# Patient Record
Sex: MALE | Race: WHITE | NOT HISPANIC OR LATINO | Employment: OTHER | URBAN - METROPOLITAN AREA
[De-identification: names, ages, dates, MRNs, and addresses within clinical notes are randomized per-mention and may not be internally consistent; named-entity substitution may affect disease eponyms.]

---

## 2017-01-26 ENCOUNTER — RADIANT APPOINTMENT (OUTPATIENT)
Dept: GENERAL RADIOLOGY | Facility: CLINIC | Age: 82
End: 2017-01-26
Attending: FAMILY MEDICINE
Payer: MEDICARE

## 2017-01-26 ENCOUNTER — OFFICE VISIT (OUTPATIENT)
Dept: FAMILY MEDICINE | Facility: CLINIC | Age: 82
End: 2017-01-26
Payer: MEDICARE

## 2017-01-26 VITALS
SYSTOLIC BLOOD PRESSURE: 148 MMHG | HEIGHT: 70 IN | RESPIRATION RATE: 14 BRPM | DIASTOLIC BLOOD PRESSURE: 80 MMHG | WEIGHT: 268 LBS | HEART RATE: 61 BPM | TEMPERATURE: 97.5 F | BODY MASS INDEX: 38.37 KG/M2

## 2017-01-26 DIAGNOSIS — M17.0 OSTEOARTHRITIS OF BOTH KNEES, UNSPECIFIED OSTEOARTHRITIS TYPE: Primary | ICD-10-CM

## 2017-01-26 DIAGNOSIS — M17.0 OSTEOARTHRITIS OF BOTH KNEES, UNSPECIFIED OSTEOARTHRITIS TYPE: ICD-10-CM

## 2017-01-26 PROCEDURE — 73562 X-RAY EXAM OF KNEE 3: CPT | Mod: RT

## 2017-01-26 PROCEDURE — 73562 X-RAY EXAM OF KNEE 3: CPT | Mod: LT

## 2017-01-26 PROCEDURE — 99214 OFFICE O/P EST MOD 30 MIN: CPT | Performed by: FAMILY MEDICINE

## 2017-01-26 NOTE — NURSING NOTE
"Chief Complaint   Patient presents with     Knee Pain     both knees are painful, left side goes up his leg to his left buttocks       Initial /79 mmHg  Pulse 61  Temp(Src) 97.5  F (36.4  C) (Oral)  Resp 14  Ht 5' 10\" (1.778 m)  Wt 268 lb (121.564 kg)  BMI 38.45 kg/m2 Estimated body mass index is 38.45 kg/(m^2) as calculated from the following:    Height as of this encounter: 5' 10\" (1.778 m).    Weight as of this encounter: 268 lb (121.564 kg).  BP completed using cuff size: large rt arm Marlene Juarez MA      "

## 2017-01-26 NOTE — PROGRESS NOTES
SUBJECTIVE:                                                    Damien Vallecillo is a 81 year old male who presents to clinic today for the following health issues:    Osteoarthritis of both knees, unspecified osteoarthritis type  (primary encounter diagnosis): Patient states that he has been experiencing bilateral pain in both knees. He reports that he has been in severe pain and can hardly walk. In his left knee he notes that the pain radiates up the leg to the buttock. The patient was previously prescribed Nabumetone a few months ago but states that it did not relieve pain.   PSHx The patient had a left knee replacement in [id number].  He has pain ever since  Past Medical History   Diagnosis Date     Hematuria      Acute, but ill-defined, cerebrovascular disease      Malignant neoplasm of rectosigmoid junction (H)      Gout, unspecified      Lentigo maligna (H)      Syncope      Glucose intolerance (impaired glucose tolerance) 5/31/2013     Arthritis      Acute suppurative arthritis (H)      Tubulovillous adenoma of colon      Chronic headaches      Other convulsions      last seizure 5-6 years ago...not certain if these were true seizres or not..     Diabetes (H)      Pre-diabetes 10/21/2016     Morbid obesity due to excess calories (H) 10/21/2016     Hammer toe of left foot 10/21/2016     Venous stasis dermatitis of both lower extremities 10/21/2016       ROS: Bilaterally knee pain, fall with no loss of consciousness. Slipped on the ice. No knee trauma, cardiac sx    This document serves as a record of the services and decisions personally performed and made by Alexandr Crocker MD. It was created on his behalf by Juice Smith a trained medical scribe. The creation of this document is based the provider's statements to the medical scribe. Juice Smith January 26, 2017 9:40 AM  OBJECTIVE:                                                    /79 mmHg  Pulse 61  Temp(Src) 97.5  F (36.4  C) (Oral)  Resp 14  Ht  "1.778 m (5' 10\")  Wt 121.564 kg (268 lb)  BMI 38.45 kg/m2  Body mass index is 38.45 kg/(m^2).  BP Readings from Last 1 Encounters:   01/26/17 148/80   GEN: Healthy, Alert, No distress  MS: Trace effusion in knees    XR bilat severe OA patellae, right with medial joint space loss  ASSESSMENT/PLAN:                                                    (M17.0) Osteoarthritis of both knees, unspecified osteoarthritis type  (primary encounter diagnosis)  Comment: right knee with loss of medial joint space, bilateral OA knee caps  Plan: XR Knee Left 3 Views, XR Knee Right 3 Views           ORTHO  REFERRAL PT now   systolic appropriate for this pt  RCK depending upon ortho    The information in this document, created by a scribe for me, accurately reflects the services I personally performed and the decisions made by me. I have reviewed and approved this document for accuracy. 9:41 AM January 26, 2017    ALICE HAMEED MD  [facility]  "

## 2017-01-26 NOTE — MR AVS SNAPSHOT
After Visit Summary   1/26/2017    Damien Vallecillo    MRN: [id number]           Patient Information     Date Of Birth          1935        Visit Information        Provider Department      1/26/2017 9:30 AM Obi Strange MD [facility]        Today's Diagnoses     Osteoarthritis of both knees, unspecified osteoarthritis type    -  1        Follow-ups after your visit        Additional Services     ORTHO  REFERRAL       [facility] is referring you to the Orthopedic  Services at [facility].       The  Representative will assist you in the coordination of your Orthopedic and Musculoskeletal Care as prescribed by your physician.    The  Representative will call you within 1 business day to help schedule your appointment, or you may contact the  Representative at:    All areas ~ [phone]     Type of Referral : Surgical / Specialist       Timeframe requested: Within 2 weeks    Coverage of these services is subject to the terms and limitations of your health insurance plan.  Please call member services at your health plan with any benefit or coverage questions.      If X-rays, CT or MRI's have been performed, please contact the facility where they were done to arrange for , prior to your scheduled appointment.  Please bring this referral request to your appointment and present it to your specialist.                  Who to contact     If you have questions or need follow up information about today's clinic visit or your schedule please contact [facility] directly at [phone].  Normal or non-critical lab and imaging results will be communicated to you by MyChart, letter or phone within 4 business days after the clinic has received the results. If you do not hear from us within 7 days, please contact the clinic through MyChart or phone. If you have a critical or abnormal  "lab result, we will notify you by phone as soon as possible.  Submit refill requests through [employer] or call your pharmacy and they will forward the refill request to us. Please allow 3 business days for your refill to be completed.          Additional Information About Your Visit        MyChart Information     [employer] lets you send messages to your doctor, view your test results, renew your prescriptions, schedule appointments and more. To sign up, go to [email or web address] . Click on \"Log in\" on the left side of the screen, which will take you to the Welcome page. Then click on \"Sign up Now\" on the right side of the page.     You will be asked to enter the access code listed below, as well as some personal information. Please follow the directions to create your username and password.     Your access code is: [email or web address]  Expires: 2017 10:31 AM     Your access code will  in 90 days. If you need help or a new code, please call your [facility] clinic or [phone].        Care EveryWhere ID     This is your Care EveryWhere ID. This could be used by other organizations to access your [facility] medical records  [id number]        Your Vitals Were     Pulse Temperature Respirations Height BMI (Body Mass Index)       61 97.5  F (36.4  C) (Oral) 14 5' 10\" (1.778 m) 38.45 kg/m2        Blood Pressure from Last 3 Encounters:   17 148/80   10/21/16 132/68   08/10/15 142/74    Weight from Last 3 Encounters:   17 268 lb (121.564 kg)   10/21/16 268 lb 14.4 oz (121.972 kg)   08/10/15 263 lb 10.7 oz (119.6 kg)              We Performed the Following     ORTHO  REFERRAL        Primary Care Provider Office Phone # Fax #    Obi Strange [phone]       [facility] [address] [place] [address]        Thank you!     Thank you for choosing [facility]  for your care. Our goal is always to provide you with excellent care. Hearing back " from our patients is one way we can continue to improve our services. Please take a few minutes to complete the written survey that you may receive in the mail after your visit with us. Thank you!             Your Updated Medication List - Protect others around you: Learn how to safely use, store and throw away your medicines at www.disposemymeds.org.          This list is accurate as of: 1/26/17 10:31 AM.  Always use your most recent med list.                   Brand Name Dispense Instructions for use    ACE NOT PRESCRIBED (INTENTIONAL)     0 each    ACE Inhibitor not prescribed due to Other:  Had cough on ACEI in past       acetaminophen 325 MG tablet    TYLENOL    100 tablet    Take 2 tablets (650 mg) by mouth every 4 hours as needed for mild pain       amoxicillin 500 MG capsule    AMOXIL    30 capsule    Take four pills one hour before dental work       atorvastatin 40 MG tablet    LIPITOR    90 tablet    Take 1 tablet (40 mg) by mouth At Bedtime       * blood glucose monitoring test strip    ACCU-CHEK LAURA    100 strip    1Use to test blood sugar 2 times daily or as directed.       * blood glucose monitoring test strip    ACCU-CHEK LAURA PLUS    100 each    Use to test blood sugar 1 time daily       CINNAMON PO          fish oil-omega-3 fatty acids 1000 MG capsule      Take 1 g by mouth every evening       flax seed oil 1000 MG capsule      Take 1 capsule by mouth every evening       Glucosamine HCl 1000 MG Tabs      Take 1 tablet by mouth every evening       tamsulosin 0.4 MG capsule    FLOMAX    90 capsule    Take 1 capsule (0.4 mg) by mouth At Bedtime       * Notice:  This list has 2 medication(s) that are the same as other medications prescribed for you. Read the directions carefully, and ask your doctor or other care provider to review them with you.

## 2017-01-27 ENCOUNTER — TELEPHONE (OUTPATIENT)
Dept: FAMILY MEDICINE | Facility: CLINIC | Age: 82
End: 2017-01-27

## 2017-01-27 ENCOUNTER — OFFICE VISIT (OUTPATIENT)
Dept: ORTHOPEDICS | Facility: CLINIC | Age: 82
End: 2017-01-27
Payer: MEDICARE

## 2017-01-27 VITALS
SYSTOLIC BLOOD PRESSURE: 148 MMHG | BODY MASS INDEX: 38.37 KG/M2 | HEIGHT: 70 IN | DIASTOLIC BLOOD PRESSURE: 80 MMHG | WEIGHT: 268 LBS

## 2017-01-27 DIAGNOSIS — M25.561 ARTHRALGIA OF RIGHT LOWER LEG: ICD-10-CM

## 2017-01-27 DIAGNOSIS — M17.11 PRIMARY LOCALIZED OSTEOARTHROSIS, LOWER LEG, RIGHT: ICD-10-CM

## 2017-01-27 DIAGNOSIS — M25.562 CHRONIC PAIN OF LEFT KNEE: Primary | ICD-10-CM

## 2017-01-27 DIAGNOSIS — G89.29 CHRONIC PAIN OF LEFT KNEE: Primary | ICD-10-CM

## 2017-01-27 PROCEDURE — 20610 DRAIN/INJ JOINT/BURSA W/O US: CPT | Mod: RT | Performed by: ORTHOPAEDIC SURGERY

## 2017-01-27 PROCEDURE — 99203 OFFICE O/P NEW LOW 30 MIN: CPT | Mod: 25 | Performed by: ORTHOPAEDIC SURGERY

## 2017-01-27 ASSESSMENT — PATIENT HEALTH QUESTIONNAIRE - PHQ9: SUM OF ALL RESPONSES TO PHQ QUESTIONS 1-9: 2

## 2017-01-27 NOTE — TELEPHONE ENCOUNTER
Patients wife phoned stating he had an appt. In [place] today at 1;30 and needed  To take images with them. I burned a cd with images from 1/26/17 of bilateral knees.  Printed report and left a release for him to sign up front.  Nanda Nguyen  [facility]  [phone]  Fax [phone]

## 2017-01-27 NOTE — PROGRESS NOTES
HISTORY OF PRESENT ILLNESS:    Damien Vallecillo is a 81 year old male who is seen in consultation at the request of Dr. Strange for Bilateral knee pain.  Pt. Would like to discuss options for pain relief    Present symptoms: Pain while ambulating is very difficult, pain during sleep  Treatments tried to this point: Injections: Ibuprofen, elevating leg  Orthopedic PMH: Left knee replacement 2002/2003     Past Medical History   Diagnosis Date     Hematuria      Acute, but ill-defined, cerebrovascular disease      Malignant neoplasm of rectosigmoid junction (H)      Gout, unspecified      Lentigo maligna (H)      Syncope      Glucose intolerance (impaired glucose tolerance) 5/31/2013     Arthritis      Acute suppurative arthritis (H)      Tubulovillous adenoma of colon      Chronic headaches      Other convulsions      last seizure 5-6 years ago...not certain if these were true seizres or not..     Diabetes (H)      Pre-diabetes 10/21/2016     Morbid obesity due to excess calories (H) 10/21/2016     Hammer toe of left foot 10/21/2016     Venous stasis dermatitis of both lower extremities 10/21/2016       Past Surgical History   Procedure Laterality Date     C nonspecific procedure       right heel surgery; spur removed.     Orthopedic surgery       lt knee arthroplasty     Herniorrhaphy epigastric  4/27/2012     Procedure:HERNIORRHAPHY EPIGASTRIC; Epigastric Hernia Repair with mesh ; Surgeon:BOLIVAR OLIVEIRA; Location: OR     Sigmoidoscopy flexible  5/29/2013     Procedure: SIGMOIDOSCOPY FLEXIBLE;  SIGMOIDOSCOPY FLEXIBLE (FV) Needs blood sugar ck'd;  Surgeon: Enrique Salazar MD;  Location:  GI     Colonoscopy N/A 6/23/2015     Procedure: COMBINED COLONOSCOPY, SINGLE OR MULTIPLE BIOPSY/POLYPECTOMY BY BIOPSY;  Surgeon: Kimberly Alfaro MD;  Location:  GI     Colonoscopy N/A 7/30/2015     Procedure: COLONOSCOPY;  Surgeon: Enrique Salazar MD;  Location:  OR     Laparoscopic assisted colectomy Right 7/30/2015      Procedure: LAPAROSCOPIC ASSISTED COLECTOMY;  Surgeon: Enrique Salazar MD;  Location: [facility] OR       Family History   Problem Relation Age of Onset     Cancer - colorectal Mother      CEREBROVASCULAR DISEASE Father        Social History     Social History     Marital Status:      Spouse Name: N/A     Number of Children: N/A     Years of Education: N/A     Occupational History     Not on file.     Social History Main Topics     Smoking status: Never Smoker      Smokeless tobacco: Never Used     Alcohol Use: 0.0 oz/week     0 Standard drinks or equivalent per week      Comment: one or two per day, beer, wine     Drug Use: No     Sexual Activity:     Partners: Female     Other Topics Concern     Parent/Sibling W/ Cabg, Mi Or Angioplasty Before 65f 55m? No     Social History Narrative       Current Outpatient Prescriptions   Medication Sig Dispense Refill     CINNAMON PO        tamsulosin (FLOMAX) 0.4 MG 24 hr capsule Take 1 capsule (0.4 mg) by mouth At Bedtime 90 capsule 3     atorvastatin (LIPITOR) 40 MG tablet Take 1 tablet (40 mg) by mouth At Bedtime 90 tablet 3     acetaminophen (TYLENOL) 325 MG tablet Take 2 tablets (650 mg) by mouth every 4 hours as needed for mild pain 100 tablet 3     blood glucose monitoring (ACCU-CHEK LAURA PLUS) test strip Use to test blood sugar 1 time daily 100 each 11     blood glucose (ACCU-CHEK LAURA) test strip 1Use to test blood sugar 2 times daily or as directed. 100 strip 3     fish oil-omega-3 fatty acids 1000 MG capsule Take 1 g by mouth every evening       Flaxseed, Linseed, (FLAX SEED OIL) 1000 MG capsule Take 1 capsule by mouth every evening       Glucosamine HCl 1000 MG TABS Take 1 tablet by mouth every evening       amoxicillin (AMOXIL) 500 MG capsule Take four pills one hour before dental work 30 capsule 0     ACE NOT PRESCRIBED, INTENTIONAL, ACE Inhibitor not prescribed due to Other:  Had cough on ACEI in past       0 each 0       Allergies   Allergen Reactions      "Levetiracetam [Keppra] Rash     Oxcarbazepine [Trileptal] Rash       REVIEW OF SYSTEMS:  CONSTITUTIONAL:  NEGATIVE for fever, chills, change in weight  INTEGUMENTARY/SKIN:  NEGATIVE for worrisome rashes, moles or lesions  EYES:  NEGATIVE for vision changes or irritation  ENT/MOUTH:  NEGATIVE for ear, mouth and throat problems  RESP:  NEGATIVE for significant cough or SOB  BREAST:  NEGATIVE for masses, tenderness or discharge  CV:  NEGATIVE for chest pain, palpitations or peripheral edema  GI:  NEGATIVE for nausea, abdominal pain, heartburn, or change in bowel habits  :  Negative   MUSCULOSKELETAL:  See HPI above  NEURO:  NEGATIVE for weakness, dizziness or paresthesias  ENDOCRINE:  NEGATIVE for temperature intolerance, skin/hair changes  HEME/ALLERGY/IMMUNE:  NEGATIVE for bleeding problems  PSYCHIATRIC:  NEGATIVE for changes in mood or affect      PHYSICAL EXAM:  /80 mmHg  Ht 5' 10\" (1.778 m)  Wt 268 lb (121.564 kg)  BMI 38.45 kg/m2  Body mass index is 38.45 kg/(m^2).   GENERAL APPEARANCE: healthy, alert and no distress   SKIN: no suspicious lesions or rashes  NEURO: Normal strength and tone, mentation intact and speech normal  VASCULAR: Good pulses, and capillary refill   LYMPH: no lymphadenopathy   PSYCH:  mentation appears normal and affect normal/bright    MSK:  A&OX3, NAD  Neck supple, no lymphadenopathy  The patient ambulates without an antalgic gait.  The patient is able to get on and off the exam table without difficulty.      Examination of the spine reveals a normal lordosis to the cervical and lumbar spine, and a normal kyphosis to the thoracic spine.  There is no clinical evidence of scoliosis.    The pelvis is clinically level.  Trendelenberg is negative.  The patient is non-tender to palpation over the greater trochanteric bursal region or piriformis fossa.  With the hip flexed to 90 degrees, internal and external rotation is 30/60 respectively,  with no pain .      KNEE: Examination of the " bilateral knee reveals the lower extremity to have a Normal anatomic alignment.  There is no erythema, ecchymosis nor edema. The left knee surgical scar is well-healed. There is moderate effusion present clinically.  There is no significant warmth.  Range of motion is 0 to 120 degrees, without crepitus.  There is mild tenderness to palpation at the both medial and lateral joint line.  Delia's is not done . The knee is ligamentously stable. Patello-femoral grind test is not done.      The calves and thighs are symmetric, without atrophy and non-tender to palpation. Navid's sign is negative, bilaterally.   CMS is intact to the toes.        ASSESSMENT / PLAN: 10 years status post left total knee arthroplasty, possibly loosening. I ordered a bone scan to look for loosening. The right knee has primary osteoarthritis getting close to end-stage. At this point I offered him a corticosteroid injection to the right knee, which he accepted. Following a sterile prep, 40 mg of Depo-Medrol 4 mg of dexamethasone along with 3 cc 1% lidocaine was instilled into his right knee.    Imaging Interpretation:         Mark Raymond MD  Department of Orthopedic Surgery        Disclaimer: This note consists of symbols derived from keyboarding, dictation and/or voice recognition software. As a result, there may be errors in the script that have gone undetected. Please consider this when interpreting information found in this chart.

## 2017-01-27 NOTE — PATIENT INSTRUCTIONS
*Follow up as needed or as discussed with your care provider  *See Chart Note for specific details      Your Bone scan has been ordered.  [facility] Scheduling will contact you to schedule.  Please call [phone] if you have not heard from them in two days.     [facility]  [address] [facility]   [place], [place] [address] [phone]      Arrive 15 minutes early.  If you need to cancel or change the appointment please call [phone]      Please follow up in clinic 2 business days following the Bone Scan

## 2017-01-27 NOTE — NURSING NOTE
"Chief Complaint   Patient presents with     Musculoskeletal Problem     knee pain       Initial /80 mmHg  Ht 5' 10\" (1.778 m)  Wt 268 lb (121.564 kg)  BMI 38.45 kg/m2 Estimated body mass index is 38.45 kg/(m^2) as calculated from the following:    Height as of this encounter: 5' 10\" (1.778 m).    Weight as of this encounter: 268 lb (121.564 kg).  BP completed using cuff size: NA (Not Taken)    Iliana Herzog, RT (R)      "

## 2017-01-27 NOTE — MR AVS SNAPSHOT
After Visit Summary   1/27/2017    Damien Vallecillo    MRN: [id number]           Patient Information     Date Of Birth          1935        Visit Information        Provider Department      1/27/2017 1:20 PM Alec Raymond MD [facility] ORTHOPEDIC SURGERY        Today's Diagnoses     Chronic pain of left knee    -  1     Primary localized osteoarthrosis, lower leg, right [M17.11]         Arthralgia of right lower leg [M25.561]           Care Instructions    *Follow up as needed or as discussed with your care provider  *See Chart Note for specific details      Your Bone scan has been ordered.  [facility] Scheduling will contact you to schedule.  Please call [phone] if you have not heard from them in two days.     [facility]  [address] [place] [address] [phone]      Arrive 15 minutes early.  If you need to cancel or change the appointment please call [phone]      Please follow up in clinic 2 business days following the Bone Scan                          Follow-ups after your visit        Your next 10 appointments already scheduled     Feb 02, 2017  1:50 PM   MOE Extremity with Vern Levy, PT   [facility]    [address]  [facility] [address] [phone]              Future tests that were ordered for you today     Open Future Orders        Priority Expected Expires Ordered    NM Bone Scan Whole Body Routine  1/27/2018 1/27/2017            Who to contact     If you have questions or need follow up information about today's clinic visit or your schedule please contact [facility] ORTHOPEDIC SURGERY directly at [phone].  Normal or non-critical lab and imaging results will be communicated to you by MyChart, letter or phone within 4 business days after the clinic has received the results. If you do not hear from us within 7 days, please  "contact the clinic through [employer] or phone. If you have a critical or abnormal lab result, we will notify you by phone as soon as possible.  Submit refill requests through [employer] or call your pharmacy and they will forward the refill request to us. Please allow 3 business days for your refill to be completed.          Additional Information About Your Visit        [employer] Information     [employer] lets you send messages to your doctor, view your test results, renew your prescriptions, schedule appointments and more. To sign up, go to [email or web address] . Click on \"Log in\" on the left side of the screen, which will take you to the Welcome page. Then click on \"Sign up Now\" on the right side of the page.     You will be asked to enter the access code listed below, as well as some personal information. Please follow the directions to create your username and password.     Your access code is: [email or web address]  Expires: 2017 10:31 AM     Your access code will  in 90 days. If you need help or a new code, please call your [facility] clinic or [phone].        Care EveryWhere ID     This is your Care EveryWhere ID. This could be used by other organizations to access your [facility] medical records  [id number]        Your Vitals Were     Height BMI (Body Mass Index)                5' 10\" (1.778 m) 38.45 kg/m2           Blood Pressure from Last 3 Encounters:   17 148/80   17 148/80   10/21/16 132/68    Weight from Last 3 Encounters:   17 268 lb (121.564 kg)   17 268 lb (121.564 kg)   10/21/16 268 lb 14.4 oz (121.972 kg)               Primary Care Provider Office Phone # Fax #    Obi Strange [phone]       [facility] [address] [place] [address]        Thank you!     Thank you for choosing [facility] ORTHOPEDIC SURGERY  for your care. Our goal is always to provide you with excellent care. Hearing back from our patients is one way we " can continue to improve our services. Please take a few minutes to complete the written survey that you may receive in the mail after your visit with us. Thank you!             Your Updated Medication List - Protect others around you: Learn how to safely use, store and throw away your medicines at www.disposemymeds.org.          This list is accurate as of: 1/27/17  1:51 PM.  Always use your most recent med list.                   Brand Name Dispense Instructions for use    ACE NOT PRESCRIBED (INTENTIONAL)     0 each    ACE Inhibitor not prescribed due to Other:  Had cough on ACEI in past       acetaminophen 325 MG tablet    TYLENOL    100 tablet    Take 2 tablets (650 mg) by mouth every 4 hours as needed for mild pain       amoxicillin 500 MG capsule    AMOXIL    30 capsule    Take four pills one hour before dental work       atorvastatin 40 MG tablet    LIPITOR    90 tablet    Take 1 tablet (40 mg) by mouth At Bedtime       * blood glucose monitoring test strip    ACCU-CHEK LAURA    100 strip    1Use to test blood sugar 2 times daily or as directed.       * blood glucose monitoring test strip    ACCU-CHEK LAURA PLUS    100 each    Use to test blood sugar 1 time daily       CINNAMON PO          fish oil-omega-3 fatty acids 1000 MG capsule      Take 1 g by mouth every evening       flax seed oil 1000 MG capsule      Take 1 capsule by mouth every evening       Glucosamine HCl 1000 MG Tabs      Take 1 tablet by mouth every evening       tamsulosin 0.4 MG capsule    FLOMAX    90 capsule    Take 1 capsule (0.4 mg) by mouth At Bedtime       * Notice:  This list has 2 medication(s) that are the same as other medications prescribed for you. Read the directions carefully, and ask your doctor or other care provider to review them with you.

## 2017-02-01 ENCOUNTER — TELEPHONE (OUTPATIENT)
Dept: ORTHOPEDICS | Facility: CLINIC | Age: 82
End: 2017-02-01

## 2017-02-01 NOTE — TELEPHONE ENCOUNTER
Alejandrina called on behalf of Damien looking to see about scheduling the Bone scan as they have not been called yet.    I called back and LVM that the order had more recently been signed and that they should get a call soon or they can call ([phone]) to schedule.    Vitor Thomason, ATC

## 2017-02-02 NOTE — TELEPHONE ENCOUNTER
Wife calls stating scheduling has not called to set up a bone scan. Informed of message below and phone number given to call to schedule. Apologized for the delay.     MARK Ontiveros RN

## 2017-02-03 ENCOUNTER — HOSPITAL ENCOUNTER (OUTPATIENT)
Dept: NUCLEAR MEDICINE | Facility: CLINIC | Age: 82
Setting detail: NUCLEAR MEDICINE
Discharge: HOME | End: 2017-02-03
Attending: ORTHOPAEDIC SURGERY | Admitting: ORTHOPAEDIC SURGERY
Payer: MEDICARE

## 2017-02-03 ENCOUNTER — HOSPITAL ENCOUNTER (OUTPATIENT)
Dept: NUCLEAR MEDICINE | Facility: CLINIC | Age: 82
Setting detail: NUCLEAR MEDICINE
End: 2017-02-03
Attending: ORTHOPAEDIC SURGERY
Payer: MEDICARE

## 2017-02-03 DIAGNOSIS — G89.29 CHRONIC PAIN OF LEFT KNEE: ICD-10-CM

## 2017-02-03 DIAGNOSIS — M25.562 CHRONIC PAIN OF LEFT KNEE: ICD-10-CM

## 2017-02-03 PROCEDURE — 34300033 ZZH RX 343: Performed by: ORTHOPAEDIC SURGERY

## 2017-02-03 PROCEDURE — A9561 TC99M OXIDRONATE: HCPCS | Performed by: ORTHOPAEDIC SURGERY

## 2017-02-03 PROCEDURE — 78315 BONE IMAGING 3 PHASE: CPT

## 2017-02-03 RX ADMIN — Medication 25 MCI.: at 11:15

## 2017-02-08 ENCOUNTER — OFFICE VISIT (OUTPATIENT)
Dept: ORTHOPEDICS | Facility: CLINIC | Age: 82
End: 2017-02-08
Payer: MEDICARE

## 2017-02-08 ENCOUNTER — TELEPHONE (OUTPATIENT)
Dept: ORTHOPEDICS | Facility: CLINIC | Age: 82
End: 2017-02-08

## 2017-02-08 VITALS
BODY MASS INDEX: 38.37 KG/M2 | SYSTOLIC BLOOD PRESSURE: 130 MMHG | HEIGHT: 70 IN | WEIGHT: 268 LBS | DIASTOLIC BLOOD PRESSURE: 78 MMHG

## 2017-02-08 DIAGNOSIS — T84.018D FAILED TOTAL KNEE ARTHROPLASTY, SUBSEQUENT ENCOUNTER: Primary | ICD-10-CM

## 2017-02-08 DIAGNOSIS — Z96.659 FAILED TOTAL KNEE ARTHROPLASTY, SUBSEQUENT ENCOUNTER: Primary | ICD-10-CM

## 2017-02-08 PROCEDURE — 99213 OFFICE O/P EST LOW 20 MIN: CPT | Performed by: ORTHOPAEDIC SURGERY

## 2017-02-08 NOTE — MR AVS SNAPSHOT
After Visit Summary   2/8/2017    Damien Vallecillo    MRN: [id number]           Patient Information     Date Of Birth          1935        Visit Information        Provider Department      2/8/2017 1:20 PM Alec Raymond MD [facility] ORTHOPEDIC SURGERY        Today's Diagnoses     Failed total knee arthroplasty, subsequent encounter    -  1       Follow-ups after your visit        Your next 10 appointments already scheduled     Feb 20, 2017   Procedure with Alec Raymond MD   [facility] PeriOp Services (--)    [address]  [facility] [address]   [phone]            Feb 24, 2017 10:50 AM CST   MOE Extremity with Vern Levy PT   [facility] ([facility] [place])    [address]  [facility] [id number] [phone]            Mar 03, 2017  9:40 AM CST   Return Visit with Thomas Govea PA-C   [facility] ORTHOPEDIC SURGERY ([facility] [place])    [address]  [facility] [address]   [phone]              Who to contact     If you have questions or need follow up information about today's clinic visit or your schedule please contact [facility] ORTHOPEDIC SURGERY directly at [phone].  Normal or non-critical lab and imaging results will be communicated to you by MyChart, letter or phone within 4 business days after the clinic has received the results. If you do not hear from us within 7 days, please contact the clinic through MyChart or phone. If you have a critical or abnormal lab result, we will notify you by phone as soon as possible.  Submit refill requests through [email or web address] or call your pharmacy and they will forward the refill request to us. Please allow 3 business days for your refill to be completed.          Additional Information About Your Visit        [email or web address] Information     [email or web address] lets you send messages to your doctor, view  "your test results, renew your prescriptions, schedule appointments and more. To sign up, go to [email or web address] . Click on \"Log in\" on the left side of the screen, which will take you to the Welcome page. Then click on \"Sign up Now\" on the right side of the page.     You will be asked to enter the access code listed below, as well as some personal information. Please follow the directions to create your username and password.     Your access code is: [email or web address]  Expires: 2017 10:31 AM     Your access code will  in 90 days. If you need help or a new code, please call your [place] clinic or [phone].        Care EveryWhere ID     This is your Care EveryWhere ID. This could be used by other organizations to access your [place] medical records  [id number]        Your Vitals Were     Height BMI (Body Mass Index)                5' 10\" (1.778 m) 38.45 kg/m2           Blood Pressure from Last 3 Encounters:   17 124/64   17 130/78   17 148/80    Weight from Last 3 Encounters:   17 266 lb 4.8 oz (120.8 kg)   17 268 lb (121.6 kg)   17 268 lb (121.6 kg)              Today, you had the following     No orders found for display       Primary Care Provider Office Phone # Fax #    Obi Strange [phone]       [address] [address]        Thank you!     Thank you for choosing [facility] ORTHOPEDIC SURGERY  for your care. Our goal is always to provide you with excellent care. Hearing back from our patients is one way we can continue to improve our services. Please take a few minutes to complete the written survey that you may receive in the mail after your visit with us. Thank you!             Your Updated Medication List - Protect others around you: Learn how to safely use, store and throw away your medicines at www.disposemymeds.org.          This list is accurate as of: 17 11:59 PM.  Always " use your most recent med list.                   Brand Name Dispense Instructions for use    ACE NOT PRESCRIBED (INTENTIONAL)     0 each    ACE Inhibitor not prescribed due to Other:  Had cough on ACEI in past       amoxicillin 500 MG capsule    AMOXIL    30 capsule    Take four pills one hour before dental work       atorvastatin 40 MG tablet    LIPITOR    90 tablet    Take 1 tablet (40 mg) by mouth At Bedtime       * blood glucose monitoring test strip    ACCU-CHEK LAURA    100 strip    1Use to test blood sugar 2 times daily or as directed.       * blood glucose monitoring test strip    ACCU-CHEK LAURA PLUS    100 each    Use to test blood sugar 1 time daily       CINNAMON PO          fish oil-omega-3 fatty acids 1000 MG capsule      Take 1 g by mouth every evening       flax seed oil 1000 MG capsule      Take 1 capsule by mouth every evening       Glucosamine HCl 1000 MG Tabs      Take 1 tablet by mouth every evening       tamsulosin 0.4 MG capsule    FLOMAX    90 capsule    Take 1 capsule (0.4 mg) by mouth At Bedtime       * Notice:  This list has 2 medication(s) that are the same as other medications prescribed for you. Read the directions carefully, and ask your doctor or other care provider to review them with you.

## 2017-02-08 NOTE — NURSING NOTE
"Chief Complaint   Patient presents with     Results     Bone Scan results, Left knee pain       Initial /78 mmHg  Ht 5' 10\" (1.778 m)  Wt 268 lb (121.564 kg)  BMI 38.45 kg/m2 Estimated body mass index is 38.45 kg/(m^2) as calculated from the following:    Height as of this encounter: 5' 10\" (1.778 m).    Weight as of this encounter: 268 lb (121.564 kg).  Medication Reconciliation: complete    "

## 2017-02-08 NOTE — TELEPHONE ENCOUNTER
Scheduled surgery for Revision Left total knee Arthroplasty on 2/20/2017 with Dr. Raymond @ [facility] @ 9:30AM.  Surgery education packet provided to patient.

## 2017-02-08 NOTE — PROGRESS NOTES
"HISTORY OF PRESENT ILLNESS:    Damien Vallecillo is a 81 year old male who is seen in follow up for left knee pain, Bone Scan results.  Pt states the cortisone injection in the right knee seems to have helped with the pain, rates current improvement at 50%.      PHYSICAL EXAM:  /78 mmHg  Ht 5' 10\" (1.778 m)  Wt 268 lb (121.564 kg)  BMI 38.45 kg/m2  Body mass index is 38.45 kg/(m^2).   GENERAL APPEARANCE: healthy, alert and no distress   SKIN: no suspicious lesions or rashes  NEURO: Normal strength and tone, mentation intact and speech normal  VASCULAR:  good pulses, and cappillary refill   LYMPH: no lymphadenopathy   PSYCH:  mentation appears normal and affect normal/bright    MSK:  Examination of his knees reveals no erythema ecchymosis nor edema. Surgical wound of the left knee is well-healed. Homans sign is negative. He has full extension and flexes to 110  on the left.    IMAGING INTERPRETATION:    Results for orders placed or performed during the hospital encounter of 02/03/17   NM Bone Scan 3 Phase    [facility]    NUCLEAR MEDICINE BONE SCAN THREE PHASE  2/3/2017 1:35 PM    HISTORY: Pain in left knee. Other chronic pain.    DOSE:  24.0mCI Tc99m HDP    COMPARISON:  Prior bone scan: None available.   Relevant imaging study: None.    FINDINGS: Flow images demonstrate mild increased blood flow to the  left knee area. Blood pool images demonstrate mild hyperemia  surrounding the left knee. Delayed images demonstrate a small focus of  increased activity beneath the medial tibial plateau component. This  could represent some loosening or resorption beneath the tibial  component. No significant abnormal uptake elsewhere.      Impression    IMPRESSION:  Slight increased flow and hyperemia surrounding the left  knee. Delayed images demonstrate abnormal increased uptake deep to the  medial tibial plateau component which could represent loosening or  resorption in this region. The increased flow and blood pool " activity  indicate inflammation that could be related to the loosening. It is  difficult to exclude infection based on this type of imaging.    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 We discussed revision total knee arthroplasty on the left and 8 primary total knee on the right. He would like to proceed with the former. We'll schedule this for later this month.      Return to clinic after surgery    Mark Raymond MD  Dept. Orthopedic Surgery  [facility]       Disclaimer: This note consists of symbols derived from keyboarding, dictation and/or voice recognition software. As a result, there may be errors in the script that have gone undetected. Please consider this when interpreting information found in this chart.

## 2017-02-13 ENCOUNTER — OFFICE VISIT (OUTPATIENT)
Dept: FAMILY MEDICINE | Facility: CLINIC | Age: 82
End: 2017-02-13
Payer: MEDICARE

## 2017-02-13 VITALS
WEIGHT: 266.3 LBS | HEIGHT: 70 IN | BODY MASS INDEX: 38.12 KG/M2 | TEMPERATURE: 97.8 F | SYSTOLIC BLOOD PRESSURE: 124 MMHG | DIASTOLIC BLOOD PRESSURE: 64 MMHG | HEART RATE: 55 BPM

## 2017-02-13 DIAGNOSIS — Z01.818 PREOP GENERAL PHYSICAL EXAM: Primary | ICD-10-CM

## 2017-02-13 LAB
BASOPHILS # BLD AUTO: 0.1 10E9/L (ref 0–0.2)
BASOPHILS NFR BLD AUTO: 0.7 %
DIFFERENTIAL METHOD BLD: ABNORMAL
EOSINOPHIL # BLD AUTO: 0.4 10E9/L (ref 0–0.7)
EOSINOPHIL NFR BLD AUTO: 5 %
ERYTHROCYTE [DISTWIDTH] IN BLOOD BY AUTOMATED COUNT: 19.6 % (ref 10–15)
HCT VFR BLD AUTO: 34.6 % (ref 40–53)
HGB BLD-MCNC: 10.8 G/DL (ref 13.3–17.7)
LYMPHOCYTES # BLD AUTO: 1 10E9/L (ref 0.8–5.3)
LYMPHOCYTES NFR BLD AUTO: 12.6 %
MCH RBC QN AUTO: 22.6 PG (ref 26.5–33)
MCHC RBC AUTO-ENTMCNC: 31.2 G/DL (ref 31.5–36.5)
MCV RBC AUTO: 73 FL (ref 78–100)
MONOCYTES # BLD AUTO: 1 10E9/L (ref 0–1.3)
MONOCYTES NFR BLD AUTO: 13.6 %
NEUTROPHILS # BLD AUTO: 5.2 10E9/L (ref 1.6–8.3)
NEUTROPHILS NFR BLD AUTO: 68.1 %
PLATELET # BLD AUTO: 234 10E9/L (ref 150–450)
RBC # BLD AUTO: 4.77 10E12/L (ref 4.4–5.9)
WBC # BLD AUTO: 7.6 10E9/L (ref 4–11)

## 2017-02-13 PROCEDURE — 36415 COLL VENOUS BLD VENIPUNCTURE: CPT | Performed by: FAMILY MEDICINE

## 2017-02-13 PROCEDURE — 99214 OFFICE O/P EST MOD 30 MIN: CPT | Performed by: FAMILY MEDICINE

## 2017-02-13 PROCEDURE — 85025 COMPLETE CBC W/AUTO DIFF WBC: CPT | Performed by: FAMILY MEDICINE

## 2017-02-13 PROCEDURE — 93000 ELECTROCARDIOGRAM COMPLETE: CPT | Performed by: FAMILY MEDICINE

## 2017-02-13 PROCEDURE — 80048 BASIC METABOLIC PNL TOTAL CA: CPT | Performed by: FAMILY MEDICINE

## 2017-02-13 NOTE — NURSING NOTE
"Chief Complaint   Patient presents with     Pre-Op Exam       Initial There were no vitals taken for this visit. Estimated body mass index is 38.45 kg/(m^2) as calculated from the following:    Height as of 2/8/17: 5' 10\" (1.778 m).    Weight as of 2/8/17: 268 lb (121.6 kg).  Medication Reconciliation: complete rt arm Marlene Juarez MA      "

## 2017-02-13 NOTE — LETTER
[facility]  [address] [place], [address]  [phone]      February 14, 2017    Damien Vallecillo  [address] [facility] [address]          Dear Damien,    The results of your recent tests are back. Tests are all OK for your surgery. Let's look at that anemia after you get done. Enclosed is a copy of the results.  It was a pleasure to see you at your last appointment.  Results for orders placed or performed in visit on 02/13/17   CBC with platelets and differential   Result Value Ref Range    WBC 7.6 4.0 - 11.0 10e9/L    RBC Count 4.77 4.4 - 5.9 10e12/L    Hemoglobin 10.8 (L) 13.3 - 17.7 g/dL    Hematocrit 34.6 (L) 40.0 - 53.0 %    MCV 73 (L) 78 - 100 fl    MCH 22.6 (L) 26.5 - 33.0 pg    MCHC 31.2 (L) 31.5 - 36.5 g/dL    RDW 19.6 (H) 10.0 - 15.0 %    Platelet Count 234 150 - 450 10e9/L    Diff Method Automated Method     % Neutrophils 68.1 %    % Lymphocytes 12.6 %    % Monocytes 13.6 %    % Eosinophils 5.0 %    % Basophils 0.7 %    Absolute Neutrophil 5.2 1.6 - 8.3 10e9/L    Absolute Lymphocytes 1.0 0.8 - 5.3 10e9/L    Absolute Monocytes 1.0 0.0 - 1.3 10e9/L    Absolute Eosinophils 0.4 0.0 - 0.7 10e9/L    Absolute Basophils 0.1 0.0 - 0.2 10e9/L   Basic metabolic panel  (Ca, Cl, CO2, Creat, Gluc, K, Na, BUN)   Result Value Ref Range    Sodium 140 133 - 144 mmol/L    Potassium 4.1 3.4 - 5.3 mmol/L    Chloride 106 94 - 109 mmol/L    Carbon Dioxide 27 20 - 32 mmol/L    Anion Gap 7 3 - 14 mmol/L    Glucose 111 (H) 70 - 99 mg/dL    Urea Nitrogen 12 7 - 30 mg/dL    Creatinine 0.74 0.66 - 1.25 mg/dL    GFR Estimate >90  Non  GFR Calc   >60 mL/min/1.7m2    GFR Estimate If Black >90   GFR Calc   >60 mL/min/1.7m2    Calcium 9.1 8.5 - 10.1 mg/dL         If you have any questions or concerns, please call myself or my nurse at [phone].          Sincerely,    Obi Strange MD  [id number]

## 2017-02-13 NOTE — MR AVS SNAPSHOT
After Visit Summary   2/13/2017    Damien Vallecillo    MRN: [id number]           Patient Information     Date Of Birth          1935        Visit Information        Provider Department      2/13/2017 10:45 AM Obi Strange MD [facility]        Today's Diagnoses     Preop general physical exam    -  1      Care Instructions      Before Your Surgery      Call your surgeon if there is any change in your health. This includes signs of a cold or flu (such as a sore throat, runny nose, cough, rash or fever).    Do not smoke, drink alcohol or take over the counter medicine (unless your surgeon or primary care doctor tells you to) for the 24 hours before and after surgery.    If you take prescribed drugs: Follow your doctor s orders about which medicines to take and which to stop until after surgery.    Eating and drinking prior to surgery: follow the instructions from your surgeon    Take a shower or bath the night before surgery. Use the soap your surgeon gave you to gently clean your skin. If you do not have soap from your surgeon, use your regular soap. Do not shave or scrub the surgery site.  Wear clean pajamas and have clean sheets on your bed.         Follow-ups after your visit        Your next 10 appointments already scheduled     Feb 20, 2017   Procedure with Alec Raymond MD   [facility] PeriOp Services (--)    [address] [facility] [id number] [phone] Feb 24, 2017 10:50 AM CST   MOE Extremity with Vern Levy, SARAH   [facility] ([facility] [place])    [address]  [facility] [address] [phone]            Mar 03, 2017  9:40 AM CST   Return Visit with Thomas Govea PA-C   [facility] ([facility] [place])    [address]  [address]  [facility] [address]   [phone]              Who to contact     If you  "have questions or need follow up information about today's clinic visit or your schedule please contact [facility] directly at [phone].  Normal or non-critical lab and imaging results will be communicated to you by MyChart, letter or phone within 4 business days after the clinic has received the results. If you do not hear from us within 7 days, please contact the clinic through [employer] or phone. If you have a critical or abnormal lab result, we will notify you by phone as soon as possible.  Submit refill requests through CallFire or call your pharmacy and they will forward the refill request to us. Please allow 3 business days for your refill to be completed.          Additional Information About Your Visit        [employer] Information     CallFire lets you send messages to your doctor, view your test results, renew your prescriptions, schedule appointments and more. To sign up, go to [email or web address] . Click on \"Log in\" on the left side of the screen, which will take you to the Welcome page. Then click on \"Sign up Now\" on the right side of the page.     You will be asked to enter the access code listed below, as well as some personal information. Please follow the directions to create your username and password.     Your access code is: [email or web address]  Expires: 2017 10:31 AM     Your access code will  in 90 days. If you need help or a new code, please call your [place] clinic or [phone].        Care EveryWhere ID     This is your Care EveryWhere ID. This could be used by other organizations to access your [place] medical records  [id number]        Your Vitals Were     Pulse Temperature Height BMI (Body Mass Index)          55 97.8  F (36.6  C) (Oral) 5' 10\" (1.778 m) 38.21 kg/m2         Blood Pressure from Last 3 Encounters:   17 142/83   17 130/78   17 148/80    Weight from Last 3 Encounters:   17 266 lb 4.8 oz (120.8 kg)   17 268 lb " (121.6 kg)   01/27/17 268 lb (121.6 kg)              We Performed the Following     Basic metabolic panel  (Ca, Cl, CO2, Creat, Gluc, K, Na, BUN)     CBC with platelets and differential     EKG 12-lead complete w/read - Clinics        Primary Care Provider Office Phone # Fax #    Obi Strange [phone]       [facility] [address]  [facility] [address]        Thank you!     Thank you for choosing [facility]  for your care. Our goal is always to provide you with excellent care. Hearing back from our patients is one way we can continue to improve our services. Please take a few minutes to complete the written survey that you may receive in the mail after your visit with us. Thank you!             Your Updated Medication List - Protect others around you: Learn how to safely use, store and throw away your medicines at www.disposemymeds.org.          This list is accurate as of: 2/13/17 11:26 AM.  Always use your most recent med list.                   Brand Name Dispense Instructions for use    ACE NOT PRESCRIBED (INTENTIONAL)     0 each    ACE Inhibitor not prescribed due to Other:  Had cough on ACEI in past       amoxicillin 500 MG capsule    AMOXIL    30 capsule    Take four pills one hour before dental work       atorvastatin 40 MG tablet    LIPITOR    90 tablet    Take 1 tablet (40 mg) by mouth At Bedtime       * blood glucose monitoring test strip    ACCU-CHEK LAURA    100 strip    1Use to test blood sugar 2 times daily or as directed.       * blood glucose monitoring test strip    ACCU-CHEK LAURA PLUS    100 each    Use to test blood sugar 1 time daily       CINNAMON PO          fish oil-omega-3 fatty acids 1000 MG capsule      Take 1 g by mouth every evening       flax seed oil 1000 MG capsule      Take 1 capsule by mouth every evening       Glucosamine HCl 1000 MG Tabs      Take 1 tablet by mouth every evening       tamsulosin 0.4 MG capsule     FLOMAX    90 capsule    Take 1 capsule (0.4 mg) by mouth At Bedtime       * Notice:  This list has 2 medication(s) that are the same as other medications prescribed for you. Read the directions carefully, and ask your doctor or other care provider to review them with you.

## 2017-02-13 NOTE — PROGRESS NOTES
[facility]  [address] [facility] [address]  [phone]  Dept: [phone]    PRE-OP EVALUATION:  Today's date: 2017    Damien Vallecillo (: 1935) presents for pre-operative evaluation assessment as requested by Dr. Raymond.  He requires evaluation and anesthesia risk assessment prior to undergoing surgery/procedure for treatment of Left knee replacemnt .  Proposed procedure: Left knee replacement    Date of Surgery/ Procedure: 2017  Time of Surgery/ Procedure: 8:00 am  Hospital/Surgical Facility: [facility]    Primary Physician: Obi Strange  Type of Anesthesia Anticipated: General    Patient has a Health Care Directive or Living Will:  NO    1. NO - Do you have a history of heart attack, stroke, stent, bypass or surgery on an artery in the head, neck, heart or legs?  2. NO - Do you ever have any pain or discomfort in your chest?  3. NO - Do you have a history of  Heart Failure?  4. NO - Are you troubled by shortness of breath when: walking on the level, up a slight hill or at night?  5. NO - Do you currently have a cold, bronchitis or other respiratory infection?  6. NO - Do you have a cough, shortness of breath or wheezing?  7. NO - Do you sometimes get pains in the calves of your legs when you walk?  8. NO - Do you or anyone in your family have previous history of blood clots?  9. NO - Do you or does anyone in your family have a serious bleeding problem such as prolonged bleeding following surgeries or cuts?  10. NO - Have you ever had problems with anemia or been told to take iron pills?  11. NO - Have you had any abnormal blood loss such as black, tarry or bloody stools, or abnormal vaginal bleeding?  13. NO - Have you or any of your relatives ever had problems with anesthesia?  14. NO - Do you have sleep apnea, excessive snoring or daytime drowsiness?  15. NO - Do you have any prosthetic heart valves?  17. NO - Is there any chance that you may be  pregnant?  12. Not sure - Have you ever had a blood transfusion?  16. Yes- Do you have prosthetic joints?      HPI:                                                      Brief HPI related to upcoming procedure: Left knee replacement . 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 Provider to review       Lower GI bleed 06/30/2015     Priority: Medium     GI bleed 06/30/2015     Priority: Medium     Cerebral infarction (H) 07/07/2014     Priority: Medium     Diagnosis updated by automated process. Provider to review and confirm.       Glucose intolerance (impaired glucose tolerance) 05/31/2013     Priority: Medium     Advanced directives, counseling/discussion 03/14/2012     Priority: Medium     ordered today.       CARDIOVASCULAR SCREENING; LDL GOAL LESS THAN 100 10/31/2010     Priority: Medium     24.9% 10 yr risk 4/2014       Thyroid nodule 06/11/2010     Priority: Medium     Gout      Priority: Medium     Problem list name updated by automated process. Provider to review       Effusion of lower leg joint 05/29/2003     Priority: Medium      Past Medical History   Diagnosis Date     Acute suppurative arthritis (H)      Acute, but ill-defined, cerebrovascular disease      Arthritis      Chronic headaches      Diabetes (H)      Glucose intolerance (impaired glucose tolerance) 5/31/2013     Gout, unspecified      Hammer toe of left foot 10/21/2016      Hematuria      Lentigo maligna (H)      Malignant neoplasm of rectosigmoid junction (H)      Morbid obesity due to excess calories (H) 10/21/2016     Other convulsions      last seizure 5-6 years ago...not certain if these were true seizres or not..     Pre-diabetes 10/21/2016     Syncope      Tubulovillous adenoma of colon      Venous stasis dermatitis of both lower extremities 10/21/2016     Past Surgical History   Procedure Laterality Date     C nonspecific procedure       right heel surgery; spur removed.     Orthopedic surgery       lt knee arthroplasty     Herniorrhaphy epigastric  4/27/2012     Procedure:HERNIORRHAPHY EPIGASTRIC; Epigastric Hernia Repair with mesh ; Surgeon:BOLIVAR OLIVEIRA; Location: OR     Sigmoidoscopy flexible  5/29/2013     Procedure: SIGMOIDOSCOPY FLEXIBLE;  SIGMOIDOSCOPY FLEXIBLE (FV) Needs blood sugar ck'd;  Surgeon: Enrique Salazar MD;  Location:  GI     Colonoscopy N/A 6/23/2015     Procedure: COMBINED COLONOSCOPY, SINGLE OR MULTIPLE BIOPSY/POLYPECTOMY BY BIOPSY;  Surgeon: Kimberly Alfaro MD;  Location:  GI     Colonoscopy N/A 7/30/2015     Procedure: COLONOSCOPY;  Surgeon: Enrique Salazar MD;  Location:  OR     Laparoscopic assisted colectomy Right 7/30/2015     Procedure: LAPAROSCOPIC ASSISTED COLECTOMY;  Surgeon: Enrique Salazar MD;  Location:  OR     Current Outpatient Prescriptions   Medication Sig Dispense Refill     CINNAMON PO        tamsulosin (FLOMAX) 0.4 MG 24 hr capsule Take 1 capsule (0.4 mg) by mouth At Bedtime 90 capsule 3     atorvastatin (LIPITOR) 40 MG tablet Take 1 tablet (40 mg) by mouth At Bedtime 90 tablet 3     blood glucose monitoring (ACCU-CHEK LAURA PLUS) test strip Use to test blood sugar 1 time daily 100 each 11     blood glucose (ACCU-CHEK LAURA) test strip 1Use to test blood sugar 2 times daily or as directed. 100 strip 3     fish oil-omega-3 fatty acids 1000 MG capsule Take 1 g by mouth every evening       Flaxseed, Linseed, (FLAX  "SEED OIL) 1000 MG capsule Take 1 capsule by mouth every evening       Glucosamine HCl 1000 MG TABS Take 1 tablet by mouth every evening       amoxicillin (AMOXIL) 500 MG capsule Take four pills one hour before dental work 30 capsule 0     ACE NOT PRESCRIBED, INTENTIONAL, ACE Inhibitor not prescribed due to Other:  Had cough on ACEI in past       0 each 0     OTC products: None, except as noted above    Allergies   Allergen Reactions     Levetiracetam [Keppra] Rash     Oxcarbazepine [Trileptal] Rash      Latex Allergy: NO    Social History   Substance Use Topics     Smoking status: Never Smoker     Smokeless tobacco: Never Used     Alcohol use 0.0 oz/week     0 Standard drinks or equivalent per week      Comment: one or two per day, beer, wine     History   Drug Use No       REVIEW OF SYSTEMS:                                                    Denies fever, chills, blurry vision, coughing, chest pain, SOB, palpations, diarrhea, constipation, dysuria    This document serves as a record of the services and decisions personally performed and made by Alexandr Crocker MD. It was created on his behalf by Juice Smith a trained medical scribe. The creation of this document is based the provider's statements to the medical scribe. Juice Smith February 13, 2017 10:59 AM  EXAM:                                                    /83 (BP Location: Right arm, Patient Position: Chair, Cuff Size: Adult Large)  Pulse 55  Temp 97.8  F (36.6  C) (Oral)  Ht 1.778 m (5' 10\")  Wt 120.8 kg (266 lb 4.8 oz)  BMI 38.21 kg/m2   BP Readings from Last 1 Encounters:   02/13/17 124/64   GEN: Healthy, Alert, No distress  EYES: pupils are symmetric  ENT: ears without cerumen, mucus membranes moist  NECK: no thyroidmegaly  CHEST: Clear to auscultation, respirations unlabored  HEART: S1S2 RRR no murmur nor apical displacement  ABD: soft without mass or organomegaly   MS: nper ortho   DIAGNOSTICS:                                                "     EKG: appears normal, NSR, normal axis, normal intervals, no acute ST/T changes c/w ischemia, no LVH by voltage criteria, unchanged from previous tracings    Recent Labs   Lab Test  10/21/16   1154  08/10/15   0835  08/08/15   0600   08/05/15   0650   08/03/15   0705   07/31/15   0705   HGB   --    --    --    --    --    --   10.4*   --   10.4*   PLT   --    --   268   --   252   --   230   < >  219   INR   --   1.15*   --    --   1.09   < >   --    --    --    NA  138  137  136   < >  137   < >  136   < >  139   POTASSIUM  4.6  4.3  4.4   < >  4.0   < >  4.2   < >  4.3   CR  0.84  0.73  0.68   < >  0.71   < >  0.79   < >  0.81   A1C  6.0   --    --    --    --    --    --    --   6.3*    < > = values in this interval not displayed.     IMPRESSION:                                                    Reason for surgery/procedure: Left knee replacement   Diagnosis/reason for consult: assess perioperative risk    The proposed surgical procedure is considered INTERMEDIATE risk.    REVISED CARDIAC RISK INDEX  The patient has the following serious cardiovascular risks for perioperative complications such as (MI, PE, VFib and 3  AV Block):  No serious cardiac risks  INTERPRETATION: 0 risks: Class I (very low risk - 0.4% complication rate)    The patient has the following additional risks for perioperative complications:  No identified additional risks  Remote History epilepsy, no treatment now    ICD-10-CM    1. Preop general physical exam Z01.818 EKG 12-lead complete w/read - Clinics     CBC with platelets and differential     Basic metabolic panel  (Ca, Cl, CO2, Creat, Gluc, K, Na, BUN)       RECOMMENDATIONS:                                                    Patient is to hold ALL medications on the AM of surgery     APPROVAL GIVEN to proceed with proposed procedure, without further diagnostic evaluation     Signed Electronically by: Obi Strange MD    The information in this document, created by a scribe for me,  accurately reflects the services I personally performed and the decisions made by me. I have reviewed and approved this document for accuracy. 11:02 AM February 13, 2017    Copy of this evaluation report is provided to requesting physician.    [facility] Preop Guidelines

## 2017-02-14 PROBLEM — R71.8 RBC MICROCYTOSIS: Status: ACTIVE | Noted: 2017-02-14

## 2017-02-14 LAB
ANION GAP SERPL CALCULATED.3IONS-SCNC: 7 MMOL/L (ref 3–14)
BUN SERPL-MCNC: 12 MG/DL (ref 7–30)
CALCIUM SERPL-MCNC: 9.1 MG/DL (ref 8.5–10.1)
CHLORIDE SERPL-SCNC: 106 MMOL/L (ref 94–109)
CO2 SERPL-SCNC: 27 MMOL/L (ref 20–32)
CREAT SERPL-MCNC: 0.74 MG/DL (ref 0.66–1.25)
GFR SERPL CREATININE-BSD FRML MDRD: ABNORMAL ML/MIN/1.7M2
GLUCOSE SERPL-MCNC: 111 MG/DL (ref 70–99)
POTASSIUM SERPL-SCNC: 4.1 MMOL/L (ref 3.4–5.3)
SODIUM SERPL-SCNC: 140 MMOL/L (ref 133–144)

## 2017-02-17 ENCOUNTER — OFFICE VISIT (OUTPATIENT)
Dept: FAMILY MEDICINE | Facility: CLINIC | Age: 82
End: 2017-02-17
Payer: MEDICARE

## 2017-02-17 ENCOUNTER — TELEPHONE (OUTPATIENT)
Dept: ORTHOPEDICS | Facility: CLINIC | Age: 82
End: 2017-02-17

## 2017-02-17 ENCOUNTER — TELEPHONE (OUTPATIENT)
Dept: FAMILY MEDICINE | Facility: CLINIC | Age: 82
End: 2017-02-17

## 2017-02-17 ENCOUNTER — RADIANT APPOINTMENT (OUTPATIENT)
Dept: GENERAL RADIOLOGY | Facility: CLINIC | Age: 82
End: 2017-02-17
Attending: FAMILY MEDICINE
Payer: MEDICARE

## 2017-02-17 VITALS
HEART RATE: 86 BPM | OXYGEN SATURATION: 95 % | TEMPERATURE: 99.6 F | RESPIRATION RATE: 18 BRPM | DIASTOLIC BLOOD PRESSURE: 66 MMHG | HEIGHT: 70 IN | SYSTOLIC BLOOD PRESSURE: 130 MMHG

## 2017-02-17 DIAGNOSIS — R05.9 COUGH: Primary | ICD-10-CM

## 2017-02-17 DIAGNOSIS — R05.9 COUGH: ICD-10-CM

## 2017-02-17 DIAGNOSIS — R94.2 ABNORMAL LUNG FUNCTION TEST: ICD-10-CM

## 2017-02-17 DIAGNOSIS — J10.1 INFLUENZA A: ICD-10-CM

## 2017-02-17 LAB
FEF 25/75: 33
FEV-1: 54
FEV1/FVC: 77
FLUAV+FLUBV AG SPEC QL: ABNORMAL
FLUAV+FLUBV AG SPEC QL: ABNORMAL
FVC: 70
SPECIMEN SOURCE: ABNORMAL

## 2017-02-17 PROCEDURE — 71020 XR CHEST 2 VW: CPT

## 2017-02-17 PROCEDURE — 94010 BREATHING CAPACITY TEST: CPT | Performed by: FAMILY MEDICINE

## 2017-02-17 PROCEDURE — 99214 OFFICE O/P EST MOD 30 MIN: CPT | Mod: 25 | Performed by: FAMILY MEDICINE

## 2017-02-17 PROCEDURE — 87804 INFLUENZA ASSAY W/OPTIC: CPT | Performed by: FAMILY MEDICINE

## 2017-02-17 RX ORDER — PREDNISONE 20 MG/1
40 TABLET ORAL DAILY
Qty: 10 TABLET | Refills: 0 | Status: SHIPPED | OUTPATIENT
Start: 2017-02-17 | End: 2017-02-22

## 2017-02-17 RX ORDER — OSELTAMIVIR PHOSPHATE 75 MG/1
75 CAPSULE ORAL 2 TIMES DAILY
Qty: 10 CAPSULE | Refills: 0 | Status: SHIPPED | OUTPATIENT
Start: 2017-02-17 | End: 2017-03-10

## 2017-02-17 NOTE — PROGRESS NOTES
"  SUBJECTIVE:                                                    Damien Vallecillo is a 81 year old male who presents to clinic today for the following health issues:    Acute Illness   Acute illness concerns: URI  Onset: Feb. 13th    Fever: YES - low grade today    Chills/Sweats: YES - Chills    Headache (location?): YES    Sinus Pressure:no    Conjunctivitis:  no    Ear Pain: no    Rhinorrhea: YES    Congestion: YES    Sore Throat: YES    Appetite: no    flucuations in bodily temperature: YES - cold     Cough: YES - productive of yellow sputum    Wheeze: no     Decreased Appetite: YES    Nausea: no    Vomiting: no    Diarrhea:  YES    Dysuria/Freq.: no    Fatigue/Achiness: YES    Sick/Strep Exposure: no    Patient had to cancel knee surgery      Therapies Tried and outcome: none    Social History     Social History     Marital status:      Spouse name: N/A     Number of children: N/A     Years of education: N/A     Occupational History     Not on file.     Social History Main Topics     Smoking status: Never Smoker     Smokeless tobacco: Never Used     Alcohol use 0.0 oz/week     0 Standard drinks or equivalent per week      Comment: Occas     Drug use: No     Sexual activity: Yes     Partners: Female     Other Topics Concern     Parent/Sibling W/ Cabg, Mi Or Angioplasty Before 65f 55m? No     Social History Narrative       ROS: See above    This document serves as a record of the services and decisions personally performed and made by Alexandr Crocker MD. It was created on his behalf by Juice Smith a trained medical scribe. The creation of this document is based the provider's statements to the medical scribe. Juice Smith February 17, 2017 1:56 PM  OBJECTIVE:                                                    /66 (BP Location: Right arm, Patient Position: Chair, Cuff Size: Adult Large)  Pulse 86  Temp 99.6  F (37.6  C) (Oral)  Resp 18  Ht 1.778 m (5' 10\")  SpO2 95%  There is no height or weight on " file to calculate BMI.  GEN: Healthy, Alert, No distress  CHEST: inspiratory wheezing to ausculation in all lung fields bilaterally    X-ray: negative  Results for orders placed or performed in visit on 02/17/17   Spirometry, Breathing Capacity: Normal Order, Clinic Performed   Result Value Ref Range    FEV-1 54     FVC 70     FEV1/FVC 77     FEF 25/75 33    Influenza A/B antigen   Result Value Ref Range    Influenza A/B Agn Specimen Nasal     Influenza A (A) NEG     Positive   Test results must be correlated with clinical data. If necessary, results   should be confirmed by a molecular assay or viral culture.      Influenza B  NEG     Negative   Test results must be correlated with clinical data. If necessary, results   should be confirmed by a molecular assay or viral culture.        cxr neg  ASSESSMENT/PLAN:                                                    (R05) Cough  (primary encounter diagnosis)  Comment: Broaden database. Reassuring imaging   Plan: Influenza A/B antigen, XR Chest 2 Views,         Spirometry, Breathing Capacity: Normal Order,         Clinic Performed, CANCELED: Influenza A/B         antigen            (J10.1) Influenza A  Comment: Rx  Plan: oseltamivir (TAMIFLU) 75 MG capsule            (R94.2) Abnormal lung function test  Comment: Rx  Plan: predniSONE (DELTASONE) 20 MG tablet            RCK in 2-3 weeks    The information in this document, created by a scribe for me, accurately reflects the services I personally performed and the decisions made by me. I have reviewed and approved this document for accuracy. 1:57 PM February 17, 2017    ALICE HAMEED MD  [facility]

## 2017-02-17 NOTE — MR AVS SNAPSHOT
"              After Visit Summary   2/17/2017    Damien Vallecillo    MRN: [id number]           Patient Information     Date Of Birth          1935        Visit Information        Provider Department      2/17/2017 1:45 PM Obi Strange MD [facility]        Today's Diagnoses     Cough    -  1    Influenza A        Abnormal lung function test           Follow-ups after your visit        Your next 10 appointments already scheduled     Mar 10, 2017  9:00 AM CST   SHORT with Obi Strange MD   [facility]    [address] [place] [id number] [phone]              Who to contact     If you have questions or need follow up information about today's clinic visit or your schedule please contact [facility] directly at [phone].  Normal or non-critical lab and imaging results will be communicated to you by MyChart, letter or phone within 4 business days after the clinic has received the results. If you do not hear from us within 7 days, please contact the clinic through MyChart or phone. If you have a critical or abnormal lab result, we will notify you by phone as soon as possible.  Submit refill requests through [employer] or call your pharmacy and they will forward the refill request to us. Please allow 3 business days for your refill to be completed.          Additional Information About Your Visit        MyChart Information     [employer] lets you send messages to your doctor, view your test results, renew your prescriptions, schedule appointments and more. To sign up, go to [email or web address] . Click on \"Log in\" on the left side of the screen, which will take you to the Welcome page. Then click on \"Sign up Now\" on the right side of the page.     You will be asked to enter the access code listed below, as well as some personal information. Please follow the directions to create your username and " "password.     Your access code is: [email or web address]  Expires: 2017 10:31 AM     Your access code will  in 90 days. If you need help or a new code, please call your [place] clinic or [phone].        Care EveryWhere ID     This is your Care EveryWhere ID. This could be used by other organizations to access your [place] medical records  [id number]        Your Vitals Were     Pulse Temperature Respirations Height Pulse Oximetry       86 99.6  F (37.6  C) (Oral) 18 5' 10\" (1.778 m) 95%        Blood Pressure from Last 3 Encounters:   17 130/66   17 124/64   17 130/78    Weight from Last 3 Encounters:   17 266 lb 4.8 oz (120.8 kg)   17 268 lb (121.6 kg)   17 268 lb (121.6 kg)              We Performed the Following     Influenza A/B antigen     Spirometry, Breathing Capacity: Normal Order, Clinic Performed          Today's Medication Changes          These changes are accurate as of: 17  8:04 PM.  If you have any questions, ask your nurse or doctor.               Start taking these medicines.        Dose/Directions    oseltamivir 75 MG capsule   Commonly known as:  TAMIFLU   Used for:  Influenza A   Started by:  Obi Strange MD        Dose:  75 mg   Take 1 capsule (75 mg) by mouth 2 times daily   Quantity:  10 capsule   Refills:  0       predniSONE 20 MG tablet   Commonly known as:  DELTASONE   Used for:  Abnormal lung function test   Started by:  Obi Strange MD        Dose:  40 mg   Take 2 tablets (40 mg) by mouth daily for 5 days   Quantity:  10 tablet   Refills:  0            Where to get your medicines      These medications were sent to [facility] [address]  [address] [facility] [address]     Phone:  [phone]     oseltamivir 75 MG capsule    predniSONE 20 MG tablet                Primary Care Provider Office Phone # Fax #    Obi Strange [phone]       [facility]" [place] [address] [address]  [facility] [address]        Thank you!     Thank you for choosing [facility]  for your care. Our goal is always to provide you with excellent care. Hearing back from our patients is one way we can continue to improve our services. Please take a few minutes to complete the written survey that you may receive in the mail after your visit with us. Thank you!             Your Updated Medication List - Protect others around you: Learn how to safely use, store and throw away your medicines at www.disposemymeds.org.          This list is accurate as of: 2/17/17  8:04 PM.  Always use your most recent med list.                   Brand Name Dispense Instructions for use    ACE NOT PRESCRIBED (INTENTIONAL)     0 each    ACE Inhibitor not prescribed due to Other:  Had cough on ACEI in past       amoxicillin 500 MG capsule    AMOXIL    30 capsule    Take four pills one hour before dental work       atorvastatin 40 MG tablet    LIPITOR    90 tablet    Take 1 tablet (40 mg) by mouth At Bedtime       * blood glucose monitoring test strip    ACCU-CHEK LAURA    100 strip    1Use to test blood sugar 2 times daily or as directed.       * blood glucose monitoring test strip    ACCU-CHEK LAURA PLUS    100 each    Use to test blood sugar 1 time daily       CINNAMON PO          fish oil-omega-3 fatty acids 1000 MG capsule      Take 1 g by mouth every evening       flax seed oil 1000 MG capsule      Take 1 capsule by mouth every evening       Glucosamine HCl 1000 MG Tabs      Take 1 tablet by mouth every evening       oseltamivir 75 MG capsule    TAMIFLU    10 capsule    Take 1 capsule (75 mg) by mouth 2 times daily       predniSONE 20 MG tablet    DELTASONE    10 tablet    Take 2 tablets (40 mg) by mouth daily for 5 days       tamsulosin 0.4 MG capsule    FLOMAX    90 capsule    Take 1 capsule (0.4 mg) by mouth At Bedtime       * Notice:  This list has 2 medication(s) that are the same as other  medications prescribed for you. Read the directions carefully, and ask your doctor or other care provider to review them with you.

## 2017-02-17 NOTE — TELEPHONE ENCOUNTER
Wife calls for patient stating patient has been very ill this week and would like surgery cancelled for 2/20/17 with Dr. Raymond. Patient is seeing his PCP this pm for URI/ possible pneumonia. Informed we will have surgery scheduling cancel at the hospital and will cancel follow up appointments with Moises Govea PA-C.   She will call back to reschedule but would like to get set up before pre-op time frame expires. Was appreciate of assistance.     Routing to Surgery scheduling.     MARK Ontiveros RN

## 2017-02-17 NOTE — NURSING NOTE
"Chief Complaint   Patient presents with     URI       Initial /66 (BP Location: Right arm, Patient Position: Chair, Cuff Size: Adult Large)  Pulse 86  Temp 99.6  F (37.6  C) (Oral)  Resp 18  Ht 5' 10\" (1.778 m)  SpO2 95% Estimated body mass index is 38.21 kg/(m^2) as calculated from the following:    Height as of 2/13/17: 5' 10\" (1.778 m).    Weight as of 2/13/17: 266 lb 4.8 oz (120.8 kg).  Medication Reconciliation: complete Josephine Campuzano MA  Health Maintenance has been reviewed.       "

## 2017-02-17 NOTE — TELEPHONE ENCOUNTER
Spouse calling states pt. Has been ill since 2/13/2017    Has productive cough with congestion, pt. Complains of weakness, fatigue ( sleeping all day), low appetite, and complaints of chills (did not check temp but had to increase home thermostat to 77).    Pt. Has tried robitussin along with fluids but no improvement over the last 3 days.     Triage spoke with patient and advised to be seen in clinic. Given sx may need further eval for pneumonia or flu.    Pt. And spouse expressed understanding.     OV made for BW today.     Essie Leija, RN, BSN, PHN

## 2017-02-19 RX ORDER — DEXAMETHASONE SODIUM PHOSPHATE 4 MG/ML
4 INJECTION, SOLUTION INTRA-ARTICULAR; INTRALESIONAL; INTRAMUSCULAR; INTRAVENOUS; SOFT TISSUE ONCE
Qty: 1 ML | Refills: 0 | OUTPATIENT
Start: 2017-02-19 | End: 2017-05-10

## 2017-02-19 RX ORDER — METHYLPREDNISOLONE ACETATE 40 MG/ML
40 INJECTION, SUSPENSION INTRA-ARTICULAR; INTRALESIONAL; INTRAMUSCULAR; SOFT TISSUE ONCE
Qty: 1 ML | Refills: 0 | OUTPATIENT
Start: 2017-02-19 | End: 2017-02-19

## 2017-03-10 ENCOUNTER — OFFICE VISIT (OUTPATIENT)
Dept: FAMILY MEDICINE | Facility: CLINIC | Age: 82
End: 2017-03-10
Payer: MEDICARE

## 2017-03-10 VITALS
RESPIRATION RATE: 20 BRPM | WEIGHT: 263.7 LBS | DIASTOLIC BLOOD PRESSURE: 68 MMHG | HEART RATE: 86 BPM | OXYGEN SATURATION: 99 % | SYSTOLIC BLOOD PRESSURE: 124 MMHG | HEIGHT: 70 IN | TEMPERATURE: 97.4 F | BODY MASS INDEX: 37.75 KG/M2

## 2017-03-10 DIAGNOSIS — R94.2 ABNORMAL PULMONARY FUNCTION TEST: Primary | ICD-10-CM

## 2017-03-10 DIAGNOSIS — I87.2 STASIS DERMATITIS OF BOTH LEGS: ICD-10-CM

## 2017-03-10 DIAGNOSIS — R05.9 COUGH: ICD-10-CM

## 2017-03-10 LAB
FEF 25/75: NORMAL
FEV-1: 89
FEV1/FVC: 89
FVC: 99

## 2017-03-10 PROCEDURE — 94010 BREATHING CAPACITY TEST: CPT | Performed by: FAMILY MEDICINE

## 2017-03-10 PROCEDURE — 99213 OFFICE O/P EST LOW 20 MIN: CPT | Mod: 25 | Performed by: FAMILY MEDICINE

## 2017-03-10 RX ORDER — GUAIFENESIN 600 MG/1
1200 TABLET, EXTENDED RELEASE ORAL 2 TIMES DAILY
Qty: 120 TABLET | Refills: 1 | Status: SHIPPED | OUTPATIENT
Start: 2017-03-10 | End: 2017-05-08

## 2017-03-10 RX ORDER — TRIAMCINOLONE ACETONIDE 5 MG/G
CREAM TOPICAL
Qty: 30 G | Refills: 1 | Status: SHIPPED | OUTPATIENT
Start: 2017-03-10 | End: 2021-09-16

## 2017-03-10 NOTE — PROGRESS NOTES
"  SUBJECTIVE:                                                    Damien Vallecillo is a 81 year old male who presents to clinic today for the following health issues:    Acute Illness   Acute illness concerns: URI  Onset: 1month    Fever: no     Chills/Sweats: no     Headache (location?): no     Sinus Pressure:no    Conjunctivitis:  no    Ear Pain: no    Rhinorrhea: no     Congestion: YES    Sore Throat: no      Cough: YES-non-productive    Wheeze: no     Decreased Appetite: no     Nausea: no     Vomiting: no     Diarrhea:  no     Dysuria/Freq.: no     Fatigue/Achiness: no     Sick/Strep Exposure: no      Therapies Tried and outcome: Tamiflu    Treated for influenza, still some \"junk\" in lungs  Stasis dermatitis of both legs: Patient states that he uses lotion to treat rash on lower extremities      ROS: Denies fever, chest pain, heart burn. Also, see above    This document serves as a record of the services and decisions personally performed and made by Alexandr Crocker MD. It was created on his behalf by Juice Smith a trained medical scribe. The creation of this document is based the provider's statements to the medical scribe. Juice Smith March 10, 2017 9:12 AM  OBJECTIVE:                                                    /68 (BP Location: Right arm, Patient Position: Chair, Cuff Size: Adult Regular)  Pulse 86  Temp 97.4  F (36.3  C) (Oral)  Resp 20  Ht 1.778 m (5' 10\")  Wt 119.6 kg (263 lb 11.2 oz)  SpO2 99%  BMI 37.84 kg/m2  Body mass index is 37.84 kg/(m^2).  GEN: Healthy, Alert, No distress  CHEST: Clear to auscultation, respirations unlabored    Results for orders placed or performed in visit on 03/10/17   Spirometry, Breathing Capacity: Normal Order, Clinic Performed   Result Value Ref Range    FEV-1 89     FVC 99     FEV1/FVC 89     FEF 25/75        No edema   Mild stasis dermatitis  ASSESSMENT/PLAN:                                                    (R94.2) Abnormal pulmonary function test  (primary " encounter diagnosis)  Comment: Broaden database. Reassuring spirometry. Rx   Plan: Spirometry, Breathing Capacity: Normal Order,         Clinic Performed, guaiFENesin (MUCINEX) 600 MG         12 hr tablet normalized            (R05) Cough  Comment: Rx  Plan: guaiFENesin (MUCINEX) 600 MG 12 hr tablet      (I83.11,  I83.12) Stasis dermatitis of both legs  Comment: Rx  Plan: triamcinolone (KENALOG) 0.5 % cream, emollients      RCK in 6 weeks     The information in this document, created by a scribe for me, accurately reflects the services I personally performed and the decisions made by me. I have reviewed and approved this document for accuracy. 9:13 AM March 10, 2017      ALICE HAMEED MD  [facility]

## 2017-03-10 NOTE — MR AVS SNAPSHOT
"              After Visit Summary   3/10/2017    Damien Vallecillo    MRN: [id number]           Patient Information     Date Of Birth          1935        Visit Information        Provider Department      3/10/2017 9:00 AM Obi Strange MD [facility]        Today's Diagnoses     Abnormal pulmonary function test    -  1    Cough        Stasis dermatitis of both legs          Care Instructions    Eucerin Cream to legs        Follow-ups after your visit        Who to contact     If you have questions or need follow up information about today's clinic visit or your schedule please contact [facility] directly at [phone].  Normal or non-critical lab and imaging results will be communicated to you by MyChart, letter or phone within 4 business days after the clinic has received the results. If you do not hear from us within 7 days, please contact the clinic through [employer] or phone. If you have a critical or abnormal lab result, we will notify you by phone as soon as possible.  Submit refill requests through [employer] or call your pharmacy and they will forward the refill request to us. Please allow 3 business days for your refill to be completed.          Additional Information About Your Visit        MyChart Information     [employer] lets you send messages to your doctor, view your test results, renew your prescriptions, schedule appointments and more. To sign up, go to [email or web address] . Click on \"Log in\" on the left side of the screen, which will take you to the Welcome page. Then click on \"Sign up Now\" on the right side of the page.     You will be asked to enter the access code listed below, as well as some personal information. Please follow the directions to create your username and password.     Your access code is: [email or web address]  Expires: 2017 10:31 AM     Your access code will  in 90 days. If you need help or a new code, please call your [place] clinic " "or [phone].        Care EveryWhere ID     This is your Care EveryWhere ID. This could be used by other organizations to access your [facility] medical records  [id number]        Your Vitals Were     Pulse Temperature Respirations Height Pulse Oximetry BMI (Body Mass Index)    86 97.4  F (36.3  C) (Oral) 20 5' 10\" (1.778 m) 99% 37.84 kg/m2       Blood Pressure from Last 3 Encounters:   03/10/17 124/68   02/17/17 130/66   02/13/17 124/64    Weight from Last 3 Encounters:   03/10/17 263 lb 11.2 oz (119.6 kg)   02/13/17 266 lb 4.8 oz (120.8 kg)   02/08/17 268 lb (121.6 kg)              We Performed the Following     Spirometry, Breathing Capacity: Normal Order, Clinic Performed          Today's Medication Changes          These changes are accurate as of: 3/10/17  9:51 AM.  If you have any questions, ask your nurse or doctor.               Start taking these medicines.        Dose/Directions    guaiFENesin 600 MG 12 hr tablet   Commonly known as:  MUCINEX   Used for:  Abnormal pulmonary function test, Cough   Started by:  Obi Strange MD        Dose:  1200 mg   Take 2 tablets (1,200 mg) by mouth 2 times daily   Quantity:  120 tablet   Refills:  1       triamcinolone 0.5 % cream   Commonly known as:  KENALOG   Used for:  Stasis dermatitis of both legs   Started by:  Obi Strange MD        Apply sparingly to affected area three times daily.   Quantity:  30 g   Refills:  1            Where to get your medicines      These medications were sent to [facility] [address] [address]     Phone:  [phone]     guaiFENesin 600 MG 12 hr tablet    triamcinolone 0.5 % cream                Primary Care Provider Office Phone # Fax #    Obi Strange [phone]       [address] [address]        Thank you!     Thank you for choosing [facility]  for your care. Our " goal is always to provide you with excellent care. Hearing back from our patients is one way we can continue to improve our services. Please take a few minutes to complete the written survey that you may receive in the mail after your visit with us. Thank you!             Your Updated Medication List - Protect others around you: Learn how to safely use, store and throw away your medicines at www.disposemymeds.org.          This list is accurate as of: 3/10/17  9:51 AM.  Always use your most recent med list.                   Brand Name Dispense Instructions for use    ACE NOT PRESCRIBED (INTENTIONAL)     0 each    ACE Inhibitor not prescribed due to Other:  Had cough on ACEI in past       amoxicillin 500 MG capsule    AMOXIL    30 capsule    Take four pills one hour before dental work       atorvastatin 40 MG tablet    LIPITOR    90 tablet    Take 1 tablet (40 mg) by mouth At Bedtime       * blood glucose monitoring test strip    ACCU-CHEK LAURA    100 strip    1Use to test blood sugar 2 times daily or as directed.       * blood glucose monitoring test strip    ACCU-CHEK LAURA PLUS    100 each    Use to test blood sugar 1 time daily       CINNAMON PO          dexamethasone 4 MG/ML injection    DECADRON    1 mL    Inject 1 mL (4 mg) as directed once for 1 dose Use 4 mg or dose determined by provider for iontophoresis.       fish oil-omega-3 fatty acids 1000 MG capsule      Take 1 g by mouth every evening       flax seed oil 1000 MG capsule      Take 1 capsule by mouth every evening       Glucosamine HCl 1000 MG Tabs      Take 1 tablet by mouth every evening       guaiFENesin 600 MG 12 hr tablet    MUCINEX    120 tablet    Take 2 tablets (1,200 mg) by mouth 2 times daily       tamsulosin 0.4 MG capsule    FLOMAX    90 capsule    Take 1 capsule (0.4 mg) by mouth At Bedtime       triamcinolone 0.5 % cream    KENALOG    30 g    Apply sparingly to affected area three times daily.       * Notice:  This list has 2  medication(s) that are the same as other medications prescribed for you. Read the directions carefully, and ask your doctor or other care provider to review them with you.

## 2017-04-17 ENCOUNTER — OFFICE VISIT (OUTPATIENT)
Dept: FAMILY MEDICINE | Facility: CLINIC | Age: 82
End: 2017-04-17
Payer: MEDICARE

## 2017-04-17 VITALS
HEIGHT: 70 IN | HEART RATE: 56 BPM | TEMPERATURE: 98 F | BODY MASS INDEX: 38.22 KG/M2 | DIASTOLIC BLOOD PRESSURE: 70 MMHG | SYSTOLIC BLOOD PRESSURE: 132 MMHG | RESPIRATION RATE: 12 BRPM | WEIGHT: 267 LBS | OXYGEN SATURATION: 100 %

## 2017-04-17 DIAGNOSIS — R79.9 ABNORMAL FINDING OF BLOOD CHEMISTRY: ICD-10-CM

## 2017-04-17 DIAGNOSIS — R06.09 DYSPNEA ON EXERTION: ICD-10-CM

## 2017-04-17 DIAGNOSIS — R09.82 POST-NASAL DRIP: ICD-10-CM

## 2017-04-17 DIAGNOSIS — R73.03 PRE-DIABETES: ICD-10-CM

## 2017-04-17 DIAGNOSIS — R05.9 COUGH: Primary | ICD-10-CM

## 2017-04-17 LAB — HBA1C MFR BLD: 5.9 % (ref 4.3–6)

## 2017-04-17 PROCEDURE — 99214 OFFICE O/P EST MOD 30 MIN: CPT | Performed by: FAMILY MEDICINE

## 2017-04-17 PROCEDURE — 83036 HEMOGLOBIN GLYCOSYLATED A1C: CPT | Performed by: FAMILY MEDICINE

## 2017-04-17 PROCEDURE — 84460 ALANINE AMINO (ALT) (SGPT): CPT | Performed by: FAMILY MEDICINE

## 2017-04-17 PROCEDURE — 36415 COLL VENOUS BLD VENIPUNCTURE: CPT | Performed by: FAMILY MEDICINE

## 2017-04-17 RX ORDER — FLUTICASONE PROPIONATE 50 MCG
1-2 SPRAY, SUSPENSION (ML) NASAL DAILY
Qty: 1 BOTTLE | Refills: 1 | Status: SHIPPED | OUTPATIENT
Start: 2017-04-17 | End: 2017-05-08

## 2017-04-17 NOTE — NURSING NOTE
"No chief complaint on file.      Initial Ht 5' 10\" (1.778 m)  Wt 267 lb (121.1 kg)  BMI 38.31 kg/m2 Estimated body mass index is 38.31 kg/(m^2) as calculated from the following:    Height as of this encounter: 5' 10\" (1.778 m).    Weight as of this encounter: 267 lb (121.1 kg).  Medication Reconciliation: complete   Nanda Darden, JAY      "

## 2017-04-17 NOTE — MR AVS SNAPSHOT
After Visit Summary   4/17/2017    Damien Vallecillo    MRN: [id number]           Patient Information     Date Of Birth          1935        Visit Information        Provider Department      4/17/2017 10:15 AM Obi Strange MD [facility]        Today's Diagnoses     Cough    -  1    Post-nasal drip        Pre-diabetes        Abnormal finding of blood chemistry         Dyspnea on exertion           Follow-ups after your visit        Additional Services     SLEEP EVALUATION & MANAGEMENT REFERRAL - ADULT       Please be aware that coverage of these services is subject to the terms and limitations of your health insurance plan.  Call member services at your health plan with any benefit or coverage questions.      Please bring the following to your appointment:    >>   List of current medications   >>   This referral request   >>   Any documents/labs given to you for this referral    List of [place] hospitals according to the [phone] (Age 18 and up)                  Your next 10 appointments already scheduled     May 08, 2017  9:00 AM CDT   SHORT with Obi Strange MD   [facility] ([facility]    [address] [place] [id number] [phone]              Future tests that were ordered for you today     Open Future Orders        Priority Expected Expires Ordered    SLEEP EVALUATION & MANAGEMENT REFERRAL - ADULT Routine  4/17/2018 4/17/2017            Who to contact     If you have questions or need follow up information about today's clinic visit or your schedule please contact [facility] directly at [phone].  Normal or non-critical lab and imaging results will be communicated to you by MyChart, letter or phone within 4 business days after the clinic has received the results. If you do not hear from us within 7 days, please contact the clinic through MyChart or phone. If you have a critical or abnormal  "lab result, we will notify you by phone as soon as possible.  Submit refill requests through [employer] or call your pharmacy and they will forward the refill request to us. Please allow 3 business days for your refill to be completed.          Additional Information About Your Visit        [employer] Information     [employer] lets you send messages to your doctor, view your test results, renew your prescriptions, schedule appointments and more. To sign up, go to [email or web address] . Click on \"Log in\" on the left side of the screen, which will take you to the Welcome page. Then click on \"Sign up Now\" on the right side of the page.     You will be asked to enter the access code listed below, as well as some personal information. Please follow the directions to create your username and password.     Your access code is: [email or web address]  Expires: 2017 11:31 AM     Your access code will  in 90 days. If you need help or a new code, please call your [facility] clinic or [phone].        Care EveryWhere ID     This is your Care EveryWhere ID. This could be used by other organizations to access your [facility] medical records  [id number]        Your Vitals Were     Pulse Temperature Respirations Height Pulse Oximetry BMI (Body Mass Index)    56 98  F (36.7  C) (Oral) 12 5' 10\" (1.778 m) 100% 38.31 kg/m2       Blood Pressure from Last 3 Encounters:   17 132/70   03/10/17 124/68   17 130/66    Weight from Last 3 Encounters:   17 267 lb (121.1 kg)   03/10/17 263 lb 11.2 oz (119.6 kg)   17 266 lb 4.8 oz (120.8 kg)              We Performed the Following     ALT     Hemoglobin A1c          Today's Medication Changes          These changes are accurate as of: 17 11:59 PM.  If you have any questions, ask your nurse or doctor.               Start taking these medicines.        Dose/Directions    fluticasone 50 MCG/ACT spray   Commonly known as:  FLONASE   Used for:  Post-nasal drip   Started by:  " Obi Strange MD        Dose:  1-2 spray   Spray 1-2 sprays into both nostrils daily   Quantity:  1 Bottle   Refills:  1       ranitidine 300 MG tablet   Commonly known as:  ZANTAC   Used for:  Cough   Started by:  Obi Strange MD        Dose:  300 mg   Take 1 tablet (300 mg) by mouth At Bedtime   Quantity:  30 tablet   Refills:  1            Where to get your medicines      These medications were sent to [address] [address]     Phone:  [phone]     fluticasone 50 MCG/ACT spray    ranitidine 300 MG tablet                Primary Care Provider Office Phone # Fax #    Obi Strange [phone]       [address] [address]        Thank you!     Thank you for choosing [facility]  for your care. Our goal is always to provide you with excellent care. Hearing back from our patients is one way we can continue to improve our services. Please take a few minutes to complete the written survey that you may receive in the mail after your visit with us. Thank you!             Your Updated Medication List - Protect others around you: Learn how to safely use, store and throw away your medicines at www.disposemymeds.org.          This list is accurate as of: 4/17/17 11:59 PM.  Always use your most recent med list.                   Brand Name Dispense Instructions for use    ACE NOT PRESCRIBED (INTENTIONAL)     0 each    ACE Inhibitor not prescribed due to Other:  Had cough on ACEI in past       amoxicillin 500 MG capsule    AMOXIL    30 capsule    Take four pills one hour before dental work       ASPIRIN PO      Take 325 mg by mouth daily       atorvastatin 40 MG tablet    LIPITOR    90 tablet    Take 1 tablet (40 mg) by mouth At Bedtime       * blood glucose monitoring test strip    ACCU-CHEK LAURA    100 strip    1Use to test blood sugar 2 times daily or  as directed.       * blood glucose monitoring test strip    ACCU-CHEK LAURA PLUS    100 each    Use to test blood sugar 1 time daily       CINNAMON PO          dexamethasone 4 MG/ML injection    DECADRON    1 mL    Inject 1 mL (4 mg) as directed once for 1 dose Use 4 mg or dose determined by provider for iontophoresis.       fish oil-omega-3 fatty acids 1000 MG capsule      Take 1 g by mouth every evening       flax seed oil 1000 MG capsule      Take 1 capsule by mouth every evening       fluticasone 50 MCG/ACT spray    FLONASE    1 Bottle    Spray 1-2 sprays into both nostrils daily       Glucosamine HCl 1000 MG Tabs      Take 1 tablet by mouth every evening       guaiFENesin 600 MG 12 hr tablet    MUCINEX    120 tablet    Take 2 tablets (1,200 mg) by mouth 2 times daily       MULTIVITAMIN ADULT PO          ranitidine 300 MG tablet    ZANTAC    30 tablet    Take 1 tablet (300 mg) by mouth At Bedtime       tamsulosin 0.4 MG capsule    FLOMAX    90 capsule    Take 1 capsule (0.4 mg) by mouth At Bedtime       triamcinolone 0.5 % cream    KENALOG    30 g    Apply sparingly to affected area three times daily.       * Notice:  This list has 2 medication(s) that are the same as other medications prescribed for you. Read the directions carefully, and ask your doctor or other care provider to review them with you.

## 2017-04-17 NOTE — PROGRESS NOTES
SUBJECTIVE:                                                    Damien Vallecillo is a 81 year old male who presents to clinic today for the following health issues:    Cough/Chest congestion      Duration: 2 mos    Description (location/character/radiation):  Dry cough with inhalation, chest congestion    Intensity:  moderate    Accompanying signs and symptoms: none    History (similar episodes/previous evaluation): None    Precipitating or alleviating factors: None    Therapies tried and outcome: last Rx didn't work, felt worse       Cough  (primary encounter diagnosis): The patient states that the prednisone regimen he was prescribed provided relief for a brief period of time. Patient had a negative X-ray in the past and his spirometry was normal in March.      Post-nasal drip: Patient believes he may has allergies      Pre-diabetes:   Lab Results   Component Value Date    A1C 6.0 10/21/2016    A1C 6.3 07/31/2015    A1C 6.3 07/22/2015    A1C 6.0 04/28/2015    A1C 6.0 07/01/2014     Glucose   Date Value Ref Range Status   02/13/2017 111 (H) 70 - 99 mg/dL Final     Comment:     Non Fasting         Dyspnea on exertion: Patient notes intermittent SOB with physical activity. Patient has not had a sleep study for many years.        Past Medical History:   Diagnosis Date     Acute suppurative arthritis (H)      Acute, but ill-defined, cerebrovascular disease      Arthritis      Chronic headaches      Glucose intolerance (impaired glucose tolerance) 5/31/2013     Gout, unspecified      Hammer toe of left foot 10/21/2016     Hematuria      Lentigo maligna (H)      Malignant neoplasm of rectosigmoid junction (H)      Morbid obesity due to excess calories (H) 10/21/2016     Other convulsions     last seizure 7-8 years ago...not certain if these were true seizres or not..     Pre-diabetes 10/21/2016     RBC microcytosis 2/14/2017     Syncope      Tubulovillous adenoma of colon      Venous stasis dermatitis of both lower  "extremities 10/21/2016       Past Surgical History:   Procedure Laterality Date     C NONSPECIFIC PROCEDURE      right heel surgery; spur removed.     COLONOSCOPY N/A 6/23/2015    Procedure: COMBINED COLONOSCOPY, SINGLE OR MULTIPLE BIOPSY/POLYPECTOMY BY BIOPSY;  Surgeon: Kimberly Alfaro MD;  Location:  GI     COLONOSCOPY N/A 7/30/2015    Procedure: COLONOSCOPY;  Surgeon: Enrique Salazar MD;  Location:  OR     HERNIORRHAPHY EPIGASTRIC  4/27/2012    Procedure:HERNIORRHAPHY EPIGASTRIC; Epigastric Hernia Repair with mesh ; Surgeon:BOLIVAR OLIVEIRA; Location: OR     LAPAROSCOPIC ASSISTED COLECTOMY Right 7/30/2015    Procedure: LAPAROSCOPIC ASSISTED COLECTOMY;  Surgeon: Enrique Salazar MD;  Location:  OR     ORTHOPEDIC SURGERY      lt knee arthroplasty     SIGMOIDOSCOPY FLEXIBLE  5/29/2013    Procedure: SIGMOIDOSCOPY FLEXIBLE;  SIGMOIDOSCOPY FLEXIBLE (FV) Needs blood sugar ck'd;  Surgeon: Enrique Salazar MD;  Location:  GI       Family History   Problem Relation Age of Onset     Cancer - colorectal Mother      CEREBROVASCULAR DISEASE Father        Social History   Substance Use Topics     Smoking status: Never Smoker     Smokeless tobacco: Never Used     Alcohol use 0.0 oz/week     0 Standard drinks or equivalent per week      Comment: Occas       ROS: Denies heart burn, post nasal drip, edema, fever     This document serves as a record of the services and decisions personally performed and made by Alexandr Crocker MD. It was created on his behalf by Juice Smith a trained medical scribe. The creation of this document is based the provider's statements to the medical scribe. Juice Smith April 17, 2017 10:49 AM  OBJECTIVE:                                                    /70 (BP Location: Right arm, Patient Position: Chair, Cuff Size: Adult Large)  Pulse 56  Temp 98  F (36.7  C) (Oral)  Resp 12  Ht 1.778 m (5' 10\")  Wt 121.1 kg (267 lb)  SpO2 100%  BMI 38.31 kg/m2  Body mass index is " 38.31 kg/(m^2).  GEN: Healthy, Alert, No distress  ENT: ears without cerumen, post nasal drip, wears hearing aides  CHEST: Clear to auscultation, respirations unlabored     ASSESSMENT/PLAN:                                                    (R05) Cough  (primary encounter diagnosis)  Comment: Rx  Plan: ranitidine (ZANTAC) 300 MG tablet            (R09.82) Post-nasal drip  Comment: Rx  Plan: fluticasone (FLONASE) 50 MCG/ACT spray      (R73.03) Pre-diabetes  Comment: Assess   Lab Results   Component Value Date    A1C 6.0 10/21/2016    A1C 6.3 07/31/2015    A1C 6.3 07/22/2015    A1C 6.0 04/28/2015    A1C 6.0 07/01/2014     Glucose   Date Value Ref Range Status   02/13/2017 111 (H) 70 - 99 mg/dL Final     Comment:     Non Fasting   Plan: ALT, Hemoglobin A1c            (R79.9) Abnormal finding of blood chemistry  ICD 10 requires  Comment: Assess   Lab Results   Component Value Date    A1C 6.0 10/21/2016    A1C 6.3 07/31/2015    A1C 6.3 07/22/2015    A1C 6.0 04/28/2015    A1C 6.0 07/01/2014     Glucose   Date Value Ref Range Status   02/13/2017 111 (H) 70 - 99 mg/dL Final     Comment:     Non Fasting   Plan: Hemoglobin A1c                (R06.09) Dyspnea on exertion  Comment: Discussed, referred  Plan: SLEEP EVALUATION & MANAGEMENT REFERRAL - ADULT              RCK in 3 weeks    The information in this document, created by a scribe for me, accurately reflects the services I personally performed and the decisions made by me. I have reviewed and approved this document for accuracy. 10:51 AM April 17, 2017    ALICE HAMEED MD  [facility]

## 2017-04-17 NOTE — LETTER
[facility]  [address] [place], [place], [address]  [phone]      April 18, 2017    Damien Vallecillo  [address] [facility] [address]          Dear Damien,    The results of your recent tests were normal. GREAT!  Enclosed is a copy of the results.  It was a pleasure to see you at your last appointment.  Results for orders placed or performed in visit on 04/17/17   ALT   Result Value Ref Range    ALT 19 0 - 70 U/L   Hemoglobin A1c   Result Value Ref Range    Hemoglobin A1C 5.9 4.3 - 6.0 %   '  If you have any questions or concerns, please call myself or my nurse at [phone].          Sincerely,    Obi Strange MD

## 2017-04-18 LAB — ALT SERPL W P-5'-P-CCNC: 19 U/L (ref 0–70)

## 2017-04-18 ASSESSMENT — PATIENT HEALTH QUESTIONNAIRE - PHQ9: SUM OF ALL RESPONSES TO PHQ QUESTIONS 1-9: 0

## 2017-05-08 ENCOUNTER — OFFICE VISIT (OUTPATIENT)
Dept: FAMILY MEDICINE | Facility: CLINIC | Age: 82
End: 2017-05-08
Payer: MEDICARE

## 2017-05-08 VITALS
HEART RATE: 72 BPM | RESPIRATION RATE: 14 BRPM | HEIGHT: 70 IN | DIASTOLIC BLOOD PRESSURE: 68 MMHG | WEIGHT: 268 LBS | TEMPERATURE: 98.1 F | SYSTOLIC BLOOD PRESSURE: 138 MMHG | BODY MASS INDEX: 38.37 KG/M2

## 2017-05-08 DIAGNOSIS — R09.82 POST-NASAL DRIP: ICD-10-CM

## 2017-05-08 DIAGNOSIS — Z71.89 ADVANCED DIRECTIVES, COUNSELING/DISCUSSION: ICD-10-CM

## 2017-05-08 DIAGNOSIS — M20.42 HAMMER TOE OF LEFT FOOT: ICD-10-CM

## 2017-05-08 DIAGNOSIS — R94.2 ABNORMAL PULMONARY FUNCTION TEST: ICD-10-CM

## 2017-05-08 DIAGNOSIS — M25.561 RIGHT KNEE PAIN, UNSPECIFIED CHRONICITY: ICD-10-CM

## 2017-05-08 DIAGNOSIS — R05.9 COUGH: ICD-10-CM

## 2017-05-08 DIAGNOSIS — M25.562 LEFT KNEE PAIN, UNSPECIFIED CHRONICITY: Primary | ICD-10-CM

## 2017-05-08 PROCEDURE — 99214 OFFICE O/P EST MOD 30 MIN: CPT | Performed by: FAMILY MEDICINE

## 2017-05-08 RX ORDER — FLUTICASONE PROPIONATE 50 MCG
1-2 SPRAY, SUSPENSION (ML) NASAL DAILY
Qty: 1 BOTTLE | Refills: 11 | Status: SHIPPED | OUTPATIENT
Start: 2017-05-08 | End: 2017-08-17

## 2017-05-08 RX ORDER — GUAIFENESIN 600 MG/1
1200 TABLET, EXTENDED RELEASE ORAL 2 TIMES DAILY
Qty: 120 TABLET | Refills: 11 | Status: SHIPPED | OUTPATIENT
Start: 2017-05-08 | End: 2017-08-17

## 2017-05-08 ASSESSMENT — ANXIETY QUESTIONNAIRES
6. BECOMING EASILY ANNOYED OR IRRITABLE: NOT AT ALL
3. WORRYING TOO MUCH ABOUT DIFFERENT THINGS: NOT AT ALL
GAD7 TOTAL SCORE: 0
2. NOT BEING ABLE TO STOP OR CONTROL WORRYING: NOT AT ALL
5. BEING SO RESTLESS THAT IT IS HARD TO SIT STILL: NOT AT ALL
1. FEELING NERVOUS, ANXIOUS, OR ON EDGE: NOT AT ALL
7. FEELING AFRAID AS IF SOMETHING AWFUL MIGHT HAPPEN: NOT AT ALL

## 2017-05-08 ASSESSMENT — PATIENT HEALTH QUESTIONNAIRE - PHQ9: 5. POOR APPETITE OR OVEREATING: NOT AT ALL

## 2017-05-08 NOTE — MR AVS SNAPSHOT
After Visit Summary   5/8/2017    Damien Vallecillo    MRN: [id number]           Patient Information     Date Of Birth          1935        Visit Information        Provider Department      5/8/2017 9:00 AM Obi Strange MD [facility]        Today's Diagnoses     Left knee pain, unspecified chronicity    -  1    Right knee pain, unspecified chronicity        Hammer toe of left foot        Post-nasal drip        Abnormal pulmonary function test        Cough        Advanced directives, counseling/discussion           Follow-ups after your visit        Additional Services     ORTHO  REFERRAL       [facility] is referring you to the Orthopedic  Services at [facility].       The  Representative will assist you in the coordination of your Orthopedic and Musculoskeletal Care as prescribed by your physician.    The  Representative will call you within 1 business day to help schedule your appointment, or you may contact the  Representative at:    All areas ~ [phone]     Type of Referral : Surgical / Specialist       Timeframe requested: Within 2 weeks    Coverage of these services is subject to the terms and limitations of your health insurance plan.  Please call member services at your health plan with any benefit or coverage questions.      If X-rays, CT or MRI's have been performed, please contact the facility where they were done to arrange for , prior to your scheduled appointment.  Please bring this referral request to your appointment and present it to your specialist.            ORTHO  REFERRAL       [facility] is referring you to the Orthopedic  Services at [facility].       The  Representative will assist you in the coordination of your Orthopedic and Musculoskeletal Care as prescribed by your physician.    The  "[facility] Representative will call you within 1 business day to help schedule your appointment, or you may contact the [facility] Representative at:    All areas ~ [phone]     Type of Referral : [facility] / Foot & Ankle Surgery       Timeframe requested: Within 2 weeks    Coverage of these services is subject to the terms and limitations of your health insurance plan.  Please call member services at your health plan with any benefit or coverage questions.      If X-rays, CT or MRI's have been performed, please contact the facility where they were done to arrange for , prior to your scheduled appointment.  Please bring this referral request to your appointment and present it to your specialist.                  Who to contact     If you have questions or need follow up information about today's clinic visit or your schedule please contact [facility] directly at [phone].  Normal or non-critical lab and imaging results will be communicated to you by MyChart, letter or phone within 4 business days after the clinic has received the results. If you do not hear from us within 7 days, please contact the clinic through MyChart or phone. If you have a critical or abnormal lab result, we will notify you by phone as soon as possible.  Submit refill requests through [employer] or call your pharmacy and they will forward the refill request to us. Please allow 3 business days for your refill to be completed.          Additional Information About Your Visit        [employer] Information     [employer] lets you send messages to your doctor, view your test results, renew your prescriptions, schedule appointments and more. To sign up, go to [email or web address] . Click on \"Log in\" on the left side of the screen, which will take you to the Welcome page. Then click on \"Sign up Now\" on the right side of the page.     You will be asked to enter the access code listed below, as well as some personal " "information. Please follow the directions to create your username and password.     Your access code is: [email or web address]  Expires: 2017  9:15 AM     Your access code will  in 90 days. If you need help or a new code, please call your [facility] clinic or [phone].        Care EveryWhere ID     This is your Care EveryWhere ID. This could be used by other organizations to access your [facility] medical records  [id number]        Your Vitals Were     Pulse Temperature Respirations Height BMI (Body Mass Index)       72 98.1  F (36.7  C) (Oral) 14 5' 10\" (1.778 m) 38.45 kg/m2        Blood Pressure from Last 3 Encounters:   17 138/68   17 132/70   03/10/17 124/68    Weight from Last 3 Encounters:   17 268 lb (121.6 kg)   17 267 lb (121.1 kg)   03/10/17 263 lb 11.2 oz (119.6 kg)              We Performed the Following     ORTHO  REFERRAL     ORTHO  REFERRAL          Where to get your medicines      These medications were sent to [facility] [address] [address]     Phone:  [phone]     fluticasone 50 MCG/ACT spray    guaiFENesin 600 MG 12 hr tablet          Primary Care Provider Office Phone # Fax #    Obi Strange [phone]       [address] [address]        Thank you!     Thank you for choosing [facility]  for your care. Our goal is always to provide you with excellent care. Hearing back from our patients is one way we can continue to improve our services. Please take a few minutes to complete the written survey that you may receive in the mail after your visit with us. Thank you!             Your Updated Medication List - Protect others around you: Learn how to safely use, store and throw away your medicines at www.disposemymeds.org.          This list is accurate as of: 17 10:33 AM.  Always use your most " recent med list.                   Brand Name Dispense Instructions for use    ACE NOT PRESCRIBED (INTENTIONAL)     0 each    ACE Inhibitor not prescribed due to Other:  Had cough on ACEI in past       ASPIRIN PO      Take 325 mg by mouth daily       atorvastatin 40 MG tablet    LIPITOR    90 tablet    Take 1 tablet (40 mg) by mouth At Bedtime       * blood glucose monitoring test strip    ACCU-CHEK LAURA    100 strip    1Use to test blood sugar 2 times daily or as directed.       * blood glucose monitoring test strip    ACCU-CHEK LAURA PLUS    100 each    Use to test blood sugar 1 time daily       CINNAMON PO          dexamethasone 4 MG/ML injection    DECADRON    1 mL    Inject 1 mL (4 mg) as directed once for 1 dose Use 4 mg or dose determined by provider for iontophoresis.       fish oil-omega-3 fatty acids 1000 MG capsule      Take 1 g by mouth every evening       flax seed oil 1000 MG capsule      Take 1 capsule by mouth every evening       fluticasone 50 MCG/ACT spray    FLONASE    1 Bottle    Spray 1-2 sprays into both nostrils daily       Glucosamine HCl 1000 MG Tabs      Take 1 tablet by mouth every evening       guaiFENesin 600 MG 12 hr tablet    MUCINEX    120 tablet    Take 2 tablets (1,200 mg) by mouth 2 times daily       MULTIVITAMIN ADULT PO          ranitidine 300 MG tablet    ZANTAC    30 tablet    Take 1 tablet (300 mg) by mouth At Bedtime       tamsulosin 0.4 MG capsule    FLOMAX    90 capsule    Take 1 capsule (0.4 mg) by mouth At Bedtime       triamcinolone 0.5 % cream    KENALOG    30 g    Apply sparingly to affected area three times daily.       * Notice:  This list has 2 medication(s) that are the same as other medications prescribed for you. Read the directions carefully, and ask your doctor or other care provider to review them with you.

## 2017-05-08 NOTE — PROGRESS NOTES
"  SUBJECTIVE:                                                    Damien Vallecillo is a 81 year old male who presents to clinic today for the following health issues:      Left knee pain, unspecified chronicity  (primary encounter diagnosis):     The patient notes pain over his left knee. The patient is interested in surgical options for treatment. Previously seen and prepared, he became ill and his surgery was delayed      Right knee pain, unspecified chronicity:    Same      Hammer toe of left foot:    The patient notes that he has to buy shoes 3 times his size to fit his \"crooked\"  toes      Advanced directives, counseling/discussion:    The patient does not have an advanced directive        Post-nasal drip:    The patient notes that his cough has been persistent but that his nasal congestion has improved. The patient uses flonase regularly. He denies SOB. We had wanted, for completeness sake, to get a sleep study. He is not interested. Spirometry normalized echocardiogram showed good function no chest pain. Mucus is increased with guaifenesin      Abnormal pulmonary function test:     Normalized      Cough:     The patient notes that his cough has become progressively more productive over the past week.      Past Medical History:   Diagnosis Date     Acute suppurative arthritis (H)      Acute, but ill-defined, cerebrovascular disease      Arthritis      Chronic headaches      Glucose intolerance (impaired glucose tolerance) 5/31/2013     Gout, unspecified      Hammer toe of left foot 10/21/2016     Hematuria      Left knee pain, unspecified chronicity 5/8/2017     Lentigo maligna (H)      Malignant neoplasm of rectosigmoid junction (H)      Morbid obesity due to excess calories (H) 10/21/2016     Other convulsions     last seizure 7-8 years ago...not certain if these were true seizres or not..     Pre-diabetes 10/21/2016     RBC microcytosis 2/14/2017     Right knee pain, unspecified chronicity 5/8/2017     Syncope  " "    Tubulovillous adenoma of colon      Venous stasis dermatitis of both lower extremities 10/21/2016       Past Surgical History:   Procedure Laterality Date     C NONSPECIFIC PROCEDURE      right heel surgery; spur removed.     COLONOSCOPY N/A 6/23/2015    Procedure: COMBINED COLONOSCOPY, SINGLE OR MULTIPLE BIOPSY/POLYPECTOMY BY BIOPSY;  Surgeon: Kimberly Alfaro MD;  Location:  GI     COLONOSCOPY N/A 7/30/2015    Procedure: COLONOSCOPY;  Surgeon: Enrique Salazar MD;  Location:  OR     HERNIORRHAPHY EPIGASTRIC  4/27/2012    Procedure:HERNIORRHAPHY EPIGASTRIC; Epigastric Hernia Repair with mesh ; Surgeon:BOLIVAR OLIVEIRA; Location: OR     LAPAROSCOPIC ASSISTED COLECTOMY Right 7/30/2015    Procedure: LAPAROSCOPIC ASSISTED COLECTOMY;  Surgeon: Enrique Salazar MD;  Location:  OR     ORTHOPEDIC SURGERY      lt knee arthroplasty     SIGMOIDOSCOPY FLEXIBLE  5/29/2013    Procedure: SIGMOIDOSCOPY FLEXIBLE;  SIGMOIDOSCOPY FLEXIBLE (FV) Needs blood sugar ck'd;  Surgeon: Enrique Salazar MD;  Location:  GI       Family History   Problem Relation Age of Onset     Cancer - colorectal Mother      CEREBROVASCULAR DISEASE Father        Social History   Substance Use Topics     Smoking status: Never Smoker     Smokeless tobacco: Never Used     Alcohol use 0.0 oz/week     0 Standard drinks or equivalent per week      Comment: Occas       ROS: No fevers no chest pain    This document serves as a record of the services and decisions personally performed and made by Alexandr Crocker MD. It was created on his behalf by Juice Smith a trained medical scribe. The creation of this document is based the provider's statements to the medical scribe. Juice Smith May 8, 2017 9:08 AM  OBJECTIVE:                                                    /82 (BP Location: Left arm, Patient Position: Chair, Cuff Size: Adult Large)  Pulse 72  Temp 98.1  F (36.7  C) (Oral)  Resp 14  Ht 1.778 m (5' 10\")  Wt 121.6 kg (268 lb)  " BMI 38.45 kg/m2  Body mass index is 38.45 kg/(m^2).   BP Readings from Last 1 Encounters:   05/08/17 150/82   GEN: Healthy, Alert, No distress  MS: no edema,   FEET: left-sided sided hammer toe with dorsal abrasions over medial toes     BP Readings from Last 1 Encounters:   05/08/17 138/68       ASSESSMENT/PLAN:                                                    (M25.562) Left knee pain, unspecified chronicity  (primary encounter diagnosis)  Comment: Discussed, referred  Plan: ORTHO  REFERRAL he was interested in arthroplasty            (M25.561) Right knee pain, unspecified chronicity  Comment: Discussed, referred  Plan: ORTHO  REFERRAL he was interested in arthroplasty            (M20.42) Hammer toe of left foot  Comment: Discussed, referred  Plan: ORTHO  REFERRAL these can be surgically addressed            (Z71.89) Advanced directives, counseling/discussion  Comment: Discussed  Plan: pamphlet given       (R09.82) Post-nasal drip  Comment: Refill  Plan: fluticasone (FLONASE) 50 MCG/ACT spray            (R94.2) Abnormal pulmonary function test result  Comment: Refill  Plan: guaiFENesin (MUCINEX) 600 MG 12 hr tablet            (R05) Cough  Comment: Refill   Plan: guaiFENesin (MUCINEX) 600 MG 12 hr tablet           Anticipated preop otherwise in the fall    The information in this document, created by a scribe for me, accurately reflects the services I personally performed and the decisions made by me. I have reviewed and approved this document for accuracy. 9:18 AM May 8, 2017    ALICE HAMEED MD  [facility]

## 2017-05-09 ASSESSMENT — ANXIETY QUESTIONNAIRES: GAD7 TOTAL SCORE: 0

## 2017-05-10 ENCOUNTER — TELEPHONE (OUTPATIENT)
Dept: FAMILY MEDICINE | Facility: CLINIC | Age: 82
End: 2017-05-10

## 2017-05-10 ENCOUNTER — RADIANT APPOINTMENT (OUTPATIENT)
Dept: GENERAL RADIOLOGY | Facility: CLINIC | Age: 82
End: 2017-05-10
Attending: ORTHOPAEDIC SURGERY
Payer: MEDICARE

## 2017-05-10 ENCOUNTER — OFFICE VISIT (OUTPATIENT)
Dept: ORTHOPEDICS | Facility: CLINIC | Age: 82
End: 2017-05-10
Payer: MEDICARE

## 2017-05-10 VITALS
WEIGHT: 268 LBS | DIASTOLIC BLOOD PRESSURE: 72 MMHG | HEIGHT: 70 IN | BODY MASS INDEX: 38.37 KG/M2 | SYSTOLIC BLOOD PRESSURE: 144 MMHG

## 2017-05-10 DIAGNOSIS — M79.671 BILATERAL FOOT PAIN: ICD-10-CM

## 2017-05-10 DIAGNOSIS — M79.672 BILATERAL FOOT PAIN: Primary | ICD-10-CM

## 2017-05-10 DIAGNOSIS — M79.671 BILATERAL FOOT PAIN: Primary | ICD-10-CM

## 2017-05-10 DIAGNOSIS — M79.672 BILATERAL FOOT PAIN: ICD-10-CM

## 2017-05-10 PROCEDURE — 99213 OFFICE O/P EST LOW 20 MIN: CPT | Performed by: ORTHOPAEDIC SURGERY

## 2017-05-10 PROCEDURE — 73630 X-RAY EXAM OF FOOT: CPT | Mod: RT

## 2017-05-10 NOTE — PROGRESS NOTES
"HISTORY OF PRESENT ILLNESS:    Damien Vallecillo is a 81 year old male who is seen in follow up for bilateral knee pain.  Present symptoms: Pt would like to discuss getting TKA on right knee first.  Treatments tried to this point: left - failed TKA, bone scan; right - cortisone injections    PHYSICAL EXAM:  /72 (BP Location: Right arm, Patient Position: Chair, Cuff Size: Adult Large)  Ht 5' 10\" (1.778 m)  Wt 268 lb (121.6 kg)  BMI 38.45 kg/m2  Body mass index is 38.45 kg/(m^2).   GENERAL APPEARANCE: healthy, alert and no distress   SKIN: no suspicious lesions or rashes  NEURO: Normal strength and tone, mentation intact and speech normal  VASCULAR:  good pulses, and cappillary refill   LYMPH: no lymphadenopathy   PSYCH:  mentation appears normal and affect normal/bright    MSK:  The patient ambulates without an antalgic gait.  The patient is able to get on and off the exam table without difficulty.      Examination of the spine reveals a normal lordosis to the cervical and lumbar spine, and a normal kyphosis to the thoracic spine.  There is no clinical evidence of scoliosis.    The pelvis is clinically level.  Trendelenberg is negative.  The patient is non-tender to palpation over the greater trochanteric bursal region or piriformis fossa.  With the hip flexed to 90 degrees, internal and external rotation is 30/60 respectively,  with no pain .      KNEE: Examination of the right knee reveals the lower extremity to have a Normal anatomic alignment.  There is no erythema, ecchymosis nor edema.  There is moderate effusion present clinically.  There is no significant warmth.  Range of motion is 0 to 130 degrees, with crepitus.  There is mild tenderness to palpation at the both medial and lateral joint line.  Delia's is positive without crepitus. The knee is ligamentously stable. Patello-femoral grind test is positive.      The calves and thighs are symmetric, without atrophy and non-tender to palpation. Navid's " sign is negative, bilaterally.   CMS is intact to the toes.       IMAGING INTERPRETATION:       ASSESSMENT / PLAN: Primary and stage osteo arthritis, right knee. We discussed the potential risks as well as benefits of total knee arthroplasty and he would like to proceed. He will schedule this at his convenience.    Return to clinic     Mark Raymond MD  Dept. Orthopedic Surgery  [facility]       Disclaimer: This note consists of symbols derived from keyboarding, dictation and/or voice recognition software. As a result, there may be errors in the script that have gone undetected. Please consider this when interpreting information found in this chart.

## 2017-05-10 NOTE — TELEPHONE ENCOUNTER
Reason for call: Other   Patient called regarding (reason for call): Patient was seen by Dr. Strange on 05/08/17 for   knee/foot pain.  He is hoping to schedule surgery for Monday, 05/18/17.  Patient is    wondering if the appt he had on Monday would count as a pre-op.  If so, his surgeon    needs something to indicate he is good to go for the surgery.  If not, let him know and   he will schedule a pre-op appt.    Additional comments: Please have Dr. Strange review his chart and call the patient   to let him know how to proceed.    Phone Number Pt can be reached at: Home number on file [phone] (home)  Best Time: Please call in the morning if possible  Can we leave a detailed message on this number? YES

## 2017-05-10 NOTE — NURSING NOTE
"Chief Complaint   Patient presents with     RECHECK     bilateral knee pain       Initial /72 (BP Location: Right arm, Patient Position: Chair, Cuff Size: Adult Large)  Ht 5' 10\" (1.778 m)  Wt 268 lb (121.6 kg)  BMI 38.45 kg/m2 Estimated body mass index is 38.45 kg/(m^2) as calculated from the following:    Height as of this encounter: 5' 10\" (1.778 m).    Weight as of this encounter: 268 lb (121.6 kg).  Medication Reconciliation: complete    "

## 2017-05-10 NOTE — LETTER
"  5/10/2017       RE: Damien Vallecillo  [address]  [place] [address]           Dear Colleague,    Thank you for referring your patient, Damien Vallecillo, to the [facility] ORTHOPEDIC SURGERY. Please see a copy of my visit note below.    HISTORY OF PRESENT ILLNESS:    Damien Vallecillo is a 81 year old male who is seen in follow up for bilateral knee pain.  Present symptoms: Pt would like to discuss getting TKA on right knee first.  Treatments tried to this point: left - failed TKA, bone scan; right - cortisone injections    PHYSICAL EXAM:  /72 (BP Location: Right arm, Patient Position: Chair, Cuff Size: Adult Large)  Ht 5' 10\" (1.778 m)  Wt 268 lb (121.6 kg)  BMI 38.45 kg/m2  Body mass index is 38.45 kg/(m^2).   GENERAL APPEARANCE: healthy, alert and no distress   SKIN: no suspicious lesions or rashes  NEURO: Normal strength and tone, mentation intact and speech normal  VASCULAR:  good pulses, and cappillary refill   LYMPH: no lymphadenopathy   PSYCH:  mentation appears normal and affect normal/bright    MSK:  The patient ambulates without an antalgic gait.  The patient is able to get on and off the exam table without difficulty.      Examination of the spine reveals a normal lordosis to the cervical and lumbar spine, and a normal kyphosis to the thoracic spine.  There is no clinical evidence of scoliosis.    The pelvis is clinically level.  Trendelenberg is negative.  The patient is non-tender to palpation over the greater trochanteric bursal region or piriformis fossa.  With the hip flexed to 90 degrees, internal and external rotation is 30/60 respectively,  with no pain .      KNEE: Examination of the right knee reveals the lower extremity to have a Normal anatomic alignment.  There is no erythema, ecchymosis nor edema.  There is moderate effusion present clinically.  There is no significant warmth.  Range of motion is 0 to 130 degrees, with crepitus.  There is mild tenderness to palpation at " the both medial and lateral joint line.  Delia's is positive without crepitus. The knee is ligamentously stable. Patello-femoral grind test is positive.      The calves and thighs are symmetric, without atrophy and non-tender to palpation. Navid's sign is negative, bilaterally.   CMS is intact to the toes.       IMAGING INTERPRETATION:       ASSESSMENT / PLAN: Primary and stage osteo arthritis, right knee. We discussed the potential risks as well as benefits of total knee arthroplasty and he would like to proceed. He will schedule this at his convenience.    Return to clinic     Mark Raymond MD  Dept. Orthopedic Surgery  [facility]       Disclaimer: This note consists of symbols derived from keyboarding, dictation and/or voice recognition software. As a result, there may be errors in the script that have gone undetected. Please consider this when interpreting information found in this chart.        Again, thank you for allowing me to participate in the care of your patient.        Sincerely,              Alec Raymond MD

## 2017-05-11 NOTE — TELEPHONE ENCOUNTER
Called patient to inform him that he needs pre-op, appt scheduled for Monday 05/15.  Lynda Alexis CMA

## 2017-05-11 NOTE — TELEPHONE ENCOUNTER
No, a pre op needs additional tests and formal assessment of risk  None of these were done  Obi Strange

## 2017-05-12 ENCOUNTER — OFFICE VISIT (OUTPATIENT)
Dept: PODIATRY | Facility: CLINIC | Age: 82
End: 2017-05-12
Payer: MEDICARE

## 2017-05-12 VITALS
SYSTOLIC BLOOD PRESSURE: 140 MMHG | BODY MASS INDEX: 38.37 KG/M2 | WEIGHT: 268 LBS | DIASTOLIC BLOOD PRESSURE: 84 MMHG | HEIGHT: 70 IN

## 2017-05-12 DIAGNOSIS — M20.42 HAMMER TOES OF BOTH FEET: ICD-10-CM

## 2017-05-12 DIAGNOSIS — M79.671 PAIN IN BOTH FEET: Primary | ICD-10-CM

## 2017-05-12 DIAGNOSIS — M79.672 PAIN IN BOTH FEET: Primary | ICD-10-CM

## 2017-05-12 DIAGNOSIS — E66.01 MORBID OBESITY DUE TO EXCESS CALORIES (H): ICD-10-CM

## 2017-05-12 DIAGNOSIS — I87.2 VENOUS INSUFFICIENCY OF BOTH LOWER EXTREMITIES: ICD-10-CM

## 2017-05-12 DIAGNOSIS — M20.41 HAMMER TOES OF BOTH FEET: ICD-10-CM

## 2017-05-12 PROCEDURE — 99204 OFFICE O/P NEW MOD 45 MIN: CPT | Performed by: PODIATRIST

## 2017-05-12 NOTE — PATIENT INSTRUCTIONS
DR. CASEY'S CLINIC SCHEDULE     [facility]  [address] [place] [address]  P: [phone]  F: [phone] [facility]  [address] [place] [address]  P: [phone]  F: [phone] [facility]  [address] [place] [address]  P: [phone]  F: [phone]   FRIDAY AM FRIDAY PM SURGERY   [facility]     [facility]  [address]  [place] [address]  P: [phone] [facility]  [address]  [place] [address]  P: [phone]  F: [phone] Surgery [phone]   Appointment [phone] General After Hours:  [phone] Patient [phone]             Body Mass Index (BMI)  Many things can cause foot and ankle problems. Foot structure, activity level, foot mechanics and injuries are common causes of pain.    One very important issue that often goes unmentioned, is body weight.  Extra weight can cause increased stress on muscles, ligaments, bones and tendons.  Sometimes just a few extra pounds is all it takes to put one over her/his threshold.   Without reducing that stress, it can be difficult to alleviate pain.      Some people are uncomfortable addressing this issue, but we feel it is important for you to think about it.  As Foot &  Ankle specialists, our job is addressing the lower extremity problem and possible causes.     Regarding extra body weight, we encourage patients to discuss diet and weight management plans with their primary care doctors.  It is this team approach that gives you the best opportunity for pain relief and getting you back on your feet.        HAMMERTOES     Hammertoe is a contracture (bending) of one or both joints of the second, third, fourth, or fifth (little) toes. This abnormal bending can put pressure on the toe when wearing shoes, causing problems to develop.  Hammertoes usually start  out as mild deformities and get progressively worse over time. In the earlier stages, hammertoes are flexible and the symptoms can often be managed with noninvasive measures. But if left untreated, hammertoes can become more rigid and will not respond to non-surgical treatment.  Because of the progressive nature of hammertoes, they should receive early attention. Hammertoes never get better without some kind of intervention.  Causes  The most common cause of hammertoe is a muscle/tendon imbalance. This imbalance, which leads to a bending of the toe, results from mechanical (structural) changes in the foot that occur over time in some people.  Hammertoes may be aggravated by shoes that don t fit properly. A hammertoe may result if a toe is too long and is forced into a cramped position when a tight shoe is worn.  Occasionally, hammertoe is the result of an earlier trauma to the toe. In some people, hammertoes are inherited.  Symptoms  Pain or irritation of the affected toe when wearing shoes.   Corns and calluses (a buildup of skin) on the toe, between two toes, or on the ball of the foot. Corns are caused by constant friction against the shoe. They may be soft or hard, depending upon their location.   Inflammation, redness, or a burning sensation   Contracture of the toe   In more severe cases of hammertoe, open sores may form.   Diagnosis  Although hammertoes are readily apparent, to arrive at a diagnosis the foot and ankle surgeon will obtain a thorough history of your symptoms and examine your foot. During the physical examination, the doctor may attempt to reproduce your symptoms by manipulating your foot and will study the contractures of the toes. In addition, the foot and ankle surgeon may take x-rays to determine the degree of the deformities and assess any changes that may have occurred.   Hammertoes are progressive   they don t go away by themselves and usually they will get worse over time. However, not  all cases are alike   some hammertoes progress more rapidly than others. Once your foot and ankle surgeon has evaluated your hammertoes, a treatment plan can be developed that is suited to your needs.  Non-surgical Treatment  There is a variety of treatment options for hammertoe. The treatment your foot and ankle surgeon selects will depend upon the severity of your hammertoe and other factors.  A number of non-surgical measures can be undertaken:  Padding corns and calluses. Your foot and ankle surgeon can provide or prescribe pads designed to shield corns from irritation. If you want to try over-the-counter pads, avoid the medicated types. Medicated pads are generally not recommended because they may contain a small amount of acid that can be harmful. Consult your surgeon about this option.   Changes in shoewear. Avoid shoes with pointed toes, shoes that are too short, or shoes with high heels   conditions that can force your toe against the front of the shoe. Instead, choose comfortable shoes with a deep, roomy toe box and heels no higher than two inches.   Orthotic devices. A custom orthotic device placed in your shoe may help control the muscle/tendon imbalance.   Injection therapy. Corticosteroid injections are sometimes used to ease pain and inflammation caused by hammertoe.   Medications. Oral nonsteroidal anti-inflammatory drugs (NSAIDs), such as ibuprofen, may be recommended to reduce pain and inflammation.   Splinting/strapping. Splints or small straps may be applied by the surgeon to realign the bent toe.   Exercises:   1. The Toe Tap  Stand flat on the ground in your bare feet. Raise all of your toes up off the ground as high as you can. Then starting with the little toes, slowly press them down to the ground. After the big toes are on the ground, start over by raising all of them up off the ground again. This motion is similar to tapping your fingers on a desk. Repeat this ten times.     2.  Interlocking your Fingers and Toes  Cross your right foot over your knee and place the fingers of your left hand between your toes. Squeeze your toes together, pinching your fingers between them. Spread the toes apart and squeeze them together again. Repeat this ten times then do the other foot. Like most exercises, this will get easier the more you do it. If you are having a lot of difficulty with this exercise, start with just your index finger between your first and second toe, then later add your middle finger between your second and third toes, and so on until you can fit all your fingers between your toes. Do this ten times on each foot. Eventually you will be able to spread your toes apart without using your fingers.    3. Gripping the Floor   the floor by pressing the pads of your toes (not the tips) into the floor without curling your toes. Relax and repeat this ten times. If your shoes have the proper amount of depth, you should be able to do this with shoes on.      HAMMERTOE TOE SURGERY   Hammertoe surgery is complex. The surgical procedure is an attempt to help the toe lay in a better position. Nearly every structure in the toe will be cut including the tendons, ligaments, skin and bone. Hammertoes are a complex deformity and final toe position is difficult to predict. The only sure way to position a toe is to fuse all three digital joints. That will not happen as some degree of toe motion is needed for walking. The toe may not be completely reduced as the surrounding skin and other structures may not allow the toe to return to a normal position. The tendons on adjacent toes may need to be cut at the time of the original or subsequent surgeries, as interconnections exist between the toes. The toe may drift after surgery. Stiffness may develop leading to new areas of pressure.   Future shoe choices will be critical in allowing the surgery to provide comfort. The toes will still hurt if shoes rub. The  original pain may also persist as other foot problems may be contributing to the current pain. The toe may or may not be pinned in place. External pins would require complete avoidance of water on the foot for six weeks. The pin would be removed about six weeks after the surgery. Strict attention to protection is critical. The pin could get bumped or loosen resulting in early removal. Removal might be necessary before the bone heals which would negatively affect the final surgical outcome and toe allignment.     POTENTIAL COMPLICATIONS OF FOOT & ANKLE SURGERY    Undergoing a surgical procedure involves a certain amount of risk. Risk of complications vary depending on the complexity of the surgery and how you take care of the surgical area during the healing process. Complications can range from minor infection to death. Some complications are temporary while others will be permanent.  Your surgeon weighs the risk of complications vs the potential benefit of undergoing surgery. You need to consider your tolerance for unexpected problems as you decide whether to undergo surgery.    Foot and Ankle surgery involves cutting skin, bone, ligaments, blood vessels and joints.  these structures heal well but not without consequence. Any break to the skin can lead to infection. A deep infection involves bones or joints which can be devastating. Deep infection can lead to amputation or could spread to other parts of your body. Most infections are minor and easily treated with oral antibiotics. Infections are often times from bacteria already present on your skin. Proper care of the surgical site is an essential component of avoiding infection. Do not get the bandage wet and take proper care of external pins to avoid these problems.     Joint stiffness is inherent to any foot or ankle surgery. Joint surgery is a major component of reconstructive foot and ankle procedures. The ligaments and tissues around the joint are cut, then  later repaired. Scare tissue limits joint mobility. This can be permanent but generally improves over the course of one year.    Surgery involves dissection around nerves. Visible nerves are moved out of the way while very small nerves are cut. Scar tissue develops around nerves and can lead to nerve pain, numbness, or neuromas. Nerve symptoms can be permanent. This can lead to numbness or sometimes hypersensitivity to touch and problems wearing shoes.    Bones do not always heal after surgery. Poor healing after a bone cut or joint fusion can lead to an extended period of casting or repeat surgery. Electronic bone stimulators are sometimes used to stimulate poor healing of bone. Nonunion is when joint fusion does not take.  This can occur as often as 10% of the time. Smoking doubles your risk of poor bone healing to 20%.    Bone grafting is sometimes necessary during the original or subsequent surgery. Bone is sometimes taken from other parts of your body or freeze dried bone from a bone bank from a bone bank or synthetic bone material might be used.    A scar is always present after foot and ankle surgery. The scar will be visible and could be sensitive. Some people develop excessive scarring, which cannot be controlled by the surgeon. Scars can be unsightly and can restrict joint mobility.    Blood clots can develop in the calf after surgery. Foot and ankle surgery is a predisposing factor for blood clots. The blood clot could break and travel to your lung.  This condition can lead to death. Early warning signs could include calf swelling and pain, chest pain or shortness of breath. This is an emergency that requires immediate attention by a medical doctor!    Surgery will not necessarily create a pain-free foot. Even normal feet hurt. Crooked toes, bunions, neuromas, flat feet and arthritis should all be considered permanent conditions.  Ankle pain commonly requires multiple surgeries over a lifetime. Do not  assume that having surgery will permanently fix your condition. You may need permanent alteration in shoes and activities to accommodate your foot and ankle problem.    Careful attention to post-operative recommendations will dramatically reduce your risk of complications. Proper dressing, wound care, elevation and rest will be essential to get the wound healed and minimize scarring. Strict attention to activity restrictions, such as non-weight bearing, or partial weight bearing is essential. Internal fixation devices may not not resist the stress of walking. Some select surgeries allow the patient to walk, however this should be very minimal.    Despite these concerns, foot and ankle surgery leads to a high level of patient satisfaction. Your surgeon would not recommend surgery if he/she did not expect your foot to improve. Talk to your surgeon about any of the above issues.

## 2017-05-12 NOTE — MR AVS SNAPSHOT
After Visit Summary   2017    Damien Vallecillo    MRN: [id number]           Patient Information     Date Of Birth          1935        Visit Information        Provider Department      2017 2:30 PM Beverly Kim DPM, Podiat[facility]        Care Instructions    DR. KIM'S CLINIC SCHEDULE     [facility]  [address] [place] [address]  P: [phone]  F: [phone] [facility]  [address] [address] [place] [address]  P: [phone]  F: [phone] [facility]  [address] [place] [address]  P: [phone]  F: [phone]   FRIDAY AM FRIDAY PM SURGERY   [facility]     [facility]  [address]  [place] [address]  P: [phone] [facility]  [address]  [place] [address]  P: [phone]  F: [phone] Surgery [phone]   Appointment [phone] General After Hours:  [phone] Patient [phone]             Body Mass Index (BMI)  Many things can cause foot and ankle problems. Foot structure, activity level, foot mechanics and injuries are common causes of pain.    One very important issue that often goes unmentioned, is body weight.  Extra weight can cause increased stress on muscles, ligaments, bones and tendons.  Sometimes just a few extra pounds is all it takes to put one over her/his threshold.   Without reducing that stress, it can be difficult to alleviate pain.      Some people are uncomfortable addressing this issue, but we feel it is important for you to think about it.  As Foot &  Ankle specialists, our job is addressing the lower extremity problem and possible causes.     Regarding extra body weight, we encourage patients to discuss diet and weight management plans with their primary care doctors.  It is this team approach that gives you  the best opportunity for pain relief and getting you back on your feet.        HAMMERTOES     Hammertoe is a contracture (bending) of one or both joints of the second, third, fourth, or fifth (little) toes. This abnormal bending can put pressure on the toe when wearing shoes, causing problems to develop.  Hammertoes usually start out as mild deformities and get progressively worse over time. In the earlier stages, hammertoes are flexible and the symptoms can often be managed with noninvasive measures. But if left untreated, hammertoes can become more rigid and will not respond to non-surgical treatment.  Because of the progressive nature of hammertoes, they should receive early attention. Hammertoes never get better without some kind of intervention.  Causes  The most common cause of hammertoe is a muscle/tendon imbalance. This imbalance, which leads to a bending of the toe, results from mechanical (structural) changes in the foot that occur over time in some people.  Hammertoes may be aggravated by shoes that don t fit properly. A hammertoe may result if a toe is too long and is forced into a cramped position when a tight shoe is worn.  Occasionally, hammertoe is the result of an earlier trauma to the toe. In some people, hammertoes are inherited.  Symptoms  Pain or irritation of the affected toe when wearing shoes.   Corns and calluses (a buildup of skin) on the toe, between two toes, or on the ball of the foot. Corns are caused by constant friction against the shoe. They may be soft or hard, depending upon their location.   Inflammation, redness, or a burning sensation   Contracture of the toe   In more severe cases of hammertoe, open sores may form.   Diagnosis  Although hammertoes are readily apparent, to arrive at a diagnosis the foot and ankle surgeon will obtain a thorough history of your symptoms and examine your foot. During the physical examination, the doctor may attempt to reproduce your symptoms by  manipulating your foot and will study the contractures of the toes. In addition, the foot and ankle surgeon may take x-rays to determine the degree of the deformities and assess any changes that may have occurred.   Hammertoes are progressive - they don t go away by themselves and usually they will get worse over time. However, not all cases are alike - some hammertoes progress more rapidly than others. Once your foot and ankle surgeon has evaluated your hammertoes, a treatment plan can be developed that is suited to your needs.  Non-surgical Treatment  There is a variety of treatment options for hammertoe. The treatment your foot and ankle surgeon selects will depend upon the severity of your hammertoe and other factors.  A number of non-surgical measures can be undertaken:  Padding corns and calluses. Your foot and ankle surgeon can provide or prescribe pads designed to shield corns from irritation. If you want to try over-the-counter pads, avoid the medicated types. Medicated pads are generally not recommended because they may contain a small amount of acid that can be harmful. Consult your surgeon about this option.   Changes in shoewear. Avoid shoes with pointed toes, shoes that are too short, or shoes with high heels - conditions that can force your toe against the front of the shoe. Instead, choose comfortable shoes with a deep, roomy toe box and heels no higher than two inches.   Orthotic devices. A custom orthotic device placed in your shoe may help control the muscle/tendon imbalance.   Injection therapy. Corticosteroid injections are sometimes used to ease pain and inflammation caused by hammertoe.   Medications. Oral nonsteroidal anti-inflammatory drugs (NSAIDs), such as ibuprofen, may be recommended to reduce pain and inflammation.   Splinting/strapping. Splints or small straps may be applied by the surgeon to realign the bent toe.   Exercises:   1. The Toe Tap  Stand flat on the ground in your bare  feet. Raise all of your toes up off the ground as high as you can. Then starting with the little toes, slowly press them down to the ground. After the big toes are on the ground, start over by raising all of them up off the ground again. This motion is similar to tapping your fingers on a desk. Repeat this ten times.     2. Interlocking your Fingers and Toes  Cross your right foot over your knee and place the fingers of your left hand between your toes. Squeeze your toes together, pinching your fingers between them. Spread the toes apart and squeeze them together again. Repeat this ten times then do the other foot. Like most exercises, this will get easier the more you do it. If you are having a lot of difficulty with this exercise, start with just your index finger between your first and second toe, then later add your middle finger between your second and third toes, and so on until you can fit all your fingers between your toes. Do this ten times on each foot. Eventually you will be able to spread your toes apart without using your fingers.    3. Gripping the Floor   the floor by pressing the pads of your toes (not the tips) into the floor without curling your toes. Relax and repeat this ten times. If your shoes have the proper amount of depth, you should be able to do this with shoes on.      HAMMERTOE TOE SURGERY   Hammertoe surgery is complex. The surgical procedure is an attempt to help the toe lay in a better position. Nearly every structure in the toe will be cut including the tendons, ligaments, skin and bone. Hammertoes are a complex deformity and final toe position is difficult to predict. The only sure way to position a toe is to fuse all three digital joints. That will not happen as some degree of toe motion is needed for walking. The toe may not be completely reduced as the surrounding skin and other structures may not allow the toe to return to a normal position. The tendons on adjacent toes may  need to be cut at the time of the original or subsequent surgeries, as interconnections exist between the toes. The toe may drift after surgery. Stiffness may develop leading to new areas of pressure.   Future shoe choices will be critical in allowing the surgery to provide comfort. The toes will still hurt if shoes rub. The original pain may also persist as other foot problems may be contributing to the current pain. The toe may or may not be pinned in place. External pins would require complete avoidance of water on the foot for six weeks. The pin would be removed about six weeks after the surgery. Strict attention to protection is critical. The pin could get bumped or loosen resulting in early removal. Removal might be necessary before the bone heals which would negatively affect the final surgical outcome and toe allignment.     POTENTIAL COMPLICATIONS OF FOOT & ANKLE SURGERY    Undergoing a surgical procedure involves a certain amount of risk. Risk of complications vary depending on the complexity of the surgery and how you take care of the surgical area during the healing process. Complications can range from minor infection to death. Some complications are temporary while others will be permanent.  Your surgeon weighs the risk of complications vs the potential benefit of undergoing surgery. You need to consider your tolerance for unexpected problems as you decide whether to undergo surgery.    Foot and Ankle surgery involves cutting skin, bone, ligaments, blood vessels and joints.  these structures heal well but not without consequence. Any break to the skin can lead to infection. A deep infection involves bones or joints which can be devastating. Deep infection can lead to amputation or could spread to other parts of your body. Most infections are minor and easily treated with oral antibiotics. Infections are often times from bacteria already present on your skin. Proper care of the surgical site is an  essential component of avoiding infection. Do not get the bandage wet and take proper care of external pins to avoid these problems.     Joint stiffness is inherent to any foot or ankle surgery. Joint surgery is a major component of reconstructive foot and ankle procedures. The ligaments and tissues around the joint are cut, then later repaired. Scare tissue limits joint mobility. This can be permanent but generally improves over the course of one year.    Surgery involves dissection around nerves. Visible nerves are moved out of the way while very small nerves are cut. Scar tissue develops around nerves and can lead to nerve pain, numbness, or neuromas. Nerve symptoms can be permanent. This can lead to numbness or sometimes hypersensitivity to touch and problems wearing shoes.    Bones do not always heal after surgery. Poor healing after a bone cut or joint fusion can lead to an extended period of casting or repeat surgery. Electronic bone stimulators are sometimes used to stimulate poor healing of bone. Nonunion is when joint fusion does not take.  This can occur as often as 10% of the time. Smoking doubles your risk of poor bone healing to 20%.    Bone grafting is sometimes necessary during the original or subsequent surgery. Bone is sometimes taken from other parts of your body or freeze dried bone from a bone bank from a bone bank or synthetic bone material might be used.    A scar is always present after foot and ankle surgery. The scar will be visible and could be sensitive. Some people develop excessive scarring, which cannot be controlled by the surgeon. Scars can be unsightly and can restrict joint mobility.    Blood clots can develop in the calf after surgery. Foot and ankle surgery is a predisposing factor for blood clots. The blood clot could break and travel to your lung.  This condition can lead to death. Early warning signs could include calf swelling and pain, chest pain or shortness of breath. This  is an emergency that requires immediate attention by a medical doctor!    Surgery will not necessarily create a pain-free foot. Even normal feet hurt. Crooked toes, bunions, neuromas, flat feet and arthritis should all be considered permanent conditions.  Ankle pain commonly requires multiple surgeries over a lifetime. Do not assume that having surgery will permanently fix your condition. You may need permanent alteration in shoes and activities to accommodate your foot and ankle problem.    Careful attention to post-operative recommendations will dramatically reduce your risk of complications. Proper dressing, wound care, elevation and rest will be essential to get the wound healed and minimize scarring. Strict attention to activity restrictions, such as non-weight bearing, or partial weight bearing is essential. Internal fixation devices may not not resist the stress of walking. Some select surgeries allow the patient to walk, however this should be very minimal.    Despite these concerns, foot and ankle surgery leads to a high level of patient satisfaction. Your surgeon would not recommend surgery if he/she did not expect your foot to improve. Talk to your surgeon about any of the above issues.          Follow-ups after your visit        Your next 10 appointments already scheduled     May 15, 2017  9:45 AM CDT   Office Visit with Obi Strange MD   [facility]    [address] [place] [id number] [phone]           Bring a current list of meds and any records pertaining to this visit.  For Physicals, please bring immunization records and any forms needing to be filled out.  Please arrive 10 minutes early to complete paperwork.              Who to contact     If you have questions or need follow up information about today's clinic visit or your schedule please contact [facility] directly at [phone].  Normal or  "non-critical lab and imaging results will be communicated to you by MyChart, letter or phone within 4 business days after the clinic has received the results. If you do not hear from us within 7 days, please contact the clinic through [employer] or phone. If you have a critical or abnormal lab result, we will notify you by phone as soon as possible.  Submit refill requests through [employer] or call your pharmacy and they will forward the refill request to us. Please allow 3 business days for your refill to be completed.          Additional Information About Your Visit        [employer] Information     [employer] lets you send messages to your doctor, view your test results, renew your prescriptions, schedule appointments and more. To sign up, go to [email or web address] . Click on \"Log in\" on the left side of the screen, which will take you to the Welcome page. Then click on \"Sign up Now\" on the right side of the page.     You will be asked to enter the access code listed below, as well as some personal information. Please follow the directions to create your username and password.     Your access code is: [email or web address]  Expires: 2017  9:15 AM     Your access code will  in 90 days. If you need help or a new code, please call your [place] [facility] clinic or [phone].        Care EveryWhere ID     This is your Care EveryWhere ID. This could be used by other organizations to access your [place] [facility] medical records  [id number]        Your Vitals Were     Height BMI (Body Mass Index)                5' 10\" (1.778 m) 38.45 kg/m2           Blood Pressure from Last 3 Encounters:   17 140/84   05/10/17 144/72   17 138/68    Weight from Last 3 Encounters:   17 268 lb (121.6 kg)   05/10/17 268 lb (121.6 kg)   17 268 lb (121.6 kg)              Today, you had the following     No orders found for display       Primary Care Provider Office Phone # Fax #    Obi Strange [phone]       " [address] [address]        Thank you!     Thank you for choosing [facility]  for your care. Our goal is always to provide you with excellent care. Hearing back from our patients is one way we can continue to improve our services. Please take a few minutes to complete the written survey that you may receive in the mail after your visit with us. Thank you!             Your Updated Medication List - Protect others around you: Learn how to safely use, store and throw away your medicines at www.disposemymeds.org.          This list is accurate as of: 5/12/17  3:31 PM.  Always use your most recent med list.                   Brand Name Dispense Instructions for use    ACE NOT PRESCRIBED (INTENTIONAL)     0 each    ACE Inhibitor not prescribed due to Other:  Had cough on ACEI in past       ASPIRIN PO      Take 325 mg by mouth daily       atorvastatin 40 MG tablet    LIPITOR    90 tablet    Take 1 tablet (40 mg) by mouth At Bedtime       * blood glucose monitoring test strip    ACCU-CHEK LAURA    100 strip    1Use to test blood sugar 2 times daily or as directed.       * blood glucose monitoring test strip    ACCU-CHEK LAURA PLUS    100 each    Use to test blood sugar 1 time daily       CINNAMON PO          fish oil-omega-3 fatty acids 1000 MG capsule      Take 1 g by mouth every evening       flax seed oil 1000 MG capsule      Take 1 capsule by mouth every evening       fluticasone 50 MCG/ACT spray    FLONASE    1 Bottle    Spray 1-2 sprays into both nostrils daily       Glucosamine HCl 1000 MG Tabs      Take 1 tablet by mouth every evening       guaiFENesin 600 MG 12 hr tablet    MUCINEX    120 tablet    Take 2 tablets (1,200 mg) by mouth 2 times daily       MULTIVITAMIN ADULT PO      Reported on 5/12/2017       ranitidine 300 MG tablet    ZANTAC    30 tablet    Take 1 tablet (300 mg) by mouth At Bedtime       tamsulosin 0.4 MG capsule    FLOMAX    90 capsule     Take 1 capsule (0.4 mg) by mouth At Bedtime       triamcinolone 0.5 % cream    KENALOG    30 g    Apply sparingly to affected area three times daily.       * Notice:  This list has 2 medication(s) that are the same as other medications prescribed for you. Read the directions carefully, and ask your doctor or other care provider to review them with you.

## 2017-05-12 NOTE — NURSING NOTE
"Chief Complaint   Patient presents with     Foot Problems     toes and pain on both feet        Initial /84  Ht 5' 10\" (1.778 m)  Wt 268 lb (121.6 kg)  BMI 38.45 kg/m2 Estimated body mass index is 38.45 kg/(m^2) as calculated from the following:    Height as of this encounter: 5' 10\" (1.778 m).    Weight as of this encounter: 268 lb (121.6 kg).  Medication Reconciliation: complete   Blaise Palafox MA      "

## 2017-05-12 NOTE — LETTER
5/12/2017       RE: Damien Vallecillo  [address]  [facility] [id number]           Dear Colleague,    Thank you for referring your patient, Damien Vallecillo, to the [facility]. Please see a copy of my visit note below.    PATIENT HISTORY:  Damien Vallecillo is a 81 year old male who presents to clinic for pain to both feet. Left worse. Notes he has had hammertoes for years. He is getting knee surgery done with Dr. Raymond and is wondering about surgery on the toes at the same time. Pain to feet is 5/10. The left one is very hard to wear shoes in as it rubs. Right 2nd toe rubs against big toe. Here with wife. They had xrays of the feet done.     Review of Systems:  Patient denies fever, chills, rash, wound, stiffness,numbness, weakness, heart burn, blood in stool, chest pain with activity, calf pain when walking, shortness of breath with activity, chronic cough, easy bleeding/bruising, swelling of ankles, excessive thirst, fatigue, depression, anxiety.  Patient admits to limping at times. .     PAST MEDICAL HISTORY:   Past Medical History:   Diagnosis Date     Acute suppurative arthritis (H)      Acute, but ill-defined, cerebrovascular disease      Arthritis      Chronic headaches      Glucose intolerance (impaired glucose tolerance) 5/31/2013     Gout, unspecified      Hammer toe of left foot 10/21/2016     Hematuria      Left knee pain, unspecified chronicity 5/8/2017     Lentigo maligna (H)      Malignant neoplasm of rectosigmoid junction (H)      Morbid obesity due to excess calories (H) 10/21/2016     Other convulsions     last seizure 7-8 years ago...not certain if these were true seizres or not..     Pre-diabetes 10/21/2016     RBC microcytosis 2/14/2017     Right knee pain, unspecified chronicity 5/8/2017     Syncope      Tubulovillous adenoma of colon      Venous stasis dermatitis of both lower extremities 10/21/2016        PAST SURGICAL HISTORY:   Past Surgical History:   Procedure Laterality  Date     C NONSPECIFIC PROCEDURE      right heel surgery; spur removed.     COLONOSCOPY N/A 6/23/2015    Procedure: COMBINED COLONOSCOPY, SINGLE OR MULTIPLE BIOPSY/POLYPECTOMY BY BIOPSY;  Surgeon: Kimberly Alfaro MD;  Location:  GI     COLONOSCOPY N/A 7/30/2015    Procedure: COLONOSCOPY;  Surgeon: Enrique Salazar MD;  Location:  OR     HERNIORRHAPHY EPIGASTRIC  4/27/2012    Procedure:HERNIORRHAPHY EPIGASTRIC; Epigastric Hernia Repair with mesh ; Surgeon:BOLIVAR OLIVEIRA; Location: OR     LAPAROSCOPIC ASSISTED COLECTOMY Right 7/30/2015    Procedure: LAPAROSCOPIC ASSISTED COLECTOMY;  Surgeon: Enrique Salazar MD;  Location:  OR     ORTHOPEDIC SURGERY      lt knee arthroplasty     SIGMOIDOSCOPY FLEXIBLE  5/29/2013    Procedure: SIGMOIDOSCOPY FLEXIBLE;  SIGMOIDOSCOPY FLEXIBLE (FV) Needs blood sugar ck'd;  Surgeon: Enrique Salazar MD;  Location:  GI        MEDICATIONS:   Current Outpatient Prescriptions:      fluticasone (FLONASE) 50 MCG/ACT spray, Spray 1-2 sprays into both nostrils daily, Disp: 1 Bottle, Rfl: 11     guaiFENesin (MUCINEX) 600 MG 12 hr tablet, Take 2 tablets (1,200 mg) by mouth 2 times daily, Disp: 120 tablet, Rfl: 11     ASPIRIN PO, Take 325 mg by mouth daily, Disp: , Rfl:      Multiple Vitamins-Minerals (MULTIVITAMIN ADULT PO), Reported on 5/12/2017, Disp: , Rfl:      CINNAMON PO, , Disp: , Rfl:      tamsulosin (FLOMAX) 0.4 MG 24 hr capsule, Take 1 capsule (0.4 mg) by mouth At Bedtime, Disp: 90 capsule, Rfl: 3     atorvastatin (LIPITOR) 40 MG tablet, Take 1 tablet (40 mg) by mouth At Bedtime, Disp: 90 tablet, Rfl: 3     blood glucose monitoring (ACCU-CHEK LAURA PLUS) test strip, Use to test blood sugar 1 time daily, Disp: 100 each, Rfl: 11     blood glucose (ACCU-CHEK LAURA) test strip, 1Use to test blood sugar 2 times daily or as directed., Disp: 100 strip, Rfl: 3     fish oil-omega-3 fatty acids 1000 MG capsule, Take 1 g by mouth every evening, Disp: , Rfl:      Flaxseed, Linseed,  "(FLAX SEED OIL) 1000 MG capsule, Take 1 capsule by mouth every evening, Disp: , Rfl:      Glucosamine HCl 1000 MG TABS, Take 1 tablet by mouth every evening, Disp: , Rfl:      ACE NOT PRESCRIBED, INTENTIONAL,, ACE Inhibitor not prescribed due to Other:  Had cough on ACEI in past   , Disp: 0 each, Rfl: 0     ranitidine (ZANTAC) 300 MG tablet, Take 1 tablet (300 mg) by mouth At Bedtime (Patient not taking: Reported on 5/10/2017), Disp: 30 tablet, Rfl: 1     triamcinolone (KENALOG) 0.5 % cream, Apply sparingly to affected area three times daily. (Patient not taking: Reported on 5/10/2017), Disp: 30 g, Rfl: 1     ALLERGIES:    Allergies   Allergen Reactions     Levetiracetam [Keppra] Rash     Oxcarbazepine [Trileptal] Rash        SOCIAL HISTORY:   Social History     Social History     Marital status:      Spouse name: N/A     Number of children: N/A     Years of education: N/A     Occupational History     Not on file.     Social History Main Topics     Smoking status: Never Smoker     Smokeless tobacco: Never Used     Alcohol use 0.0 oz/week     0 Standard drinks or equivalent per week      Comment: Occas     Drug use: No     Sexual activity: Yes     Partners: Female     Other Topics Concern     Parent/Sibling W/ Cabg, Mi Or Angioplasty Before 65f 55m? No     Social History Narrative        FAMILY HISTORY:   Family History   Problem Relation Age of Onset     Cancer - colorectal Mother      CEREBROVASCULAR DISEASE Father         EXAM:Vitals: /84  Ht 5' 10\" (1.778 m)  Wt 268 lb (121.6 kg)  BMI 38.45 kg/m2  BMI= Body mass index is 38.45 kg/(m^2).    General appearance: Patient is alert and fully cooperative with history & exam.  No sign of distress is noted during the visit.     Psychiatric: Affect is pleasant & appropriate.  Patient appears motivated to improve health.     Respiratory: Breathing is regular & unlabored while sitting.     HEENT: Hearing is intact to spoken word.  Speech is clear.  No gross " evidence of visual impairment that would impact ambulation.     Dermatologic:pre ulcerative lesions  Dorsal left 2nd and 3rd proximal interphalangeal joints. No open lesions or signs of infection.      Vascular: DP & PT pulses are intact & regular bilaterally.   edema and varicosities noted.  CFT and skin temperature is normal to both lower extremities.     Neurologic: Lower extremity sensation is intact to light touch.  No evidence of weakness or contracture in the lower extremities.  No evidence of neuropathy.     Musculoskeletal: Patient is ambulatory without assistive device or brace.  Rigid contracture of the toes 2-5 left foot and semi flexible contracture of the right 2-5th toes.     Radiographs: Right foot: Compare January 2014. Osteopenia suspected. Mild-moderate  degenerative change at the first TMT joint. Calcaneal spurring again  seen.     Left foot: Osteopenia suspected. Dislocation of the second MTP joint.  Plantar and posterior calcaneal spurring. Mild degenerative change at  the talonavicular joint.     ASSESSMENT:     Pain in both feet  Hammer toes of both feet  Morbid obesity due to excess calories (H)  Venous insufficiency of both lower extremities     PLAN:  Reviewed [facility] chart in [facility]. Reviewed xrays with patient and patients wife. Reviewed and discussed causes of hammertoes with patient.  Explained that this can be caused by an overpowering of muscles or by the way we walk.  Discussed conservative treatments such as orthotics, pads, shoe gear.  Explained that sometimes the flexor tendons can be cut to try and straighten the toe and reduce rubbing. This is normally done in office and patient is weight bearing in postop she for 1-2 weeks.  We also discussed surgical intervention to remove the joint and possibly fuse the toe.  Normally patient has a pin sticking out of the toe for about 6 weeks and can not get the foot wet. Patient would have to be minimal weight bearing in cam boot.       Right foot, could likely be by with flexor and extensor tenotomies but right foot would require metatarsal osteotomies to correct toes. Talked about risks including infection, numbness, continued pain, non union, need for further surgery, blood loss, blood clotting. You will scar. Will be in a boot left foot for 6-8 weeks with post op shoe on the right.      Spoke with Dr. Raymond who thought that it would be fine for him to be in a boot on the left side after right side knee surgery.      Will try to coordinate surgery with Dr. Raymond.     DIAGNOSIS: hammertoes both feet.       PROCEDURES:  Hammer toe repair toes 2-4 left foot with osteotomy and right foot toes 2, possible 3rd and 4th with tenotomies.     Site:   Bilateral      Length of case:  2 1/2 hours for my part ( for the feet)    Fernandez:  Yes     ANESTHESIA General     Popliteal Block:   No     Tourniquet:   Ankle      PATIENT POSITION:  Supine     Antibiotics:  Give before surgery     Overnight Observation    EQUIPMENT:  Mini c-arm, podiatry set, sagital saw with #138,arthrex scorpion and snap off screw and trim it pin       Beverly Kim DPM, Podiatry/Foot and Ankle Surgery    Weight management plan: Patient was referred to their PCP to discuss a diet and exercise plan.      Again, thank you for allowing me to participate in the care of your patient.        Sincerely,              Beverly Kim DPM, Podiatry/Foot and Ankle Surgery

## 2017-05-13 NOTE — PROGRESS NOTES
PATIENT HISTORY:  Damien Vallecillo is a 81 year old male who presents to clinic for pain to both feet. Left worse. Notes he has had hammertoes for years. He is getting knee surgery done with Dr. Raymond and is wondering about surgery on the toes at the same time. Pain to feet is 5/10. The left one is very hard to wear shoes in as it rubs. Right 2nd toe rubs against big toe. Here with wife. They had xrays of the feet done.     Review of Systems:  Patient denies fever, chills, rash, wound, stiffness,numbness, weakness, heart burn, blood in stool, chest pain with activity, calf pain when walking, shortness of breath with activity, chronic cough, easy bleeding/bruising, swelling of ankles, excessive thirst, fatigue, depression, anxiety.  Patient admits to limping at times. .     PAST MEDICAL HISTORY:   Past Medical History:   Diagnosis Date     Acute suppurative arthritis (H)      Acute, but ill-defined, cerebrovascular disease      Arthritis      Chronic headaches      Glucose intolerance (impaired glucose tolerance) 5/31/2013     Gout, unspecified      Hammer toe of left foot 10/21/2016     Hematuria      Left knee pain, unspecified chronicity 5/8/2017     Lentigo maligna (H)      Malignant neoplasm of rectosigmoid junction (H)      Morbid obesity due to excess calories (H) 10/21/2016     Other convulsions     last seizure 7-8 years ago...not certain if these were true seizres or not..     Pre-diabetes 10/21/2016     RBC microcytosis 2/14/2017     Right knee pain, unspecified chronicity 5/8/2017     Syncope      Tubulovillous adenoma of colon      Venous stasis dermatitis of both lower extremities 10/21/2016        PAST SURGICAL HISTORY:   Past Surgical History:   Procedure Laterality Date     C NONSPECIFIC PROCEDURE      right heel surgery; spur removed.     COLONOSCOPY N/A 6/23/2015    Procedure: COMBINED COLONOSCOPY, SINGLE OR MULTIPLE BIOPSY/POLYPECTOMY BY BIOPSY;  Surgeon: Kimberly Alfaro MD;  Location:   GI     COLONOSCOPY N/A 7/30/2015    Procedure: COLONOSCOPY;  Surgeon: Enrique Salazar MD;  Location:  OR     HERNIORRHAPHY EPIGASTRIC  4/27/2012    Procedure:HERNIORRHAPHY EPIGASTRIC; Epigastric Hernia Repair with mesh ; Surgeon:BOLIVAR OLIVEIRA; Location: OR     LAPAROSCOPIC ASSISTED COLECTOMY Right 7/30/2015    Procedure: LAPAROSCOPIC ASSISTED COLECTOMY;  Surgeon: Enrique Salazar MD;  Location:  OR     ORTHOPEDIC SURGERY      lt knee arthroplasty     SIGMOIDOSCOPY FLEXIBLE  5/29/2013    Procedure: SIGMOIDOSCOPY FLEXIBLE;  SIGMOIDOSCOPY FLEXIBLE (FV) Needs blood sugar ck'd;  Surgeon: Enrique Salazar MD;  Location:  GI        MEDICATIONS:   Current Outpatient Prescriptions:      fluticasone (FLONASE) 50 MCG/ACT spray, Spray 1-2 sprays into both nostrils daily, Disp: 1 Bottle, Rfl: 11     guaiFENesin (MUCINEX) 600 MG 12 hr tablet, Take 2 tablets (1,200 mg) by mouth 2 times daily, Disp: 120 tablet, Rfl: 11     ASPIRIN PO, Take 325 mg by mouth daily, Disp: , Rfl:      Multiple Vitamins-Minerals (MULTIVITAMIN ADULT PO), Reported on 5/12/2017, Disp: , Rfl:      CINNAMON PO, , Disp: , Rfl:      tamsulosin (FLOMAX) 0.4 MG 24 hr capsule, Take 1 capsule (0.4 mg) by mouth At Bedtime, Disp: 90 capsule, Rfl: 3     atorvastatin (LIPITOR) 40 MG tablet, Take 1 tablet (40 mg) by mouth At Bedtime, Disp: 90 tablet, Rfl: 3     blood glucose monitoring (ACCU-CHEK LAURA PLUS) test strip, Use to test blood sugar 1 time daily, Disp: 100 each, Rfl: 11     blood glucose (ACCU-CHEK LAURA) test strip, 1Use to test blood sugar 2 times daily or as directed., Disp: 100 strip, Rfl: 3     fish oil-omega-3 fatty acids 1000 MG capsule, Take 1 g by mouth every evening, Disp: , Rfl:      Flaxseed, Linseed, (FLAX SEED OIL) 1000 MG capsule, Take 1 capsule by mouth every evening, Disp: , Rfl:      Glucosamine HCl 1000 MG TABS, Take 1 tablet by mouth every evening, Disp: , Rfl:      ACE NOT PRESCRIBED, INTENTIONAL,, ACE Inhibitor not prescribed  "due to Other:  Had cough on ACEI in past   , Disp: 0 each, Rfl: 0     ranitidine (ZANTAC) 300 MG tablet, Take 1 tablet (300 mg) by mouth At Bedtime (Patient not taking: Reported on 5/10/2017), Disp: 30 tablet, Rfl: 1     triamcinolone (KENALOG) 0.5 % cream, Apply sparingly to affected area three times daily. (Patient not taking: Reported on 5/10/2017), Disp: 30 g, Rfl: 1     ALLERGIES:    Allergies   Allergen Reactions     Levetiracetam [Keppra] Rash     Oxcarbazepine [Trileptal] Rash        SOCIAL HISTORY:   Social History     Social History     Marital status:      Spouse name: N/A     Number of children: N/A     Years of education: N/A     Occupational History     Not on file.     Social History Main Topics     Smoking status: Never Smoker     Smokeless tobacco: Never Used     Alcohol use 0.0 oz/week     0 Standard drinks or equivalent per week      Comment: Occas     Drug use: No     Sexual activity: Yes     Partners: Female     Other Topics Concern     Parent/Sibling W/ Cabg, Mi Or Angioplasty Before 65f 55m? No     Social History Narrative        FAMILY HISTORY:   Family History   Problem Relation Age of Onset     Cancer - colorectal Mother      CEREBROVASCULAR DISEASE Father         EXAM:Vitals: /84  Ht 5' 10\" (1.778 m)  Wt 268 lb (121.6 kg)  BMI 38.45 kg/m2  BMI= Body mass index is 38.45 kg/(m^2).    General appearance: Patient is alert and fully cooperative with history & exam.  No sign of distress is noted during the visit.     Psychiatric: Affect is pleasant & appropriate.  Patient appears motivated to improve health.     Respiratory: Breathing is regular & unlabored while sitting.     HEENT: Hearing is intact to spoken word.  Speech is clear.  No gross evidence of visual impairment that would impact ambulation.     Dermatologic:pre ulcerative lesions  Dorsal left 2nd and 3rd proximal interphalangeal joints. No open lesions or signs of infection.      Vascular: DP & PT pulses are intact & " regular bilaterally.   edema and varicosities noted.  CFT and skin temperature is normal to both lower extremities.     Neurologic: Lower extremity sensation is intact to light touch.  No evidence of weakness or contracture in the lower extremities.  No evidence of neuropathy.     Musculoskeletal: Patient is ambulatory without assistive device or brace.  Rigid contracture of the toes 2-5 left foot and semi flexible contracture of the right 2-5th toes.     Radiographs: Right foot: Compare January 2014. Osteopenia suspected. Mild-moderate  degenerative change at the first TMT joint. Calcaneal spurring again  seen.     Left foot: Osteopenia suspected. Dislocation of the second MTP joint.  Plantar and posterior calcaneal spurring. Mild degenerative change at  the talonavicular joint.     ASSESSMENT:     Pain in both feet  Hammer toes of both feet  Morbid obesity due to excess calories (H)  Venous insufficiency of both lower extremities     PLAN:  Reviewed patient's chart in [facility]. Reviewed xrays with patient and patients wife. Reviewed and discussed causes of hammertoes with patient.  Explained that this can be caused by an overpowering of muscles or by the way we walk.  Discussed conservative treatments such as orthotics, pads, shoe gear.  Explained that sometimes the flexor tendons can be cut to try and straighten the toe and reduce rubbing. This is normally done in office and patient is weight bearing in postop she for 1-2 weeks.  We also discussed surgical intervention to remove the joint and possibly fuse the toe.  Normally patient has a pin sticking out of the toe for about 6 weeks and can not get the foot wet. Patient would have to be minimal weight bearing in cam boot.      Right foot, could likely be by with flexor and extensor tenotomies but right foot would require metatarsal osteotomies to correct toes. Talked about risks including infection, numbness, continued pain, non union, need for further surgery,  blood loss, blood clotting. You will scar. Will be in a boot left foot for 6-8 weeks with post op shoe on the right.      Spoke with Dr. Raymond who thought that it would be fine for him to be in a boot on the left side after right side knee surgery.      Will try to coordinate surgery with Dr. Raymond.     DIAGNOSIS: hammertoes both feet.       PROCEDURES:  Hammer toe repair toes 2-4 left foot with osteotomy and right foot toes 2, possible 3rd and 4th with tenotomies.     Site:   Bilateral      Length of case:  2 1/2 hours for my part ( for the feet)    Fernandez:  Yes     ANESTHESIA General     Popliteal Block:   No     Tourniquet:   Ankle      PATIENT POSITION:  Supine     Antibiotics:  Give before surgery     Overnight Observation    EQUIPMENT:  Mini c-arm, podiatry set, sagital saw with #138,arthrex scorpion and snap off screw and trim it pin       Beverly Kim DPM, Podiatry/Foot and Ankle Surgery    Weight management plan: Patient was referred to their PCP to discuss a diet and exercise plan.

## 2017-05-15 ENCOUNTER — TELEPHONE (OUTPATIENT)
Dept: ORTHOPEDICS | Facility: CLINIC | Age: 82
End: 2017-05-15

## 2017-05-15 ENCOUNTER — OFFICE VISIT (OUTPATIENT)
Dept: FAMILY MEDICINE | Facility: CLINIC | Age: 82
End: 2017-05-15
Payer: MEDICARE

## 2017-05-15 VITALS — TEMPERATURE: 97.7 F | RESPIRATION RATE: 16 BRPM | WEIGHT: 267.6 LBS | HEIGHT: 70 IN | BODY MASS INDEX: 38.31 KG/M2

## 2017-05-15 DIAGNOSIS — Z01.818 PREOP GENERAL PHYSICAL EXAM: Primary | ICD-10-CM

## 2017-05-15 LAB
ANION GAP SERPL CALCULATED.3IONS-SCNC: 8 MMOL/L (ref 3–14)
BASOPHILS # BLD AUTO: 0 10E9/L (ref 0–0.2)
BASOPHILS NFR BLD AUTO: 0.7 %
BUN SERPL-MCNC: 13 MG/DL (ref 7–30)
CALCIUM SERPL-MCNC: 9 MG/DL (ref 8.5–10.1)
CHLORIDE SERPL-SCNC: 109 MMOL/L (ref 94–109)
CO2 SERPL-SCNC: 26 MMOL/L (ref 20–32)
CREAT SERPL-MCNC: 0.79 MG/DL (ref 0.66–1.25)
DIFFERENTIAL METHOD BLD: ABNORMAL
EOSINOPHIL # BLD AUTO: 0.4 10E9/L (ref 0–0.7)
EOSINOPHIL NFR BLD AUTO: 6 %
ERYTHROCYTE [DISTWIDTH] IN BLOOD BY AUTOMATED COUNT: 18.8 % (ref 10–15)
GFR SERPL CREATININE-BSD FRML MDRD: ABNORMAL ML/MIN/1.7M2
GLUCOSE SERPL-MCNC: 139 MG/DL (ref 70–99)
HCT VFR BLD AUTO: 37.7 % (ref 40–53)
HGB BLD-MCNC: 12.1 G/DL (ref 13.3–17.7)
LYMPHOCYTES # BLD AUTO: 1.1 10E9/L (ref 0.8–5.3)
LYMPHOCYTES NFR BLD AUTO: 17.9 %
MCH RBC QN AUTO: 25.1 PG (ref 26.5–33)
MCHC RBC AUTO-ENTMCNC: 32.1 G/DL (ref 31.5–36.5)
MCV RBC AUTO: 78 FL (ref 78–100)
MONOCYTES # BLD AUTO: 0.8 10E9/L (ref 0–1.3)
MONOCYTES NFR BLD AUTO: 12.9 %
NEUTROPHILS # BLD AUTO: 3.7 10E9/L (ref 1.6–8.3)
NEUTROPHILS NFR BLD AUTO: 62.5 %
PLATELET # BLD AUTO: 210 10E9/L (ref 150–450)
POTASSIUM SERPL-SCNC: 4 MMOL/L (ref 3.4–5.3)
RBC # BLD AUTO: 4.82 10E12/L (ref 4.4–5.9)
SODIUM SERPL-SCNC: 143 MMOL/L (ref 133–144)
WBC # BLD AUTO: 5.9 10E9/L (ref 4–11)

## 2017-05-15 PROCEDURE — 36415 COLL VENOUS BLD VENIPUNCTURE: CPT | Performed by: FAMILY MEDICINE

## 2017-05-15 PROCEDURE — 80048 BASIC METABOLIC PNL TOTAL CA: CPT | Performed by: FAMILY MEDICINE

## 2017-05-15 PROCEDURE — 85025 COMPLETE CBC W/AUTO DIFF WBC: CPT | Performed by: FAMILY MEDICINE

## 2017-05-15 PROCEDURE — 93000 ELECTROCARDIOGRAM COMPLETE: CPT | Performed by: FAMILY MEDICINE

## 2017-05-15 PROCEDURE — 99214 OFFICE O/P EST MOD 30 MIN: CPT | Performed by: FAMILY MEDICINE

## 2017-05-15 NOTE — MR AVS SNAPSHOT
After Visit Summary   5/15/2017    Damien Vallecillo    MRN: [id number]           Patient Information     Date Of Birth          1935        Visit Information        Provider Department      5/15/2017 9:45 AM Obi Strange MD [facility]        Today's Diagnoses     Preop general physical exam    -  1      Care Instructions      Before Your Surgery      Call your surgeon if there is any change in your health. This includes signs of a cold or flu (such as a sore throat, runny nose, cough, rash or fever).    Do not smoke, drink alcohol or take over the counter medicine (unless your surgeon or primary care doctor tells you to) for the 24 hours before and after surgery.    If you take prescribed drugs: Follow your doctor s orders about which medicines to take and which to stop until after surgery.    Eating and drinking prior to surgery: follow the instructions from your surgeon    Take a shower or bath the night before surgery. Use the soap your surgeon gave you to gently clean your skin. If you do not have soap from your surgeon, use your regular soap. Do not shave or scrub the surgery site.  Wear clean pajamas and have clean sheets on your bed.         Follow-ups after your visit        Who to contact     If you have questions or need follow up information about today's clinic visit or your schedule please contact [facility] directly at [phone].  Normal or non-critical lab and imaging results will be communicated to you by MyChart, letter or phone within 4 business days after the clinic has received the results. If you do not hear from us within 7 days, please contact the clinic through MyChart or phone. If you have a critical or abnormal lab result, we will notify you by phone as soon as possible.  Submit refill requests through [employer] or call your pharmacy and they will forward the refill request to us. Please allow 3 business days for your refill to be  "completed.          Additional Information About Your Visit        [employer] Information     [employer] lets you send messages to your doctor, view your test results, renew your prescriptions, schedule appointments and more. To sign up, go to [email or web address] . Click on \"Log in\" on the left side of the screen, which will take you to the Welcome page. Then click on \"Sign up Now\" on the right side of the page.     You will be asked to enter the access code listed below, as well as some personal information. Please follow the directions to create your username and password.     Your access code is: [email or web address]  Expires: 2017  9:15 AM     Your access code will  in 90 days. If you need help or a new code, please call your [place] clinic or [phone].        Care EveryWhere ID     This is your [facility] EveryWhere ID. This could be used by other organizations to access your [place] medical records  [id number]        Your Vitals Were     Temperature Respirations Height BMI (Body Mass Index)          97.7  F (36.5  C) (Oral) 16 5' 10\" (1.778 m) 38.4 kg/m2         Blood Pressure from Last 3 Encounters:   17 140/84   05/10/17 144/72   17 138/68    Weight from Last 3 Encounters:   05/15/17 267 lb 9.6 oz (121.4 kg)   17 268 lb (121.6 kg)   05/10/17 268 lb (121.6 kg)              We Performed the Following     Basic metabolic panel  (Ca, Cl, CO2, Creat, Gluc, K, Na, BUN)     CBC with platelets and differential     EKG 12-lead complete w/read - Clinics        Primary Care Provider Office Phone # Fax #    Obi Strange [phone]       [facility] [address] [place] [id number]        Thank you!     Thank you for choosing [facility]  for your care. Our goal is always to provide you with excellent care. Hearing back from our patients is one way we can continue to improve our services. Please take a few minutes to complete the written " survey that you may receive in the mail after your visit with us. Thank you!             Your Updated Medication List - Protect others around you: Learn how to safely use, store and throw away your medicines at www.disposemymeds.org.          This list is accurate as of: 5/15/17 10:44 AM.  Always use your most recent med list.                   Brand Name Dispense Instructions for use    ACE NOT PRESCRIBED (INTENTIONAL)     0 each    ACE Inhibitor not prescribed due to Other:  Had cough on ACEI in past       ASPIRIN PO      Take 325 mg by mouth daily       atorvastatin 40 MG tablet    LIPITOR    90 tablet    Take 1 tablet (40 mg) by mouth At Bedtime       * blood glucose monitoring test strip    ACCU-CHEK LAURA    100 strip    1Use to test blood sugar 2 times daily or as directed.       * blood glucose monitoring test strip    ACCU-CHEK LAURA PLUS    100 each    Use to test blood sugar 1 time daily       CINNAMON PO          fish oil-omega-3 fatty acids 1000 MG capsule      Take 1 g by mouth every evening       flax seed oil 1000 MG capsule      Take 1 capsule by mouth every evening       fluticasone 50 MCG/ACT spray    FLONASE    1 Bottle    Spray 1-2 sprays into both nostrils daily       Glucosamine HCl 1000 MG Tabs      Take 1 tablet by mouth every evening       guaiFENesin 600 MG 12 hr tablet    MUCINEX    120 tablet    Take 2 tablets (1,200 mg) by mouth 2 times daily       MULTIVITAMIN ADULT PO      Reported on 5/12/2017       tamsulosin 0.4 MG capsule    FLOMAX    90 capsule    Take 1 capsule (0.4 mg) by mouth At Bedtime       triamcinolone 0.5 % cream    KENALOG    30 g    Apply sparingly to affected area three times daily.       * Notice:  This list has 2 medication(s) that are the same as other medications prescribed for you. Read the directions carefully, and ask your doctor or other care provider to review them with you.

## 2017-05-15 NOTE — TELEPHONE ENCOUNTER
Spoke with patient and patients wife. WE will do just the left foot surgery and the right knee (no right foot).      If you could let them know more about the dates Kathleen, that would be great.    Beverly Kim DPM

## 2017-05-15 NOTE — NURSING NOTE
"Chief Complaint   Patient presents with     Pre-Op Exam     rt knee        Initial There were no vitals taken for this visit. Estimated body mass index is 38.45 kg/(m^2) as calculated from the following:    Height as of 5/12/17: 5' 10\" (1.778 m).    Weight as of 5/12/17: 268 lb (121.6 kg).  Medication Reconciliation: complete rt arm Marlene Juarez MA      "

## 2017-05-15 NOTE — PROGRESS NOTES
[facility]  [address] [id number] [phone]  Dept: [phone]    PRE-OP EVALUATION:  Today's date: 5/15/2017    Damien Vallecillo (: 1935) presents for pre-operative evaluation assessment as requested by Dr. Raymond.  He requires evaluation and anesthesia risk assessment prior to undergoing surgery/procedure for treatment of rt knee .  Proposed procedure: total right knee replacement     Date of Surgery/ Procedure: Not sure  Time of Surgery/ Procedure: not sure  Hospital/Surgical Facility: [facility]    Primary Physician: Obi Strange  Type of Anesthesia Anticipated: General    Patient has a Health Care Directive or Living Will:  NO    1. NO - Do you have a history of heart attack, stroke, stent, bypass or surgery on an artery in the head, neck, heart or legs?  2. NO - Do you ever have any pain or discomfort in your chest?  3. NO - Do you have a history of  Heart Failure?  4. NO - Are you troubled by shortness of breath when: walking on the level, up a slight hill or at night?  6. NO - Do you have a cough, shortness of breath or wheezing?  7. NO - Do you sometimes get pains in the calves of your legs when you walk?  8. NO - Do you or anyone in your family have previous history of blood clots?  9. NO - Do you or does anyone in your family have a serious bleeding problem such as prolonged bleeding following surgeries or cuts?  10. NO - Have you ever had problems with anemia or been told to take iron pills?  11. NO - Have you had any abnormal blood loss such as black, tarry or bloody stools, or abnormal vaginal bleeding?  13. NO - Have you or any of your relatives ever had problems with anesthesia?  14. NO - Do you have sleep apnea, excessive snoring or daytime drowsiness?  15. NO - Do you have any prosthetic heart valves?  17. NO - Is there any chance that you may be pregnant?  5. NO, thinks he might have allergies  - Do you currently have a cold, bronchitis or  other respiratory infection?  12. YES - Have you ever had a blood transfusion?  16. YES - Do you have prosthetic joints?  HPI:                                                      Brief HPI related to upcoming procedure: total right knee replacement    MEDICAL HISTORY:                                                      Patient Active Problem List    Diagnosis Date Noted     Right knee pain, unspecified chronicity 05/08/2017     Priority: Medium     Left knee pain, unspecified chronicity 05/08/2017     Priority: Medium     RBC microcytosis 02/14/2017     Priority: Medium     Pre-diabetes 10/21/2016     Priority: Medium     Morbid obesity due to excess calories (H) 10/21/2016     Priority: Medium     Hammer toe of left foot 10/21/2016     Priority: Medium     Venous stasis dermatitis of both lower extremities 10/21/2016     Priority: Medium     Obesity 07/01/2015     Priority: Medium     Problem list name updated by automated process. 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 Provider to review and confirm.       Glucose intolerance (impaired glucose tolerance) 05/31/2013     Advanced directives, counseling/discussion 03/14/2012     ordered today.       CARDIOVASCULAR SCREENING; LDL GOAL LESS THAN 100 10/31/2010     24.9% 10 yr risk 4/2014       Thyroid nodule 06/11/2010     Gout      Problem list name updated by automated process. Provider to review       Effusion of lower leg joint 05/29/2003      Past Medical History:   Diagnosis Date     Acute suppurative arthritis (H)      Acute, but ill-defined, cerebrovascular disease      Arthritis      Chronic headaches      Glucose intolerance (impaired glucose tolerance) 5/31/2013     Gout, unspecified      Hammer toe of left foot 10/21/2016     Hematuria      Left knee  pain, unspecified chronicity 5/8/2017     Lentigo maligna (H)      Malignant neoplasm of rectosigmoid junction (H)      Morbid obesity due to excess calories (H) 10/21/2016     Other convulsions     last seizure 7-8 years ago...not certain if these were true seizres or not..     Pre-diabetes 10/21/2016     RBC microcytosis 2/14/2017     Right knee pain, unspecified chronicity 5/8/2017     Syncope      Tubulovillous adenoma of colon      Venous stasis dermatitis of both lower extremities 10/21/2016     Past Surgical History:   Procedure Laterality Date     C NONSPECIFIC PROCEDURE      right heel surgery; spur removed.     COLONOSCOPY N/A 6/23/2015    Procedure: COMBINED COLONOSCOPY, SINGLE OR MULTIPLE BIOPSY/POLYPECTOMY BY BIOPSY;  Surgeon: Kimberly Alfaro MD;  Location:  GI     COLONOSCOPY N/A 7/30/2015    Procedure: COLONOSCOPY;  Surgeon: Enrique Salazar MD;  Location:  OR     HERNIORRHAPHY EPIGASTRIC  4/27/2012    Procedure:HERNIORRHAPHY EPIGASTRIC; Epigastric Hernia Repair with mesh ; Surgeon:BOLIVAR OLIVEIRA; Location: OR     LAPAROSCOPIC ASSISTED COLECTOMY Right 7/30/2015    Procedure: LAPAROSCOPIC ASSISTED COLECTOMY;  Surgeon: Enrique Salazar MD;  Location:  OR     ORTHOPEDIC SURGERY      lt knee arthroplasty     SIGMOIDOSCOPY FLEXIBLE  5/29/2013    Procedure: SIGMOIDOSCOPY FLEXIBLE;  SIGMOIDOSCOPY FLEXIBLE (FV) Needs blood sugar ck'd;  Surgeon: Enrique Salazar MD;  Location:  GI     Current Outpatient Prescriptions   Medication Sig Dispense Refill     fluticasone (FLONASE) 50 MCG/ACT spray Spray 1-2 sprays into both nostrils daily 1 Bottle 11     guaiFENesin (MUCINEX) 600 MG 12 hr tablet Take 2 tablets (1,200 mg) by mouth 2 times daily 120 tablet 11     ASPIRIN PO Take 325 mg by mouth daily       Multiple Vitamins-Minerals (MULTIVITAMIN ADULT PO) Reported on 5/12/2017       triamcinolone (KENALOG) 0.5 % cream Apply sparingly to affected area three times daily. 30 g 1     CINNAMON PO     "    tamsulosin (FLOMAX) 0.4 MG 24 hr capsule Take 1 capsule (0.4 mg) by mouth At Bedtime 90 capsule 3     atorvastatin (LIPITOR) 40 MG tablet Take 1 tablet (40 mg) by mouth At Bedtime 90 tablet 3     blood glucose monitoring (ACCU-CHEK LAURA PLUS) test strip Use to test blood sugar 1 time daily 100 each 11     blood glucose (ACCU-CHEK LAURA) test strip 1Use to test blood sugar 2 times daily or as directed. 100 strip 3     fish oil-omega-3 fatty acids 1000 MG capsule Take 1 g by mouth every evening       Flaxseed, Linseed, (FLAX SEED OIL) 1000 MG capsule Take 1 capsule by mouth every evening       Glucosamine HCl 1000 MG TABS Take 1 tablet by mouth every evening       ACE NOT PRESCRIBED, INTENTIONAL, ACE Inhibitor not prescribed due to Other:  Had cough on ACEI in past       0 each 0     OTC products: None, except as noted above    Allergies   Allergen Reactions     Levetiracetam [Keppra] Rash     Oxcarbazepine [Trileptal] Rash      Latex Allergy: NO    Social History   Substance Use Topics     Smoking status: Never Smoker     Smokeless tobacco: Never Used     Alcohol use 0.0 oz/week     0 Standard drinks or equivalent per week      Comment: Occas     History   Drug Use No       REVIEW OF SYSTEMS:                                                    Denies  vision changes, hearing loss, difficulty swallowing, heart burn, chest pain, SOB, diarrhea, constipation, dysuria. Has intermittent swelling of the feet. A complete review of systems is otherwise negative    This document serves as a record of the services and decisions personally performed and made by Alexandr Crocker MD. It was created on his behalf by Juice Smith a trained medical scribe. The creation of this document is based the provider's statements to the medical scribe. Juice Smith May 15, 2017 10:01 AM  EXAM:                                                    Temp 97.7  F (36.5  C) (Oral)  Resp 16  Ht 5' 10\" (1.778 m)  Wt 267 lb 9.6 oz (121.4 kg)  " BMI 38.4 kg/m2  GEN: Healthy, Alert, No distress  EYES: pupils are symmetric  ENT: ears without cerumen, mucus membranes moist  NECK: no thyromegaly  CHEST: Clear to auscultation, respirations unlabored  HEART: S1S2 RRR no murmur nor apical displacement  ABD: soft without mass or organomegaly   MS: tr edema   DIAGNOSTICS:                                                    EKG: sinus bradycardia, normal axis, normal intervals, no acute ST/T changes c/w ischemia, no LVH by voltage criteria, unchanged from previous    Recent Labs   Lab Test  04/17/17   1100  02/13/17   1115  10/21/16   1154  08/10/15   0835  08/08/15   0600   08/05/15   0650   08/03/15   0705   HGB   --   10.8*   --    --    --    --    --    --   10.4*   PLT   --   234   --    --   268   --   252   --   230   INR   --    --    --   1.15*   --    --   1.09   < >   --    NA   --   140  138  137  136   < >  137   < >  136   POTASSIUM   --   4.1  4.6  4.3  4.4   < >  4.0   < >  4.2   CR   --   0.74  0.84  0.73  0.68   < >  0.71   < >  0.79   A1C  5.9   --   6.0   --    --    --    --    --    --     < > = values in this interval not displayed.     IMPRESSION:                                                    Reason for surgery/procedure: total right knee replacement  Diagnosis/reason for consult: assess perioperative risk    The proposed surgical procedure is considered INTERMEDIATE risk.    REVISED CARDIAC RISK INDEX  The patient has the following serious cardiovascular risks for perioperative complications such as (MI, PE, VFib and 3  AV Block):  No serious cardiac risks  INTERPRETATION: 0 risks: Class I (very low risk - 0.4% complication rate)    The patient has the following additional risks for perioperative complications:  No identified additional risks      ICD-10-CM    1. Preop general physical exam Z01.818      RECOMMENDATIONS:                                                              APPROVAL GIVEN to proceed with proposed procedure, without  further diagnostic evaluation       Signed Electronically by: Obi Strange MD    The information in this document, created by a scribe for me, accurately reflects the services I personally performed and the decisions made by me. I have reviewed and approved this document for accuracy. 10:03 AM May 15, 2017    Copy of this evaluation report is provided to requesting physician.    [place] Preop Guidelines

## 2017-05-15 NOTE — TELEPHONE ENCOUNTER
Wife, Alejandrina, left voicemail stating patient saw Dr. Raymond on 5/10/17. Patient saw Dr Kim last week also and had his pre-op physical. Please call.     Phone call to wife and patient. Patient states he wants to have R TKA and surgery done to Right toes. Dr. Kim and Dr. Raymond were going to coordinate the surgeries. Informed that there is conflicting information in chart and will need to discuss with physicians and get back with him. Ok to leave message.     Dr. Kim's note from 5/12/17 states:   Right foot, could likely be by with flexor and extensor tenotomies but right foot would require metatarsal osteotomies to correct toes. Talked about risks including infection, numbness, continued pain, non union, need for further surgery, blood loss, blood clotting. You will scar. Will be in a boot left foot for 6-8 weeks with post op shoe on the right.    Spoke with Dr. Raymond who thought that it would be fine for him to be in a boot on the left side after right side knee surgery.   Will try to coordinate surgery with Dr. Raymond.    Discussed with Dr. Raymond. He prefers to have surgery done on left foot and R TKA to avoid the possibility of infection on the same side as R TKA.    Please clarify which foot surgery involves and correct office visit note if needed. Please contact patient with details and set up surgery via surgery scheduling.     Routing to Dr. Julio Ontiveros, RN

## 2017-05-15 NOTE — LETTER
[facility]  [address] [place], [address]  [phone]      May 17, 2017    Damien Vallecillo  [address] [id number]          Dear Damien,    The results of your recent tests are back. Tests are all ok.  Enclosed is a copy of the results.  It was a pleasure to see you at your last appointment.  Results for orders placed or performed in visit on 05/15/17   CBC with platelets and differential   Result Value Ref Range    WBC 5.9 4.0 - 11.0 10e9/L    RBC Count 4.82 4.4 - 5.9 10e12/L    Hemoglobin 12.1 (L) 13.3 - 17.7 g/dL    Hematocrit 37.7 (L) 40.0 - 53.0 %    MCV 78 78 - 100 fl    MCH 25.1 (L) 26.5 - 33.0 pg    MCHC 32.1 31.5 - 36.5 g/dL    RDW 18.8 (H) 10.0 - 15.0 %    Platelet Count 210 150 - 450 10e9/L    Diff Method Automated Method     % Neutrophils 62.5 %    % Lymphocytes 17.9 %    % Monocytes 12.9 %    % Eosinophils 6.0 %    % Basophils 0.7 %    Absolute Neutrophil 3.7 1.6 - 8.3 10e9/L    Absolute Lymphocytes 1.1 0.8 - 5.3 10e9/L    Absolute Monocytes 0.8 0.0 - 1.3 10e9/L    Absolute Eosinophils 0.4 0.0 - 0.7 10e9/L    Absolute Basophils 0.0 0.0 - 0.2 10e9/L   Basic metabolic panel  (Ca, Cl, CO2, Creat, Gluc, K, Na, BUN)   Result Value Ref Range    Sodium 143 133 - 144 mmol/L    Potassium 4.0 3.4 - 5.3 mmol/L    Chloride 109 94 - 109 mmol/L    Carbon Dioxide 26 20 - 32 mmol/L    Anion Gap 8 3 - 14 mmol/L    Glucose 139 (H) 70 - 99 mg/dL    Urea Nitrogen 13 7 - 30 mg/dL    Creatinine 0.79 0.66 - 1.25 mg/dL    GFR Estimate >90  Non  GFR Calc   >60 mL/min/1.7m2    GFR Estimate If Black >90   GFR Calc   >60 mL/min/1.7m2    Calcium 9.0 8.5 - 10.1 mg/dL       If you have any questions or concerns, please call myself or my nurse at [phone].          Sincerely,    Obi Strange MD  [id number]

## 2017-05-19 ENCOUNTER — TELEPHONE (OUTPATIENT)
Dept: PODIATRY | Facility: CLINIC | Age: 82
End: 2017-05-19

## 2017-05-19 ENCOUNTER — TELEPHONE (OUTPATIENT)
Dept: ORTHOPEDICS | Facility: CLINIC | Age: 82
End: 2017-05-19

## 2017-05-19 RX ORDER — CEFAZOLIN SODIUM 1 G/50ML
3 SOLUTION INTRAVENOUS
Status: CANCELLED | OUTPATIENT
Start: 2017-05-19

## 2017-05-19 RX ORDER — CEFAZOLIN SODIUM 1 G/3ML
1 INJECTION, POWDER, FOR SOLUTION INTRAMUSCULAR; INTRAVENOUS SEE ADMIN INSTRUCTIONS
Status: CANCELLED | OUTPATIENT
Start: 2017-05-19

## 2017-05-19 NOTE — TELEPHONE ENCOUNTER
Scheduled surgery for Right knee total arthroplasty on 6/12/2017 with Dr. Raymond @ [facility] @ 1:30.    **  Dr. Raymond 1:30 - 3:00,  Dr. Kim to follow @ 3:00  **    Surgery education packet provided to patient.

## 2017-05-19 NOTE — TELEPHONE ENCOUNTER
Scheduled surgery for Hammer toe repair toes 2-4 left foot with osteotomy on 6/12/2017 with Dr. Kim @ [facility] @ 3:00.     **  Dr. Raymond 1:30 - 3:00) (Dr. Kim to follow @ 3:00  **      Surgery education packet provided to patient.

## 2017-06-01 ENCOUNTER — HOSPITAL ENCOUNTER (OUTPATIENT)
Dept: LAB | Facility: CLINIC | Age: 82
Discharge: HOME | End: 2017-06-01
Attending: ORTHOPAEDIC SURGERY | Admitting: ORTHOPAEDIC SURGERY
Payer: MEDICARE

## 2017-06-01 DIAGNOSIS — Z01.812 PRE-OPERATIVE LABORATORY EXAMINATION: ICD-10-CM

## 2017-06-01 LAB
MRSA DNA SPEC QL NAA+PROBE: NORMAL
SPECIMEN SOURCE: NORMAL

## 2017-06-01 PROCEDURE — 40000829 ZZHCL STATISTIC STAPH AUREUS SUSCEPT SCREEN PCR: Performed by: ORTHOPAEDIC SURGERY

## 2017-06-01 PROCEDURE — 40000830 ZZHCL STATISTIC STAPH AUREUS METH RESIST SCREEN PCR: Performed by: ORTHOPAEDIC SURGERY

## 2017-06-01 PROCEDURE — 87641 MR-STAPH DNA AMP PROBE: CPT | Performed by: ORTHOPAEDIC SURGERY

## 2017-06-01 PROCEDURE — 87640 STAPH A DNA AMP PROBE: CPT | Performed by: ORTHOPAEDIC SURGERY

## 2017-06-07 NOTE — H&P (VIEW-ONLY)
[facility]  [id number] [facility] [id number] [phone]  Dept: [phone]    PRE-OP EVALUATION:  Today's date: 5/15/2017    Damien Vallecillo (: 1935) presents for pre-operative evaluation assessment as requested by Dr. Raymond.  He requires evaluation and anesthesia risk assessment prior to undergoing surgery/procedure for treatment of rt knee .  Proposed procedure: total right knee replacement     Date of Surgery/ Procedure: Not sure  Time of Surgery/ Procedure: not sure  Hospital/Surgical Facility: [facility]    Primary Physician: Obi Strange  Type of Anesthesia Anticipated: General    Patient has a Health Care Directive or Living Will:  NO    1. NO - Do you have a history of heart attack, stroke, stent, bypass or surgery on an artery in the head, neck, heart or legs?  2. NO - Do you ever have any pain or discomfort in your chest?  3. NO - Do you have a history of  Heart Failure?  4. NO - Are you troubled by shortness of breath when: walking on the level, up a slight hill or at night?  6. NO - Do you have a cough, shortness of breath or wheezing?  7. NO - Do you sometimes get pains in the calves of your legs when you walk?  8. NO - Do you or anyone in your family have previous history of blood clots?  9. NO - Do you or does anyone in your family have a serious bleeding problem such as prolonged bleeding following surgeries or cuts?  10. NO - Have you ever had problems with anemia or been told to take iron pills?  11. NO - Have you had any abnormal blood loss such as black, tarry or bloody stools, or abnormal vaginal bleeding?  13. NO - Have you or any of your relatives ever had problems with anesthesia?  14. NO - Do you have sleep apnea, excessive snoring or daytime drowsiness?  15. NO - Do you have any prosthetic heart valves?  17. NO - Is there any chance that you may be pregnant?  5. NO, thinks he might have allergies  - Do you currently have a cold, bronchitis or  other respiratory infection?  12. YES - Have you ever had a blood transfusion?  16. YES - Do you have prosthetic joints?  HPI:                                                      Brief HPI related to upcoming procedure: total right knee replacement    MEDICAL HISTORY:                                                      Patient Active Problem List    Diagnosis Date Noted     Right knee pain, unspecified chronicity 05/08/2017     Priority: Medium     Left knee pain, unspecified chronicity 05/08/2017     Priority: Medium     RBC microcytosis 02/14/2017     Priority: Medium     Pre-diabetes 10/21/2016     Priority: Medium     Morbid obesity due to excess calories (H) 10/21/2016     Priority: Medium     Hammer toe of left foot 10/21/2016     Priority: Medium     Venous stasis dermatitis of both lower extremities 10/21/2016     Priority: Medium     Obesity 07/01/2015     Priority: Medium     Problem list name updated by automated process. 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 Provider to review and confirm.       Glucose intolerance (impaired glucose tolerance) 05/31/2013     Advanced directives, counseling/discussion 03/14/2012     ordered today.       CARDIOVASCULAR SCREENING; LDL GOAL LESS THAN 100 10/31/2010     24.9% 10 yr risk 4/2014       Thyroid nodule 06/11/2010     Gout      Problem list name updated by automated process. Provider to review       Effusion of lower leg joint 05/29/2003      Past Medical History:   Diagnosis Date     Acute suppurative arthritis (H)      Acute, but ill-defined, cerebrovascular disease      Arthritis      Chronic headaches      Glucose intolerance (impaired glucose tolerance) 5/31/2013     Gout, unspecified      Hammer toe of left foot 10/21/2016     Hematuria      Left knee  pain, unspecified chronicity 5/8/2017     Lentigo maligna (H)      Malignant neoplasm of rectosigmoid junction (H)      Morbid obesity due to excess calories (H) 10/21/2016     Other convulsions     last seizure 7-8 years ago...not certain if these were true seizres or not..     Pre-diabetes 10/21/2016     RBC microcytosis 2/14/2017     Right knee pain, unspecified chronicity 5/8/2017     Syncope      Tubulovillous adenoma of colon      Venous stasis dermatitis of both lower extremities 10/21/2016     Past Surgical History:   Procedure Laterality Date     C NONSPECIFIC PROCEDURE      right heel surgery; spur removed.     COLONOSCOPY N/A 6/23/2015    Procedure: COMBINED COLONOSCOPY, SINGLE OR MULTIPLE BIOPSY/POLYPECTOMY BY BIOPSY;  Surgeon: Kimberly Alfaro MD;  Location:  GI     COLONOSCOPY N/A 7/30/2015    Procedure: COLONOSCOPY;  Surgeon: Enrique Salazar MD;  Location:  OR     HERNIORRHAPHY EPIGASTRIC  4/27/2012    Procedure:HERNIORRHAPHY EPIGASTRIC; Epigastric Hernia Repair with mesh ; Surgeon:BOLIVAR OLIVEIRA; Location: OR     LAPAROSCOPIC ASSISTED COLECTOMY Right 7/30/2015    Procedure: LAPAROSCOPIC ASSISTED COLECTOMY;  Surgeon: Enrique Salazar MD;  Location:  OR     ORTHOPEDIC SURGERY      lt knee arthroplasty     SIGMOIDOSCOPY FLEXIBLE  5/29/2013    Procedure: SIGMOIDOSCOPY FLEXIBLE;  SIGMOIDOSCOPY FLEXIBLE (FV) Needs blood sugar ck'd;  Surgeon: Enrique Salazar MD;  Location:  GI     Current Outpatient Prescriptions   Medication Sig Dispense Refill     fluticasone (FLONASE) 50 MCG/ACT spray Spray 1-2 sprays into both nostrils daily 1 Bottle 11     guaiFENesin (MUCINEX) 600 MG 12 hr tablet Take 2 tablets (1,200 mg) by mouth 2 times daily 120 tablet 11     ASPIRIN PO Take 325 mg by mouth daily       Multiple Vitamins-Minerals (MULTIVITAMIN ADULT PO) Reported on 5/12/2017       triamcinolone (KENALOG) 0.5 % cream Apply sparingly to affected area three times daily. 30 g 1     CINNAMON PO     "    tamsulosin (FLOMAX) 0.4 MG 24 hr capsule Take 1 capsule (0.4 mg) by mouth At Bedtime 90 capsule 3     atorvastatin (LIPITOR) 40 MG tablet Take 1 tablet (40 mg) by mouth At Bedtime 90 tablet 3     blood glucose monitoring (ACCU-CHEK LAURA PLUS) test strip Use to test blood sugar 1 time daily 100 each 11     blood glucose (ACCU-CHEK LAURA) test strip 1Use to test blood sugar 2 times daily or as directed. 100 strip 3     fish oil-omega-3 fatty acids 1000 MG capsule Take 1 g by mouth every evening       Flaxseed, Linseed, (FLAX SEED OIL) 1000 MG capsule Take 1 capsule by mouth every evening       Glucosamine HCl 1000 MG TABS Take 1 tablet by mouth every evening       ACE NOT PRESCRIBED, INTENTIONAL, ACE Inhibitor not prescribed due to Other:  Had cough on ACEI in past       0 each 0     OTC products: None, except as noted above    Allergies   Allergen Reactions     Levetiracetam [Keppra] Rash     Oxcarbazepine [Trileptal] Rash      Latex Allergy: NO    Social History   Substance Use Topics     Smoking status: Never Smoker     Smokeless tobacco: Never Used     Alcohol use 0.0 oz/week     0 Standard drinks or equivalent per week      Comment: Occas     History   Drug Use No       REVIEW OF SYSTEMS:                                                    Denies  vision changes, hearing loss, difficulty swallowing, heart burn, chest pain, SOB, diarrhea, constipation, dysuria. Has intermittent swelling of the feet. A complete review of systems is otherwise negative    This document serves as a record of the services and decisions personally performed and made by Alexandr Crocker MD. It was created on his behalf by Juice Smith a trained medical scribe. The creation of this document is based the provider's statements to the medical scribe. Juice Smith May 15, 2017 10:01 AM  EXAM:                                                    Temp 97.7  F (36.5  C) (Oral)  Resp 16  Ht 5' 10\" (1.778 m)  Wt 267 lb 9.6 oz (121.4 kg)  " BMI 38.4 kg/m2  GEN: Healthy, Alert, No distress  EYES: pupils are symmetric  ENT: ears without cerumen, mucus membranes moist  NECK: no thyromegaly  CHEST: Clear to auscultation, respirations unlabored  HEART: S1S2 RRR no murmur nor apical displacement  ABD: soft without mass or organomegaly   MS: tr edema   DIAGNOSTICS:                                                    EKG: sinus bradycardia, normal axis, normal intervals, no acute ST/T changes c/w ischemia, no LVH by voltage criteria, unchanged from previous    Recent Labs   Lab Test  04/17/17   1100  02/13/17   1115  10/21/16   1154  08/10/15   0835  08/08/15   0600   08/05/15   0650   08/03/15   0705   HGB   --   10.8*   --    --    --    --    --    --   10.4*   PLT   --   234   --    --   268   --   252   --   230   INR   --    --    --   1.15*   --    --   1.09   < >   --    NA   --   140  138  137  136   < >  137   < >  136   POTASSIUM   --   4.1  4.6  4.3  4.4   < >  4.0   < >  4.2   CR   --   0.74  0.84  0.73  0.68   < >  0.71   < >  0.79   A1C  5.9   --   6.0   --    --    --    --    --    --     < > = values in this interval not displayed.     IMPRESSION:                                                    Reason for surgery/procedure: total right knee replacement  Diagnosis/reason for consult: assess perioperative risk    The proposed surgical procedure is considered INTERMEDIATE risk.    REVISED CARDIAC RISK INDEX  The patient has the following serious cardiovascular risks for perioperative complications such as (MI, PE, VFib and 3  AV Block):  No serious cardiac risks  INTERPRETATION: 0 risks: Class I (very low risk - 0.4% complication rate)    The patient has the following additional risks for perioperative complications:  No identified additional risks      ICD-10-CM    1. Preop general physical exam Z01.818      RECOMMENDATIONS:                                                              APPROVAL GIVEN to proceed with proposed procedure, without  further diagnostic evaluation       Signed Electronically by: Obi Strange MD    The information in this document, created by a scribe for me, accurately reflects the services I personally performed and the decisions made by me. I have reviewed and approved this document for accuracy. 10:03 AM May 15, 2017    Copy of this evaluation report is provided to requesting physician.    [facility] Preop Guidelines

## 2017-06-08 NOTE — PHARMACY-ADMISSION MEDICATION HISTORY
Medication reconciliation completed by pre-admitting.    Prior to Admission medications    Medication Sig Last Dose Taking? Auth Provider   fluticasone (FLONASE) 50 MCG/ACT spray Spray 1-2 sprays into both nostrils daily  Yes Obi Strange MD   guaiFENesin (MUCINEX) 600 MG 12 hr tablet Take 2 tablets (1,200 mg) by mouth 2 times daily  Yes Obi Strange MD   ASPIRIN PO Take 325 mg by mouth daily  Yes Reported, Patient   Multiple Vitamins-Minerals (MULTIVITAMIN ADULT PO) Reported on 5/12/2017  Yes Reported, Patient   triamcinolone (KENALOG) 0.5 % cream Apply sparingly to affected area three times daily.  Yes Obi Strange MD   CINNAMON PO   Yes Reported, Patient   tamsulosin (FLOMAX) 0.4 MG 24 hr capsule Take 1 capsule (0.4 mg) by mouth At Bedtime  Yes Obi Strange MD   atorvastatin (LIPITOR) 40 MG tablet Take 1 tablet (40 mg) by mouth At Bedtime  Yes Obi Strange MD   fish oil-omega-3 fatty acids 1000 MG capsule Take 1 g by mouth every evening  Yes Unknown, Entered By History   Flaxseed, Linseed, (FLAX SEED OIL) 1000 MG capsule Take 1 capsule by mouth every evening  Yes Unknown, Entered By History   Glucosamine HCl 1000 MG TABS Take 1 tablet by mouth every evening  Yes Unknown, Entered By History   blood glucose monitoring (ACCU-CHEK LAURA PLUS) test strip Use to test blood sugar 1 time daily   Obi Strange MD   blood glucose (ACCU-CHEK LAURA) test strip 1Use to test blood sugar 2 times daily or as directed.   Obi Strange MD   ACE NOT PRESCRIBED, INTENTIONAL, ACE Inhibitor not prescribed due to Other:  Had cough on ACEI in past         Naren, Nimisha ARENAS MD

## 2017-06-11 RX ORDER — CEFAZOLIN SODIUM 1 G/50ML
3 SOLUTION INTRAVENOUS
Status: CANCELLED | OUTPATIENT
Start: 2017-06-11

## 2017-06-11 RX ORDER — CEFAZOLIN SODIUM 1 G/3ML
1 INJECTION, POWDER, FOR SOLUTION INTRAMUSCULAR; INTRAVENOUS SEE ADMIN INSTRUCTIONS
Status: CANCELLED | OUTPATIENT
Start: 2017-06-11

## 2017-06-12 ENCOUNTER — HOSPITAL ENCOUNTER (OUTPATIENT)
Facility: CLINIC | Age: 82
Discharge: HOME | End: 2017-06-12
Attending: ORTHOPAEDIC SURGERY | Admitting: ORTHOPAEDIC SURGERY
Payer: MEDICARE

## 2017-06-12 ENCOUNTER — TELEPHONE (OUTPATIENT)
Dept: ORTHOPEDICS | Facility: CLINIC | Age: 82
End: 2017-06-12

## 2017-06-12 ENCOUNTER — TELEPHONE (OUTPATIENT)
Dept: PODIATRY | Facility: CLINIC | Age: 82
End: 2017-06-12

## 2017-06-12 NOTE — TELEPHONE ENCOUNTER
Scheduled surgery for Right total knee arthroplasty on 6/19/2017 with Dr. Raymond @ [facility] @ 2:30  Surgery education packet provided to patient.

## 2017-06-12 NOTE — TELEPHONE ENCOUNTER
Scheduled surgery for Hammer toe repair toes 2-4 left foot with osteotomy on 6/19/2017 with Dr. Kim @ [facility] @ 3:30 (following Dr. Raymond's Case at 2:30.  Surgery education packet provided to patient.

## 2017-06-13 RX ORDER — CEFAZOLIN SODIUM 1 G/3ML
1 INJECTION, POWDER, FOR SOLUTION INTRAMUSCULAR; INTRAVENOUS SEE ADMIN INSTRUCTIONS
Status: CANCELLED | OUTPATIENT
Start: 2017-06-13

## 2017-06-13 RX ORDER — CEFAZOLIN SODIUM 1 G/50ML
3 SOLUTION INTRAVENOUS
Status: CANCELLED | OUTPATIENT
Start: 2017-06-13

## 2017-06-16 NOTE — H&P
I have taken a new history and examined Damien and there have been no changes to his 2017 H&P, as seen below:      Office Visit     5/15/2017  [facility]    Alice Hameed MD   Family Practice    Preop general physical exam   Dx    Pre-Op Exam    Reason for visit    Progress Notes   Alice Hameed MD (Physician)     Family Practice      Tests are all OK  ALICE HAMEED         Progress Notes   Alice Hameed MD (Physician)     Family Practice   Expand All Collapse All    [facility]  [address] [facility] [id number] [phone]  Dept: [phone]     PRE-OP EVALUATION:  Today's date: 5/15/2017     Damien Vallecillo (: 1935) presents for pre-operative evaluation assessment as requested by Dr. Raymond.  He requires evaluation and anesthesia risk assessment prior to undergoing surgery/procedure for treatment of rt knee .  Proposed procedure: total right knee replacement      Date of Surgery/ Procedure: Not sure  Time of Surgery/ Procedure: not sure  Hospital/Surgical Facility: [facility]     Primary Physician: Alice Hameed  Type of Anesthesia Anticipated: General     Patient has a Health Care Directive or Living Will:  NO     1. NO - Do you have a history of heart attack, stroke, stent, bypass or surgery on an artery in the head, neck, heart or legs?  2. NO - Do you ever have any pain or discomfort in your chest?  3. NO - Do you have a history of  Heart Failure?  4. NO - Are you troubled by shortness of breath when: walking on the level, up a slight hill or at night?  6. NO - Do you have a cough, shortness of breath or wheezing?  7. NO - Do you sometimes get pains in the calves of your legs when you walk?  8. NO - Do you or anyone in your family have previous history of blood clots?  9. NO - Do you or does anyone in your family have a serious bleeding problem such as prolonged bleeding following surgeries or cuts?  10. NO - Have you ever had problems  with anemia or been told to take iron pills?  11. NO - Have you had any abnormal blood loss such as black, tarry or bloody stools, or abnormal vaginal bleeding?  13. NO - Have you or any of your relatives ever had problems with anesthesia?  14. NO - Do you have sleep apnea, excessive snoring or daytime drowsiness?  15. NO - Do you have any prosthetic heart valves?  17. NO - Is there any chance that you may be pregnant?  5. NO, thinks he might have allergies  - Do you currently have a cold, bronchitis or other respiratory infection?  12. YES - Have you ever had a blood transfusion?  16. YES - Do you have prosthetic joints?  HPI:                                                       Brief HPI related to upcoming procedure: total right knee replacement     MEDICAL HISTORY:               Patient Active Problem List     Diagnosis Date Noted     Right knee pain, unspecified chronicity 05/08/2017       Priority: Medium     Left knee pain, unspecified chronicity 05/08/2017       Priority: Medium     RBC microcytosis 02/14/2017       Priority: Medium     Pre-diabetes 10/21/2016       Priority: Medium     Morbid obesity due to excess calories (H) 10/21/2016       Priority: Medium     Hammer toe of left foot 10/21/2016       Priority: Medium     Venous stasis dermatitis of both lower extremities 10/21/2016       Priority: Medium     Obesity 07/01/2015       Priority: Medium       Problem list name updated by automated process. Provider to review     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) 10/21/2016       Last 2011     Lower GI bleed 06/30/2015     GI bleed 06/30/2015     Cerebral infarction (H) 07/07/2014       Diagnosis updated by automated process. Provider to review and confirm.     Glucose intolerance (impaired glucose tolerance) 05/31/2013     Advanced directives, counseling/discussion 03/14/2012       ordered today.     CARDIOVASCULAR SCREENING; LDL GOAL LESS THAN 100 10/31/2010       24.9% 10 yr risk  4/2014     Thyroid nodule 06/11/2010     Gout         Problem list name updated by automated process. Provider to review     Effusion of lower leg joint 05/29/2003       Past Medical History         Past Medical History:   Diagnosis Date     Acute suppurative arthritis (H)       Acute, but ill-defined, cerebrovascular disease       Arthritis       Chronic headaches       Glucose intolerance (impaired glucose tolerance) 5/31/2013     Gout, unspecified       Hammer toe of left foot 10/21/2016     Hematuria       Left knee pain, unspecified chronicity 5/8/2017     Lentigo maligna (H)       Malignant neoplasm of rectosigmoid junction (H)       Morbid obesity due to excess calories (H) 10/21/2016     Other convulsions       last seizure 7-8 years ago...not certain if these were true seizres or not..     Pre-diabetes 10/21/2016     RBC microcytosis 2/14/2017     Right knee pain, unspecified chronicity 5/8/2017     Syncope       Tubulovillous adenoma of colon       Venous stasis dermatitis of both lower extremities 10/21/2016          Past Surgical History          Past Surgical History:   Procedure Laterality Date     C NONSPECIFIC PROCEDURE         right heel surgery; spur removed.     COLONOSCOPY N/A 6/23/2015     Procedure: COMBINED COLONOSCOPY, SINGLE OR MULTIPLE BIOPSY/POLYPECTOMY BY BIOPSY;  Surgeon: Kimberly Alfaro MD;  Location:  GI     COLONOSCOPY N/A 7/30/2015     Procedure: COLONOSCOPY;  Surgeon: Enrique Salazar MD;  Location:  OR     HERNIORRHAPHY EPIGASTRIC   4/27/2012     Procedure:HERNIORRHAPHY EPIGASTRIC; Epigastric Hernia Repair with mesh ; Surgeon:BOLIVAR OLIVEIRA; Location: OR     LAPAROSCOPIC ASSISTED COLECTOMY Right 7/30/2015     Procedure: LAPAROSCOPIC ASSISTED COLECTOMY;  Surgeon: Enrique Salazar MD;  Location:  OR     ORTHOPEDIC SURGERY         lt knee arthroplasty     SIGMOIDOSCOPY FLEXIBLE   5/29/2013     Procedure: SIGMOIDOSCOPY FLEXIBLE;  SIGMOIDOSCOPY FLEXIBLE (FV) Needs  blood sugar ck'd;  Surgeon: Enrique Salazar MD;  Location:  GI          Current Outpatient Prescriptions           Current Outpatient Prescriptions   Medication Sig Dispense Refill     fluticasone (FLONASE) 50 MCG/ACT spray Spray 1-2 sprays into both nostrils daily 1 Bottle 11     guaiFENesin (MUCINEX) 600 MG 12 hr tablet Take 2 tablets (1,200 mg) by mouth 2 times daily 120 tablet 11     ASPIRIN PO Take 325 mg by mouth daily         Multiple Vitamins-Minerals (MULTIVITAMIN ADULT PO) Reported on 5/12/2017         triamcinolone (KENALOG) 0.5 % cream Apply sparingly to affected area three times daily. 30 g 1     CINNAMON PO           tamsulosin (FLOMAX) 0.4 MG 24 hr capsule Take 1 capsule (0.4 mg) by mouth At Bedtime 90 capsule 3     atorvastatin (LIPITOR) 40 MG tablet Take 1 tablet (40 mg) by mouth At Bedtime 90 tablet 3     blood glucose monitoring (ACCU-CHEK LAURA PLUS) test strip Use to test blood sugar 1 time daily 100 each 11     blood glucose (ACCU-CHEK LAURA) test strip 1Use to test blood sugar 2 times daily or as directed. 100 strip 3     fish oil-omega-3 fatty acids 1000 MG capsule Take 1 g by mouth every evening         Flaxseed, Linseed, (FLAX SEED OIL) 1000 MG capsule Take 1 capsule by mouth every evening         Glucosamine HCl 1000 MG TABS Take 1 tablet by mouth every evening         ACE NOT PRESCRIBED, INTENTIONAL, ACE Inhibitor not prescribed due to Other:  Had cough on ACEI in past       0 each 0         OTC products: None, except as noted above          Allergies   Allergen Reactions     Levetiracetam [Keppra] Rash     Oxcarbazepine [Trileptal] Rash      Latex Allergy: NO             Social History    Substance Use Topics      Smoking status: Never Smoker      Smokeless tobacco: Never Used      Alcohol use 0.0 oz/week       0 Standard drinks or equivalent per week         Comment: Occas           History   Drug Use No         REVIEW OF SYSTEMS:      Denies  vision changes, hearing loss, difficulty  "swallowing, heart burn, chest pain, SOB, diarrhea, constipation, dysuria. Has intermittent swelling of the feet. A complete review of systems is otherwise negative     This document serves as a record of the services and decisions personally performed and made by Alexandr Crocker MD. It was created on his behalf by Juice Smith a trained medical scribe. The creation of this document is based the provider's statements to the medical scribe. Juice Smith May 15, 2017 10:01 AM  EXAM:      Temp 97.7  F (36.5  C) (Oral)  Resp 16  Ht 5' 10\" (1.778 m)  Wt 267 lb 9.6 oz (121.4 kg)  BMI 38.4 kg/m2  GEN: Healthy, Alert, No distress  EYES: pupils are symmetric  ENT: ears without cerumen, mucus membranes moist  NECK: no thyromegaly  CHEST: Clear to auscultation, respirations unlabored  HEART: S1S2 RRR no murmur nor apical displacement  ABD: soft without mass or organomegaly   MS: tr edema   DIAGNOSTICS:      EKG: sinus bradycardia, normal axis, normal intervals, no acute ST/T changes c/w ischemia, no LVH by voltage criteria, unchanged from previous                 Recent Labs   Lab Test  04/17/17   1100  02/13/17   1115  10/21/16   1154  08/10/15   0835  08/08/15   0600    08/05/15   0650    08/03/15   0705   HGB   --   10.8*   --    --    --    --    --    --   10.4*   PLT   --   234   --    --   268   --   252   --   230   INR   --    --    --   1.15*   --    --   1.09   < >   --    NA   --   140  138  137  136   < >  137   < >  136   POTASSIUM   --   4.1  4.6  4.3  4.4   < >  4.0   < >  4.2   CR   --   0.74  0.84  0.73  0.68   < >  0.71   < >  0.79   A1C  5.9   --   6.0   --    --    --    --    --    --     < > = values in this interval not displayed.      IMPRESSION:      Reason for surgery/procedure: total right knee replacement  Diagnosis/reason for consult: assess perioperative risk   The proposed surgical procedure is considered INTERMEDIATE risk.     REVISED CARDIAC RISK INDEX  The patient has the following " serious cardiovascular risks for perioperative complications such as (MI, PE, VFib and 3  AV Block):  No serious cardiac risks  INTERPRETATION: 0 risks: Class I (very low risk - 0.4% complication rate)     The patient has the following additional risks for perioperative complications:  No identified additional risks         ICD-10-CM     1. Preop general physical exam Z01.818        RECOMMENDATIONS:                     APPROVAL GIVEN to proceed with proposed procedure, without further diagnostic evaluation         Signed Electronically by: Obi Strange MD

## 2017-06-16 NOTE — PLAN OF CARE
Surgery was cancelled on 6/12 due to computer issues at [facility]. Patient now rescheduled for surgery on 6/19/17, but H&P is now outdated. Call placed to Dr. Raymond to see if he will update on DOS. Labs will be redrawn on AM of surgery also-no need for patient to come pre op for a T&S because we have no HGB results in the past 30 days- per Dr. Sadler.

## 2017-06-19 ENCOUNTER — ANESTHESIA (OUTPATIENT)
Dept: SURGERY | Facility: CLINIC | Age: 82
DRG: 470 | End: 2017-06-19
Payer: MEDICARE

## 2017-06-19 ENCOUNTER — ANESTHESIA EVENT (OUTPATIENT)
Dept: SURGERY | Facility: CLINIC | Age: 82
DRG: 470 | End: 2017-06-19
Payer: MEDICARE

## 2017-06-19 ENCOUNTER — HOSPITAL ENCOUNTER (INPATIENT)
Facility: CLINIC | Age: 82
LOS: 3 days | Discharge: NURSING, REHAB OR LONG-TERM CARE | DRG: 470 | End: 2017-06-22
Attending: ORTHOPAEDIC SURGERY | Admitting: ORTHOPAEDIC SURGERY
Payer: MEDICARE

## 2017-06-19 ENCOUNTER — APPOINTMENT (OUTPATIENT)
Dept: GENERAL RADIOLOGY | Facility: CLINIC | Age: 82
DRG: 470 | End: 2017-06-19
Attending: ORTHOPAEDIC SURGERY
Payer: MEDICARE

## 2017-06-19 DIAGNOSIS — Z96.651 STATUS POST TOTAL RIGHT KNEE REPLACEMENT: Primary | ICD-10-CM

## 2017-06-19 PROBLEM — Z96.659 S/P TOTAL KNEE ARTHROPLASTY: Status: ACTIVE | Noted: 2017-06-19

## 2017-06-19 LAB
ABO + RH BLD: NORMAL
ABO + RH BLD: NORMAL
BLD GP AB SCN SERPL QL: NORMAL
BLOOD BANK CMNT PATIENT-IMP: NORMAL
CREAT SERPL-MCNC: 0.82 MG/DL (ref 0.66–1.25)
GFR SERPL CREATININE-BSD FRML MDRD: 90 ML/MIN/1.7M2
GLUCOSE BLDC GLUCOMTR-MCNC: 117 MG/DL (ref 70–99)
GLUCOSE BLDC GLUCOMTR-MCNC: 178 MG/DL (ref 70–99)
HGB BLD-MCNC: 12.1 G/DL (ref 13.3–17.7)
PLATELET # BLD AUTO: 224 10E9/L (ref 150–450)
POTASSIUM SERPL-SCNC: 4.1 MMOL/L (ref 3.4–5.3)
SPECIMEN EXP DATE BLD: NORMAL

## 2017-06-19 PROCEDURE — 28011 INCISION OF TOE TENDONS: CPT | Mod: LT | Performed by: PODIATRIST

## 2017-06-19 PROCEDURE — 36000063 ZZH SURGERY LEVEL 4 EA 15 ADDTL MIN: Performed by: ORTHOPAEDIC SURGERY

## 2017-06-19 PROCEDURE — 40000306 ZZH STATISTIC PRE PROC ASSESS II: Performed by: ORTHOPAEDIC SURGERY

## 2017-06-19 PROCEDURE — 12000007 ZZH R&B INTERMEDIATE

## 2017-06-19 PROCEDURE — C1776 JOINT DEVICE (IMPLANTABLE): HCPCS | Performed by: ORTHOPAEDIC SURGERY

## 2017-06-19 PROCEDURE — C1713 ANCHOR/SCREW BN/BN,TIS/BN: HCPCS | Performed by: ORTHOPAEDIC SURGERY

## 2017-06-19 PROCEDURE — 25000128 H RX IP 250 OP 636: Performed by: NURSE ANESTHETIST, CERTIFIED REGISTERED

## 2017-06-19 PROCEDURE — 25000128 H RX IP 250 OP 636: Performed by: ANESTHESIOLOGY

## 2017-06-19 PROCEDURE — 28308 INCISION OF METATARSAL: CPT | Mod: T1 | Performed by: PODIATRIST

## 2017-06-19 PROCEDURE — 25000125 ZZHC RX 250: Performed by: PHYSICIAN ASSISTANT

## 2017-06-19 PROCEDURE — 82565 ASSAY OF CREATININE: CPT | Performed by: ANESTHESIOLOGY

## 2017-06-19 PROCEDURE — 25000125 ZZHC RX 250: Performed by: NURSE ANESTHETIST, CERTIFIED REGISTERED

## 2017-06-19 PROCEDURE — 71000012 ZZH RECOVERY PHASE 1 LEVEL 1 FIRST HR: Performed by: ORTHOPAEDIC SURGERY

## 2017-06-19 PROCEDURE — 85049 AUTOMATED PLATELET COUNT: CPT | Performed by: ORTHOPAEDIC SURGERY

## 2017-06-19 PROCEDURE — 0L8W0ZZ DIVISION OF LEFT FOOT TENDON, OPEN APPROACH: ICD-10-PCS | Performed by: PODIATRIST

## 2017-06-19 PROCEDURE — 25000566 ZZH SEVOFLURANE, EA 15 MIN: Performed by: ORTHOPAEDIC SURGERY

## 2017-06-19 PROCEDURE — 85018 HEMOGLOBIN: CPT | Performed by: ANESTHESIOLOGY

## 2017-06-19 PROCEDURE — 25000128 H RX IP 250 OP 636: Performed by: ORTHOPAEDIC SURGERY

## 2017-06-19 PROCEDURE — 25000125 ZZHC RX 250: Performed by: ANESTHESIOLOGY

## 2017-06-19 PROCEDURE — 27447 TOTAL KNEE ARTHROPLASTY: CPT | Mod: RT | Performed by: ORTHOPAEDIC SURGERY

## 2017-06-19 PROCEDURE — 28285 REPAIR OF HAMMERTOE: CPT | Mod: T1 | Performed by: PODIATRIST

## 2017-06-19 PROCEDURE — 36000065 ZZH SURGERY LEVEL 4 W FLUORO 1ST 30 MIN: Performed by: ORTHOPAEDIC SURGERY

## 2017-06-19 PROCEDURE — 25000132 ZZH RX MED GY IP 250 OP 250 PS 637: Mod: GY | Performed by: ORTHOPAEDIC SURGERY

## 2017-06-19 PROCEDURE — 37000008 ZZH ANESTHESIA TECHNICAL FEE, 1ST 30 MIN: Performed by: ORTHOPAEDIC SURGERY

## 2017-06-19 PROCEDURE — S0020 INJECTION, BUPIVICAINE HYDRO: HCPCS | Performed by: PHYSICIAN ASSISTANT

## 2017-06-19 PROCEDURE — 85049 AUTOMATED PLATELET COUNT: CPT | Performed by: ANESTHESIOLOGY

## 2017-06-19 PROCEDURE — A9270 NON-COVERED ITEM OR SERVICE: HCPCS | Mod: GY | Performed by: ORTHOPAEDIC SURGERY

## 2017-06-19 PROCEDURE — 84132 ASSAY OF SERUM POTASSIUM: CPT | Performed by: ANESTHESIOLOGY

## 2017-06-19 PROCEDURE — 71000013 ZZH RECOVERY PHASE 1 LEVEL 1 EA ADDTL HR: Performed by: ORTHOPAEDIC SURGERY

## 2017-06-19 PROCEDURE — 86901 BLOOD TYPING SEROLOGIC RH(D): CPT | Performed by: ANESTHESIOLOGY

## 2017-06-19 PROCEDURE — 86850 RBC ANTIBODY SCREEN: CPT | Performed by: ANESTHESIOLOGY

## 2017-06-19 PROCEDURE — 86900 BLOOD TYPING SEROLOGIC ABO: CPT | Performed by: ANESTHESIOLOGY

## 2017-06-19 PROCEDURE — 0SGQ04Z FUSION OF LEFT TOE PHALANGEAL JOINT WITH INTERNAL FIXATION DEVICE, OPEN APPROACH: ICD-10-PCS | Performed by: PODIATRIST

## 2017-06-19 PROCEDURE — 37000009 ZZH ANESTHESIA TECHNICAL FEE, EACH ADDTL 15 MIN: Performed by: ORTHOPAEDIC SURGERY

## 2017-06-19 PROCEDURE — 40000986 XR KNEE PORT RT 1/2 VW: Mod: RT

## 2017-06-19 PROCEDURE — 0SRC0J9 REPLACEMENT OF RIGHT KNEE JOINT WITH SYNTHETIC SUBSTITUTE, CEMENTED, OPEN APPROACH: ICD-10-PCS | Performed by: ORTHOPAEDIC SURGERY

## 2017-06-19 PROCEDURE — 27210794 ZZH OR GENERAL SUPPLY STERILE: Performed by: ORTHOPAEDIC SURGERY

## 2017-06-19 PROCEDURE — 27810169 ZZH OR IMPLANT GENERAL: Performed by: ORTHOPAEDIC SURGERY

## 2017-06-19 PROCEDURE — 36415 COLL VENOUS BLD VENIPUNCTURE: CPT | Performed by: ANESTHESIOLOGY

## 2017-06-19 PROCEDURE — 27110028 ZZH OR GENERAL SUPPLY NON-STERILE: Performed by: ORTHOPAEDIC SURGERY

## 2017-06-19 PROCEDURE — 00000146 ZZHCL STATISTIC GLUCOSE BY METER IP

## 2017-06-19 PROCEDURE — 0QHP04Z INSERTION OF INTERNAL FIXATION DEVICE INTO LEFT METATARSAL, OPEN APPROACH: ICD-10-PCS | Performed by: PODIATRIST

## 2017-06-19 DEVICE — IMP SCR ARTHREX QUICKFIX TI 2X13MM AR-8930-13: Type: IMPLANTABLE DEVICE | Site: FOOT | Status: FUNCTIONAL

## 2017-06-19 DEVICE — IMPLANTABLE DEVICE: Type: IMPLANTABLE DEVICE | Site: KNEE | Status: FUNCTIONAL

## 2017-06-19 DEVICE — IMP PIN ARTHREX BIO TRIM IT 2.0X100MM AR-4152DS: Type: IMPLANTABLE DEVICE | Site: FOOT | Status: FUNCTIONAL

## 2017-06-19 DEVICE — IMP COMP TIBIAL TRAY SNN JOURNEY NP RT SZ 5 74022215: Type: IMPLANTABLE DEVICE | Site: KNEE | Status: FUNCTIONAL

## 2017-06-19 DEVICE — BONE CEMENT SIMPLEX FULL DOSE 6191-1-001: Type: IMPLANTABLE DEVICE | Site: KNEE | Status: FUNCTIONAL

## 2017-06-19 DEVICE — IMP SCR ARTHREX QUICKFIX TI 2X11MM AR-8930-11: Type: IMPLANTABLE DEVICE | Site: FOOT | Status: FUNCTIONAL

## 2017-06-19 RX ORDER — KETAMINE HYDROCHLORIDE 10 MG/ML
INJECTION, SOLUTION INTRAMUSCULAR; INTRAVENOUS PRN
Status: DISCONTINUED | OUTPATIENT
Start: 2017-06-19 | End: 2017-06-19

## 2017-06-19 RX ORDER — HYDRALAZINE HYDROCHLORIDE 20 MG/ML
2.5-5 INJECTION INTRAMUSCULAR; INTRAVENOUS EVERY 10 MIN PRN
Status: DISCONTINUED | OUTPATIENT
Start: 2017-06-19 | End: 2017-06-19 | Stop reason: HOSPADM

## 2017-06-19 RX ORDER — ONDANSETRON 2 MG/ML
4 INJECTION INTRAMUSCULAR; INTRAVENOUS EVERY 6 HOURS PRN
Status: DISCONTINUED | OUTPATIENT
Start: 2017-06-19 | End: 2017-06-22 | Stop reason: HOSPADM

## 2017-06-19 RX ORDER — ACETAMINOPHEN 325 MG/1
650 TABLET ORAL EVERY 4 HOURS PRN
Status: DISCONTINUED | OUTPATIENT
Start: 2017-06-22 | End: 2017-06-22 | Stop reason: HOSPADM

## 2017-06-19 RX ORDER — TAMSULOSIN HYDROCHLORIDE 0.4 MG/1
0.4 CAPSULE ORAL AT BEDTIME
Status: DISCONTINUED | OUTPATIENT
Start: 2017-06-19 | End: 2017-06-22 | Stop reason: HOSPADM

## 2017-06-19 RX ORDER — CEFAZOLIN SODIUM 1 G/3ML
1 INJECTION, POWDER, FOR SOLUTION INTRAMUSCULAR; INTRAVENOUS SEE ADMIN INSTRUCTIONS
Status: DISCONTINUED | OUTPATIENT
Start: 2017-06-19 | End: 2017-06-20

## 2017-06-19 RX ORDER — ONDANSETRON 2 MG/ML
INJECTION INTRAMUSCULAR; INTRAVENOUS PRN
Status: DISCONTINUED | OUTPATIENT
Start: 2017-06-19 | End: 2017-06-19

## 2017-06-19 RX ORDER — SODIUM CHLORIDE, SODIUM LACTATE, POTASSIUM CHLORIDE, CALCIUM CHLORIDE 600; 310; 30; 20 MG/100ML; MG/100ML; MG/100ML; MG/100ML
INJECTION, SOLUTION INTRAVENOUS CONTINUOUS
Status: DISCONTINUED | OUTPATIENT
Start: 2017-06-19 | End: 2017-06-22

## 2017-06-19 RX ORDER — LABETALOL HYDROCHLORIDE 5 MG/ML
10 INJECTION, SOLUTION INTRAVENOUS
Status: COMPLETED | OUTPATIENT
Start: 2017-06-19 | End: 2017-06-19

## 2017-06-19 RX ORDER — NALOXONE HYDROCHLORIDE 0.4 MG/ML
.1-.4 INJECTION, SOLUTION INTRAMUSCULAR; INTRAVENOUS; SUBCUTANEOUS
Status: DISCONTINUED | OUTPATIENT
Start: 2017-06-19 | End: 2017-06-22 | Stop reason: HOSPADM

## 2017-06-19 RX ORDER — ACETAMINOPHEN 325 MG/1
975 TABLET ORAL EVERY 8 HOURS
Status: COMPLETED | OUTPATIENT
Start: 2017-06-19 | End: 2017-06-22

## 2017-06-19 RX ORDER — ATORVASTATIN CALCIUM 40 MG/1
40 TABLET, FILM COATED ORAL AT BEDTIME
Status: DISCONTINUED | OUTPATIENT
Start: 2017-06-19 | End: 2017-06-22 | Stop reason: HOSPADM

## 2017-06-19 RX ORDER — DEXAMETHASONE SODIUM PHOSPHATE 4 MG/ML
INJECTION, SOLUTION INTRA-ARTICULAR; INTRALESIONAL; INTRAMUSCULAR; INTRAVENOUS; SOFT TISSUE PRN
Status: DISCONTINUED | OUTPATIENT
Start: 2017-06-19 | End: 2017-06-19

## 2017-06-19 RX ORDER — BUPIVACAINE HYDROCHLORIDE 5 MG/ML
INJECTION, SOLUTION EPIDURAL; INTRACAUDAL PRN
Status: DISCONTINUED | OUTPATIENT
Start: 2017-06-19 | End: 2017-06-19 | Stop reason: HOSPADM

## 2017-06-19 RX ORDER — CEFAZOLIN SODIUM 2 G/100ML
2 INJECTION, SOLUTION INTRAVENOUS EVERY 8 HOURS
Status: COMPLETED | OUTPATIENT
Start: 2017-06-20 | End: 2017-06-20

## 2017-06-19 RX ORDER — PROPOFOL 10 MG/ML
INJECTION, EMULSION INTRAVENOUS PRN
Status: DISCONTINUED | OUTPATIENT
Start: 2017-06-19 | End: 2017-06-19

## 2017-06-19 RX ORDER — BUPIVACAINE HYDROCHLORIDE AND EPINEPHRINE 5; 5 MG/ML; UG/ML
INJECTION, SOLUTION PERINEURAL PRN
Status: DISCONTINUED | OUTPATIENT
Start: 2017-06-19 | End: 2017-06-19

## 2017-06-19 RX ORDER — SODIUM CHLORIDE, SODIUM LACTATE, POTASSIUM CHLORIDE, CALCIUM CHLORIDE 600; 310; 30; 20 MG/100ML; MG/100ML; MG/100ML; MG/100ML
INJECTION, SOLUTION INTRAVENOUS CONTINUOUS
Status: DISCONTINUED | OUTPATIENT
Start: 2017-06-19 | End: 2017-06-19

## 2017-06-19 RX ORDER — CEFAZOLIN SODIUM 1 G/50ML
3 SOLUTION INTRAVENOUS
Status: COMPLETED | OUTPATIENT
Start: 2017-06-19 | End: 2017-06-19

## 2017-06-19 RX ORDER — FENTANYL CITRATE 50 UG/ML
25-50 INJECTION, SOLUTION INTRAMUSCULAR; INTRAVENOUS
Status: DISCONTINUED | OUTPATIENT
Start: 2017-06-19 | End: 2017-06-19 | Stop reason: HOSPADM

## 2017-06-19 RX ORDER — SODIUM CHLORIDE, SODIUM LACTATE, POTASSIUM CHLORIDE, CALCIUM CHLORIDE 600; 310; 30; 20 MG/100ML; MG/100ML; MG/100ML; MG/100ML
INJECTION, SOLUTION INTRAVENOUS CONTINUOUS
Status: DISCONTINUED | OUTPATIENT
Start: 2017-06-19 | End: 2017-06-19 | Stop reason: HOSPADM

## 2017-06-19 RX ORDER — GLYCOPYRROLATE 0.2 MG/ML
INJECTION, SOLUTION INTRAMUSCULAR; INTRAVENOUS PRN
Status: DISCONTINUED | OUTPATIENT
Start: 2017-06-19 | End: 2017-06-19

## 2017-06-19 RX ORDER — HYDROMORPHONE HYDROCHLORIDE 2 MG/1
2-4 TABLET ORAL
Status: DISCONTINUED | OUTPATIENT
Start: 2017-06-19 | End: 2017-06-22 | Stop reason: HOSPADM

## 2017-06-19 RX ORDER — LIDOCAINE 40 MG/G
CREAM TOPICAL
Status: DISCONTINUED | OUTPATIENT
Start: 2017-06-19 | End: 2017-06-22 | Stop reason: HOSPADM

## 2017-06-19 RX ORDER — LIDOCAINE HYDROCHLORIDE 10 MG/ML
INJECTION, SOLUTION INFILTRATION; PERINEURAL PRN
Status: DISCONTINUED | OUTPATIENT
Start: 2017-06-19 | End: 2017-06-19

## 2017-06-19 RX ORDER — DIMENHYDRINATE 50 MG/ML
25 INJECTION, SOLUTION INTRAMUSCULAR; INTRAVENOUS
Status: DISCONTINUED | OUTPATIENT
Start: 2017-06-19 | End: 2017-06-19 | Stop reason: HOSPADM

## 2017-06-19 RX ORDER — ONDANSETRON 4 MG/1
4 TABLET, ORALLY DISINTEGRATING ORAL EVERY 30 MIN PRN
Status: DISCONTINUED | OUTPATIENT
Start: 2017-06-19 | End: 2017-06-19 | Stop reason: HOSPADM

## 2017-06-19 RX ORDER — FENTANYL CITRATE 50 UG/ML
INJECTION, SOLUTION INTRAMUSCULAR; INTRAVENOUS PRN
Status: DISCONTINUED | OUTPATIENT
Start: 2017-06-19 | End: 2017-06-19

## 2017-06-19 RX ORDER — ONDANSETRON 2 MG/ML
4 INJECTION INTRAMUSCULAR; INTRAVENOUS EVERY 30 MIN PRN
Status: DISCONTINUED | OUTPATIENT
Start: 2017-06-19 | End: 2017-06-19 | Stop reason: HOSPADM

## 2017-06-19 RX ORDER — FLUTICASONE PROPIONATE 50 MCG
1-2 SPRAY, SUSPENSION (ML) NASAL DAILY
Status: DISCONTINUED | OUTPATIENT
Start: 2017-06-20 | End: 2017-06-22 | Stop reason: HOSPADM

## 2017-06-19 RX ORDER — HYDROMORPHONE HYDROCHLORIDE 1 MG/ML
.5-1 INJECTION, SOLUTION INTRAMUSCULAR; INTRAVENOUS; SUBCUTANEOUS
Status: DISCONTINUED | OUTPATIENT
Start: 2017-06-19 | End: 2017-06-22 | Stop reason: HOSPADM

## 2017-06-19 RX ORDER — ONDANSETRON 4 MG/1
4 TABLET, ORALLY DISINTEGRATING ORAL EVERY 6 HOURS PRN
Status: DISCONTINUED | OUTPATIENT
Start: 2017-06-19 | End: 2017-06-22 | Stop reason: HOSPADM

## 2017-06-19 RX ORDER — NEOSTIGMINE METHYLSULFATE 1 MG/ML
VIAL (ML) INJECTION PRN
Status: DISCONTINUED | OUTPATIENT
Start: 2017-06-19 | End: 2017-06-19

## 2017-06-19 RX ORDER — HYDROXYZINE HYDROCHLORIDE 10 MG/1
10 TABLET, FILM COATED ORAL EVERY 6 HOURS PRN
Status: DISCONTINUED | OUTPATIENT
Start: 2017-06-19 | End: 2017-06-22 | Stop reason: HOSPADM

## 2017-06-19 RX ORDER — CYCLOBENZAPRINE HCL 5 MG
5 TABLET ORAL 3 TIMES DAILY PRN
Status: DISCONTINUED | OUTPATIENT
Start: 2017-06-19 | End: 2017-06-22 | Stop reason: HOSPADM

## 2017-06-19 RX ADMIN — TAMSULOSIN HYDROCHLORIDE 0.4 MG: 0.4 CAPSULE ORAL at 21:04

## 2017-06-19 RX ADMIN — GLYCOPYRROLATE 0.2 MG: 0.2 INJECTION, SOLUTION INTRAMUSCULAR; INTRAVENOUS at 16:06

## 2017-06-19 RX ADMIN — DEXAMETHASONE SODIUM PHOSPHATE 4 MG: 4 INJECTION, SOLUTION INTRA-ARTICULAR; INTRALESIONAL; INTRAMUSCULAR; INTRAVENOUS; SOFT TISSUE at 16:06

## 2017-06-19 RX ADMIN — LIDOCAINE HYDROCHLORIDE 50 MG: 10 INJECTION, SOLUTION INFILTRATION; PERINEURAL at 16:06

## 2017-06-19 RX ADMIN — ACETAMINOPHEN 975 MG: 325 TABLET, FILM COATED ORAL at 21:04

## 2017-06-19 RX ADMIN — HYDROMORPHONE HYDROCHLORIDE 0.5 MG: 1 INJECTION, SOLUTION INTRAMUSCULAR; INTRAVENOUS; SUBCUTANEOUS at 17:33

## 2017-06-19 RX ADMIN — FENTANYL CITRATE 50 MCG: 50 INJECTION, SOLUTION INTRAMUSCULAR; INTRAVENOUS at 18:00

## 2017-06-19 RX ADMIN — Medication 10 MG: at 19:20

## 2017-06-19 RX ADMIN — HYDROMORPHONE HYDROCHLORIDE 0.5 MG: 1 INJECTION, SOLUTION INTRAMUSCULAR; INTRAVENOUS; SUBCUTANEOUS at 17:38

## 2017-06-19 RX ADMIN — Medication 2 MG: at 18:53

## 2017-06-19 RX ADMIN — FENTANYL CITRATE 50 MCG: 50 INJECTION INTRAMUSCULAR; INTRAVENOUS at 19:43

## 2017-06-19 RX ADMIN — FENTANYL CITRATE 100 MCG: 50 INJECTION, SOLUTION INTRAMUSCULAR; INTRAVENOUS at 16:06

## 2017-06-19 RX ADMIN — SODIUM CHLORIDE, POTASSIUM CHLORIDE, SODIUM LACTATE AND CALCIUM CHLORIDE: 600; 310; 30; 20 INJECTION, SOLUTION INTRAVENOUS at 21:05

## 2017-06-19 RX ADMIN — Medication 3 G: at 16:02

## 2017-06-19 RX ADMIN — ONDANSETRON 4 MG: 2 INJECTION INTRAMUSCULAR; INTRAVENOUS at 16:06

## 2017-06-19 RX ADMIN — ROCURONIUM BROMIDE 35 MG: 10 INJECTION INTRAVENOUS at 16:06

## 2017-06-19 RX ADMIN — ROCURONIUM BROMIDE 15 MG: 10 INJECTION INTRAVENOUS at 16:28

## 2017-06-19 RX ADMIN — BUPIVACAINE HYDROCHLORIDE AND EPINEPHRINE BITARTRATE 30 ML: 5; .005 INJECTION, SOLUTION EPIDURAL; INTRACAUDAL; PERINEURAL at 15:56

## 2017-06-19 RX ADMIN — SODIUM CHLORIDE, POTASSIUM CHLORIDE, SODIUM LACTATE AND CALCIUM CHLORIDE: 600; 310; 30; 20 INJECTION, SOLUTION INTRAVENOUS at 17:05

## 2017-06-19 RX ADMIN — PROPOFOL 200 MG: 10 INJECTION, EMULSION INTRAVENOUS at 16:06

## 2017-06-19 RX ADMIN — ATORVASTATIN CALCIUM 40 MG: 40 TABLET, FILM COATED ORAL at 21:04

## 2017-06-19 RX ADMIN — FENTANYL CITRATE 50 MCG: 50 INJECTION INTRAMUSCULAR; INTRAVENOUS at 19:31

## 2017-06-19 RX ADMIN — MIDAZOLAM HYDROCHLORIDE 2 MG: 1 INJECTION, SOLUTION INTRAMUSCULAR; INTRAVENOUS at 15:46

## 2017-06-19 RX ADMIN — SODIUM CHLORIDE, POTASSIUM CHLORIDE, SODIUM LACTATE AND CALCIUM CHLORIDE: 600; 310; 30; 20 INJECTION, SOLUTION INTRAVENOUS at 16:02

## 2017-06-19 RX ADMIN — GLYCOPYRROLATE 0.2 MG: 0.2 INJECTION, SOLUTION INTRAMUSCULAR; INTRAVENOUS at 18:53

## 2017-06-19 RX ADMIN — CEFAZOLIN 1 G: 1 INJECTION, POWDER, FOR SOLUTION INTRAMUSCULAR; INTRAVENOUS at 18:02

## 2017-06-19 RX ADMIN — KETAMINE HYDROCHLORIDE 50 MG: 10 INJECTION, SOLUTION INTRAMUSCULAR; INTRAVENOUS at 16:13

## 2017-06-19 ASSESSMENT — LIFESTYLE VARIABLES: TOBACCO_USE: 0

## 2017-06-19 ASSESSMENT — ENCOUNTER SYMPTOMS
SEIZURES: 1
DYSRHYTHMIAS: 0
STRIDOR: 0

## 2017-06-19 ASSESSMENT — COPD QUESTIONNAIRES: COPD: 0

## 2017-06-19 NOTE — IP AVS SNAPSHOT
MRN:[id number]                      After Visit Summary   6/19/2017    Damien Vallecillo    MRN: [id number]           Thank you!     Thank you for choosing [facility] for your care. Our goal is always to provide you with excellent care. Hearing back from our patients is one way we can continue to improve our services. Please take a few minutes to complete the written survey that you may receive in the mail after you visit. If you would like to speak to someone directly about your visit please contact Patient Relations at [phone]. Thank you!          Patient Information     Date Of Birth          1935        Designated Caregiver       Most Recent Value    Caregiver    Will someone help with your care after discharge? 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  For non-urgent questions about your medical care, please call your primary care provider or clinic, [phone]  For questions related to your surgery, please call your surgery clinic        Attending Provider     Provider Specialty    Alec Raymond MD Orthopedics       Primary Care Provider Office Phone # Fax #    Obi Strange [phone]      After Care Instructions     Activity       Your activity upon discharge: minimal heel weight bearing left foot in cast boot.            Activity - Up ad dillan           Activity - Up with assistive device           Additional Discharge Instructions       IT IS OK FOR THE CARE CENTER TO CHANGE YOUR DRESSING ON YOUR FOOT as instructed  below, to start 6/23.            Fall precautions           General info for SNF       Length of Stay Estimate: Short Term Care: Estimated # of Days <30  Condition at Discharge: Improving  Level of care:skilled   Rehabilitation Potential: Excellent  Admission H&P remains valid and up-to-date: Yes  Recent Chemotherapy: N/A  Use Nursing Home Standing Orders: Yes            Mantoux instructions       Give two-step Mantoux (PPD) Per Facility Policy Yes            Weight bearing status       WBAT            Wound care       Site:   R knee, Left foot  Instructions:  Daily deressing changes until wound healed            Wound care and dressings       Instructions to care for your wound at home:    Left foot:  Keep foot and foot dressing dry and intact. Will change at first clinic visit. Bag up if showering. .                  Follow-up Appointments     Follow Up and recommended labs and tests       Follow up with Dr. Raymond/ Julio in 10-14 days . ( Friday afternoons both in same [facility] clinic)            Follow Up and recommended labs and tests       YOUR Podiatry FOLLOW UP appointment has been changed to 6/30 2:45pm, as indicated below.            Follow-up and recommended labs and tests        Follow up with Dr. Kim on 6/23/2017 at 1:15pm at [facility]. For questions or concerns with the foot surgery, call:  [phone]                  Your next 10 appointments already scheduled     Jun 29, 2017 10:20 AM CDT   Return Visit with Thomas Govea PA-C   [facility] ([facility])    [address]  [address]  [place] [facility] [address]   [phone]            Jun 30, 2017  2:45 PM CDT   Return Visit with Beverly Kim DPM, Podiatry/Foot and Ankle Surgery   [facility] ([facility])    [address]  [address]  [place] [address]   [phone]              Additional Services     Occupational Therapy Adult  Consult       Evaluate and treat as clinically indicated.    Reason:  TKA            Physical Therapy Adult Consult       Evaluate and treat as clinically indicated.    Reason:  TKA                  Further instructions from your care team       POST OP LOOT FOOT CARE:  Every Other Day Dressing changes to include 4x4 gauze, wrap with Kerlix and then follow with Ace wrap.   Call Dr. Kim office,  [phone] for question or concerns     POST OP TOTAL KNEE INSTRUCTION  Pain medication: You will be discharged from the hospital with pain medication(s). In most cases, consistent use the pain pills can be limited to a few days. After this period, the medication should be weaned off as soon as possible to avoid potential complications of constipation, nausea, or rash, etc. Until you can be completely off the pain pills, using them only at nighttime along with controlling the pain with over-the-counter medication(s) such as Ibuprofen/Naproxen and/or Tylenol during the day would be a good way of managing the pain.    A medication for blood clot prevention will be specified when you are discharged. If you were taking Coumadin before the surgery, you will resume it.    Activity: You will be using a walker or crutches until you feel confident. You also need to use the knee immobilizer until you are able to straight leg raise 10-15 times. Gradual rather than sudden increase of walking distance is recommended as the comfort level dictates. A cane instead of walker or crutches can be used when you strength and balance are improved.    Dressing: Typically, the first dressing change will be done on postop day #2. Showers can be taken with plastic covering over the original post surgical dressing for the first 4 days from the surgery. At that point, the incision site can be wet for showering but should not be soaked. A light gauze dressing along with paper tape should be placed over the wound after each shower. The staples will be  removed 10-14 days from the operation. During that time it is best to cover the incision site to protect the staples from being pulled or snagged.     If you have an aquacell dressing, (flesh colored rubber like bandage), you may leave this dressing in place until follow up in the clinic, unless; the dressing becomes completely saturated, is leaking, gets water inside as evident by moisture appearing on the inside of the dressing, or you have a skin reaction.  In this case you should remove and use dressings like what is mentioned above.    Exercises: It is recommended that you do the exercises of range of motion and strengthening in a small amounts frequently throughout the day rather than infrequent long sessions. Being over aggressive with her exercises can be hindering rather than helping. We are looking for a gradual steady improvement even though the general goal for range of motion post-operatively is 0-90 degrees within the first 2 weeks. During this time, gaining full Extension is far more important than gaining flexion. Generally, you be working with a physical therapist 2-3 times per week for the first 2-4 weeks.    5 keys to the knee,- to be done every 2 hours during the day:  1. Knee Bending: Dangle- in a seated position where foot can swing, let the leg from the knee down dangle off the edge of bed or table. You may use the other leg/foot to gently push the affected leg into more bending.  2. Knee straightening: Heel blocking - While sitting or laying, place pillow, ottoman or some other support under the heel with toes pointing up but ankle relaxed.  Rest there, working up to 10 minutes relaxing the leg to induce knee straightening.  Once relaxed, push/squeeze the knee towards the floor, hold for 2 seconds, and relax. Repeat x 10.    NOTE:  Alternate 1 and 2 every other hour.  3. Straight leg raises: from a laying or sitting position, while keeping the knee locked straight, slowly lift the foot up  "12-16 inches and hold for 2 seconds and relax.  Repeat working up to 20 repetitions.    4. Ice and elevate: whenever you are not up and about.  No standing around or sitting with the knee bend for more than very short periods.  5. Walk:  Use assistive devices as needed such as walker or cane to promote safety.  Walk focusing more on good form than getting somewhere.  Lead with the knee and then straighten the knee trying to make the surgical leg move like normal knee.    Soft tissue: It is not unusual to have swelling in the leg (thigh down to the foot) up to 2 months from the surgery. Frequent ankle pumping, elevation of the leg above the heart level and gentle compression support with ace wrap should help with swelling. Icing regularly, for 20 minutes every hour, will also help with swelling and pain.  Due to soft tissue swelling, increased amount of redness around the incision site is also commonly seen. Progressively worsening redness along with increasing pain or increasing drainage from the wound should be a reason to alert us immediately.     Follow up in clinic within 14 days after surgery.  May call [phone] to schedule.    Thomas Govea PA-C  [facility] - Surgery  [facility]  [address] [place]  [address]  [phone]          Pending Results     No orders found from 6/17/2017 to 6/20/2017.            Statement of Approval     Ordered          06/22/17 1216  I have reviewed and agree with all the recommendations and orders detailed in this document.  EFFECTIVE NOW     Approved and electronically signed by:  Alec Raymond MD             Admission Information     Date & Time Provider Department Dept. Phone    6/19/2017 Alec Raymond MD [facility] [phone]      Your Vitals Were     Blood Pressure Pulse Temperature Respirations Height Weight    131/70 75 98  F (36.7  C) (Oral) 16 1.778 m (5' 10\") 120.7 kg (266 lb) " "   Pulse Oximetry BMI (Body Mass Index)                96% 38.17 kg/m2          [facility] Information     [employer] lets you send messages to your doctor, view your test results, renew your prescriptions, schedule appointments and more. To sign up, go to [email or web address] . Click on \"Log in\" on the left side of the screen, which will take you to the Welcome page. Then click on \"Sign up Now\" on the right side of the page.     You will be asked to enter the access code listed below, as well as some personal information. Please follow the directions to create your username and password.     Your access code is: [email or web address]  Expires: 2017  9:15 AM     Your access code will  in 90 days. If you need help or a new code, please call your [place] clinic or [phone].        Care EveryWhere ID     This is your Care EveryWhere ID. This could be used by other organizations to access your [place] medical records  [id number]        Equal Access to Services     TWIN MART : Hadwilver sebastiano Sosharri, waaxda luqadaha, qaybta kaalmada adeoscar, corin blank . So [facility] [phone].    ATENCIÓN: Si habla español, tiene a dumont disposición servicios gratuitos de asistencia lingüística. Llame al [phone].    We comply with applicable federal civil rights laws and [place] laws. We do not discriminate on the basis of race, color, national origin, age, disability sex, sexual orientation or gender identity.               Review of your medicines      START taking        Dose / Directions    HYDROmorphone 2 MG tablet   Commonly known as:  DILAUDID        Dose:  2-4 mg   Take 1-2 tablets (2-4 mg) by mouth every 3 hours as needed for moderate to severe pain   Refills:  0       order for DME        Equipment being ordered: Walker Wheels () and Walker () Treatment Diagnosis: Impaired functional mobility   Quantity:  1 each   Refills:  0         CONTINUE these medicines which may " have CHANGED, or have new prescriptions. If we are uncertain of the size of tablets/capsules you have at home, strength may be listed as something that might have changed.        Dose / Directions    aspirin  MG EC tablet   This may have changed:  medication strength        Dose:  325 mg   Take 1 tablet (325 mg) by mouth daily   Quantity:  30 tablet   Refills:  0         CONTINUE these medicines which have NOT CHANGED        Dose / Directions    ACE NOT PRESCRIBED (INTENTIONAL)   Used for:  Type 2 diabetes, HbA1c goal < 7% (H)        ACE Inhibitor not prescribed due to Other:  Had cough on ACEI in past   Quantity:  0 each   Refills:  0       atorvastatin 40 MG tablet   Commonly known as:  LIPITOR   Used for:  Pre-diabetes   Notes to Patient:  Resume per home schedule        Dose:  40 mg   Take 1 tablet (40 mg) by mouth At Bedtime   Quantity:  90 tablet   Refills:  3       * blood glucose monitoring test strip   Commonly known as:  ACCU-CHEK LAURA   Used for:  Glucose intolerance (impaired glucose tolerance)        1Use to test blood sugar 2 times daily or as directed.   Quantity:  100 strip   Refills:  3       * blood glucose monitoring test strip   Commonly known as:  ACCU-CHEK LAURA PLUS   Used for:  Glucose intolerance (impaired glucose tolerance)        Use to test blood sugar 1 time daily   Quantity:  100 each   Refills:  11       CINNAMON PO        Refills:  0       fish oil-omega-3 fatty acids 1000 MG capsule   Notes to Patient:  Resume per home schedule        Dose:  1 g   Take 1 g by mouth every evening   Refills:  0       flax seed oil 1000 MG capsule   Notes to Patient:  Resume per home schedule        Dose:  1 capsule   Take 1 capsule by mouth every evening   Refills:  0       fluticasone 50 MCG/ACT spray   Commonly known as:  FLONASE   Used for:  Post-nasal drip   Notes to Patient:  Resume per home schedule        Dose:  1-2 spray   Spray 1-2 sprays into both nostrils daily   Quantity:  1 Bottle    Refills:  11       Glucosamine HCl 1000 MG Tabs   Notes to Patient:  Resume per home schedule        Dose:  1 tablet   Take 1 tablet by mouth every evening   Refills:  0       guaiFENesin 600 MG 12 hr tablet   Commonly known as:  MUCINEX   Used for:  Abnormal pulmonary function test, Cough   Notes to Patient:  Resume per home schedule        Dose:  1200 mg   Take 2 tablets (1,200 mg) by mouth 2 times daily   Quantity:  120 tablet   Refills:  11       MULTIVITAMIN ADULT PO   Notes to Patient:  Resume per home schedule        Reported on 5/12/2017   Refills:  0       tamsulosin 0.4 MG capsule   Commonly known as:  FLOMAX   Used for:  Benign prostatic hyperplasia with lower urinary tract symptoms, unspecified morphology        Dose:  0.4 mg   Take 1 capsule (0.4 mg) by mouth At Bedtime   Quantity:  90 capsule   Refills:  3       triamcinolone 0.5 % cream   Commonly known as:  KENALOG   Used for:  Stasis dermatitis of both legs   Notes to Patient:  Resume per home schedule        Apply sparingly to affected area three times daily.   Quantity:  30 g   Refills:  1       * Notice:  This list has 2 medication(s) that are the same as other medications prescribed for you. Read the directions carefully, and ask your doctor or other care provider to review them with you.         Where to get your medicines      Some of these will need a paper prescription and others can be bought over the counter. Ask your nurse if you have questions.     Bring a paper prescription for each of these medications     order for DME       You don't need a prescription for these medications     aspirin  MG EC tablet         Information about where to get these medications is not yet available     ! Ask your nurse or doctor about these medications     HYDROmorphone 2 MG tablet                Protect others around you: Learn how to safely use, store and throw away your medicines at www.disposemymeds.org.             Medication List: This is a  list of all your medications and when to take them. Check marks below indicate your daily home schedule. Keep this list as a reference.      Medications           Morning Afternoon Evening Bedtime As Needed    ACE NOT PRESCRIBED (INTENTIONAL)   ACE Inhibitor not prescribed due to Other:  Had cough on ACEI in past                                aspirin  MG EC tablet   Take 1 tablet (325 mg) by mouth daily   Next Dose Due:  Start tomorrow                                  atorvastatin 40 MG tablet   Commonly known as:  LIPITOR   Take 1 tablet (40 mg) by mouth At Bedtime   Last time this was given:  40 mg on 6/21/2017  7:54 PM   Notes to Patient:  Resume per home schedule                                * blood glucose monitoring test strip   Commonly known as:  ACCU-CHEK LAURA   1Use to test blood sugar 2 times daily or as directed.                                * blood glucose monitoring test strip   Commonly known as:  ACCU-CHEK LAURA PLUS   Use to test blood sugar 1 time daily                                CINNAMON PO                                fish oil-omega-3 fatty acids 1000 MG capsule   Take 1 g by mouth every evening   Notes to Patient:  Resume per home schedule                                flax seed oil 1000 MG capsule   Take 1 capsule by mouth every evening   Notes to Patient:  Resume per home schedule                                fluticasone 50 MCG/ACT spray   Commonly known as:  FLONASE   Spray 1-2 sprays into both nostrils daily   Notes to Patient:  Resume per home schedule                                Glucosamine HCl 1000 MG Tabs   Take 1 tablet by mouth every evening   Notes to Patient:  Resume per home schedule                                guaiFENesin 600 MG 12 hr tablet   Commonly known as:  MUCINEX   Take 2 tablets (1,200 mg) by mouth 2 times daily   Notes to Patient:  Resume per home schedule                                      HYDROmorphone 2 MG tablet   Commonly known as:   DILAUDID   Take 1-2 tablets (2-4 mg) by mouth every 3 hours as needed for moderate to severe pain   Last time this was given:  2 mg on 6/21/2017 12:49 PM   Next Dose Due:  Anytime                                  MULTIVITAMIN ADULT PO   Reported on 5/12/2017   Notes to Patient:  Resume per home schedule                                order for DME   Equipment being ordered: Walker Wheels () and Walker () Treatment Diagnosis: Impaired functional mobility                                tamsulosin 0.4 MG capsule   Commonly known as:  FLOMAX   Take 1 capsule (0.4 mg) by mouth At Bedtime   Last time this was given:  0.4 mg on 6/21/2017  7:54 PM   Next Dose Due:  Tonight                                     triamcinolone 0.5 % cream   Commonly known as:  KENALOG   Apply sparingly to affected area three times daily.   Notes to Patient:  Resume per home schedule                                * Notice:  This list has 2 medication(s) that are the same as other medications prescribed for you. Read the directions carefully, and ask your doctor or other care provider to review them with you.

## 2017-06-19 NOTE — IP AVS SNAPSHOT
"          [facility] ORTHO SPINE: [phone]                                              INTERAGENCY TRANSFER FORM - PHYSICIAN ORDERS   2017                    Hospital Admission Date: 2017  HORTENSIA ADAMS   : 1935  Sex: Male        Attending Provider: Alec Raymond MD     Allergies:  Levetiracetam [Keppra], Oxcarbazepine [Trileptal]    Infection:  None   Service:  SURGERY    Ht:  1.778 m (5' 10\")   Wt:  120.7 kg (266 lb)   Admission Wt:  121.5 kg (267 lb 14.4 oz)    BMI:  38.17 kg/m 2   BSA:  2.44 m 2            Patient PCP Information     Provider PCP Type    Obi Strange MD General      ED Clinical Impression     Diagnosis Description Comment Added By Time Added    Status post total right knee replacement [Z96.651] Status post total right knee replacement [Z96.651]  [place], PT 2017 12:05 PM      Hospital Problems as of 2017              Priority Class Noted POA    S/P total knee arthroplasty Medium  2017 Yes      Non-Hospital Problems as of 2017              Priority Class Noted    Effusion of lower leg joint   2003    Gout   Unknown    Thyroid nodule   2010    CARDIOVASCULAR SCREENING; LDL GOAL LESS THAN 100   10/31/2010    Advanced directives, counseling/discussion   3/14/2012    Glucose intolerance (impaired glucose tolerance)   2013    Cerebral infarction (H)   2014    Lower GI bleed   2015    GI bleed   2015    Obesity Medium  2015    Pre-diabetes Medium  10/21/2016    Morbid obesity due to excess calories (H) Medium  10/21/2016    Hammer toe of left foot Medium  10/21/2016    Venous stasis dermatitis of both lower extremities Medium  10/21/2016    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)   10/21/2016    RBC microcytosis Medium  2017    Right knee pain, unspecified chronicity Medium  2017    Left knee pain, unspecified chronicity Medium  2017      Code Status History     Date " Active Date Inactive Code Status Order ID Comments User Context    8/10/2015  8:41 AM 6/19/2017  8:08 PM Full Code [id number]  Jerrica Bates PA-C Outpatient    7/30/2015  4:51 PM 8/10/2015  8:41 AM Full Code [id number]  Enrique Salazar MD Inpatient    7/1/2015  9:27 AM 7/30/2015  4:51 PM Full Code [id number]  Faviola Rosen PA Outpatient    6/30/2015 10:35 PM 7/1/2015  9:27 AM Full Code [id number]  Kiko Rhodes DO ED         Medication Review      START taking        Dose / Directions Comments    HYDROmorphone 2 MG tablet   Commonly known as:  DILAUDID        Dose:  2-4 mg   Take 1-2 tablets (2-4 mg) by mouth every 3 hours as needed for moderate to severe pain   Refills:  0        order for DME        Equipment being ordered: Walker Wheels () and Walker () Treatment Diagnosis: Impaired functional mobility   Quantity:  1 each   Refills:  0          CONTINUE these medications which may have CHANGED, or have new prescriptions. If we are uncertain of the size of tablets/capsules you have at home, strength may be listed as something that might have changed.        Dose / Directions Comments    aspirin  MG EC tablet   This may have changed:  medication strength        Dose:  325 mg   Take 1 tablet (325 mg) by mouth daily   Quantity:  30 tablet   Refills:  0          CONTINUE these medications which have NOT CHANGED        Dose / Directions Comments    ACE NOT PRESCRIBED (INTENTIONAL)   Used for:  Type 2 diabetes, HbA1c goal < 7% (H)        ACE Inhibitor not prescribed due to Other:  Had cough on ACEI in past   Quantity:  0 each   Refills:  0        atorvastatin 40 MG tablet   Commonly known as:  LIPITOR   Used for:  Pre-diabetes   Notes to Patient:  Resume per home schedule        Dose:  40 mg   Take 1 tablet (40 mg) by mouth At Bedtime   Quantity:  90 tablet   Refills:  3        * blood glucose monitoring test strip   Commonly known as:  ACCU-CHEK LAURA   Used for:  Glucose intolerance  (impaired glucose tolerance)        1Use to test blood sugar 2 times daily or as directed.   Quantity:  100 strip   Refills:  3        * blood glucose monitoring test strip   Commonly known as:  ACCU-CHEK LAURA PLUS   Used for:  Glucose intolerance (impaired glucose tolerance)        Use to test blood sugar 1 time daily   Quantity:  100 each   Refills:  11        CINNAMON PO        Refills:  0        fish oil-omega-3 fatty acids 1000 MG capsule   Notes to Patient:  Resume per home schedule        Dose:  1 g   Take 1 g by mouth every evening   Refills:  0        flax seed oil 1000 MG capsule   Notes to Patient:  Resume per home schedule        Dose:  1 capsule   Take 1 capsule by mouth every evening   Refills:  0        fluticasone 50 MCG/ACT spray   Commonly known as:  FLONASE   Used for:  Post-nasal drip   Notes to Patient:  Resume per home schedule        Dose:  1-2 spray   Spray 1-2 sprays into both nostrils daily   Quantity:  1 Bottle   Refills:  11        Glucosamine HCl 1000 MG Tabs   Notes to Patient:  Resume per home schedule        Dose:  1 tablet   Take 1 tablet by mouth every evening   Refills:  0        guaiFENesin 600 MG 12 hr tablet   Commonly known as:  MUCINEX   Used for:  Abnormal pulmonary function test, Cough   Notes to Patient:  Resume per home schedule        Dose:  1200 mg   Take 2 tablets (1,200 mg) by mouth 2 times daily   Quantity:  120 tablet   Refills:  11        MULTIVITAMIN ADULT PO   Notes to Patient:  Resume per home schedule        Reported on 5/12/2017   Refills:  0        tamsulosin 0.4 MG capsule   Commonly known as:  FLOMAX   Used for:  Benign prostatic hyperplasia with lower urinary tract symptoms, unspecified morphology        Dose:  0.4 mg   Take 1 capsule (0.4 mg) by mouth At Bedtime   Quantity:  90 capsule   Refills:  3        triamcinolone 0.5 % cream   Commonly known as:  KENALOG   Used for:  Stasis dermatitis of both legs   Notes to Patient:  Resume per home schedule         Apply sparingly to affected area three times daily.   Quantity:  30 g   Refills:  1        * Notice:  This list has 2 medication(s) that are the same as other medications prescribed for you. Read the directions carefully, and ask your doctor or other care provider to review them with you.              Further instructions from your care team       POST OP LOOT FOOT CARE:  Every Other Day Dressing changes to include 4x4 gauze, wrap with Kerlix and then follow with Ace wrap.   Call Dr. Kim office,  [phone] for question or concerns     POST OP TOTAL KNEE INSTRUCTION  Pain medication: You will be discharged from the hospital with pain medication(s). In most cases, consistent use the pain pills can be limited to a few days. After this period, the medication should be weaned off as soon as possible to avoid potential complications of constipation, nausea, or rash, etc. Until you can be completely off the pain pills, using them only at nighttime along with controlling the pain with over-the-counter medication(s) such as Ibuprofen/Naproxen and/or Tylenol during the day would be a good way of managing the pain.    A medication for blood clot prevention will be specified when you are discharged. If you were taking Coumadin before the surgery, you will resume it.    Activity: You will be using a walker or crutches until you feel confident. You also need to use the knee immobilizer until you are able to straight leg raise 10-15 times. Gradual rather than sudden increase of walking distance is recommended as the comfort level dictates. A cane instead of walker or crutches can be used when you strength and balance are improved.    Dressing: Typically, the first dressing change will be done on postop day #2. Showers can be taken with plastic covering over the original post surgical dressing for the first 4 days from the surgery. At that point, the incision site can be wet for showering but should not be soaked. A light  gauze dressing along with paper tape should be placed over the wound after each shower. The staples will be removed 10-14 days from the operation. During that time it is best to cover the incision site to protect the staples from being pulled or snagged.     If you have an aquacell dressing, (flesh colored rubber like bandage), you may leave this dressing in place until follow up in the clinic, unless; the dressing becomes completely saturated, is leaking, gets water inside as evident by moisture appearing on the inside of the dressing, or you have a skin reaction.  In this case you should remove and use dressings like what is mentioned above.    Exercises: It is recommended that you do the exercises of range of motion and strengthening in a small amounts frequently throughout the day rather than infrequent long sessions. Being over aggressive with her exercises can be hindering rather than helping. We are looking for a gradual steady improvement even though the general goal for range of motion post-operatively is 0-90 degrees within the first 2 weeks. During this time, gaining full Extension is far more important than gaining flexion. Generally, you be working with a physical therapist 2-3 times per week for the first 2-4 weeks.    5 keys to the knee,- to be done every 2 hours during the day:  1. Knee Bending: Dangle- in a seated position where foot can swing, let the leg from the knee down dangle off the edge of bed or table. You may use the other leg/foot to gently push the affected leg into more bending.  2. Knee straightening: Heel blocking - While sitting or laying, place pillow, ottoman or some other support under the heel with toes pointing up but ankle relaxed.  Rest there, working up to 10 minutes relaxing the leg to induce knee straightening.  Once relaxed, push/squeeze the knee towards the floor, hold for 2 seconds, and relax. Repeat x 10.    NOTE:  Alternate 1 and 2 every other hour.  3. Straight leg  raises: from a laying or sitting position, while keeping the knee locked straight, slowly lift the foot up 12-16 inches and hold for 2 seconds and relax.  Repeat working up to 20 repetitions.    4. Ice and elevate: whenever you are not up and about.  No standing around or sitting with the knee bend for more than very short periods.  5. Walk:  Use assistive devices as needed such as walker or cane to promote safety.  Walk focusing more on good form than getting somewhere.  Lead with the knee and then straighten the knee trying to make the surgical leg move like normal knee.    Soft tissue: It is not unusual to have swelling in the leg (thigh down to the foot) up to 2 months from the surgery. Frequent ankle pumping, elevation of the leg above the heart level and gentle compression support with ace wrap should help with swelling. Icing regularly, for 20 minutes every hour, will also help with swelling and pain.  Due to soft tissue swelling, increased amount of redness around the incision site is also commonly seen. Progressively worsening redness along with increasing pain or increasing drainage from the wound should be a reason to alert us immediately.     Follow up in clinic within 14 days after surgery.  May call [phone] to schedule.    Thomas Govea PA-C  [facility] - Surgery  [facility]  [address] [place]  [address]  [phone]          Summary of Visit     Reason for your hospital stay       Total knee arthroplasty and hammer toe correction, uneventful             After Care     Activity       Your activity upon discharge: minimal heel weight bearing left foot in cast boot.       Activity - Up ad dillan           Activity - Up with assistive device           Additional Discharge Instructions       IT IS OK FOR THE CARE CENTER TO CHANGE YOUR DRESSING ON YOUR FOOT as instructed below, to start 6/23.       Fall precautions           General info for SNF        Length of Stay Estimate: Short Term Care: Estimated # of Days <30  Condition at Discharge: Improving  Level of care:skilled   Rehabilitation Potential: Excellent  Admission H&P remains valid and up-to-date: Yes  Recent Chemotherapy: N/A  Use Nursing Home Standing Orders: Yes       Mantoux instructions       Give two-step Mantoux (PPD) Per Facility Policy Yes       Weight bearing status       WBAT       Wound care       Site:   R knee, Left foot  Instructions:  Daily deressing changes until wound healed       Wound care and dressings       Instructions to care for your wound at home:    Left foot:  Keep foot and foot dressing dry and intact. Will change at first clinic visit. Bag up if showering. .             Referrals     Occupational Therapy Adult Consult       Evaluate and treat as clinically indicated.    Reason:  TKA       Physical Therapy Adult Consult       Evaluate and treat as clinically indicated.    Reason:  TKA             Your next 10 appointments already scheduled     Jun 29, 2017 10:20 AM CDT   Return Visit with Thomas Govea PA-C   [facility] ORTHOPEDIC SURGERY ([facility])    [id number] [place] [id number]   [phone]            Jun 30, 2017  2:45 PM CDT   Return Visit with Beverly Kim DPM, Podiatry/Foot and Ankle Surgery   [facility] PODIATRY ([facility])    [address]  [address]  [facility] [address]   [phone]              Follow-Up Appointment Instructions     Future Labs/Procedures    Follow Up and recommended labs and tests     Comments:    Follow up with Dr. Raymond/ Julio in 10-14 days . ( Friday afternoons both in same [place] clinic)    Follow Up and recommended labs and tests     Comments:    YOUR Podiatry FOLLOW UP appointment has been changed to 6/30 2:45pm, as indicated below.    Follow-up and recommended labs and tests      Comments:    Follow up with Dr. Kim on 6/23/2017 at  1:15pm at [facility]. For questions or concerns with the foot surgery, call:  [phone]      Follow-Up Appointment Instructions     Follow Up and recommended labs and tests       Follow up with Dr. Raymond/ Julio in 10-14 days . ( Friday afternoons both in same [facility])       Follow Up and recommended labs and tests       YOUR Podiatry FOLLOW UP appointment has been changed to 6/30 2:45pm, as indicated below.       Follow-up and recommended labs and tests        Follow up with Dr. Kim on 6/23/2017 at 1:15pm at [facility]. For questions or concerns with the foot surgery, call:  [phone]             Statement of Approval     Ordered          06/22/17 1216  I have reviewed and agree with all the recommendations and orders detailed in this document.  EFFECTIVE NOW     Approved and electronically signed by:  Alec Raymond MD

## 2017-06-19 NOTE — LETTER
Transition Communication Hand-off for Care Transitions to Next Level of Care Provider    Name: Damien Vallecillo  MRN #: [id number]  Primary Care Provider: Obi Strange  Primary Care MD Name: Alexandr   Primary Clinic: [facility] [address] [facility] [address]  Primary Care Clinic Name: [facility]  Reason for Hospitalization:  right knee pain, left foot hammertoe  S/P total knee arthroplasty  Admit Date/Time: 6/19/2017 12:44 PM  Discharge Date: 6/22/2017   Payor Source: Payor: MEDICARE / Plan: MEDICARE / Product Type: Medicare /       Pt is S/P Right total knee arthroplasty, Hammer toe repair toe 2,3,4,5 left foot with osteotomy  , 6/19/2017, Dr. Alec Raymond.  Discharging to TCU, [place]   Discharge follow up plan to include Ortho Dr. Raymond in 10 -14 days   Follow up with Dr. Kim on 6/30/2017 at 2:45 pm at [facility].   For questions or concerns with the foot surgery, call:  [phone]   DVT Prophylaxis   aspirin  MG EC tablet   This may have changed: medication strength        Dose: 325 mg   Take 1 tablet (325 mg) by mouth daily   Quantity: 30 tablet   Refills: 0                Pt.'s  PCP is Obi Strange    Internal Medicine service was asked to see pt. post operatively for management of chronic medical problems.  No post operative acute medical concerns identified for PCP follow up on discharge.     FYI/TCU handoff indicated.    Vannesa Dhaliwal RN BSN CTS  Care Transitions Team  [phone]      AVS/Discharge Summary is the source of truth; this is a helpful guide for improved communication of patient story

## 2017-06-19 NOTE — ANESTHESIA PROCEDURE NOTES
Peripheral nerve/Neuraxial procedure note : Femoral (adductor canal)  Pre-Procedure  Performed by YOLI RANKIN  Referred by NAHID  Location: pre-op    Procedure Times:6/19/2017 3:46 PM and 6/19/2017 3:56 PM  Pre-Anesthestic Checklist: patient identified, IV checked, site marked, risks and benefits discussed, informed consent, monitors and equipment checked, pre-op evaluation, at physician/surgeon's request and post-op pain management    Timeout  Correct Patient: Yes   Correct Procedure: Yes   Correct Site: Yes   Correct Laterality: Yes   Correct Position: Yes   Site Marked: Yes   .   Procedure Documentation    .    Procedure:    Femoral (adductor canal).  Local skin infiltrated with mL of 1% lidocaine.     Ultrasound used to identify targeted nerve, plexus, or vascular marker and placed a needle adjacent to it., Ultrasound was used to visualize the spread of the anesthetic in close proximity to the above stated nerve.   Patient Prep;mask, sterile gloves, povidone-iodine 7.5% surgical scrub.  .  Needle: insulated (22 G. 3.13 in). .  Spinal Needle: . . Insertion Method: Single Shot (incrementally).     Assessment/Narrative  Paresthesias: No.  .  The placement was negative for: blood aspirated, painful injection and site bleeding.  Bolus given via..   Secured via.   Complications: none. Comments:  The surgeon has given a verbal order transferring care of this patient to me for the performance of a regional analgesia block for post-op pain control. It is requested of me because I am uniquely trained and qualified to perform this block and the surgeon is neither trained nor qualified to perform this procedure.

## 2017-06-19 NOTE — IP AVS SNAPSHOT
` [facility] ORTHO SPINE: [phone]            Medication Administration Report for Damien Vallecillo as of 06/22/17 9775   Legend:    Given Hold Not Given Due Canceled Entry Other Actions    Time Time (Time) Time  Time-Action       Inactive    Active    Linked        Medications 06/16/17 06/17/17 06/18/17 06/19/17 06/20/17 06/21/17 06/22/17    acetaminophen (TYLENOL) tablet 650 mg  Dose: 650 mg Freq: EVERY 4 HOURS PRN Route: PO  PRN Reason: other  PRN Comment: surgical pain  Start: 06/22/17 0000   Admin Instructions: May give first dose 4 hours after last scheduled dose of acetaminophen.  Maximum acetaminophen dose from all sources = 75 mg/kg/day not to exceed 4 grams/day.           1437 (650 mg)-Given           atorvastatin (LIPITOR) tablet 40 mg  Dose: 40 mg Freq: AT BEDTIME Route: PO  Start: 06/19/17 2200       2104 (40 mg)-Given        2113 (40 mg)-Given        1954 (40 mg)-Given               [ ] 2200           benzocaine-menthol (CHLORASEPTIC) 6-10 MG lozenge 1-2 lozenge  Dose: 1-2 lozenge Freq: EVERY 1 HOUR PRN Route: BU  PRN Reason: sore throat  PRN Comment: sore throat without fever  Start: 06/19/17 2007              cyclobenzaprine (FLEXERIL) tablet 5 mg  Dose: 5 mg Freq: 3 TIMES DAILY PRN Route: PO  PRN Reason: muscle spasms  Start: 06/19/17 2007   Admin Instructions: Caution to be used when administering multiple CNS depressing meds within a short time frame.               enoxaparin (LOVENOX) injection 40 mg  Dose: 40 mg Freq: EVERY 24 HOURS Route: SC  Start: 06/20/17 1100   Admin Instructions: Check to make sure start date/time is 12-24 hours post op unless documented complication, AND no sooner than 22 hours post op if spinal anesthesia used.   Continue until discharge to home. HOLD if platelet count falls below 50% of baseline or less than 100,000/ L and notify provider.         1034 (40 mg)-Given        1055 (40 mg)-Given        1144 (40 mg)-Given           fluticasone (FLONASE) 50  "MCG/ACT spray 1-2 spray  Dose: 1-2 spray Freq: DAILY Route: BOTH NOSTRIL  Start: 06/20/17 0800        (0732)-Not Given [C]        (0730)-Not Given        (0836)-Not Given           HYDROmorphone (DILAUDID) tablet 2-4 mg  Dose: 2-4 mg Freq: EVERY 3 HOURS PRN Route: PO  PRN Reason: moderate to severe pain  Start: 06/19/17 2007   Admin Instructions: IF CrCl is UNKNOWN start at lowest end of dosing range. Hold while on PCA or with regular IV opioid dosing.         0507 (2 mg)-Given       1809 (2 mg)-Given       2018 (2 mg)-Given        0422 (2 mg)-Given       0845 (2 mg)-Given       1249 (2 mg)-Given            HYDROmorphone (PF) (DILAUDID) injection 0.5-1 mg  Dose: 0.5-1 mg Freq: EVERY 2 HOURS PRN Route: IV  PRN Reason: severe pain  PRN Comment: or patient unable to take PO  Start: 06/19/17 2007   Admin Instructions: ONLY for Opioid TOLERANT patient. Hold while on PCA..               hydrOXYzine (ATARAX) tablet 10 mg  Dose: 10 mg Freq: EVERY 6 HOURS PRN Route: PO  PRN Reason: itching  Start: 06/19/17 2007   Admin Instructions: Caution to be used when administering multiple CNS depressing meds within a short time frame.               lidocaine (LMX4) kit  Freq: EVERY 1 HOUR PRN Route: Top  PRN Reason: pain  PRN Comment: with VAD insertion or accessing implanted port.  Start: 06/19/17 2007   Admin Instructions: Do NOT give if patient has a history of allergy to any local anesthetic or any \"elroy\" product.   Apply 30 minutes prior to VAD insertion or port access.  MAX Dose:  2.5 g (  of 5 g tube)               lidocaine (LMX4) kit  Freq: EVERY 1 HOUR PRN Route: Top  PRN Reason: pain  PRN Comment: with VAD insertion or accessing implanted port.  Start: 06/19/17 1324   Admin Instructions: Do NOT give if patient has a history of allergy to any local anesthetic or any \"elroy\" product.   Apply 30 minutes prior to VAD insertion or port access.  MAX Dose:  2.5 g (  of 5 g tube)               lidocaine 1 % 1 mL  Dose: 1 mL Freq: " "EVERY 1 HOUR PRN Route: OTHER  PRN Comment: mild pain with VAD insertion or accessing implanted port  Start: 06/19/17 2007   Admin Instructions: Do NOT give if patient has a history of allergy to any local anesthetic or any \"elroy\" product. MAX dose 1 mL subcutaneous OR intradermal in divided doses.               lidocaine 1 % 1 mL  Dose: 1 mL Freq: EVERY 1 HOUR PRN Route: OTHER  PRN Comment: mild pain with VAD insertion or accessing implanted port  Start: 06/19/17 1324   Admin Instructions: Do NOT give if patient has a history of allergy to any local anesthetic or any \"elroy\" product. MAX dose 1 mL subcutaneous OR intradermal in divided doses.               naloxone (NARCAN) injection 0.1-0.4 mg  Dose: 0.1-0.4 mg Freq: EVERY 2 MIN PRN Route: IV  PRN Reason: opioid reversal  Start: 06/19/17 2007   Admin Instructions: For respiratory rate LESS than or EQUAL to 8.  Partial reversal dose:  0.1 mg titrated q 2 minutes for Analgesia Side Effects Monitoring Sedation Level of 3 (frequently drowsy, arousable, drifts to sleep during conversation).Full reversal dose:  0.4 mg bolus for Analgesia Side Effects Monitoring Sedation Level of 4 (somnolent, minimal or no response to stimulation).               ondansetron (ZOFRAN-ODT) ODT tab 4 mg  Dose: 4 mg Freq: EVERY 6 HOURS PRN Route: PO  PRN Reason: nausea  Start: 06/19/17 2008   Admin Instructions: This is Step 1 of nausea and vomiting management.  If nausea not resolved in 15 minutes, go to Step 2 prochlorperazine (COMPAZINE). Do not push through foil backing. Peel back foil and gently remove. Place on tongue immediately. Administration with liquid unnecessary              Or  ondansetron (ZOFRAN) injection 4 mg  Dose: 4 mg Freq: EVERY 6 HOURS PRN Route: IV  PRN Reasons: nausea,vomiting  Start: 06/19/17 2008   Admin Instructions: This is Step 1 of nausea and vomiting management.  If nausea not resolved in 15 minutes, go to Step 2 prochlorperazine (COMPAZINE).  Irritant.       "         sodium chloride (PF) 0.9% PF flush 3 mL  Dose: 3 mL Freq: EVERY 8 HOURS Route: IK  Start: 06/19/17 2015   Admin Instructions: And Q1H PRN, to lock peripheral IV dormant line.        (2037)-Not Given        (0507)-Not Given       1223 (3 mL)-Given       2018 (3 mL)-Given        0402 (3 mL)-Given       1252 (3 mL)-Given       (2059)-Not Given        0407 (3 mL)-Given       1144 (3 mL)-Given       [ ] 2015           sodium chloride (PF) 0.9% PF flush 3 mL  Dose: 3 mL Freq: EVERY 1 HOUR PRN Route: IK  PRN Reason: line flush  PRN Comment: for peripheral IV flush post IV meds  Start: 06/19/17 2007              sodium chloride (PF) 0.9% PF flush 3 mL  Dose: 3 mL Freq: EVERY 1 HOUR PRN Route: IK  PRN Reason: line flush  PRN Comment: for peripheral IV flush post IV meds  Start: 06/19/17 1324              tamsulosin (FLOMAX) capsule 0.4 mg  Dose: 0.4 mg Freq: AT BEDTIME Route: PO  Start: 06/19/17 2200   Admin Instructions: Administer 30 minutes after the same meal each day.  Capsules should be swallowed whole; do not crush chew or open.        2104 (0.4 mg)-Given        2113 (0.4 mg)-Given        1954 (0.4 mg)-Given               [ ] 2200           zolpidem (AMBIEN) half-tab 2.5 mg  Dose: 2.5 mg Freq: AT BEDTIME PRN Route: PO  PRN Reason: sleep  Start: 06/20/17 2000   Admin Instructions: POD 1.  Do not give unless at least 6 hours of uninterrupted sleep is expected.              Completed Medications  Medications 06/16/17 06/17/17 06/18/17 06/19/17 06/20/17 06/21/17 06/22/17         Dose: 975 mg Freq: EVERY 8 HOURS Route: PO  Start: 06/19/17 2100   End: 06/22/17 1144   Admin Instructions: Do not use if patient has an active opioid/acetaminophen analgesic order for pain  Maximum acetaminophen dose from all sources = 75 mg/kg/day not to exceed 4 grams/day.        2104 (975 mg)-Given        0507 (975 mg)-Given       1226 (975 mg)-Given       2018 (975 mg)-Given        0357 (975 mg)-Given       1249 (975 mg)-Given        1953 (975 mg)-Given        0404 (975 mg)-Given       1144 (975 mg)-Given             Dose: 3 g Freq: PRE-OP/PRE-PROCEDURE Route: IV  Indications of Use: SURGICAL PROPHYLAXIS  Start: 06/19/17 1320   End: 06/19/17 1602   Admin Instructions: Give first dose within 1 hour PRIOR to incision.        1602 (3 g)-Given                Dose: 2 g Freq: EVERY 8 HOURS Route: IV  Indications of Use: SURGICAL PROPHYLAXIS  Start: 06/20/17 0200   End: 06/20/17 1104   Admin Instructions: First post-op dose due 8 hours after intra-op dose, see eMAR.           0134 (2 g)-Given       1034 (2 g)-Given               Dose: 10 mg Freq: ONCE PRN Route: IV  PRN Reason: high blood pressure  PRN Comment: for Systemic Blood Pressure greater than 160 and Heart Rate greater than 60 bpm.    Start: 06/19/17 1859   End: 06/19/17 1920   Admin Instructions: For PACU USE ONLY.  DC WHEN TRANSFERRED TO FLOOR.        1920 (10 mg)-Given             Discontinued Medications  Medications 06/16/17 06/17/17 06/18/17 06/19/17 06/20/17 06/21/17 06/22/17         Freq: PRN  Start: 06/19/17 1702   End: 06/19/17 2006       1702 (20 mL)-Given [C]       2006-Med Discontinued            Dose: 1 g Freq: SEE ADMIN INSTRUCTIONS Route: IV  Indications of Use: SURGICAL PROPHYLAXIS  Start: 06/19/17 1320   End: 06/20/17 1328   Admin Instructions: Give every 2 hours while patient in surgery, starting 2 hours after pre-op dose. DO NOT GIVE intra-op dose if CrCl less than 10 mL/min (on dialysis).  If CrCL less than 50 mL/min, double the time interval between doses.        1802 (1 g)-Given        1328-Med Discontinued           Dose: 25 mg Freq: ONCE PRN Route: IV  PRN Reason: sleep  PRN Comment: nausea  Start: 06/19/17 1859   End: 06/19/17 2006   Admin Instructions: Dilute in 10 ml 0.9% Sodium chloride.        2006-Med Discontinued            Dose: 25-50 mcg Freq: EVERY 2 MIN PRN Route: IV  PRN Reason: other  PRN Comment: acute pain  Start: 06/19/17 1859   End: 06/19/17 2006    Admin Instructions: MAX cumulative dose = 250 mcg.    Use Fentanyl initially, as a short acting agent for acute pain control.  If insufficient, or a longer acting agent is needed, begin Morphine or Hydromorphone if ordered.        1931 (50 mcg)-Given       1943 (50 mcg)-Given       2006-Med Discontinued            Dose: 2.5-5 mg Freq: EVERY 10 MIN PRN Route: IV  PRN Reason: high blood pressure  PRN Comment: for Systolic Blood Pressure greater than 160 and Heart Rate greater than 60 bpm.  Start: 06/19/17 1859   End: 06/19/17 2006   Admin Instructions: Max cumulative dose = 20 mg.  FOR USE IN PACU ONLY, DC WHEN TRANSFERRED TO FLOOR.        2006-Med Discontinued            Dose: 0.3-0.5 mg Freq: EVERY 5 MIN PRN Route: IV  PRN Reason: other  PRN Comment: acute pain.  May administer if Respiratory Rate is greater than 10  Start: 06/19/17 1859   End: 06/19/17 2006   Admin Instructions: If fentanyl is also ordered, use HYDROmorphone if pain control insufficient with fentanyl or a longer acting agent is needed.   Max cumulative dose = 2 mg        2006-Med Discontinued            Rate: 75 mL/hr Freq: CONTINUOUS Route: IV  Start: 06/19/17 2015   End: 06/22/17 1031   Admin Instructions: Change to saline lock when PO well tolerated.        2105 ( )-New Bag          1031-Med Discontinued         Rate: 100 mL/hr Freq: CONTINUOUS Route: IV  Start: 06/19/17 1900   End: 06/19/17 2006   Admin Instructions: Continue until IV catheter is weaned               2006-Med Discontinued            Rate: 25 mL/hr Freq: CONTINUOUS Route: IV  Start: 06/19/17 1330   End: 06/19/17 2033   Admin Instructions: IF patient NOT on dialysis.        1602 ( )-New Bag       1705 ( )-New Bag       1856 ( )-Anesthesia Volume Adjustment       2033-Med Discontinued            Dose: 4 mg Freq: EVERY 30 MIN PRN Route: PO  PRN Reason: nausea  Start: 06/19/17 1859   End: 06/19/17 2006   Admin Instructions: MAX total dose = 8 mg, including OR dosing. If not  resolved in 15 minutes, then go to step 2 (Prochlorperazine if ordered).        2006-Med Discontinued         Or    Dose: 4 mg Freq: EVERY 30 MIN PRN Route: IV  PRN Reason: nausea  Start: 06/19/17 1859   End: 06/19/17 2006   Admin Instructions: MAX total dose = 8 mg, including OR dosing. If not resolved in 15 minutes, then go to step 2 (Prochlorperazine if ordered).  Irritant.        2006-Med Discontinued            Dose: 5 mg Freq: EVERY 6 HOURS PRN Route: IV  PRN Reasons: nausea,vomiting  Start: 06/19/17 1859   End: 06/19/17 2006   Admin Instructions: This is Step 2 of the nausea and vomiting protocol.   If nausea not resolved in 15 minutes, give Metoclopramide if ordered (step 3 of nausea and vomiting protocol)          2006-Med Discontinued            Dose: 3 mL Freq: EVERY 8 HOURS Route: IK  Start: 06/19/17 1330   End: 06/20/17 1115   Admin Instructions: And Q1H PRN, to lock peripheral IV dormant line.        (2027)-Not Given       (2037)-Not Given        (0508)-Not Given       1115-Med Discontinued      Medications 06/16/17 06/17/17 06/18/17 06/19/17 06/20/17 06/21/17 06/22/17

## 2017-06-19 NOTE — BRIEF OP NOTE
[facility] Operative Note    Pre-operative diagnosis:  left foot hammertoe   Post-operative diagnosis same   Procedure: Procedure(s):  Right total knee arthroplasty, Hammer toe repair toe 2,3,4,5 left foot with osteotomy    choice anesthesia   - Wound Class: I-Clean   - Wound Class: I-Clean   Surgeon(s): Surgeon(s) and Role:  Panel 2:     * Beverly Kim DPM, Podiatry/Foot and Ankle Surgery - Primary   Estimated blood loss: 2 mL    Specimens: * No specimens in log *   Findings: See op note

## 2017-06-19 NOTE — IP AVS SNAPSHOT
` ` Patient Information     Patient Name Sex     Damien Vallecillo V ([id number]) Male 1935       Room Bed    [facility] 0643-01      Patient Demographics     Address Phone     [address]  [facility] [address] [phone] (Home)  none (Work)  [phone] (Mobile) *Preferred*      Patient Ethnicity & Race     Ethnic Group Patient Race    American White      Emergency Contact(s)     Name Relation Home Work Mobile    Alejandrina Vallecillo Spouse [phone] none [phone]    Amanda Evans  [phone] none none      Documents on File        Status Date Received Description       Documents for the Patient    Privacy Notice - [place] Received      Insurance Card  05     Face Sheet Received () 08     Insurance Card  07     External Medication Information Consent Accepted () 09     Face Sheet Received () 09     Face Sheet Received () 07/28/10     External Medication Information Consent Accepted () 09/14/10     Patient ID Received 06/15/14     Consent for Services - Hospital/Clinic Received () 11/01/10     Insurance Card Received 11/01/10     Privacy Notice - [place] Received 11     Consent for Services - Hospital/Clinic Received () 11     Other Received 11 BCBS COB    HIM TAYLOR Authorization - File Only   [facility] 11     [facility]IM [facility] Authorization - File Only   Record Request 3-14-12    External Medication Information Consent Accepted () 12     Insurance Card Received 12     Insurance Card Received 12     Privacy Notice - [place] Received 12     Consent for Services - Hospital/Clinic Received () 12     Consent for EHR Access  13 Copied from existing Consent for services - C/HOD collected on 2012    [facility] Specified Other       External Medication Information Consent Patient Refused 13     Patient ID Received 13     Insurance Card Received 10/21/16  MEDICARE/ BCBS    Consent for Services - Hospital/Clinic Received () 10/04/13     Consent for Services - Hospital/Clinic Received () 04/28/15     Patient ID Received 06/23/15     Consent for Services/Privacy Notice - Hospital/Clinic Received 10/21/16     Patient ID Received 10/26/16 [place] 19    Insurance Card Received 17 BCBS    Insurance Card Received 17 Medicare    HIM TAYLOR Authorization - File Only  17 [facility]       Documents for the Encounter    CMS IM for Patient Signature Received 17 Explained to pt by Marilee       Admission Information     Attending Provider Admitting Provider Admission Type Admission Date/Time    Alec Raymond MD Wengler, Michael David, MD Elective 17  1244    Discharge Date Hospital Service Auth/Cert Status Service Area     Surgery Incomplete [facility]    Unit Room/Bed Admission Status        ORTHO SPINE  Admission (Confirmed)       Admission     Complaint    right knee pain, left foot hammertoe, S/P total knee arthroplasty      Hospital Account     Name Acct ID Class Status Primary Coverage    Damien Vallecillo [id number] Inpatient Open MEDICARE - MEDICARE            Guarantor Account (for Hospital Account #[id number])     Name Relation to Pt Service Area Active? Acct Type    Damien Vallecillo  FCS Yes Personal/Family    [place] [address] [phone](H)  none(O)              Coverage Information (for Hospital Account #[id number])     1. MEDICARE/MEDICARE     F/O Payor/Plan Precert #    MEDICARE/MEDICARE     Subscriber Subscriber #    Damien Vallecillo [id number]    Address Phone    [employer]  [address]  [place] [address] [phone]          2. [place]     F/O Payor/Plan Precert #    [place]     Subscriber Subscriber #    Damien Vallecillo [id number]    Address Phone    [address] [address]  [place]  [address] [phone]

## 2017-06-19 NOTE — ANESTHESIA PREPROCEDURE EVALUATION
Anesthesia Evaluation     . Pt has had prior anesthetic. Type: General    No history of anesthetic complications          ROS/MED HX    ENT/Pulmonary:     (+)RENAE risk factors obese, , . .   (-) tobacco use, asthma, COPD and recent URI   Neurologic:     (+)seizures last seizure: 8 yrs CVA date: 2014 without deficits    Cardiovascular:  - neg cardiovascular ROS   (+) ----. : . . fainting (syncope). :. . Previous cardiac testing date:results:date: results:ECG reviewed date:5/17 results:Sinus 54. date: results:         (-) hypertension, CAD, arrhythmias and valvular problems/murmurs   METS/Exercise Tolerance:     Hematologic: Comments: Lab Test        06/19/17     05/15/17     02/13/17     08/10/15     08/08/15     08/05/15     08/04/15     08/03/15                       1346          1030          1115          0835          0600          0650          1157          0705          WBC           --          5.9          7.6           --           --           --           --          9.8           HGB          12.1*        12.1*        10.8*         --           --           --           --          10.4*         MCV           --          78           73*           --           --           --           --          77*           PLT           --          210          234           --          268          252           --          230           INR           --           --           --          1.15*         --          1.09         1.16*         --            Lab Test        06/19/17     05/15/17     02/13/17     10/21/16                       1346          1030          1115          1154          NA            --          143          140          138           POTASSIUM    4.1          4.0          4.1          4.6           CHLORIDE      --          109          106          106           CO2           --          26           27           26            BUN           --          13           12           15             CR           0.82         0.79         0.74         0.84          ANIONGAP      --          8            7            6             ANURAG           --          9.0          9.1          9.0           GLC           --          139*         111*         101*            (+) Anemia, History of Transfusion -      Musculoskeletal:  - neg musculoskeletal ROS (+) arthritis, , , -       GI/Hepatic:     (+) Other GI/Hepatic GI bleed     (-) GERD, hepatitis and liver disease   Renal/Genitourinary:  - ROS Renal section negative       Endo:     (+) type II DM Not using insulin - not using insulin pump thyroid problem (nodule) Obesity, .   (-) Type I DM   Psychiatric:  - neg psychiatric ROS       Infectious Disease:  - neg infectious disease ROS       Malignancy:      - no malignancy   Other:    - neg other ROS                 Physical Exam  Normal systems: cardiovascular, pulmonary and dental    Airway   Mallampati: III  TM distance: >3 FB  Neck ROM: full    Dental     Cardiovascular   Rhythm and rate: regular and normal  (-) no friction rub, no systolic click and no murmur    Pulmonary    breath sounds clear to auscultation(-) no rhonchi, no decreased breath sounds, no wheezes, no rales and no stridor                    Anesthesia Plan      History & Physical Review  History and physical reviewed and following examination; no interval change.    ASA Status:  3 .    NPO Status:  > 8 hours    Plan for General, ETT and Periph. Nerve Block for postop pain with Intravenous induction. Maintenance will be Balanced.    PONV prophylaxis:  Ondansetron (or other 5HT-3) and Dexamethasone or Solumedrol       Postoperative Care  Postoperative pain management:  IV analgesics and Multi-modal analgesia.      Consents  Anesthetic plan, risks, benefits and alternatives discussed with:  Patient or representative and Patient..                          .

## 2017-06-19 NOTE — IP AVS SNAPSHOT
[facility]    [address]    [facility] [address]    Phone:  [phone]    Fax:  [phone]                                       After Visit Summary   6/19/2017    Damien Vallecillo    MRN: [id number]           After Visit Summary Signature Page     I have received my discharge instructions, and my questions have been answered. I have discussed any challenges I see with this plan with the nurse or doctor.    ..........................................................................................................................................  Patient/Patient Representative Signature      ..........................................................................................................................................  Patient Representative Print Name and Relationship to Patient    ..................................................               ................................................  Date                                            Time    ..........................................................................................................................................  Reviewed by Signature/Title    ...................................................              ..............................................  Date                                                            Time

## 2017-06-20 ENCOUNTER — APPOINTMENT (OUTPATIENT)
Dept: PHYSICAL THERAPY | Facility: CLINIC | Age: 82
DRG: 470 | End: 2017-06-20
Attending: ORTHOPAEDIC SURGERY
Payer: MEDICARE

## 2017-06-20 LAB
GLUCOSE BLDC GLUCOMTR-MCNC: 110 MG/DL (ref 70–99)
GLUCOSE SERPL-MCNC: 148 MG/DL (ref 70–99)
HGB BLD-MCNC: 10.1 G/DL (ref 13.3–17.7)

## 2017-06-20 PROCEDURE — 25000128 H RX IP 250 OP 636: Performed by: ORTHOPAEDIC SURGERY

## 2017-06-20 PROCEDURE — 97110 THERAPEUTIC EXERCISES: CPT | Mod: GP | Performed by: PHYSICAL THERAPIST

## 2017-06-20 PROCEDURE — 12000000 ZZH R&B MED SURG/OB

## 2017-06-20 PROCEDURE — 85018 HEMOGLOBIN: CPT | Performed by: ORTHOPAEDIC SURGERY

## 2017-06-20 PROCEDURE — 99222 1ST HOSP IP/OBS MODERATE 55: CPT | Performed by: PHYSICIAN ASSISTANT

## 2017-06-20 PROCEDURE — 97161 PT EVAL LOW COMPLEX 20 MIN: CPT | Mod: GP | Performed by: PHYSICAL THERAPIST

## 2017-06-20 PROCEDURE — 00000146 ZZHCL STATISTIC GLUCOSE BY METER IP

## 2017-06-20 PROCEDURE — 25000132 ZZH RX MED GY IP 250 OP 250 PS 637: Mod: GY | Performed by: ORTHOPAEDIC SURGERY

## 2017-06-20 PROCEDURE — L4360 PNEUMAT WALKING BOOT PRE CST: HCPCS

## 2017-06-20 PROCEDURE — A9270 NON-COVERED ITEM OR SERVICE: HCPCS | Mod: GY | Performed by: ORTHOPAEDIC SURGERY

## 2017-06-20 PROCEDURE — 40000193 ZZH STATISTIC PT WARD VISIT: Performed by: PHYSICAL THERAPIST

## 2017-06-20 PROCEDURE — 97530 THERAPEUTIC ACTIVITIES: CPT | Mod: GP | Performed by: PHYSICAL THERAPIST

## 2017-06-20 PROCEDURE — 82947 ASSAY GLUCOSE BLOOD QUANT: CPT | Performed by: ORTHOPAEDIC SURGERY

## 2017-06-20 PROCEDURE — 99207 ZZC CONSULT E&M CHANGED TO INITIAL LEVEL: CPT | Performed by: PHYSICIAN ASSISTANT

## 2017-06-20 PROCEDURE — 36415 COLL VENOUS BLD VENIPUNCTURE: CPT | Performed by: ORTHOPAEDIC SURGERY

## 2017-06-20 RX ADMIN — ACETAMINOPHEN 975 MG: 325 TABLET, FILM COATED ORAL at 05:07

## 2017-06-20 RX ADMIN — HYDROMORPHONE HYDROCHLORIDE 2 MG: 2 TABLET ORAL at 05:07

## 2017-06-20 RX ADMIN — ENOXAPARIN SODIUM 40 MG: 40 INJECTION SUBCUTANEOUS at 10:34

## 2017-06-20 RX ADMIN — HYDROMORPHONE HYDROCHLORIDE 2 MG: 2 TABLET ORAL at 20:18

## 2017-06-20 RX ADMIN — HYDROMORPHONE HYDROCHLORIDE 2 MG: 2 TABLET ORAL at 18:09

## 2017-06-20 RX ADMIN — TAMSULOSIN HYDROCHLORIDE 0.4 MG: 0.4 CAPSULE ORAL at 21:13

## 2017-06-20 RX ADMIN — ACETAMINOPHEN 975 MG: 325 TABLET, FILM COATED ORAL at 12:26

## 2017-06-20 RX ADMIN — CEFAZOLIN SODIUM 2 G: 2 INJECTION, SOLUTION INTRAVENOUS at 10:34

## 2017-06-20 RX ADMIN — ATORVASTATIN CALCIUM 40 MG: 40 TABLET, FILM COATED ORAL at 21:13

## 2017-06-20 RX ADMIN — CEFAZOLIN SODIUM 2 G: 2 INJECTION, SOLUTION INTRAVENOUS at 01:34

## 2017-06-20 RX ADMIN — ACETAMINOPHEN 975 MG: 325 TABLET, FILM COATED ORAL at 20:18

## 2017-06-20 ASSESSMENT — PAIN DESCRIPTION - DESCRIPTORS: DESCRIPTORS: ACHING;DULL

## 2017-06-20 NOTE — ANESTHESIA POSTPROCEDURE EVALUATION
Patient: Damien Vallecillo    Procedure(s):  Right total knee arthroplasty, Hammer toe repair toe 2,3,4,5 left foot with osteotomy    choice anesthesia   - Wound Class: I-Clean   - Wound Class: I-Clean    Diagnosis:right knee pain, left foot hammertoe  Diagnosis Additional Information: Pre-operative diagnosis:  left foot hammertoe  Post-operative diagnosis same  Procedure: Procedure(s):  Right total knee arthroplasty, Hammer toe repair toe 2,3,4,5 left foot with osteotomy    DJD    Anesthesia Type:  General, ETT, Peripheral Nerve Block for post-op pain at surgeon's request    Note:  Anesthesia Post Evaluation    Patient location during evaluation: PACU  Patient participation: Able to participate in evaluation but full recovery from regional anesthesia has not yet ocurrred but is anticipated to occur within 48 hours  Level of consciousness: awake and alert  Pain management: adequate  Airway patency: patent  Cardiovascular status: acceptable  Respiratory status: acceptable  Hydration status: acceptable  PONV: none     Anesthetic complications: None          Last vitals:  Vitals:    06/19/17 2100 06/19/17 2147 06/19/17 2220   BP: 131/70 150/87 125/80   Pulse:      Resp: 16 16 16   Temp:  98  F (36.7  C)    SpO2: 99% 100% 98%         Electronically Signed By: Vinicio Rodriguez MD  June 19, 2017  10:41 PM

## 2017-06-20 NOTE — CONSULTS
"Hospitalist Consultation      Damien Vallecillo MRN# [id number]   YOB: 1935 Age: 81 year old   Date of Admission: 6/19/2017     Requesting Physician:  Dr. Raymond  Reason for consult: Medical mgmt           Assessment and Plan:   This patient is a 81 year old male with a PMH significant for BPH, allergies and gout who is POD 1 s/p right total knee arthroplasty and left hammer toe repair.    1. S/p right total knee arthroplasty and left hammer toe repair: doing well, cont PT/OT and pain control as per primary.    2. Hx of elevated blood sugar- He states he does not have diabetes but has been told he has high blood sugars in the past. Last A1c per him was 5.9. Will increase blood sugar checks to BID.    3. Possible hx of seizure- He has passed out before and there are differing opinions amongst providers if this was seizure related or not. Not currently on any anti seizure meds.    4. BPH- Continue Flomax    5. Allergies- Continue Flonase     DVT Prophylaxis: on Lovenox as per primary  D/C planning: Unclear at this point but he hopes to go home.             History of Present Illness:   This patient is a 81 year old male who is POD 1 s/p right total knee arthroplasty and left hammer toe repair.  Intra-op report reviewed and showed no intra-op complications.   I/o's reviewed, Currently net positive with good UOP since OR. Hgb stable this am at 10.1.  Overnight did pretty well, no complaints, VSS.   Currently, today latasha diet, pain controlled, no CP, SOB, no n/v.   O/w other medical problems have been stable, with no recent c/o illness.               Past Medical History:     Past Medical History:   Diagnosis Date     Acute suppurative arthritis (H)      Acute, but ill-defined, cerebrovascular disease      Arthritis      Chronic headaches      Diabetes (H)     \"Pre-diabetes\"     Glucose intolerance (impaired glucose tolerance) 5/31/2013     Gout, unspecified      Hammer toe of left foot 10/21/2016     " Hematuria      History of blood transfusion      Left knee pain, unspecified chronicity 5/8/2017     Lentigo maligna (H)      Malignant neoplasm of rectosigmoid junction (H)      Morbid obesity due to excess calories (H) 10/21/2016     Other convulsions     last seizure 7-8 years ago...not certain if these were true seizres or not..     Pre-diabetes 10/21/2016     RBC microcytosis 2/14/2017     Right knee pain, unspecified chronicity 5/8/2017     Syncope      Tubulovillous adenoma of colon      Venous stasis dermatitis of both lower extremities 10/21/2016               Past Surgical History:     Past Surgical History:   Procedure Laterality Date     C NONSPECIFIC PROCEDURE      right heel surgery; spur removed.     COLONOSCOPY N/A 6/23/2015    Procedure: COMBINED COLONOSCOPY, SINGLE OR MULTIPLE BIOPSY/POLYPECTOMY BY BIOPSY;  Surgeon: Kimberly Alfaro MD;  Location:  GI     COLONOSCOPY N/A 7/30/2015    Procedure: COLONOSCOPY;  Surgeon: Enrique Salazar MD;  Location:  OR     HERNIORRHAPHY EPIGASTRIC  4/27/2012    Procedure:HERNIORRHAPHY EPIGASTRIC; Epigastric Hernia Repair with mesh ; Surgeon:BOLIVAR OLIVEIRA; Location: OR     LAPAROSCOPIC ASSISTED COLECTOMY Right 7/30/2015    Procedure: LAPAROSCOPIC ASSISTED COLECTOMY;  Surgeon: Enrique Salazar MD;  Location:  OR     ORTHOPEDIC SURGERY      lt knee arthroplasty     SIGMOIDOSCOPY FLEXIBLE  5/29/2013    Procedure: SIGMOIDOSCOPY FLEXIBLE;  SIGMOIDOSCOPY FLEXIBLE (FV) Needs blood sugar ck'd;  Surgeon: Enrique Salazar MD;  Location:  GI                 Social History:     Social History   Substance Use Topics     Smoking status: Never Smoker     Smokeless tobacco: Never Used     Alcohol use 0.0 oz/week     0 Standard drinks or equivalent per week      Comment: Occas                 Family History:     family history includes CEREBROVASCULAR DISEASE in his father; Cancer - colorectal in his mother.              Allergies:     Allergies   Allergen  Reactions     Levetiracetam [Keppra] Rash     Oxcarbazepine [Trileptal] Rash             Medications:     Prior to Admission medications    Medication Sig Last Dose Taking? Auth Provider   ASPIRIN PO Take 325 mg by mouth daily Past Month at Unknown time Yes Reported, Patient   Multiple Vitamins-Minerals (MULTIVITAMIN ADULT PO) Reported on 5/12/2017 Past Month at Unknown time Yes Reported, Patient   triamcinolone (KENALOG) 0.5 % cream Apply sparingly to affected area three times daily. Past Month at Unknown time Yes Obi Strange MD   CINNAMON PO  Past Month at Unknown time Yes Reported, Patient   tamsulosin (FLOMAX) 0.4 MG 24 hr capsule Take 1 capsule (0.4 mg) by mouth At Bedtime Past Month at Unknown time Yes Obi Strange MD   fish oil-omega-3 fatty acids 1000 MG capsule Take 1 g by mouth every evening Past Month at Unknown time Yes Unknown, Entered By History   Flaxseed, Linseed, (FLAX SEED OIL) 1000 MG capsule Take 1 capsule by mouth every evening Past Month at Unknown time Yes Unknown, Entered By History   Glucosamine HCl 1000 MG TABS Take 1 tablet by mouth every evening Past Month at Unknown time Yes Unknown, Entered By History   fluticasone (FLONASE) 50 MCG/ACT spray Spray 1-2 sprays into both nostrils daily   Obi Strange MD   guaiFENesin (MUCINEX) 600 MG 12 hr tablet Take 2 tablets (1,200 mg) by mouth 2 times daily   Obi Strange MD   atorvastatin (LIPITOR) 40 MG tablet Take 1 tablet (40 mg) by mouth At Bedtime   Obi Strange MD   blood glucose monitoring (ACCU-CHEK LAURA PLUS) test strip Use to test blood sugar 1 time daily   Obi Strange MD   blood glucose (ACCU-CHEK LAURA) test strip 1Use to test blood sugar 2 times daily or as directed.   Obi Strange MD   ACE NOT PRESCRIBED, INTENTIONAL, ACE Inhibitor not prescribed due to Other:  Had cough on ACEI in past         Naren, Nimisha ARENAS MD               Review of Systems:   A comprehensive greater than 10 system review of systems was carried  "out.  Pertinent positives and negatives are noted above.  Otherwise negative for contributory info.            Physical Exam:   Vitals were reviewed  Blood pressure 121/67, pulse 78, temperature 98.5  F (36.9  C), temperature source Oral, resp. rate 18, height 1.778 m (5' 10\"), weight 120.7 kg (266 lb), SpO2 97 %.  Exam:    GENERAL:  Comfortable.  PSYCH: pleasant, oriented, No acute distress.  HEENT:  PERRLA. Normal conjunctiva, normal hearing, nasal mucosa and Oropharynx are normal.  NECK:  Supple, no neck vein distention, adenopathy or bruits, normal thyroid.  HEART:  Normal S1, S2 with no murmur, no pericardial rub, S3 or S4.  LUNGS:  Clear to auscultation, normal Respiratory effort.  ABDOMEN:  Soft, no hepatosplenomegaly, normal bowel sounds.  EXTREMITIES:  Legs have PCDs and ace wraps so unable to assess.  SKIN:  Dry to touch, No rash, wound or ulcerations.  NEUROLOGIC:  Nonfocal with normal cranial nerve and motor power and sensation.            Data:   Past 24 hours labs, studies, and imaging were reviewed.    Recent Labs  Lab 06/20/17  0600 06/19/17  1346   HGB 10.1* 12.1*   PLT  --  224       Recent Labs  Lab 06/20/17 0600 06/19/17  1346   POTASSIUM  --  4.1   *  --    CR  --  0.82   GFRESTIMATED  --  90   GFRESTBLACK  --  >90African American GFR Calc       Recent Labs  Lab 06/20/17  0600 06/19/17  2215 06/19/17  1917   *  --   --    BGM  --  178* 117*       Britta Lainez PA-C    Pt discussed with Dr. Larson who agrees with the care as discussed above.     "

## 2017-06-20 NOTE — PROGRESS NOTES
Here to see patient for tall Aircast boot.  His foot has thick wrap and compromises me donning the boot.  I will put in a message to Dr. Kim to find out how she would like me to proceed.

## 2017-06-20 NOTE — PLAN OF CARE
Problem: Knee Replacement, Total (Adult)  Goal: Signs and Symptoms of Listed Potential Problems Will be Absent or Manageable (Knee Replacement, Total)  Signs and symptoms of listed potential problems will be absent or manageable by discharge/transition of care (reference Knee Replacement, Total (Adult) CPG).   Outcome: Improving  Vital signs monitored.   Lung sounds clear. Bowel sounds active, tolerating diet.  CMS intact.   Ace wrap on RLE is c/d/i. Ace wrap on left foot is c/d/i.   Pt denies pain, declines pain medication. Taking scheduled Tylenol.  Rebecca DC'd at 1100, pt is DTV.  Up with max assist x2, pt got up to chair with PT today using the radha steady. Knee immobilizer on RLE; CAM boot on LLE.

## 2017-06-20 NOTE — PLAN OF CARE
Problem: Goal Outcome Summary  Goal: Goal Outcome Summary  OT: Order received, chart reviewed, pt POD#1 R TKA and L foot hammer toe repair, this afternoon pt fatigued from working with PT, limited ability to stand on this date, per discussion with pt and wife, will reschedule OT evaluation for tomorrow

## 2017-06-20 NOTE — PLAN OF CARE
Problem: Goal Outcome Summary  Goal: Goal Outcome Summary    Discharge Planner PT   Patient plan for discharge: home with spouse and OP PT  Current status: Worked on R TKA exercises this afternoon, tolerating well but limited quad strength/control yet, requires KI on when up.  Mod A for supine to sit EOB.  Sit <> stand trials with max A x 2 using FWW, very difficult with this due to poor strength R knee and appears to be placing full WBAT through L foot, in Camwalker.  Unable to safely take steps yet, unsteady.  Mod A x 2 with [place] transfer bed > chair.  R knee AAROM 9-66 deg.    Barriers to return to prior living situation: very limited mobility status yet, fall risk, difficulty with mobility skills if limited to heel WB L foot  Recommendations for discharge: TBD POD 2  Rationale for recommendations: TBD POD 2       Entered by: [place] 06/20/2017 3:00 PM

## 2017-06-20 NOTE — PHARMACY-ADMISSION MEDICATION HISTORY
Medication reconciliation completed by pre-admitting.    Prior to Admission medications    Medication Sig Last Dose Taking? Auth Provider   ASPIRIN PO Take 325 mg by mouth daily Past Month at Unknown time Yes Reported, Patient   Multiple Vitamins-Minerals (MULTIVITAMIN ADULT PO) Reported on 5/12/2017 Past Month at Unknown time Yes Reported, Patient   triamcinolone (KENALOG) 0.5 % cream Apply sparingly to affected area three times daily. Past Month at Unknown time Yes Obi Strange MD   CINNAMON PO  Past Month at Unknown time Yes Reported, Patient   tamsulosin (FLOMAX) 0.4 MG 24 hr capsule Take 1 capsule (0.4 mg) by mouth At Bedtime Past Month at Unknown time Yes Obi Strange MD   fish oil-omega-3 fatty acids 1000 MG capsule Take 1 g by mouth every evening Past Month at Unknown time Yes Unknown, Entered By History   Flaxseed, Linseed, (FLAX SEED OIL) 1000 MG capsule Take 1 capsule by mouth every evening Past Month at Unknown time Yes Unknown, Entered By History   Glucosamine HCl 1000 MG TABS Take 1 tablet by mouth every evening Past Month at Unknown time Yes Unknown, Entered By History   fluticasone (FLONASE) 50 MCG/ACT spray Spray 1-2 sprays into both nostrils daily   Obi Strange MD   guaiFENesin (MUCINEX) 600 MG 12 hr tablet Take 2 tablets (1,200 mg) by mouth 2 times daily   Obi Strange MD   atorvastatin (LIPITOR) 40 MG tablet Take 1 tablet (40 mg) by mouth At Bedtime   Obi Strange MD   blood glucose monitoring (ACCU-CHEK LAURA PLUS) test strip Use to test blood sugar 1 time daily   Obi Strange MD   blood glucose (ACCU-CHEK LAURA) test strip 1Use to test blood sugar 2 times daily or as directed.   Obi Strange MD   ACE NOT PRESCRIBED, INTENTIONAL, ACE Inhibitor not prescribed due to Other:  Had cough on ACEI in past         Naren, Nimisha ARENAS MD

## 2017-06-20 NOTE — OP NOTE
SURGEON: Beverly Kim DPM    DATE OF PROCEDURE:  6/19/2017      PREOPERATIVE DIAGNOSES:   1.  Left foot pain.   2.  Hammertoes 2 through 5, left foot.      POSTOPERATIVE DIAGNOSES:   1.  Left foot pain.   2.  Hammertoes 2 through 5, left foot.      PROCEDURES:   1.  Left second metatarsal shortening osteotomy.   2.  Left third metatarsal shortening osteotomy.   3.  Left second proximal interphalangeal joint fusion.   4.  Left third proximal interphalangeal joint fusion.   5.  Left fourth toe flexor tenotomy.   6.  Left fifth toe flexor tenotomy.      SURGEON:  Beverly Kim DPM      ANESTHESIA:  General with ankle block.      ESTIMATED BLOOD LOSS:  Less than 5 ml.      HEMOSTASIS:  Electrocautery and ankle tourniquet.      SPECIMENS:  None.      MATERIALS:  Four Arthrex snap-off screws and a Trim-It pin.      INDICATIONS:  The patient Damien Vallecillo is an 81-year-old male who presented to the Clinic with pain in the left foot.  He has had hammertoes for years, and it is very hard for him to wear shoes.  He is undergoing right knee surgery with Dr. Raymond who is operating on the right foot in conjunction with my surgery on the left foot.  The patient would like the hammertoes corrected at this time as well so he does not have to have multiple surgeries.  On physical examination the patient's pulses are palpable.  He does have edema noted to feet and legs.  He has rigid contractures of toes 2 through 5.  There is significant dorsal dislocation of the left second and third toes on the metatarsal heads.  X-rays demonstrate the same findings.  We discussed with the patient that we would shorten the second and third metatarsals to make room to straighten the second and third toes.  We discussed fusing the aPIPJ and discussed doing tenotomies of the fourth and fifth toes to help straighten those as well.  Risks, benefits and complications of surgery were discussed with the patient.  The patient wished to proceed with  surgery.      DESCRIPTION OF PROCEDURE:  The patient was brought to the operating room on 06/19/2017 where he was placed on the operating table in the supine position.  Anesthesia was administered, and a tourniquet was placed on the left ankle.  Local anesthetic was injected, and the left foot was prepped and draped using sterile technique.        Procedure #1.  Attention was directed to the dorsal aspect of the left second toe.  A linear incision approximately 6 cm was made over the proximal interphalangeal joint and metatarsophalangeal joint through skin.  Blunt dissection was used down to the level of the metatarsophalangeal joint.  Once noted this was transected using a #15 blade; we transected the extensor tendon at this level.  The periosteum was sharply dissected off of the head of the second metatarsal, and a McClamary elevator was used to release the soft tissues plantarly.  A double-cut Weil osteotomy was then performed on the second metatarsal, and the plantar fragment was translocated proximally 4 mm.  This was permanently fixated with 2 Arthrex Snap-off screws, and the remaining distal eminence of bone was removed using a sagittal saw.      Procedure #2.  The exact same procedure was done on the left third metatarsal.      Procedure #3.  Attention was directed to the left second proximal interphalangeal joint.  A transverse incision was made at this level through periosteum and cutting the extensor tendon.  The periosteum was sharply dissected off the head of the proximal phalanx and base of the middle phalanx.  These were removed using sagittal saw, and an Arthrex Trim-It pin was used and retrograded into the proximal phalanx.  Then the distal aspect of the toe was approximated over this to straighten and fuse the bone ends together.  The toe was noted to be straight at this time.  The wound was flushed with copious amounts of normal saline.  The capsule was reapproximated with 3-0 Vicryl, the  subcutaneous was reapproximated using 4-0 Vicryl, and the skin was reapproximated using 4-0 Prolene.  The toe was noted to be straight at this time especially in the loaded weightbearing position.      Procedure #4:  The exact same procedure was done on the left third proximal interphalangeal joint.      Procedure #5.  Attention was directed to the plantar aspect of the left fourth toe.  A stab incision was made at the level of the proximal interphalangeal joint full thickness to the joint capsule.  The flexor tendon was transected with a #15 blade.  The toe was noted to straighten at this time.  The wound was flushed with copious amounts of normal saline, and a 4-0 Prolene suture was used to reapproximate the skin.               Procedure #6.  The  exact same procedure was done to the fifth toe.               The patient's left foot was placed in a dry sterile dressing.  The patient tolerated the procedure and anesthesia well and was transferred to Recovery with stable vital signs and intact vascular status.      PLAN:  The patient will be minimal heel-weightbearing in a Cam boot on his left foot.  He will be admitted after his knee surgery, and pain control is deferred to Orthopedics.         ALAN CASEY DPM             D: 2017 18:26   T: 2017 09:57   MT: ITALIA#[id number]      Name:     HORTENSIA ADAMS   MRN:      [id number]        Account:        [id number]   :      1935           Procedure Date: 2017      Document: [id number]       cc: Obi Strange MD

## 2017-06-20 NOTE — PLAN OF CARE
Problem: Goal Outcome Summary  Goal: Goal Outcome Summary  Outcome: No Change  AO, VSS. R knee small drainage, L foot CDI. Pain managed by PRN medication. DTV, BS+, passing gas. Up with lift.

## 2017-06-20 NOTE — PROGRESS NOTES
06/20/17 1028   Quick Adds   Type of Visit Initial PT Evaluation   Living Environment   Lives With spouse   [place]   Home Accessibility stairs to enter home   Number of Stairs to Enter Home 1  (small threshold)   Number of Stairs Within Home 12  (to basement laundry, but bed/bath on main level)   Transportation Available family or friend will provide   Living Environment Comment Pt lives with spouse in a 2- level [facility], he states bed/bath on main level with small threshold step to enter.  Spouse has hx of head injury with STM impairment and some difficulty with mobility skills at times.   Self-Care   Usual Activity Tolerance moderate  (limited by knee and foot pain)   Current Activity Tolerance poor   Regular Exercise no   Equipment Currently Used at Home none   Activity/Exercise/Self-Care Comment pt reports he functions independently at baseline, owns crutches and a 4WW.   Functional Level Prior   Ambulation 0-->independent   Transferring 0-->independent   Toileting 0-->independent   Bathing 0-->independent   Dressing 0-->independent   Eating 0-->independent   Communication 0-->understands/communicates without difficulty   Swallowing 0-->swallows foods/liquids without difficulty   Cognition 0 - no cognition issues reported   Fall history within last six months yes  (slipped on ice)   Number of times patient has fallen within last six months 1   Which of the above functional risks had a recent onset or change? ambulation;transferring   Prior Functional Level Comment Pt reports he can ambulate 1-2 blocks at baseline before limited by knee and foot pain.   General Information   Onset of Illness/Injury or Date of Surgery - Date 06/19/17   Referring Physician Alec Raymond MD   Patient/Family Goals Statement pt states his plan is to d/c home and has an OP PT in mind   Pertinent History of Current Problem (include personal factors and/or comorbidities that impact the POC) Pt  seen POD #1 s/p  R TKA as well as L hammertoe correction toes 2-5.  Pt reports hx of L TKA 9 yrs ago.  PMH including pre-diabetes, obesity, venous stasis caterina LEs, gout.  Pt reports Dr. Kim indicated he needs to be heel WB only on LLE post op, awaiting orthotic boot for L foot yet.   Precautions/Limitations fall precautions   Weight-Bearing Status - LLE (pt indicates Dr. Kim told him heel WB only, Cam boot)   Weight-Bearing Status - RLE weight-bearing as tolerated   General Observations pt resting in bed, awake and alert   Cognitive Status Examination   Orientation orientation to person, place and time   Pain Assessment   Patient Currently in Pain Yes, see Vital Sign flowsheet  (3/10 R knee)   Integumentary/Edema   Integumentary/Edema Comments Ace wrap RLE, L foot wrapped in Ace.  Noted hx of caterina LE venous stasis   Posture    Posture Forward head position   Range of Motion (ROM)   ROM Comment L knee and hip to 90 deg flexion, limited AROM L ankle due to restrictions post op and Ace wrap.  R knee AAROM 9-60 deg currently, limited by pain.   Strength   Strength Comments Deconditioning noted proximally at hips, L hip flexion 4-/5 strength, able to demo L SLR indep.  Limited quad strength yet post op RLE, min A with SLR and mild/mod quad lag.  Full 5/5 strength R ankle DF/PF.   Bed Mobility   Bed Mobility Comments Min-mod A x 1 supine <> sit EOB.   Transfer Skills   Transfer Comments deferred until orthotic boot arrives for L foot   Gait   Gait Comments deferred until orthotic boot arrives for L foot   Balance   Balance Comments Good seated stability EOB, deferred standing currently until orthotic boot arrives for L foot   Sensory Examination   Sensory Perception no deficits were identified   Muscle Tone   Muscle Tone Comments mm guarding R knee   Modality Interventions   Planned Modality Interventions Cryotherapy   Planned Modality Interventions Comments prn to R knee   General Therapy Interventions   Planned Therapy  "Interventions bed mobility training;gait training;neuromuscular re-education;orthotic fitting/training;ROM;strengthening;stretching;transfer training;risk factor education;home program guidelines;progressive activity/exercise   Clinical Impression   Criteria for Skilled Therapeutic Intervention yes, treatment indicated   PT Diagnosis Impaired functional mobility   Influenced by the following impairments Pain, weakness, dec LE ROM, WB restrictions LLE, impaired balance   Functional limitations due to impairments Impaired bed mobility skills, transfers, gait skills, single step into home, fall risk   Clinical Presentation Stable/Uncomplicated   Clinical Presentation Rationale stable medical status post op, caterina LE involvement but single body system, indep PLOF   Clinical Decision Making (Complexity) Low complexity   Therapy Frequency` 2 times/day   Predicted Duration of Therapy Intervention (days/wks) 3 days   Anticipated Equipment Needs at Discharge front wheeled walker  (pt owns crutches and 4WW, may need FWW, order placed)   Risk & Benefits of therapy have been explained Yes   Patient, Family & other staff in agreement with plan of care Yes   Clinical Impression Comments Anticipate potential difficulty with functional mobility skills given WB restrictions to LLE in addition to R TKA, pt indicates Dr. Kim told him heel WB only, awaiting orthotic boot yet for L foot.   [facility] [employer] TM \"6 Clicks\"   2016, Trustees of [facility], under license to [employer].  All rights reserved.   6 Clicks Short Forms Basic Mobility Inpatient Short Form   [facility] [employer]  \"6 Clicks\" V.2 Basic Mobility Inpatient Short Form   1. Turning from your back to your side while in a flat bed without using bedrails? 3 - A Little   2. Moving from lying on your back to sitting on the side of a flat bed without using bedrails? 3 - A Little   3. Moving to and from a bed to a chair (including a wheelchair)? 2 - A Lot   4. " Standing up from a chair using your arms (e.g., wheelchair, or bedside chair)? 2 - A Lot   5. To walk in hospital room? 2 - A Lot   6. Climbing 3-5 steps with a railing? 2 - A Lot   Basic Mobility Raw Score (Score out of 24.Lower scores equate to lower levels of function) 14   Total Evaluation Time   Total Evaluation Time (Minutes) 15

## 2017-06-20 NOTE — PLAN OF CARE
Problem: Goal Outcome Summary  Goal: Goal Outcome Summary  A/O, VSS pain 3/10 oral dilaudid for pain control.  saline locked lindquist cathetr in place waiting for orthiotics consult to be fitted for a cam boot. Hard of Hearing.  plans to go home at discharge.

## 2017-06-20 NOTE — PLAN OF CARE
Problem: Goal Outcome Summary  Goal: Goal Outcome Summary     PT:  Eval complete, treatment initiated.  Pt seen POD #1 s/p R TKA as well as L hammertoe correction toes 2-5.  Pt lives with spouse in a rambler-style home, 1 step to enter, main floor bed/bath.  He functions independently at baseline, but does own crutches and a 4WW.  He states his wife suffered a head injury many years ago and struggles with STM, some mild mobility difficulties at times which pt helps out with as needed.     Discharge Planner PT   Patient plan for discharge: home with spouse and OP PT  Current status: Deferred standing skills yet this AM, as orthotics has not been able to fit proper walking boot for L foot yet due to size of dressing.  Initiated TKA exercises, AAROM currently 9-60 deg, pain rated 3/10 at rest.  Limited quad strength/control yet, will require KI on when up.  Deconditioning/weakness noted proximally at hips, and pt indicates Dr. Kim told him he will need to be heel WB only on L foot post op.  Dangled EOB with good tolerance, no dizziness.  Barriers to return to prior living situation: mobility status, anticipate difficulty with mobility skills if limited to heel WB L foot  Recommendations for discharge: TBD POD 2  Rationale for recommendations: TBD POD 2       Entered by: [place] 06/20/2017 11:00 AM

## 2017-06-20 NOTE — PROGRESS NOTES
S.  Patient was seen today at  for an evaluation for a short cam walker for their left foot/ankle as ordered by . Dx: Hammer toe repair.   O/G. help stabilize foot and ankle.  A.  Patient was fit with a large short pneumatic Cam Walker for the left leg. An extension was used for the dorsal portion of the patients foot. Patients bandages were to thick to close the dorsal portion of the boot and an extention where used.    Cam walker was assembled by removing the soft liner from the foot plate and uprights, the patients foot and leg was positioned into the soft liner with the heel firmly sealed.  The liner was closed tightly and secured with the Velcro closure. The heel and foot were positioned in the rocker bottom foot plate and the uprights were contoured to fall at mid-line of the lower leg and secured to the Velcro.  The foot plate Velcro straps were secured, proximal straps first, then the distal strap.  The lower leg straps were attached starting distal and working proximal. The ankle joints were set at 90 degrees of dorsi/plantar flexion. Donning and doffing of the Cam Walker was explained.  P.   Patient was advised to contact this office with any problems or questions.

## 2017-06-20 NOTE — ANESTHESIA CARE TRANSFER NOTE
Patient: Damien Vallecillo    Procedure(s):  Right total knee arthroplasty, Hammer toe repair toe 2,3,4,5 left foot with osteotomy    choice anesthesia   - Wound Class: I-Clean   - Wound Class: I-Clean    Diagnosis: right knee pain, left foot hammertoe  Diagnosis Additional Information: No value filed.    Anesthesia Type:   General, ETT, Periph. Nerve Block for postop pain     Note:  Airway :Face Mask  Patient transferred to:PACU  Comments: Report to RN, oxygen per face mask, VVS.      Vitals: (Last set prior to Anesthesia Care Transfer)    CRNA VITALS  6/19/2017 1829 - 6/19/2017 1908      6/19/2017             Pulse: 80    SpO2: 99 %    Resp Rate (observed): 17                Electronically Signed By: MAKAYLA Morrow CRNA  June 19, 2017  7:08 PM

## 2017-06-20 NOTE — PLAN OF CARE
Problem: Individualization  Goal: Patient Preferences  Outcome: No Change  2015 Arrived from PACU 643. Alert, orientated. Pleasant. Dressing dry to right leg and left foot. Dorsi and planter flexions strong bilateral feet. Unable to lift right leg off leg. KI on. No numbness. Full liquids well. Pain controlled, denies need for pain meds. No edema. Lungs clear. IS 1500. Pulse ox and bed alarm on for safety. 2210 sat on edge of bed. Awaiting CAM boot tomorrow so did not stand. Tolerated sitting on edge of bed well, moving well. KI on to right leg. Blood sugar 178. Fernandez drains well. No drain. No acute variances. Planning to go home at d/c with wife. Following plan of care. Bed alarm and pulse ox on.

## 2017-06-21 ENCOUNTER — APPOINTMENT (OUTPATIENT)
Dept: PHYSICAL THERAPY | Facility: CLINIC | Age: 82
DRG: 470 | End: 2017-06-21
Attending: ORTHOPAEDIC SURGERY
Payer: MEDICARE

## 2017-06-21 LAB
GLUCOSE BLDC GLUCOMTR-MCNC: 103 MG/DL (ref 70–99)
GLUCOSE SERPL-MCNC: 128 MG/DL (ref 70–99)
HGB BLD-MCNC: 8.8 G/DL (ref 13.3–17.7)

## 2017-06-21 PROCEDURE — 40000193 ZZH STATISTIC PT WARD VISIT: Performed by: PHYSICAL THERAPIST

## 2017-06-21 PROCEDURE — 25000132 ZZH RX MED GY IP 250 OP 250 PS 637: Mod: GY | Performed by: ORTHOPAEDIC SURGERY

## 2017-06-21 PROCEDURE — 99232 SBSQ HOSP IP/OBS MODERATE 35: CPT | Performed by: INTERNAL MEDICINE

## 2017-06-21 PROCEDURE — 00000146 ZZHCL STATISTIC GLUCOSE BY METER IP

## 2017-06-21 PROCEDURE — 97110 THERAPEUTIC EXERCISES: CPT | Mod: GP | Performed by: PHYSICAL THERAPIST

## 2017-06-21 PROCEDURE — 82947 ASSAY GLUCOSE BLOOD QUANT: CPT | Performed by: ORTHOPAEDIC SURGERY

## 2017-06-21 PROCEDURE — 99207 ZZC APP CREDIT; MD BILLING SHARED VISIT: CPT | Performed by: INTERNAL MEDICINE

## 2017-06-21 PROCEDURE — 36415 COLL VENOUS BLD VENIPUNCTURE: CPT | Performed by: ORTHOPAEDIC SURGERY

## 2017-06-21 PROCEDURE — 85018 HEMOGLOBIN: CPT | Performed by: ORTHOPAEDIC SURGERY

## 2017-06-21 PROCEDURE — 97530 THERAPEUTIC ACTIVITIES: CPT | Mod: GP | Performed by: PHYSICAL THERAPIST

## 2017-06-21 PROCEDURE — A9270 NON-COVERED ITEM OR SERVICE: HCPCS | Mod: GY | Performed by: ORTHOPAEDIC SURGERY

## 2017-06-21 PROCEDURE — 25000128 H RX IP 250 OP 636: Performed by: ORTHOPAEDIC SURGERY

## 2017-06-21 PROCEDURE — 12000000 ZZH R&B MED SURG/OB

## 2017-06-21 RX ADMIN — ACETAMINOPHEN 975 MG: 325 TABLET, FILM COATED ORAL at 12:49

## 2017-06-21 RX ADMIN — ACETAMINOPHEN 975 MG: 325 TABLET, FILM COATED ORAL at 19:53

## 2017-06-21 RX ADMIN — HYDROMORPHONE HYDROCHLORIDE 2 MG: 2 TABLET ORAL at 04:22

## 2017-06-21 RX ADMIN — TAMSULOSIN HYDROCHLORIDE 0.4 MG: 0.4 CAPSULE ORAL at 19:54

## 2017-06-21 RX ADMIN — HYDROMORPHONE HYDROCHLORIDE 2 MG: 2 TABLET ORAL at 08:45

## 2017-06-21 RX ADMIN — ACETAMINOPHEN 975 MG: 325 TABLET, FILM COATED ORAL at 03:57

## 2017-06-21 RX ADMIN — HYDROMORPHONE HYDROCHLORIDE 2 MG: 2 TABLET ORAL at 12:49

## 2017-06-21 RX ADMIN — ENOXAPARIN SODIUM 40 MG: 40 INJECTION SUBCUTANEOUS at 10:55

## 2017-06-21 RX ADMIN — ATORVASTATIN CALCIUM 40 MG: 40 TABLET, FILM COATED ORAL at 19:54

## 2017-06-21 NOTE — PLAN OF CARE
Problem: Goal Outcome Summary  Goal: Goal Outcome Summary  OT-Attempted eval, however breakfast had arrived and patient requesting to eat and have pain meds before therapy. Will attempt again later today after PT session as OT schedule allows.

## 2017-06-21 NOTE — PLAN OF CARE
Problem: Goal Outcome Summary  Goal: Goal Outcome Summary  Outcome: Improving  AO, VSS-temp 99.7 encouraged IS. Dressing on R scant old drainage, L CDI-CMS intact. Pain very minimal. BS+, Gas +, voiding approp. Up with Lift.

## 2017-06-21 NOTE — PLAN OF CARE
Problem: Goal Outcome Summary  Goal: Goal Outcome Summary  Outcome: No Change  Areas of concern: patient still unable to ambulate so ceiling lift needed to get him to chair.      Areas of improvement: pain in right calf better. Tolerating sitting up in chair. Reports pain is better today. Blood sugars better today.      Plan: PT rec TCU. Heel toch weight bearing to left foot and right knee needs immobilizer. Anticipate discharge tomorrow.

## 2017-06-21 NOTE — PLAN OF CARE
Problem: Goal Outcome Summary  Goal: Goal Outcome Summary  Outcome: No Change  Vital signs stable.  Dressing to right knee and left foot and lower leg are C/D/I.    Oral dilaudid given for pain.  Alarms on for safety.  Continue with POC.

## 2017-06-21 NOTE — PROGRESS NOTES
X-cover    Called for calf pain    Per patient calf pain present since surgery. Constant in nature, actually feeling better this evening.  Not crampy just achy    Pulses palpable through wrap, so doubt weak, no e/o acute arterial insufficiency    A/p    Calf pain-present since surgery  Already on enox for dvt ppx and seems too early for dvt  Likely due to surgery, consider US if worsens or changes

## 2017-06-21 NOTE — PROVIDER NOTIFICATION
Web based paged hospitalist on call  complaining of Right calf pain, weak pulse pain doesn't change with dorsiflexion

## 2017-06-21 NOTE — PLAN OF CARE
Problem: Goal Outcome Summary  Goal: Goal Outcome Summary    Discharge Planner PT   Patient plan for discharge: home with spouse and OP PT  Current status: Tolerated supine TKA ex on RLE needing min-mod A for ROM as very resistant to flex knee; ROM: 0-18-48; total A to yuliana CAM walker on LLE, KI on RLE; min A with RLE for supine > sit with HOB elevated and use of overhead trapeze; sit <> stand with mod A x2 and elevated bed height; unable to take steps from bed > w/c; pt reports he believes he was only heel WB on LLE during standing; ceiling lift needed for transfer to w/c  Barriers to return to prior living situation: very limited mobility status yet, fall risk, difficulty with mobility skills if limited to heel WB L foot, heavy use on ability to adapt environment for current mobility (raise HOB, overhead trapeze, elevated bed height for standing)   Recommendations for discharge: TCU  Rationale for recommendations: Pt would benefit from TCU for continued rehab to progress strength, ROM, and mobility prior to return to home       Entered by: Estela Malik 06/21/2017 11:35 AM         PM Session: dependently lifted from w/c to bed, participated in supine ex on RLE, continues to be very limited with ROM.  Pt is appropriate for daily PT

## 2017-06-21 NOTE — PLAN OF CARE
Problem: Goal Outcome Summary  Goal: Goal Outcome Summary  A/O complained of calf pain md notified pt said calf pain has improved this shift.  Taking oral dilaudid for pain using urinal voided 200cc dark clear gaby post void residual 105cc.

## 2017-06-21 NOTE — PROGRESS NOTES
Care Transitions Team: Following for CC, discharge planning, and disposition.      POD 2, PT recommendation update:   Barriers to return to prior living situation: very limited mobility status yet, fall risk, difficulty with mobility skills if limited to heel WB L foot, heavy use on ability to adapt environment for current mobility (raise HOB, overhead trapeze, elevated bed height for standing)   Recommendations for discharge: TCU  Rationale for recommendations: Pt would benefit from TCU for continued rehab to progress strength, ROM, and mobility prior to return to home       Entered by: Estela Malik 06/21/2017 11:35 AM     This information was shared with Dr. Raymond's CC via  at  [phone]     Will await MD direction and consult [facility] upon MD request.     Will continue to follow.    Vannesa Dhaliwal RN BSN CTS  Care Transitions Team  [phone]

## 2017-06-21 NOTE — PROGRESS NOTES
"[facility]  Hospitalist Progress Note  Gregory Larson MD, MD 06/21/2017  (Text Page)  Reason for Stay (Diagnosis): post operative medical care.         Assessment and Plan:      Summary of Stay: Damien Vallecillo is a 81 year old male admitted on 6/19/2017 with s/p R TKA and left hammer toe repair.    Problem List:   1. Post operative state  2. S/p TKA and left hammer toe repair  3. BPH    Pain under control. Tolerating oral diet. Prn anti-emetics.  Participating with PT/OT  Suspect needing TCU placement    DVT Prophylaxis: Defer to primary service  Code Status: Full Code  Discharge Dispo: likely TCU  Estimated Disch Date / # of Days until Disch: defer to primary service        Interval History (Subjective):      Seen and examined.  Damien is conversant, cooperative and pleasant while sitting comfortable on the chair.  Earlier he had some calf pain and feeling of cramps.  Afebrile. Feeling much better now.  Stable hemodynamics.                  Physical Exam:      Last Vital Signs:  /64  Pulse 81  Temp 99.2  F (37.3  C) (Oral)  Resp 18  Ht 1.778 m (5' 10\")  Wt 120.7 kg (266 lb)  SpO2 96%  BMI 38.17 kg/m2    I/O last 3 completed shifts:  In: 340 [P.O.:340]  Out: 1000 [Urine:1000]  Wt Readings from Last 1 Encounters:   06/19/17 120.7 kg (266 lb)     Vitals:    06/16/17 1400 06/19/17 1314   Weight: 121.5 kg (267 lb 14.4 oz) 120.7 kg (266 lb)       Constitutional: Awake, alert, cooperative, no apparent distress   Respiratory: Clear to auscultation bilaterally, no crackles or wheezing   Cardiovascular: Regular rate and rhythm, normal S1 and S2, and no murmur noted   Abdomen: Normal bowel sounds, soft, non-distended, non-tender   Skin: No rashes, no cyanosis, dry to touch   Neuro: Alert and oriented x3, no weakness, spontaneous and coherent speech   Extremities: No edema, normal range of motion   Other(s): Euthymic mood, not agitated       All other systems: Negative          Medications:    "   All current medications were reviewed with changes reflected in problem list.         Data:      All new lab and imaging data was reviewed.   Labs:  No results for input(s): CULT in the last 168 hours.    Recent Labs  Lab 06/21/17  0610 06/20/17  0600 06/19/17  1346   POTASSIUM  --   --  4.1   * 148*  --    CR  --   --  0.82   GFRESTIMATED  --   --  90   GFRESTBLACK  --   --  >90African American GFR Calc       Recent Labs  Lab 06/21/17 0610 06/20/17 0600 06/19/17  1346   HGB 8.8* 10.1* 12.1*   PLT  --   --  224       Recent Labs  Lab 06/21/17 0610 06/20/17  2244 06/20/17 0600 06/19/17  2215 06/19/17  1917   *  --  148*  --   --    BGM  --  110*  --  178* 117*        Imaging:   Results for orders placed or performed during the hospital encounter of 06/19/17   XR Knee Port Right 1/2 Views    Narrative    XR KNEE PORT RT 1/2 VW    6/19/2017 7:34 PM      HISTORY: Post-Op Total Knee    COMPARISON: None.    FINDINGS:  Right total knee arthroplasty has been performed. The  components are in good position. No postoperative complications are  identified.    ZEV ABRAHAM MD

## 2017-06-22 ENCOUNTER — APPOINTMENT (OUTPATIENT)
Dept: PHYSICAL THERAPY | Facility: CLINIC | Age: 82
DRG: 470 | End: 2017-06-22
Attending: ORTHOPAEDIC SURGERY
Payer: MEDICARE

## 2017-06-22 ENCOUNTER — CARE COORDINATION (OUTPATIENT)
Dept: CARE COORDINATION | Facility: CLINIC | Age: 82
End: 2017-06-22

## 2017-06-22 VITALS
RESPIRATION RATE: 16 BRPM | TEMPERATURE: 98 F | BODY MASS INDEX: 38.08 KG/M2 | SYSTOLIC BLOOD PRESSURE: 131 MMHG | WEIGHT: 266 LBS | OXYGEN SATURATION: 96 % | HEIGHT: 70 IN | DIASTOLIC BLOOD PRESSURE: 70 MMHG | HEART RATE: 75 BPM

## 2017-06-22 LAB — PLATELET # BLD AUTO: 153 10E9/L (ref 150–450)

## 2017-06-22 PROCEDURE — 25000128 H RX IP 250 OP 636: Performed by: ORTHOPAEDIC SURGERY

## 2017-06-22 PROCEDURE — 36415 COLL VENOUS BLD VENIPUNCTURE: CPT | Performed by: ORTHOPAEDIC SURGERY

## 2017-06-22 PROCEDURE — 99232 SBSQ HOSP IP/OBS MODERATE 35: CPT | Performed by: INTERNAL MEDICINE

## 2017-06-22 PROCEDURE — 85049 AUTOMATED PLATELET COUNT: CPT | Performed by: ORTHOPAEDIC SURGERY

## 2017-06-22 PROCEDURE — 40000193 ZZH STATISTIC PT WARD VISIT: Performed by: PHYSICAL THERAPIST

## 2017-06-22 PROCEDURE — 25000132 ZZH RX MED GY IP 250 OP 250 PS 637: Mod: GY | Performed by: ORTHOPAEDIC SURGERY

## 2017-06-22 PROCEDURE — 40000894 ZZH STATISTIC OT IP EVAL DEFER: Performed by: STUDENT IN AN ORGANIZED HEALTH CARE EDUCATION/TRAINING PROGRAM

## 2017-06-22 PROCEDURE — A9270 NON-COVERED ITEM OR SERVICE: HCPCS | Mod: GY | Performed by: ORTHOPAEDIC SURGERY

## 2017-06-22 PROCEDURE — 97110 THERAPEUTIC EXERCISES: CPT | Mod: GP | Performed by: PHYSICAL THERAPIST

## 2017-06-22 RX ORDER — HYDROMORPHONE HYDROCHLORIDE 2 MG/1
2-4 TABLET ORAL
Refills: 0 | Status: SHIPPED | DISCHARGE
Start: 2017-06-22 | End: 2017-07-13

## 2017-06-22 RX ADMIN — ACETAMINOPHEN 975 MG: 325 TABLET, FILM COATED ORAL at 11:44

## 2017-06-22 RX ADMIN — ACETAMINOPHEN 975 MG: 325 TABLET, FILM COATED ORAL at 04:04

## 2017-06-22 RX ADMIN — ACETAMINOPHEN 650 MG: 325 TABLET, FILM COATED ORAL at 14:37

## 2017-06-22 RX ADMIN — ENOXAPARIN SODIUM 40 MG: 40 INJECTION SUBCUTANEOUS at 11:44

## 2017-06-22 NOTE — PROGRESS NOTES
Clinic Care Coordination Contact    Situation: Patient chart reviewed by RN care coordinator.    Background:   CCRN received report from [facility] inpatient     Assessment:     right knee pain, left foot hammertoe  S/P total knee arthroplasty  Admit Date/Time: 6/19/2017 12:44 PM  Discharge Date: 6/22/2017     Plan/Recommendations: Patient will discharge today to TCU - CCRN will reach out to TCU tomorrow     Flakita Mast Care Coordinator RN  [facility]  [phone]  June 22, 2017

## 2017-06-22 NOTE — PROGRESS NOTES
"[facility]  Hospitalist Progress Note  Gregory Larson MD, MD 06/22/2017  (Text Page)  Reason for Stay (Diagnosis): post operative medical care.         Assessment and Plan:      Summary of Stay: Damien Vallecillo is a 81 year old male admitted on 6/19/2017 with s/p R TKA and left hammer toe repair.    Problem List:   1. Post operative state  2. S/p TKA and left hammer toe repair  3. BPH  4. Acute blood loss anemia from recent surgery, no indication yet for PRBC tranfusion    Pain under control. Tolerating oral diet. Prn anti-emetics.  Participating with PT/OT  I have no objections for TCU discharge. Needs to continue incentive spirometer    DVT Prophylaxis: Defer to primary service  Code Status: Full Code  Discharge Dispo: likely TCU  Estimated Disch Date / # of Days until Disch: defer to primary service        Interval History (Subjective):      Seen and examined.  Damien is in good spirits, remained conversant, cooperative and pleasant while sitting comfortable on the chair.  No recurrence of calf pain and feeling of cramps.  Afebrile. Tolerating oral diet  Stable hemodynamics.                  Physical Exam:      Last Vital Signs:  /70  Pulse 75  Temp 98  F (36.7  C) (Oral)  Resp 16  Ht 1.778 m (5' 10\")  Wt 120.7 kg (266 lb)  SpO2 96%  BMI 38.17 kg/m2    I/O last 3 completed shifts:  In: 800 [P.O.:800]  Out: 1050 [Urine:1050]  Wt Readings from Last 1 Encounters:   06/19/17 120.7 kg (266 lb)     Vitals:    06/16/17 1400 06/19/17 1314   Weight: 121.5 kg (267 lb 14.4 oz) 120.7 kg (266 lb)       Constitutional: Awake, alert, cooperative, no apparent distress   Respiratory: Clear to auscultation bilaterally, no crackles or wheezing   Cardiovascular: Regular rate and rhythm, normal S1 and S2, and no murmur noted   Abdomen: Normal bowel sounds, soft, non-distended, non-tender   Skin: No rashes, no cyanosis, dry to touch   Neuro: Alert and oriented x3, no weakness, spontaneous and coherent " speech   Extremities: No edema, normal range of motion   Other(s): Euthymic mood, not agitated       All other systems: Negative          Medications:      All current medications were reviewed with changes reflected in problem list.         Data:      All new lab and imaging data was reviewed.   Labs:  No results for input(s): CULT in the last 168 hours.    Recent Labs  Lab 06/21/17  0610 06/20/17  0600 06/19/17  1346   POTASSIUM  --   --  4.1   * 148*  --    CR  --   --  0.82   GFRESTIMATED  --   --  90   GFRESTBLACK  --   --  >90African American GFR Calc       Recent Labs  Lab 06/22/17  0602 06/21/17  0610 06/20/17  0600 06/19/17  1346   HGB  --  8.8* 10.1* 12.1*     --   --  224       Recent Labs  Lab 06/21/17  1630 06/21/17  0610 06/20/17  2244 06/20/17  0600 06/19/17  2215 06/19/17  1917   GLC  --  128*  --  148*  --   --    *  --  110*  --  178* 117*        Imaging:   Results for orders placed or performed during the hospital encounter of 06/19/17   XR Knee Port Right 1/2 Views    Narrative    XR KNEE PORT RT 1/2 VW    6/19/2017 7:34 PM      HISTORY: Post-Op Total Knee    COMPARISON: None.    FINDINGS:  Right total knee arthroplasty has been performed. The  components are in good position. No postoperative complications are  identified.    ZEV ABRAHAM MD

## 2017-06-22 NOTE — PROGRESS NOTES
Care Transitions Team: Following for CC, discharge planning, and disposition.        Per chart review noted DC recommendations per Dr. Kim re: Foot care.      Scheduled Follow for June 23, this is tomorrow.     Spoke to Dr. Kim who relayed that the care facility can change dressing and she can see him in 1-2 weeks from now.     This appointment was rescheduled for     Jun 30, 2017  2:45 PM CDT   Return Visit with Beverly Kim DPM, Podiatry/Foot and Ankle Surgery   [facility] ([facility])    [id number] [place] [id number]   [phone]        Dressing change instructions have been added to AVS   QOD dressing changes  4x4 guaze, Kerlix and wrap with Ace.     Call  for question or concerns     Vannesa Dhaliwal RN BSN [facility]  Care Transitions Team  [phone]

## 2017-06-22 NOTE — PROGRESS NOTES
Pt received discharge instructions on previous shift.  Verbalizes understanding and no further questions.  Assisted into w/c with Ax2 et lift.  Personal belongings gathered and sent with pt.  Leaves via w/c van for TCU.

## 2017-06-22 NOTE — PROGRESS NOTES
Care Transitions Team: Following for CC, discharge planning, and disposition.        [facility] has a bed available for discharge today.  Per Bedside RN who spoke with Hospitalitist pt is discharge ready for today as well.     Updates have been provided to discharge AVS in regards to Dr. Kim's recommendations and handoff to TCU via KEMAR Kiser, complete.    Pt. requests transport, fee's were discussed and [facility] W/C transport was arranged for 415pm.    Pt will update his wife regarding plan.     PCP handoff will be complete on discharge.     Vannesa Dhaliwal RN BSN CTS  Care Transitions Team  [phone]      Your information has been submitted on June 22nd, 2017 at 12:32:41 PM CDT. The confirmation number is [id number]

## 2017-06-22 NOTE — PLAN OF CARE
Problem: Goal Outcome Summary  Goal: Goal Outcome Summary  A/O, [place], VSS, repositioned for comfort declines pain meds.  Cam boot LLE, KI RLE when out of bed.  Dc possible to tcu today.  Care coordinator following.

## 2017-06-22 NOTE — PLAN OF CARE
Problem: Goal Outcome Summary  Goal: Goal Outcome Summary     Discharge Planner PT   Patient plan for discharge: to TCU  Current status: Pt participated in LE exercises, knee flexion on R to 55  Barriers to return to prior living situation: pt unable to safely mobilize  Recommendations for discharge: TCU  Rationale for recommendations: Pt will benefit from continued PT at TCU for progressing mobility, strength, ROM       Entered by: Helen Montalvo 06/22/2017 3:24 PM      Physical Therapy Discharge Summary     Reason for therapy discharge:    Discharged to transitional care facility.     Progress towards therapy goal(s). See goals on Care Plan in [facility] electronic health record for goal details.  Goals not met.  Barriers to achieving goals:   limited tolerance for therapy.     Therapy recommendation(s):    Continued therapy is recommended.  Rationale/Recommendations:  at TCU.

## 2017-06-22 NOTE — PLAN OF CARE
Problem: Goal Outcome Summary  Goal: Goal Outcome Summary  OT: Per chart review plan is for TCU possibly today, met with RN and pt, discussed with plan for TCU likely today, will hold IP OT evaluation and defer assessment and treatment to next level of care facility, pt in agreement with plan, will complete order

## 2017-06-22 NOTE — PROGRESS NOTES
Transition Communication Hand-off for Care Transitions to Next Level of Care Provider    Name: Damien Vallecillo  MRN #: [id number]  Primary Care Provider: Obi Strange  Primary Care MD Name: Alexandr   Primary Clinic: [facility] [address] [facility] [address]  Primary Care Clinic Name: [facility]  Reason for Hospitalization:  right knee pain, left foot hammertoe  S/P total knee arthroplasty  Admit Date/Time: 6/19/2017 12:44 PM  Discharge Date: 6/22/2017  Payor Source: Payor: MEDICARE / Plan: MEDICARE / Product Type: Medicare /       Pt is S/P Right total knee arthroplasty, Hammer toe repair toe 2,3,4,5 left foot with osteotomy  , 6/19/2017, Dr. Alec Raymond.  Discharging to TCU, [place]   Discharge follow up plan to include Ortho Dr. Raymond in 10 -14 days   Follow up with Dr. Kim on 6/30/2017 at 2:45 pm at [facility].   For questions or concerns with the foot surgery, call:  [phone]   DVT Prophylaxis   aspirin  MG EC tablet   This may have changed: medication strength        Dose: 325 mg   Take 1 tablet (325 mg) by mouth daily   Quantity: 30 tablet   Refills: 0                Pt.'s  PCP is Obi Strange    Internal Medicine service was asked to see pt. post operatively for management of chronic medical problems.  No post operative acute medical concerns identified for PCP follow up on discharge.     FYI/TCU handoff indicated.    Vannesa Dhaliwal RN BSN CTS  Care Transitions Team  [phone]      AVS/Discharge Summary is the source of truth; this is a helpful guide for improved communication of patient story

## 2017-06-22 NOTE — OP NOTE
DATE OF PROCEDURE:  06/19/2017       SURGEON:  Alec Raymond MD      ASSISTANT:  Moises Govea PA-C      PREOPERATIVE DIAGNOSIS:  Primary osteoarthritis, right knee.      POSTOPERATIVE DIAGNOSIS:  Primary osteoarthritis, right knee.      PROCEDURE:  Right total knee arthroplasty.      ANESTHESIA:  General.       INDICATIONS FOR PROCEDURE:  Damien Vallecillo is an 81-year-old gentleman with longstanding primary osteoarthritis to his right knee.  He elected to undergo the above-stated procedure, fully understanding the potential risks as well as benefits of this procedure.      DESCRIPTION OF PROCEDURE:  Damien was brought to the operating room, placed in a supine position on the operating table, where general anesthesia was administered without difficulty.  A tourniquet was applied to his right upper thigh, and following routine sterile scrub, prep and drape, the  right lower extremity was exsanguinated and the tourniquet was inflated to 300 mmHg.  Total tourniquet time was approximately 1 hour.        A longitudinal skin incision was made directly over the anterior aspect of the knee.  The skin and subcutaneous tissue were dissected sharply down to the medial patellar retinaculum and was extended up into the mid quadriceps tendon.      Using an intramedullary guide from the [employer] system, sequential cuts were made to the distal femur for a size #7 femoral component.  An extramedullary guide with 3 degrees of posterior slope was then used to make the tibial cuts for a size 7 tibial baseplate.  A 13 mm spacer gave the best stability and range of motion.  The patella was reamed for a 29 mm patellar button.  With the trial components in place, the knee had excellent range of motion and stability.  The trial components were removed and the bone ends were pulse lavaged and dried carefully.  A size 7 tibial baseplate, size 7 femoral component and 29 mm patella were cemented with polymethyl methacrylate cement.  Following  cement cure, the trial poly was replaced with a prosthetic 13 mm poly.  The knee had excellent range of motion and stability.  The wound was closed in layers in a routine fashion and dressed sterilely.  Hortensia left the operating room in satisfactory and stable condition.      ESTIMATED BLOOD LOSS:  10 mL.      SPONGE AND NEEDLE COUNT:  Correct x2.             IDALIA WHITFIELD MD             D: 2017 13:55   T: 2017 20:47   MT: ITALIA#[id number]      Name:     HORTENSIA ADAMS   MRN:      [id number]        Account:        [id number]   :      1935           Procedure Date: 2017      Document: [id number]

## 2017-06-22 NOTE — PROGRESS NOTES
Care Transitions Team: Following for CC, discharge planning, and disposition.        Per conversation with Ortho, Dr. Raymond is in agreement for TCU on discharge.    Followed up with pt at bedside who is understanding of the recommendations and would like   1. [facility]  2. [facility]   3. [facility]   Semi Private room.    Pt is medcially stable per Ortho for DC today.   Hospitalist to follow as well.     Referrals have been sent     Vannesa Dhaliwal RN BSN CTS  Care Transitions Team  [phone]

## 2017-06-22 NOTE — PLAN OF CARE
Problem: Goal Outcome Summary  Goal: Goal Outcome Summary  Outcome: Adequate for Discharge Date Met:  06/22/17  VS stable, afebrile. Pain is minimal, managing with Tylenol. Denies any numbness or tingling. Using the urinal. Dressing dry and intact. Lift. Will be going to [place] today at 415 pm. HE will provide the transport.

## 2017-06-22 NOTE — PLAN OF CARE
"Problem: Goal Outcome Summary  Goal: Goal Outcome Summary  Outcome: No Change  /67  Pulse 81  Temp 100  F (37.8  C) (Oral)  Resp 16  Ht 1.778 m (5' 10\")  Wt 120.7 kg (266 lb)  SpO2 97%  BMI 38.17 kg/m2  Lungs: clear but diminished, encouraged CDB and IS  BS: positive, passing flatus, tolerating regular diet  Urine: voids adequate amount of urine in urinal  CMS: intact, dried drainage to dressing, dressing marked  Pain: controlled with scheduled tylenol, no PRN pain medications needed this evening.  Activity: up with Ax1 and Lift per PT reccomendations  Plan to discharge to TCU             "

## 2017-06-22 NOTE — DISCHARGE SUMMARY
[facility]  Orthopedics Progress Note          Assessment and Plan:   POD#2  Rt TKA and hammertoe  correction, stable, in need of further rehab prior to return home. OT/PT, WBAT, regular diet.  F/U in 2 weeks with Wengler and Julio.(Friday afternoons in [facility])             Interval History:   no complaints and doing well; no cp, sob, n/v/d, or abd pain.              Review of Systems:   C: NEGATIVE for fever, chills, change in weight  E/M: NEGATIVE for ear, mouth and throat problems  R: NEGATIVE for significant cough or SOB  CV: NEGATIVE for chest pain, palpitations or peripheral edema             Medications:       atorvastatin  40 mg Oral At Bedtime     fluticasone  1-2 spray Both Nostrils Daily     tamsulosin  0.4 mg Oral At Bedtime     sodium chloride (PF)  3 mL Intracatheter Q8H     enoxaparin  40 mg Subcutaneous Q24H     lidocaine, lidocaine 4%, sodium chloride (PF), lidocaine, lidocaine 4%, sodium chloride (PF), sore throat lozenge, naloxone, HYDROmorphone, acetaminophen, HYDROmorphone, cyclobenzaprine, hydrOXYzine, ondansetron **OR** ondansetron, zolpidem               Physical Exam:   Vitals were reviewed  Patient Vitals for the past 24 hrs:   BP Temp Temp src Pulse Heart Rate Resp SpO2   06/22/17 0835 131/70 98  F (36.7  C) Oral 75 - 16 96 %   06/22/17 0052 132/66 99  F (37.2  C) Oral - 87 16 96 %   06/21/17 1904 128/67 100  F (37.8  C) Oral - 87 16 97 %   06/21/17 1559 - 99.7  F (37.6  C) Oral - - - -   06/21/17 1554 130/58 100  F (37.8  C) Oral - 76 16 97 %   06/21/17 1345 - - - - - 16 -     Musculoskeletal:   CMS intact, Navid's neg.              Data:     Lab Results   Component Value Date    WBC 5.9 05/15/2017     Lab Results   Component Value Date    RBC 4.82 05/15/2017     Lab Results   Component Value Date    HGB 8.8 06/21/2017     Lab Results   Component Value Date    HCT 37.7 05/15/2017     Lab Results   Component Value Date    RDW 18.8 05/15/2017     Lab Results    Component Value Date     06/22/2017           Alec Raymond MD  6/22/2017 1:38 PM

## 2017-06-22 NOTE — DISCHARGE INSTRUCTIONS
POST OP LOOT FOOT CARE:  Every Other Day Dressing changes to include 4x4 gauze, wrap with Kerlix and then follow with Ace wrap.   Call Dr. Kim office,  [phone] for question or concerns     POST OP TOTAL KNEE INSTRUCTION  Pain medication: You will be discharged from the hospital with pain medication(s). In most cases, consistent use the pain pills can be limited to a few days. After this period, the medication should be weaned off as soon as possible to avoid potential complications of constipation, nausea, or rash, etc. Until you can be completely off the pain pills, using them only at nighttime along with controlling the pain with over-the-counter medication(s) such as Ibuprofen/Naproxen and/or Tylenol during the day would be a good way of managing the pain.    A medication for blood clot prevention will be specified when you are discharged. If you were taking Coumadin before the surgery, you will resume it.    Activity: You will be using a walker or crutches until you feel confident. You also need to use the knee immobilizer until you are able to straight leg raise 10-15 times. Gradual rather than sudden increase of walking distance is recommended as the comfort level dictates. A cane instead of walker or crutches can be used when you strength and balance are improved.    Dressing: Typically, the first dressing change will be done on postop day #2. Showers can be taken with plastic covering over the original post surgical dressing for the first 4 days from the surgery. At that point, the incision site can be wet for showering but should not be soaked. A light gauze dressing along with paper tape should be placed over the wound after each shower. The staples will be removed 10-14 days from the operation. During that time it is best to cover the incision site to protect the staples from being pulled or snagged.     If you have an aquacell dressing, (flesh colored rubber like bandage), you may leave this  dressing in place until follow up in the clinic, unless; the dressing becomes completely saturated, is leaking, gets water inside as evident by moisture appearing on the inside of the dressing, or you have a skin reaction.  In this case you should remove and use dressings like what is mentioned above.    Exercises: It is recommended that you do the exercises of range of motion and strengthening in a small amounts frequently throughout the day rather than infrequent long sessions. Being over aggressive with her exercises can be hindering rather than helping. We are looking for a gradual steady improvement even though the general goal for range of motion post-operatively is 0-90 degrees within the first 2 weeks. During this time, gaining full Extension is far more important than gaining flexion. Generally, you be working with a physical therapist 2-3 times per week for the first 2-4 weeks.    5 keys to the knee,- to be done every 2 hours during the day:  1. Knee Bending: Dangle- in a seated position where foot can swing, let the leg from the knee down dangle off the edge of bed or table. You may use the other leg/foot to gently push the affected leg into more bending.  2. Knee straightening: Heel blocking - While sitting or laying, place pillow, ottoman or some other support under the heel with toes pointing up but ankle relaxed.  Rest there, working up to 10 minutes relaxing the leg to induce knee straightening.  Once relaxed, push/squeeze the knee towards the floor, hold for 2 seconds, and relax. Repeat x 10.    NOTE:  Alternate 1 and 2 every other hour.  3. Straight leg raises: from a laying or sitting position, while keeping the knee locked straight, slowly lift the foot up 12-16 inches and hold for 2 seconds and relax.  Repeat working up to 20 repetitions.    4. Ice and elevate: whenever you are not up and about.  No standing around or sitting with the knee bend for more than very short periods.  5. Walk:  Use  assistive devices as needed such as walker or cane to promote safety.  Walk focusing more on good form than getting somewhere.  Lead with the knee and then straighten the knee trying to make the surgical leg move like normal knee.    Soft tissue: It is not unusual to have swelling in the leg (thigh down to the foot) up to 2 months from the surgery. Frequent ankle pumping, elevation of the leg above the heart level and gentle compression support with ace wrap should help with swelling. Icing regularly, for 20 minutes every hour, will also help with swelling and pain.  Due to soft tissue swelling, increased amount of redness around the incision site is also commonly seen. Progressively worsening redness along with increasing pain or increasing drainage from the wound should be a reason to alert us immediately.     Follow up in clinic within 14 days after surgery.  May call [phone] to schedule.    Thomas Govea PA-C  [facility] - Surgery  [facility]  [address] [place]  [address]  [phone]

## 2017-06-23 NOTE — PROGRESS NOTES
Clinic Care Coordination Contact  Care Team Conversations    CCRN placed call to [facility] - gave CCRN contact information and asked to be notified with questions/concerns and when patient discharges from TCU     CCRN will review chart and f/u with TCU in 2 weeks if not have not heard from TCU by that time     Flakita Mast Care Coordinator RN  [facility]  [phone]  June 23, 2017

## 2017-06-26 ENCOUNTER — TELEPHONE (OUTPATIENT)
Dept: PODIATRY | Facility: CLINIC | Age: 82
End: 2017-06-26

## 2017-06-26 ENCOUNTER — TELEPHONE (OUTPATIENT)
Dept: ORTHOPEDICS | Facility: CLINIC | Age: 82
End: 2017-06-26

## 2017-06-26 NOTE — TELEPHONE ENCOUNTER
Patient will have staples from TKA removed at TCU so appointment with Moises Govea PA-C on 6/29/17 was cancelled. Patient is going to follow up in the office with Moises Govea PA-C  after discharge from TCU if all goes well.     Please advise patient of what she should do for foot surgery as patient will not be able to make it to appointment on 6/30/17 with Dr. Kim without causing a financial hardship.     MARK Ontiveros RN

## 2017-06-26 NOTE — TELEPHONE ENCOUNTER
Ok for staples at TCU, I believe that is in discharge instructions for TCU.    As long as he is being monitored ok to just see in clinic when able.  Would like post ok x ray at some point.    Thomas Govea PA-C  [facility] - Surgery

## 2017-06-26 NOTE — TELEPHONE ENCOUNTER
Wife, Alejandrina, left voicemail asking for a return call regarding upcoming appointments with Dr. Raymond and Dr. Kim.     Phone call to wife. She asks if appointments can be moved as it is too costly for her to have patient transported from TCU and back.   Patient has appointment with Dr. Kim on 6/30/17, but she is not sure when he is going to be ready to be discharged from the TCU.     Encouraged patient to discuss further with [facility] in [place] of when they are expecting discharge. Informed will check with Dr. Kim of what they recommend. She would like to coordinate visit with Moises Govea PA-C and Dr. Kim on the same day if possible. Informed that we are not able to do that most likely. Will have Dr. Kim's office get back with her.       Patient had :  1.  Left second metatarsal shortening osteotomy.   2.  Left third metatarsal shortening osteotomy.   3.  Left second proximal interphalangeal joint fusion.   4.  Left third proximal interphalangeal joint fusion.   5.  Left fourth toe flexor tenotomy.   6.  Left fifth toe flexor tenotomy.     Patient also had R TKA on 6/19/17 by Dr. Wang in conjunction with left foot surgery by Dr. Kim.     Routing to Dr. Kim.   Not sure if you have TCU remove dressing/sutures, etc.  Please advise.     MARK Ontiveros RN

## 2017-06-26 NOTE — TELEPHONE ENCOUNTER
I can try and come on the Thursday 6/29/2017 during his visit with Moises.  I have surgery that day from 9-11:30 so if they are okay with waiting after their appointment with Moises, I can do a dressing change and check the stitches. If not, then I can have the TCU remove the stitches the following week.    Please let patients wife know.     Beverly Kim DPM

## 2017-06-26 NOTE — TELEPHONE ENCOUNTER
Phone call to wife, Alejandirna, and informed of Moises Govea's recommendations below. Asked that she follow up with TCU to make sure they have orders, and if not to call us back. Otherwise to schedule an appointment with Moises Govea PA-C either the same day he is discharged or as soon as possible after.   She verbalized understanding.   She does states she has a lot going on and her son currently has a lung infection and she is a little overwhelmed. She will call back for further questions.     She asks about Dr. Kim's instructions. Will send message in another encounter.     MARK Ontiveros RN

## 2017-06-26 NOTE — TELEPHONE ENCOUNTER
Wife, Alejandrina, left voicemail asking for a return call regarding upcoming appointments with Dr. Raymond and Dr. Kim.     Phone call to wife. She asks if appointments can be moved as it is too costly for her to have patient transported from TCU and back.   Patient has appointment with Moises Govea PA-C scheduled for 6/29/17, but she is not sure when he is going to be ready to be discharged from the TCU.     Encouraged patient to discuss further with [facility] in [facility] when they are expecting discharge. Informed we can give them orders to D/C staples and patient can make appointment for follow up with Moises Govea PA-C here possibly the same day as discharge. Most likely not able to coordinate visit with Dr. Kim on the same day and Moises is here in the mornings on Friday and Dr. Kim is in [place] in the pm on Fridays.     Will check into further and get back with her.     Patient had R TKA on 6/19/17, in conjunction with left foot surgery by Dr. Kim.     Please advise if ok to give D/C staples order to [facility] for 7/3/17 or after.     MARK Ontiveros, RN

## 2017-06-27 NOTE — TELEPHONE ENCOUNTER
[facility]  [address] [place] [address]  Phone: [phone]    Discussed that we received a message from the wife that the patient is not going to his Thursday appointment now.  Discussed that they can take the foot stitches out July 3rd and he can get the foot wet at that time. Still needs to wear boot until August 1st.  Follow up when he leaves the care center.    Beverly Kim DPM

## 2017-07-10 ENCOUNTER — TELEPHONE (OUTPATIENT)
Dept: ORTHOPEDICS | Facility: CLINIC | Age: 82
End: 2017-07-10

## 2017-07-10 NOTE — TELEPHONE ENCOUNTER
Received call from Usha, , [facility]. She states they spoke with Elissa on 6/27/17 and were told there was not a need for patient to follow up until discharge. They did remove his staples and patient has ongoing therapy. There has been no date set for discharge. Patient is transferring better and family is able to transport patient to appointment. Physical therapy would like us to review the minimal weight bearing status and see patient if appropriate.     Phone call to Usha. Informed if it is not a burden/hardship for family to bring patient, then ok to make an appointment and our provider can make further recommendations regarding weight bearing status. She will have family call to make appointment.     MARK Ontiveros RN

## 2017-07-11 ENCOUNTER — CARE COORDINATION (OUTPATIENT)
Dept: CARE COORDINATION | Facility: CLINIC | Age: 82
End: 2017-07-11

## 2017-07-11 NOTE — PROGRESS NOTES
Clinic Care Coordination Contact    Situation: Patient chart reviewed by RN care coordinator.    Background: admitted to [facility] after knee surgery     Assessment: Per chart review patient currently admitted to [facility]     Plan/Recommendations: CCRM will f/u in two weeks to ensure patient still at TCU     Flakita Mast Care Coordinator RN  [facility]  [phone]  July 11, 2017

## 2017-07-19 ENCOUNTER — CARE COORDINATION (OUTPATIENT)
Dept: CARE COORDINATION | Facility: CLINIC | Age: 82
End: 2017-07-19

## 2017-07-19 NOTE — PROGRESS NOTES
Clinic Care Coordination Contact  Care Team Conversations    CCRN received call from Rashel IYER at [facility]     Care team will have meeting tomorrow and patient will likely discharge on 7/23/17 - she is uncertain if he will have any services such as home care     CCRN asked Rashel to call back if plans change     CCRN will f/u with patient on 7/24/17 after home from TCU     Flakita Mast Care Coordinator RN  [facility]  [phone]  July 19, 2017

## 2017-07-24 ENCOUNTER — HOSPITAL ENCOUNTER (OUTPATIENT)
Dept: ULTRASOUND IMAGING | Facility: CLINIC | Age: 82
Discharge: HOME | End: 2017-07-24
Attending: FAMILY MEDICINE | Admitting: FAMILY MEDICINE
Payer: MEDICARE

## 2017-07-24 ENCOUNTER — OFFICE VISIT (OUTPATIENT)
Dept: FAMILY MEDICINE | Facility: CLINIC | Age: 82
End: 2017-07-24
Payer: MEDICARE

## 2017-07-24 VITALS
DIASTOLIC BLOOD PRESSURE: 82 MMHG | HEIGHT: 70 IN | TEMPERATURE: 98.5 F | HEART RATE: 76 BPM | WEIGHT: 260 LBS | BODY MASS INDEX: 37.22 KG/M2 | SYSTOLIC BLOOD PRESSURE: 134 MMHG | RESPIRATION RATE: 16 BRPM

## 2017-07-24 DIAGNOSIS — R60.0 PEDAL EDEMA: Primary | ICD-10-CM

## 2017-07-24 DIAGNOSIS — M20.42 HAMMER TOE OF LEFT FOOT: ICD-10-CM

## 2017-07-24 DIAGNOSIS — R60.0 PEDAL EDEMA: ICD-10-CM

## 2017-07-24 DIAGNOSIS — Z96.651 S/P TOTAL KNEE ARTHROPLASTY, RIGHT: ICD-10-CM

## 2017-07-24 DIAGNOSIS — I87.2 VENOUS STASIS DERMATITIS OF BOTH LOWER EXTREMITIES: ICD-10-CM

## 2017-07-24 PROBLEM — M25.561 RIGHT KNEE PAIN, UNSPECIFIED CHRONICITY: Status: RESOLVED | Noted: 2017-05-08 | Resolved: 2017-07-24

## 2017-07-24 PROCEDURE — 99495 TRANSJ CARE MGMT MOD F2F 14D: CPT | Performed by: FAMILY MEDICINE

## 2017-07-24 PROCEDURE — 93971 EXTREMITY STUDY: CPT | Mod: RT

## 2017-07-24 NOTE — MR AVS SNAPSHOT
After Visit Summary   7/24/2017    Damien Vallecillo    MRN: [id number]           Patient Information     Date Of Birth          1935        Visit Information        Provider Department      7/24/2017 11:30 AM Obi Strange MD [facility]        Today's Diagnoses     Pedal edema    -  1    Venous stasis dermatitis of both lower extremities        Hammer toe of left foot        S/P total knee arthroplasty, right           Follow-ups after your visit        Your next 10 appointments already scheduled     Aug 04, 2017 11:00 AM CDT   Return Visit with Thomas Govea PA-C   [facility] ORTHOPEDIC SURGERY ([facility])    [address]  [address]  [facility] [address]   [phone]            Aug 04, 2017  1:00 PM CDT   Return Visit with Beverly Kim DPM, Podiatry/Foot and Ankle Surgery   [facility] PODIATRY ([facility])    [address]  [address]  [facility] [address]   [phone]              Future tests that were ordered for you today     Open Future Orders        Priority Expected Expires Ordered    US Lower Extremity Venous Duplex Right STAT  7/24/2018 7/24/2017            Who to contact     If you have questions or need follow up information about today's clinic visit or your schedule please contact [facility] directly at [phone].  Normal or non-critical lab and imaging results will be communicated to you by MyChart, letter or phone within 4 business days after the clinic has received the results. If you do not hear from us within 7 days, please contact the clinic through MyChart or phone. If you have a critical or abnormal lab result, we will notify you by phone as soon as possible.  Submit refill requests through [employer] or call your pharmacy and they will forward the refill request to us. Please allow 3 business days for your refill to be completed.          Additional  "Information About Your Visit        MyChart Information     [email or web address] lets you send messages to your doctor, view your test results, renew your prescriptions, schedule appointments and more. To sign up, go to [email or web address] . Click on \"Log in\" on the left side of the screen, which will take you to the Welcome page. Then click on \"Sign up Now\" on the right side of the page.     You will be asked to enter the access code listed below, as well as some personal information. Please follow the directions to create your username and password.     Your access code is: [email or web address]  Expires: 2017  9:15 AM     Your access code will  in 90 days. If you need help or a new code, please call your [place] clinic or [phone].        Care EveryWhere ID     This is your Care EveryWhere ID. This could be used by other organizations to access your [place] medical records  [id number]        Your Vitals Were     Pulse Temperature Respirations Height BMI (Body Mass Index)       76 98.5  F (36.9  C) (Oral) 16 5' 10\" (1.778 m) 37.31 kg/m2        Blood Pressure from Last 3 Encounters:   17 134/82   17 131/70   17 140/84    Weight from Last 3 Encounters:   17 260 lb (117.9 kg)   17 266 lb (120.7 kg)   05/15/17 267 lb 9.6 oz (121.4 kg)                 Today's Medication Changes          These changes are accurate as of: 17 11:59 PM.  If you have any questions, ask your nurse or doctor.               Start taking these medicines.        Dose/Directions    bumetanide 1 MG tablet   Commonly known as:  BUMEX   Used for:  Pedal edema   Started by:  Obi Strange MD        Dose:  1 mg   Take 1 tablet (1 mg) by mouth 2 times daily   Quantity:  30 tablet   Refills:  0            Where to get your medicines      These medications were sent to [facility] [address]  [address] [facility] [address]     Phone:  [phone]     bumetanide 1 MG " tablet                Primary Care Provider Office Phone # Fax #    Obi Strange [phone]       [facility] [address]  [facility] [address]        Equal Access to Services     ZENON MART : Amairani mccormick romuloo Socorrieali, waaxda luqadaha, qaybta kaalmada adeegyada, corin waden ron ray laDashadino davis. So [facility] [phone].    ATENCIÓN: Si habla español, tiene a dumont disposición servicios gratuitos de asistencia lingüística. Llame al [phone].    We comply with applicable federal civil rights laws and [place] laws. We do not discriminate on the basis of race, color, national origin, age, disability sex, sexual orientation or gender identity.            Thank you!     Thank you for choosing [facility]  for your care. Our goal is always to provide you with excellent care. Hearing back from our patients is one way we can continue to improve our services. Please take a few minutes to complete the written survey that you may receive in the mail after your visit with us. Thank you!             Your Updated Medication List - Protect others around you: Learn how to safely use, store and throw away your medicines at www.disposemymeds.org.          This list is accurate as of: 7/24/17 11:59 PM.  Always use your most recent med list.                   Brand Name Dispense Instructions for use Diagnosis    ACE NOT PRESCRIBED (INTENTIONAL)     0 each    ACE Inhibitor not prescribed due to Other:  Had cough on ACEI in past    Type 2 diabetes, HbA1c goal < 7% (H)       aspirin  MG EC tablet     30 tablet    Take 1 tablet (325 mg) by mouth daily    Status post total right knee replacement       atorvastatin 40 MG tablet    LIPITOR    90 tablet    Take 1 tablet (40 mg) by mouth At Bedtime    Pre-diabetes       * blood glucose monitoring test strip    ACCU-CHEK LAURA    100 strip    1Use to test blood sugar 2 times daily or as directed.    Glucose  intolerance (impaired glucose tolerance)       * blood glucose monitoring test strip    ACCU-CHEK LAURA PLUS    100 each    Use to test blood sugar 1 time daily    Glucose intolerance (impaired glucose tolerance)       bumetanide 1 MG tablet    BUMEX    30 tablet    Take 1 tablet (1 mg) by mouth 2 times daily    Pedal edema       CINNAMON PO           fish oil-omega-3 fatty acids 1000 MG capsule      Take 1 g by mouth every evening        flax seed oil 1000 MG capsule      Take 1 capsule by mouth every evening        fluticasone 50 MCG/ACT spray    FLONASE    1 Bottle    Spray 1-2 sprays into both nostrils daily    Post-nasal drip       Glucosamine HCl 1000 MG Tabs      Take 1 tablet by mouth every evening        guaiFENesin 600 MG 12 hr tablet    MUCINEX    120 tablet    Take 2 tablets (1,200 mg) by mouth 2 times daily    Abnormal pulmonary function test, Cough       MULTIVITAMIN ADULT PO      Reported on 5/12/2017        order for DME     1 each    Equipment being ordered: Walker Wheels () and Walker () Treatment Diagnosis: Impaired functional mobility    Status post total right knee replacement       tamsulosin 0.4 MG capsule    FLOMAX    90 capsule    Take 1 capsule (0.4 mg) by mouth At Bedtime    Benign prostatic hyperplasia with lower urinary tract symptoms, unspecified morphology       triamcinolone 0.5 % cream    KENALOG    30 g    Apply sparingly to affected area three times daily.    Stasis dermatitis of both legs       * Notice:  This list has 2 medication(s) that are the same as other medications prescribed for you. Read the directions carefully, and ask your doctor or other care provider to review them with you.

## 2017-07-24 NOTE — NURSING NOTE
"Chief Complaint   Patient presents with     RECHECK     follow up surgery rt knee and repair hammer toe        Initial /82 (BP Location: Right arm, Patient Position: Chair, Cuff Size: Adult Regular)  Pulse 76  Temp 98.5  F (36.9  C) (Oral)  Resp 16  Ht 5' 10\" (1.778 m)  Wt 260 lb (117.9 kg)  BMI 37.31 kg/m2 Estimated body mass index is 37.31 kg/(m^2) as calculated from the following:    Height as of this encounter: 5' 10\" (1.778 m).    Weight as of this encounter: 260 lb (117.9 kg).  Medication Reconciliation: complete rt arm Marlene Juarez MA      "

## 2017-07-24 NOTE — PROGRESS NOTES
"  SUBJECTIVE:                                                    Damien Vallecillo is a 81 year old male who presents to clinic today for the following health issues:          Hospital Follow-up Visit:    Hospital/Nursing Home/ Rehab Facility: [facility]  Date of Admission: 7/11/17  Date of Discharge: 7/23/17  Reason(s) for Admission: knee            Problems taking medications regularly:  None       Medication changes since discharge: None       Problems adhering to non-medication therapy:  None    Summary of hospitalization:  [facility] discharge summary reviewed  Discharge summary unavailable  Diagnostic Tests/Treatments reviewed.  Follow up needed: none  Other Healthcare Providers Involved in Patient s Care:         Physical Therapy  Update since discharge: improved.     Post Discharge Medication Reconciliation: discharge medications reconciled, continue medications without change.  Plan of care communicated with patient and family     Coding guidelines for this visit:  Type of Medical   Decision Making Face-to-Face Visit       within 7 Days of discharge Face-to-Face Visit        within 14 days of discharge   Moderate Complexity [id number]   High Complexity 81947 [id number]          Past Medical History:   Diagnosis Date     Acute suppurative arthritis (H)      Acute, but ill-defined, cerebrovascular disease      Arthritis      Chronic headaches      Diabetes (H)     \"Pre-diabetes\"     Glucose intolerance (impaired glucose tolerance) 5/31/2013     Gout, unspecified      Hammer toe of left foot 10/21/2016     Hematuria      History of blood transfusion      Left knee pain, unspecified chronicity 5/8/2017     Lentigo maligna (H)      Malignant neoplasm of rectosigmoid junction (H)      Morbid obesity due to excess calories (H) 10/21/2016     Other convulsions     last seizure 7-8 years ago...not certain if these were true seizres or not..     Pre-diabetes 10/21/2016     RBC microcytosis 2/14/2017     Right knee " pain, unspecified chronicity 5/8/2017     Syncope      Tubulovillous adenoma of colon      Venous stasis dermatitis of both lower extremities 10/21/2016       Past Surgical History:   Procedure Laterality Date     ARTHROPLASTY KNEE Right 6/19/2017    Procedure: ARTHROPLASTY KNEE;  Right total knee arthroplasty, Hammer toe repair toe 2,3,4,5 left foot with osteotomy;  Surgeon: Alec Raymond MD;  Location:  OR     C NONSPECIFIC PROCEDURE      right heel surgery; spur removed.     COLONOSCOPY N/A 6/23/2015    Procedure: COMBINED COLONOSCOPY, SINGLE OR MULTIPLE BIOPSY/POLYPECTOMY BY BIOPSY;  Surgeon: Kimberly Alfaro MD;  Location:  GI     COLONOSCOPY N/A 7/30/2015    Procedure: COLONOSCOPY;  Surgeon: Enrique Salazar MD;  Location:  OR     HERNIORRHAPHY EPIGASTRIC  4/27/2012    Procedure:HERNIORRHAPHY EPIGASTRIC; Epigastric Hernia Repair with mesh ; Surgeon:BOLIVAR OLIVEIRA; Location: OR     LAPAROSCOPIC ASSISTED COLECTOMY Right 7/30/2015    Procedure: LAPAROSCOPIC ASSISTED COLECTOMY;  Surgeon: Enrique Salazar MD;  Location:  OR     ORTHOPEDIC SURGERY      lt knee arthroplasty     REPAIR HAMMER TOE Left 6/19/2017    Procedure: REPAIR HAMMER TOE;  Hammer toe repair toe 2,3,4,5 left foot with osteotomy   ;  Surgeon: Beverly Kim DPM, Podiatry/Foot and Ankle Surgery;  Location:  OR     SIGMOIDOSCOPY FLEXIBLE  5/29/2013    Procedure: SIGMOIDOSCOPY FLEXIBLE;  SIGMOIDOSCOPY FLEXIBLE (FV) Needs blood sugar ck'd;  Surgeon: Enrique Salazar MD;  Location:  GI       Family History   Problem Relation Age of Onset     Cancer - colorectal Mother      CEREBROVASCULAR DISEASE Father        Social History   Substance Use Topics     Smoking status: Never Smoker     Smokeless tobacco: Never Used     Alcohol use 0.0 oz/week     0 Standard drinks or equivalent per week      Comment: Occas             Problem list and histories reviewed & adjusted, as indicated.  Additional history:         Reviewed and  "updated as needed this visit by clinical staff  Tobacco  Allergies  Meds  Problems  Med Hx  Surg Hx  Fam Hx  Soc Hx        Reviewed and updated as needed this visit by Provider  Allergies  Meds  Problems       Pedal edema  (primary encounter diagnosis) right leg. ACE wraps at NH, little pain. C/o right  Venous stasis dermatitis of both lower extremities known Dx  Hammer toe of left foot compliant with boot, complains  S/P total knee arthroplasty, right PT through NH continues  ROS no CP skin breakdown  /82 (BP Location: Right arm, Patient Position: Chair, Cuff Size: Adult Regular)  Pulse 76  Temp 98.5  F (36.9  C) (Oral)  Resp 16  Ht 5' 10\" (1.778 m)  Wt 260 lb (117.9 kg)  BMI 37.31 kg/m2  3+ edema right calf, no cords. Some edema left ends at boot top    US negative for DVT    ASSESSMENT / PLAN:  (R60.0) Pedal edema  (primary encounter diagnosis)  Comment: stop ACE wraps   Potassium   Date Value Ref Range Status   06/19/2017 4.1 3.4 - 5.3 mmol/L Final   ]    Plan: US Lower Extremity Venous Duplex Right     2 weeks loop diuretic. Increase ambulation    (I87.2) Venous stasis dermatitis of both lower extremities  Comment: known to be present  Plan:     (M20.42) Hammer toe of left foot  Comment: pos strokes for boot compliancePlan:     (Z96.651) S/P total knee arthroplasty, right  Comment: reassuring study  Plan: US Lower Extremity Venous Duplex Right                  Obi Strange MD          "

## 2017-07-25 RX ORDER — BUMETANIDE 1 MG/1
1 TABLET ORAL 2 TIMES DAILY
Qty: 30 TABLET | Refills: 0 | Status: SHIPPED | OUTPATIENT
Start: 2017-07-25 | End: 2017-08-17

## 2017-07-27 ENCOUNTER — TELEPHONE (OUTPATIENT)
Dept: ORTHOPEDICS | Facility: CLINIC | Age: 82
End: 2017-07-27

## 2017-07-27 ENCOUNTER — CARE COORDINATION (OUTPATIENT)
Dept: CARE COORDINATION | Facility: CLINIC | Age: 82
End: 2017-07-27

## 2017-07-27 NOTE — PROGRESS NOTES
Clinic Care Coordination Contact  OUTREACH    Referral Information:  Referral Source: CTS  Reason for Contact: post TCU discharge call   Care Conference: No     Universal Utilization:   ED Visits in last year: 0  Hospital visits in last year: 1  Last PCP appointment: 05/15/17  Missed Appointments:  (no concerns)  Concerns:  (no concerns )  Multiple Providers or Specialists: yes see care team     Clinical Concerns:  Current Medical Concerns: patient had right knee replacement on 6/19/17 - [facility] - he is now home from TCU   Patient was in to see pcp 7/24/17 and ultra sound was ordered to rule out DVT, patient given bumex to aid with edema   Patient notes that the medication seemed to work at first, however now the leg is just staying swollen - he is now noting swelling in his right foot as well   CCRN asked patient if his knee was warm and he had his wife feel it and she felt that it was warm - unable to bend leg at knee well   CCRN asked patient if he had contacted the surgeon's office and he had not - CCRN advised it is important that we advise surgeon of the symptoms and get their recommendations - they may want to see him prior to his scheduled visit 8/4/17 - patient asks CCRN to call for him     Patient advised to ice, elevate leg - he does not think that they have a thermometer at home however he does not feel hot - CCRN advised to check with wife and if they do have thermometer to check temp     CCRN placed call to [facility] Dr. Raymond's office - message left on nurse triage line to return call and advise that they have received the message and will call patient to discuss and or call CCRN back with recommendations - will also send note to pcp with symptoms     Current Behavioral Concerns: No concerns noted   PHQ-9 score:    PHQ-9 SCORE 4/17/2017   Total Score 0     Clinical Pathway Name: None    Medication Management:  States compliance   Has pain meds however not having a lot of pain so  not needing to take them     Functional Status:  Mobility Status: Independent w/Device walker  Equipment Currently Used at Home: none  Transportation: wife  Has not tried using stairs since coming home from [facility]   Independent and wife able to assist with needs      Psychosocial:  Current living arrangement:: I live in a private home with spouse  Financial/Insurance: no concerns      Resources and Interventions:  Current Resources:  (NA);  (NA)  PAS Number:  (NA)  Senior Linkage Line Referral Placed:  (NA)  Advanced Care Plans/Directives on file:: No  Referrals Placed:  (NA)     Patient/Caregiver understanding: yes   Frequency of Care Coordination: 1 week   Upcoming appointment: 8/4/17 ortho      Plan:   Will await call from ortho with recommendations  CCRN will send note to pcp as FYI  CCRN will mail [facility] care plan and Care Coordination letter and f/u with patient in 1 week - he agrees to this outreach     Flakita Mast Care Coordinator RN  [facility] and [facility]  [phone]  July 27, 2017

## 2017-07-27 NOTE — LETTER
Health Care Home - Access Care Plan    About Me  Patient Name:  Damien Vallecillo    YOB: 1935  Age:                            81 year old   Elizabet MRN:         [id number] Telephone Information:     Home Phone [phone]   Mobile [phone]       Address:    [address]  [place] [address] Email address:  No e-mail address on record      Emergency Contact(s)  Name Relationship Lgl Grd Work Phone Home Phone Mobile Phone   1. GEOFF VALLECILLO Spouse No none [phone]   2. MCCLELLAN, PATSY   none [phone] none             Health Maintenance: Routine Health maintenance Reviewed: Up to date    My Access Plan  Medical Emergency 911   Questions or concerns during clinic hours Primary Clinic Line, I will call the clinic directly: Primary Clinic: [facility]- [phone]   24 Hour Appointment Line [phone] or  7-000 [place] ([phone])  (toll free)   24 Hour Nurse Line [phone] (toll free)   Questions or concerns outside clinic hours 24 Hour Appointment Line, I will call the after-hours on-call line:   [facility] [phone] or [phone] ([phone]) (toll-free)   Preferred Urgent Care Preferred Urgent Care: [facility] [phone]   Preferred Hospital Preferred Hospital: [facility]  [phone]   Preferred Pharmacy [facility] [place], [place] - [address]     Behavioral Health Crisis Line The National Suicide Prevention Lifeline at 1-110.740.8455 or 682     My Care Team Members   Obi Strange MD PCP - General Family Practice 6/23/15    Phone: [phone] Fax: [phone]         Flakita Mast RN Clinic Care Coordinator  6/22/17    Phone: [phone] Fax: [phone]      Alec Raymond MD MD Orthopedics 6/22/17    Phone: [phone] Fax: [phone]         Beverly Kim DPM, Podiatry/Foot and Ankle Surgery   Podiatry 6/22/17    Phone:  [phone] Pager: [phone] Fax: [phone]      My Medical and Care Information  Problem List   Patient Active Problem List   Diagnosis     Gout     Thyroid nodule     CARDIOVASCULAR SCREENING; LDL GOAL LESS THAN 100     Advanced directives, counseling/discussion     Glucose intolerance (impaired glucose tolerance)     Cerebral infarction (H)     Lower GI bleed     GI bleed     Obesity     Pre-diabetes     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)     Morbid obesity due to excess calories (H)     Hammer toe of left foot     Venous stasis dermatitis of both lower extremities     RBC microcytosis     Left knee pain, unspecified chronicity     S/P total knee arthroplasty        Current Medications and Allergies:  See printed Medication Report

## 2017-07-27 NOTE — TELEPHONE ENCOUNTER
Received call from Flakita Mast RN, Care Coordinator, [facility].   Patient saw Dr Starnge 7/24/17.   Patient having a lot of swelling in right leg.   Ultrasound was done to rule out DVT.   Wife was concerned about his leg feeling warm and increased swelling.   She would like a return call either way.     Phone call to patient.   He states swelling is about the same.   Denies redness or drainage of incision.   Denies fever.   Is not needing to be covered at this time.   Steri strips have fallen off.   He states he is elevating at heart level most of the day without much change.   Swelling from knee to foot.   Icing almost constantly since discharge and TCU.   Started bumex which has helped some initially but now swelling back to where it was.   Had been using ACE wrap off and on, and states that Dr. Strange recommended they discontinue ACE.  Difficulty with doing physical therapy exercises due to swelling and pain and not doing many of them.   Denies pain at rest.   Patient is ambulating within the home with walker.    asked writer to speak with wife.   She reports incision is inflamed in two different areas for the past several days that is new.   One is approximately 3-4 inches x 1 inch for several days. The other area is just slightly reddened.   Taking tylenol 650mg three times daily .   Not taking much Hydromorphone 2mg only taking when pain is severe.     Due to inconsistent descriptions, appointment recommended and scheduled.     They verbalized understanding.     Flakita Ontiveros RN

## 2017-07-27 NOTE — LETTER
[place] CARE COORDINATION  [address] [address]  [phone]    July 27, 2017    Damien Vallecillo  [address] [address] [address]    Dear Damien,   I am the Clinic Care Coordinator that works with your primary care provider's clinic. I wanted to introduce myself and provide you with my contact information for you to be able to call me with any questions or concerns. I wanted to thank you for spending the time to talk with me.  Below is a description of what Clinic Care Coordination is and how I can further assist you.     The Clinic Care Coordinator role is a Registered Nurse and/or  who understands the health care system. The goal of Clinic Care Coordination is to help you manage your health and improve access to the [facility] system in the most efficient manner.  The Registered Nurse can assist you in meeting your health care goals by providing education, coordinating services, and strengthening the communication among your providers. The  can assist you with financial, behavioral, psychosocial, and chemical dependency and counseling/psychiatric resources.    Please feel free to keep this letter and contact information to contact me at [phone] with any further questions or concerns that may arise. We at [facility] are focused on providing you with the highest-quality healthcare experience possible and that all starts with you.       Sincerely,     Flakita Mast Care Coordinator RN  [facility]  [phone]  July 27, 2017     Enclosed: I have enclosed a copy of a 24 Hour Access Plan. This has helpful phone numbers for you to call when needed. Please keep this in an easy to access place to use as needed.

## 2017-07-28 ENCOUNTER — OFFICE VISIT (OUTPATIENT)
Dept: ORTHOPEDICS | Facility: CLINIC | Age: 82
End: 2017-07-28
Payer: MEDICARE

## 2017-07-28 VITALS
DIASTOLIC BLOOD PRESSURE: 76 MMHG | SYSTOLIC BLOOD PRESSURE: 126 MMHG | BODY MASS INDEX: 37.22 KG/M2 | HEIGHT: 70 IN | WEIGHT: 260 LBS

## 2017-07-28 DIAGNOSIS — Z96.651 S/P TOTAL KNEE ARTHROPLASTY, RIGHT: Primary | ICD-10-CM

## 2017-07-28 PROCEDURE — 99024 POSTOP FOLLOW-UP VISIT: CPT | Performed by: ORTHOPAEDIC SURGERY

## 2017-07-28 NOTE — LETTER
"    7/28/2017         RE: Damien Vallecillo  [address]  [place] [address]        Dear Colleague,    Thank you for referring your patient, Damien Vallecillo, to the [facility] ORTHOPEDIC SURGERY. Please see a copy of my visit note below.    HISTORY OF PRESENT ILLNESS:    Damien Vallecillo is a 81 year old male who is seen in follow up for Right total knee arthroplasty, DOS 6/19/2017.  Present symptoms: Pt states he has difficulty bending his knee.  Pt states he was only able to get to 100 deg at rehab.  Pt states he has a lot of swelling with the knee.      PHYSICAL EXAM:  /76 (BP Location: Right arm, Patient Position: Sitting, Cuff Size: Adult Regular)  Ht 5' 10\" (1.778 m)  Wt 260 lb (117.9 kg)  BMI 37.31 kg/m2  Body mass index is 37.31 kg/(m^2).   GENERAL APPEARANCE: healthy, alert and no distress   SKIN: no suspicious lesions or rashes  NEURO: Normal strength and tone, mentation intact and speech normal  VASCULAR:  good pulses, and cappillary refill   LYMPH: no lymphadenopathy   PSYCH:  mentation appears normal and affect normal/bright    MSK:  Examination of his right knee reveals the surgical wound to be well-healed. He has moderate edema persistent as expected. He has 100  of flexion and full extension. Homans sign is negative. CMS is intact to his toes.    IMAGING INTERPRETATION:       ASSESSMENT / PLAN: 6 weeks status post right total knee arthroplasty, healing and on schedule.  Return to clinic p.r.n.    Mark Raymond MD  Dept. Orthopedic Surgery  [facility]     Disclaimer: This note consists of symbols derived from keyboarding, dictation and/or voice recognition software. As a result, there may be errors in the script that have gone undetected. Please consider this when interpreting information found in this chart.      Again, thank you for allowing me to participate in the care of your patient.        Sincerely,        Alec Raymond MD    "

## 2017-07-28 NOTE — PROGRESS NOTES
"HISTORY OF PRESENT ILLNESS:    Damien Vallecillo is a 81 year old male who is seen in follow up for Right total knee arthroplasty, DOS 6/19/2017.  Present symptoms: Pt states he has difficulty bending his knee.  Pt states he was only able to get to 100 deg at rehab.  Pt states he has a lot of swelling with the knee.      PHYSICAL EXAM:  /76 (BP Location: Right arm, Patient Position: Sitting, Cuff Size: Adult Regular)  Ht 5' 10\" (1.778 m)  Wt 260 lb (117.9 kg)  BMI 37.31 kg/m2  Body mass index is 37.31 kg/(m^2).   GENERAL APPEARANCE: healthy, alert and no distress   SKIN: no suspicious lesions or rashes  NEURO: Normal strength and tone, mentation intact and speech normal  VASCULAR:  good pulses, and cappillary refill   LYMPH: no lymphadenopathy   PSYCH:  mentation appears normal and affect normal/bright    MSK:  Examination of his right knee reveals the surgical wound to be well-healed. He has moderate edema persistent as expected. He has 100  of flexion and full extension. Homans sign is negative. CMS is intact to his toes.    IMAGING INTERPRETATION:       ASSESSMENT / PLAN: 6 weeks status post right total knee arthroplasty, healing and on schedule.  Return to clinic p.r.n.    Mark Raymond MD  Dept. Orthopedic Surgery  [facility]     Disclaimer: This note consists of symbols derived from keyboarding, dictation and/or voice recognition software. As a result, there may be errors in the script that have gone undetected. Please consider this when interpreting information found in this chart.    "

## 2017-07-28 NOTE — MR AVS SNAPSHOT
"              After Visit Summary   2017    Damien Vallecillo    MRN: [id number]           Patient Information     Date Of Birth          1935        Visit Information        Provider Department      2017 10:40 AM Alec Raymond MD [facility] ORTHOPEDIC SURGERY        Today's Diagnoses     S/P total knee arthroplasty, right    -  1       Follow-ups after your visit        Who to contact     If you have questions or need follow up information about today's clinic visit or your schedule please contact [facility] ORTHOPEDIC SURGERY directly at [phone].  Normal or non-critical lab and imaging results will be communicated to you by [employer], letter or phone within 4 business days after the clinic has received the results. If you do not hear from us within 7 days, please contact the clinic through [employer] or phone. If you have a critical or abnormal lab result, we will notify you by phone as soon as possible.  Submit refill requests through [employer] or call your pharmacy and they will forward the refill request to us. Please allow 3 business days for your refill to be completed.          Additional Information About Your Visit        MyChart Information     [employer] lets you send messages to your doctor, view your test results, renew your prescriptions, schedule appointments and more. To sign up, go to [email or web address] . Click on \"Log in\" on the left side of the screen, which will take you to the Welcome page. Then click on \"Sign up Now\" on the right side of the page.     You will be asked to enter the access code listed below, as well as some personal information. Please follow the directions to create your username and password.     Your access code is: [email or web address]  Expires: 2017  2:59 PM     Your access code will  in 90 days. If you need help or a new code, please call your [place] clinic or [phone].        Care EveryWhere ID     This is your Care EveryWhere ID. " "This could be used by other organizations to access your [place] medical records  [id number]        Your Vitals Were     Height BMI (Body Mass Index)                5' 10\" (1.778 m) 37.31 kg/m2           Blood Pressure from Last 3 Encounters:   08/04/17 124/72   07/28/17 126/76   07/24/17 134/82    Weight from Last 3 Encounters:   08/04/17 264 lb 1.6 oz (119.8 kg)   07/28/17 260 lb (117.9 kg)   07/24/17 260 lb (117.9 kg)              Today, you had the following     No orders found for display       Primary Care Provider Office Phone # Fax #    Obi Strange [phone] [address] [facility] [address]        Equal Access to Services     [facility] : Hadii aad ku hadasho Soomaali, waaxda luqadaha, qaybta kaalmada adeegyada, corin blank . So [facility] [phone].    ATENCIÓN: Si habla español, tiene a dumont disposición servicios gratuitos de asistencia lingüística. Haley al [phone].    We comply with applicable federal civil rights laws and [place] laws. We do not discriminate on the basis of race, color, national origin, age, disability sex, sexual orientation or gender identity.            Thank you!     Thank you for choosing [facility] ORTHOPEDIC SURGERY  for your care. Our goal is always to provide you with excellent care. Hearing back from our patients is one way we can continue to improve our services. Please take a few minutes to complete the written survey that you may receive in the mail after your visit with us. Thank you!             Your Updated Medication List - Protect others around you: Learn how to safely use, store and throw away your medicines at www.disposemymeds.org.          This list is accurate as of: 7/28/17 11:59 PM.  Always use your most recent med list.                   Brand Name Dispense Instructions for use Diagnosis    ACE NOT PRESCRIBED (INTENTIONAL)     0 each    ACE Inhibitor not prescribed due to Other:  Had " cough on ACEI in past    Type 2 diabetes, HbA1c goal < 7% (H)       aspirin  MG EC tablet     30 tablet    Take 1 tablet (325 mg) by mouth daily    Status post total right knee replacement       atorvastatin 40 MG tablet    LIPITOR    90 tablet    Take 1 tablet (40 mg) by mouth At Bedtime    Pre-diabetes       * blood glucose monitoring test strip    ACCU-CHEK LAURA    100 strip    1Use to test blood sugar 2 times daily or as directed.    Glucose intolerance (impaired glucose tolerance)       * blood glucose monitoring test strip    ACCU-CHEK LAURA PLUS    100 each    Use to test blood sugar 1 time daily    Glucose intolerance (impaired glucose tolerance)       bumetanide 1 MG tablet    BUMEX    30 tablet    Take 1 tablet (1 mg) by mouth 2 times daily    Pedal edema       CINNAMON PO           fish oil-omega-3 fatty acids 1000 MG capsule      Take 1 g by mouth every evening        flax seed oil 1000 MG capsule      Take 1 capsule by mouth every evening        fluticasone 50 MCG/ACT spray    FLONASE    1 Bottle    Spray 1-2 sprays into both nostrils daily    Post-nasal drip       Glucosamine HCl 1000 MG Tabs      Take 1 tablet by mouth every evening        guaiFENesin 600 MG 12 hr tablet    MUCINEX    120 tablet    Take 2 tablets (1,200 mg) by mouth 2 times daily    Abnormal pulmonary function test, Cough       MULTIVITAMIN ADULT PO      Reported on 5/12/2017        order for DME     1 each    Equipment being ordered: Walker Wheels () and Walker () Treatment Diagnosis: Impaired functional mobility    Status post total right knee replacement       tamsulosin 0.4 MG capsule    FLOMAX    90 capsule    Take 1 capsule (0.4 mg) by mouth At Bedtime    Benign prostatic hyperplasia with lower urinary tract symptoms, unspecified morphology       triamcinolone 0.5 % cream    KENALOG    30 g    Apply sparingly to affected area three times daily.    Stasis dermatitis of both legs       * Notice:  This list has 2  medication(s) that are the same as other medications prescribed for you. Read the directions carefully, and ask your doctor or other care provider to review them with you.

## 2017-07-28 NOTE — NURSING NOTE
"Chief Complaint   Patient presents with     Surgical Followup     Right total knee arthroplasty, DOS 6/19/2017 ~ 6 weeks ago       Initial /76 (BP Location: Right arm, Patient Position: Sitting, Cuff Size: Adult Regular)  Ht 5' 10\" (1.778 m)  Wt 260 lb (117.9 kg)  BMI 37.31 kg/m2 Estimated body mass index is 37.31 kg/(m^2) as calculated from the following:    Height as of this encounter: 5' 10\" (1.778 m).    Weight as of this encounter: 260 lb (117.9 kg).  Medication Reconciliation: complete    "

## 2017-08-04 ENCOUNTER — OFFICE VISIT (OUTPATIENT)
Dept: PODIATRY | Facility: CLINIC | Age: 82
End: 2017-08-04
Payer: MEDICARE

## 2017-08-04 VITALS
DIASTOLIC BLOOD PRESSURE: 72 MMHG | WEIGHT: 264.1 LBS | HEIGHT: 70 IN | BODY MASS INDEX: 37.81 KG/M2 | SYSTOLIC BLOOD PRESSURE: 124 MMHG

## 2017-08-04 DIAGNOSIS — Z98.890 POST-OPERATIVE STATE: Primary | ICD-10-CM

## 2017-08-04 PROCEDURE — 99024 POSTOP FOLLOW-UP VISIT: CPT | Performed by: PODIATRIST

## 2017-08-04 NOTE — PROGRESS NOTES
"Podiatry / Foot and Ankle Surgery Progress Note    August 4, 2017    Subject: Patient was seen for follow up on notes his foot feels fine. No pain to the area. Will get a \"zing\" of pain very intermittently.     Objective:  Vitals: /72  Ht 1.778 m (5' 10\")  Wt 119.8 kg (264 lb 1.6 oz)  BMI 37.89 kg/m2    General:  Patient is alert and orientated.  NAD  Incisions are well healed. Sutures have been removed. Edema to both feet and legs. Sensation intact to toes.     Assessment: Post-operative state    Plan:  At this time, patient can go in to regular shoes. Can do normal activity on left foot. Follow up as needed.     Beverly Kim DPM, Podiatry/Foot and Ankle Surgery    Weight management plan: Patient was referred to their PCP to discuss a diet and exercise plan.      "

## 2017-08-04 NOTE — LETTER
"    8/4/2017         RE: Damien Vallecillo  [address]  [facility] [address]        Dear Colleague,    Thank you for referring your patient, Damien Vallecillo, to the [facility] PODIATRY. Please see a copy of my visit note below.    Podiatry / Foot and Ankle Surgery Progress Note    August 4, 2017    Subject: Patient was seen for follow up on notes his foot feels fine. No pain to the area. Will get a \"zing\" of pain very intermittently.     Objective:  Vitals: /72  Ht 1.778 m (5' 10\")  Wt 119.8 kg (264 lb 1.6 oz)  BMI 37.89 kg/m2    General:  Patient is alert and orientated.  NAD  Incisions are well healed. Sutures have been removed. Edema to both feet and legs. Sensation intact to toes.     Assessment: Post-operative state    Plan:  At this time, patient can go in to regular shoes. Can do normal activity on left foot. Follow up as needed.     Beverly Kim DPM, Podiatry/Foot and Ankle Surgery    Weight management plan: Patient was referred to their PCP to discuss a diet and exercise plan.        Again, thank you for allowing me to participate in the care of your patient.        Sincerely,        Beverly Kim DPM, Podiatry/Foot and Ankle Surgery    "

## 2017-08-04 NOTE — PATIENT INSTRUCTIONS
DR. CASEY'S CLINIC LOCATIONS:   MONDAY AM - [place] TUESDAY - [place]   [address] [address] [place] [address] [place] [address]   [phone] / [phone] / [phone]       WEDNESDAY - [place] FRIDAY AM - [facility]   [address]   [place] [address] [place] [address]   [phone] / [phone]       FRIDAY PM - [place] SCHEDULE SURGERY: [phone]   [address] BILLING QUESTIONS: [phone]   [place] [address] AFTER HOURS: [phone]   [phone] / [phone] APPOINTMENTS: [phone]         Body Mass Index (BMI)  Many things can cause foot and ankle problems. Foot structure, activity level, foot mechanics and injuries are common causes of pain.  One very important issue that often goes unmentioned, is body weight. Extra weight can cause increased stress on muscles, ligaments, bones and tendons.  Sometimes just a few extra pounds is all it takes to put one over her/his threshold. Without reducing that stress, it can be difficult to alleviate pain. Some people are uncomfortable addressing this issue, but we feel it is important for you to think about it. As Foot &  Ankle specialists, our job is addressing the lower extremity problem and possible causes. Regarding extra body weight, we encourage patients to discuss diet and weight management plans with their primary care doctors. It is this team approach that gives you the best opportunity for pain relief and getting you back on your feet.

## 2017-08-04 NOTE — NURSING NOTE
"Chief Complaint   Patient presents with     Surgical Followup     Hammer toe repair toe 2,3,4,5 left foot with osteotomy DOS 6/19/17       Initial /72  Ht 5' 10\" (1.778 m)  Wt 264 lb 1.6 oz (119.8 kg)  BMI 37.89 kg/m2 Estimated body mass index is 37.89 kg/(m^2) as calculated from the following:    Height as of this encounter: 5' 10\" (1.778 m).    Weight as of this encounter: 264 lb 1.6 oz (119.8 kg).  Medication Reconciliation: complete   Blaise Palafox MA      "

## 2017-08-04 NOTE — MR AVS SNAPSHOT
After Visit Summary   8/4/2017    Damien Vallecillo    MRN: [id number]           Patient Information     Date Of Birth          1935        Visit Information        Provider Department      8/4/2017 1:00 PM Beverly Kim DPM, Podiatry/Foot and Ankle Surgery [facility] [place] PODIATRY        Care Instructions      DR. KIM'S CLINIC LOCATIONS:   MONDAY AM - [place] T[place] - [place]   [address] [address] [place] [address] [place] [address]   [phone] / [phone] / [phone]       WEDNESDAY - [place] FRIDAY AM - [facility]   [address]   [place] [address] [place] [address]   [phone] / [phone]       FRIDAY PM - [place] SCHEDULE SURGERY: [phone]   [address] BILLING QUESTIONS: [phone]   [place] [address] AFTER HOURS: [phone] / [phone] APPOINTMENTS: [phone]         Body Mass Index (BMI)  Many things can cause foot and ankle problems. Foot structure, activity level, foot mechanics and injuries are common causes of pain.  One very important issue that often goes unmentioned, is body weight. Extra weight can cause increased stress on muscles, ligaments, bones and tendons.  Sometimes just a few extra pounds is all it takes to put one over her/his threshold. Without reducing that stress, it can be difficult to alleviate pain. Some people are uncomfortable addressing this issue, but we feel it is important for you to think about it. As Foot &  Ankle specialists, our job is addressing the lower extremity problem and possible causes. Regarding extra body weight, we encourage patients to discuss diet and weight management plans with their primary care doctors. It is this team approach that gives you the best opportunity for pain relief and getting you back on your feet.                  Follow-ups after your visit        Who to contact      "If you have questions or need follow up information about today's clinic visit or your schedule please contact [facility] PODIATRY directly at [phone].  Normal or non-critical lab and imaging results will be communicated to you by MyChart, letter or phone within 4 business days after the clinic has received the results. If you do not hear from us within 7 days, please contact the clinic through [employer] or phone. If you have a critical or abnormal lab result, we will notify you by phone as soon as possible.  Submit refill requests through [employer] or call your pharmacy and they will forward the refill request to us. Please allow 3 business days for your refill to be completed.          Additional Information About Your Visit        [employer] Information     [employer] lets you send messages to your doctor, view your test results, renew your prescriptions, schedule appointments and more. To sign up, go to [email or web address] . Click on \"Log in\" on the left side of the screen, which will take you to the Welcome page. Then click on \"Sign up Now\" on the right side of the page.     You will be asked to enter the access code listed below, as well as some personal information. Please follow the directions to create your username and password.     Your access code is: [email or web address]  Expires: 2017  9:15 AM     Your access code will  in 90 days. If you need help or a new code, please call your [place] clinic or [phone].        Care EveryWhere ID     This is your Care EveryWhere ID. This could be used by other organizations to access your [place] medical records  [id number]        Your Vitals Were     Height BMI (Body Mass Index)                5' 10\" (1.778 m) 37.89 kg/m2           Blood Pressure from Last 3 Encounters:   17 124/72   17 126/76   17 134/82    Weight from Last 3 Encounters:   17 264 lb 1.6 oz (119.8 kg)   17 260 lb (117.9 kg)   17 260 lb (117.9 kg)    "           Today, you had the following     No orders found for display       Primary Care Provider Office Phone # Fax #    Obi Strange [phone]       [address] [address]        Equal Access to Services     ZENON RUBIOGUICHO : Hadii aad ku hadasho Soomaali, waaxda luqadaha, qaybta kaalmada adeegyada, waxay idiin hayaan adehermilo khtetesh lajohnnelson ryan. So [facility] [phone].    ATENCIÓN: Si habla español, tiene a dumont disposición servicios gratuitos de asistencia lingüística. Llame al [phone].    We comply with applicable federal civil rights laws and [place] laws. We do not discriminate on the basis of race, color, national origin, age, disability sex, sexual orientation or gender identity.            Thank you!     Thank you for choosing [facility] PODIATRY  for your care. Our goal is always to provide you with excellent care. Hearing back from our patients is one way we can continue to improve our services. Please take a few minutes to complete the written survey that you may receive in the mail after your visit with us. Thank you!             Your Updated Medication List - Protect others around you: Learn how to safely use, store and throw away your medicines at www.disposemymeds.org.          This list is accurate as of: 8/4/17  1:13 PM.  Always use your most recent med list.                   Brand Name Dispense Instructions for use Diagnosis    ACE NOT PRESCRIBED (INTENTIONAL)     0 each    ACE Inhibitor not prescribed due to Other:  Had cough on ACEI in past    Type 2 diabetes, HbA1c goal < 7% (H)       aspirin  MG EC tablet     30 tablet    Take 1 tablet (325 mg) by mouth daily    Status post total right knee replacement       atorvastatin 40 MG tablet    LIPITOR    90 tablet    Take 1 tablet (40 mg) by mouth At Bedtime    Pre-diabetes       * blood glucose monitoring test strip    ACCU-CHEK LAURA    100 strip    1Use to test blood sugar  2 times daily or as directed.    Glucose intolerance (impaired glucose tolerance)       * blood glucose monitoring test strip    ACCU-CHEK LAURA PLUS    100 each    Use to test blood sugar 1 time daily    Glucose intolerance (impaired glucose tolerance)       bumetanide 1 MG tablet    BUMEX    30 tablet    Take 1 tablet (1 mg) by mouth 2 times daily    Pedal edema       CINNAMON PO           fish oil-omega-3 fatty acids 1000 MG capsule      Take 1 g by mouth every evening        flax seed oil 1000 MG capsule      Take 1 capsule by mouth every evening        fluticasone 50 MCG/ACT spray    FLONASE    1 Bottle    Spray 1-2 sprays into both nostrils daily    Post-nasal drip       Glucosamine HCl 1000 MG Tabs      Take 1 tablet by mouth every evening        guaiFENesin 600 MG 12 hr tablet    MUCINEX    120 tablet    Take 2 tablets (1,200 mg) by mouth 2 times daily    Abnormal pulmonary function test, Cough       MULTIVITAMIN ADULT PO      Reported on 5/12/2017        order for DME     1 each    Equipment being ordered: Walker Wheels () and Walker () Treatment Diagnosis: Impaired functional mobility    Status post total right knee replacement       tamsulosin 0.4 MG capsule    FLOMAX    90 capsule    Take 1 capsule (0.4 mg) by mouth At Bedtime    Benign prostatic hyperplasia with lower urinary tract symptoms, unspecified morphology       triamcinolone 0.5 % cream    KENALOG    30 g    Apply sparingly to affected area three times daily.    Stasis dermatitis of both legs       * Notice:  This list has 2 medication(s) that are the same as other medications prescribed for you. Read the directions carefully, and ask your doctor or other care provider to review them with you.

## 2017-08-17 ENCOUNTER — OFFICE VISIT (OUTPATIENT)
Dept: FAMILY MEDICINE | Facility: CLINIC | Age: 82
End: 2017-08-17
Payer: MEDICARE

## 2017-08-17 VITALS
TEMPERATURE: 97.7 F | HEART RATE: 59 BPM | BODY MASS INDEX: 37.81 KG/M2 | HEIGHT: 70 IN | SYSTOLIC BLOOD PRESSURE: 139 MMHG | RESPIRATION RATE: 14 BRPM | WEIGHT: 264.1 LBS | DIASTOLIC BLOOD PRESSURE: 56 MMHG

## 2017-08-17 DIAGNOSIS — Z96.651 HISTORY OF ARTHROPLASTY OF RIGHT KNEE: Primary | ICD-10-CM

## 2017-08-17 PROCEDURE — 99213 OFFICE O/P EST LOW 20 MIN: CPT | Performed by: FAMILY MEDICINE

## 2017-08-17 NOTE — MR AVS SNAPSHOT
After Visit Summary   8/17/2017    Damien Vallecillo    MRN: [id number]           Patient Information     Date Of Birth          1935        Visit Information        Provider Department      8/17/2017 9:30 AM Obi Strange MD [facility]        Today's Diagnoses     History of arthroplasty of right knee    -  1       Follow-ups after your visit        Additional Services     MOE PT, HAND, AND CHIROPRACTIC REFERRAL       **This order will print in the [facility] Scheduling Office**    Physical Therapy, Hand Therapy and Chiropractic Care are available through:    *[facility]  *[facility]  *[facility]    Call one number to schedule at any of the above locations: [phone].    Your provider has referred you to: As Indicated:     Indication/Reason for Referral: s/p arthroplasty  Onset of Illness: July 12  Therapy Orders: Evaluate and Treat  Special Programs:   Special Request:     Jose Garcia      Additional Comments for the Therapist or Chiropractor:   Please be aware that coverage of these services is subject to the terms and limitations of your health insurance plan.  Call member services at your health plan with any benefit or coverage questions.      Please bring the following to your appointment:    *Your personal calendar for scheduling future appointments  *Comfortable clothing                  Your next 10 appointments already scheduled     Aug 24, 2017 10:30 AM CDT   MOE Extremity with Vern Levy PT   [facility] ([facility])    [address]  [facility] [address]   [phone]            Aug 29, 2017 10:30 AM CDT   MOE Extremity with Vern Levy PT   [facility] ([facility])    [address]  [facility] [address]   [phone]            Aug 31, 2017 11:50 AM  CDT   MOE Extremity with Vern Levy, PT   [facility] ([facility])    [address]  [facility] [address]   [phone]            Sep 05, 2017 10:20 AM CDT   MOE Extremity with Vern Levy PT   [facility] ([facility])    [address]  [facility] [address]   [phone]            Sep 07, 2017 10:30 AM CDT   MOE Extremity with Vern Levy, PT   [facility] ([facility])    [address]  [facility] [address]   [phone]            Sep 12, 2017 10:20 AM CDT   MOE Extremity with Vern Levy, PT   [facility] ([facility])    [address]  [facility] [address]   [phone]            Sep 14, 2017 10:30 AM CDT   MOE Extremity with Vern Lvey PT   [facility]    [address]  [facility] [address]   [phone]              Who to contact     If you have questions or need follow up information about today's clinic visit or your schedule please contact [facility] directly at [phone].  Normal or non-critical lab and imaging results will be communicated to you by MyChart, letter or phone within 4 business days after the clinic has received the results. If you do not hear from us within 7 days, please contact the clinic through [employer]inhart or phone. If you have a critical or abnormal lab result, we will notify you by phone as soon as possible.  Submit refill requests through [employer] or call your pharmacy and they will forward the refill request to us. Please allow 3 business days for your refill to be completed.          Additional Information About Your Visit        vidCoinharJobSpice Information      "[employer] lets you send messages to your doctor, view your test results, renew your prescriptions, schedule appointments and more. To sign up, go to [email or web address] . Click on \"Log in\" on the left side of the screen, which will take you to the Welcome page. Then click on \"Sign up Now\" on the right side of the page.     You will be asked to enter the access code listed below, as well as some personal information. Please follow the directions to create your username and password.     Your access code is: [email or web address]  Expires: 2017  2:59 PM     Your access code will  in 90 days. If you need help or a new code, please call your [place] clinic or [phone].        Care EveryWhere ID     This is your Care EveryWhere ID. This could be used by other organizations to access your [place] medical records  [id number]        Your Vitals Were     Pulse Temperature Respirations Height BMI (Body Mass Index)       59 97.7  F (36.5  C) (Oral) 14 5' 10\" (1.778 m) 37.89 kg/m2        Blood Pressure from Last 3 Encounters:   17 139/56   17 124/72   17 126/76    Weight from Last 3 Encounters:   17 264 lb 1.6 oz (119.8 kg)   17 264 lb 1.6 oz (119.8 kg)   17 260 lb (117.9 kg)              We Performed the Following     [facility]E PT, HAND, AND CHIROPRACTIC REFERRAL        Primary Care Provider Office Phone # Fax #    Obi Strange [phone] [address] [facility] [address]        Equal Access to Services     [facility] : Amairani Carlson, rob godinez, michele slateralmikaela christensen, corin davis. So [facility] [phone].    ATENCIÓN: Si habla español, tiene a dumont disposición servicios gratuitos de asistencia lingüística. Llame al [phone].    We comply with applicable federal civil rights laws and [place] laws. We do not discriminate on the basis of race, color, national origin, age, disability sex, sexual " orientation or gender identity.            Thank you!     Thank you for choosing [facility]  for your care. Our goal is always to provide you with excellent care. Hearing back from our patients is one way we can continue to improve our services. Please take a few minutes to complete the written survey that you may receive in the mail after your visit with us. Thank you!             Your Updated Medication List - Protect others around you: Learn how to safely use, store and throw away your medicines at www.disposemymeds.org.          This list is accurate as of: 8/17/17  1:09 PM.  Always use your most recent med list.                   Brand Name Dispense Instructions for use Diagnosis    ACE NOT PRESCRIBED (INTENTIONAL)     0 each    ACE Inhibitor not prescribed due to Other:  Had cough on ACEI in past    Type 2 diabetes, HbA1c goal < 7% (H)       aspirin  MG EC tablet     30 tablet    Take 1 tablet (325 mg) by mouth daily    Status post total right knee replacement       atorvastatin 40 MG tablet    LIPITOR    90 tablet    Take 1 tablet (40 mg) by mouth At Bedtime    Pre-diabetes       * blood glucose monitoring test strip    ACCU-CHEK LAURA    100 strip    1Use to test blood sugar 2 times daily or as directed.    Glucose intolerance (impaired glucose tolerance)       * blood glucose monitoring test strip    ACCU-CHEK LAURA PLUS    100 each    Use to test blood sugar 1 time daily    Glucose intolerance (impaired glucose tolerance)       CINNAMON PO           fish oil-omega-3 fatty acids 1000 MG capsule      Take 1 g by mouth every evening        flax seed oil 1000 MG capsule      Take 1 capsule by mouth every evening        MULTIVITAMIN ADULT PO      Reported on 5/12/2017        order for DME     1 each    Equipment being ordered: Walker Wheels () and Walker () Treatment Diagnosis: Impaired functional mobility    Status post total right knee replacement       tamsulosin 0.4 MG  capsule    FLOMAX    90 capsule    Take 1 capsule (0.4 mg) by mouth At Bedtime    Benign prostatic hyperplasia with lower urinary tract symptoms, unspecified morphology       triamcinolone 0.5 % cream    KENALOG    30 g    Apply sparingly to affected area three times daily.    Stasis dermatitis of both legs       * Notice:  This list has 2 medication(s) that are the same as other medications prescribed for you. Read the directions carefully, and ask your doctor or other care provider to review them with you.

## 2017-08-17 NOTE — PROGRESS NOTES
"  SUBJECTIVE:                                                    Damien Vallecillo is a 81 year old male who presents to clinic today for the following health issues:      Discuss rt knee therapy     L knee fine,   R knee- had some therapy in hospital and TCU, has not done therapy since. He did not think he needed PT at the time.  His surgeon suggested that he could decide if he needed therapy  He has observed that he can only flex to 90  and his extension is weak he is able to walk  .     Problem list and histories reviewed & adjusted, as indicated.  Additional history: none    Reviewed and updated as needed this visit by clinical staff  Tobacco  Allergies  Meds  Med Hx  Surg Hx  Fam Hx  Soc Hx       Past Medical History:   Diagnosis Date     Acute suppurative arthritis (H)      Acute, but ill-defined, cerebrovascular disease      Arthritis      Chronic headaches      Diabetes (H)     \"Pre-diabetes\"     Glucose intolerance (impaired glucose tolerance) 5/31/2013     Gout, unspecified      Hammer toe of left foot 10/21/2016     Hematuria      History of blood transfusion      Left knee pain, unspecified chronicity 5/8/2017     Lentigo maligna (H)      Malignant neoplasm of rectosigmoid junction (H)      Morbid obesity due to excess calories (H) 10/21/2016     Other convulsions     last seizure 7-8 years ago...not certain if these were true seizres or not..     Pre-diabetes 10/21/2016     RBC microcytosis 2/14/2017     Right knee pain, unspecified chronicity 5/8/2017     Syncope      Tubulovillous adenoma of colon      Venous stasis dermatitis of both lower extremities 10/21/2016       Past Surgical History:   Procedure Laterality Date     ARTHROPLASTY KNEE Right 6/19/2017    Procedure: ARTHROPLASTY KNEE;  Right total knee arthroplasty, Hammer toe repair toe 2,3,4,5 left foot with osteotomy;  Surgeon: Alec Raymond MD;  Location: [facility] OR     C NONSPECIFIC PROCEDURE      right heel surgery; spur removed.     " COLONOSCOPY N/A 6/23/2015    Procedure: COMBINED COLONOSCOPY, SINGLE OR MULTIPLE BIOPSY/POLYPECTOMY BY BIOPSY;  Surgeon: Kimberly Alfaro MD;  Location:  GI     COLONOSCOPY N/A 7/30/2015    Procedure: COLONOSCOPY;  Surgeon: Enrique Salazar MD;  Location:  OR     HERNIORRHAPHY EPIGASTRIC  4/27/2012    Procedure:HERNIORRHAPHY EPIGASTRIC; Epigastric Hernia Repair with mesh ; Surgeon:BOLIVAR OLIVEIRA; Location: OR     LAPAROSCOPIC ASSISTED COLECTOMY Right 7/30/2015    Procedure: LAPAROSCOPIC ASSISTED COLECTOMY;  Surgeon: Enrique Salazar MD;  Location:  OR     ORTHOPEDIC SURGERY      lt knee arthroplasty     REPAIR HAMMER TOE Left 6/19/2017    Procedure: REPAIR HAMMER TOE;  Hammer toe repair toe 2,3,4,5 left foot with osteotomy   ;  Surgeon: Beverly Kim DPM, Podiatry/Foot and Ankle Surgery;  Location:  OR     SIGMOIDOSCOPY FLEXIBLE  5/29/2013    Procedure: SIGMOIDOSCOPY FLEXIBLE;  SIGMOIDOSCOPY FLEXIBLE (FV) Needs blood sugar ck'd;  Surgeon: Enrique Salazar MD;  Location:  GI       Family History   Problem Relation Age of Onset     Cancer - colorectal Mother      CEREBROVASCULAR DISEASE Father        Social History   Substance Use Topics     Smoking status: Never Smoker     Smokeless tobacco: Never Used     Alcohol use 0.0 oz/week     0 Standard drinks or equivalent per week      Comment: Occas       Reviewed and updated as needed this visit by Provider         ROS:  L knee fine he has variable edema of his lower extremities    This document serves as a record of the services and decisions personally performed and made by Obi Strange MD. It was created on his behalf by Vannesa Schreiber, a trained medical scribe.  The creation of this document is based on the scribe's personal observations and the provider's statements to the medical scribe.  Vannesa Schreiber, August 17, 2017 9:57 AM    OBJECTIVE:     /56 (BP Location: Right arm, Patient Position: Chair, Cuff Size: Adult Large)  Pulse 59  Temp  "97.7  F (36.5  C) (Oral)  Resp 14  Ht 1.778 m (5' 10\")  Wt 119.8 kg (264 lb 1.6 oz)  BMI 37.89 kg/m2  Body mass index is 37.89 kg/(m^2).  He does have 1+ edema right slightly less left mild brawny changes  MS: Gait after a few steps, is quite symmetric.     ASSESSMENT/PLAN:     ASSESSMENT / PLAN:  (Z96.651) History of arthroplasty of right knee  (primary encounter diagnosis)  Comment: Refer. At the moment is using crutches  Plan: [facility] PT, HAND, AND CHIROPRACTIC REFERRAL        He had a good outcome of his left knee          RTC October    The information in this document, created by the medical scribe for me, accurately reflects the services I personally performed and the decisions made by me. I have reviewed and approved this document for accuracy prior to leaving the patient care area.  Obi Strange MD August 17, 2017 9:57 AM    Obi Strange MD  [facility]"

## 2017-08-17 NOTE — NURSING NOTE
"Chief Complaint   Patient presents with     Knee Pain     therapy for rt knee       Initial /56 (BP Location: Right arm, Patient Position: Chair, Cuff Size: Adult Large)  Pulse 59  Temp 97.7  F (36.5  C) (Oral)  Resp 14  Ht 5' 10\" (1.778 m)  Wt 264 lb 1.6 oz (119.8 kg)  BMI 37.89 kg/m2 Estimated body mass index is 37.89 kg/(m^2) as calculated from the following:    Height as of this encounter: 5' 10\" (1.778 m).    Weight as of this encounter: 264 lb 1.6 oz (119.8 kg).  Medication Reconciliation: complete r romeo Juarez MA      "

## 2017-08-24 ENCOUNTER — THERAPY VISIT (OUTPATIENT)
Dept: PHYSICAL THERAPY | Facility: CLINIC | Age: 82
End: 2017-08-24
Payer: MEDICARE

## 2017-08-24 DIAGNOSIS — G89.29 CHRONIC PAIN OF RIGHT KNEE: Primary | ICD-10-CM

## 2017-08-24 DIAGNOSIS — M25.561 CHRONIC PAIN OF RIGHT KNEE: Primary | ICD-10-CM

## 2017-08-24 PROCEDURE — G8979 MOBILITY GOAL STATUS: HCPCS | Mod: GP | Performed by: PHYSICAL THERAPIST

## 2017-08-24 PROCEDURE — 97161 PT EVAL LOW COMPLEX 20 MIN: CPT | Mod: GP | Performed by: PHYSICAL THERAPIST

## 2017-08-24 PROCEDURE — 97110 THERAPEUTIC EXERCISES: CPT | Mod: GP | Performed by: PHYSICAL THERAPIST

## 2017-08-24 PROCEDURE — G8978 MOBILITY CURRENT STATUS: HCPCS | Mod: GP | Performed by: PHYSICAL THERAPIST

## 2017-08-24 ASSESSMENT — ACTIVITIES OF DAILY LIVING (ADL)
HOW_WOULD_YOU_RATE_THE_OVERALL_FUNCTION_OF_YOUR_KNEE_DURING_YOUR_USUAL_DAILY_ACTIVITIES?: NEARLY NORMAL
SQUAT: ACTIVITY IS NOT DIFFICULT
STIFFNESS: THE SYMPTOM AFFECTS MY ACTIVITY MODERATELY
WEAKNESS: THE SYMPTOM AFFECTS MY ACTIVITY SLIGHTLY
HOW_WOULD_YOU_RATE_THE_CURRENT_FUNCTION_OF_YOUR_KNEE_DURING_YOUR_USUAL_DAILY_ACTIVITIES_ON_A_SCALE_FROM_0_TO_100_WITH_100_BEING_YOUR_LEVEL_OF_KNEE_FUNCTION_PRIOR_TO_YOUR_INJURY_AND_0_BEING_THE_INABILITY_TO_PERFORM_ANY_OF_YOUR_USUAL_DAILY_ACTIVITIES?: 60
GO UP STAIRS: ACTIVITY IS MINIMALLY DIFFICULT
SWELLING: I DO NOT HAVE THE SYMPTOM
RAW_SCORE: 51
AS_A_RESULT_OF_YOUR_KNEE_INJURY,_HOW_WOULD_YOU_RATE_YOUR_CURRENT_LEVEL_OF_DAILY_ACTIVITY?: ABNORMAL
RISE FROM A CHAIR: ACTIVITY IS SOMEWHAT DIFFICULT
SIT WITH YOUR KNEE BENT: ACTIVITY IS MINIMALLY DIFFICULT
GIVING WAY, BUCKLING OR SHIFTING OF KNEE: THE SYMPTOM AFFECTS MY ACTIVITY SLIGHTLY
KNEE_ACTIVITY_OF_DAILY_LIVING_SUM: 51
KNEE_ACTIVITY_OF_DAILY_LIVING_SCORE: 72.86
WALK: ACTIVITY IS SOMEWHAT DIFFICULT
KNEEL ON THE FRONT OF YOUR KNEE: ACTIVITY IS NOT DIFFICULT
STAND: ACTIVITY IS SOMEWHAT DIFFICULT
LIMPING: THE SYMPTOM AFFECTS MY ACTIVITY SLIGHTLY
GO DOWN STAIRS: ACTIVITY IS MINIMALLY DIFFICULT
PAIN: I HAVE THE SYMPTOM BUT IT DOES NOT AFFECT MY ACTIVITY

## 2017-08-24 NOTE — LETTER
"DEPARTMENT OF HEALTH AND HUMAN SERVICES  CENTERS FOR MEDICARE & MEDICAID SERVICES    PLAN/UPDATED PLAN OF PROGRESS FOR OUTPATIENT REHABILITATION    PATIENTS NAME:  Damien Vallecillo   : 1935  PROVIDER NUMBER:    [id number]  HICN:  [id number]  PROVIDER NAME: [facility] PHYSICAL THERAPY  MEDICAL RECORD NUMBER: [id number]   START OF CARE DATE:  17   TYPE:  PT  PRIMARY/TREATMENT DIAGNOSIS: Chronic pain of right knee  VISITS FROM START OF CARE:  Rxs Used: 1     Subjective:    Patient is a 81 year old male presenting with rehab left knee hpi. The history is provided by the patient. No  was used.   Damien Vallecillo is a 81 year old male with a right knee condition.  Condition occurred with:  Degenerative joint disease.  This is a new condition  PT reports having R TKA on 17 without complication.  He went to TCU for 4 weeks after surgery and was told by surgeon \"he didn't need out-patient therapy\" per patient and patient's wife.  Patient reports pain:  Anterior.  Radiates to:  Knee.  Pain is described as aching and is intermittent and reported as 4/10.  Associated symptoms:  Loss of motion/stiffness. Pain is worse during the day.  Symptoms are exacerbated by activity, weight bearing, standing, ascending stairs, descending stairs and walking and relieved by rest and ice.  Since onset symptoms are gradually improving.  General health as reported by patient is good.  Pertinent medical history includes:  Cancer, high blood pressure and overweight.  Medical allergies: no.  Other surgeries include:  Cancer surgery (skin).  Current medications:  High blood pressure medication.  Current occupation is Retired.  Barriers include:  None as reported by the patient.  Red flags:  None as reported by the patient.                 Objective:    Gait:  Assistive Devices:  Crutches      Knee Evaluation:  ROM:    PROM  Hyperextension: Left:   Right:  0  Extension: Left:   " Right:  0  Flexion: Left:   Right:  81  Strength:   Quad Set Left: Fair    Pain:   Quad Set Right: Fair    Pain:  Palpation:  Normal    Assessment/Plan:      Patient is a 81 year old male with right side knee complaints.    Patient has the following significant findings with corresponding treatment plan.                Diagnosis 1:  S/p R TKA  Pain -  hot/cold therapy, self management, education, directional preference exercise and home program  Decreased ROM/flexibility - manual therapy and therapeutic exercise  Decreased joint mobility - manual therapy and therapeutic exercise  Decreased strength - therapeutic exercise and therapeutic activities  Impaired balance - neuro re-education and therapeutic activities  PATIENTS NAME:  Damien Vallecillo   : 1935        Decreased proprioception - neuro re-education and therapeutic activities  Impaired muscle performance - neuro re-education  Decreased function - therapeutic activities    Therapy Evaluation Codes:   1) History comprised of:   Personal factors that impact the plan of care:      None.    Comorbidity factors that impact the plan of care are:      Cancer and Overweight.     Medications impacting care: High blood pressure.  2) Examination of Body Systems comprised of:   Body structures and functions that impact the plan of care:      Knee.   Activity limitations that impact the plan of care are:      Bending, Sitting, Standing and Walking.  3) Clinical presentation characteristics are:   Stable/Uncomplicated.  4) Decision-Making    Low complexity using standardized patient assessment instrument and/or measureable assessment of functional outcome.  Cumulative Therapy Evaluation is: Low complexity.    Previous and current functional limitations:  (See Goal Flow Sheet for this information)    Short term and Long term goals: (See Goal Flow Sheet for this information)   Communication ability:  Patient appears to be able to clearly communicate and understand verbal  "and written communication and follow directions correctly.  Treatment Explanation - The following has been discussed with the patient:   RX ordered/plan of care.  This patient would benefit from PT intervention to resume normal activities.   Rehab potential is good.    Frequency:  2 X week, once daily  Duration:  for 6 weeks  Discharge Plan:  Achieve all LTG.  Independent in home treatment program.  Reach maximal therapeutic benefit.    Caregiver Signature/Credentials _____________________________ Date ________       Treating Provider: Vern Levy, PT    I have reviewed and certified the need for these services and plan of treatment while under my care.    PHYSICIAN'S SIGNATURE:   _________________________________________  Date___________   Obi Strange MD    Certification period:  Beginning of Cert date period: 08/24/17 to  End of Cert period date: 11/21/17     Functional Level Progress Report: Please see attached \"Goal Flow sheet for Functional level.\"    ____X____ Continue Services or       ________ DC Services                Service dates: From  SOC Date: 08/24/17 date to present           "

## 2017-08-24 NOTE — PROGRESS NOTES
"Subjective:    Patient is a 81 year old male presenting with rehab left knee hpi. The history is provided by the patient. No  was used.   Damien Vallecillo is a 81 year old male with a right knee condition.  Condition occurred with:  Degenerative joint disease.    This is a new condition  PT reports having R TKA on 6/19/17 without complication.  He went to TCU for 4 weeks after surgery and was told by surgeon \"he didn't need out-patient therapy\" per patient and patient's wife..    Patient reports pain:  Anterior.  Radiates to:  Knee.  Pain is described as aching and is intermittent and reported as 4/10.  Associated symptoms:  Loss of motion/stiffness. Pain is worse during the day.  Symptoms are exacerbated by activity, weight bearing, standing, ascending stairs, descending stairs and walking and relieved by rest and ice.  Since onset symptoms are gradually improving.        General health as reported by patient is good.  Pertinent medical history includes:  Cancer, high blood pressure and overweight.  Medical allergies: no.  Other surgeries include:  Cancer surgery (skin).  Current medications:  High blood pressure medication.  Current occupation is Retired  .        Barriers include:  None as reported by the patient.    Red flags:  None as reported by the patient.                        Objective:      Gait:    Assistive Devices:  Crutches                                                        Knee Evaluation:  ROM:      PROM    Hyperextension: Left:   Right:  0  Extension: Left:   Right:  0  Flexion: Left:   Right:  81      Strength:         Quad Set Left: Fair    Pain:   Quad Set Right: Fair    Pain:      Palpation:  Normal                General     ROS    Assessment/Plan:      Patient is a 81 year old male with right side knee complaints.    Patient has the following significant findings with corresponding treatment plan.                Diagnosis 1:  S/p R TKA  Pain -  hot/cold therapy, self " management, education, directional preference exercise and home program  Decreased ROM/flexibility - manual therapy and therapeutic exercise  Decreased joint mobility - manual therapy and therapeutic exercise  Decreased strength - therapeutic exercise and therapeutic activities  Impaired balance - neuro re-education and therapeutic activities  Decreased proprioception - neuro re-education and therapeutic activities  Impaired muscle performance - neuro re-education  Decreased function - therapeutic activities    Therapy Evaluation Codes:   1) History comprised of:   Personal factors that impact the plan of care:      None.    Comorbidity factors that impact the plan of care are:      Cancer and Overweight.     Medications impacting care: High blood pressure.  2) Examination of Body Systems comprised of:   Body structures and functions that impact the plan of care:      Knee.   Activity limitations that impact the plan of care are:      Bending, Sitting, Standing and Walking.  3) Clinical presentation characteristics are:   Stable/Uncomplicated.  4) Decision-Making    Low complexity using standardized patient assessment instrument and/or measureable assessment of functional outcome.  Cumulative Therapy Evaluation is: Low complexity.    Previous and current functional limitations:  (See Goal Flow Sheet for this information)    Short term and Long term goals: (See Goal Flow Sheet for this information)     Communication ability:  Patient appears to be able to clearly communicate and understand verbal and written communication and follow directions correctly.  Treatment Explanation - The following has been discussed with the patient:   RX ordered/plan of care  Anticipated outcomes  Possible risks and side effects  This patient would benefit from PT intervention to resume normal activities.   Rehab potential is good.    Frequency:  2 X week, once daily  Duration:  for 6 weeks  Discharge Plan:  Achieve all LTG.  Independent  in home treatment program.  Reach maximal therapeutic benefit.    Please refer to the daily flowsheet for treatment today, total treatment time and time spent performing 1:1 timed codes.

## 2017-08-24 NOTE — MR AVS SNAPSHOT
After Visit Summary   8/24/2017    Damien Vallecillo    MRN: [id number]           Patient Information     Date Of Birth          1935        Visit Information        Provider Department      8/24/2017 10:30 AM Vern Levy, SARAH [facility]        Today's Diagnoses     Chronic pain of right knee    -  1       Follow-ups after your visit        Your next 10 appointments already scheduled     Aug 29, 2017 10:30 AM CDT   MOE Extremity with Vern Levy PT   [facility] ([facility])    [address]  [facility] [address]   [phone]            Aug 31, 2017 11:50 AM CDT   MOE Extremity with Vern Levy PT   [facility] ([facility])    [address]  [facility] [address]   [phone]            Sep 05, 2017 10:20 AM CDT   MOE Extremity with Vern Levy PT   [facility] ([facility])    [address]  [facility] [address]   [phone]            Sep 07, 2017 10:30 AM CDT   MOE Extremity with Vern Levy PT   [facility] ([facility])    [address]  [facility] [address]   [phone]            Sep 12, 2017 10:20 AM CDT   MOE Extremity with Vern Levy PT   [facility] ([facility])    [address]  [facility] [address]   [phone]            Sep 14, 2017 10:30 AM CDT   MOE Extremity with Vern Levy PT   [facility] ([facility])    [address]  [facility] [address]   [phone]              Who to contact     If you have questions or need follow up information about today's  "clinic visit or your schedule please contact [facility] directly at [phone].  Normal or non-critical lab and imaging results will be communicated to you by Merklehart, letter or phone within 4 business days after the clinic has received the results. If you do not hear from us within 7 days, please contact the clinic through Merklehart or phone. If you have a critical or abnormal lab result, we will notify you by phone as soon as possible.  Submit refill requests through JoKno or call your pharmacy and they will forward the refill request to us. Please allow 3 business days for your refill to be completed.          Additional Information About Your Visit        Merklehart Information     JoKno lets you send messages to your doctor, view your test results, renew your prescriptions, schedule appointments and more. To sign up, go to [email or web address] . Click on \"Log in\" on the left side of the screen, which will take you to the Welcome page. Then click on \"Sign up Now\" on the right side of the page.     You will be asked to enter the access code listed below, as well as some personal information. Please follow the directions to create your username and password.     Your access code is: [email or web address]  Expires: 2017  2:59 PM     Your access code will  in 90 days. If you need help or a new code, please call your [facility] clinic or [phone].        Care EveryWhere ID     This is your Care EveryWhere ID. This could be used by other organizations to access your [facility] medical records  [id number]         Blood Pressure from Last 3 Encounters:   17 139/56   17 124/72   17 126/76    Weight from Last 3 Encounters:   17 119.8 kg (264 lb 1.6 oz)   17 119.8 kg (264 lb 1.6 oz)   17 117.9 kg (260 lb)              We Performed the Following     HC PT EVAL, LOW COMPLEXITY     MOE CERT REPORT     MOE INITIAL EVAL REPORT     " THERAPEUTIC EXERCISES        Primary Care Provider Office Phone # Fax #    Obi Strange [phone] [address]  [facility] [address]        Equal Access to Services     JAZMINETWIN RUBIOGUICHO : Hadwilver benjamin mccormick romuloo Socorrieali, waaxda luqadaha, qaybta kaalmada adeoscar, corin murraynelson ryan. So [facility] [phone].    ATENCIÓN: Si habla español, tiene a dumont disposición servicios gratuitos de asistencia lingüística. Llame al [phone].    We comply with applicable federal civil rights laws and [place] laws. We do not discriminate on the basis of race, color, national origin, age, disability sex, sexual orientation or gender identity.            Thank you!     Thank you for choosing [facility]  for your care. Our goal is always to provide you with excellent care. Hearing back from our patients is one way we can continue to improve our services. Please take a few minutes to complete the written survey that you may receive in the mail after your visit with us. Thank you!             Your Updated Medication List - Protect others around you: Learn how to safely use, store and throw away your medicines at www.disposemymeds.org.          This list is accurate as of: 8/24/17 11:39 AM.  Always use your most recent med list.                   Brand Name Dispense Instructions for use Diagnosis    ACE NOT PRESCRIBED (INTENTIONAL)     0 each    ACE Inhibitor not prescribed due to Other:  Had cough on ACEI in past    Type 2 diabetes, HbA1c goal < 7% (H)       aspirin  MG EC tablet     30 tablet    Take 1 tablet (325 mg) by mouth daily    Status post total right knee replacement       atorvastatin 40 MG tablet    LIPITOR    90 tablet    Take 1 tablet (40 mg) by mouth At Bedtime    Pre-diabetes       * blood glucose monitoring test strip    ACCU-CHEK LAURA    100 strip    1Use to test blood sugar 2 times daily or as directed.     Glucose intolerance (impaired glucose tolerance)       * blood glucose monitoring test strip    ACCU-CHEK LAURA PLUS    100 each    Use to test blood sugar 1 time daily    Glucose intolerance (impaired glucose tolerance)       CINNAMON PO           fish oil-omega-3 fatty acids 1000 MG capsule      Take 1 g by mouth every evening        flax seed oil 1000 MG capsule      Take 1 capsule by mouth every evening        MULTIVITAMIN ADULT PO      Reported on 5/12/2017        order for DME     1 each    Equipment being ordered: Walker Wheels () and Walker () Treatment Diagnosis: Impaired functional mobility    Status post total right knee replacement       tamsulosin 0.4 MG capsule    FLOMAX    90 capsule    Take 1 capsule (0.4 mg) by mouth At Bedtime    Benign prostatic hyperplasia with lower urinary tract symptoms, unspecified morphology       triamcinolone 0.5 % cream    KENALOG    30 g    Apply sparingly to affected area three times daily.    Stasis dermatitis of both legs       * Notice:  This list has 2 medication(s) that are the same as other medications prescribed for you. Read the directions carefully, and ask your doctor or other care provider to review them with you.

## 2017-08-29 ENCOUNTER — THERAPY VISIT (OUTPATIENT)
Dept: PHYSICAL THERAPY | Facility: CLINIC | Age: 82
End: 2017-08-29
Payer: MEDICARE

## 2017-08-29 DIAGNOSIS — G89.29 CHRONIC PAIN OF RIGHT KNEE: ICD-10-CM

## 2017-08-29 DIAGNOSIS — M25.561 CHRONIC PAIN OF RIGHT KNEE: ICD-10-CM

## 2017-08-29 PROCEDURE — 97110 THERAPEUTIC EXERCISES: CPT | Mod: GP | Performed by: PHYSICAL THERAPIST

## 2017-08-29 PROCEDURE — 97112 NEUROMUSCULAR REEDUCATION: CPT | Mod: GP | Performed by: PHYSICAL THERAPIST

## 2017-08-31 ENCOUNTER — THERAPY VISIT (OUTPATIENT)
Dept: PHYSICAL THERAPY | Facility: CLINIC | Age: 82
End: 2017-08-31
Payer: MEDICARE

## 2017-08-31 DIAGNOSIS — G89.29 CHRONIC PAIN OF RIGHT KNEE: ICD-10-CM

## 2017-08-31 DIAGNOSIS — M25.561 CHRONIC PAIN OF RIGHT KNEE: ICD-10-CM

## 2017-08-31 PROCEDURE — 97112 NEUROMUSCULAR REEDUCATION: CPT | Mod: GP | Performed by: PHYSICAL THERAPIST

## 2017-08-31 PROCEDURE — 97110 THERAPEUTIC EXERCISES: CPT | Mod: GP | Performed by: PHYSICAL THERAPIST

## 2017-09-05 ENCOUNTER — THERAPY VISIT (OUTPATIENT)
Dept: PHYSICAL THERAPY | Facility: CLINIC | Age: 82
End: 2017-09-05
Payer: MEDICARE

## 2017-09-05 DIAGNOSIS — G89.29 CHRONIC PAIN OF RIGHT KNEE: ICD-10-CM

## 2017-09-05 DIAGNOSIS — M25.561 CHRONIC PAIN OF RIGHT KNEE: ICD-10-CM

## 2017-09-05 PROCEDURE — 97112 NEUROMUSCULAR REEDUCATION: CPT | Mod: GP | Performed by: PHYSICAL THERAPIST

## 2017-09-05 PROCEDURE — 97110 THERAPEUTIC EXERCISES: CPT | Mod: GP | Performed by: PHYSICAL THERAPIST

## 2017-09-07 ENCOUNTER — THERAPY VISIT (OUTPATIENT)
Dept: PHYSICAL THERAPY | Facility: CLINIC | Age: 82
End: 2017-09-07
Payer: MEDICARE

## 2017-09-07 DIAGNOSIS — G89.29 CHRONIC PAIN OF RIGHT KNEE: ICD-10-CM

## 2017-09-07 DIAGNOSIS — M25.561 CHRONIC PAIN OF RIGHT KNEE: ICD-10-CM

## 2017-09-07 PROCEDURE — 97110 THERAPEUTIC EXERCISES: CPT | Mod: GP | Performed by: PHYSICAL THERAPIST

## 2017-09-07 PROCEDURE — 97112 NEUROMUSCULAR REEDUCATION: CPT | Mod: GP | Performed by: PHYSICAL THERAPIST

## 2017-09-10 ENCOUNTER — CARE COORDINATION (OUTPATIENT)
Dept: CARE COORDINATION | Facility: CLINIC | Age: 82
End: 2017-09-10

## 2017-09-10 NOTE — PROGRESS NOTES
F/U right knee replacement   Attending PT   Clinic Care Coordination Contact  OUTREACH    Referral Information:  Referral Source: CTS  Reason for Contact: foll  Care Conference: No     Universal Utilization:   ED Visits in last year: 0  Hospital visits in last year: 1  Last PCP appointment: 08/17/17  Missed Appointments:  (no concerns)  Concerns:  (no concerns)  Multiple Providers or Specialists: yes see care team     Clinical Concerns:  Current Medical Concerns:   Right TKA 6/19/17 - attending PT   Still having some swelling in lower legs / feet / bilaterally   Patient has been elevating and he is very tired of doing that - he has seen pcp and had ultrasound which was negative for DVT   Last Echo 10/16/16 EF 55-60%   Patient states that prior to the surgery he really was having so much pain in his knee that he was not able to walk much at all. Since the surgery that pain has been better and patient notes that he has been able to be on his feet more. He wonders if this contributes to the swelling. Patient is not having pain. He did use some tube socks that helped some with the swelling.   Discussed low sodium diet and that sodium can cause swelling - low sodium diet mailed to patient   CCRN advised patient that if swelling worsens or does not improve or he starts to have pain he is to call for appt. with pcp. CCRN advised sometimes they do need to use medications to help with the swelling - patient states that he has taken meds before however not on any currently. He reported that he was having to urinate every 45 minutes on that medication.     Current Behavioral Concerns: forgetful - patient asked CCRN 3 times while on the phone CCRN name.   No depression/anxiety history     Education Provided to patient: elevate legs - use low salt diet to aid with swelling - make appt. with pcp if swelling worsens or does not get better     Clinical Pathway Name: None    Medication Management:  States compliance - no questions      Functional Status:  Mobility Status: Independent w/Device walker   Equipment Currently Used at Home: walker   Transportation: wife   attending PT      Psychosocial:  Current living arrangement: I live in a private home with spouse  Financial/Insurance: no concerns      Resources and Interventions:  Current Resources:  (NA);  (NA)  PAS Number:  (NA)  Senior Linkage Line Referral Placed:  (NA)  Advanced Care Plans/Directives on file:: No  Referrals Placed:  (NA)     Goals:   Goal 1 Statement: LOW SODIUM FOODS UNDER 2500 MG DAILY TO AID WITH SWELLING   Goal 1 Progression Percent: 10%  Goal 1 Progression Date: 09/12/17     Frequency of Care Coordination: 1 month   Upcoming appointment:  (none with pcp)     Plan:   Patient will continue working with PT   Patient will continue monitoring sodium intake and swelling - if swelling worsens, has pain or starts with any new symptoms such as sob patient is to call right away for an appt.   CCRN mailed low sodium diet   CCRN will check in with patient in one month     Flakita Mast Care Coordinator RN  [facility]  [phone]  September 12, 2017

## 2017-09-12 ENCOUNTER — THERAPY VISIT (OUTPATIENT)
Dept: PHYSICAL THERAPY | Facility: CLINIC | Age: 82
End: 2017-09-12
Payer: MEDICARE

## 2017-09-12 DIAGNOSIS — G89.29 CHRONIC PAIN OF RIGHT KNEE: ICD-10-CM

## 2017-09-12 DIAGNOSIS — M25.561 CHRONIC PAIN OF RIGHT KNEE: ICD-10-CM

## 2017-09-12 PROCEDURE — 97112 NEUROMUSCULAR REEDUCATION: CPT | Mod: GP | Performed by: PHYSICAL THERAPIST

## 2017-09-12 PROCEDURE — 97110 THERAPEUTIC EXERCISES: CPT | Mod: GP | Performed by: PHYSICAL THERAPIST

## 2017-09-14 ENCOUNTER — THERAPY VISIT (OUTPATIENT)
Dept: PHYSICAL THERAPY | Facility: CLINIC | Age: 82
End: 2017-09-14
Payer: MEDICARE

## 2017-09-14 DIAGNOSIS — G89.29 CHRONIC PAIN OF RIGHT KNEE: ICD-10-CM

## 2017-09-14 DIAGNOSIS — M25.561 CHRONIC PAIN OF RIGHT KNEE: ICD-10-CM

## 2017-09-14 PROCEDURE — 97110 THERAPEUTIC EXERCISES: CPT | Mod: GP | Performed by: PHYSICAL THERAPIST

## 2017-09-14 PROCEDURE — 97112 NEUROMUSCULAR REEDUCATION: CPT | Mod: GP | Performed by: PHYSICAL THERAPIST

## 2017-09-15 ENCOUNTER — OFFICE VISIT (OUTPATIENT)
Dept: ORTHOPEDICS | Facility: CLINIC | Age: 82
End: 2017-09-15
Payer: MEDICARE

## 2017-09-15 ENCOUNTER — RADIANT APPOINTMENT (OUTPATIENT)
Dept: GENERAL RADIOLOGY | Facility: CLINIC | Age: 82
End: 2017-09-15
Attending: ORTHOPAEDIC SURGERY
Payer: MEDICARE

## 2017-09-15 ENCOUNTER — CARE COORDINATION (OUTPATIENT)
Dept: CARE COORDINATION | Facility: CLINIC | Age: 82
End: 2017-09-15

## 2017-09-15 ENCOUNTER — TELEPHONE (OUTPATIENT)
Dept: ORTHOPEDICS | Facility: CLINIC | Age: 82
End: 2017-09-15

## 2017-09-15 VITALS
SYSTOLIC BLOOD PRESSURE: 144 MMHG | HEIGHT: 70 IN | DIASTOLIC BLOOD PRESSURE: 80 MMHG | WEIGHT: 264 LBS | BODY MASS INDEX: 37.8 KG/M2

## 2017-09-15 DIAGNOSIS — M25.561 ACUTE PAIN OF RIGHT KNEE: ICD-10-CM

## 2017-09-15 DIAGNOSIS — M25.561 ACUTE PAIN OF RIGHT KNEE: Primary | ICD-10-CM

## 2017-09-15 DIAGNOSIS — Z96.651 S/P TOTAL KNEE ARTHROPLASTY, RIGHT: ICD-10-CM

## 2017-09-15 PROCEDURE — 73562 X-RAY EXAM OF KNEE 3: CPT | Mod: RT

## 2017-09-15 PROCEDURE — 99024 POSTOP FOLLOW-UP VISIT: CPT | Performed by: ORTHOPAEDIC SURGERY

## 2017-09-15 NOTE — TELEPHONE ENCOUNTER
Scheduled surgery for Right total knee revision on 10/09/2017 with Dr. Raymond @ [facility] @ 10:05.  Surgery education packet provided to patient.

## 2017-09-15 NOTE — PROGRESS NOTES
"HISTORY OF PRESENT ILLNESS:    Damien Vallecillo is a 81 year old male who is seen in follow up for right knee pain, R TKA 6/18/2017.  Present symptoms: Pt states at physical  Therapy he is getting pain down the lateral lower leg, pain in the calf, pt states the lower leg pain has been present since the day of surgery, states pain will vary in intensity.  Pt feels pain was with compression while at the hospital.  Pt states he continues to have a lot of swelling, states it is bothersome in the posterior knee.  Pt states at therapy he had less motion yesterday as well.  Treatments tried to this point: physical therapy;  R TKA on 6/18/2017, negative duplex Doppler for DVT.    PHYSICAL EXAM:  /80 (BP Location: Right arm, Patient Position: Sitting, Cuff Size: Adult Regular)  Ht 5' 10\" (1.778 m)  Wt 264 lb (119.7 kg)  BMI 37.88 kg/m2  Body mass index is 37.88 kg/(m^2).   GENERAL APPEARANCE: healthy, alert and no distress   SKIN: no suspicious lesions or rashes  NEURO: Normal strength and tone, mentation intact and speech normal  VASCULAR:  good pulses, and cappillary refill   LYMPH: no lymphadenopathy   PSYCH:  mentation appears normal and affect normal/bright    MSK:  The patient ambulates without an antalgic gait.  The patient is able to get on and off the exam table without difficulty.      Examination of the spine reveals a normal lordosis to the cervical and lumbar spine, and a normal kyphosis to the thoracic spine.  There is no clinical evidence of scoliosis.    The pelvis is clinically level.  Trendelenberg is negative.  The patient is non-tender to palpation over the greater trochanteric bursal region or piriformis fossa.  With the hip flexed to 90 degrees, internal and external rotation is 30/60 respectively,  with no pain .      KNEE: Examination of the right knee reveals the lower extremity to have a Normal anatomic alignment.  There is no erythema, ecchymosis nor edema.  There is moderate effusion present " clinically.  There is no significant warmth.  Range of motion is 0 to 110 degrees, without crepitus.  There is no tenderness to palpation at the both medial and lateral joint line.  Delia's is not done. The knee is ligamentously stable. Patello-femoral grind test is not done.      The calves and thighs are symmetric, without atrophy and non-tender to palpation. Navid's sign is negative, bilaterally.   CMS is intact to the toes.       IMAGING INTERPRETATION:  X-rays of the knee today reveal a subluxed patella.     ASSESSMENT / PLAN: Subluxed patellar prosthetic component. I offered Damien a revision of the medial arthrotomy with nonabsorbable sutures. We discussed the potential risks as well as benefits and he will schedule this at his convenience.    ICD-10-CM    1. Acute pain of right knee M25.561 XR Knee Right 3 Views   2. S/P total knee arthroplasty, right Z96.651 XR Knee Right 3 Views         Return to clinic after surgery    Mark Raymond MD  Dept. Orthopedic Surgery  [facility]       Disclaimer: This note consists of symbols derived from keyboarding, dictation and/or voice recognition software. As a result, there may be errors in the script that have gone undetected. Please consider this when interpreting information found in this chart.

## 2017-09-15 NOTE — NURSING NOTE
"Chief Complaint   Patient presents with     Knee Pain     Right knee       Initial /80 (BP Location: Right arm, Patient Position: Sitting, Cuff Size: Adult Regular)  Ht 5' 10\" (1.778 m)  Wt 264 lb (119.7 kg)  BMI 37.88 kg/m2 Estimated body mass index is 37.88 kg/(m^2) as calculated from the following:    Height as of this encounter: 5' 10\" (1.778 m).    Weight as of this encounter: 264 lb (119.7 kg).  Medication Reconciliation: complete    "

## 2017-09-15 NOTE — LETTER
"    9/15/2017         RE: Damien Vallecillo  [address]  [place] [facility] [address]        Dear Colleague,    Thank you for referring your patient, Damien Vallecillo, to the [facility] ORTHOP[facility]IC SURGERY. Please see a copy of my visit note below.    HISTORY OF PRESENT ILLNESS:    Damien Vallecillo is a 81 year old male who is seen in follow up for right knee pain, R TKA 6/18/2017.  Present symptoms: Pt states at physical  Therapy he is getting pain down the lateral lower leg, pain in the calf, pt states the lower leg pain has been present since the day of surgery, states pain will vary in intensity.  Pt feels pain was with compression while at the hospital.  Pt states he continues to have a lot of swelling, states it is bothersome in the posterior knee.  Pt states at therapy he had less motion yesterday as well.  Treatments tried to this point: physical therapy;  R TKA on 6/18/2017, negative duplex Doppler for DVT.    PHYSICAL EXAM:  /80 (BP Location: Right arm, Patient Position: Sitting, Cuff Size: Adult Regular)  Ht 5' 10\" (1.778 m)  Wt 264 lb (119.7 kg)  BMI 37.88 kg/m2  Body mass index is 37.88 kg/(m^2).   GENERAL APPEARANCE: healthy, alert and no distress   SKIN: no suspicious lesions or rashes  NEURO: Normal strength and tone, mentation intact and speech normal  VASCULAR:  good pulses, and cappillary refill   LYMPH: no lymphadenopathy   PSYCH:  mentation appears normal and affect normal/bright    MSK:  The patient ambulates without an antalgic gait.  The patient is able to get on and off the exam table without difficulty.      Examination of the spine reveals a normal lordosis to the cervical and lumbar spine, and a normal kyphosis to the thoracic spine.  There is no clinical evidence of scoliosis.    The pelvis is clinically level.  Trendelenberg is negative.  The patient is non-tender to palpation over the greater trochanteric bursal region or piriformis fossa.  With the hip flexed to 90 degrees, " internal and external rotation is 30/60 respectively,  with no pain .      KNEE: Examination of the right knee reveals the lower extremity to have a Normal anatomic alignment.  There is no erythema, ecchymosis nor edema.  There is moderate effusion present clinically.  There is no significant warmth.  Range of motion is 0 to 110 degrees, without crepitus.  There is no tenderness to palpation at the both medial and lateral joint line.  Delia's is not done. The knee is ligamentously stable. Patello-femoral grind test is not done.      The calves and thighs are symmetric, without atrophy and non-tender to palpation. Navid's sign is negative, bilaterally.   CMS is intact to the toes.       IMAGING INTERPRETATION:  X-rays of the knee today reveal a subluxed patella.     ASSESSMENT / PLAN: Subluxed patellar prosthetic component. I offered Damien a revision of the medial arthrotomy with nonabsorbable sutures. We discussed the potential risks as well as benefits and he will schedule this at his convenience.    ICD-10-CM    1. Acute pain of right knee M25.561 XR Knee Right 3 Views   2. S/P total knee arthroplasty, right Z96.651 XR Knee Right 3 Views         Return to clinic after surgery    Mark Raymond MD  Dept. Orthopedic Surgery  [facility]       Disclaimer: This note consists of symbols derived from keyboarding, dictation and/or voice recognition software. As a result, there may be errors in the script that have gone undetected. Please consider this when interpreting information found in this chart.        Again, thank you for allowing me to participate in the care of your patient.        Sincerely,        Alec Raymond MD

## 2017-09-15 NOTE — PROGRESS NOTES
Clinic Care Coordination Contact  OUTREACH    Referral Information:  Referral Source: CTS  Reason for Contact: CCRN received call from patient   Care Conference: No     Universal Utilization:   ED Visits in last year: 0  Hospital visits in last year: 1  Last PCP appointment: 08/17/17  Missed Appointments:  (no concerns)  Concerns:  (no concerns )  Multiple Providers or Specialists: yes see care team     Clinical Concerns:  Current Medical Concerns: right calf pain - since knee surgery   Patient wanted to call and update CCRN that his right calf pain is still bothering him - he tried to get into the clinic however Dr. Strange is out for a week - so patient called to get an appt. With Dr. Raymond today and he wanted CCRN to know - advised patient that they may need to do an ultrasound to rule out DVT -     CCRN will continue to monitor and f/u with patient next business day     Current Behavioral Concerns: No concerns - patient is very pleasant     Clinical Pathway Name: None    Medication Management:  No concerns with meds     Functional Status:  Mobility Status: Independent w/Device  Equipment Currently Used at Home: none  Transportation: wife       Psychosocial:  Current living arrangement:: I live in a private home with spouse  Financial/Insurance: No concerns      Resources and Interventions:  Current Resources:  (NA);  (NA)  PAS Number:  (NA)  Senior Linkage Line Referral Placed:  (NA)  Advanced Care Plans/Directives on file:: No  Referrals Placed:  (NA)     Goals:   Goal 1 Statement: LOW SODIUM FOODS UNDER 2500 MG DAILY TO AID WITH SWELLING   Goal 1 Progression Percent: 20%  Goal 1 Progression Date: 09/15/17    Patient/Caregiver understanding: yes   Frequency of Care Coordination: 1 month - previous date - will now f/u one business day as well   Upcoming appointment:  (none with pcp) - Dr. Raymond ortho today      Plan: patient will f/u with Dr. Raymond today at ortho for calf pain   CCRN will continue to monitor  and f/u one business day     Flakita Mast Care Coordinator RN  [facility]  [phone]  September 15, 2017

## 2017-09-15 NOTE — MR AVS SNAPSHOT
After Visit Summary   9/15/2017    Damien Vallecillo    MRN: [id number]           Patient Information     Date Of Birth          1935        Visit Information        Provider Department      9/15/2017 11:00 AM [facility]        Today's Diagnoses     Acute pain of right knee    -  1    S/P total knee arthroplasty, right           Follow-ups after your visit        Your next 10 appointments already scheduled     Sep 18, 2017 10:10 AM CDT   OME Extremity with Vern Levy PT   [facility] ([facility])    [address]  [facility] [address]   [phone]            Sep 22, 2017 10:10 AM CDT   MOE Extremity with Vern Levy PT   [facility] ([facility])    [address]  [facility] [address]   [phone]            Sep 25, 2017 10:10 AM CDT   MOE Extremity with Vern Levy PT   [facility] ([facility])    [address] [facility] [address]  [facility] [address]   [phone]            Sep 26, 2017 11:30 AM CDT   SHORT with Obi Strange MD   [facility]    [address] [place] [address]   [phone]            Sep 28, 2017 10:30 AM CDT   MOE Extremity with Vern Levy PT   [facility] ([facility])    [address] [facility] [address]  [facility] [address]   [phone]            Oct 09, 2017   Procedure with Alec Raymond MD   [facility] (--)    [address] [place]  [facility] [id number] [phone]              Who to contact     If you have questions or need follow up information about today's clinic visit or your schedule please contact [facility] ORTHOPEDIC  "SURGERY directly at [phone].  Normal or non-critical lab and imaging results will be communicated to you by MyChart, letter or phone within 4 business days after the clinic has received the results. If you do not hear from us within 7 days, please contact the clinic through [employer] or phone. If you have a critical or abnormal lab result, we will notify you by phone as soon as possible.  Submit refill requests through [employer] or call your pharmacy and they will forward the refill request to us. Please allow 3 business days for your refill to be completed.          Additional Information About Your Visit        [employer] Information     [employer] lets you send messages to your doctor, view your test results, renew your prescriptions, schedule appointments and more. To sign up, go to [email or web address] . Click on \"Log in\" on the left side of the screen, which will take you to the Welcome page. Then click on \"Sign up Now\" on the right side of the page.     You will be asked to enter the access code listed below, as well as some personal information. Please follow the directions to create your username and password.     Your access code is: [email or web address]  Expires: 2017  2:59 PM     Your access code will  in 90 days. If you need help or a new code, please call your [place] clinic or [phone].        Care EveryWhere ID     This is your Care EveryWhere ID. This could be used by other organizations to access your [place] medical records  [id number]        Your Vitals Were     Height BMI (Body Mass Index)                5' 10\" (1.778 m) 37.88 kg/m2           Blood Pressure from Last 3 Encounters:   09/15/17 144/80   17 139/56   17 124/72    Weight from Last 3 Encounters:   09/15/17 264 lb (119.7 kg)   17 264 lb 1.6 oz (119.8 kg)   17 264 lb 1.6 oz (119.8 kg)               Primary Care Provider Office Phone # Fax #    Obi Strange [phone] [address]  [facility] [address]        Equal Access to Services     [facility] BARRON : Hadii benjamin mccormick rashawn Sosharri, waaxda luqadaha, qaybta kaalmada amparo, corin petersonteteharry davis. So [facility] [phone].    ATENCIÓN: Si habla español, tiene a dumont disposición servicios gratuitos de asistencia lingüística. Llame al [phone].    We comply with applicable federal civil rights laws and [place] laws. We do not discriminate on the basis of race, color, national origin, age, disability sex, sexual orientation or gender identity.            Thank you!     Thank you for choosing [facility] ORTHOPEDIC SURGERY  for your care. Our goal is always to provide you with excellent care. Hearing back from our patients is one way we can continue to improve our services. Please take a few minutes to complete the written survey that you may receive in the mail after your visit with us. Thank you!             Your Updated Medication List - Protect others around you: Learn how to safely use, store and throw away your medicines at www.disposemymeds.org.          This list is accurate as of: 9/15/17 11:31 AM.  Always use your most recent med list.                   Brand Name Dispense Instructions for use Diagnosis    ACE NOT PRESCRIBED (INTENTIONAL)     0 each    ACE Inhibitor not prescribed due to Other:  Had cough on ACEI in past    Type 2 diabetes, HbA1c goal < 7% (H)       aspirin  MG EC tablet     30 tablet    Take 1 tablet (325 mg) by mouth daily    Status post total right knee replacement       atorvastatin 40 MG tablet    LIPITOR    90 tablet    Take 1 tablet (40 mg) by mouth At Bedtime    Pre-diabetes       * blood glucose monitoring test strip    ACCU-CHEK LAURA    100 strip    1Use to test blood sugar 2 times daily or as directed.    Glucose intolerance (impaired glucose tolerance)       * blood glucose monitoring test strip    ACCU-CHEK LAURA PLUS    100 each    Use to test blood sugar 1 time daily     Glucose intolerance (impaired glucose tolerance)       CINNAMON PO           fish oil-omega-3 fatty acids 1000 MG capsule      Take 1 g by mouth every evening        flax seed oil 1000 MG capsule      Take 1 capsule by mouth every evening        MULTIVITAMIN ADULT PO      Reported on 5/12/2017        order for DME     1 each    Equipment being ordered: Walker Wheels () and Walker () Treatment Diagnosis: Impaired functional mobility    Status post total right knee replacement       tamsulosin 0.4 MG capsule    FLOMAX    90 capsule    Take 1 capsule (0.4 mg) by mouth At Bedtime    Benign prostatic hyperplasia with lower urinary tract symptoms, unspecified morphology       triamcinolone 0.5 % cream    KENALOG    30 g    Apply sparingly to affected area three times daily.    Stasis dermatitis of both legs       * Notice:  This list has 2 medication(s) that are the same as other medications prescribed for you. Read the directions carefully, and ask your doctor or other care provider to review them with you.

## 2017-09-26 ENCOUNTER — OFFICE VISIT (OUTPATIENT)
Dept: FAMILY MEDICINE | Facility: CLINIC | Age: 82
End: 2017-09-26
Payer: MEDICARE

## 2017-09-26 VITALS
WEIGHT: 259 LBS | RESPIRATION RATE: 16 BRPM | SYSTOLIC BLOOD PRESSURE: 166 MMHG | DIASTOLIC BLOOD PRESSURE: 90 MMHG | TEMPERATURE: 97.6 F | BODY MASS INDEX: 37.16 KG/M2 | HEART RATE: 63 BPM

## 2017-09-26 DIAGNOSIS — Z01.818 PREOP GENERAL PHYSICAL EXAM: Primary | ICD-10-CM

## 2017-09-26 DIAGNOSIS — R73.02 GLUCOSE INTOLERANCE (IMPAIRED GLUCOSE TOLERANCE): ICD-10-CM

## 2017-09-26 DIAGNOSIS — Z01.812 PRE-OPERATIVE LABORATORY EXAMINATION: ICD-10-CM

## 2017-09-26 LAB
BASOPHILS # BLD AUTO: 0 10E9/L (ref 0–0.2)
BASOPHILS NFR BLD AUTO: 0.5 %
DIFFERENTIAL METHOD BLD: ABNORMAL
EOSINOPHIL # BLD AUTO: 0.5 10E9/L (ref 0–0.7)
EOSINOPHIL NFR BLD AUTO: 6.1 %
ERYTHROCYTE [DISTWIDTH] IN BLOOD BY AUTOMATED COUNT: 17.9 % (ref 10–15)
HBA1C MFR BLD: 5.5 % (ref 4.3–6)
HCT VFR BLD AUTO: 41.9 % (ref 40–53)
HGB BLD-MCNC: 13.3 G/DL (ref 13.3–17.7)
LYMPHOCYTES # BLD AUTO: 1.2 10E9/L (ref 0.8–5.3)
LYMPHOCYTES NFR BLD AUTO: 15.5 %
MCH RBC QN AUTO: 26.1 PG (ref 26.5–33)
MCHC RBC AUTO-ENTMCNC: 31.7 G/DL (ref 31.5–36.5)
MCV RBC AUTO: 82 FL (ref 78–100)
MONOCYTES # BLD AUTO: 1 10E9/L (ref 0–1.3)
MONOCYTES NFR BLD AUTO: 12.6 %
MRSA DNA SPEC QL NAA+PROBE: NEGATIVE
NEUTROPHILS # BLD AUTO: 5 10E9/L (ref 1.6–8.3)
NEUTROPHILS NFR BLD AUTO: 65.3 %
PLATELET # BLD AUTO: 233 10E9/L (ref 150–450)
RBC # BLD AUTO: 5.09 10E12/L (ref 4.4–5.9)
SPECIMEN SOURCE: NORMAL
WBC # BLD AUTO: 7.6 10E9/L (ref 4–11)

## 2017-09-26 PROCEDURE — 99214 OFFICE O/P EST MOD 30 MIN: CPT | Performed by: FAMILY MEDICINE

## 2017-09-26 PROCEDURE — 83036 HEMOGLOBIN GLYCOSYLATED A1C: CPT | Performed by: FAMILY MEDICINE

## 2017-09-26 PROCEDURE — 80053 COMPREHEN METABOLIC PANEL: CPT | Performed by: FAMILY MEDICINE

## 2017-09-26 PROCEDURE — 85025 COMPLETE CBC W/AUTO DIFF WBC: CPT | Performed by: FAMILY MEDICINE

## 2017-09-26 PROCEDURE — 93000 ELECTROCARDIOGRAM COMPLETE: CPT | Performed by: FAMILY MEDICINE

## 2017-09-26 PROCEDURE — 87641 MR-STAPH DNA AMP PROBE: CPT | Performed by: ORTHOPAEDIC SURGERY

## 2017-09-26 PROCEDURE — 87640 STAPH A DNA AMP PROBE: CPT | Mod: XU | Performed by: ORTHOPAEDIC SURGERY

## 2017-09-26 PROCEDURE — 36415 COLL VENOUS BLD VENIPUNCTURE: CPT | Performed by: FAMILY MEDICINE

## 2017-09-26 NOTE — MR AVS SNAPSHOT
After Visit Summary   9/26/2017    Damien Vallecillo    MRN: [id number]           Patient Information     Date Of Birth          1935        Visit Information        Provider Department      9/26/2017 1:45 PM Obi Strange MD [facility]        Today's Diagnoses     Preop general physical exam    -  1    Glucose intolerance (impaired glucose tolerance)        Pre-operative laboratory examination          Care Instructions      Before Your Surgery      Call your surgeon if there is any change in your health. This includes signs of a cold or flu (such as a sore throat, runny nose, cough, rash or fever).    Do not smoke, drink alcohol or take over the counter medicine (unless your surgeon or primary care doctor tells you to) for the 24 hours before and after surgery.    If you take prescribed drugs: Follow your doctor s orders about which medicines to take and which to stop until after surgery.    Eating and drinking prior to surgery: follow the instructions from your surgeon    Take a shower or bath the night before surgery. Use the soap your surgeon gave you to gently clean your skin. If you do not have soap from your surgeon, use your regular soap. Do not shave or scrub the surgery site.  Wear clean pajamas and have clean sheets on your bed.   NO MEDS am OF SURGERY          Follow-ups after your visit        Your next 10 appointments already scheduled     Oct 09, 2017   Procedure with Alec Raymond MD   [facility] Services (--)    [address] [facility] [address]   [phone]            Oct 13, 2017 10:10 AM CDT   (Arrive by 9:55 AM)   MOE Extremity with Vern Levy PT   [facility] [facility] ([facility] [place])    [address]  [facility] [address]   [phone]            Oct 20, 2017 10:00 AM CDT   Return Visit with Thomas Govea PA-C   [facility] ORTHOPEDIC SURGERY  "([facility])    [address] [facility]  [address]  [facility] [address]   [phone]              Who to contact     If you have questions or need follow up information about today's clinic visit or your schedule please contact [facility] directly at [phone].  Normal or non-critical lab and imaging results will be communicated to you by MyChart, letter or phone within 4 business days after the clinic has received the results. If you do not hear from us within 7 days, please contact the clinic through MyChart or phone. If you have a critical or abnormal lab result, we will notify you by phone as soon as possible.  Submit refill requests through [employer] or call your pharmacy and they will forward the refill request to us. Please allow 3 business days for your refill to be completed.          Additional Information About Your Visit        MyChart Information     [employer] lets you send messages to your doctor, view your test results, renew your prescriptions, schedule appointments and more. To sign up, go to [email or web address] . Click on \"Log in\" on the left side of the screen, which will take you to the Welcome page. Then click on \"Sign up Now\" on the right side of the page.     You will be asked to enter the access code listed below, as well as some personal information. Please follow the directions to create your username and password.     Your access code is: [email or web address]  Expires: 2017  2:59 PM     Your access code will  in 90 days. If you need help or a new code, please call your [facility] or [phone].        Care EveryWhere ID     This is your Care EveryWhere ID. This could be used by other organizations to access your [place] medical records  [id number]        Your Vitals Were     Pulse Temperature Respirations BMI (Body Mass Index)          63 97.6  F (36.4  C) (Oral) 16 37.16 kg/m2         Blood Pressure from Last 3 Encounters:   17 " 166/90   09/15/17 144/80   08/17/17 139/56    Weight from Last 3 Encounters:   09/26/17 259 lb (117.5 kg)   09/15/17 264 lb (119.7 kg)   08/17/17 264 lb 1.6 oz (119.8 kg)              We Performed the Following     CBC with platelets and differential     Comprehensive metabolic panel     EKG 12-lead complete w/read - Clinics     Hemoglobin A1c     Methicillin Resistant Staph Aureus PCR        Primary Care Provider Office Phone # Fax #    Obi Strange [phone] [address] [facility] [address]        Equal Access to Services     [facility] : Hadii aad anny hadasho Socorrieali, waaxda luqadaha, qaybta kaalmada adehermiloyada, corin blank . So [facility] [phone].    ATENCIÓN: Si habla español, tiene a dumont disposición servicios gratuitos de asistencia lingüística. Llame al [phone].    We comply with applicable federal civil rights laws and [place] laws. We do not discriminate on the basis of race, color, national origin, age, disability sex, sexual orientation or gender identity.            Thank you!     Thank you for choosing [facility]  for your care. Our goal is always to provide you with excellent care. Hearing back from our patients is one way we can continue to improve our services. Please take a few minutes to complete the written survey that you may receive in the mail after your visit with us. Thank you!             Your Updated Medication List - Protect others around you: Learn how to safely use, store and throw away your medicines at www.disposemymeds.org.          This list is accurate as of: 9/26/17  2:54 PM.  Always use your most recent med list.                   Brand Name Dispense Instructions for use Diagnosis    ACE NOT PRESCRIBED (INTENTIONAL)     0 each    ACE Inhibitor not prescribed due to Other:  Had cough on ACEI in past    Type 2 diabetes, HbA1c goal < 7% (H)       aspirin  MG EC tablet     30 tablet    Take 1  tablet (325 mg) by mouth daily    Status post total right knee replacement       atorvastatin 40 MG tablet    LIPITOR    90 tablet    Take 1 tablet (40 mg) by mouth At Bedtime    Pre-diabetes       * blood glucose monitoring test strip    ACCU-CHEK LAURA    100 strip    1Use to test blood sugar 2 times daily or as directed.    Glucose intolerance (impaired glucose tolerance)       * blood glucose monitoring test strip    ACCU-CHEK LAURA PLUS    100 each    Use to test blood sugar 1 time daily    Glucose intolerance (impaired glucose tolerance)       CINNAMON PO           fish oil-omega-3 fatty acids 1000 MG capsule      Take 1 g by mouth every evening        MULTIVITAMIN ADULT PO      Reported on 5/12/2017        order for DME     1 each    Equipment being ordered: Walker Wheels () and Walker () Treatment Diagnosis: Impaired functional mobility    Status post total right knee replacement       tamsulosin 0.4 MG capsule    FLOMAX    90 capsule    Take 1 capsule (0.4 mg) by mouth At Bedtime    Benign prostatic hyperplasia with lower urinary tract symptoms, unspecified morphology       triamcinolone 0.5 % cream    KENALOG    30 g    Apply sparingly to affected area three times daily.    Stasis dermatitis of both legs       * Notice:  This list has 2 medication(s) that are the same as other medications prescribed for you. Read the directions carefully, and ask your doctor or other care provider to review them with you.

## 2017-09-26 NOTE — PROGRESS NOTES
[facility]  [address] [facility] [address]  [phone]  Dept: [phone]    PRE-OP EVALUATION:  Today's date: 2017    Damien Vallecillo (: 1935) presents for pre-operative evaluation assessment as requested by Dr. Raymond.  He requires evaluation and anesthesia risk assessment prior to undergoing surgery/procedure for treatment of right knee .  Proposed procedure: total knee revision    Date of Surgery/ Procedure: 10/9/17  Time of Surgery/ Procedure: 10:05  Hospital/Surgical Facility: [facility]  Fax number for surgical facility: [phone]  Primary Physician: Obi Strange  Type of Anesthesia Anticipated: General    Patient has a Health Care Directive or Living Will:  YES at hospital    1. NO - Do you have a history of heart attack, stroke, stent, bypass or surgery on an artery in the head, neck, heart or legs?  2. NO - Do you ever have any pain or discomfort in your chest?  3. NO - Do you have a history of  Heart Failure?  4. NO - Are you troubled by shortness of breath when: walking on the level, up a slight hill or at night?  5. NO - Do you currently have a cold, bronchitis or other respiratory infection?  6. NO - Do you have a cough, shortness of breath or wheezing?  7. YES - Do you sometimes get pains in the calves of your legs when you walk? Blamed on previous therapies (right only)  8. NO - Do you or anyone in your family have previous history of blood clots?  9. NO - Do you or does anyone in your family have a serious bleeding problem such as prolonged bleeding following surgeries or cuts?  10. NO - Have you ever had problems with anemia or been told to take iron pills?  11. NO - Have you had any abnormal blood loss such as black, tarry or bloody stools, or abnormal vaginal bleeding?  12. YES - Have you ever had a blood transfusion?  13. NO - Have you or any of your relatives ever had problems with anesthesia?  14. NO - Do you have sleep apnea,  excessive snoring or daytime drowsiness?  15. NO - Do you have any prosthetic heart valves?  16. YES - Do you have prosthetic joints?  17. NO - Is there any chance that you may be pregnant?        HPI:                                                      Brief HPI related to upcoming procedure: Patient presents for pre op for right knee revision.         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 Provider to review       Lower GI bleed 06/30/2015     Priority: Medium     GI bleed 06/30/2015     Priority: Medium     Cerebral infarction (H) 07/07/2014     Priority: Medium     Diagnosis updated by automated process. Provider to review and confirm.       Glucose intolerance (impaired glucose tolerance) 05/31/2013     Priority: Medium     Advanced directives, counseling/discussion 03/14/2012     Priority: Medium     ordered today.       CARDIOVASCULAR SCREENING; LDL GOAL LESS THAN 100 10/31/2010     Priority: Medium     24.9% 10 yr risk 4/2014       Thyroid nodule 06/11/2010     Priority: Medium     Gout      Priority: Medium     Problem list name  "updated by automated process. Provider to review        Past Medical History:   Diagnosis Date     Acute suppurative arthritis (H)      Acute, but ill-defined, cerebrovascular disease      Arthritis      Chronic headaches      Diabetes (H)     \"Pre-diabetes\"     Glucose intolerance (impaired glucose tolerance) 5/31/2013     Gout, unspecified      Hammer toe of left foot 10/21/2016     Hematuria      History of arthroplasty of right knee 8/17/2017     History of blood transfusion      Left knee pain, unspecified chronicity 5/8/2017     Lentigo maligna (H)      Malignant neoplasm of rectosigmoid junction (H)      Morbid obesity due to excess calories (H) 10/21/2016     Other convulsions     last seizure 7-8 years ago...not certain if these were true seizres or not..     Pre-diabetes 10/21/2016     RBC microcytosis 2/14/2017     Right knee pain, unspecified chronicity 5/8/2017     Syncope      Tubulovillous adenoma of colon      Venous stasis dermatitis of both lower extremities 10/21/2016     Past Surgical History:   Procedure Laterality Date     ARTHROPLASTY KNEE Right 6/19/2017    Procedure: ARTHROPLASTY KNEE;  Right total knee arthroplasty, Hammer toe repair toe 2,3,4,5 left foot with osteotomy;  Surgeon: Alec Raymond MD;  Location:  OR      NONSPECIFIC PROCEDURE      right heel surgery; spur removed.     COLONOSCOPY N/A 6/23/2015    Procedure: COMBINED COLONOSCOPY, SINGLE OR MULTIPLE BIOPSY/POLYPECTOMY BY BIOPSY;  Surgeon: Kimberly Alfaro MD;  Location:  GI     COLONOSCOPY N/A 7/30/2015    Procedure: COLONOSCOPY;  Surgeon: Enrique Salazar MD;  Location:  OR     HERNIORRHAPHY EPIGASTRIC  4/27/2012    Procedure:HERNIORRHAPHY EPIGASTRIC; Epigastric Hernia Repair with mesh ; Surgeon:BOLIVAR OLIVEIRA; Location: OR     LAPAROSCOPIC ASSISTED COLECTOMY Right 7/30/2015    Procedure: LAPAROSCOPIC ASSISTED COLECTOMY;  Surgeon: Enrique Salazar MD;  Location:  OR     ORTHOPEDIC SURGERY      lt " knee arthroplasty     REPAIR HAMMER TOE Left 6/19/2017    Procedure: REPAIR HAMMER TOE;  Hammer toe repair toe 2,3,4,5 left foot with osteotomy   ;  Surgeon: Beverly Kim DPM, Podiatry/Foot and Ankle Surgery;  Location: [facility] OR     SIGMOIDOSCOPY FLEXIBLE  5/29/2013    Procedure: SIGMOIDOSCOPY FLEXIBLE;  SIGMOIDOSCOPY FLEXIBLE (FV) Needs blood sugar ck'd;  Surgeon: Enrique Salazar MD;  Location:  GI     Current Outpatient Prescriptions   Medication Sig Dispense Refill     aspirin  MG EC tablet Take 1 tablet (325 mg) by mouth daily 30 tablet      order for DME Equipment being ordered: Walker Wheels () and Walker ()  Treatment Diagnosis: Impaired functional mobility 1 each 0     Multiple Vitamins-Minerals (MULTIVITAMIN ADULT PO) Reported on 5/12/2017       triamcinolone (KENALOG) 0.5 % cream Apply sparingly to affected area three times daily. 30 g 1     CINNAMON PO        tamsulosin (FLOMAX) 0.4 MG 24 hr capsule Take 1 capsule (0.4 mg) by mouth At Bedtime 90 capsule 3     atorvastatin (LIPITOR) 40 MG tablet Take 1 tablet (40 mg) by mouth At Bedtime 90 tablet 3     blood glucose monitoring (ACCU-CHEK LAURA PLUS) test strip Use to test blood sugar 1 time daily 100 each 11     blood glucose (ACCU-CHEK LAURA) test strip 1Use to test blood sugar 2 times daily or as directed. 100 strip 3     fish oil-omega-3 fatty acids 1000 MG capsule Take 1 g by mouth every evening       ACE NOT PRESCRIBED, INTENTIONAL, ACE Inhibitor not prescribed due to Other:  Had cough on ACEI in past       0 each 0     OTC products: Aspirin    Allergies   Allergen Reactions     Levetiracetam [Keppra] Rash     Oxcarbazepine [Trileptal] Rash      Latex Allergy: NO    Social History   Substance Use Topics     Smoking status: Never Smoker     Smokeless tobacco: Never Used     Alcohol use 0.0 oz/week     0 Standard drinks or equivalent per week      Comment: Occas     History   Drug Use No       REVIEW OF SYSTEMS:                                                     C: NEGATIVE for fever, chills, change in weight  E: NEGATIVE for vision changes or irritation  E/M: NEGATIVE for dysphagia  R: NEGATIVE for significant cough or SOB  CV: NEGATIVE for chest pain, palpitations  GI: NEGATIVE for heartburn or change in bowel habits  : NEGATIVE for dysuria  N: NEGATIVE for weakness, dizziness or paresthesias  H: NEGATIVE for bleeding problems  P: NEGATIVE for changes in mood or affect    This document serves as a record of the services and decisions personally performed and made by Obi Strange MD. It was created on his behalf by Vannesa Schreiber, a trained medical scribe.  The creation of this document is based on the scribe's personal observations and the provider's statements to the medical scribe.  Vannesa Schreiber, September 26, 2017 2:03 PM    EXAM:                                                    /90 (BP Location: Left arm, Patient Position: Chair, Cuff Size: Adult Small)  Pulse 63  Temp 97.6  F (36.4  C) (Oral)  Resp 16  Wt 259 lb (117.5 kg)  BMI 37.16 kg/m2    GENERAL APPEARANCE: healthy, alert and no distress     EYES: EOMI,  PERRL     HENT: ear canals and TM's normal and nose and mouth without ulcers or lesions     NECK: no adenopathy, no asymmetry, masses, or scars and thyroid normal to palpation     RESP: lungs clear to auscultation - no rales, rhonchi or wheezes     CV: regular rates and rhythm, normal S1 S2, no S3 or S4 and no murmur, click or rub     ABDOMEN:  soft, nontender, no HSM or masses and bowel sounds normal     MS: per ortho. Calf without cords, tenderness     SKIN: no suspicious lesions or rashes 2 recent biopsy sites bandaged     PSYCH: mentation appears normal. and affect normal/bright     LYMPHATICS: No cervical or supraclavicular nodes    DIAGNOSTICS:                                                    EKG: appears normal, NSR, normal axis, normal intervals, no acute ST/T changes c/w ischemia, no LVH by voltage criteria,  unchanged from previous tracings    Recent Labs   Lab Test  06/22/17   0602  06/21/17   0610  06/20/17   0600  06/19/17   1346  05/15/17   1030  04/17/17   1100  02/13/17   1115  10/21/16   1154  08/10/15   0835   08/05/15   0650   HGB   --   8.8*  10.1*  12.1*  12.1*   --   10.8*   --    --    --    --    PLT  153   --    --   224  210   --   234   --    --    < >  252   INR   --    --    --    --    --    --    --    --   1.15*   --   1.09   NA   --    --    --    --   143   --   140  138  137   < >  137   POTASSIUM   --    --    --   4.1  4.0   --   4.1  4.6  4.3   < >  4.0   CR   --    --    --   0.82  0.79   --   0.74  0.84  0.73   < >  0.71   A1C   --    --    --    --    --   5.9   --   6.0   --    --    --     < > = values in this interval not displayed.        IMPRESSION:                                                    Reason for surgery/procedure: Patellar dislocation  Diagnosis/reason for consult: assess perioperative risk      The proposed surgical procedure is considered INTERMEDIATE risk.    REVISED CARDIAC RISK INDEX  The patient has the following serious cardiovascular risks for perioperative complications such as (MI, PE, VFib and 3  AV Block):  No serious cardiac risks  INTERPRETATION: 0 risks: Class I (very low risk - 0.4% complication rate)    The patient has the following additional risks for perioperative complications:  N Dx RENAE, but this has been considered in past      ICD-10-CM    1. Preop general physical exam Z01.818        RECOMMENDATIONS:                                                        Obstructive Sleep Apnea (or suspected sleep apnea)  Hospital staff are advised to monitor for sleep related oxygen desaturations due to suspicion of RENAE      Patient is to hold all medications morning of surgery.     APPROVAL GIVEN to proceed with proposed procedure, without further diagnostic evaluation       Signed Electronically by: Obi Strange MD  The information in this document,  created by the medical scribe for me, accurately reflects the services I personally performed and the decisions made by me. I have reviewed and approved this document for accuracy prior to leaving the patient care area.  Obi Strange MD September 26, 2017 2:03 PM    Copy of this evaluation report is provided to requesting physician.    [facility] Preop Guidelines

## 2017-09-26 NOTE — NURSING NOTE
"Chief Complaint   Patient presents with     Pre-Op Exam       Initial /90 (BP Location: Left arm, Patient Position: Chair, Cuff Size: Adult Small)  Pulse 63  Temp 97.6  F (36.4  C) (Oral)  Resp 16  Wt 259 lb (117.5 kg)  BMI 37.16 kg/m2 Estimated body mass index is 37.16 kg/(m^2) as calculated from the following:    Height as of 9/15/17: 5' 10\" (1.778 m).    Weight as of this encounter: 259 lb (117.5 kg).  Medication Reconciliation: complete    "

## 2017-09-26 NOTE — LETTER
September 28, 2017      [facility] [place]illo  [address] [facility] [address]        Dear ,    We are writing to inform you of your test results.    Tests are all normal. GREAT!     Resulted Orders   CBC with platelets and differential   Result Value Ref Range    WBC 7.6 4.0 - 11.0 10e9/L    RBC Count 5.09 4.4 - 5.9 10e12/L    Hemoglobin 13.3 13.3 - 17.7 g/dL    Hematocrit 41.9 40.0 - 53.0 %    MCV 82 78 - 100 fl    MCH 26.1 (L) 26.5 - 33.0 pg    MCHC 31.7 31.5 - 36.5 g/dL    RDW 17.9 (H) 10.0 - 15.0 %    Platelet Count 233 150 - 450 10e9/L    Diff Method Automated Method     % Neutrophils 65.3 %    % Lymphocytes 15.5 %    % Monocytes 12.6 %    % Eosinophils 6.1 %    % Basophils 0.5 %    Absolute Neutrophil 5.0 1.6 - 8.3 10e9/L    Absolute Lymphocytes 1.2 0.8 - 5.3 10e9/L    Absolute Monocytes 1.0 0.0 - 1.3 10e9/L    Absolute Eosinophils 0.5 0.0 - 0.7 10e9/L    Absolute Basophils 0.0 0.0 - 0.2 10e9/L   Comprehensive metabolic panel   Result Value Ref Range    Sodium 140 133 - 144 mmol/L    Potassium 4.4 3.4 - 5.3 mmol/L    Chloride 106 94 - 109 mmol/L    Carbon Dioxide 25 20 - 32 mmol/L    Anion Gap 9 3 - 14 mmol/L    Glucose 90 70 - 99 mg/dL      Comment:      Non Fasting    Urea Nitrogen 13 7 - 30 mg/dL    Creatinine 0.76 0.66 - 1.25 mg/dL    GFR Estimate >90 >60 mL/min/1.7m2      Comment:      Non  GFR Calc    GFR Estimate If Black >90 >60 mL/min/1.7m2      Comment:       GFR Calc    Calcium 9.0 8.5 - 10.1 mg/dL    Bilirubin Total 0.6 0.2 - 1.3 mg/dL    Albumin 3.8 3.4 - 5.0 g/dL    Protein Total 7.3 6.8 - 8.8 g/dL    Alkaline Phosphatase 74 40 - 150 U/L    ALT 22 0 - 70 U/L    AST 13 0 - 45 U/L   Hemoglobin A1c   Result Value Ref Range    Hemoglobin A1C 5.5 4.3 - 6.0 %       If you have any questions or concerns, please call the clinic at the number listed above.       Sincerely,        Obi Strange MD

## 2017-09-26 NOTE — PATIENT INSTRUCTIONS
Before Your Surgery      Call your surgeon if there is any change in your health. This includes signs of a cold or flu (such as a sore throat, runny nose, cough, rash or fever).    Do not smoke, drink alcohol or take over the counter medicine (unless your surgeon or primary care doctor tells you to) for the 24 hours before and after surgery.    If you take prescribed drugs: Follow your doctor s orders about which medicines to take and which to stop until after surgery.    Eating and drinking prior to surgery: follow the instructions from your surgeon    Take a shower or bath the night before surgery. Use the soap your surgeon gave you to gently clean your skin. If you do not have soap from your surgeon, use your regular soap. Do not shave or scrub the surgery site.  Wear clean pajamas and have clean sheets on your bed.   NO MEDS am OF SURGERY

## 2017-09-27 LAB
ALBUMIN SERPL-MCNC: 3.8 G/DL (ref 3.4–5)
ALP SERPL-CCNC: 74 U/L (ref 40–150)
ALT SERPL W P-5'-P-CCNC: 22 U/L (ref 0–70)
ANION GAP SERPL CALCULATED.3IONS-SCNC: 9 MMOL/L (ref 3–14)
AST SERPL W P-5'-P-CCNC: 13 U/L (ref 0–45)
BILIRUB SERPL-MCNC: 0.6 MG/DL (ref 0.2–1.3)
BUN SERPL-MCNC: 13 MG/DL (ref 7–30)
CALCIUM SERPL-MCNC: 9 MG/DL (ref 8.5–10.1)
CHLORIDE SERPL-SCNC: 106 MMOL/L (ref 94–109)
CO2 SERPL-SCNC: 25 MMOL/L (ref 20–32)
CREAT SERPL-MCNC: 0.76 MG/DL (ref 0.66–1.25)
GFR SERPL CREATININE-BSD FRML MDRD: >90 ML/MIN/1.7M2
GLUCOSE SERPL-MCNC: 90 MG/DL (ref 70–99)
POTASSIUM SERPL-SCNC: 4.4 MMOL/L (ref 3.4–5.3)
PROT SERPL-MCNC: 7.3 G/DL (ref 6.8–8.8)
SODIUM SERPL-SCNC: 140 MMOL/L (ref 133–144)

## 2017-10-04 NOTE — H&P (VIEW-ONLY)
[facility]  [address] [facility] [address]  [phone]  Dept: [phone]    PRE-OP EVALUATION:  Today's date: 2017    Damien Vallecillo (: 1935) presents for pre-operative evaluation assessment as requested by Dr. Raymond.  He requires evaluation and anesthesia risk assessment prior to undergoing surgery/procedure for treatment of right knee .  Proposed procedure: total knee revision    Date of Surgery/ Procedure: 10/9/17  Time of Surgery/ Procedure: 10:05  Hospital/Surgical Facility: [facility]  Fax number for surgical facility: [phone]  Primary Physician: Obi Strange  Type of Anesthesia Anticipated: General    Patient has a Health Care Directive or Living Will:  YES at hospital    1. NO - Do you have a history of heart attack, stroke, stent, bypass or surgery on an artery in the head, neck, heart or legs?  2. NO - Do you ever have any pain or discomfort in your chest?  3. NO - Do you have a history of  Heart Failure?  4. NO - Are you troubled by shortness of breath when: walking on the level, up a slight hill or at night?  5. NO - Do you currently have a cold, bronchitis or other respiratory infection?  6. NO - Do you have a cough, shortness of breath or wheezing?  7. YES - Do you sometimes get pains in the calves of your legs when you walk? Blamed on previous therapies (right only)  8. NO - Do you or anyone in your family have previous history of blood clots?  9. NO - Do you or does anyone in your family have a serious bleeding problem such as prolonged bleeding following surgeries or cuts?  10. NO - Have you ever had problems with anemia or been told to take iron pills?  11. NO - Have you had any abnormal blood loss such as black, tarry or bloody stools, or abnormal vaginal bleeding?  12. YES - Have you ever had a blood transfusion?  13. NO - Have you or any of your relatives ever had problems with anesthesia?  14. NO - Do you have sleep apnea,  excessive snoring or daytime drowsiness?  15. NO - Do you have any prosthetic heart valves?  16. YES - Do you have prosthetic joints?  17. NO - Is there any chance that you may be pregnant?        HPI:                                                      Brief HPI related to upcoming procedure: Patient presents for pre op for right knee revision.         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 Provider to review       Lower GI bleed 06/30/2015     Priority: Medium     GI bleed 06/30/2015     Priority: Medium     Cerebral infarction (H) 07/07/2014     Priority: Medium     Diagnosis updated by automated process. Provider to review and confirm.       Glucose intolerance (impaired glucose tolerance) 05/31/2013     Priority: Medium     Advanced directives, counseling/discussion 03/14/2012     Priority: Medium     ordered today.       CARDIOVASCULAR SCREENING; LDL GOAL LESS THAN 100 10/31/2010     Priority: Medium     24.9% 10 yr risk 4/2014       Thyroid nodule 06/11/2010     Priority: Medium     Gout      Priority: Medium     Problem list name  "updated by automated process. Provider to review        Past Medical History:   Diagnosis Date     Acute suppurative arthritis (H)      Acute, but ill-defined, cerebrovascular disease      Arthritis      Chronic headaches      Diabetes (H)     \"Pre-diabetes\"     Glucose intolerance (impaired glucose tolerance) 5/31/2013     Gout, unspecified      Hammer toe of left foot 10/21/2016     Hematuria      History of arthroplasty of right knee 8/17/2017     History of blood transfusion      Left knee pain, unspecified chronicity 5/8/2017     Lentigo maligna (H)      Malignant neoplasm of rectosigmoid junction (H)      Morbid obesity due to excess calories (H) 10/21/2016     Other convulsions     last seizure 7-8 years ago...not certain if these were true seizres or not..     Pre-diabetes 10/21/2016     RBC microcytosis 2/14/2017     Right knee pain, unspecified chronicity 5/8/2017     Syncope      Tubulovillous adenoma of colon      Venous stasis dermatitis of both lower extremities 10/21/2016     Past Surgical History:   Procedure Laterality Date     ARTHROPLASTY KNEE Right 6/19/2017    Procedure: ARTHROPLASTY KNEE;  Right total knee arthroplasty, Hammer toe repair toe 2,3,4,5 left foot with osteotomy;  Surgeon: Alec Raymond MD;  Location:  OR      NONSPECIFIC PROCEDURE      right heel surgery; spur removed.     COLONOSCOPY N/A 6/23/2015    Procedure: COMBINED COLONOSCOPY, SINGLE OR MULTIPLE BIOPSY/POLYPECTOMY BY BIOPSY;  Surgeon: Kimberly Alfaro MD;  Location:  GI     COLONOSCOPY N/A 7/30/2015    Procedure: COLONOSCOPY;  Surgeon: Enrique Salazar MD;  Location:  OR     HERNIORRHAPHY EPIGASTRIC  4/27/2012    Procedure:HERNIORRHAPHY EPIGASTRIC; Epigastric Hernia Repair with mesh ; Surgeon:BOLIVAR OLIVEIRA; Location: OR     LAPAROSCOPIC ASSISTED COLECTOMY Right 7/30/2015    Procedure: LAPAROSCOPIC ASSISTED COLECTOMY;  Surgeon: Enrique Salazar MD;  Location:  OR     ORTHOPEDIC SURGERY      lt " knee arthroplasty     REPAIR HAMMER TOE Left 6/19/2017    Procedure: REPAIR HAMMER TOE;  Hammer toe repair toe 2,3,4,5 left foot with osteotomy   ;  Surgeon: Beverly iKm DPM, Podiatry/Foot and Ankle Surgery;  Location: [facility] OR     SIGMOIDOSCOPY FLEXIBLE  5/29/2013    Procedure: SIGMOIDOSCOPY FLEXIBLE;  SIGMOIDOSCOPY FLEXIBLE (FV) Needs blood sugar ck'd;  Surgeon: Enrique Salazar MD;  Location:  GI     Current Outpatient Prescriptions   Medication Sig Dispense Refill     aspirin  MG EC tablet Take 1 tablet (325 mg) by mouth daily 30 tablet      order for DME Equipment being ordered: Walker Wheels () and Walker ()  Treatment Diagnosis: Impaired functional mobility 1 each 0     Multiple Vitamins-Minerals (MULTIVITAMIN ADULT PO) Reported on 5/12/2017       triamcinolone (KENALOG) 0.5 % cream Apply sparingly to affected area three times daily. 30 g 1     CINNAMON PO        tamsulosin (FLOMAX) 0.4 MG 24 hr capsule Take 1 capsule (0.4 mg) by mouth At Bedtime 90 capsule 3     atorvastatin (LIPITOR) 40 MG tablet Take 1 tablet (40 mg) by mouth At Bedtime 90 tablet 3     blood glucose monitoring (ACCU-CHEK LAURA PLUS) test strip Use to test blood sugar 1 time daily 100 each 11     blood glucose (ACCU-CHEK LAURA) test strip 1Use to test blood sugar 2 times daily or as directed. 100 strip 3     fish oil-omega-3 fatty acids 1000 MG capsule Take 1 g by mouth every evening       ACE NOT PRESCRIBED, INTENTIONAL, ACE Inhibitor not prescribed due to Other:  Had cough on ACEI in past       0 each 0     OTC products: Aspirin    Allergies   Allergen Reactions     Levetiracetam [Keppra] Rash     Oxcarbazepine [Trileptal] Rash      Latex Allergy: NO    Social History   Substance Use Topics     Smoking status: Never Smoker     Smokeless tobacco: Never Used     Alcohol use 0.0 oz/week     0 Standard drinks or equivalent per week      Comment: Occas     History   Drug Use No       REVIEW OF SYSTEMS:                                                     C: NEGATIVE for fever, chills, change in weight  E: NEGATIVE for vision changes or irritation  E/M: NEGATIVE for dysphagia  R: NEGATIVE for significant cough or SOB  CV: NEGATIVE for chest pain, palpitations  GI: NEGATIVE for heartburn or change in bowel habits  : NEGATIVE for dysuria  N: NEGATIVE for weakness, dizziness or paresthesias  H: NEGATIVE for bleeding problems  P: NEGATIVE for changes in mood or affect    This document serves as a record of the services and decisions personally performed and made by Obi Strange MD. It was created on his behalf by Vannesa Schreiber, a trained medical scribe.  The creation of this document is based on the scribe's personal observations and the provider's statements to the medical scribe.  Vannesa Schreiber, September 26, 2017 2:03 PM    EXAM:                                                    /90 (BP Location: Left arm, Patient Position: Chair, Cuff Size: Adult Small)  Pulse 63  Temp 97.6  F (36.4  C) (Oral)  Resp 16  Wt 259 lb (117.5 kg)  BMI 37.16 kg/m2    GENERAL APPEARANCE: healthy, alert and no distress     EYES: EOMI,  PERRL     HENT: ear canals and TM's normal and nose and mouth without ulcers or lesions     NECK: no adenopathy, no asymmetry, masses, or scars and thyroid normal to palpation     RESP: lungs clear to auscultation - no rales, rhonchi or wheezes     CV: regular rates and rhythm, normal S1 S2, no S3 or S4 and no murmur, click or rub     ABDOMEN:  soft, nontender, no HSM or masses and bowel sounds normal     MS: per ortho. Calf without cords, tenderness     SKIN: no suspicious lesions or rashes 2 recent biopsy sites bandaged     PSYCH: mentation appears normal. and affect normal/bright     LYMPHATICS: No cervical or supraclavicular nodes    DIAGNOSTICS:                                                    EKG: appears normal, NSR, normal axis, normal intervals, no acute ST/T changes c/w ischemia, no LVH by voltage criteria,  unchanged from previous tracings    Recent Labs   Lab Test  06/22/17   0602  06/21/17   0610  06/20/17   0600  06/19/17   1346  05/15/17   1030  04/17/17   1100  02/13/17   1115  10/21/16   1154  08/10/15   0835   08/05/15   0650   HGB   --   8.8*  10.1*  12.1*  12.1*   --   10.8*   --    --    --    --    PLT  153   --    --   224  210   --   234   --    --    < >  252   INR   --    --    --    --    --    --    --    --   1.15*   --   1.09   NA   --    --    --    --   143   --   140  138  137   < >  137   POTASSIUM   --    --    --   4.1  4.0   --   4.1  4.6  4.3   < >  4.0   CR   --    --    --   0.82  0.79   --   0.74  0.84  0.73   < >  0.71   A1C   --    --    --    --    --   5.9   --   6.0   --    --    --     < > = values in this interval not displayed.        IMPRESSION:                                                    Reason for surgery/procedure: Patellar dislocation  Diagnosis/reason for consult: assess perioperative risk      The proposed surgical procedure is considered INTERMEDIATE risk.    REVISED CARDIAC RISK INDEX  The patient has the following serious cardiovascular risks for perioperative complications such as (MI, PE, VFib and 3  AV Block):  No serious cardiac risks  INTERPRETATION: 0 risks: Class I (very low risk - 0.4% complication rate)    The patient has the following additional risks for perioperative complications:  N Dx RENAE, but this has been considered in past      ICD-10-CM    1. Preop general physical exam Z01.818        RECOMMENDATIONS:                                                        Obstructive Sleep Apnea (or suspected sleep apnea)  Hospital staff are advised to monitor for sleep related oxygen desaturations due to suspicion of RENAE      Patient is to hold all medications morning of surgery.     APPROVAL GIVEN to proceed with proposed procedure, without further diagnostic evaluation       Signed Electronically by: Obi Strange MD  The information in this document,  created by the medical scribe for me, accurately reflects the services I personally performed and the decisions made by me. I have reviewed and approved this document for accuracy prior to leaving the patient care area.  Obi Strange MD September 26, 2017 2:03 PM    Copy of this evaluation report is provided to requesting physician.    [facility] Preop Guidelines

## 2017-10-06 NOTE — PHARMACY-ADMISSION MEDICATION HISTORY
Pharmacy reviewed prior to admission med list from pre-admitting rn, SERGIO Medina.    Added generic sig (1 tab daily) to OTCs-multivitamin and cinnamon.        Prior to Admission medications    Medication Sig Last Dose Taking? Auth Provider   aspirin  MG EC tablet Take 1 tablet (325 mg) by mouth daily  Yes Alec Raymond MD   Multiple Vitamins-Minerals (MULTIVITAMIN ADULT PO) Take 1 tablet by mouth daily Reported on 5/12/2017  Yes Reported, Patient   triamcinolone (KENALOG) 0.5 % cream Apply sparingly to affected area three times daily.  Yes Obi Strange MD   CINNAMON PO Take 1 tablet by mouth daily   Yes Reported, Patient   tamsulosin (FLOMAX) 0.4 MG 24 hr capsule Take 1 capsule (0.4 mg) by mouth At Bedtime  Yes Obi Strange MD   atorvastatin (LIPITOR) 40 MG tablet Take 1 tablet (40 mg) by mouth At Bedtime  Yes Obi Strange MD   fish oil-omega-3 fatty acids 1000 MG capsule Take 1 g by mouth every evening  Yes Unknown, Entered By History

## 2017-10-09 ENCOUNTER — HOSPITAL ENCOUNTER (INPATIENT)
Facility: CLINIC | Age: 82
LOS: 3 days | Discharge: HOME | DRG: 502 | End: 2017-10-12
Attending: ORTHOPAEDIC SURGERY | Admitting: ORTHOPAEDIC SURGERY
Payer: MEDICARE

## 2017-10-09 ENCOUNTER — APPOINTMENT (OUTPATIENT)
Dept: PHYSICAL THERAPY | Facility: CLINIC | Age: 82
DRG: 502 | End: 2017-10-09
Attending: ORTHOPAEDIC SURGERY
Payer: MEDICARE

## 2017-10-09 ENCOUNTER — ANESTHESIA EVENT (OUTPATIENT)
Dept: SURGERY | Facility: CLINIC | Age: 82
DRG: 502 | End: 2017-10-09
Payer: MEDICARE

## 2017-10-09 ENCOUNTER — ANESTHESIA (OUTPATIENT)
Dept: SURGERY | Facility: CLINIC | Age: 82
DRG: 502 | End: 2017-10-09
Payer: MEDICARE

## 2017-10-09 LAB
GLUCOSE BLDC GLUCOMTR-MCNC: 107 MG/DL (ref 70–99)
GLUCOSE BLDC GLUCOMTR-MCNC: 88 MG/DL (ref 70–99)

## 2017-10-09 PROCEDURE — 97530 THERAPEUTIC ACTIVITIES: CPT | Mod: GP

## 2017-10-09 PROCEDURE — 36000069 ZZH SURGERY LEVEL 5 EA 15 ADDTL MIN: Performed by: ORTHOPAEDIC SURGERY

## 2017-10-09 PROCEDURE — 27210794 ZZH OR GENERAL SUPPLY STERILE: Performed by: ORTHOPAEDIC SURGERY

## 2017-10-09 PROCEDURE — 25000125 ZZHC RX 250: Performed by: NURSE ANESTHETIST, CERTIFIED REGISTERED

## 2017-10-09 PROCEDURE — 97161 PT EVAL LOW COMPLEX 20 MIN: CPT | Mod: GP

## 2017-10-09 PROCEDURE — 37000009 ZZH ANESTHESIA TECHNICAL FEE, EACH ADDTL 15 MIN: Performed by: ORTHOPAEDIC SURGERY

## 2017-10-09 PROCEDURE — 0MNN0ZZ RELEASE RIGHT KNEE BURSA AND LIGAMENT, OPEN APPROACH: ICD-10-PCS | Performed by: ORTHOPAEDIC SURGERY

## 2017-10-09 PROCEDURE — 97116 GAIT TRAINING THERAPY: CPT | Mod: GP

## 2017-10-09 PROCEDURE — 37000008 ZZH ANESTHESIA TECHNICAL FEE, 1ST 30 MIN: Performed by: ORTHOPAEDIC SURGERY

## 2017-10-09 PROCEDURE — 36000063 ZZH SURGERY LEVEL 4 EA 15 ADDTL MIN: Performed by: ORTHOPAEDIC SURGERY

## 2017-10-09 PROCEDURE — S0020 INJECTION, BUPIVICAINE HYDRO: HCPCS | Performed by: ORTHOPAEDIC SURGERY

## 2017-10-09 PROCEDURE — 27487 REVISE/REPLACE KNEE JOINT: CPT | Mod: RT | Performed by: ORTHOPAEDIC SURGERY

## 2017-10-09 PROCEDURE — 25000128 H RX IP 250 OP 636: Performed by: NURSE ANESTHETIST, CERTIFIED REGISTERED

## 2017-10-09 PROCEDURE — 00000146 ZZHCL STATISTIC GLUCOSE BY METER IP

## 2017-10-09 PROCEDURE — 25000132 ZZH RX MED GY IP 250 OP 250 PS 637: Mod: GY | Performed by: ORTHOPAEDIC SURGERY

## 2017-10-09 PROCEDURE — 71000012 ZZH RECOVERY PHASE 1 LEVEL 1 FIRST HR: Performed by: ORTHOPAEDIC SURGERY

## 2017-10-09 PROCEDURE — 25000128 H RX IP 250 OP 636: Performed by: ANESTHESIOLOGY

## 2017-10-09 PROCEDURE — 36000067 ZZH SURGERY LEVEL 5 1ST 30 MIN: Performed by: ORTHOPAEDIC SURGERY

## 2017-10-09 PROCEDURE — 25000125 ZZHC RX 250: Performed by: ORTHOPAEDIC SURGERY

## 2017-10-09 PROCEDURE — 40000171 ZZH STATISTIC PRE-PROCEDURE ASSESSMENT III: Performed by: ORTHOPAEDIC SURGERY

## 2017-10-09 PROCEDURE — 25000128 H RX IP 250 OP 636: Performed by: ORTHOPAEDIC SURGERY

## 2017-10-09 PROCEDURE — 12000007 ZZH R&B INTERMEDIATE

## 2017-10-09 PROCEDURE — 36000093 ZZH SURGERY LEVEL 4 1ST 30 MIN: Performed by: ORTHOPAEDIC SURGERY

## 2017-10-09 PROCEDURE — A9270 NON-COVERED ITEM OR SERVICE: HCPCS | Mod: GY | Performed by: ORTHOPAEDIC SURGERY

## 2017-10-09 PROCEDURE — 40000193 ZZH STATISTIC PT WARD VISIT

## 2017-10-09 PROCEDURE — 25000566 ZZH SEVOFLURANE, EA 15 MIN: Performed by: ORTHOPAEDIC SURGERY

## 2017-10-09 PROCEDURE — 97110 THERAPEUTIC EXERCISES: CPT | Mod: GP

## 2017-10-09 RX ORDER — HYDROXYZINE HYDROCHLORIDE 10 MG/1
10 TABLET, FILM COATED ORAL EVERY 6 HOURS PRN
Status: DISCONTINUED | OUTPATIENT
Start: 2017-10-09 | End: 2017-10-12 | Stop reason: HOSPADM

## 2017-10-09 RX ORDER — LIDOCAINE 40 MG/G
CREAM TOPICAL
Status: DISCONTINUED | OUTPATIENT
Start: 2017-10-09 | End: 2017-10-09 | Stop reason: HOSPADM

## 2017-10-09 RX ORDER — DOCUSATE SODIUM 100 MG/1
100 CAPSULE, LIQUID FILLED ORAL 2 TIMES DAILY
Status: DISCONTINUED | OUTPATIENT
Start: 2017-10-09 | End: 2017-10-12 | Stop reason: HOSPADM

## 2017-10-09 RX ORDER — ONDANSETRON 2 MG/ML
4 INJECTION INTRAMUSCULAR; INTRAVENOUS EVERY 6 HOURS PRN
Status: DISCONTINUED | OUTPATIENT
Start: 2017-10-09 | End: 2017-10-12 | Stop reason: HOSPADM

## 2017-10-09 RX ORDER — LIDOCAINE HYDROCHLORIDE 10 MG/ML
INJECTION, SOLUTION INFILTRATION; PERINEURAL PRN
Status: DISCONTINUED | OUTPATIENT
Start: 2017-10-09 | End: 2017-10-09

## 2017-10-09 RX ORDER — TAMSULOSIN HYDROCHLORIDE 0.4 MG/1
0.4 CAPSULE ORAL AT BEDTIME
Status: DISCONTINUED | OUTPATIENT
Start: 2017-10-09 | End: 2017-10-12 | Stop reason: HOSPADM

## 2017-10-09 RX ORDER — PROMETHAZINE HYDROCHLORIDE 25 MG/ML
6.25 INJECTION, SOLUTION INTRAMUSCULAR; INTRAVENOUS
Status: DISCONTINUED | OUTPATIENT
Start: 2017-10-09 | End: 2017-10-09 | Stop reason: HOSPADM

## 2017-10-09 RX ORDER — ONDANSETRON 2 MG/ML
4 INJECTION INTRAMUSCULAR; INTRAVENOUS EVERY 30 MIN PRN
Status: DISCONTINUED | OUTPATIENT
Start: 2017-10-09 | End: 2017-10-09 | Stop reason: HOSPADM

## 2017-10-09 RX ORDER — PROPOFOL 10 MG/ML
INJECTION, EMULSION INTRAVENOUS PRN
Status: DISCONTINUED | OUTPATIENT
Start: 2017-10-09 | End: 2017-10-09

## 2017-10-09 RX ORDER — ATORVASTATIN CALCIUM 40 MG/1
40 TABLET, FILM COATED ORAL AT BEDTIME
Status: DISCONTINUED | OUTPATIENT
Start: 2017-10-09 | End: 2017-10-12 | Stop reason: HOSPADM

## 2017-10-09 RX ORDER — ONDANSETRON 2 MG/ML
INJECTION INTRAMUSCULAR; INTRAVENOUS PRN
Status: DISCONTINUED | OUTPATIENT
Start: 2017-10-09 | End: 2017-10-09

## 2017-10-09 RX ORDER — FENTANYL CITRATE 50 UG/ML
25-50 INJECTION, SOLUTION INTRAMUSCULAR; INTRAVENOUS
Status: DISCONTINUED | OUTPATIENT
Start: 2017-10-09 | End: 2017-10-09 | Stop reason: HOSPADM

## 2017-10-09 RX ORDER — MEPERIDINE HYDROCHLORIDE 25 MG/ML
12.5 INJECTION INTRAMUSCULAR; INTRAVENOUS; SUBCUTANEOUS
Status: DISCONTINUED | OUTPATIENT
Start: 2017-10-09 | End: 2017-10-09 | Stop reason: HOSPADM

## 2017-10-09 RX ORDER — HYDROMORPHONE HYDROCHLORIDE 1 MG/ML
.3-.5 INJECTION, SOLUTION INTRAMUSCULAR; INTRAVENOUS; SUBCUTANEOUS EVERY 10 MIN PRN
Status: DISCONTINUED | OUTPATIENT
Start: 2017-10-09 | End: 2017-10-09 | Stop reason: HOSPADM

## 2017-10-09 RX ORDER — NEOSTIGMINE METHYLSULFATE 1 MG/ML
VIAL (ML) INJECTION PRN
Status: DISCONTINUED | OUTPATIENT
Start: 2017-10-09 | End: 2017-10-09

## 2017-10-09 RX ORDER — METOPROLOL TARTRATE 1 MG/ML
1-2 INJECTION, SOLUTION INTRAVENOUS EVERY 5 MIN PRN
Status: DISCONTINUED | OUTPATIENT
Start: 2017-10-09 | End: 2017-10-09 | Stop reason: HOSPADM

## 2017-10-09 RX ORDER — BUPIVACAINE HYDROCHLORIDE 5 MG/ML
INJECTION, SOLUTION EPIDURAL; INTRACAUDAL PRN
Status: DISCONTINUED | OUTPATIENT
Start: 2017-10-09 | End: 2017-10-09

## 2017-10-09 RX ORDER — HYDROMORPHONE HYDROCHLORIDE 2 MG/1
2-4 TABLET ORAL EVERY 4 HOURS PRN
Status: DISCONTINUED | OUTPATIENT
Start: 2017-10-09 | End: 2017-10-12 | Stop reason: HOSPADM

## 2017-10-09 RX ORDER — NALOXONE HYDROCHLORIDE 0.4 MG/ML
.1-.4 INJECTION, SOLUTION INTRAMUSCULAR; INTRAVENOUS; SUBCUTANEOUS
Status: DISCONTINUED | OUTPATIENT
Start: 2017-10-09 | End: 2017-10-12 | Stop reason: HOSPADM

## 2017-10-09 RX ORDER — NALOXONE HYDROCHLORIDE 0.4 MG/ML
.1-.4 INJECTION, SOLUTION INTRAMUSCULAR; INTRAVENOUS; SUBCUTANEOUS
Status: DISCONTINUED | OUTPATIENT
Start: 2017-10-09 | End: 2017-10-09 | Stop reason: HOSPADM

## 2017-10-09 RX ORDER — SODIUM CHLORIDE, SODIUM LACTATE, POTASSIUM CHLORIDE, CALCIUM CHLORIDE 600; 310; 30; 20 MG/100ML; MG/100ML; MG/100ML; MG/100ML
INJECTION, SOLUTION INTRAVENOUS CONTINUOUS
Status: DISCONTINUED | OUTPATIENT
Start: 2017-10-09 | End: 2017-10-09 | Stop reason: HOSPADM

## 2017-10-09 RX ORDER — LIDOCAINE 40 MG/G
CREAM TOPICAL
Status: DISCONTINUED | OUTPATIENT
Start: 2017-10-09 | End: 2017-10-12 | Stop reason: HOSPADM

## 2017-10-09 RX ORDER — ACETAMINOPHEN 325 MG/1
975 TABLET ORAL EVERY 8 HOURS
Status: COMPLETED | OUTPATIENT
Start: 2017-10-09 | End: 2017-10-12

## 2017-10-09 RX ORDER — DEXAMETHASONE SODIUM PHOSPHATE 4 MG/ML
INJECTION, SOLUTION INTRA-ARTICULAR; INTRALESIONAL; INTRAMUSCULAR; INTRAVENOUS; SOFT TISSUE PRN
Status: DISCONTINUED | OUTPATIENT
Start: 2017-10-09 | End: 2017-10-09

## 2017-10-09 RX ORDER — CEFAZOLIN SODIUM 2 G/100ML
2 INJECTION, SOLUTION INTRAVENOUS
Status: COMPLETED | OUTPATIENT
Start: 2017-10-09 | End: 2017-10-09

## 2017-10-09 RX ORDER — ONDANSETRON 4 MG/1
4 TABLET, ORALLY DISINTEGRATING ORAL EVERY 30 MIN PRN
Status: DISCONTINUED | OUTPATIENT
Start: 2017-10-09 | End: 2017-10-09 | Stop reason: HOSPADM

## 2017-10-09 RX ORDER — HYDROMORPHONE HYDROCHLORIDE 1 MG/ML
.5-1 INJECTION, SOLUTION INTRAMUSCULAR; INTRAVENOUS; SUBCUTANEOUS
Status: DISCONTINUED | OUTPATIENT
Start: 2017-10-09 | End: 2017-10-10 | Stop reason: CLARIF

## 2017-10-09 RX ORDER — ONDANSETRON 4 MG/1
4 TABLET, ORALLY DISINTEGRATING ORAL EVERY 6 HOURS PRN
Status: DISCONTINUED | OUTPATIENT
Start: 2017-10-09 | End: 2017-10-12 | Stop reason: HOSPADM

## 2017-10-09 RX ORDER — GLYCOPYRROLATE 0.2 MG/ML
INJECTION, SOLUTION INTRAMUSCULAR; INTRAVENOUS PRN
Status: DISCONTINUED | OUTPATIENT
Start: 2017-10-09 | End: 2017-10-09

## 2017-10-09 RX ORDER — KETOROLAC TROMETHAMINE 15 MG/ML
15 INJECTION, SOLUTION INTRAMUSCULAR; INTRAVENOUS EVERY 6 HOURS
Status: COMPLETED | OUTPATIENT
Start: 2017-10-09 | End: 2017-10-10

## 2017-10-09 RX ORDER — CEFAZOLIN SODIUM 1 G/3ML
1 INJECTION, POWDER, FOR SOLUTION INTRAMUSCULAR; INTRAVENOUS SEE ADMIN INSTRUCTIONS
Status: DISCONTINUED | OUTPATIENT
Start: 2017-10-09 | End: 2017-10-09 | Stop reason: HOSPADM

## 2017-10-09 RX ORDER — ACETAMINOPHEN 325 MG/1
650 TABLET ORAL EVERY 4 HOURS PRN
Status: DISCONTINUED | OUTPATIENT
Start: 2017-10-12 | End: 2017-10-12 | Stop reason: HOSPADM

## 2017-10-09 RX ORDER — CEFAZOLIN SODIUM 2 G/100ML
2 INJECTION, SOLUTION INTRAVENOUS EVERY 8 HOURS
Status: COMPLETED | OUTPATIENT
Start: 2017-10-09 | End: 2017-10-10

## 2017-10-09 RX ORDER — ALBUTEROL SULFATE 0.83 MG/ML
2.5 SOLUTION RESPIRATORY (INHALATION) EVERY 4 HOURS PRN
Status: DISCONTINUED | OUTPATIENT
Start: 2017-10-09 | End: 2017-10-09 | Stop reason: HOSPADM

## 2017-10-09 RX ADMIN — Medication 2 MG: at 11:24

## 2017-10-09 RX ADMIN — KETOROLAC TROMETHAMINE 15 MG: 15 INJECTION, SOLUTION INTRAMUSCULAR; INTRAVENOUS at 16:37

## 2017-10-09 RX ADMIN — KETOROLAC TROMETHAMINE 15 MG: 15 INJECTION, SOLUTION INTRAMUSCULAR; INTRAVENOUS at 21:49

## 2017-10-09 RX ADMIN — CEFAZOLIN SODIUM 2 G: 2 INJECTION, SOLUTION INTRAVENOUS at 18:51

## 2017-10-09 RX ADMIN — FENTANYL CITRATE 50 MCG: 50 INJECTION INTRAMUSCULAR; INTRAVENOUS at 12:08

## 2017-10-09 RX ADMIN — ROCURONIUM BROMIDE 30 MG: 10 INJECTION INTRAVENOUS at 10:26

## 2017-10-09 RX ADMIN — LIDOCAINE HYDROCHLORIDE 20 MG: 10 INJECTION, SOLUTION INFILTRATION; PERINEURAL at 10:26

## 2017-10-09 RX ADMIN — SODIUM CHLORIDE, POTASSIUM CHLORIDE, SODIUM LACTATE AND CALCIUM CHLORIDE: 600; 310; 30; 20 INJECTION, SOLUTION INTRAVENOUS at 10:22

## 2017-10-09 RX ADMIN — ONDANSETRON 4 MG: 2 INJECTION INTRAMUSCULAR; INTRAVENOUS at 10:45

## 2017-10-09 RX ADMIN — CEFAZOLIN SODIUM 3 G: 2 INJECTION, SOLUTION INTRAVENOUS at 10:22

## 2017-10-09 RX ADMIN — FENTANYL CITRATE 50 MCG: 50 INJECTION INTRAMUSCULAR; INTRAVENOUS at 12:02

## 2017-10-09 RX ADMIN — SODIUM CHLORIDE, POTASSIUM CHLORIDE, SODIUM LACTATE AND CALCIUM CHLORIDE: 600; 310; 30; 20 INJECTION, SOLUTION INTRAVENOUS at 11:04

## 2017-10-09 RX ADMIN — ASPIRIN 325 MG: 325 TABLET, DELAYED RELEASE ORAL at 18:51

## 2017-10-09 RX ADMIN — Medication 1 MG: at 10:28

## 2017-10-09 RX ADMIN — GLYCOPYRROLATE 0.2 MG: 0.2 INJECTION, SOLUTION INTRAMUSCULAR; INTRAVENOUS at 11:24

## 2017-10-09 RX ADMIN — GLYCOPYRROLATE 0.2 MG: 0.2 INJECTION, SOLUTION INTRAMUSCULAR; INTRAVENOUS at 10:26

## 2017-10-09 RX ADMIN — ATORVASTATIN CALCIUM 40 MG: 40 TABLET, FILM COATED ORAL at 21:49

## 2017-10-09 RX ADMIN — TAMSULOSIN HYDROCHLORIDE 0.4 MG: 0.4 CAPSULE ORAL at 21:49

## 2017-10-09 RX ADMIN — DEXAMETHASONE SODIUM PHOSPHATE 4 MG: 4 INJECTION, SOLUTION INTRA-ARTICULAR; INTRALESIONAL; INTRAMUSCULAR; INTRAVENOUS; SOFT TISSUE at 10:26

## 2017-10-09 RX ADMIN — ACETAMINOPHEN 975 MG: 325 TABLET, FILM COATED ORAL at 16:36

## 2017-10-09 RX ADMIN — Medication 0.4 MG: at 12:34

## 2017-10-09 RX ADMIN — PROPOFOL 150 MG: 10 INJECTION, EMULSION INTRAVENOUS at 10:26

## 2017-10-09 NOTE — BRIEF OP NOTE
[facility]  Orthopedics Brief Operative Note    Pre-operative diagnosis: Patella dislocation   Post-operative diagnosis Same   Procedure: Procedure(s):  Right Total Knee Revision   - Wound Class: I-Clean   Surgeon: Alec Raymond MD   Assistants(s): Surgeon(s) and Role:     * Alec Raymond MD - Primary     * Thomas Govea PA-C - Assisting   Anesthesia: General    Estimated blood loss: * No values recorded between 10/9/2017 10:40 AM and 10/9/2017 11:31 AM *    Total IV fluids: See anesthesia record   Blood transfusion: None       Drains: None   Specimens: * No specimens in log *   Implants: None   Findings: Dictated   Complications: None   Condition: Stable   Comments: See Dictated Operative report for full details

## 2017-10-09 NOTE — PROGRESS NOTES
10/09/17 1714   Quick Adds   Type of Visit Initial PT Evaluation   Living Environment   Lives With spouse   Living Arrangements house   Home Accessibility stairs to enter home   Number of Stairs to Enter Home 1   Number of Stairs Within Home 12   Self-Care   Usual Activity Tolerance good   Current Activity Tolerance moderate   Equipment Currently Used at Home crutches   Functional Level Prior   Ambulation 1-->assistive equipment  (pt occ used crutches due to pain)   Transferring 0-->independent   Toileting 0-->independent   Bathing 0-->independent   Dressing 0-->independent   Eating 0-->independent   Communication 0-->understands/communicates without difficulty   Swallowing 0-->swallows foods/liquids without difficulty   Cognition 0 - no cognition issues reported   Fall history within last six months no   Which of the above functional risks had a recent onset or change? ambulation;transferring   General Information   Onset of Illness/Injury or Date of Surgery - Date 10/09/17   Referring Physician Dr. Alec Raymond   Patient/Family Goals Statement to return home   Pertinent History of Current Problem (include personal factors and/or comorbidities that impact the POC) Pt is POD #0 following R TKA revision. PMH includes venous stasis, obesity, diabetes, arthritis   Precautions/Limitations fall precautions   Weight-Bearing Status - LLE full weight-bearing   Weight-Bearing Status - RLE weight-bearing as tolerated   Cognitive Status Examination   Orientation orientation to person, place and time   Level of Consciousness alert   Follows Commands and Answers Questions 100% of the time;able to follow multistep instructions   Personal Safety and Judgment intact   Memory intact   Pain Assessment   Patient Currently in Pain Yes, see Vital Sign flowsheet   Posture    Posture Forward head position;Protracted shoulders   Bed Mobility   Bed Mobility Comments Pt requires CGA for supine to/from sit transfers   Transfer Skills  "  Transfer Comments Pt requires CGA for sit to/from stand transfers   Gait   Gait Comments Pt ambulates 2 ft with fww and CGA with KI donned and antalgic gait on R   Balance   Balance Comments good seated, good/fair standing    Sensory Examination   Sensory Perception no deficits were identified   Coordination   Coordination no deficits were identified   Muscle Tone   Muscle Tone no deficits were identified   Modality Interventions   Planned Modality Interventions Cryotherapy   General Therapy Interventions   Planned Therapy Interventions bed mobility training;gait training;neuromuscular re-education;ROM;strengthening;stretching;transfer training;home program guidelines;progressive activity/exercise   Clinical Impression   Criteria for Skilled Therapeutic Intervention yes, treatment indicated   PT Diagnosis Difficulty with gait   Influenced by the following impairments Pt presents with increased R knee pain and weakness, decreased R knee ROM, impaired balance, and decreased functional capacity   Functional limitations due to impairments Pt demonstrates increased difficulty with bed mobility, transfers, and ambulation.    Clinical Presentation Stable/Uncomplicated   Clinical Presentation Rationale clinical observation   Clinical Decision Making (Complexity) Low complexity   Therapy Frequency` 2 times/day   Predicted Duration of Therapy Intervention (days/wks) 4 days   Anticipated Discharge Disposition Home with Outpatient Therapy   Risk & Benefits of therapy have been explained Yes   Patient, Family & other staff in agreement with plan of care Yes   [facility] Meal Mantra TM \"6 Clicks\"   2016, Trustees of [facility], under license to [employer]  All rights reserved.   6 Clicks Short Forms Basic Mobility Inpatient Short Form   [facility] [employer]  \"6 Clicks\" V.2 Basic Mobility Inpatient Short Form   1. Turning from your back to your side while in a flat bed without using bedrails? 3 - A Little   2. " Moving from lying on your back to sitting on the side of a flat bed without using bedrails? 3 - A Little   3. Moving to and from a bed to a chair (including a wheelchair)? 3 - A Little   4. Standing up from a chair using your arms (e.g., wheelchair, or bedside chair)? 3 - A Little   5. To walk in hospital room? 3 - A Little   6. Climbing 3-5 steps with a railing? 2 - A Lot   Basic Mobility Raw Score (Score out of 24.Lower scores equate to lower levels of function) 17   Total Evaluation Time   Total Evaluation Time (Minutes) 15

## 2017-10-09 NOTE — IP AVS SNAPSHOT
Formerly named [facility]    [address]    [facility] [address]    Phone:  [phone]    Fax:  [phone]                                       After Visit Summary   10/9/2017    Damien Vallecillo    MRN: [id number]           After Visit Summary Signature Page     I have received my discharge instructions, and my questions have been answered. I have discussed any challenges I see with this plan with the nurse or doctor.    ..........................................................................................................................................  Patient/Patient Representative Signature      ..........................................................................................................................................  Patient Representative Print Name and Relationship to Patient    ..................................................               ................................................  Date                                            Time    ..........................................................................................................................................  Reviewed by Signature/Title    ...................................................              ..............................................  Date                                                            Time

## 2017-10-09 NOTE — ANESTHESIA POSTPROCEDURE EVALUATION
Patient: Damien Vallecillo    Procedure(s):  Right Total Knee Revision   - Wound Class: I-Clean    Diagnosis:Patella dislocation  Diagnosis Additional Information: Patella dislocation     Anesthesia Type:  General, ETT    Note:  Anesthesia Post Evaluation    Patient location during evaluation: PACU  Patient participation: Able to fully participate in evaluation  Level of consciousness: awake  Pain management: adequate  Airway patency: patent  Cardiovascular status: acceptable  Respiratory status: acceptable  Hydration status: acceptable  PONV: controlled     Anesthetic complications: None          Last vitals:  Vitals:    10/09/17 1230 10/09/17 1234 10/09/17 1245   BP: 170/87  162/87   Pulse:      Resp: 12  12   Temp: 97  F (36.1  C)     SpO2: 96% 97% 95%         Electronically Signed By: Turner Gay DO  October 9, 2017  1:13 PM

## 2017-10-09 NOTE — IP AVS SNAPSHOT
MRN:[id number]                      After Visit Summary   10/9/2017    Damien Vallecillo    MRN: [id number]           Thank you!     Thank you for choosing [facility] for your care. Our goal is always to provide you with excellent care. Hearing back from our patients is one way we can continue to improve our services. Please take a few minutes to complete the written survey that you may receive in the mail after you visit. If you would like to speak to someone directly about your visit please contact Patient Relations at [phone]. Thank you!          Patient Information     Date Of Birth          1935        Designated Caregiver       Most Recent Value    Caregiver    Will someone help with your care after discharge? no      About your hospital stay     You were admitted on:  October 9, 2017 You last received care in the:  [facility]    You were discharged on:  October 12, 2017        Reason for your hospital stay       Right total knee revision                  Who to Call     For medical emergencies, please call 911.  For non-urgent questions about your medical care, please call your primary care provider or clinic, [phone]  For questions related to your surgery, please call your surgery clinic        Attending Provider     Provider Specialty    Alec Raymond MD Orthopedics       Primary Care Provider Office Phone # Fax #    Obi Strange [phone]      After Care Instructions     Diet       Follow this diet upon discharge: Advance to a regular diet as tolerated            Wound care and dressings       Instructions to care for your wound at home:     - ice to area for comfort up to 20 minutes every 2 hours.  - may get incision wet in shower but do not soak or scrub and if still draining, use Knee immobilizer to get into shower, remove, shower, dry then reapply Knee immobilizer to get out of shower.  - You may bear weight on  the leg as much as tolerated.  - Knee immobilizer when weight bearing, and  Worn when resting until you have stopped draining for 2 days.                  Follow-up Appointments     Follow-up and recommended labs and tests        Follow up with me,  Thomas Govea, on 10/20/2017 at 10:00 am. to evaluate after surgery.  No follow up labs or test are needed.    Thomas Goeva PA-C  [facility] - Surgery  [facility]  [address]  [address]  [phone] fax  [phone] for scheduling                  Your next 10 appointments already scheduled     Oct 20, 2017 10:00 AM CDT   Return Visit with Thomas Govea PA-C   [facility] ([facility])    [address]  [address]  [facility] [address]   [phone]              Further instructions from your care team       POST OP TOTAL KNEE INSTRUCTION    Specific notes for HORTENSIA:    - ice to area for comfort up to 20 minutes every 2 hours.  - may get incision wet in shower but do not soak or scrub and if still draining, use Knee immobilizer to get into shower, remove, shower, dry then reapply Knee immobilizer to get out of shower.  - You may bear weight on the leg as much as tolerated.  - Knee immobilizer:   Wear at all times until you have stopped draining for 2 days. After that wear when weight bearing (ie walking, transfering, etc).    Pain medication: You will be discharged from the hospital with pain medication(s). In most cases, consistent use the pain pills can be limited to a few days. After this period, the medication should be weaned off as soon as possible to avoid potential complications of constipation, nausea, or rash, etc. Until you can be completely off the pain pills, using them only at nighttime along with controlling the pain with over-the-counter medication(s) such as Ibuprofen/Naproxen and/or Tylenol during the day would be  a good way of managing the pain.    A medication for blood clot prevention will be specified when you are discharged. If you were taking Coumadin before the surgery, you will resume it.    Activity: You will be using a walker or crutches until you feel confident. You also need to use the knee immobilizer until you are able to straight leg raise 10-15 times. Gradual rather than sudden increase of walking distance is recommended as the comfort level dictates. A cane instead of walker or crutches can be used when you strength and balance are improved.    Dressing: Typically, the first dressing change will be done on postop day #2. Showers can be taken with plastic covering over the original post surgical dressing for the first 4 days from the surgery. At that point, the incision site can be wet for showering but should not be soaked. A light gauze dressing along with paper tape should be placed over the wound after each shower. The staples will be removed 10-14 days from the operation. During that time it is best to cover the incision site to protect the staples from being pulled or snagged.     If you have an aquacell dressing, (flesh colored rubber like bandage), you may leave this dressing in place until follow up in the clinic, unless; the dressing becomes completely saturated, is leaking, gets water inside as evident by moisture appearing on the inside of the dressing, or you have a skin reaction.  In this case you should remove and use dressings like what is mentioned above.    Exercises: It is recommended that you do the exercises of range of motion and strengthening in a small amounts frequently throughout the day rather than infrequent long sessions. Being over aggressive with her exercises can be hindering rather than helping. We are looking for a gradual steady improvement even though the general goal for range of motion post-operatively is 0-90 degrees within the first 2 weeks. During this time, gaining  full Extension is far more important than gaining flexion. Generally, you be working with a physical therapist 2-3 times per week for the first 2-4 weeks.    5 keys to the knee,- to be done every 2 hours during the day:  1. Knee Bending: Dangle- in a seated position where foot can swing, let the leg from the knee down dangle off the edge of bed or table. You may use the other leg/foot to gently push the affected leg into more bending.  2. Knee straightening: Heel blocking - While sitting or laying, place pillow, ottoman or some other support under the heel with toes pointing up but ankle relaxed.  Rest there, working up to 10 minutes relaxing the leg to induce knee straightening.  Once relaxed, push/squeeze the knee towards the floor, hold for 2 seconds, and relax. Repeat x 10.    NOTE:  Alternate 1 and 2 every other hour.  3. Straight leg raises: from a laying or sitting position, while keeping the knee locked straight, slowly lift the foot up 12-16 inches and hold for 2 seconds and relax.  Repeat working up to 20 repetitions.    4. Ice and elevate: whenever you are not up and about.  No standing around or sitting with the knee bend for more than very short periods.  5. Walk:  Use assistive devices as needed such as walker or cane to promote safety.  Walk focusing more on good form than getting somewhere.  Lead with the knee and then straighten the knee trying to make the surgical leg move like normal knee.    Soft tissue: It is not unusual to have swelling in the leg (thigh down to the foot) up to 2 months from the surgery. Frequent ankle pumping, elevation of the leg above the heart level and gentle compression support with ace wrap should help with swelling. Icing regularly, for 20 minutes every hour, will also help with swelling and pain.  Due to soft tissue swelling, increased amount of redness around the incision site is also commonly seen. Progressively worsening redness along with increasing pain or  "increasing drainage from the wound should be a reason to alert us immediately.     Follow up in clinic within 14 days after surgery.  May call [phone] to schedule.    Thomas Govea PA-C  [facility] - Surgery  [facility]  [address]   [place]  [address]  [phone]          Pending Results     No orders found from 10/7/2017 to 10/10/2017.            Admission Information     Date & Time Provider Department Dept. Phone    10/9/2017 Alec Raymond MD [facility] [phone]      Your Vitals Were     Blood Pressure Pulse Temperature Respirations Pulse Oximetry       161/72 81 95.3  F (35.2  C) (Oral) 16 99%       MyChart Information     The [employer] lets you send messages to your doctor, view your test results, renew your prescriptions, schedule appointments and more. To sign up, go to [email or web address] . Click on \"Log in\" on the left side of the screen, which will take you to the Welcome page. Then click on \"Sign up Now\" on the right side of the page.     You will be asked to enter the access code listed below, as well as some personal information. Please follow the directions to create your username and password.     Your access code is: [email or web address]  Expires: 2017  2:59 PM     Your access code will  in 90 days. If you need help or a new code, please call your [facility] clinic or [phone].        Care EveryWhere ID     This is your Care EveryWhere ID. This could be used by other organizations to access your [facility] medical records  [id number]        Equal Access to Services     ZENON MART : Amairani Carlson, rob godinez, corin pelaez. So [facility] [phone].    ATENCIÓN: Si habla español, tiene a dumont disposición servicios gratuitos de asistencia lingüística. Llame al [phone].    We comply with applicable federal civil rights laws and [place] " laws. We do not discriminate on the basis of race, color, national origin, age, disability, sex, sexual orientation, or gender identity.               Review of your medicines      CONTINUE these medicines which have NOT CHANGED        Dose / Directions    ACE NOT PRESCRIBED (INTENTIONAL)   Used for:  Type 2 diabetes, HbA1c goal < 7% (H)        ACE Inhibitor not prescribed due to Other:  Had cough on ACEI in past   Quantity:  0 each   Refills:  0       aspirin  MG EC tablet   Used for:  Status post total right knee replacement        Dose:  325 mg   Take 1 tablet (325 mg) by mouth daily   Quantity:  30 tablet   Refills:  0       atorvastatin 40 MG tablet   Commonly known as:  LIPITOR   Used for:  Pre-diabetes        Dose:  40 mg   Take 1 tablet (40 mg) by mouth At Bedtime   Quantity:  90 tablet   Refills:  3       * blood glucose monitoring test strip   Commonly known as:  ACCU-CHEK LAURA   Used for:  Glucose intolerance (impaired glucose tolerance)        1Use to test blood sugar 2 times daily or as directed.   Quantity:  100 strip   Refills:  3       * blood glucose monitoring test strip   Commonly known as:  ACCU-CHEK LAURA PLUS   Used for:  Glucose intolerance (impaired glucose tolerance)        Use to test blood sugar 1 time daily   Quantity:  100 each   Refills:  11       CINNAMON PO        Dose:  1 tablet   Take 1 tablet by mouth daily   Refills:  0       fish oil-omega-3 fatty acids 1000 MG capsule        Dose:  1 g   Take 1 g by mouth every evening   Refills:  0       MULTIVITAMIN ADULT PO        Dose:  1 tablet   Take 1 tablet by mouth daily Reported on 5/12/2017   Refills:  0       order for DME   Used for:  Status post total right knee replacement        Equipment being ordered: Walker Wheels () and Walker () Treatment Diagnosis: Impaired functional mobility   Quantity:  1 each   Refills:  0       tamsulosin 0.4 MG capsule   Commonly known as:  FLOMAX   Used for:  Benign prostatic  hyperplasia with lower urinary tract symptoms, unspecified morphology        Dose:  0.4 mg   Take 1 capsule (0.4 mg) by mouth At Bedtime   Quantity:  90 capsule   Refills:  3       triamcinolone 0.5 % cream   Commonly known as:  KENALOG   Used for:  Stasis dermatitis of both legs        Apply sparingly to affected area three times daily.   Quantity:  30 g   Refills:  1       * Notice:  This list has 2 medication(s) that are the same as other medications prescribed for you. Read the directions carefully, and ask your doctor or other care provider to review them with you.             Protect others around you: Learn how to safely use, store and throw away your medicines at www.disposemymeds.org.             Medication List: This is a list of all your medications and when to take them. Check marks below indicate your daily home schedule. Keep this list as a reference.      Medications           Morning Afternoon Evening Bedtime As Needed    ACE NOT PRESCRIBED (INTENTIONAL)   ACE Inhibitor not prescribed due to Other:  Had cough on ACEI in past                                aspirin  MG EC tablet   Take 1 tablet (325 mg) by mouth daily   Last time this was given:  325 mg on 10/12/2017  8:50 AM                                atorvastatin 40 MG tablet   Commonly known as:  LIPITOR   Take 1 tablet (40 mg) by mouth At Bedtime   Last time this was given:  40 mg on 10/11/2017  9:36 PM                                * blood glucose monitoring test strip   Commonly known as:  ACCU-CHEK LAURA   1Use to test blood sugar 2 times daily or as directed.                                * blood glucose monitoring test strip   Commonly known as:  ACCU-CHEK LAURA PLUS   Use to test blood sugar 1 time daily                                CINNAMON PO   Take 1 tablet by mouth daily                                fish oil-omega-3 fatty acids 1000 MG capsule   Take 1 g by mouth every evening                                MULTIVITAMIN  ADULT PO   Take 1 tablet by mouth daily Reported on 5/12/2017                                order for DME   Equipment being ordered: Walker Wheels () and Walker () Treatment Diagnosis: Impaired functional mobility                                tamsulosin 0.4 MG capsule   Commonly known as:  FLOMAX   Take 1 capsule (0.4 mg) by mouth At Bedtime   Last time this was given:  0.4 mg on 10/11/2017  9:36 PM                                triamcinolone 0.5 % cream   Commonly known as:  KENALOG   Apply sparingly to affected area three times daily.                                * Notice:  This list has 2 medication(s) that are the same as other medications prescribed for you. Read the directions carefully, and ask your doctor or other care provider to review them with you.              More Information        IT IS IMPORTANT TO CONTINUE TO USE YOUR INCENTIVE SPIROMETER WHILE AT HOME.    YOU will continue to be at Risk for Pneumonia following your surgery during your recovery, especially while on continued narcotics.   If you continue to take narcotics at home or are experiencing at Temperature it is recommended to continue using .    Using an Incentive Spirometer    An incentive spirometer is a device that helps you do deep breathing exercises. These exercises expand your lungs, aid in circulation, and help prevent pneumonia. Deep breathing exercises also help you breathe better and improve the function of your lungs by:    Keeping your lungs clear    Strengthening your breathing muscles    Helping prevent respiratory complications or problems  The incentive spirometer gives you a way to take an active part in recover. A nurse or therapist will teach you breathing exercises. To do these exercises, you will breathe in through your mouth and not your nose. The incentive spirometer only works correctly if you breathe in through your mouth.  Steps to clear lungs  Step 1. Exhale normally. Then, inhale  normally.    Relax and breathe out.  Step 2. Place your lips tightly around the mouthpiece.    Make sure the device is upright and not tilted.  Step 3. Inhale as much air as you can through the mouthpiece (don't breath through your nose).    Inhale slowly and deeply.    Hold your breath long enough to keep the balls or disk raised for at least 3 to 5 seconds, or as instructed by your healthcare provider.    Some spirometers have an indicator to let you know that you are breathing in too fast. If the indicator goes off, breathe in more slowly.  Step 4. Repeat the exercise regularly.    Do this exercise every hour while you're awake, or as instructed by your healthcare provider.    If you were taught deep breathing and coughing exercises, do them regularly as instructed by your healthcare provider.   Follow-up care  Make a follow up appointment, or as directed. Also, follow up with your healthcare provider as advised or if your symptoms do not improve or continue to get worse.  When to call your healthcare provider  Call your healthcare provider right away if you have any of the following:    Fever 100.4  (38 C) or higher, or as directed by your healthcare provider    Brownish, bloody, or smelly sputum  Call 911  Call 911 if any of these occur:     Shortness of breath that doesn't get better after taking your medicine    Cool, moist, pale or blue skin    Trouble breathing or swallowing, wheezing    Fainting or loss of consciousness    Feeling of fizziness or weakness or a sudden drop in blood pressure    Feeling of doom or lightheaded    Chest pain or rapid heart rate  Date Last Reviewed: 1/1/2017 2000-2017 [employer]. [address] [place] [address], [place] [address]. All rights reserved. This information is not intended as a substitute for professional medical care. Always follow your healthcare professional's instructions.                Constipation (Adult)  Constipation means that you have bowel  movements that are less frequent than usual. Stools often become very hard and difficult to pass.  Constipation is very common. At some point in life it affects almost everyone. Since everyone's bowel habits are different, what is constipation to one person may not be to another. Your healthcare provider may do tests to diagnose constipation. It depends on what he or she finds when evaluating you.    Symptoms of constipation include:    Abdominal pain    Bloating    Vomiting    Painful bowel movements    Itching, swelling, bleeding, or pain around the anus  Causes  Constipation can have many causes. These include:    Diet low in fiber    Too much dairy    Not drinking enough liquids    Lack of exercise or physical activity. This is especially true for older adults.    Changes in lifestyle or daily routine, including pregnancy, aging, work, and travel    Frequent use or misuse of laxatives    Ignoring the urge to have a bowel movement or delaying it until later    Medicines, such as certain prescription pain medicines, iron supplements, antacids, certain antidepressants, and calcium supplements    Diseases like irritable bowel syndrome, bowel obstructions, stroke, diabetes, thyroid disease, Parkinson disease, hemorrhoids, and colon cancer  Complications  Potential complications of constipation can include:    Hemorrhoids    Rectal bleeding from hemorrhoids or anal fissures (skin tears)    Hernias    Dependency on laxatives    Chronic constipation    Fecal impaction    Bowel obstruction or perforation  Home care  All treatment should be done after talking with your healthcare provider. This is especially true if you have another medical problems, are taking prescription medicines, or are an older adult. Treatment most often involves lifestyle changes. You may also need medicines. Your healthcare provider will tell you which will work best for you. Follow the advice below to help avoid this problem in the  future.  Lifestyle changes  These lifestyle changes can help prevent constipation:    Diet. Eat a high-fiber diet, with fresh fruit and vegetables, and reduce dairy intake, meats, and processed foods    Fluids. It's important to get enough fluids each day. Drink plenty of water when you eat more fiber. If you are on diet that limits the amount of fluid you can have, talk about this with your healthcare provider.    Regular exercise. Check with your healthcare provider first.  Medications  Take any medicines as directed. Some laxatives are safe to use only every now and then. Others can be taken on a regular basis. Talk with your doctor or pharmacist if you have questions.  Prescription pain medicines can cause constipation. If you are taking this kind of medicine, ask your healthcare provider if you should also take a stool softener.  Medicines you may take to treat constipation include:    Fiber supplements    Stool softeners    Laxatives    Enemas    Rectal suppositories  Follow-up care  Follow up with your healthcare provider if symptoms don't get better in the next few days. You may need to have more tests or see a specialist.  Call 911  Call 911 if any of these occur:    Trouble breathing    Stiff, rigid abdomen that is severely painful to touch    Confusion    Fainting or loss of consciousness    Rapid heart rate    Chest pain  When to seek medical advice  Call your healthcare provider right away if any of these occur:    Fever over 100.4 F (38 C)    Failure to resume normal bowel movements    Pain in your abdomen or back gets worse    Nausea or vomiting    Swelling in your abdomen    Blood in the stool    Black, tarry stool    Involuntary weight loss    Weakness  Date Last Reviewed: 12/30/2015 2000-2017 [employer]. [address] [place] [address], [place] [address]. All rights reserved. This information is not intended as a substitute for professional medical care. Always follow your healthcare  professional's instructions.

## 2017-10-09 NOTE — PLAN OF CARE
Problem: Patient Care Overview  Goal: Plan of Care/Patient Progress Review  Discharge Planner PT      PT: Orders received, chart reviewed, eval completed, and treatment initiated. Pt is POD #0 following R TKA revision. Pt reports being mod I at baseline with occ use of crutches on days with increased pain. Pt states he lives in a town home with 1 step to enter and able to live on the main floor.      Patient plan for discharge: Home with OP PT   Current status: Pt requires CGA for supine to/from sit transfers, CGA for sit to/from stand, and CGA for gait up to 10ft with KI donned. SLR not independent and KI needed with mobility at this time. Pt tolerates supine TKA exercises well.   Barriers to return to prior living situation: step to enter  Recommendations for discharge: TBD POD #2  Rationale for recommendations: TBD POD #2       Entered by: Shaniqua Blackmon 10/09/2017 5:23 PM

## 2017-10-09 NOTE — ANESTHESIA PREPROCEDURE EVALUATION
Anesthesia Evaluation     .             ROS/MED HX    ENT/Pulmonary:  - neg pulmonary ROS     Neurologic:     (+)CVA     Cardiovascular:     (+) ----. : . . fainting (syncope). :. .       METS/Exercise Tolerance:     Hematologic:  - neg hematologic  ROS       Musculoskeletal:  - neg musculoskeletal ROS       GI/Hepatic:  - neg GI/hepatic ROS       Renal/Genitourinary:  - ROS Renal section negative       Endo: Comment: .Lab Test        09/26/17     06/22/17     06/21/17     06/20/17     06/19/17     05/15/17     02/13/17     08/10/15      --          08/05/15     08/04/15                       1457          0602          0610          0600          1346          1030          1115          0835           --           0650          1157          WBC          7.6           --           --           --           --          5.9          7.6           --           --           --           --           HGB          13.3          --          8.8*         10.1*        12.1*        12.1*        10.8*         --           --           --           --           MCV          82            --           --           --           --          78           73*           --           --           --           --           PLT          233          153           --           --          224          210          234           --            < >        252           --           INR           --           --           --           --           --           --           --          1.15*         --          1.09         1.16*          < > = values in this interval not displayed.                  Lab Test        09/26/17     06/21/17     06/20/17     06/19/17     05/15/17     02/13/17                       1457          0610          0600          1346          1030          1115          NA           140           --           --           --          143          140           POTASSIUM    4.4           --           --          4.1           4.0          4.1           CHLORIDE     106           --           --           --          109          106           CO2          25            --           --           --          26           27            BUN          13            --           --           --          13           12            CR           0.76          --           --          0.82         0.79         0.74          ANIONGAP     9             --           --           --          8            7             ANURAG          9.0           --           --           --          9.0          9.1           GLC          90           128*         148*          --          139*         111*              (+) type II DM thyroid problem Obesity, .      Psychiatric:  - neg psychiatric ROS       Infectious Disease:  - neg infectious disease ROS       Malignancy:         Other: Comment: .Lab Test        09/26/17     06/22/17     06/21/17     06/20/17     06/19/17     05/15/17     02/13/17     08/10/15      --          08/05/15     08/04/15                       1457          0602          0610          0600          1346          1030          1115          0835           --           0650          1157          WBC          7.6           --           --           --           --          5.9          7.6           --           --           --           --           HGB          13.3          --          8.8*         10.1*        12.1*        12.1*        10.8*         --           --           --           --           MCV          82            --           --           --           --          78           73*           --           --           --           --           PLT          233          153           --           --          224          210          234           --            < >        252           --           INR           --           --           --           --           --           --           --          1.15*         --           1.09         1.16*          < > = values in this interval not displayed.                  Lab Test        09/26/17     06/21/17     06/20/17     06/19/17     05/15/17     02/13/17                       1457          0610          0600          1346          1030          1115          NA           140           --           --           --          143          140           POTASSIUM    4.4           --           --          4.1          4.0          4.1           CHLORIDE     106           --           --           --          109          106           CO2          25            --           --           --          26           27            BUN          13            --           --           --          13           12            CR           0.76          --           --          0.82         0.79         0.74          ANIONGAP     9             --           --           --          8            7             ANURAG          9.0           --           --           --          9.0          9.1           GLC          90           128*         148*          --          139*         111*                              Physical Exam  Normal systems: cardiovascular and pulmonary    Airway   Mallampati: II    Dental     Cardiovascular   Rhythm and rate: regular and normal      Pulmonary    breath sounds clear to auscultation                    Anesthesia Plan      History & Physical Review  History and physical reviewed and following examination; no interval change.    ASA Status:  3 .        Plan for General and ETT with Intravenous induction. Maintenance will be Inhalation and Balanced.    PONV prophylaxis:  Ondansetron (or other 5HT-3)       Postoperative Care      Consents  Anesthetic plan, risks, benefits and alternatives discussed with:  Patient or representative..                          .

## 2017-10-10 ENCOUNTER — APPOINTMENT (OUTPATIENT)
Dept: PHYSICAL THERAPY | Facility: CLINIC | Age: 82
DRG: 502 | End: 2017-10-10
Attending: ORTHOPAEDIC SURGERY
Payer: MEDICARE

## 2017-10-10 ENCOUNTER — TELEPHONE (OUTPATIENT)
Dept: ORTHOPEDICS | Facility: CLINIC | Age: 82
End: 2017-10-10

## 2017-10-10 PROCEDURE — 40000193 ZZH STATISTIC PT WARD VISIT

## 2017-10-10 PROCEDURE — A9270 NON-COVERED ITEM OR SERVICE: HCPCS | Mod: GY | Performed by: ORTHOPAEDIC SURGERY

## 2017-10-10 PROCEDURE — 97116 GAIT TRAINING THERAPY: CPT | Mod: GP

## 2017-10-10 PROCEDURE — 97110 THERAPEUTIC EXERCISES: CPT | Mod: GP

## 2017-10-10 PROCEDURE — 97530 THERAPEUTIC ACTIVITIES: CPT | Mod: GP

## 2017-10-10 PROCEDURE — 40000894 ZZH STATISTIC OT IP EVAL DEFER: Performed by: REHABILITATION PRACTITIONER

## 2017-10-10 PROCEDURE — 25000128 H RX IP 250 OP 636: Performed by: ORTHOPAEDIC SURGERY

## 2017-10-10 PROCEDURE — 12000000 ZZH R&B MED SURG/OB

## 2017-10-10 PROCEDURE — 25000132 ZZH RX MED GY IP 250 OP 250 PS 637: Mod: GY | Performed by: ORTHOPAEDIC SURGERY

## 2017-10-10 RX ADMIN — TAMSULOSIN HYDROCHLORIDE 0.4 MG: 0.4 CAPSULE ORAL at 21:16

## 2017-10-10 RX ADMIN — ACETAMINOPHEN 975 MG: 325 TABLET, FILM COATED ORAL at 00:31

## 2017-10-10 RX ADMIN — CEFAZOLIN SODIUM 2 G: 2 INJECTION, SOLUTION INTRAVENOUS at 01:53

## 2017-10-10 RX ADMIN — KETOROLAC TROMETHAMINE 15 MG: 15 INJECTION, SOLUTION INTRAMUSCULAR; INTRAVENOUS at 04:24

## 2017-10-10 RX ADMIN — ACETAMINOPHEN 975 MG: 325 TABLET, FILM COATED ORAL at 16:02

## 2017-10-10 RX ADMIN — ASPIRIN 325 MG: 325 TABLET, DELAYED RELEASE ORAL at 08:37

## 2017-10-10 RX ADMIN — ATORVASTATIN CALCIUM 40 MG: 40 TABLET, FILM COATED ORAL at 21:16

## 2017-10-10 RX ADMIN — KETOROLAC TROMETHAMINE 15 MG: 15 INJECTION, SOLUTION INTRAMUSCULAR; INTRAVENOUS at 10:12

## 2017-10-10 RX ADMIN — ACETAMINOPHEN 975 MG: 325 TABLET, FILM COATED ORAL at 08:37

## 2017-10-10 NOTE — PLAN OF CARE
Problem: Patient Care Overview  Goal: Plan of Care/Patient Progress Review  OT-Orders received, extensive discussion with patient regarding OT POC and goals, patient reports he had significant OT rehab and cares over the past 4 months and does not have feel he has OT needs.Per patient, his spouse is available to A with ADL's and IADL's during recovery. Patient provided with OT ADL options to practice, such as toilet and shower or dressing tasks, and patient declined all offers. Per patient request, will dc from OT services.

## 2017-10-10 NOTE — TELEPHONE ENCOUNTER
Called Charlene, informed her per Moises Govea no parts were used during the revision yesterday, it was soft tissue revision only.

## 2017-10-10 NOTE — TELEPHONE ENCOUNTER
BELKIS mandujano called with questions regarding patient; did not leave specific information. Call back number is [phone]

## 2017-10-10 NOTE — PLAN OF CARE
Problem: Patient Care Overview  Goal: Plan of Care/Patient Progress Review  Outcome: No Change  VSS: A&O, CMS intact.  LS: clear, on RA.  GI: Active, +flatus, last BM 10/9  : in urinal  Skin: incision, right knee, dressing CDI, ice applied  Activity: x1, walker, gait, KI   Diet: reg   Pain: denies, on scheduled tylenol and toradol  Plan: IV ancef, pain       '

## 2017-10-10 NOTE — PLAN OF CARE
Problem: Knee Arthroplasty (Total, Partial) (Adult)  Goal: Signs and Symptoms of Listed Potential Problems Will be Absent, Minimized or Managed (Knee Arthroplasty)  Signs and symptoms of listed potential problems will be absent, minimized or managed by discharge/transition of care (reference Knee Arthroplasty (Total, Partial) (Adult) CPG).   Outcome: Improving  Up with one assist.  Voiding adequate amounts in urinal.  Large amount of bleeding from incision saturating dressing through ace wrap x 2.  Call placed and message left with Moises.  Denies pain.  Would like to go home with wife this evening.

## 2017-10-10 NOTE — PLAN OF CARE
Problem: Patient Care Overview  Goal: Plan of Care/Patient Progress Review  VSS: A&O x4, CMS intact.  LS: clear on RA.  GI: active, last BM this AM, +flatus  : in urinal  Skin: right knee incision, bleeding through dressing, dressing changed and rewrapped.   Activity: x 1, walker, gait belt, KI, ambulated in room.  Diet: reg   Pain: denies  Plan: IV ancef

## 2017-10-10 NOTE — OP NOTE
DATE OF PROCEDURE:  10/09/2017      SURGEON:  Alec Raymond MD      ASSISTANT:  Moises Govea PA-C      ANESTHESIA:  General.      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      PROCEDURE PERFORMED:  Revision total knee arthroplasty.      INDICATIONS FOR PROCEDURE:  Damien Vallecillo is an 81-year-old gentleman who had undergone a total knee arthroplasty in the months prior to this surgical procedure.  Routine followup revealed subluxation of his patella laterally.  In discussions between myself, patient and his wife, they elected to have him undergo a revision of the total knee arthroplasty, fully understanding the potential risks as well as benefits of this procedure.      OPERATIVE NOTE:  Patient was brought to the operating room and placed in a supine position on the operating table, where general anesthesia was administered without difficulty.  A tourniquet was applied to his right upper thigh and following routine sterile scrub, prep and drape, the right lower extremity was exsanguinated and the tourniquet was inflated to 300 mmHg.  Total tourniquet time was approximately 40 minutes. Using the previous well-healed midline surgical scar, skin and subcutaneous tissue were dissected sharply.  Dissection was carried down to the patella where the previous Ethibond sutures remained apparently intact.  The medial arthrotomy was performed and the patella was noted to be riding lateral to the femoral component.  A portion of the medial retinaculum was excised and following a lateral release a pants-over-vest type repair of the medial side was performed.  The patella tracked normally.  The wound was irrigated and was closed in routine fashion.  It was dressed sterilely.  Patient tolerated the procedure well and left the operating room in satisfactory and stable condition.      ESTIMATED BLOOD LOSS:   5 mL.      SPONGE AND NEEDLE COUNT:  Correct x2.         IDALIA WHITFIELD MD             D: 10/10/2017 14:28   T: 10/10/2017 16:37   MT: [id number]      Name:     HORTENSIA ADAMS   MRN:      [id number]        Account:        [id number]   :      1935           Procedure Date: 10/09/2017      Document: [id number]

## 2017-10-10 NOTE — TELEPHONE ENCOUNTER
Charlene called in regards to total knee revision parts that were used during surgery yesterday; needs more information for coding purposes.     She can be reached at [phone].

## 2017-10-10 NOTE — PLAN OF CARE
Problem: Patient Care Overview  Goal: Plan of Care/Patient Progress Review  Discharge Planner PT      Patient plan for discharge: Home with OP PT   Current status: Pt requires SBA for supine to/from sit transfers, SBA for sit to/from stand, and SBA for gait up to 150ft with no KI. SLR independent. KI needed per order.    Barriers to return to prior living situation: step to enter  Recommendations for discharge: TBD POD #2  Rationale for recommendations: TBD POD #2       Entered by: Shaniqua Blackmon 10/10/2017 8:43 AM

## 2017-10-11 ENCOUNTER — APPOINTMENT (OUTPATIENT)
Dept: PHYSICAL THERAPY | Facility: CLINIC | Age: 82
DRG: 502 | End: 2017-10-11
Attending: ORTHOPAEDIC SURGERY
Payer: MEDICARE

## 2017-10-11 LAB
GLUCOSE BLDC GLUCOMTR-MCNC: 123 MG/DL (ref 70–99)
HGB BLD-MCNC: 10.2 G/DL (ref 13.3–17.7)

## 2017-10-11 PROCEDURE — A9270 NON-COVERED ITEM OR SERVICE: HCPCS | Mod: GY | Performed by: ORTHOPAEDIC SURGERY

## 2017-10-11 PROCEDURE — 97530 THERAPEUTIC ACTIVITIES: CPT | Mod: GP | Performed by: PHYSICAL THERAPY ASSISTANT

## 2017-10-11 PROCEDURE — 25000132 ZZH RX MED GY IP 250 OP 250 PS 637: Mod: GY | Performed by: ORTHOPAEDIC SURGERY

## 2017-10-11 PROCEDURE — 00000146 ZZHCL STATISTIC GLUCOSE BY METER IP

## 2017-10-11 PROCEDURE — 36415 COLL VENOUS BLD VENIPUNCTURE: CPT | Performed by: ORTHOPAEDIC SURGERY

## 2017-10-11 PROCEDURE — 40000193 ZZH STATISTIC PT WARD VISIT: Performed by: PHYSICAL THERAPY ASSISTANT

## 2017-10-11 PROCEDURE — 85018 HEMOGLOBIN: CPT | Performed by: ORTHOPAEDIC SURGERY

## 2017-10-11 PROCEDURE — 12000000 ZZH R&B MED SURG/OB

## 2017-10-11 RX ADMIN — ACETAMINOPHEN 975 MG: 325 TABLET, FILM COATED ORAL at 07:48

## 2017-10-11 RX ADMIN — ATORVASTATIN CALCIUM 40 MG: 40 TABLET, FILM COATED ORAL at 21:36

## 2017-10-11 RX ADMIN — ACETAMINOPHEN 975 MG: 325 TABLET, FILM COATED ORAL at 00:43

## 2017-10-11 RX ADMIN — ASPIRIN 325 MG: 325 TABLET, DELAYED RELEASE ORAL at 07:47

## 2017-10-11 RX ADMIN — DOCUSATE SODIUM 100 MG: 100 CAPSULE, LIQUID FILLED ORAL at 07:48

## 2017-10-11 RX ADMIN — ACETAMINOPHEN 975 MG: 325 TABLET, FILM COATED ORAL at 15:30

## 2017-10-11 RX ADMIN — TAMSULOSIN HYDROCHLORIDE 0.4 MG: 0.4 CAPSULE ORAL at 21:36

## 2017-10-11 ASSESSMENT — PAIN DESCRIPTION - DESCRIPTORS: DESCRIPTORS: TINGLING

## 2017-10-11 NOTE — PLAN OF CARE
Problem: Patient Care Overview  Goal: Plan of Care/Patient Progress Review  Discharge Planner PT      Patient plan for discharge: Home with OP PT   Current status: Pt is indep with all transfers ( sit to/from stand , sit to/from stand and bed to/from wc with ww. Pt with KI on @ times. Pt amb 25' with ww with KI indep. Pt reports he will be able to 1 small step @ home. Verbally instructed pt on proper tech on performing step.   Barriers to return to prior living situation: none Recommendations for discharge: home with spouse with Knee immob on @ all times per Surgeon.  Rationale for recommendations: Pt discharged from PT with all goals met. Please refer to discharge summary.        Entered by: Flavia Clark 10/11/2017 10:10 AM

## 2017-10-11 NOTE — PLAN OF CARE
Problem: Patient Care Overview  Goal: Plan of Care/Patient Progress Review  Outcome: Improving  Patient is SBA with walker/gait belt.  Declining pain meds except for tylenol.  Voiding via urinal.  Regular diet.  He is not to have PT until his 2 week follow up.  He is able to walk to the bathroom though.

## 2017-10-11 NOTE — PROGRESS NOTES
[facility]  Orthopedics Progress Note             Interval History:     81 year old male admitted postoperatively for elective Left TKA revision  with Dr. LORRAINE Raymond due to patella dislocation.  There were no intraoperative complications.  Patient currently Post op day #2.    Pt with continued hemorrhage.  Increased after shower today when he bend the knee.  Using KI otherwise.  Minimal pain.  Eating, voiding.  Wife present and attentive.  No new complaints.     Redressed incision x2 or more today.    Pain: Tolerable.   Nausea: none.  Light headedness : none  Chest pain: none  Shortness of breath: none              Assessment and Plan:   S/P R TKA rev, Day#2.  VSS, hemodynamically asymptomatic, pain management adequate.  Incision draining.    PLAN:  KI full time, may be loose for ice when  In bed.  No bending knee  Elevate and ice often.  Dressing with gauze and ABD, no island or aquacell please.    Discharge hopeful for tomorrow.                  Physical Exam:   Patient Vitals for the past 12 hrs:   BP Temp Temp src Heart Rate Resp SpO2   10/11/17 1528 146/70 97  F (36.1  C) Oral 62 16 99 %   10/11/17 0805 156/72 97  F (36.1  C) - 63 16 99 %     Hemoglobin   Date Value Ref Range Status   10/11/2017 10.2 (L) 13.3 - 17.7 g/dL Final   09/26/2017 13.3 13.3 - 17.7 g/dL Final       Patient is alert and oriented.  Vitals: stable  Neurovascular status: intact  Dressing: clean and dry  Calves: soft and non tender          Thomas Govea PA-C  [facility] - Surgery    10/11/2017

## 2017-10-11 NOTE — PLAN OF CARE
End of Shift Summary.  For vital signs and complete assessments, please see documentation flowsheets.     Pertinent assessments: pt alert and oriented, voiding per urinal. Pt taking scheduled tylenol for pain. Pt remains with the immobilizer in place, unstrapped and no drainage noted.   Major Shift Events: none  Plan (Upcoming Events): continue current cares, PT  Discharge/Transfer Needs: p[t hopes to discharge today     Bedside Shift Report Completed :   Bedside Safety Check Completed:

## 2017-10-11 NOTE — PROGRESS NOTES
[facility]  Orthopedics Progress Note             Interval History:     81 year old male admitted postoperatively for elective Right total knee arthroplasty revision  with Dr. LORRAINE John due to ongoing pain postoperatively of a TKA 6/19/2017.  There were no intraoperative complications.  Patient currently Post op day #1.    Patient reports pain manageable on tylenol, actually better than before surgery.  Some confusion as to activities.  Went through PT this morning and had increased hemorrage requiring multiple dressing changes.  Compression ,elevation, KI applied for the rest of the day, and at time of visit this evening appears to have stopped.  Otherwise: Voiding, eating, sleeping, no new complaints.     Pain: tolerable.   Nausea: none.,   Light headedness : none  Chest pain: none  Shortness of breath: none              Assessment and Plan:   R TKA, revision, Day #1.  VSS, hemodynamically asymptomatic, pain management adequate.    PLAN:  dvt proph  PT/OT - no forced flexion. Gait training in KI only.  Knee immobilizer with WB and in bed until tomorrow.    Home possible tomorrow.                    Physical Exam:   Patient Vitals for the past 12 hrs:   BP Temp Temp src Heart Rate Resp SpO2   10/10/17 1600 129/63 97.5  F (36.4  C) Oral 54 16 100 %   10/10/17 1250 135/60 96.4  F (35.8  C) Oral 58 16 100 %     Hemoglobin   Date Value Ref Range Status   09/26/2017 13.3 13.3 - 17.7 g/dL Final   06/21/2017 8.8 (L) 13.3 - 17.7 g/dL Final       Patient is alert and oriented.  Vitals: stable  Neurovascular status: intact  Dressing: clean and dry  Calves: soft and non tender          Thomas Govea PA-C  [facility] - Surgery    10/10/2017

## 2017-10-12 VITALS
DIASTOLIC BLOOD PRESSURE: 72 MMHG | RESPIRATION RATE: 16 BRPM | OXYGEN SATURATION: 99 % | HEART RATE: 81 BPM | SYSTOLIC BLOOD PRESSURE: 161 MMHG | TEMPERATURE: 95.3 F

## 2017-10-12 PROCEDURE — A9270 NON-COVERED ITEM OR SERVICE: HCPCS | Mod: GY | Performed by: ORTHOPAEDIC SURGERY

## 2017-10-12 PROCEDURE — 25000132 ZZH RX MED GY IP 250 OP 250 PS 637: Mod: GY | Performed by: ORTHOPAEDIC SURGERY

## 2017-10-12 RX ADMIN — ACETAMINOPHEN 975 MG: 325 TABLET, FILM COATED ORAL at 08:49

## 2017-10-12 RX ADMIN — ACETAMINOPHEN 975 MG: 325 TABLET, FILM COATED ORAL at 00:59

## 2017-10-12 RX ADMIN — ASPIRIN 325 MG: 325 TABLET, DELAYED RELEASE ORAL at 08:50

## 2017-10-12 NOTE — DISCHARGE INSTRUCTIONS
POST OP TOTAL KNEE INSTRUCTION    Specific notes for HORTENSIA:    - ice to area for comfort up to 20 minutes every 2 hours.  - may get incision wet in shower but do not soak or scrub and if still draining, use Knee immobilizer to get into shower, remove, shower, dry then reapply Knee immobilizer to get out of shower.  - You may bear weight on the leg as much as tolerated.  - Knee immobilizer:   Wear at all times until you have stopped draining for 2 days. After that wear when weight bearing (ie walking, transfering, etc).    Pain medication: You will be discharged from the hospital with pain medication(s). In most cases, consistent use the pain pills can be limited to a few days. After this period, the medication should be weaned off as soon as possible to avoid potential complications of constipation, nausea, or rash, etc. Until you can be completely off the pain pills, using them only at nighttime along with controlling the pain with over-the-counter medication(s) such as Ibuprofen/Naproxen and/or Tylenol during the day would be a good way of managing the pain.    A medication for blood clot prevention will be specified when you are discharged. If you were taking Coumadin before the surgery, you will resume it.    Activity: You will be using a walker or crutches until you feel confident. You also need to use the knee immobilizer until you are able to straight leg raise 10-15 times. Gradual rather than sudden increase of walking distance is recommended as the comfort level dictates. A cane instead of walker or crutches can be used when you strength and balance are improved.    Dressing: Typically, the first dressing change will be done on postop day #2. Showers can be taken with plastic covering over the original post surgical dressing for the first 4 days from the surgery. At that point, the incision site can be wet for showering but should not be soaked. A light gauze dressing along with paper tape should be placed  over the wound after each shower. The staples will be removed 10-14 days from the operation. During that time it is best to cover the incision site to protect the staples from being pulled or snagged.     If you have an aquacell dressing, (flesh colored rubber like bandage), you may leave this dressing in place until follow up in the clinic, unless; the dressing becomes completely saturated, is leaking, gets water inside as evident by moisture appearing on the inside of the dressing, or you have a skin reaction.  In this case you should remove and use dressings like what is mentioned above.    Exercises: It is recommended that you do the exercises of range of motion and strengthening in a small amounts frequently throughout the day rather than infrequent long sessions. Being over aggressive with her exercises can be hindering rather than helping. We are looking for a gradual steady improvement even though the general goal for range of motion post-operatively is 0-90 degrees within the first 2 weeks. During this time, gaining full Extension is far more important than gaining flexion. Generally, you be working with a physical therapist 2-3 times per week for the first 2-4 weeks.    5 keys to the knee,- to be done every 2 hours during the day:  1. Knee Bending: Dangle- in a seated position where foot can swing, let the leg from the knee down dangle off the edge of bed or table. You may use the other leg/foot to gently push the affected leg into more bending.  2. Knee straightening: Heel blocking - While sitting or laying, place pillow, ottoman or some other support under the heel with toes pointing up but ankle relaxed.  Rest there, working up to 10 minutes relaxing the leg to induce knee straightening.  Once relaxed, push/squeeze the knee towards the floor, hold for 2 seconds, and relax. Repeat x 10.    NOTE:  Alternate 1 and 2 every other hour.  3. Straight leg raises: from a laying or sitting position, while keeping  the knee locked straight, slowly lift the foot up 12-16 inches and hold for 2 seconds and relax.  Repeat working up to 20 repetitions.    4. Ice and elevate: whenever you are not up and about.  No standing around or sitting with the knee bend for more than very short periods.  5. Walk:  Use assistive devices as needed such as walker or cane to promote safety.  Walk focusing more on good form than getting somewhere.  Lead with the knee and then straighten the knee trying to make the surgical leg move like normal knee.    Soft tissue: It is not unusual to have swelling in the leg (thigh down to the foot) up to 2 months from the surgery. Frequent ankle pumping, elevation of the leg above the heart level and gentle compression support with ace wrap should help with swelling. Icing regularly, for 20 minutes every hour, will also help with swelling and pain.  Due to soft tissue swelling, increased amount of redness around the incision site is also commonly seen. Progressively worsening redness along with increasing pain or increasing drainage from the wound should be a reason to alert us immediately.     Follow up in clinic within 14 days after surgery.  May call [phone] to schedule.    Thomas Govea PA-C  [facility] - Surgery  [facility]  [address] [place]  [address]  [phone]

## 2017-10-12 NOTE — PLAN OF CARE
Problem: Knee Arthroplasty (Total, Partial) (Adult)  Goal: Signs and Symptoms of Listed Potential Problems Will be Absent, Minimized or Managed (Knee Arthroplasty)  Signs and symptoms of listed potential problems will be absent, minimized or managed by discharge/transition of care (reference Knee Arthroplasty (Total, Partial) (Adult) CPG).   Outcome: Improving  A&O. VSS. Lungs are clear. Active bowel sounds. Ace dressing is CDI. Taking scheduled tylenol, denied pain. Ice to incision. immobilizer on at all times. Voiding using urinal.

## 2017-10-12 NOTE — DISCHARGE SUMMARY
[facility]  Discharge Summary            Interval History:     81 year old male admitted postoperatively for elective Left Total knee revision  with Dr. LORRAINE Raymond due to DJD unresponsive to non operative treatment.  There were no notable intraoperative complications.  Patient being discharged post op Day #3.  Damien Vallecillo received skilled nursing, PT, OT, SW inquiry.  There was no Hospitalist care during the stay.  Pt experienced drainage at the incision, which is often expected with a lateral release.  Measures were taken to address and flow seemed to be decreasing.   Damien Vallecillo has progressed satisfactorily with ambulation and pain management and without medical complication.  Patient is confident in their discharge and has met goals with PT and OT. Pt lives at home with wife and will be discharged to home based on home living conditions and level of support.  Damien Vallecillo leaves the hospital in stable condition with good. chance for recovery.  Today: Pt with minimal pain. Voiding, eating, ambulating.  Confident in discharge.     Pain: min.   Nausea: none.   Light headedness : none  Chest pain: none  Shortness of breath: none              Assessment and Plan:   S/P R TKA rev Day #3.  Final Diagnosis: same.  VSS, Hemodynamically asymptomatic, pain management adequate.    PLAN:  DVT prophylaxis with aspirin 325 mg dialy, as patient has no history of VTE.  Pain management with tylenol PRN  Bowel regimen.  Knee immobilizer until drainage has stopped 48 hours.  Patient instructions attached to discharge.      Discharge to home.   Follow up in clinic as previously scheduled, approx 10-14 days post op.    [facility]  [address]  [facility]  [address]  [phone]                    Physical Exam:   Patient Vitals for the past 12 hrs:   BP Temp Temp src Heart Rate Resp SpO2   10/12/17 0846 161/72 95.3  F (35.2  C) Oral 60 16 99 %   10/12/17 0400 - - - - 18 -   10/12/17 0056  134/62 97.2  F (36.2  C) Oral 56 18 97 %     Hemoglobin   Date Value Ref Range Status   10/11/2017 10.2 (L) 13.3 - 17.7 g/dL Final   09/26/2017 13.3 13.3 - 17.7 g/dL Final       Patient is alert and oriented.  Vitals: stable  Neurovascular status: intact  Dressing: clean and dry  Calves: soft and non tender          Thomas Govea PA-C  [facility] - Surgery    10/12/2017

## 2017-10-12 NOTE — PLAN OF CARE
Problem: Patient Care Overview  Goal: Plan of Care/Patient Progress Review  Outcome: Adequate for Discharge Date Met:  10/12/17  Reviewed discharge instructions and meds with pt and spouse, no further questions. Pt discharging to home at 13:00 am. Sent dressing supplies home with pt.

## 2017-10-12 NOTE — PLAN OF CARE
Problem: Patient Care Overview  Goal: Plan of Care/Patient Progress Review  Outcome: Improving  Vss. Afebrile. Lungs clr-room air high 90s. Is done. Hypoactive bs/+gas, no nausea. Tolerating diet. Last bm 10/09/17. Voiding. No iv access. Drsg changed for large amount serosang drainage using sterile technique. Orders to use gauze and abds with ace for new drsg-no breakthrough bleeding to this time. Cms wnl except for edema. Pain managed with Tylenol. No drain. Aspirin for anticoagulant therapy. KI on continuously. Tolerating ice and repositioning. Pt anticipating discharge to home in am. No other significant issues noted this evening.

## 2017-10-13 ENCOUNTER — TELEPHONE (OUTPATIENT)
Dept: FAMILY MEDICINE | Facility: CLINIC | Age: 82
End: 2017-10-13

## 2017-10-13 NOTE — TELEPHONE ENCOUNTER
"Hospital/TCU/ED for chronic condition Discharge Protocol    \"Hi, my name is Essie Leija, a registered nurse, and I am calling from [facility].  I am calling to follow up and see how things are going for you after your recent emergency visit/hospital/TCU stay.\"    Tell me how you are doing now that you are home?\" Considerably better. Has help at home. No pain. Noticed slight bleeding at bandage, but other than that staples looked intact and we put another bandage on it'. (triage advised to f/u with surgeon- he has phone number to call)      Discharge Instructions    \"Let's review your discharge instructions.  What is/are the follow-up recommendations?  Pt. Response: f/u with surgeon within 10-14 days    \"Has an appointment with your primary care provider been scheduled?\"   No (schedule appointment)  Would rather schedule after seeing Dr. Rojas for his f/u       \"When you see the provider, I would recommend that you bring your medications with you.\"    Medications    \"Tell me what changed about your medicines when you discharged?\"    Changes to chronic meds?    0-1    \"What questions do you have about your medications?\"    None     New diagnoses of heart failure, COPD, diabetes, or MI?    No                  Medication reconciliation completed? Yes  Was MTM referral placed (*Make sure to put transitions as reason for referral)?   No    Call Summary    \"What questions or concerns do you have about your recent visit and your follow-up care?\"     none    \"If you have questions or things don't continue to improve, we encourage you contact us through the main clinic number (give number).  Even if the clinic is not open, triage nurses are available 24/7 to help you.     We would like you to know that our clinic has extended hours (provide information).  We also have urgent care (provide details on closest location and hours/contact info)\"      \"Thank you for your time and take care!\"     Essie GONZALEZ RN, " BSN, PHN  Elizabet Morris RN

## 2017-10-18 ENCOUNTER — CARE COORDINATION (OUTPATIENT)
Dept: CARE COORDINATION | Facility: CLINIC | Age: 82
End: 2017-10-18

## 2017-10-18 NOTE — PROGRESS NOTES
"Clinic Care Coordination Contact  OUTREACH    Referral Information:  Referral Source: CTS  Reason for Contact: right total knee revision   Care Conference: No     Universal Utilization:   ED Visits in last year: 0  Hospital visits in last year: 1  Last PCP appointment: 08/17/17  Missed Appointments:  (no concerns )  Concerns:  (no concerns )  Multiple Providers or Specialists: yes see care team     Clinical Concerns:  Current Medical Concerns: patient admitted from 10/9 - 10/12 with total right knee revision   Patient is doing well - he is about to eat breakfast and then his wife will remove dressing and look at incision and wash as instructed   He does note that he had a small amount of bleeding over night approx 1 tsp. After looking at wound he will call surgeon if continues to bleed or has questions.   He has an appt. On 10/20/17 - he is thinking maybe he should cancel this as he doesn't think he should have his staples removed yet, CCRN suggested NOT cancelling the appointment - go in for eval and they will decide if he should remove staples or not, wife and patient agree to this   Patient is still having some swelling lower left ankle - this has been ongoing   Not using pain medications - was using tylenol however was not having any pain so he stopped taking that as well   No questions/concerns at this time - wife and  were on speaker phone today while talking to CCRN - CCRN contact information written down by wife again today for questions/concerns   Using Crutches at this time - walker was ordered but patient states \"I hate the walker\" CCRN asked why he disliked it so much and he stated that he feels more stable with use of crutches     Current Behavioral Concerns: No concerns noted - patient is in good spirits,     Education Provided to patient: call surgeon if continued bleeding/concerns with incision     Clinical Pathway Name: None    Medication Management:  No concerns at this time - states " compliance      Functional Status:  Mobility Status: Independent w/Device  Equipment Currently Used at Home: crutches, walker, rolling (doesnt like his walker)   Transportation: drives and wife assisting now after surgery   Retired      Psychosocial:  Current living arrangement:: I live in a private home with spouse  Financial/Insurance: no concerns noted      Resources and Interventions:  Current Resources:  (NA);  (NA)  PAS Number:  (NA)  Senior Linkage Line Referral Placed:  (NA)  Advanced Care Plans/Directives on file:: No  Referrals Placed:  (NA)     Goals:   Goal 1 Statement: LOW SODIUM FOODS UNDER 2000 MG DAILY TO AID WITH SWELLING   Goal 1 Progression Percent: 50%  Goal 1 Progression Date: 10/18/17     Patient/Caregiver understanding: yes   Frequency of Care Coordination: 2 weeks   Upcoming appointment:  (none with pcp )     Plan:   Patient will call surgeon with questions/concerns relating to knee surgery   Patient will continue to monitor ankle swelling and call with concerns of this worsening   CCRN will check in with patient in 2 weeks   Patient/wife to call CCRN with questions/concerns     Flakita Mast Care Coordinator RN  [facility]  [phone]  October 18, 2017

## 2017-10-19 ENCOUNTER — DOCUMENTATION ONLY (OUTPATIENT)
Dept: OTHER | Facility: CLINIC | Age: 82
End: 2017-10-19

## 2017-10-19 DIAGNOSIS — Z71.89 ACP (ADVANCE CARE PLANNING): Chronic | ICD-10-CM

## 2017-10-20 ENCOUNTER — OFFICE VISIT (OUTPATIENT)
Dept: ORTHOPEDICS | Facility: CLINIC | Age: 82
End: 2017-10-20
Payer: MEDICARE

## 2017-10-20 DIAGNOSIS — L03.119 CELLULITIS AND ABSCESS OF LEG, EXCEPT FOOT: ICD-10-CM

## 2017-10-20 DIAGNOSIS — L02.419 CELLULITIS AND ABSCESS OF LEG, EXCEPT FOOT: ICD-10-CM

## 2017-10-20 DIAGNOSIS — Z47.89 ORTHOPEDIC AFTERCARE: Primary | ICD-10-CM

## 2017-10-20 PROCEDURE — 99024 POSTOP FOLLOW-UP VISIT: CPT | Performed by: PHYSICIAN ASSISTANT

## 2017-10-20 RX ORDER — CEPHALEXIN 500 MG/1
500 CAPSULE ORAL 3 TIMES DAILY
Qty: 30 CAPSULE | Refills: 0 | Status: SHIPPED | OUTPATIENT
Start: 2017-10-20 | End: 2017-11-07

## 2017-10-20 NOTE — LETTER
10/20/2017         RE: Damien Vallecillo  [address]  [place] [id number]        Dear Colleague,    Thank you for referring your patient, Damien Vallecillo, to the [facility] ORTHOPEDIC SURGERY. Please see a copy of my visit note below.    HISTORY OF PRESENT ILLNESS:    Damien Vallecillo is a 81 year old male who is seen in follow up for Right TKA revision, DOS 10/9/2017, Dr. Raymond.  Present symptoms: Pt reports slow progress.  SOme drainage currently.  States had stopped for 24 hours, but then D/c KI and it started again.  Reports serosanguinous, changing every 12-24 hours.  Ambulating well with KI and walker.  No pain medication.Taking aspirin.  No new complaints.   Denies Chest pain, Calve pain, Fever, Chills.    Current Treatment: post. Op.    PHYSICAL EXAM:  There were no vitals taken for this visit.  There is no height or weight on file to calculate BMI.   GENERAL APPEARANCE: healthy, alert and no distress   PSYCH:  mentation appears normal and affect normal/bright    MSK:  Right: Knee .  Ambulates: Well with KI and walker. .  Incision clean and dry, staples present, healing.   incisional erythema, red near proximal and middle mainly on lateral side..   Yes Ecchymosis mild resolving. .  No calve pain on palpation.  Edema moderate at knee with some fluctuance.  CMS: ina incisional numbness, otherwise grossly intact.  AROM flexion 0-50.      IMAGING INTERPRETATION:  None today..  Images read and interpreted by me.     ASSESSMENT:  Damien Vallecillo is a 81 year old male S/P Right TKA revision, DOS 10/9/2017, Dr. Raymond..  Drainage, probable cellulitis.    PLAN:  - Surgery discussed, images reviewed if applicable, and all questions were answered at this time.  - care instructions given and verbally acknowledged.  - Medications: as current, plus Keflex.  - gentle flexion.  - KI with WB.  - leave stables.  - Clean daily dressing changes.    Return to clinic 1, weeks.    Thomas Govea PA-C    Dept.  Orthopedic Surgery  [facility]   10/23/2017        Again, thank you for allowing me to participate in the care of your patient.        Sincerely,        Thomas Govea PA-C

## 2017-10-20 NOTE — MR AVS SNAPSHOT
"              After Visit Summary   10/20/2017    Damien Vallecillo    MRN: [id number]           Patient Information     Date Of Birth          1935        Visit Information        Provider Department      10/20/2017 10:00 AM Thomas Govea PA-C [facility] ORTHOPEDIC SURGERY        Today's Diagnoses     Cellulitis and abscess of leg, except foot    -  1      Care Instructions    Take the Antibiotic as written.    Cover incision with clean gauze and tape.    Compression for swelling.    Wear brace when up and moving around.    May remove brace when seated and start to gently bend knee, slowly increasing.    Follow up in 1 week.          Follow-ups after your visit        Follow-up notes from your care team     Return in about 1 week (around 10/27/2017).      Who to contact     If you have questions or need follow up information about today's clinic visit or your schedule please contact [facility] ORTHOPEDIC SURGERY directly at [phone].  Normal or non-critical lab and imaging results will be communicated to you by [employer] Inhart, letter or phone within 4 business days after the clinic has received the results. If you do not hear from us within 7 days, please contact the clinic through [employer] In[employer] or phone. If you have a critical or abnormal lab result, we will notify you by phone as soon as possible.  Submit refill requests through [employer] or call your pharmacy and they will forward the refill request to us. Please allow 3 business days for your refill to be completed.          Additional Information About Your Visit        Built In[employer]BigBarn Information     [employer]io lets you send messages to your doctor, view your test results, renew your prescriptions, schedule appointments and more. To sign up, go to [email or web address] . Click on \"Log in\" on the left side of the screen, which will take you to the Welcome page. Then click on \"Sign up Now\" on the right side of the page.     You will be asked to enter the " access code listed below, as well as some personal information. Please follow the directions to create your username and password.     Your access code is: [email or web address]  Expires: 2017  2:59 PM     Your access code will  in 90 days. If you need help or a new code, please call your [place] clinic or [phone].        Care EveryWhere ID     This is your Care EveryWhere ID. This could be used by other organizations to access your [place] medical records  [id number]         Blood Pressure from Last 3 Encounters:   10/12/17 161/72   17 166/90   09/15/17 144/80    Weight from Last 3 Encounters:   17 259 lb (117.5 kg)   09/15/17 264 lb (119.7 kg)   17 264 lb 1.6 oz (119.8 kg)              Today, you had the following     No orders found for display         Today's Medication Changes          These changes are accurate as of: 10/20/17 10:24 AM.  If you have any questions, ask your nurse or doctor.               Start taking these medicines.        Dose/Directions    cephALEXin 500 MG capsule   Commonly known as:  KEFLEX   Used for:  Cellulitis and abscess of leg, except foot        Dose:  500 mg   Take 1 capsule (500 mg) by mouth 3 times daily   Quantity:  30 capsule   Refills:  0            Where to get your medicines      Some of these will need a paper prescription and others can be bought over the counter.  Ask your nurse if you have questions.     Bring a paper prescription for each of these medications     cephALEXin 500 MG capsule                Primary Care Provider Office Phone # Fax #    Obi Strange [phone] [address] [facility] [address]        Equal Access to Services     John Douglas French CenterGUICHO : Hadwilver morocho Sosharri, waaxda luqadaha, qaybta kaalmacorin agrcia. So [facility] [phone].    ATENCIÓN: Si habla español, tiene a dumont disposición servicios gratuitos de asistencia lingüística. Llame al  [phone].    We comply with applicable federal civil rights laws and [place] laws. We do not discriminate on the basis of race, color, national origin, age, disability, sex, sexual orientation, or gender identity.            Thank you!     Thank you for choosing [facility] ORTHOPEDIC SURGERY  for your care. Our goal is always to provide you with excellent care. Hearing back from our patients is one way we can continue to improve our services. Please take a few minutes to complete the written survey that you may receive in the mail after your visit with us. Thank you!             Your Updated Medication List - Protect others around you: Learn how to safely use, store and throw away your medicines at www.disposemymeds.org.          This list is accurate as of: 10/20/17 10:24 AM.  Always use your most recent med list.                   Brand Name Dispense Instructions for use Diagnosis    ACE NOT PRESCRIBED (INTENTIONAL)     0 each    ACE Inhibitor not prescribed due to Other:  Had cough on ACEI in past    Type 2 diabetes, HbA1c goal < 7% (H)       aspirin  MG EC tablet     30 tablet    Take 1 tablet (325 mg) by mouth daily    Status post total right knee replacement       atorvastatin 40 MG tablet    LIPITOR    90 tablet    Take 1 tablet (40 mg) by mouth At Bedtime    Pre-diabetes       * blood glucose monitoring test strip    ACCU-CHEK LAURA    100 strip    1Use to test blood sugar 2 times daily or as directed.    Glucose intolerance (impaired glucose tolerance)       * blood glucose monitoring test strip    ACCU-CHEK LAURA PLUS    100 each    Use to test blood sugar 1 time daily    Glucose intolerance (impaired glucose tolerance)       cephALEXin 500 MG capsule    KEFLEX    30 capsule    Take 1 capsule (500 mg) by mouth 3 times daily    Cellulitis and abscess of leg, except foot       CINNAMON PO      Take 1 tablet by mouth daily        fish oil-omega-3 fatty acids 1000 MG capsule      Take 1 g by  mouth every evening        MULTIVITAMIN ADULT PO      Take 1 tablet by mouth daily Reported on 5/12/2017        order for DME     1 each    Equipment being ordered: Walker Wheels () and Walker () Treatment Diagnosis: Impaired functional mobility    Status post total right knee replacement       tamsulosin 0.4 MG capsule    FLOMAX    90 capsule    Take 1 capsule (0.4 mg) by mouth At Bedtime    Benign prostatic hyperplasia with lower urinary tract symptoms, unspecified morphology       triamcinolone 0.5 % cream    KENALOG    30 g    Apply sparingly to affected area three times daily.    Stasis dermatitis of both legs       * Notice:  This list has 2 medication(s) that are the same as other medications prescribed for you. Read the directions carefully, and ask your doctor or other care provider to review them with you.

## 2017-10-20 NOTE — PATIENT INSTRUCTIONS
Take the Antibiotic as written.    Cover incision with clean gauze and tape.    Compression for swelling.    Wear brace when up and moving around.    May remove brace when seated and start to gently bend knee, slowly increasing.    Follow up in 1 week.

## 2017-10-23 NOTE — PROGRESS NOTES
HISTORY OF PRESENT ILLNESS:    Damien Vallecillo is a 81 year old male who is seen in follow up for Right TKA revision, DOS 10/9/2017, Dr. Raymond.  Present symptoms: Pt reports slow progress.  SOme drainage currently.  States had stopped for 24 hours, but then D/c KI and it started again.  Reports serosanguinous, changing every 12-24 hours.  Ambulating well with KI and walker.  No pain medication.Taking aspirin.  No new complaints.   Denies Chest pain, Calve pain, Fever, Chills.    Current Treatment: post. Op.    PHYSICAL EXAM:  There were no vitals taken for this visit.  There is no height or weight on file to calculate BMI.   GENERAL APPEARANCE: healthy, alert and no distress   PSYCH:  mentation appears normal and affect normal/bright    MSK:  Right: Knee .  Ambulates: Well with KI and walker. .  Incision clean and dry, staples present, healing.   incisional erythema, red near proximal and middle mainly on lateral side..   Yes Ecchymosis mild resolving. .  No calve pain on palpation.  Edema moderate at knee with some fluctuance.  CMS: ina incisional numbness, otherwise grossly intact.  AROM flexion 0-50.      IMAGING INTERPRETATION:  None today..  Images read and interpreted by me.     ASSESSMENT:  Damien Vallecillo is a 81 year old male S/P Right TKA revision, DOS 10/9/2017, Dr. Raymond..  Drainage, probable cellulitis.    PLAN:  - Surgery discussed, images reviewed if applicable, and all questions were answered at this time.  - care instructions given and verbally acknowledged.  - Medications: as current, plus Keflex.  - gentle flexion.  - KI with WB.  - leave stables.  - Clean daily dressing changes.    Return to clinic 1, weeks.    Thomas Govea PA-C    Dept. Orthopedic Surgery  [facility]   10/23/2017

## 2017-10-27 ENCOUNTER — OFFICE VISIT (OUTPATIENT)
Dept: ORTHOPEDICS | Facility: CLINIC | Age: 82
End: 2017-10-27
Payer: MEDICARE

## 2017-10-27 DIAGNOSIS — Z47.89 ORTHOPEDIC AFTERCARE: Primary | ICD-10-CM

## 2017-10-27 PROCEDURE — 99024 POSTOP FOLLOW-UP VISIT: CPT | Performed by: PHYSICIAN ASSISTANT

## 2017-10-27 NOTE — PROGRESS NOTES
HISTORY OF PRESENT ILLNESS:    Damien Vallecillo is a 81 year old male who is seen in follow up for Right TKA revision, DOS 10/9/2017, Dr. Raymond..  Present symptoms: Pt reports continues to have redness.  Drainage has been stopped for several days.  Wearing KI when up, and wraps knee with ACE.  Taking Keflex, no pain medication. Walker for ambulation.  Limiting knee bending. No new injuries or complaints.   Denies Chest pain, Calve pain, Fever, Chills.    Current Treatment: postop, KI, walker, abx.    PHYSICAL EXAM:  There were no vitals taken for this visit.  There is no height or weight on file to calculate BMI.   GENERAL APPEARANCE: healthy, alert and no distress   PSYCH:  mentation appears normal and affect normal/bright    MSK:  Right: Knee - presents in KI  Ambulates: Well with  Walker. Mild loss of F/E.  Incision clean and dry, staples present, incision closed.  Erythema present at prox and mid incision,  Bilateral, blanceable, non tender.  No Ecchymosis.  No calve pain on palpation.  Edema moderate pitting edema.  CMS: ina incisional numbness, otherwise grossly intact.  AROM FLexion 5-50.      IMAGING INTERPRETATION:  None today     ASSESSMENT:  Damien Vallecillo is a 81 year old male S/P Right TKA revision, DOS 10/9/2017, Dr. Raymond..  cellulitis vs healing.    PLAN:  - Surgery discussed, images reviewed if applicable, and all questions were answered at this time.  - staples removed with sterile technique, steri-strips applied in usual fashion, care instructions given and verbally acknowledged.  - Medications: as current, finish abx  - Physical therapy: continue with current physical therapy  - start flexion  - no KI  - AAT    Return to clinic 1, weeks or if redness progress    Thomas Govea PA-C    Dept. Orthopedic Surgery  [facility]   10/27/2017

## 2017-10-27 NOTE — MR AVS SNAPSHOT
After Visit Summary   10/27/2017    Damien Vallecillo    MRN: [id number]           Patient Information     Date Of Birth          1935        Visit Information        Provider Department      10/27/2017 9:20 AM Thomas Govea PA-C [facility] ORTHOPEDIC SURGERY        Care Instructions    Incision care instructions:  Keep dry 24 hours.  Showering is ok after that time, however no soaking or scrubbing of incision for 1 weeks.      If draining or bleeding stops the tape-strips are enough coverage unless you were instructed otherwise or you would like to cover for comfort.  If drainage or bleeding continues please cover with clean dressings.     You may bend the knee now and discontinue the brace.    Complete antibiotics.    Follow up in 3 weeks or if redness progresses, fever over 101 F or drainage starts.          Follow-ups after your visit        Follow-up notes from your care team     Return in about 3 weeks (around 11/17/2017).      Who to contact     If you have questions or need follow up information about today's clinic visit or your schedule please contact [facility] ORTHOPEDIC SURGERY directly at [phone].  Normal or non-critical lab and imaging results will be communicated to you by [employer], letter or phone within 4 business days after the clinic has received the results. If you do not hear from us within 7 days, please contact the clinic through WhipCart or phone. If you have a critical or abnormal lab result, we will notify you by phone as soon as possible.  Submit refill requests through Senseg or call your pharmacy and they will forward the refill request to us. Please allow 3 business days for your refill to be completed.          Additional Information About Your Visit        [employer] Information     Senseg lets you send messages to your doctor, view your test results, renew your prescriptions, schedule appointments and more. To sign up, go to  [email or web address] . Click on \"Log in\" on the left side of the screen, which will take you to the Welcome page. Then click on \"Sign up Now\" on the right side of the page.     You will be asked to enter the access code listed below, as well as some personal information. Please follow the directions to create your username and password.     Your access code is: [email or web address]  Expires: 2017  2:59 PM     Your access code will  in 90 days. If you need help or a new code, please call your [place] clinic or [phone].        Care EveryWhere ID     This is your Care EveryWhere ID. This could be used by other organizations to access your [place] medical records  [id number]         Blood Pressure from Last 3 Encounters:   10/12/17 161/72   17 166/90   09/15/17 144/80    Weight from Last 3 Encounters:   17 259 lb (117.5 kg)   09/15/17 264 lb (119.7 kg)   17 264 lb 1.6 oz (119.8 kg)              Today, you had the following     No orders found for display       Primary Care Provider Office Phone # Fax #    Obi Strange [phone] [address] [facility] [address]        Equal Access to Services     TWIN MART AH: Hadii benjamin mccormick hadasho Sosharri, waaxda luqadaha, qaybta kaalmada amparo, corin davis. So [facility] [phone].    ATENCIÓN: Si habla español, tiene a dumont disposición servicios gratuitos de asistencia lingüística. Haley al [phone].    We comply with applicable federal civil rights laws and [place] laws. We do not discriminate on the basis of race, color, national origin, age, disability, sex, sexual orientation, or gender identity.            Thank you!     Thank you for choosing [facility] ORTHOPEDIC SURGERY  for your care. Our goal is always to provide you with excellent care. Hearing back from our patients is one way we can continue to improve our services. Please take a few minutes to complete the written " survey that you may receive in the mail after your visit with us. Thank you!             Your Updated Medication List - Protect others around you: Learn how to safely use, store and throw away your medicines at www.disposemymeds.org.          This list is accurate as of: 10/27/17  9:42 AM.  Always use your most recent med list.                   Brand Name Dispense Instructions for use Diagnosis    ACE NOT PRESCRIBED (INTENTIONAL)     0 each    ACE Inhibitor not prescribed due to Other:  Had cough on ACEI in past    Type 2 diabetes, HbA1c goal < 7% (H)       aspirin  MG EC tablet     30 tablet    Take 1 tablet (325 mg) by mouth daily    Status post total right knee replacement       atorvastatin 40 MG tablet    LIPITOR    90 tablet    Take 1 tablet (40 mg) by mouth At Bedtime    Pre-diabetes       * blood glucose monitoring test strip    ACCU-CHEK LAURA    100 strip    1Use to test blood sugar 2 times daily or as directed.    Glucose intolerance (impaired glucose tolerance)       * blood glucose monitoring test strip    ACCU-CHEK LAURA PLUS    100 each    Use to test blood sugar 1 time daily    Glucose intolerance (impaired glucose tolerance)       cephALEXin 500 MG capsule    KEFLEX    30 capsule    Take 1 capsule (500 mg) by mouth 3 times daily    Cellulitis and abscess of leg, except foot       CINNAMON PO      Take 1 tablet by mouth daily        fish oil-omega-3 fatty acids 1000 MG capsule      Take 1 g by mouth every evening        MULTIVITAMIN ADULT PO      Take 1 tablet by mouth daily Reported on 5/12/2017        order for DME     1 each    Equipment being ordered: Walker Wheels () and Walker () Treatment Diagnosis: Impaired functional mobility    Status post total right knee replacement       tamsulosin 0.4 MG capsule    FLOMAX    90 capsule    Take 1 capsule (0.4 mg) by mouth At Bedtime    Benign prostatic hyperplasia with lower urinary tract symptoms, unspecified morphology        triamcinolone 0.5 % cream    KENALOG    30 g    Apply sparingly to affected area three times daily.    Stasis dermatitis of both legs       * Notice:  This list has 2 medication(s) that are the same as other medications prescribed for you. Read the directions carefully, and ask your doctor or other care provider to review them with you.

## 2017-10-27 NOTE — PATIENT INSTRUCTIONS
Incision care instructions:  Keep dry 24 hours.  Showering is ok after that time, however no soaking or scrubbing of incision for 1 weeks.      If draining or bleeding stops the tape-strips are enough coverage unless you were instructed otherwise or you would like to cover for comfort.  If drainage or bleeding continues please cover with clean dressings.     You may bend the knee now and discontinue the brace.    Complete antibiotics.    Follow up in 1 weeks or if redness progresses, fever over 101 F or drainage starts.

## 2017-10-29 ENCOUNTER — NURSE TRIAGE (OUTPATIENT)
Dept: NURSING | Facility: CLINIC | Age: 82
End: 2017-10-29

## 2017-10-29 NOTE — TELEPHONE ENCOUNTER
"  Reason for Disposition    [1] Incision looks infected (spreading redness, pain) AND [2] large red area (> 2 in. or 5 cm)    Additional Information    Negative: [1] Major abdominal surgical incision AND [2] wound gaping open AND [3] visible internal organs    Negative: Sounds like a life-threatening emergency to the triager    Negative: [1] Bleeding from incision AND [2] won't stop after 10 minutes of direct pressure    Negative: [1] Widespread rash AND [2] bright red, sunburn-like    Negative: Severe pain in the incision    Negative: [1] Suture came out early AND [2] wound gaping AND [3] < 48 hours since sutures placed    Negative: [1] Incision gaping open AND [2] length of opening > 2 inches (5 cm)    Negative: Patient sounds very sick or weak to the triager    Negative: Sounds like a serious complication to the triager    Negative: Fever > 100.5 F (38.1 C)    Negative: [1] Incision looks infected (spreading redness, pain) AND [2] fever > 99.5 F (37.5 C)    Protocols used: POST-OP INCISION SYMPTOMS-ADULT-AH      Patient was told to notify provider right away if any increased redness or pain to be addressed right away, on his last day of current antibiotic course. Paged on call Dr. Wang via smart web at 10:27 pm \"Call Pt Damien Vallecillo (Wife is Alejandrina) [phone], 1935, complications post knee replacement, staples out 10/27/17 and now a lot more redness, more pain, abx almost done, told to notify MD if more redness.      Kathleen Venegas, RN, BSN  [facility]    "

## 2017-10-30 ENCOUNTER — OFFICE VISIT (OUTPATIENT)
Dept: ORTHOPEDICS | Facility: CLINIC | Age: 82
End: 2017-10-30
Payer: MEDICARE

## 2017-10-30 ENCOUNTER — TELEPHONE (OUTPATIENT)
Dept: ORTHOPEDICS | Facility: CLINIC | Age: 82
End: 2017-10-30

## 2017-10-30 VITALS
WEIGHT: 259 LBS | SYSTOLIC BLOOD PRESSURE: 158 MMHG | HEIGHT: 70 IN | DIASTOLIC BLOOD PRESSURE: 80 MMHG | TEMPERATURE: 97.6 F | BODY MASS INDEX: 37.08 KG/M2

## 2017-10-30 DIAGNOSIS — Z96.659 STATUS POST TOTAL KNEE REPLACEMENT, UNSPECIFIED LATERALITY: Primary | ICD-10-CM

## 2017-10-30 PROCEDURE — 99024 POSTOP FOLLOW-UP VISIT: CPT | Performed by: ORTHOPAEDIC SURGERY

## 2017-10-30 NOTE — PROGRESS NOTES
"HISTORY OF PRESENT ILLNESS:    Damien Vallecillo is a 81 year old male who is seen in follow up for Right TKA revision, DOS 10/9/2017.  Present symptoms: Pt is concerned for redness around the incision, states redness has worsened since last office visit.  Pt continues to have swelling in the knee as well.      PHYSICAL EXAM:  /80 (BP Location: Right arm, Patient Position: Sitting, Cuff Size: Adult Regular)  Temp 97.6  F (36.4  C) (Oral)  Ht 5' 10\" (1.778 m)  Wt 259 lb (117.5 kg)  BMI 37.16 kg/m2  Body mass index is 37.16 kg/(m^2).   GENERAL APPEARANCE: healthy, alert and no distress   SKIN: no suspicious lesions or rashes  NEURO: Normal strength and tone, mentation intact and speech normal  VASCULAR:  good pulses, and cappillary refill   LYMPH: no lymphadenopathy   PSYCH:  mentation appears normal and affect normal/bright    MSK:  Mild erythema along the entire incision. There is no fluctuance and no tenderness to palpation. There is no drainage. His calves and thighs are soft and nontender and CMS is intact to his toes.    IMAGING INTERPRETATION:       ASSESSMENT / PLAN:  3  weeks status post right total knee arthroplasty revision. We'll continue to watch the wound. If they develop any drainage or constitutional symptoms or return immediately.    Return to clinic george Raymond MD  Dept. Orthopedic Surgery  [facility]     Disclaimer: This note consists of symbols derived from keyboarding, dictation and/or voice recognition software. As a result, there may be errors in the script that have gone undetected. Please consider this when interpreting information found in this chart.    "

## 2017-10-30 NOTE — LETTER
"    10/30/2017         RE: Damien Vallecillo  [address]  [place] [address]        Dear Colleague,    Thank you for referring your patient, Damien Vallecillo, to the [facility] ORTHOPEDIC SURGERY. Please see a copy of my visit note below.    HISTORY OF PRESENT ILLNESS:    Damien Vallecillo is a 81 year old male who is seen in follow up for Right TKA revision, DOS 10/9/2017.  Present symptoms: Pt is concerned for redness around the incision, states redness has worsened since last office visit.  Pt continues to have swelling in the knee as well.      PHYSICAL EXAM:  /80 (BP Location: Right arm, Patient Position: Sitting, Cuff Size: Adult Regular)  Temp 97.6  F (36.4  C) (Oral)  Ht 5' 10\" (1.778 m)  Wt 259 lb (117.5 kg)  BMI 37.16 kg/m2  Body mass index is 37.16 kg/(m^2).   GENERAL APPEARANCE: healthy, alert and no distress   SKIN: no suspicious lesions or rashes  NEURO: Normal strength and tone, mentation intact and speech normal  VASCULAR:  good pulses, and cappillary refill   LYMPH: no lymphadenopathy   PSYCH:  mentation appears normal and affect normal/bright    MSK:  Mild erythema along the entire incision. There is no fluctuance and no tenderness to palpation. There is no drainage. His calves and thighs are soft and nontender and CMS is intact to his toes.    IMAGING INTERPRETATION:       ASSESSMENT / PLAN:  3  weeks status post right total knee arthroplasty revision. We'll continue to watch the wound. If they develop any drainage or constitutional symptoms or return immediately.    Return to clinic p.rdunia.    Mark Raymond MD  Dept. Orthopedic Surgery  [facility]     Disclaimer: This note consists of symbols derived from keyboarding, dictation and/or voice recognition software. As a result, there may be errors in the script that have gone undetected. Please consider this when interpreting information found in this chart.      Again, thank you for allowing me to participate in the " care of your patient.        Sincerely,        Alec Raymond MD

## 2017-10-30 NOTE — TELEPHONE ENCOUNTER
Consent to communicate on file. Left voicemail for patient or wife to return call to discuss symptoms further. Moises Govea PA-C saw patient on 10/27/17 and plan was : Return to clinic 1, weeks or if redness progress.      MARK Ontiveros RN

## 2017-10-30 NOTE — NURSING NOTE
"Chief Complaint   Patient presents with     Surgical Followup     Right TKA revision, DOS 10/9/2017       Initial /80 (BP Location: Right arm, Patient Position: Sitting, Cuff Size: Adult Regular)  Temp 97.6  F (36.4  C) (Oral)  Ht 5' 10\" (1.778 m)  Wt 259 lb (117.5 kg)  BMI 37.16 kg/m2 Estimated body mass index is 37.16 kg/(m^2) as calculated from the following:    Height as of this encounter: 5' 10\" (1.778 m).    Weight as of this encounter: 259 lb (117.5 kg).  Medication Reconciliation: complete    "

## 2017-10-30 NOTE — MR AVS SNAPSHOT
"              After Visit Summary   10/30/2017    Damien Vallecillo    MRN: [id number]           Patient Information     Date Of Birth          1935        Visit Information        Provider Department      10/30/2017 3:00 PM Alec Raymond MD [facility] ORTHOPEDIC SURGERY        Today's Diagnoses     Status post total knee replacement, unspecified laterality    -  1       Follow-ups after your visit        Your next 10 appointments already scheduled     Nov 10, 2017 10:40 AM CST   Return Visit with Thomas Govea PA-C   [facility] ORTHOPEDIC SURGERY ([facility])    [address]  [address]  [place] [address]   [phone]              Who to contact     If you have questions or need follow up information about today's clinic visit or your schedule please contact [facility] ORTHOPEDIC SURGERY directly at [phone].  Normal or non-critical lab and imaging results will be communicated to you by MyChart, letter or phone within 4 business days after the clinic has received the results. If you do not hear from us within 7 days, please contact the clinic through MyChart or phone. If you have a critical or abnormal lab result, we will notify you by phone as soon as possible.  Submit refill requests through [employer] or call your pharmacy and they will forward the refill request to us. Please allow 3 business days for your refill to be completed.          Additional Information About Your Visit        MyChart Information     [employer] lets you send messages to your doctor, view your test results, renew your prescriptions, schedule appointments and more. To sign up, go to [email or web address] . Click on \"Log in\" on the left side of the screen, which will take you to the Welcome page. Then click on \"Sign up Now\" on the right side of the page.     You will be asked to enter the access code listed below, as well as some personal information. Please follow the " "directions to create your username and password.     Your access code is: [email or web address]  Expires: 2017  2:59 PM     Your access code will  in 90 days. If you need help or a new code, please call your [place] clinic or [phone].        Care EveryWhere ID     This is your Care EveryWhere ID. This could be used by other organizations to access your [place] medical records  [id number]        Your Vitals Were     Temperature Height BMI (Body Mass Index)             97.6  F (36.4  C) (Oral) 5' 10\" (1.778 m) 37.16 kg/m2          Blood Pressure from Last 3 Encounters:   10/30/17 158/80   10/12/17 161/72   17 166/90    Weight from Last 3 Encounters:   10/30/17 259 lb (117.5 kg)   17 259 lb (117.5 kg)   09/15/17 264 lb (119.7 kg)              Today, you had the following     No orders found for display       Primary Care Provider Office Phone # Fax #    Obi Strange [phone] [address] [id number]        Equal Access to Services     TWIN MART : Hadii benjamin sebastiano Sosharri, waaxda luqadaha, qaybta kaalmada adeegyada, corin davis. So [facility] [phone].    ATENCIÓN: Si habla español, tiene a dumont disposición servicios gratuitos de asistencia lingüística. Llame al [phone].    We comply with applicable federal civil rights laws and [place] laws. We do not discriminate on the basis of race, color, national origin, age, disability, sex, sexual orientation, or gender identity.            Thank you!     Thank you for choosing [facility] ORTHOPEDIC SURGERY  for your care. Our goal is always to provide you with excellent care. Hearing back from our patients is one way we can continue to improve our services. Please take a few minutes to complete the written survey that you may receive in the mail after your visit with us. Thank you!             Your Updated Medication List - Protect others around you: Learn how to safely " use, store and throw away your medicines at www.disposemymeds.org.          This list is accurate as of: 10/30/17 11:59 PM.  Always use your most recent med list.                   Brand Name Dispense Instructions for use Diagnosis    ACE NOT PRESCRIBED (INTENTIONAL)     0 each    ACE Inhibitor not prescribed due to Other:  Had cough on ACEI in past    Type 2 diabetes, HbA1c goal < 7% (H)       aspirin  MG EC tablet     30 tablet    Take 1 tablet (325 mg) by mouth daily    Status post total right knee replacement       atorvastatin 40 MG tablet    LIPITOR    90 tablet    Take 1 tablet (40 mg) by mouth At Bedtime    Pre-diabetes       * blood glucose monitoring test strip    ACCU-CHEK LAURA    100 strip    1Use to test blood sugar 2 times daily or as directed.    Glucose intolerance (impaired glucose tolerance)       * blood glucose monitoring test strip    ACCU-CHEK LAURA PLUS    100 each    Use to test blood sugar 1 time daily    Glucose intolerance (impaired glucose tolerance)       cephALEXin 500 MG capsule    KEFLEX    30 capsule    Take 1 capsule (500 mg) by mouth 3 times daily    Cellulitis and abscess of leg, except foot       CINNAMON PO      Take 1 tablet by mouth daily        fish oil-omega-3 fatty acids 1000 MG capsule      Take 1 g by mouth every evening        MULTIVITAMIN ADULT PO      Take 1 tablet by mouth daily Reported on 5/12/2017        order for DME     1 each    Equipment being ordered: Walker Wheels () and Walker () Treatment Diagnosis: Impaired functional mobility    Status post total right knee replacement       tamsulosin 0.4 MG capsule    FLOMAX    90 capsule    Take 1 capsule (0.4 mg) by mouth At Bedtime    Benign prostatic hyperplasia with lower urinary tract symptoms, unspecified morphology       triamcinolone 0.5 % cream    KENALOG    30 g    Apply sparingly to affected area three times daily.    Stasis dermatitis of both legs       * Notice:  This list has 2  medication(s) that are the same as other medications prescribed for you. Read the directions carefully, and ask your doctor or other care provider to review them with you.

## 2017-10-30 NOTE — TELEPHONE ENCOUNTER
Patient called today with concern about very swollen leg. He said he spoke with Dr. Martin On on Friday and was told to call back if not better.   Dr. Raymond is his doctor. They would like a call back today.     Spoke with Dr. Raymond. He would like patient to come in to see Moises.   Phone number # [phone]

## 2017-11-07 ENCOUNTER — CARE COORDINATION (OUTPATIENT)
Dept: CARE COORDINATION | Facility: CLINIC | Age: 82
End: 2017-11-07

## 2017-11-07 NOTE — PROGRESS NOTES
Clinic Care Coordination Contact  OUTREACH    Referral Information:  Referral Source: CTS  Reason for Contact: right knee patellar alignment f/u   Care Conference: No     Universal Utilization:   ED Visits in last year: 0  Hospital visits in last year: 1  Last PCP appointment: 09/26/17  Missed Appointments:  (no concerns)  Concerns:  (no concerns)  Multiple Providers or Specialists: yes see care team     Clinical Concerns:  Current Medical Concerns: patient had surgery to realign right knee - he is still having some pain and swelling and redness. He has completed a course of keflex and f/u with ortho and he states they were not worried about the symptoms - CCRN advised to keep monitoring and if any changes to let the surgeon know right away   Using ACE wrap to aid with swelling, this causes him to itch if he keeps it on to long,  he has not tried ibuprofen, discussed this and he will try 200-400 mg if no allergies to this medication/and or ever been told by doctor not to take NSAIDS   Patient has been using ice in small amounts at times as well   Patient plans on following up with surgeon 11/10/17 and after that will make appt. With Dr. Strange     DM - well controlled - not checking blood sugars on regular basis at home   Lab Results   Component Value Date    A1C 5.5 09/26/2017    A1C 5.9 04/17/2017    A1C 6.0 10/21/2016    A1C 6.3 07/31/2015    A1C 6.3 07/22/2015     Current Behavioral Concerns: Patient is in good spirits, very pleasant   No concerns     Education Provided to patient: continue to monitor for swelling, redness, fever, worsening pain and if this occurs needs to f/u with surgeon asap      Clinical Pathway Name: None    Medication Management:  No concerns with meds     Functional Status:  Mobility Status: Independent w/Device  Equipment Currently Used at Home: crutches, walker, rolling (doesnt like walker - uses crutches)  Transportation: wife      Psychosocial:  Current living arrangement: I live in a  private home with spouse  Financial/Insurance: No concerns noted today on outreach      Resources and Interventions:  Current Resources:  (NA);  (NA)  PAS Number:  (NA)  Senior Linkage Line Referral Placed:  (NA)  Advanced Care Plans/Directives on file:: No  Referrals Placed:  (NA)     Goals:   Goal 1 Statement: LOW SODIUM FOODS UNDER 2000 MG DAILY TO AID WITH SWELLING   Goal 1 Progression Percent: 60%  Goal 1 Progression Date: 11/07/17    Patient/Caregiver understanding: yes   Frequency of Care Coordination: 3 weeks   Upcoming appointment:  (none with pcp will call to schedule soon)     Plan:   Patient will continue to monitor symptoms and if swelling/pain/worsening redness/fevers he will f/u with surgeon asap   Patient will call to schedule appt. with pcp when able   CCRN will f/u with patient in 3 weeks - if any questions prior to this time patient will call CCRN     Flakita Mast Care Coordinator RN  [facility]  [phone]  November 7, 2017

## 2017-11-10 ENCOUNTER — RADIANT APPOINTMENT (OUTPATIENT)
Dept: GENERAL RADIOLOGY | Facility: CLINIC | Age: 82
End: 2017-11-10
Attending: PHYSICIAN ASSISTANT
Payer: MEDICARE

## 2017-11-10 ENCOUNTER — OFFICE VISIT (OUTPATIENT)
Dept: ORTHOPEDICS | Facility: CLINIC | Age: 82
End: 2017-11-10
Payer: MEDICARE

## 2017-11-10 DIAGNOSIS — Z96.651 S/P REVISION OF TOTAL KNEE, RIGHT: Primary | ICD-10-CM

## 2017-11-10 DIAGNOSIS — Z96.651 S/P REVISION OF TOTAL KNEE, RIGHT: ICD-10-CM

## 2017-11-10 PROCEDURE — 99024 POSTOP FOLLOW-UP VISIT: CPT | Performed by: PHYSICIAN ASSISTANT

## 2017-11-10 PROCEDURE — 73562 X-RAY EXAM OF KNEE 3: CPT | Mod: RT

## 2017-11-10 RX ORDER — TRAMADOL HYDROCHLORIDE 50 MG/1
50-100 TABLET ORAL EVERY 6 HOURS PRN
Qty: 40 TABLET | Refills: 0 | Status: SHIPPED | OUTPATIENT
Start: 2017-11-10 | End: 2018-01-18

## 2017-11-10 NOTE — MR AVS SNAPSHOT
"              After Visit Summary   11/10/2017    Damien Vallecillo    MRN: [id number]           Patient Information     Date Of Birth          1935        Visit Information        Provider Department      11/10/2017 10:40 AM Thomas Govea PA-C [facility] ORTHOPEDIC SURGERY        Today's Diagnoses     S/P revision of total knee, right    -  1      Care Instructions      PLAN:  - ice, elevate and wrap for swelling.  - continue with PT.  - AAT  - monitor over time.          Follow-ups after your visit        Follow-up notes from your care team     Return in about 4 weeks (around 12/8/2017).      Who to contact     If you have questions or need follow up information about today's clinic visit or your schedule please contact [facility] ORTHOPEDIC SURGERY directly at [phone].  Normal or non-critical lab and imaging results will be communicated to you by Smartiohart, letter or phone within 4 business days after the clinic has received the results. If you do not hear from us within 7 days, please contact the clinic through Smartiohart or phone. If you have a critical or abnormal lab result, we will notify you by phone as soon as possible.  Submit refill requests through Beanup or call your pharmacy and they will forward the refill request to us. Please allow 3 business days for your refill to be completed.          Additional Information About Your Visit        Smartiohart Information     Beanup lets you send messages to your doctor, view your test results, renew your prescriptions, schedule appointments and more. To sign up, go to [email or web address] . Click on \"Log in\" on the left side of the screen, which will take you to the Welcome page. Then click on \"Sign up Now\" on the right side of the page.     You will be asked to enter the access code listed below, as well as some personal information. Please follow the directions to create your username and password.     Your access code is: " [email or web address]  Expires: 2018 12:23 PM     Your access code will  in 90 days. If you need help or a new code, please call your [place] clinic or [phone].        Care EveryWhere ID     This is your Care EveryWhere ID. This could be used by other organizations to access your [place] medical records  [id number]         Blood Pressure from Last 3 Encounters:   10/30/17 158/80   10/12/17 161/72   17 166/90    Weight from Last 3 Encounters:   10/30/17 259 lb (117.5 kg)   17 259 lb (117.5 kg)   09/15/17 264 lb (119.7 kg)                 Today's Medication Changes          These changes are accurate as of: 11/10/17 12:23 PM.  If you have any questions, ask your nurse or doctor.               Start taking these medicines.        Dose/Directions    traMADol 50 MG tablet   Commonly known as:  ULTRAM   Used for:  S/P revision of total knee, right   Started by:  Thomas Govea, SANDY        Dose:   mg   Take 1-2 tablets ( mg) by mouth every 6 hours as needed for pain maximum 6 tablet(s) per day   Quantity:  40 tablet   Refills:  0            Where to get your medicines      Some of these will need a paper prescription and others can be bought over the counter.  Ask your nurse if you have questions.     Bring a paper prescription for each of these medications     traMADol 50 MG tablet                Primary Care Provider Office Phone # Fax #    Obi Strange [phone] [address] [facility] [address]        Equal Access to Services     [facility]: Hadii benjamin mccormick hadasho Sosharri, waaxda luqadaha, qaybta kaalmacorin garcia . So [facility] [phone].    ATENCIÓN: Si habla español, tiene a duomnt disposición servicios gratuitos de asistencia lingüística. Llame al [phone].    We comply with applicable federal civil rights laws and [place] laws. We do not discriminate on the basis of race, color, national origin,  age, disability, sex, sexual orientation, or gender identity.            Thank you!     Thank you for choosing [facility] ORTHOPEDIC SURGERY  for your care. Our goal is always to provide you with excellent care. Hearing back from our patients is one way we can continue to improve our services. Please take a few minutes to complete the written survey that you may receive in the mail after your visit with us. Thank you!             Your Updated Medication List - Protect others around you: Learn how to safely use, store and throw away your medicines at www.disposemymeds.org.          This list is accurate as of: 11/10/17 12:23 PM.  Always use your most recent med list.                   Brand Name Dispense Instructions for use Diagnosis    ACE NOT PRESCRIBED (INTENTIONAL)     0 each    ACE Inhibitor not prescribed due to Other:  Had cough on ACEI in past    Type 2 diabetes, HbA1c goal < 7% (H)       aspirin  MG EC tablet     30 tablet    Take 1 tablet (325 mg) by mouth daily    Status post total right knee replacement       atorvastatin 40 MG tablet    LIPITOR    90 tablet    Take 1 tablet (40 mg) by mouth At Bedtime    Pre-diabetes       * blood glucose monitoring test strip    ACCU-CHEK LAURA    100 strip    1Use to test blood sugar 2 times daily or as directed.    Glucose intolerance (impaired glucose tolerance)       * blood glucose monitoring test strip    ACCU-CHEK LAURA PLUS    100 each    Use to test blood sugar 1 time daily    Glucose intolerance (impaired glucose tolerance)       CINNAMON PO      Take 1 tablet by mouth daily        fish oil-omega-3 fatty acids 1000 MG capsule      Take 1 g by mouth every evening        MULTIVITAMIN ADULT PO      Take 1 tablet by mouth daily Reported on 5/12/2017        order for DME     1 each    Equipment being ordered: Walker Wheels () and Walker () Treatment Diagnosis: Impaired functional mobility    Status post total right knee replacement        tamsulosin 0.4 MG capsule    FLOMAX    90 capsule    Take 1 capsule (0.4 mg) by mouth At Bedtime    Benign prostatic hyperplasia with lower urinary tract symptoms, unspecified morphology       traMADol 50 MG tablet    ULTRAM    40 tablet    Take 1-2 tablets ( mg) by mouth every 6 hours as needed for pain maximum 6 tablet(s) per day    S/P revision of total knee, right       triamcinolone 0.5 % cream    KENALOG    30 g    Apply sparingly to affected area three times daily.    Stasis dermatitis of both legs       * Notice:  This list has 2 medication(s) that are the same as other medications prescribed for you. Read the directions carefully, and ask your doctor or other care provider to review them with you.

## 2017-11-10 NOTE — PROGRESS NOTES
HISTORY OF PRESENT ILLNESS:    Damien Vallecillo is a 82 year old male who is seen in follow up for Right total knee revision, DOS 10/09/2017, Dr. Raymond.  Present symptoms: Pt reports ongoing pain at inside of knee and lots of swelling.  Is wrapping, elevating and icing (some).  Walks fairly well, can go 5-10 min, usually 1-2 times dialy.  Using crutches for ambulation.  Denies Chest pain, Calve pain, Fever, Chills.    Current Treatment: postop, RICE.    PHYSICAL EXAM:  There were no vitals taken for this visit.  There is no height or weight on file to calculate BMI.   GENERAL APPEARANCE: healthy, alert and no distress   PSYCH:  mentation appears normal and affect normal/bright    MSK:  Right: Knee .  Ambulates: well with crutches.  Incision clean and dry, well healed.   Blancheable  erythema.   No Ecchymosis .  No calve pain on palpation.  Edema moderate to large effusion at knee, moderate below knee.   CMS: ina incisional numbness, otherwise grossly intact.  AROM Flexion 0-70.      IMAGING INTERPRETATION:  XR knee.    Images demonstrate the post operative changes of a right total knee arthroplasty.  The patella component appears to be lateral to femur implant on sunrise view as was on prior view.  Otherwise components appear intact.  No new pathology noted.    See also radiology read..       ASSESSMENT:  Damien Vallecillo is a 82 year old male S/P Right total knee revision, DOS 10/09/2017, Dr. Raymond.    Displaced patella component as previous.  Discussed with Dr. Raymond.    PLAN:  - continue with PT.  - AAT  - monitor over time.  - xray of knee and knee forms today.    Return to clinic 4, weeks, PRTOMMY Govea PA-C    Dept. Orthopedic Surgery  [facility]   11/10/2017

## 2017-12-05 ENCOUNTER — OFFICE VISIT (OUTPATIENT)
Dept: FAMILY MEDICINE | Facility: CLINIC | Age: 82
End: 2017-12-05
Payer: MEDICARE

## 2017-12-05 VITALS
HEART RATE: 67 BPM | WEIGHT: 259 LBS | RESPIRATION RATE: 16 BRPM | BODY MASS INDEX: 37.08 KG/M2 | TEMPERATURE: 97.9 F | DIASTOLIC BLOOD PRESSURE: 91 MMHG | HEIGHT: 70 IN | SYSTOLIC BLOOD PRESSURE: 158 MMHG

## 2017-12-05 DIAGNOSIS — Z96.651 STATUS POST TOTAL RIGHT KNEE REPLACEMENT: Primary | ICD-10-CM

## 2017-12-05 DIAGNOSIS — G89.29 CHRONIC PAIN OF RIGHT KNEE: ICD-10-CM

## 2017-12-05 DIAGNOSIS — I10 ESSENTIAL HYPERTENSION: ICD-10-CM

## 2017-12-05 DIAGNOSIS — M25.561 CHRONIC PAIN OF RIGHT KNEE: ICD-10-CM

## 2017-12-05 PROCEDURE — 80307 DRUG TEST PRSMV CHEM ANLYZR: CPT | Mod: 90 | Performed by: FAMILY MEDICINE

## 2017-12-05 PROCEDURE — 99214 OFFICE O/P EST MOD 30 MIN: CPT | Performed by: FAMILY MEDICINE

## 2017-12-05 PROCEDURE — 99000 SPECIMEN HANDLING OFFICE-LAB: CPT | Performed by: FAMILY MEDICINE

## 2017-12-05 RX ORDER — LOSARTAN POTASSIUM 100 MG/1
100 TABLET ORAL DAILY
Qty: 30 TABLET | Refills: 2 | Status: SHIPPED | OUTPATIENT
Start: 2017-12-05 | End: 2018-08-17

## 2017-12-05 NOTE — MR AVS SNAPSHOT
"              After Visit Summary   12/5/2017    Damien Vallecillo    MRN: [id number]           Patient Information     Date Of Birth          1935        Visit Information        Provider Department      12/5/2017 2:15 PM Obi Strange MD [facility]        Today's Diagnoses     Status post total right knee replacement    -  1    Chronic pain of right knee        Essential hypertension           Follow-ups after your visit        Your next 10 appointments already scheduled     Jan 18, 2018 11:15 AM CST   [facility] with Obi Strange MD   [facility]    [address] [place] [id number] [phone]              Who to contact     If you have questions or need follow up information about today's clinic visit or your schedule please contact [facility] directly at [phone].  Normal or non-critical lab and imaging results will be communicated to you by MyChart, letter or phone within 4 business days after the clinic has received the results. If you do not hear from us within 7 days, please contact the clinic through MyChart or phone. If you have a critical or abnormal lab result, we will notify you by phone as soon as possible.  Submit refill requests through [employer] or call your pharmacy and they will forward the refill request to us. Please allow 3 business days for your refill to be completed.          Additional Information About Your Visit        MyChart Information     [employer] lets you send messages to your doctor, view your test results, renew your prescriptions, schedule appointments and more. To sign up, go to [email or web address] . Click on \"Log in\" on the left side of the screen, which will take you to the Welcome page. Then click on \"Sign up Now\" on the right side of the page.     You will be asked to enter the access code listed below, as well as some personal information. Please follow the " "directions to create your username and password.     Your access code is: [email or web address]  Expires: 2018 12:23 PM     Your access code will  in 90 days. If you need help or a new code, please call your [place] clinic or [phone].        Care EveryWhere ID     This is your Care EveryWhere ID. This could be used by other organizations to access your [place] medical records  [id number]        Your Vitals Were     Pulse Temperature Respirations Height BMI (Body Mass Index)       67 97.9  F (36.6  C) (Oral) 16 5' 10\" (1.778 m) 37.16 kg/m2        Blood Pressure from Last 3 Encounters:   17 (!) 158/91   10/30/17 158/80   10/12/17 161/72    Weight from Last 3 Encounters:   17 259 lb (117.5 kg)   10/30/17 259 lb (117.5 kg)   17 259 lb (117.5 kg)              We Performed the Following     Drug  Screen Comprehensive, Urine w/o Reported Meds (Pain Care Package)          Today's Medication Changes          These changes are accurate as of: 17  8:46 PM.  If you have any questions, ask your nurse or doctor.               Start taking these medicines.        Dose/Directions    losartan 100 MG tablet   Commonly known as:  COZAAR   Used for:  Essential hypertension   Started by:  Obi Strange MD        Dose:  100 mg   Take 1 tablet (100 mg) by mouth daily   Quantity:  30 tablet   Refills:  2            Where to get your medicines      These medications were sent to [facility] [place] [id number]     Phone:  [phone]     losartan 100 MG tablet                Primary Care Provider Office Phone # Fax #    Obi Strange [phone] [address] [place] [address]        Equal Access to Services     [facility]: Amairani Carlson, waaxda luqadaha, qaybta kaalmasheri christensen, corin blank . [facility] [phone].    ATENCIÓN: Si lauren yates " dumont disposición servicios gratuitos de asistencia lingüística. Haley sheikh [phone].    We comply with applicable federal civil rights laws and [place] laws. We do not discriminate on the basis of race, color, national origin, age, disability, sex, sexual orientation, or gender identity.            Thank you!     Thank you for choosing [facility]  for your care. Our goal is always to provide you with excellent care. Hearing back from our patients is one way we can continue to improve our services. Please take a few minutes to complete the written survey that you may receive in the mail after your visit with us. Thank you!             Your Updated Medication List - Protect others around you: Learn how to safely use, store and throw away your medicines at www.disposemymeds.org.          This list is accurate as of: 12/5/17  8:46 PM.  Always use your most recent med list.                   Brand Name Dispense Instructions for use Diagnosis    ACE NOT PRESCRIBED (INTENTIONAL)     0 each    ACE Inhibitor not prescribed due to Other:  Had cough on ACEI in past    Type 2 diabetes, HbA1c goal < 7% (H)       aspirin  MG EC tablet     30 tablet    Take 1 tablet (325 mg) by mouth daily    Status post total right knee replacement       atorvastatin 40 MG tablet    LIPITOR    90 tablet    Take 1 tablet (40 mg) by mouth At Bedtime    Pre-diabetes       * blood glucose monitoring test strip    ACCU-CHEK LAURA    100 strip    1Use to test blood sugar 2 times daily or as directed.    Glucose intolerance (impaired glucose tolerance)       * blood glucose monitoring test strip    ACCU-CHEK LAURA PLUS    100 each    Use to test blood sugar 1 time daily    Glucose intolerance (impaired glucose tolerance)       CINNAMON PO      Take 1 tablet by mouth daily        fish oil-omega-3 fatty acids 1000 MG capsule      Take 1 g by mouth every evening        losartan 100 MG tablet    COZAAR    30 tablet    Take 1  tablet (100 mg) by mouth daily    Essential hypertension       MULTIVITAMIN ADULT PO      Take 1 tablet by mouth daily Reported on 5/12/2017        order for DME     1 each    Equipment being ordered: Walker Wheels () and Walker () Treatment Diagnosis: Impaired functional mobility    Status post total right knee replacement       tamsulosin 0.4 MG capsule    FLOMAX    90 capsule    Take 1 capsule (0.4 mg) by mouth At Bedtime    Benign prostatic hyperplasia with lower urinary tract symptoms, unspecified morphology       traMADol 50 MG tablet    ULTRAM    40 tablet    Take 1-2 tablets ( mg) by mouth every 6 hours as needed for pain maximum 6 tablet(s) per day    S/P revision of total knee, right       triamcinolone 0.5 % cream    KENALOG    30 g    Apply sparingly to affected area three times daily.    Stasis dermatitis of both legs       * Notice:  This list has 2 medication(s) that are the same as other medications prescribed for you. Read the directions carefully, and ask your doctor or other care provider to review them with you.

## 2017-12-05 NOTE — PROGRESS NOTES
"  SUBJECTIVE:   Damien Vallecillo is a 82 year old male who presents to clinic today for the following health issues:      Status post total right knee replacement  (primary encounter diagnosis)   Chronic pain of right knee: The patient states that his right knee still hurts. He can straighten it out but still cannot quite bend it 90 degrees. Patient says while he is still swollen, it has been improved. He walks 30-40 minutes 4-5 times per week as instructed.     Essential hypertension  BP Readings from Last 6 Encounters:   12/05/17 (!) 158/91   10/30/17 158/80   10/12/17 161/72   09/26/17 166/90   09/15/17 144/80   08/17/17 139/56       Problem list and histories reviewed & adjusted, as indicated.  Additional history: none        Reviewed and updated as needed this visit by clinical staff  Tobacco  Allergies  Meds  Med Hx  Surg Hx  Fam Hx  Soc Hx       Past Medical History:   Diagnosis Date     Acute suppurative arthritis (H)      Acute, but ill-defined, cerebrovascular disease      Arthritis      Chronic headaches      Diabetes (H)     \"Pre-diabetes\"     Glucose intolerance (impaired glucose tolerance) 5/31/2013     Gout, unspecified      Hammer toe of left foot 10/21/2016     Hematuria      History of arthroplasty of right knee 8/17/2017     History of blood transfusion      Left knee pain, unspecified chronicity 5/8/2017     Lentigo maligna (H)      Malignant neoplasm of rectosigmoid junction (H)      Morbid obesity due to excess calories (H) 10/21/2016     Other convulsions     last seizure 7-8 years ago...not certain if these were true seizres or not..     Pre-diabetes 10/21/2016     RBC microcytosis 2/14/2017     Right knee pain, unspecified chronicity 5/8/2017     Syncope      Tubulovillous adenoma of colon      Venous stasis dermatitis of both lower extremities 10/21/2016       Past Surgical History:   Procedure Laterality Date     ARTHROPLASTY KNEE Right 6/19/2017    Procedure: ARTHROPLASTY KNEE;  Right " total knee arthroplasty, Hammer toe repair toe 2,3,4,5 left foot with osteotomy;  Surgeon: Alec Raymond MD;  Location:  OR     C NONSPECIFIC PROCEDURE      right heel surgery; spur removed.     COLONOSCOPY N/A 6/23/2015    Procedure: COMBINED COLONOSCOPY, SINGLE OR MULTIPLE BIOPSY/POLYPECTOMY BY BIOPSY;  Surgeon: Kimberly Alfaro MD;  Location:  GI     COLONOSCOPY N/A 7/30/2015    Procedure: COLONOSCOPY;  Surgeon: Enrique Salazar MD;  Location:  OR     HERNIORRHAPHY EPIGASTRIC  4/27/2012    Procedure:HERNIORRHAPHY EPIGASTRIC; Epigastric Hernia Repair with mesh ; Surgeon:BOLIVAR OLIVEIRA; Location: OR     LAPAROSCOPIC ASSISTED COLECTOMY Right 7/30/2015    Procedure: LAPAROSCOPIC ASSISTED COLECTOMY;  Surgeon: Enrique Salazar MD;  Location:  OR     ORTHOPEDIC SURGERY      lt knee arthroplasty     REALIGN PATELLA Right 10/9/2017    Procedure: REALIGN PATELLA;  Revision right total knee arthroplasty;  Surgeon: Alec Raymond MD;  Location:  OR     REPAIR HAMMER TOE Left 6/19/2017    Procedure: REPAIR HAMMER TOE;  Hammer toe repair toe 2,3,4,5 left foot with osteotomy   ;  Surgeon: Beverly Kim DPM, Podiatry/Foot and Ankle Surgery;  Location:  OR     SIGMOIDOSCOPY FLEXIBLE  5/29/2013    Procedure: SIGMOIDOSCOPY FLEXIBLE;  SIGMOIDOSCOPY FLEXIBLE (FV) Needs blood sugar ck'd;  Surgeon: Enrique Salazar MD;  Location:  GI     skin cancer excision         Family History   Problem Relation Age of Onset     Cancer - colorectal Mother      CEREBROVASCULAR DISEASE Father        Social History   Substance Use Topics     Smoking status: Never Smoker     Smokeless tobacco: Never Used     Alcohol use 0.0 oz/week     0 Standard drinks or equivalent per week      Comment: Occas       Reviewed and updated as needed this visit by Provider         ROS:  Pedal edema, pain in left toes. Skin cancer removed from left ear, left lower leg enetered in record    This document serves as a record of  "the services and decisions personally performed and made by Obi Strange MD. It was created on his behalf by Vannesa Schreiber, a trained medical scribe.  The creation of this document is based on the scribe's personal observations and the provider's statements to the medical scribe.  Vannesa Schreiber, December 5, 2017 2:36 PM    OBJECTIVE:     BP (!) 158/91 (BP Location: Right arm, Patient Position: Chair, Cuff Size: Adult Large)  Pulse 67  Temp 97.9  F (36.6  C) (Oral)  Resp 16  Ht 1.778 m (5' 10\")  Wt 117.5 kg (259 lb)  BMI 37.16 kg/m2  Body mass index is 37.16 kg/(m^2).    Swollen R leg from knee down, ROM as above. No cords      ASSESSMENT/PLAN:     ASSESSMENT / PLAN:  (Z96.651) Status post total right knee replacement  (primary encounter diagnosis)  Comment: Improving discussed at length      (M25.561,  G89.29) Chronic pain of right knee  Comment: Improving  Plan: Drug  Screen Comprehensive, Urine w/o Reported         Meds (Pain Care Package)            (I10) Essential hypertension  Comment:   BP Readings from Last 6 Encounters:   12/05/17 (!) 158/91   10/30/17 158/80   10/12/17 161/72   09/26/17 166/90   09/15/17 144/80   08/17/17 139/56       Plan: losartan (COZAAR) 100 MG tablet              RTC 1-3 mos    The information in this document, created by the medical scribe for me, accurately reflects the services I personally performed and the decisions made by me. I have reviewed and approved this document for accuracy prior to leaving the patient care area.  Obi Strange MD December 5, 2017 2:36 PM    Obi Strange MD  [facility]"

## 2017-12-05 NOTE — NURSING NOTE
"Chief Complaint   Patient presents with     RECHECK     knee surgery       Initial BP (!) 158/91 (BP Location: Right arm, Patient Position: Chair, Cuff Size: Adult Large)  Pulse 67  Temp 97.9  F (36.6  C) (Oral)  Resp 16  Ht 5' 10\" (1.778 m)  Wt 259 lb (117.5 kg)  BMI 37.16 kg/m2 Estimated body mass index is 37.16 kg/(m^2) as calculated from the following:    Height as of this encounter: 5' 10\" (1.778 m).    Weight as of this encounter: 259 lb (117.5 kg).  Medication Reconciliation: complete rt arm Marlene Juarez MA      "

## 2017-12-10 LAB — COMPREHEN DRUG ANALYSIS UR: NORMAL

## 2017-12-14 ENCOUNTER — OFFICE VISIT (OUTPATIENT)
Dept: ORTHOPEDICS | Facility: CLINIC | Age: 82
End: 2017-12-14
Payer: MEDICARE

## 2017-12-14 VITALS
BODY MASS INDEX: 37.08 KG/M2 | HEIGHT: 70 IN | DIASTOLIC BLOOD PRESSURE: 88 MMHG | SYSTOLIC BLOOD PRESSURE: 156 MMHG | WEIGHT: 259 LBS

## 2017-12-14 DIAGNOSIS — Z96.651 S/P REVISION OF TOTAL KNEE, RIGHT: Primary | ICD-10-CM

## 2017-12-14 PROCEDURE — 99024 POSTOP FOLLOW-UP VISIT: CPT | Performed by: ORTHOPAEDIC SURGERY

## 2017-12-14 NOTE — PROGRESS NOTES
"HISTORY OF PRESENT ILLNESS:    Damien Vallecillo is a 82 year old male who is seen in follow up for right knee pain. He is 9.5 weeks status post right patellar re-alignment/TKA revision surgery.  Present symptoms: He reports that the kneecap is out of alignment again. He is here for continued (about the same) knee pain. The pain is intermittent anteromedial knee pain.   Treatments tried to this point: No formal PT done. He is doing some short walking (5 min).     PHYSICAL EXAM:  /88  Ht 5' 10\" (1.778 m)  Wt 259 lb (117.5 kg)  BMI 37.16 kg/m2  Body mass index is 37.16 kg/(m^2).   GENERAL APPEARANCE: healthy, alert and no distress   SKIN: no suspicious lesions or rashes  NEURO: Normal strength and tone, mentation intact and speech normal  VASCULAR:  good pulses, and cappillary refill   LYMPH: no lymphadenopathy   PSYCH:  mentation appears normal and affect normal/bright    MSK:  The patient ambulates without an antalgic gait.  The patient is able to get on and off the exam table without difficulty.        KNEE: Examination of the right knee reveals the lower extremity to have a Normal anatomic alignment.  There is no erythema, ecchymosis nor edema.  There is an unclear effusion present clinically.  There is no significant warmth.  Range of motion is 0 to 100 degrees, without crepitus.  There is no tenderness to palpation at the both medial and lateral joint line.  Delia's is not done. The knee is ligamentously stable. Patello-femoral grind test is not done.      The calves and thighs are symmetric, without atrophy and non-tender to palpation. Navid's sign is negative, bilaterally.   CMS is intact to the toes.       IMAGING INTERPRETATION:       ASSESSMENT / PLAN: Recurrent maltracking of the patellar component. He still has good function of the knee and I offered another attempt at revision. He'll take this into consideration.    Mark Raymond MD  Dept. Orthopedic Surgery  [facility]         "

## 2017-12-14 NOTE — MR AVS SNAPSHOT
After Visit Summary   12/14/2017    Damien Vallecillo    MRN: [id number]           Patient Information     Date Of Birth          1935        Visit Information        Provider Department      12/14/2017 2:20 PM Alec Raymond MD [facility] ORTHOPEDIC SURGERY        Today's Diagnoses     S/P revision of total knee, right    -  1       Follow-ups after your visit        Your next 10 appointments already scheduled     Jan 02, 2018 10:20 AM CST   (Arrive by 10:05 AM)   MOE Extremity with Vern Levy PT   [facility] ([facility])    [address] [facility] [address]  [facility] [address]   [phone]            Jan 04, 2018 10:30 AM CST   MOE Extremity with Vern Levy PT   [facility] ([facility])    [address]  [facility] [address]   [phone]            Jan 08, 2018 10:10 AM CST   MOE Extremity with Vern Levy PT   [facility] ([facility])    [address]  [facility] [address]   [phone]            Jan 18, 2018 11:15 AM CST   SHORT with Obi Strange MD   [facility] ([facility])    [address] [place] [address]   [phone]              Who to contact     If you have questions or need follow up information about today's clinic visit or your schedule please contact [facility] ORTHOPEDIC SURGERY directly at [phone].  Normal or non-critical lab and imaging results will be communicated to you by MyChart, letter or phone within 4 business days after the clinic has received the results. If you do not hear from us within 7 days, please contact the clinic through MyChart or phone. If you have a critical or abnormal lab result, we will notify you by phone as soon as possible.  Submit refill requests  "through [employer] or call your pharmacy and they will forward the refill request to us. Please allow 3 business days for your refill to be completed.          Additional Information About Your Visit        [employer] Information     [employer] lets you send messages to your doctor, view your test results, renew your prescriptions, schedule appointments and more. To sign up, go to [email or web address] . Click on \"Log in\" on the left side of the screen, which will take you to the Welcome page. Then click on \"Sign up Now\" on the right side of the page.     You will be asked to enter the access code listed below, as well as some personal information. Please follow the directions to create your username and password.     Your access code is: [email or web address]  Expires: 2018 12:23 PM     Your access code will  in 90 days. If you need help or a new code, please call your [place] clinic or [phone].        Care EveryWhere ID     This is your Care EveryWhere ID. This could be used by other organizations to access your [place] medical records  [id number]        Your Vitals Were     Height BMI (Body Mass Index)                5' 10\" (1.778 m) 37.16 kg/m2           Blood Pressure from Last 3 Encounters:   17 156/88   17 (!) 158/91   10/30/17 158/80    Weight from Last 3 Encounters:   17 259 lb (117.5 kg)   17 259 lb (117.5 kg)   10/30/17 259 lb (117.5 kg)              Today, you had the following     No orders found for display       Primary Care Provider Office Phone # Fax #    Obi Strange [phone] [address] [facility] [address]        Equal Access to Services     TWIN MART : Hadwilver Carlson, wakrystin godinez, qaybta kaalmasheri christensen, corin davis. So [facility] [phone].    ATENCIÓN: Si habla español, tiene a dumont disposición servicios gratuitos de asistencia lingüística. Haley sheikh [phone].    We comply with " applicable federal civil rights laws and [place] laws. We do not discriminate on the basis of race, color, national origin, age, disability, sex, sexual orientation, or gender identity.            Thank you!     Thank you for choosing [facility] ORTHOPEDIC SURGERY  for your care. Our goal is always to provide you with excellent care. Hearing back from our patients is one way we can continue to improve our services. Please take a few minutes to complete the written survey that you may receive in the mail after your visit with us. Thank you!             Your Updated Medication List - Protect others around you: Learn how to safely use, store and throw away your medicines at www.disposemymeds.org.          This list is accurate as of: 12/14/17 11:59 PM.  Always use your most recent med list.                   Brand Name Dispense Instructions for use Diagnosis    ACE NOT PRESCRIBED (INTENTIONAL)     0 each    ACE Inhibitor not prescribed due to Other:  Had cough on ACEI in past    Type 2 diabetes, HbA1c goal < 7% (H)       aspirin  MG EC tablet     30 tablet    Take 1 tablet (325 mg) by mouth daily    Status post total right knee replacement       atorvastatin 40 MG tablet    LIPITOR    90 tablet    Take 1 tablet (40 mg) by mouth At Bedtime    Pre-diabetes       * blood glucose monitoring test strip    ACCU-CHEK LAURA    100 strip    1Use to test blood sugar 2 times daily or as directed.    Glucose intolerance (impaired glucose tolerance)       * blood glucose monitoring test strip    ACCU-CHEK LAURA PLUS    100 each    Use to test blood sugar 1 time daily    Glucose intolerance (impaired glucose tolerance)       CINNAMON PO      Take 1 tablet by mouth daily        fish oil-omega-3 fatty acids 1000 MG capsule      Take 1 g by mouth every evening        losartan 100 MG tablet    COZAAR    30 tablet    Take 1 tablet (100 mg) by mouth daily    Essential hypertension       MULTIVITAMIN ADULT PO      Take 1  tablet by mouth daily Reported on 5/12/2017        order for DME     1 each    Equipment being ordered: Walker Wheels () and Walker () Treatment Diagnosis: Impaired functional mobility    Status post total right knee replacement       tamsulosin 0.4 MG capsule    FLOMAX    90 capsule    Take 1 capsule (0.4 mg) by mouth At Bedtime    Benign prostatic hyperplasia with lower urinary tract symptoms, unspecified morphology       traMADol 50 MG tablet    ULTRAM    40 tablet    Take 1-2 tablets ( mg) by mouth every 6 hours as needed for pain maximum 6 tablet(s) per day    S/P revision of total knee, right       triamcinolone 0.5 % cream    KENALOG    30 g    Apply sparingly to affected area three times daily.    Stasis dermatitis of both legs       * Notice:  This list has 2 medication(s) that are the same as other medications prescribed for you. Read the directions carefully, and ask your doctor or other care provider to review them with you.

## 2018-01-02 ENCOUNTER — THERAPY VISIT (OUTPATIENT)
Dept: PHYSICAL THERAPY | Facility: CLINIC | Age: 83
End: 2018-01-02
Payer: MEDICARE

## 2018-01-02 DIAGNOSIS — Z96.651 S/P REVISION OF TOTAL KNEE, RIGHT: Primary | ICD-10-CM

## 2018-01-02 DIAGNOSIS — Z96.651 AFTERCARE FOLLOWING RIGHT KNEE JOINT REPLACEMENT SURGERY: ICD-10-CM

## 2018-01-02 DIAGNOSIS — M25.561 ACUTE PAIN OF RIGHT KNEE: ICD-10-CM

## 2018-01-02 DIAGNOSIS — Z47.1 AFTERCARE FOLLOWING RIGHT KNEE JOINT REPLACEMENT SURGERY: ICD-10-CM

## 2018-01-02 PROBLEM — G89.29 CHRONIC PAIN OF RIGHT KNEE: Status: RESOLVED | Noted: 2017-08-24 | Resolved: 2018-01-02

## 2018-01-02 PROCEDURE — G8979 MOBILITY GOAL STATUS: HCPCS | Mod: GP | Performed by: PHYSICAL THERAPIST

## 2018-01-02 PROCEDURE — 97161 PT EVAL LOW COMPLEX 20 MIN: CPT | Mod: GP | Performed by: PHYSICAL THERAPIST

## 2018-01-02 PROCEDURE — G8978 MOBILITY CURRENT STATUS: HCPCS | Mod: GP | Performed by: PHYSICAL THERAPIST

## 2018-01-02 PROCEDURE — 97110 THERAPEUTIC EXERCISES: CPT | Mod: GP | Performed by: PHYSICAL THERAPIST

## 2018-01-02 ASSESSMENT — ACTIVITIES OF DAILY LIVING (ADL)
WEAKNESS: THE SYMPTOM AFFECTS MY ACTIVITY MODERATELY
KNEE_ACTIVITY_OF_DAILY_LIVING_SUM: 12
RAW_SCORE: 12
KNEEL ON THE FRONT OF YOUR KNEE: I AM UNABLE TO DO THE ACTIVITY
KNEE_ACTIVITY_OF_DAILY_LIVING_SCORE: 17.14
GIVING WAY, BUCKLING OR SHIFTING OF KNEE: THE SYMPTOM AFFECTS MY ACTIVITY MODERATELY
SWELLING: THE SYMPTOM PREVENTS ME FROM ALL DAILY ACTIVITIES
WALK: ACTIVITY IS VERY DIFFICULT
LIMPING: THE SYMPTOM AFFECTS MY ACTIVITY SEVERELY
STIFFNESS: THE SYMPTOM AFFECTS MY ACTIVITY SEVERELY
SQUAT: I AM UNABLE TO DO THE ACTIVITY
GO UP STAIRS: ACTIVITY IS VERY DIFFICULT
GO DOWN STAIRS: ACTIVITY IS VERY DIFFICULT
STAND: ACTIVITY IS VERY DIFFICULT
RISE FROM A CHAIR: I AM UNABLE TO DO THE ACTIVITY
PAIN: THE SYMPTOM AFFECTS MY ACTIVITY SEVERELY
SIT WITH YOUR KNEE BENT: ACTIVITY IS VERY DIFFICULT

## 2018-01-02 NOTE — MR AVS SNAPSHOT
After Visit Summary   1/2/2018    Damien Vallecillo    MRN: [id number]           Patient Information     Date Of Birth          1935        Visit Information        Provider Department      1/2/2018 10:20 AM Vern Levy, PT [facility] Physical Therapy        Today's Diagnoses     Acute pain of right knee        Aftercare following right knee joint replacement surgery           Follow-ups after your visit        Your next 10 appointments already scheduled     Jan 04, 2018 10:30 AM CST   MOE Extremity with Vern Levy PT   [facility] Therapy ([facility])    [address]  [facility] [address]   [phone]            Jan 08, 2018 10:10 AM CST   MOE Extremity with Vern Levy PT   [facility] Therapy ([facility])    [address]  [facility] [address]   [phone]            Jan 11, 2018 10:30 AM CST   MOE Extremity with Vern Levy PT   [facility] ([facility])    [address]  [facility] [address]   [phone]            Vincenzo 15, 2018 10:10 AM CST   MOE Extremity with Vern Levy PT   [facility] ([facility])    [address]  [facility] [address]   [phone]            Jan 18, 2018  9:50 AM CST   MOE Extremity with Vern Levy PT   [facility] ([facility])    [address]  [facility] [address]   [phone]            Jan 18, 2018 11:15 AM CST   SHORT [facility] Strange MD   [facility]    [address] [facility] [address]   [phone]              Who to contact     If you have questions or need follow up  "information about today's clinic visit or your schedule please contact [facility] directly at [phone].  Normal or non-critical lab and imaging results will be communicated to you by MyChart, letter or phone within 4 business days after the clinic has received the results. If you do not hear from us within 7 days, please contact the clinic through [employer] or phone. If you have a critical or abnormal lab result, we will notify you by phone as soon as possible.  Submit refill requests through [email or web address] or call your pharmacy and they will forward the refill request to us. Please allow 3 business days for your refill to be completed.          Additional Information About Your Visit        [employer] Information     [email or web address] lets you send messages to your doctor, view your test results, renew your prescriptions, schedule appointments and more. To sign up, go to [email or web address] . Click on \"Log in\" on the left side of the screen, which will take you to the Welcome page. Then click on \"Sign up Now\" on the right side of the page.     You will be asked to enter the access code listed below, as well as some personal information. Please follow the directions to create your username and password.     Your access code is: [email or web address]  Expires: 2018 12:23 PM     Your access code will  in 90 days. If you need help or a new code, please call your [place] clinic or [phone].        Care EveryWhere ID     This is your Care EveryWhere ID. This could be used by other organizations to access your [place] medical records  [id number]         Blood Pressure from Last 3 Encounters:   17 156/88   17 (!) 158/91   10/30/17 158/80    Weight from Last 3 Encounters:   17 117.5 kg (259 lb)   17 117.5 kg (259 lb)   10/30/17 117.5 kg (259 lb)              We Performed the Following     HC PT EVAL, LOW COMPLEXITY     MOE INITIAL EVAL REPORT     " THERAPEUTIC EXERCISES        Primary Care Provider Office Phone # Fax #    Obi Strange [phone] [address]  [facility] [address]        Equal Access to Services     ZENON RUBIOGUICHO : Hadii benjamin ku romuloo Socorrieali, waaxda luqadaha, qaybta kaalmada adeoscar, corin murraynelson ryan. So [facility] [phone].    ATENCIÓN: Si habla español, tiene a dumont disposición servicios gratuitos de asistencia lingüística. Llame al [phone].    We comply with applicable federal civil rights laws and [place] laws. We do not discriminate on the basis of race, color, national origin, age, disability, sex, sexual orientation, or gender identity.            Thank you!     Thank you for choosing [facility] PHYSICAL THERAP[facility]  for your care. Our goal is always to provide you with excellent care. Hearing back from our patients is one way we can continue to improve our services. Please take a few minutes to complete the written survey that you may receive in the mail after your visit with us. Thank you!             Your Updated Medication List - Protect others around you: Learn how to safely use, store and throw away your medicines at www.disposemymeds.org.          This list is accurate as of: 1/2/18 12:18 PM.  Always use your most recent med list.                   Brand Name Dispense Instructions for use Diagnosis    ACE NOT PRESCRIBED (INTENTIONAL)     0 each    ACE Inhibitor not prescribed due to Other:  Had cough on ACEI in past    Type 2 diabetes, HbA1c goal < 7% (H)       aspirin  MG EC tablet     30 tablet    Take 1 tablet (325 mg) by mouth daily    Status post total right knee replacement       atorvastatin 40 MG tablet    LIPITOR    90 tablet    Take 1 tablet (40 mg) by mouth At Bedtime    Pre-diabetes       * blood glucose monitoring test strip    ACCU-CHEK LAURA    100 strip    1Use to test blood sugar 2 times daily or as directed.     Glucose intolerance (impaired glucose tolerance)       * blood glucose monitoring test strip    ACCU-CHEK LAURA PLUS    100 each    Use to test blood sugar 1 time daily    Glucose intolerance (impaired glucose tolerance)       CINNAMON PO      Take 1 tablet by mouth daily        fish oil-omega-3 fatty acids 1000 MG capsule      Take 1 g by mouth every evening        losartan 100 MG tablet    COZAAR    30 tablet    Take 1 tablet (100 mg) by mouth daily    Essential hypertension       MULTIVITAMIN ADULT PO      Take 1 tablet by mouth daily Reported on 5/12/2017        order for DME     1 each    Equipment being ordered: Walker Wheels () and Walker () Treatment Diagnosis: Impaired functional mobility    Status post total right knee replacement       tamsulosin 0.4 MG capsule    FLOMAX    90 capsule    Take 1 capsule (0.4 mg) by mouth At Bedtime    Benign prostatic hyperplasia with lower urinary tract symptoms, unspecified morphology       traMADol 50 MG tablet    ULTRAM    40 tablet    Take 1-2 tablets ( mg) by mouth every 6 hours as needed for pain maximum 6 tablet(s) per day    S/P revision of total knee, right       triamcinolone 0.5 % cream    KENALOG    30 g    Apply sparingly to affected area three times daily.    Stasis dermatitis of both legs       * Notice:  This list has 2 medication(s) that are the same as other medications prescribed for you. Read the directions carefully, and ask your doctor or other care provider to review them with you.

## 2018-01-02 NOTE — LETTER
DEPARTMENT OF HEALTH AND HUMAN SERVICES  CENTERS FOR MEDICARE & MEDICAID SERVICES    PLAN/UPDATED PLAN OF PROGRESS FOR OUTPATIENT REHABILITATION    PATIENTS NAME:  Damien Vallecillo   : 1935  PROVIDER NUMBER:    [id number]  [id number]  PROVIDER NAME: [facility]  MEDICAL RECORD NUMBER: [id number]   START OF CARE DATE:  18   TYPE:  PT  PRIMARY/TREATMENT DIAGNOSIS:  Acute pain of right knee; Aftercare following right knee joint replacement surgery  VISITS FROM START OF CARE:  Rxs Used: 1     [place] [facility] Initial Evaluation  Subjective:  Patient is a 82 year old male presenting with rehab left knee hpi. The history is provided by the patient. No  was used.   Damien Vallecillo is a 82 year old male with a right knee condition.  Condition occurred with:  Degenerative joint disease and insidious onset.  Condition occurred: for unknown reasons.  This is a recurrent and new condition  PT returns to therapy after having 2 knee surgeries since having TKA in 2017.  Most recent right knee surgery was to correct patellar arthroplasty component on 10/9/17.  Patient reports pain:  Anterior and lateral.  Radiates to:  Knee.  Pain is described as aching and is intermittent and reported as 6/10.  Associated symptoms:  Loss of strength and loss of motion/stiffness. Pain is worse during the day.  Symptoms are exacerbated by activity, weight bearing, standing, walking and ascending stairs and relieved by rest.  Since onset symptoms are unchanged.  Previous treatment includes surgery and physical therapy.    General health as reported by patient is good.  Pertinent medical history includes:  None.  Medical allergies: no.  Other surgeries include:  Orthopedic surgery and cancer surgery (knee, and skin cancer).  Current medications:  None as reported by the patient.  Current occupation is Retired.  Barriers include:  None as  reported by the patient.  Red flags:  None as reported by the patient.                Objective:    Gait:  Gait Type:  Antalgic   Weight Bearing Status:  WBAT   Assistive Devices:  Crutches      Knee Evaluation:  ROM:    PROM  Hyperextension: Left:   Right:  0  Extension: Left:   Right:  2  Flexion: Left:   Right:  87  Palpation:  Normal    Assessment/Plan:    Patient is a 82 year old male with right side knee complaints.    Patient has the following significant findings with corresponding treatment plan.                Diagnosis 1:  S/p R TKA revsion  Pain -  hot/cold therapy, self management, education, directional preference exercise and home program  Decreased ROM/flexibility - manual therapy and therapeutic exercise  Decreased joint mobility - manual therapy and therapeutic exercise  Decreased strength - therapeutic exercise and therapeutic activities  Impaired balance - neuro re-education and therapeutic activities  Decreased proprioception - neuro re-education and therapeutic activities  PATIENTS NAME:  Damien Vallecillo   : 1935      Inflammation - cold therapy  Impaired gait - gait training  Impaired muscle performance - neuro re-education  Decreased function - therapeutic activities    Therapy Evaluation Codes:   1) History comprised of:   Personal factors that impact the plan of care:      None.    Comorbidity factors that impact the plan of care are:      Cancer.     Medications impacting care: None.  2) Examination of Body Systems comprised of:   Body structures and functions that impact the plan of care:      Knee.   Activity limitations that impact the plan of care are:      Bathing, Bending, Dressing, Lifting, Sitting, Squatting/kneeling, Stairs, Standing and Walking.  3) Clinical presentation characteristics are:   Stable/Uncomplicated.  4) Decision-Making    Low complexity using standardized patient assessment instrument and/or measureable assessment of functional outcome.  Cumulative Therapy  "Evaluation is: Low complexity.  Previous and current functional limitations:  (See Goal Flow Sheet for this information)    Short term and Long term goals: (See Goal Flow Sheet for this information)   Communication ability:  Patient appears to be able to clearly communicate and understand verbal and written communication and follow directions correctly.  Treatment Explanation - The following has been discussed with the patient:   RX ordered/plan of care. This patient would benefit from PT intervention to resume normal activities.  Rehab potential is good.    Frequency:  2 X week, once daily  Duration:  for 5 weeks  Discharge Plan:  Achieve all LTG. Independent in home treatment program.  Reach maximal therapeutic benefit.    Caregiver Signature/Credentials _____________________________ Date ________       Treating Provider: Vern Levy, PT    I have reviewed and certified the need for these services and plan of treatment while under my care.      PHYSICIAN'S SIGNATURE:   _________________________________________  Date___________   Alec France MD    Certification period:  Beginning of Cert date period: 01/02/18 to  End of Cert period date: 04/01/18     Functional Level Progress Report: Please see attached \"Goal Flow sheet for Functional level.\"    ____X____ Continue Services or       ________ DC Services                Service dates: From  SOC Date: 01/02/18 date to present                       "

## 2018-01-02 NOTE — PROGRESS NOTES
[facility] Initial Evaluation  Subjective:  Patient is a 82 year old male presenting with rehab left knee hpi. The history is provided by the patient. No  was used.   Damien Vallecillo is a 82 year old male with a right knee condition.  Condition occurred with:  Degenerative joint disease and insidious onset.  Condition occurred: for unknown reasons.  This is a recurrent and new condition  PT returns to therapy after having 2 knee surgeries since having TKA in June of 2017.  Most recent right knee surgery was to correct patellar arthroplasty component on 10/9/17..    Patient reports pain:  Anterior and lateral.  Radiates to:  Knee.  Pain is described as aching and is intermittent and reported as 6/10.  Associated symptoms:  Loss of strength and loss of motion/stiffness. Pain is worse during the day.  Symptoms are exacerbated by activity, weight bearing, standing, walking and ascending stairs and relieved by rest.  Since onset symptoms are unchanged.    Previous treatment includes surgery and physical therapy.    General health as reported by patient is good.  Pertinent medical history includes:  None.  Medical allergies: no.  Other surgeries include:  Orthopedic surgery and cancer surgery (knee, and skin cancer).  Current medications:  None as reported by the patient.  Current occupation is Retired  .        Barriers include:  None as reported by the patient.    Red flags:  None as reported by the patient.                        Objective:    Gait:    Gait Type:  Antalgic   Weight Bearing Status:  WBAT   Assistive Devices:  Crutches                                                        Knee Evaluation:  ROM:      PROM    Hyperextension: Left:   Right:  0  Extension: Left:   Right:  2  Flexion: Left:   Right:  87            Palpation:  Normal                General     ROS    Assessment/Plan:    Patient is a 82 year old male with right side knee complaints.    Patient has the  following significant findings with corresponding treatment plan.                Diagnosis 1:  S/p R TKA revsion  Pain -  hot/cold therapy, self management, education, directional preference exercise and home program  Decreased ROM/flexibility - manual therapy and therapeutic exercise  Decreased joint mobility - manual therapy and therapeutic exercise  Decreased strength - therapeutic exercise and therapeutic activities  Impaired balance - neuro re-education and therapeutic activities  Decreased proprioception - neuro re-education and therapeutic activities  Inflammation - cold therapy  Impaired gait - gait training  Impaired muscle performance - neuro re-education  Decreased function - therapeutic activities    Therapy Evaluation Codes:   1) History comprised of:   Personal factors that impact the plan of care:      None.    Comorbidity factors that impact the plan of care are:      Cancer.     Medications impacting care: None.  2) Examination of Body Systems comprised of:   Body structures and functions that impact the plan of care:      Knee.   Activity limitations that impact the plan of care are:      Bathing, Bending, Dressing, Lifting, Sitting, Squatting/kneeling, Stairs, Standing and Walking.  3) Clinical presentation characteristics are:   Stable/Uncomplicated.  4) Decision-Making    Low complexity using standardized patient assessment instrument and/or measureable assessment of functional outcome.  Cumulative Therapy Evaluation is: Low complexity.    Previous and current functional limitations:  (See Goal Flow Sheet for this information)    Short term and Long term goals: (See Goal Flow Sheet for this information)     Communication ability:  Patient appears to be able to clearly communicate and understand verbal and written communication and follow directions correctly.  Treatment Explanation - The following has been discussed with the patient:   RX ordered/plan of care  Anticipated outcomes  Possible  risks and side effects  This patient would benefit from PT intervention to resume normal activities.   Rehab potential is good.    Frequency:  2 X week, once daily  Duration:  for 5 weeks  Discharge Plan:  Achieve all LTG.  Independent in home treatment program.  Reach maximal therapeutic benefit.    Please refer to the daily flowsheet for treatment today, total treatment time and time spent performing 1:1 timed codes.

## 2018-01-04 ENCOUNTER — THERAPY VISIT (OUTPATIENT)
Dept: PHYSICAL THERAPY | Facility: CLINIC | Age: 83
End: 2018-01-04
Payer: MEDICARE

## 2018-01-04 DIAGNOSIS — Z96.651 AFTERCARE FOLLOWING RIGHT KNEE JOINT REPLACEMENT SURGERY: ICD-10-CM

## 2018-01-04 DIAGNOSIS — M25.561 ACUTE PAIN OF RIGHT KNEE: ICD-10-CM

## 2018-01-04 DIAGNOSIS — Z47.1 AFTERCARE FOLLOWING RIGHT KNEE JOINT REPLACEMENT SURGERY: ICD-10-CM

## 2018-01-04 PROCEDURE — 97110 THERAPEUTIC EXERCISES: CPT | Mod: GP | Performed by: PHYSICAL THERAPIST

## 2018-01-04 PROCEDURE — 97112 NEUROMUSCULAR REEDUCATION: CPT | Mod: GP | Performed by: PHYSICAL THERAPIST

## 2018-01-08 ENCOUNTER — THERAPY VISIT (OUTPATIENT)
Dept: PHYSICAL THERAPY | Facility: CLINIC | Age: 83
End: 2018-01-08
Payer: MEDICARE

## 2018-01-08 DIAGNOSIS — Z96.651 AFTERCARE FOLLOWING RIGHT KNEE JOINT REPLACEMENT SURGERY: ICD-10-CM

## 2018-01-08 DIAGNOSIS — Z47.1 AFTERCARE FOLLOWING RIGHT KNEE JOINT REPLACEMENT SURGERY: ICD-10-CM

## 2018-01-08 DIAGNOSIS — M25.561 ACUTE PAIN OF RIGHT KNEE: ICD-10-CM

## 2018-01-08 PROCEDURE — 97112 NEUROMUSCULAR REEDUCATION: CPT | Mod: GP | Performed by: PHYSICAL THERAPIST

## 2018-01-08 PROCEDURE — 97110 THERAPEUTIC EXERCISES: CPT | Mod: GP | Performed by: PHYSICAL THERAPIST

## 2018-01-11 ENCOUNTER — THERAPY VISIT (OUTPATIENT)
Dept: PHYSICAL THERAPY | Facility: CLINIC | Age: 83
End: 2018-01-11
Payer: MEDICARE

## 2018-01-11 DIAGNOSIS — Z47.1 AFTERCARE FOLLOWING RIGHT KNEE JOINT REPLACEMENT SURGERY: ICD-10-CM

## 2018-01-11 DIAGNOSIS — Z96.651 AFTERCARE FOLLOWING RIGHT KNEE JOINT REPLACEMENT SURGERY: ICD-10-CM

## 2018-01-11 DIAGNOSIS — M25.561 ACUTE PAIN OF RIGHT KNEE: ICD-10-CM

## 2018-01-11 PROCEDURE — 97110 THERAPEUTIC EXERCISES: CPT | Mod: GP | Performed by: PHYSICAL THERAPIST

## 2018-01-11 PROCEDURE — 97112 NEUROMUSCULAR REEDUCATION: CPT | Mod: GP | Performed by: PHYSICAL THERAPIST

## 2018-01-11 NOTE — MR AVS SNAPSHOT
After Visit Summary   1/11/2018    Damien Vallecillo    MRN: [id number]           Patient Information     Date Of Birth          1935        Visit Information        Provider Department      1/11/2018 10:30 AM Vern Levy, PT [facility] Physical Therapy        Today's Diagnoses     Acute pain of right knee        Aftercare following right knee joint replacement surgery           Follow-ups after your visit        Your next 10 appointments already scheduled     Vincenzo 15, 2018 10:10 AM CST   MOE Extremity with Vern Levy PT   [facility] ([facility])    [address] [place] [id number]   [phone]            Jan 18, 2018  9:50 AM CST   MOE Extremity with Vern Levy PT   [facility] ([facility])    [address] [place] [id number] [phone]            Jan 18, 2018 11:15 AM CST   SHORT with bOi Strange MD   [facility] ([facility])    [address] [place] [id number] [phone]              Who to contact     If you have questions or need follow up information about today's clinic visit or your schedule please contact [facility] PHYSICAL THERAPY directly at [phone].  Normal or non-critical lab and imaging results will be communicated to you by MyChart, letter or phone within 4 business days after the clinic has received the results. If you do not hear from us within 7 days, please contact the clinic through MyChart or phone. If you have a critical or abnormal lab result, we will notify you by phone as soon as possible.  Submit refill requests through [employer] or call your pharmacy and they will forward the refill request to us. Please allow 3 business days for your refill to be completed.        "   Additional Information About Your Visit        MyChart Information     [employer] lets you send messages to your doctor, view your test results, renew your prescriptions, schedule appointments and more. To sign up, go to [email or web address] . Click on \"Log in\" on the left side of the screen, which will take you to the Welcome page. Then click on \"Sign up Now\" on the right side of the page.     You will be asked to enter the access code listed below, as well as some personal information. Please follow the directions to create your username and password.     Your access code is: [email or web address]  Expires: 2018 12:23 PM     Your access code will  in 90 days. If you need help or a new code, please call your [facility] clinic or [phone].        Care EveryWhere ID     This is your Care EveryWhere ID. This could be used by other organizations to access your [facility] medical records  [id number]         Blood Pressure from Last 3 Encounters:   17 156/88   17 (!) 158/91   10/30/17 158/80    Weight from Last 3 Encounters:   17 117.5 kg (259 lb)   17 117.5 kg (259 lb)   10/30/17 117.5 kg (259 lb)              We Performed the Following     HOT OR COLD PACKS THERAPY     NEUROMUSCULAR RE-EDUCATION     THERAPEUTIC EXERCISES        Primary Care Provider Office Phone # Fax #    Obi Strange [phone] [address] [facility] [address]        Equal Access to Services     [facility] : Hadii benjamin mccormick hadasho Soomaali, waaxda luqadaha, qaybta kaalmada adeegyasheri, corin davis. So [facility] [phone].    ATENCIÓN: Si habla español, tiene a dumont disposición servicios gratuitos de asistencia lingüística. Llame al [phone].    We comply with applicable federal civil rights laws and [place] laws. We do not discriminate on the basis of race, color, national origin, age, disability, sex, sexual orientation, or gender identity.            Thank " you!     Thank you for choosing [facility]  for your care. Our goal is always to provide you with excellent care. Hearing back from our patients is one way we can continue to improve our services. Please take a few minutes to complete the written survey that you may receive in the mail after your visit with us. Thank you!             Your Updated Medication List - Protect others around you: Learn how to safely use, store and throw away your medicines at www.disposemymeds.org.          This list is accurate as of: 1/11/18 11:54 AM.  Always use your most recent med list.                   Brand Name Dispense Instructions for use Diagnosis    ACE NOT PRESCRIBED (INTENTIONAL)     0 each    ACE Inhibitor not prescribed due to Other:  Had cough on ACEI in past    Type 2 diabetes, HbA1c goal < 7% (H)       aspirin  MG EC tablet     30 tablet    Take 1 tablet (325 mg) by mouth daily    Status post total right knee replacement       atorvastatin 40 MG tablet    LIPITOR    90 tablet    Take 1 tablet (40 mg) by mouth At Bedtime    Pre-diabetes       * blood glucose monitoring test strip    ACCU-CHEK LAURA    100 strip    1Use to test blood sugar 2 times daily or as directed.    Glucose intolerance (impaired glucose tolerance)       * blood glucose monitoring test strip    ACCU-CHEK LAURA PLUS    100 each    Use to test blood sugar 1 time daily    Glucose intolerance (impaired glucose tolerance)       CINNAMON PO      Take 1 tablet by mouth daily        fish oil-omega-3 fatty acids 1000 MG capsule      Take 1 g by mouth every evening        losartan 100 MG tablet    COZAAR    30 tablet    Take 1 tablet (100 mg) by mouth daily    Essential hypertension       MULTIVITAMIN ADULT PO      Take 1 tablet by mouth daily Reported on 5/12/2017        order for DME     1 each    Equipment being ordered: Walker Wheels () and Walker () Treatment Diagnosis: Impaired  functional mobility    Status post total right knee replacement       tamsulosin 0.4 MG capsule    FLOMAX    90 capsule    Take 1 capsule (0.4 mg) by mouth At Bedtime    Benign prostatic hyperplasia with lower urinary tract symptoms, unspecified morphology       traMADol 50 MG tablet    ULTRAM    40 tablet    Take 1-2 tablets ( mg) by mouth every 6 hours as needed for pain maximum 6 tablet(s) per day    S/P revision of total knee, right       triamcinolone 0.5 % cream    KENALOG    30 g    Apply sparingly to affected area three times daily.    Stasis dermatitis of both legs       * Notice:  This list has 2 medication(s) that are the same as other medications prescribed for you. Read the directions carefully, and ask your doctor or other care provider to review them with you.

## 2018-01-18 ENCOUNTER — OFFICE VISIT (OUTPATIENT)
Dept: FAMILY MEDICINE | Facility: CLINIC | Age: 83
End: 2018-01-18
Payer: MEDICARE

## 2018-01-18 ENCOUNTER — TELEPHONE (OUTPATIENT)
Dept: OTHER | Facility: CLINIC | Age: 83
End: 2018-01-18

## 2018-01-18 ENCOUNTER — CARE COORDINATION (OUTPATIENT)
Dept: CARE COORDINATION | Facility: CLINIC | Age: 83
End: 2018-01-18

## 2018-01-18 VITALS
HEIGHT: 70 IN | WEIGHT: 259 LBS | RESPIRATION RATE: 15 BRPM | TEMPERATURE: 97.2 F | BODY MASS INDEX: 37.08 KG/M2 | HEART RATE: 69 BPM | SYSTOLIC BLOOD PRESSURE: 130 MMHG | OXYGEN SATURATION: 99 % | DIASTOLIC BLOOD PRESSURE: 70 MMHG

## 2018-01-18 DIAGNOSIS — R60.0 PEDAL EDEMA: Primary | ICD-10-CM

## 2018-01-18 PROCEDURE — 99214 OFFICE O/P EST MOD 30 MIN: CPT | Performed by: FAMILY MEDICINE

## 2018-01-18 RX ORDER — FUROSEMIDE 20 MG
20 TABLET ORAL 2 TIMES DAILY
Qty: 60 TABLET | Refills: 1 | Status: SHIPPED | OUTPATIENT
Start: 2018-01-18 | End: 2018-03-12

## 2018-01-18 NOTE — NURSING NOTE
"Chief Complaint   Patient presents with     Swelling     feet are swelling       Initial /70 (BP Location: Right arm, Patient Position: Chair, Cuff Size: Adult Large)  Pulse 69  Temp 97.2  F (36.2  C) (Oral)  Resp 15  SpO2 99% Estimated body mass index is 37.16 kg/(m^2) as calculated from the following:    Height as of 12/14/17: 5' 10\" (1.778 m).    Weight as of 12/14/17: 259 lb (117.5 kg).  Medication Reconciliation: complete   SMA Yadira    "

## 2018-01-18 NOTE — PROGRESS NOTES
Clinic Care Coordination Contact    Situation: Patient chart reviewed by RN care coordinator.    Background: PEDAL EDEMA / KNEE PAIN     Assessment: per chart review patient in to see pcp today     Plan/Recommendations: [facility] will f/u with patient in 1 week     Flakita Mast Care Coordinator RN  [facility]  [phone]  January 18, 2018

## 2018-01-18 NOTE — PROGRESS NOTES
"  SUBJECTIVE:   Damien Vallecillo is a 82 year old male who presents to clinic today for the following health issues:    Concern - swelling in R knee, swelling in both feet  Onset: Many years    Description:   Pain depends on amount of swelling, lack of movement. Warm to the touch upon swelling    Intensity: severe when symptoms persist, mild with rest    Progression of Symptoms:  same, intermittent and waxing and waning    Accompanying Signs & Symptoms:   Pain in knee, no other symptoms    Previous history of similar problem:   Surgery on R Knee x 2    Precipitating factors:   Worsened by: On feet for too long, will swell fast    Alleviating factors:  Improved by: None    Therapies Tried and outcome: Ice provides relief as he's using it.     Patient has tried knee high compression stockings but says they didn't help, [facility]. evaluation indicates no cardiac source, no renal source.      Problem list and histories reviewed & adjusted, as indicated.  Additional history: as documented        Reviewed and updated as needed this visit by clinical staffAllergies        Past Medical History:   Diagnosis Date     Acute suppurative arthritis (H)      Acute, but ill-defined, cerebrovascular disease      Arthritis      Chronic headaches      Diabetes (H)     \"Pre-diabetes\"     Glucose intolerance (impaired glucose tolerance) 5/31/2013     Gout, unspecified      Hammer toe of left foot 10/21/2016     Hematuria      History of arthroplasty of right knee 8/17/2017     History of blood transfusion      Left knee pain, unspecified chronicity 5/8/2017     Lentigo maligna (H)      Malignant neoplasm of rectosigmoid junction (H)      Morbid obesity due to excess calories (H) 10/21/2016     Other convulsions     last seizure 7-8 years ago...not certain if these were true seizres or not..     Pre-diabetes 10/21/2016     RBC microcytosis 2/14/2017     Right knee pain, unspecified chronicity 5/8/2017     Syncope      " Tubulovillous adenoma of colon      Venous stasis dermatitis of both lower extremities 10/21/2016       Past Surgical History:   Procedure Laterality Date     ARTHROPLASTY KNEE Right 6/19/2017    Procedure: ARTHROPLASTY KNEE;  Right total knee arthroplasty, Hammer toe repair toe 2,3,4,5 left foot with osteotomy;  Surgeon: Alec Raymond MD;  Location:  OR     C NONSPECIFIC PROCEDURE      right heel surgery; spur removed.     COLONOSCOPY N/A 6/23/2015    Procedure: COMBINED COLONOSCOPY, SINGLE OR MULTIPLE BIOPSY/POLYPECTOMY BY BIOPSY;  Surgeon: Kimberly Alfaro MD;  Location:  GI     COLONOSCOPY N/A 7/30/2015    Procedure: COLONOSCOPY;  Surgeon: Enrique Salazar MD;  Location:  OR     HERNIORRHAPHY EPIGASTRIC  4/27/2012    Procedure:HERNIORRHAPHY EPIGASTRIC; Epigastric Hernia Repair with mesh ; Surgeon:BOLIVAR OLIVEIRA; Location: OR     LAPAROSCOPIC ASSISTED COLECTOMY Right 7/30/2015    Procedure: LAPAROSCOPIC ASSISTED COLECTOMY;  Surgeon: Enrique Salazar MD;  Location:  OR     ORTHOPEDIC SURGERY      lt knee arthroplasty     REALIGN PATELLA Right 10/9/2017    Procedure: REALIGN PATELLA;  Revision right total knee arthroplasty;  Surgeon: Alec Raymond MD;  Location:  OR     REPAIR HAMMER TOE Left 6/19/2017    Procedure: REPAIR HAMMER TOE;  Hammer toe repair toe 2,3,4,5 left foot with osteotomy   ;  Surgeon: Beverly Kim DPM, Podiatry/Foot and Ankle Surgery;  Location:  OR     SIGMOIDOSCOPY FLEXIBLE  5/29/2013    Procedure: SIGMOIDOSCOPY FLEXIBLE;  SIGMOIDOSCOPY FLEXIBLE (FV) Needs blood sugar ck'd;  Surgeon: Enrique Salazar MD;  Location:  GI     skin cancer excision         Family History   Problem Relation Age of Onset     Cancer - colorectal Mother      CEREBROVASCULAR DISEASE Father        Social History   Substance Use Topics     Smoking status: Never Smoker     Smokeless tobacco: Never Used     Alcohol use 0.0 oz/week     0 Standard drinks or equivalent per week     "  Comment: Occas       Reviewed and updated as needed this visit by Provider         ROS: No chest pain no fever    This document serves as a record of the services and decisions personally performed and made by Obi Strange MD. It was created on his behalf by Vannesa Schreiber, a trained medical scribe.  The creation of this document is based on the scribe's personal observations and the provider's statements to the medical scribe.  Vannesa Schreiber, January 18, 2018 11:36 AM    OBJECTIVE:     /70 (BP Location: Right arm, Patient Position: Chair, Cuff Size: Adult Large)  Pulse 69  Temp 97.2  F (36.2  C) (Oral)  Resp 15  Ht 1.778 m (5' 10\")  Wt 117.5 kg (259 lb)  SpO2 99%  BMI 37.16 kg/m2  Body mass index is 37.16 kg/(m^2).    CV:2+  Pedal edema bilaterally no skin breakdown      ASSESSMENT/PLAN:   ASSESSMENT / PLAN:  (R60.0) Pedal edema  (primary encounter diagnosis)  Comment venous insufficiency versus lymphedema  Plan: furosemide (LASIX) 20 MG tablet, VASCULAR         SURGERY REFERRAL recommended support hose           RTC 1-2 months     The information in this document, created by the medical scribe for me, accurately reflects the services I personally performed and the decisions made by me. I have reviewed and approved this document for accuracy prior to leaving the patient care area.  Obi Strange MD January 18, 2018 11:36 AM    Obi Strange MD  [facility]"

## 2018-01-18 NOTE — MR AVS SNAPSHOT
After Visit Summary   1/18/2018    Damien Vallecillo    MRN: [id number]           Patient Information     Date Of Birth          1935        Visit Information        Provider Department      1/18/2018 11:15 AM Obi Strange MD [facility]        Today's Diagnoses     Pedal edema    -  1      Care Instructions    1-2 months          Follow-ups after your visit        Additional Services     VASCULAR SURGERY REFERRAL       Your provider has referred you to: **Vascular  Services [phone] - Vascular - Vascular Medicine &    [email or web address]    Please be aware that coverage of these services is subject to the terms and limitations of your health insurance plan.  Call member services at your health plan with any benefit or coverage questions.      Please bring the following with you to your appointment:    (1) Any X-Rays, CTs or MRIs which have been performed.  Contact the facility where they were done to arrange for  prior to your scheduled appointment.    (2) List of current medications   (3) This referral request   (4) Any documents/labs given to you for this referral                  Who to contact     If you have questions or need follow up information about today's clinic visit or your schedule please contact [facility] directly at [phone].  Normal or non-critical lab and imaging results will be communicated to you by MyChart, letter or phone within 4 business days after the clinic has received the results. If you do not hear from us within 7 days, please contact the clinic through MyChart or phone. If you have a critical or abnormal lab result, we will notify you by phone as soon as possible.  Submit refill requests through [employer] or call your pharmacy and they will forward the refill request to us. Please allow 3 business days for your refill to be completed.          Additional Information About Your  "Visit        U For Life Information     [employer] lets you send messages to your doctor, view your test results, renew your prescriptions, schedule appointments and more. To sign up, go to [email or web address] . Click on \"Log in\" on the left side of the screen, which will take you to the Welcome page. Then click on \"Sign up Now\" on the right side of the page.     You will be asked to enter the access code listed below, as well as some personal information. Please follow the directions to create your username and password.     Your access code is: [email or web address]  Expires: 2018 12:23 PM     Your access code will  in 90 days. If you need help or a new code, please call your [place] clinic or [phone].        Care EveryWhere ID     This is your Care EveryWhere ID. This could be used by other organizations to access your [place] medical records  [id number]        Your Vitals Were     Pulse Temperature Respirations Height Pulse Oximetry BMI (Body Mass Index)    69 97.2  F (36.2  C) (Oral) 15 5' 10\" (1.778 m) 99% 37.16 kg/m2       Blood Pressure from Last 3 Encounters:   18 130/70   17 156/88   17 (!) 158/91    Weight from Last 3 Encounters:   18 259 lb (117.5 kg)   17 259 lb (117.5 kg)   17 259 lb (117.5 kg)              We Performed the Following     VASCULAR SURGERY REFERRAL          Today's Medication Changes          These changes are accurate as of: 18 11:55 AM.  If you have any questions, ask your nurse or doctor.               Start taking these medicines.        Dose/Directions    furosemide 20 MG tablet   Commonly known as:  LASIX   Used for:  Pedal edema   Started by:  Obi Strange MD        Dose:  20 mg   Take 1 tablet (20 mg) by mouth 2 times daily   Quantity:  60 tablet   Refills:  1            Where to get your medicines      These medications were sent to [facility] [place], [place] - [address]  [id number] [place] [address] [facility] " [facility] [address]     Phone:  [phone]     furosemide 20 MG tablet                Primary Care Provider Office Phone # Fax #    Obi Strange [phone] [address]  [facility] [address]        Equal Access to Services     JAZMINETWIN BARRON AH: Hadii aad ku hadasho Soomaali, waaxda luqadaha, qaybta kaalmada adeegyada, waxnito idiin hayviridianan adehermilo ray lajohnnelson ryan. So [facility] [phone].    ATENCIÓN: Si habla español, tiene a dumont disposición servicios gratuitos de asistencia lingüística. Llame al [phone].    We comply with applicable federal civil rights laws and [place] laws. We do not discriminate on the basis of race, color, national origin, age, disability, sex, sexual orientation, or gender identity.            Thank you!     Thank you for choosing [facility]  for your care. Our goal is always to provide you with excellent care. Hearing back from our patients is one way we can continue to improve our services. Please take a few minutes to complete the written survey that you may receive in the mail after your visit with us. Thank you!             Your Updated Medication List - Protect others around you: Learn how to safely use, store and throw away your medicines at www.disposemymeds.org.          This list is accurate as of: 1/18/18 11:55 AM.  Always use your most recent med list.                   Brand Name Dispense Instructions for use Diagnosis    ACE NOT PRESCRIBED (INTENTIONAL)     0 each    ACE Inhibitor not prescribed due to Other:  Had cough on ACEI in past    Type 2 diabetes, HbA1c goal < 7% (H)       aspirin  MG EC tablet     30 tablet    Take 1 tablet (325 mg) by mouth daily    Status post total right knee replacement       atorvastatin 40 MG tablet    LIPITOR    90 tablet    Take 1 tablet (40 mg) by mouth At Bedtime    Pre-diabetes       * blood glucose monitoring test strip    ACCU-CHEK LAURA    100 strip    1Use to test blood sugar 2 times daily  or as directed.    Glucose intolerance (impaired glucose tolerance)       * blood glucose monitoring test strip    ACCU-CHEK LAURA PLUS    100 each    Use to test blood sugar 1 time daily    Glucose intolerance (impaired glucose tolerance)       CINNAMON PO      Take 1 tablet by mouth daily        fish oil-omega-3 fatty acids 1000 MG capsule      Take 1 g by mouth every evening        furosemide 20 MG tablet    LASIX    60 tablet    Take 1 tablet (20 mg) by mouth 2 times daily    Pedal edema       losartan 100 MG tablet    COZAAR    30 tablet    Take 1 tablet (100 mg) by mouth daily    Essential hypertension       MULTIVITAMIN ADULT PO      Take 1 tablet by mouth daily Reported on 5/12/2017        order for DME     1 each    Equipment being ordered: Walker Wheels () and Walker () Treatment Diagnosis: Impaired functional mobility    Status post total right knee replacement       tamsulosin 0.4 MG capsule    FLOMAX    90 capsule    Take 1 capsule (0.4 mg) by mouth At Bedtime    Benign prostatic hyperplasia with lower urinary tract symptoms, unspecified morphology       triamcinolone 0.5 % cream    KENALOG    30 g    Apply sparingly to affected area three times daily.    Stasis dermatitis of both legs       * Notice:  This list has 2 medication(s) that are the same as other medications prescribed for you. Read the directions carefully, and ask your doctor or other care provider to review them with you.

## 2018-01-18 NOTE — TELEPHONE ENCOUNTER
Pt referred to [facility] by  for pedal edema.    Pt needs to be scheduled for a consult with Vascular Medicine.  Will route to scheduling to coordinate an appointment next available.    Summer Anton RN BSN

## 2018-01-30 ENCOUNTER — HOSPITAL ENCOUNTER (OUTPATIENT)
Dept: LAB | Facility: CLINIC | Age: 83
Discharge: HOME | End: 2018-01-30
Attending: INTERNAL MEDICINE | Admitting: INTERNAL MEDICINE
Payer: MEDICARE

## 2018-01-30 ENCOUNTER — OFFICE VISIT (OUTPATIENT)
Dept: SURGERY | Facility: CLINIC | Age: 83
End: 2018-01-30
Payer: MEDICARE

## 2018-01-30 VITALS
HEART RATE: 69 BPM | WEIGHT: 259 LBS | BODY MASS INDEX: 37.08 KG/M2 | HEIGHT: 70 IN | RESPIRATION RATE: 16 BRPM | SYSTOLIC BLOOD PRESSURE: 138 MMHG | OXYGEN SATURATION: 99 % | DIASTOLIC BLOOD PRESSURE: 80 MMHG

## 2018-01-30 DIAGNOSIS — I48.0 PAROXYSMAL ATRIAL FIBRILLATION (H): ICD-10-CM

## 2018-01-30 DIAGNOSIS — I87.2 VENOUS (PERIPHERAL) INSUFFICIENCY: ICD-10-CM

## 2018-01-30 DIAGNOSIS — M79.89 LEG SWELLING: ICD-10-CM

## 2018-01-30 DIAGNOSIS — I87.2 VENOUS STASIS DERMATITIS OF BOTH LOWER EXTREMITIES: Primary | ICD-10-CM

## 2018-01-30 LAB
ALBUMIN SERPL-MCNC: 3.9 G/DL (ref 3.4–5)
ALP SERPL-CCNC: 80 U/L (ref 40–150)
ALT SERPL W P-5'-P-CCNC: 25 U/L (ref 0–70)
ANION GAP SERPL CALCULATED.3IONS-SCNC: 7 MMOL/L (ref 3–14)
AST SERPL W P-5'-P-CCNC: 16 U/L (ref 0–45)
BILIRUB SERPL-MCNC: 0.8 MG/DL (ref 0.2–1.3)
BUN SERPL-MCNC: 18 MG/DL (ref 7–30)
CALCIUM SERPL-MCNC: 9.2 MG/DL (ref 8.5–10.1)
CHLORIDE SERPL-SCNC: 103 MMOL/L (ref 94–109)
CO2 SERPL-SCNC: 29 MMOL/L (ref 20–32)
CREAT SERPL-MCNC: 0.84 MG/DL (ref 0.66–1.25)
GFR SERPL CREATININE-BSD FRML MDRD: 87 ML/MIN/1.7M2
GLUCOSE SERPL-MCNC: 103 MG/DL (ref 70–99)
POTASSIUM SERPL-SCNC: 4 MMOL/L (ref 3.4–5.3)
PROT SERPL-MCNC: 7.5 G/DL (ref 6.8–8.8)
SODIUM SERPL-SCNC: 139 MMOL/L (ref 133–144)

## 2018-01-30 PROCEDURE — 80053 COMPREHEN METABOLIC PANEL: CPT | Performed by: INTERNAL MEDICINE

## 2018-01-30 PROCEDURE — 99204 OFFICE O/P NEW MOD 45 MIN: CPT | Performed by: INTERNAL MEDICINE

## 2018-01-30 PROCEDURE — 36415 COLL VENOUS BLD VENIPUNCTURE: CPT | Performed by: INTERNAL MEDICINE

## 2018-01-30 NOTE — MR AVS SNAPSHOT
"              After Visit Summary   1/30/2018    Damien Vallecillo    MRN: [id number]           Patient Information     Date Of Birth          1935        Visit Information        Provider Department      1/30/2018 10:00 AM Arnav Cunningham MD [facility]      Today's Diagnoses     Venous stasis dermatitis of both lower extremities    -  1    Venous (peripheral) insufficiency        Leg swelling        Paroxysmal atrial fibrillation (H)            Follow-ups after your visit        Additional Services     LYMPHEDEMA THERAPY REFERRAL       *This therapy referral will be filtered to a centralized scheduling office at [facility] and the patient will receive a call to schedule an appointment at a [place] location most convenient for them. *   If you have not heard from the scheduling office within 2 business days, please call [phone] for all locations, with the exception of [place], please call [phone].     Treatment: PT or OT Evaluation & Treatment  Special Instructions: MLD  PT/OT Treatment Diagnosis: Edema    Please be aware that coverage of these services is subject to the terms and limitations of your health insurance plan.  Call member services at your health plan with any benefit or coverage questions.      **Note to Provider:  If you are referring outside of [place] [facility] for the therapy appointment, please list the name of the location in the \"special instructions\" above, print the referral and give to the patient to schedule the appointment.                  Follow-up notes from your care team     Return in about 4 weeks (around 2/27/2018).      Your next 10 appointments already scheduled     Mar 19, 2018  9:00 AM CDT   [facility] with Obi Strange MD   [facility]    [address] [place] [id number] [phone]              Future tests that were " "ordered for you today     Open Future Orders        Priority Expected Expires Ordered    LYMPHEDEMA THERAPY REFERRAL Routine 2018    Echocardiogram Complete Routine 2018    Comprehensive metabolic panel Routine 2018            Who to contact     If you have questions or need follow up information about today's clinic visit or your schedule please contact [facility] directly at [phone].  Normal or non-critical lab and imaging results will be communicated to you by Offerboxxhart, letter or phone within 4 business days after the clinic has received the results. If you do not hear from us within 7 days, please contact the clinic through Offerboxxhart or phone. If you have a critical or abnormal lab result, we will notify you by phone as soon as possible.  Submit refill requests through [employer] or call your pharmacy and they will forward the refill request to us. Please allow 3 business days for your refill to be completed.          Additional Information About Your Visit        Diagn[employer]us Information     Diagnovus lets you send messages to your doctor, view your test results, renew your prescriptions, schedule appointments and more. To sign up, go to [email or web address] . Click on \"Log in\" on the left side of the screen, which will take you to the Welcome page. Then click on \"Sign up Now\" on the right side of the page.     You will be asked to enter the access code listed below, as well as some personal information. Please follow the directions to create your username and password.     Your access code is: [email or web address]  Expires: 2018 12:23 PM     Your access code will  in 90 days. If you need help or a new code, please call your [place] clinic or [phone].        Care EveryWhere ID     This is your Care EveryWhere ID. This could be used by other organizations to access your [place] medical records  [id number]      " "  Your Vitals Were     Pulse Respirations Height Pulse Oximetry BMI (Body Mass Index)       69 16 5' 10\" (1.778 m) 99% 37.16 kg/m2        Blood Pressure from Last 3 Encounters:   01/30/18 138/80   01/18/18 130/70   12/14/17 156/88    Weight from Last 3 Encounters:   01/30/18 259 lb (117.5 kg)   01/18/18 259 lb (117.5 kg)   12/14/17 259 lb (117.5 kg)               Primary Care Provider Office Phone # Fax #    Obi Strange [phone] [address] [facility] [address]        Equal Access to Services     ZENON MART : Hadwilver morocho Sosharri, waaxda stuadaha, qaybta kaalmada adehermiloyasheri, corin blank . So [facility] [phone].    ATENCIÓN: Si habla español, tiene a dumont disposición servicios gratuitos de asistencia lingüística. Llame al [phone].    We comply with applicable federal civil rights laws and [place] laws. We do not discriminate on the basis of race, color, national origin, age, disability, sex, sexual orientation, or gender identity.            Thank you!     Thank you for choosing [facility]  for your care. Our goal is always to provide you with excellent care. Hearing back from our patients is one way we can continue to improve our services. Please take a few minutes to complete the written survey that you may receive in the mail after your visit with us. Thank you!             Your Updated Medication List - Protect others around you: Learn how to safely use, store and throw away your medicines at www.disposemymeds.org.          This list is accurate as of 1/30/18 10:36 AM.  Always use your most recent med list.                   Brand Name Dispense Instructions for use Diagnosis    ACE NOT PRESCRIBED (INTENTIONAL)     0 each    ACE Inhibitor not prescribed due to Other:  Had cough on ACEI in past    Type 2 diabetes, HbA1c goal < 7% (H)       aspirin  MG EC tablet     30 tablet    Take 1 tablet (325 mg) by mouth " daily    Status post total right knee replacement       atorvastatin 40 MG tablet    LIPITOR    90 tablet    Take 1 tablet (40 mg) by mouth At Bedtime    Pre-diabetes       * blood glucose monitoring test strip    ACCU-CHEK LAURA    100 strip    1Use to test blood sugar 2 times daily or as directed.    Glucose intolerance (impaired glucose tolerance)       * blood glucose monitoring test strip    ACCU-CHEK LAURA PLUS    100 each    Use to test blood sugar 1 time daily    Glucose intolerance (impaired glucose tolerance)       CINNAMON PO      Take 1 tablet by mouth daily        fish oil-omega-3 fatty acids 1000 MG capsule      Take 1 g by mouth every evening        furosemide 20 MG tablet    LASIX    60 tablet    Take 1 tablet (20 mg) by mouth 2 times daily    Pedal edema       losartan 100 MG tablet    COZAAR    30 tablet    Take 1 tablet (100 mg) by mouth daily    Essential hypertension       MULTIVITAMIN ADULT PO      Take 1 tablet by mouth daily Reported on 5/12/2017        order for DME     1 each    Equipment being ordered: Walker Wheels () and Walker () Treatment Diagnosis: Impaired functional mobility    Status post total right knee replacement       tamsulosin 0.4 MG capsule    FLOMAX    90 capsule    Take 1 capsule (0.4 mg) by mouth At Bedtime    Benign prostatic hyperplasia with lower urinary tract symptoms, unspecified morphology       triamcinolone 0.5 % cream    KENALOG    30 g    Apply sparingly to affected area three times daily.    Stasis dermatitis of both legs       * Notice:  This list has 2 medication(s) that are the same as other medications prescribed for you. Read the directions carefully, and ask your doctor or other care provider to review them with you.

## 2018-01-30 NOTE — PROGRESS NOTES
"    Vascular Medicine Consultation     Chief Complaint   Leg swelling    Date of Admission:  (Not on file)    Damien Vallecillo is a 82 year old male who was admitted on (Not on file). I was asked to see the patient for legs were.    Code Status    Focal    Reason for Consult   Reason for consult: I was asked by PCP to evaluate this patient for leg swelling.    Primary Care Physician   Obi Strange      History is obtained from the patient    History of Present Illness   Damien Vallecillo is a 82 year old male who presents with leg swelling, mainly the right leg status post knee surgery ultrasound was done with no evidence of DVT, patient has been working on his life N jobs that requires them to be standing all the time and he does have evidence of stasis dermatitis with no evidence of varicose veins so when asked about venous insufficiency symptoms it was only leg swelling may be some itching no restless legvenous ulceration or spontaneous bleeding. Patient swelling tends to be worse at the end of the day but it there and present all the time it's not responding to leg elevation and he doesn't wear compression stockings  Patient denies any history of claudication, any history of breast pain or night pain  No history of angina, shortness of breath, may be some palpitations every now and then  Patient denies any history of trauma, fall, any history of constitutional symptoms    Past Medical History   I have reviewed this patient's medical history and updated it with pertinent information if needed.   Past Medical History:   Diagnosis Date     Acute suppurative arthritis (H)      Acute, but ill-defined, cerebrovascular disease      Arthritis      Chronic headaches      Diabetes (H)     \"Pre-diabetes\"     Glucose intolerance (impaired glucose tolerance) 5/31/2013     Gout, unspecified      Hammer toe of left foot 10/21/2016     Hematuria      History of arthroplasty of right knee 8/17/2017     History of blood transfusion  "     Left knee pain, unspecified chronicity 5/8/2017     Lentigo maligna (H)      Malignant neoplasm of rectosigmoid junction (H)      Morbid obesity due to excess calories (H) 10/21/2016     Other convulsions     last seizure 7-8 years ago...not certain if these were true seizres or not..     Pedal edema 1/18/2018     Pre-diabetes 10/21/2016     RBC microcytosis 2/14/2017     Right knee pain, unspecified chronicity 5/8/2017     Syncope      Tubulovillous adenoma of colon      Venous stasis dermatitis of both lower extremities 10/21/2016       Past Surgical History   I have reviewed this patient's surgical history and updated it with pertinent information if needed.  Past Surgical History:   Procedure Laterality Date     ARTHROPLASTY KNEE Right 6/19/2017    Procedure: ARTHROPLASTY KNEE;  Right total knee arthroplasty, Hammer toe repair toe 2,3,4,5 left foot with osteotomy;  Surgeon: Alec Raymond MD;  Location:  OR      NONSPECIFIC PROCEDURE      right heel surgery; spur removed.     COLONOSCOPY N/A 6/23/2015    Procedure: COMBINED COLONOSCOPY, SINGLE OR MULTIPLE BIOPSY/POLYPECTOMY BY BIOPSY;  Surgeon: Kimberly Alfaro MD;  Location:  GI     COLONOSCOPY N/A 7/30/2015    Procedure: COLONOSCOPY;  Surgeon: Enrique Salazar MD;  Location:  OR     HERNIORRHAPHY EPIGASTRIC  4/27/2012    Procedure:HERNIORRHAPHY EPIGASTRIC; Epigastric Hernia Repair with mesh ; Surgeon:BOLIVAR OLIVEIRA; Location: OR     LAPAROSCOPIC ASSISTED COLECTOMY Right 7/30/2015    Procedure: LAPAROSCOPIC ASSISTED COLECTOMY;  Surgeon: Enrique Salazar MD;  Location:  OR     ORTHOPEDIC SURGERY      lt knee arthroplasty     REALIGN PATELLA Right 10/9/2017    Procedure: REALIGN PATELLA;  Revision right total knee arthroplasty;  Surgeon: Alec Raymond MD;  Location:  OR     REPAIR HAMMER TOE Left 6/19/2017    Procedure: REPAIR HAMMER TOE;  Hammer toe repair toe 2,3,4,5 left foot with osteotomy   ;  Surgeon: Julio  Beverly SANTOS DPM, Podiatry/Foot and Ankle Surgery;  Location:  OR     SIGMOIDOSCOPY FLEXIBLE  5/29/2013    Procedure: SIGMOIDOSCOPY FLEXIBLE;  SIGMOIDOSCOPY FLEXIBLE (FV) Needs blood sugar ck'd;  Surgeon: Enrique Salazar MD;  Location:  GI     skin cancer excision         Prior to Admission Medications   Cannot display prior to admission medications because the patient has not been admitted in this contact.     Allergies   Allergies   Allergen Reactions     Levetiracetam [Keppra] Rash     Oxcarbazepine [Trileptal] Rash       Social History   I have reviewed this patient's social history and updated it with pertinent information if needed. Damien Vallecillo  reports that he has never smoked. He has never used smokeless tobacco. He reports that he drinks alcohol. He reports that he does not use illicit drugs.    Family History   I have reviewed this patient's family history and updated it with pertinent information if needed.   Family History   Problem Relation Age of Onset     Cancer - colorectal Mother      CEREBROVASCULAR DISEASE Father        Review of Systems   The 10 point Review of Systems is negative other than noted in the HPI or here.     Physical Exam       BP: 138/80 Pulse: 69   Resp: 16 SpO2: 99 %      Vital Signs with Ranges  Pulse:  [69] 69  Resp:  [16] 16  BP: (138)/(80) 138/80  SpO2:  [99 %] 99 %  259 lbs 0 oz    Constitutional: awake, alert, cooperative, no apparent distress, and appears stated age  Eyes: Lids and lashes normal, pupils equal, round and reactive to light, extra ocular muscles intact, sclera clear, conjunctiva normal  ENT: normocepalic, without obvious abnormality, oropharynx pink and moist  Hematologic / Lymphatic: no lymphadenopathy  Respiratory: No increased work of breathing, good air exchange, clear to auscultation bilaterally, no crackles or wheezing  Cardiovascular: regular rate and rhythm, normal S1 and S2 and no murmur noted  GI: Normal bowel sounds, soft, non-distended,  non-tender  Skin: no redness, warmth, or swelling, no rashes and no lesions  Musculoskeletal: There is no redness, warmth, or swelling of the joints.  Full range of motion noted.  Motor strength is 5 out of 5 all extremities bilaterally.  Tone is normal.  Neurologic: Awake, alert, oriented to name, place and time.  Cranial nerves II-XII are grossly intact.  Motor is 5 out of 5 bilaterally.    Neuropsychiatric:  Normal affect, memory, insight.  Pulses: Intact  . No carotid bruits appreciated.     Data   Most Recent 3 CBC's:  Recent Labs   Lab Test  10/11/17   1510  09/26/17   1457  06/22/17   0602  06/21/17   0610   06/19/17   1346  05/15/17   1030  02/13/17   1115   WBC   --   7.6   --    --    --    --   5.9  7.6   HGB  10.2*  13.3   --   8.8*   < >  12.1*  12.1*  10.8*   MCV   --   82   --    --    --    --   78  73*   PLT   --   233  153   --    --   224  210  234    < > = values in this interval not displayed.     Most Recent 3 BMP's:  Recent Labs   Lab Test  09/26/17   1457  06/21/17   0610  06/20/17   0600  06/19/17   1346  05/15/17   1030  02/13/17   1115   NA  140   --    --    --   143  140   POTASSIUM  4.4   --    --   4.1  4.0  4.1   CHLORIDE  106   --    --    --   109  106   CO2  25   --    --    --   26  27   BUN  13   --    --    --   13  12   CR  0.76   --    --   0.82  0.79  0.74   ANIONGAP  9   --    --    --   8  7   ANURAG  9.0   --    --    --   9.0  9.1   GLC  90  128*  148*   --   139*  111*     Most Recent 2 LFT's:  Recent Labs   Lab Test  09/26/17   1457  04/17/17   1100  10/21/16   1154   AST  13   --   16   ALT  22  19  28   ALKPHOS  74   --   61   BILITOTAL  0.6   --   0.8     Most Recent 3 INR's:  Recent Labs   Lab Test  08/10/15   0835  08/05/15   0650  08/04/15   1157   INR  1.15*  1.09  1.16*     Most Recent ESR & CRP:No lab results found.  Most Recent Anemia Panel:  Recent Labs   Lab Test  10/11/17   1510  09/26/17   1457   04/28/15   1119   WBC   --   7.6   < >   --    HGB  10.2*  13.3    < >   --    HCT   --   41.9   < >   --    MCV   --   82   < >   --    PLT   --   233   < >   --    B12   --    --    --   460   FOLIC   --    --    --   23.0    < > = values in this interval not displayed.         Assessment & Plan   (I87.2) Venous stasis dermatitis of both lower extremities  (primary encounter diagnosis)  Comment: The venous ultrasound with insufficiency study bilateral  Plan:     (I87.2) Venous (peripheral) insufficiency  Comment: Same as above  Plan: Echocardiogram Complete, Comprehensive         metabolic panel            (M79.89) Leg swelling  Comment: Patient would benefit from compression treatment we'll start with MLD then ambulatory venous pump at home as tolerated  Plan: Echocardiogram Complete, Comprehensive         metabolic panel, LYMPHEDEMA THERAPY REFERRAL            (I48.0) Paroxysmal atrial fibrillation (H)   Comment: 2-D echocardiogram  Plan: Echocardiogram Complete              Summary: We'll see the patient on status results are available    Arnav Cunningham MD

## 2018-01-31 ENCOUNTER — HOSPITAL ENCOUNTER (OUTPATIENT)
Dept: CARDIOLOGY | Facility: CLINIC | Age: 83
Discharge: HOME | End: 2018-01-31
Attending: INTERNAL MEDICINE | Admitting: INTERNAL MEDICINE
Payer: MEDICARE

## 2018-01-31 DIAGNOSIS — I48.0 PAROXYSMAL ATRIAL FIBRILLATION (H): ICD-10-CM

## 2018-01-31 DIAGNOSIS — I87.2 VENOUS (PERIPHERAL) INSUFFICIENCY: ICD-10-CM

## 2018-01-31 DIAGNOSIS — M79.89 LEG SWELLING: ICD-10-CM

## 2018-01-31 PROCEDURE — 93306 TTE W/DOPPLER COMPLETE: CPT

## 2018-01-31 PROCEDURE — 93306 TTE W/DOPPLER COMPLETE: CPT | Mod: 26 | Performed by: INTERNAL MEDICINE

## 2018-02-05 PROBLEM — Z96.651 AFTERCARE FOLLOWING RIGHT KNEE JOINT REPLACEMENT SURGERY: Status: RESOLVED | Noted: 2018-01-02 | Resolved: 2018-02-05

## 2018-02-05 PROBLEM — Z47.1 AFTERCARE FOLLOWING RIGHT KNEE JOINT REPLACEMENT SURGERY: Status: RESOLVED | Noted: 2018-01-02 | Resolved: 2018-02-05

## 2018-02-05 PROBLEM — M25.561 ACUTE PAIN OF RIGHT KNEE: Status: RESOLVED | Noted: 2018-01-02 | Resolved: 2018-02-05

## 2018-02-05 NOTE — PROGRESS NOTES
Discharge Note    Progress reporting period is from initial eval to Jan 11, 2018.     Please see information below for last relevant information on current status.  Patient seen for Rxs Used: 4 visits.  SUBJECTIVE  Subjective changes noted by patient:  Subjective: Improved strength and confidence with walking.  Using crutch outside only  .  Current pain level is Current Pain level: 5/10.     Previous pain level was  Initial Pain level: 6/10.   Changes in function:  Yes (See Goal flowsheet attached for changes in current functional level)  Adverse reaction to treatment or activity: None    OBJECTIVE  Changes noted in objective findings: Objective: PROM 0-0-94,  fair quad set     ASSESSMENT/PLAN  Diagnosis: s/p R TKA revision   DIAGP:  Diagnoses of Acute pain of right knee and Aftercare following right knee joint replacement surgery were pertinent to this visit.  Updated problem list and treatment plan:   Decreased strength - HEP  Impaired muscle performance - HEP  STG/LTGs have been met or progress has been made towards goals:  Yes, please see goal flowsheet for most current information  Assessment of Progress: current status is unknown.  Last current status:     Self Management Plans:  HEP  I have re-evaluated this patient and find that the nature, scope, duration and intensity of the therapy is appropriate for the medical condition of the patient.  Damien continues to require the following intervention to meet STG and LTG's:  HEP.    Recommendations:  Discharge with current home program.  Patient to follow up with MD as needed.    Please refer to the daily flowsheet for treatment today, total treatment time and time spent performing 1:1 timed codes.

## 2018-02-12 ENCOUNTER — HOSPITAL ENCOUNTER (OUTPATIENT)
Dept: ULTRASOUND IMAGING | Facility: CLINIC | Age: 83
Discharge: HOME | End: 2018-02-12
Attending: INTERNAL MEDICINE | Admitting: INTERNAL MEDICINE
Payer: MEDICARE

## 2018-02-12 DIAGNOSIS — I48.0 PAROXYSMAL ATRIAL FIBRILLATION (H): ICD-10-CM

## 2018-02-12 DIAGNOSIS — M79.89 LEG SWELLING: ICD-10-CM

## 2018-02-12 DIAGNOSIS — I87.2 VENOUS (PERIPHERAL) INSUFFICIENCY: ICD-10-CM

## 2018-02-12 DIAGNOSIS — I87.2 VENOUS STASIS DERMATITIS OF BOTH LOWER EXTREMITIES: ICD-10-CM

## 2018-02-12 PROCEDURE — 93970 EXTREMITY STUDY: CPT

## 2018-02-15 ENCOUNTER — HOSPITAL ENCOUNTER (OUTPATIENT)
Dept: OCCUPATIONAL THERAPY | Facility: CLINIC | Age: 83
Setting detail: THERAPIES SERIES
End: 2018-02-15
Attending: INTERNAL MEDICINE
Payer: MEDICARE

## 2018-02-15 DIAGNOSIS — M79.89 LEG SWELLING: ICD-10-CM

## 2018-02-15 PROCEDURE — 97165 OT EVAL LOW COMPLEX 30 MIN: CPT | Mod: GO

## 2018-02-15 PROCEDURE — 97140 MANUAL THERAPY 1/> REGIONS: CPT | Mod: GO

## 2018-02-15 PROCEDURE — 40000445 ZZHC STATISTIC OT VISIT, LYMPHEDEMA

## 2018-02-15 PROCEDURE — G8979 MOBILITY GOAL STATUS: HCPCS | Mod: GO,CI

## 2018-02-15 PROCEDURE — G8978 MOBILITY CURRENT STATUS: HCPCS | Mod: GO,CJ

## 2018-02-15 NOTE — PROGRESS NOTES
02/15/18 1400   Quick Adds   Quick Adds Certification   Rehab Discipline   Discipline OT   Type of Visit   Type of visit Initial Edema Evaluation   General Information   Start of care 02/15/18   Referring physician Arnav Cunningham   Orders Evaluate and treat as indicated  (MLD)   Order date 01/30/18   Medical diagnosis leg swelling   Onset of illness / date of surgery 01/30/18   Edema onset 01/30/18   Affected body parts LLE;RLE  (RLE>LLE)   Edema etiology TKA;Chronic Venous Insufficiency   Edema etiology comments Pt recently had US workup that found supeficial venous insufficiency in BLE's. Pt has history L TKA and reports he had pain since surgery and had a 5 years period he was searching for pain releif in L knee. Pt recently had R TKA and had second surgery to correct knee cap displacement in R knee. Pts activity poor 2/2 pain and swleling in knees along with venous insufficiency.   Pertinent history of current problem (PT: include personal factors and/or comorbidities that impact the POC; OT: include additional occupational profile info) PMH significant for skin cancer with excisions from posterrior R calf and L ear, 2011 hernia, B TKA, prediabetes, and hearing problems.   Surgical / medical history reviewed Yes   Edema special tests Ultrasound   Prior level of functional mobility I-Mod I   Prior treatment Compression garments;Elevation;Diuretics   Community support Family / friend caregiver   Patient role / employment history Retired   Living environment [place] [facility]   Current assistive devices (usinf single crutch for ambulation per Dr advice)   Assistive device comments Pt was advised to use crutch by orthopedic surgeon he consulted for issue with past knee surgeries/pain   Fall Risk Screen   Fall screen completed by OT   Have you fallen 2 or more times in the past year? No   Have you fallen and had an injury in the past year? No   Is patient a fall risk? No   System Outcome Measures   Lymphedema  Life Impact Scale (score range 0-72). A higher score indicates greater impairment. 36   Subjective Report   Patient report of symptoms shoes dont fit; socks dont fit   Patient / Family Goals   Patient / family goals statement to learn how to manage LE swelling better   Pain   Patient currently in pain No   Pain comments pt reports pain at times in R knee when ambulating/upright   Cognitive Status   Orientation Orientation to person, place and time   Level of consciousness Alert   Follows commands and answers questions 100% of the time   Personal safety and judgement Intact   Edema Exam / Assessment   Skin condition Pitting;Dryness   Skin condition comments 2+ pitting foot-knee crease LLE; RLE 2+ foot-proximal knee/distal thigh   Pitting 2+   Pitting location BLE   Capillary refill Symmetrical   Dorsal pedal pulse (unable to palpate; no signs ischemia)   Stemmer sign Negative   Girth Measurements   Girth Measurements (will obtain upon return for services)   Range of Motion   ROM comments WFL   Strength   Strength comments NT'd   Activities of Daily Living   Activities of Daily Living I   Bed Mobility   Bed mobility I   Transfers   Transfers Mod I   Gait / Locomotion   Gait / Locomotion I   Sensory   Sensory perception comments no neuropathy reported or identified   Coordination   Coordination Gross motor coordination appropriate   Muscle Tone   Muscle tone No deficits were identified   Planned Edema Interventions   Planned edema interventions Gradient compression bandaging;Fit for compression garment;Exercises;Precautions to prevent infection / exacerbation;Education;Manual therapy;Skin care / precautions;Home management program development   Clinical Impression   Criteria for skilled therapeutic intervention met Yes   Therapy diagnosis lymphedema   Influenced by the following impairments / conditions Stage 1;Phlebolymphedema   Assessment of Occupational Performance 1-3 Performance Deficits   Identified Performance  Deficits LB dressing   Clinical Decision Making (Complexity) Low complexity   Treatment frequency 2 times / week   Treatment duration 2x/wk x 3 weeks, then 0x/wk x 2 weeks, then 1x/wk x 1 week   Patient / family and/or staff in agreement with plan of care Yes   Risks and benefits of therapy have been explained Yes   Clinical impression comments Pt will benefit from skilled lymphedema services to reduce LE swelling for improved LB dressing/clothing fit, promote better pain tolerance with standing/ambulation, and promote best stability with mobility tasks   Goals   Edema Eval Goals 1;2;3;4;5;6   Goal 1   Goal identifier 1   Goal description In order to improve functional mobility for activities of living, by the completion of intensive treatment,  patient and/or caregiver will:   Target date 04/20/18   Goal 2   Goal identifier 1a   Goal description tolerate gradient compression bandaging/wearing compression garments 23 hrs/day to prevent re-acccumulation of extracellular fluid for reductionsneeded to promote stability with mobility and aide reductions in R knee pain when ambulating   Target date 04/20/18   Goal 3   Goal identifier 1b   Goal description demonstrate independence in applying gradient compression bandages to build I with home management of BLE edema/lymphedema for improved pain tolerance in R knee with ambulation and improve LB dressing I/clothing fit   Target date 04/20/18   Goal 4   Goal identifier 1c   Goal description demonstrate independence in performing prescribed exercises to facilate the muscle pumping systems for max reductions needed to aide improvements in pain tolerance when ambulating and LB dressing/clothing fit   Target date 04/20/18   Goal 5   Goal identifier 1d   Goal description be independent in donning/doffing, wearing schedule, and care of compression garments for I building with home management of LE edema/lymphedema for improvements in pain tolerance with mobility and optimal  stability with ambulation tasks   Target date 04/20/18   Goal 6   Goal identifier 2   Goal description Display total BLE volume reduction of .5L+ for reductions needed to aide improvements in pain with ambulation and LB dressing I/clothing fit   Target date 04/20/18   Total Evaluation Time   Total evaluation time 20   Certification   Certification date from 02/15/18   Certification date to 04/20/18   Medical Diagnosis leg swelling   Certification I certify the need for these services furnished under this plan of treatment and while under my care.  (Physician co-signature of this document indicates review and certification of the therapy plan).

## 2018-02-15 NOTE — PROGRESS NOTES
[facility]        OUTPATIENT OCCUPATIONAL THERAPY EDEMA EVALUATION  PLAN OF TREATMENT FOR OUTPATIENT REHABILITATION  (COMPLETE FOR INITIAL CLAIMS ONLY)  Patient's Last Name, First Name, Damien Morales                           Provider s Name:   [facility] Medical Record No.  [id number]     Start of Care Date:  02/15/18   Onset Date:  01/30/18   Type:  OT   Medical Diagnosis:  leg swelling   Therapy Diagnosis:  lymphedema Visits from SOC:  1                                     __________________________________________________________________________________   Plan of Treatment/Functional Goals:    Gradient compression bandaging, Fit for compression garment, Exercises, Precautions to prevent infection / exacerbation, Education, Manual therapy, Skin care / precautions, Home management program development        GOALS  1. Goal description: In order to improve functional mobility for activities of living, by the completion of intensive treatment,  patient and/or caregiver will:       Target date: 04/20/18  2. Goal description: tolerate gradient compression bandaging/wearing compression garments 23 hrs/day to prevent re-acccumulation of extracellular fluid for reductionsneeded to promote stability with mobility and aide reductions in R knee pain when ambulating       Target date: 04/20/18  3. Goal description: demonstrate independence in applying gradient compression bandages to build I with home management of BLE edema/lymphedema for improved pain tolerance in R knee with ambulation and improve LB dressing I/clothing fit       Target date: 04/20/18  4. Goal description: demonstrate independence in performing prescribed exercises to facilate the muscle pumping systems for max reductions needed to aide improvements in pain tolerance when ambulating and LB  dressing/clothing fit       Target date: 04/20/18  5. Goal description: be independent in donning/doffing, wearing schedule, and care of compression garments for I building with home management of LE edema/lymphedema for improvements in pain tolerance with mobility and optimal stability with ambulation tasks       Target date: 04/20/18  6. Goal description: Display total BLE volume reduction of .5L+ for reductions needed to aide improvements in pain with ambulation and LB dressing I/clothing fit       Target date: 04/20/18     7.             8.              Treatment frequency: 2 times / week   Treatment duration: 2x/wk x 3 weeks, then 0x/wk x 2 weeks, then 1x/wk x 1 week    Vinicio Leon                                    I CERTIFY THE NEED FOR THESE SERVICES FURNISHED UNDER        THIS PLAN OF TREATMENT AND WHILE UNDER MY CARE     (Physician co-signature of this document indicates review and certification of the therapy plan).                   Certification date from: 02/15/18       Certification date to: 04/20/18           Referring physician: Arnav Cunningham   Initial Assessment  See Epic Evaluation- Start of care: 02/15/18

## 2018-02-27 ENCOUNTER — OFFICE VISIT (OUTPATIENT)
Dept: SURGERY | Facility: CLINIC | Age: 83
End: 2018-02-27
Attending: INTERNAL MEDICINE
Payer: MEDICARE

## 2018-02-27 ENCOUNTER — HOSPITAL ENCOUNTER (OUTPATIENT)
Dept: OCCUPATIONAL THERAPY | Facility: CLINIC | Age: 83
Setting detail: THERAPIES SERIES
End: 2018-02-27
Attending: INTERNAL MEDICINE
Payer: MEDICARE

## 2018-02-27 ENCOUNTER — HOSPITAL ENCOUNTER (OUTPATIENT)
Dept: LAB | Facility: CLINIC | Age: 83
End: 2018-02-27
Attending: INTERNAL MEDICINE
Payer: MEDICARE

## 2018-02-27 VITALS
OXYGEN SATURATION: 100 % | WEIGHT: 259 LBS | RESPIRATION RATE: 16 BRPM | BODY MASS INDEX: 37.08 KG/M2 | HEIGHT: 70 IN | DIASTOLIC BLOOD PRESSURE: 70 MMHG | HEART RATE: 66 BPM | SYSTOLIC BLOOD PRESSURE: 140 MMHG

## 2018-02-27 DIAGNOSIS — I87.2 VENOUS (PERIPHERAL) INSUFFICIENCY: ICD-10-CM

## 2018-02-27 DIAGNOSIS — M79.89 LEG SWELLING: ICD-10-CM

## 2018-02-27 DIAGNOSIS — I87.2 VENOUS STASIS DERMATITIS OF BOTH LOWER EXTREMITIES: ICD-10-CM

## 2018-02-27 DIAGNOSIS — I48.0 PAROXYSMAL ATRIAL FIBRILLATION (H): ICD-10-CM

## 2018-02-27 LAB — D DIMER PPP FEU-MCNC: 1.4 UG/ML FEU (ref 0–0.5)

## 2018-02-27 PROCEDURE — 99214 OFFICE O/P EST MOD 30 MIN: CPT | Mod: 25 | Performed by: INTERNAL MEDICINE

## 2018-02-27 PROCEDURE — 36415 COLL VENOUS BLD VENIPUNCTURE: CPT | Performed by: INTERNAL MEDICINE

## 2018-02-27 PROCEDURE — 85379 FIBRIN DEGRADATION QUANT: CPT | Performed by: INTERNAL MEDICINE

## 2018-02-27 PROCEDURE — 97140 MANUAL THERAPY 1/> REGIONS: CPT | Mod: GO

## 2018-02-27 PROCEDURE — 40000445 ZZHC STATISTIC OT VISIT, LYMPHEDEMA

## 2018-02-27 NOTE — MR AVS SNAPSHOT
After Visit Summary   2/27/2018    Damien Vallecillo    MRN: [id number]           Patient Information     Date Of Birth          1935        Visit Information        Provider Department      2/27/2018 10:30 AM Arnav Cunningham MD [facility]      Today's Diagnoses     Paroxysmal atrial fibrillation (H)         Leg swelling        Venous (peripheral) insufficiency        Venous stasis dermatitis of both lower extremities           Follow-ups after your visit        Follow-up notes from your care team     Return in about 3 months (around 5/27/2018).      Your next 10 appointments already scheduled     Feb 27, 2018  2:00 PM CST   Lymphedema Treatment with Vinicio A Edward   [facility] Lymphedema OT ([facility])    [address] [address]   [phone]            Mar 02, 2018  2:00 PM CST   Lymphedema Treatment with Viniciocindy Phillipsaamir   [facility] ([facility])    [address] [address]   [phone]            Mar 06, 2018  2:00 PM CST   Lymphedema Treatment with Vinicio UNDERWOOD Edward   [facility] ([facility])    [address] [address]   [phone]            Mar 09, 2018  2:00 PM CST   Lymphedema Treatment with Vinicio A Edward   [facility] ([facility])    [address] [address]   [phone]            Mar 12, 2018 10:00 AM CDT   SHORT with Obi Strange MD   [facility] ([facility])    [address] [place] [id number] [phone]            Mar 13, 2018  2:00 PM CDT   Lymphedema Treatment with Vinicio UNDERWOOD Edward   [facility] ([facility])    [address] [address]   [phone]            Mar 16, 2018  2:00 PM CDT  "  Lymphedema Treatment with Vinicio Loen   [facility] OT ([facility])    [address] [address] [phone]              Who to contact     If you have questions or need follow up information about today's clinic visit or your schedule please contact [facility] directly at [phone].  Normal or non-critical lab and imaging results will be communicated to you by MyChart, letter or phone within 4 business days after the clinic has received the results. If you do not hear from us within 7 days, please contact the clinic through [employer] or phone. If you have a critical or abnormal lab result, we will notify you by phone as soon as possible.  Submit refill requests through [employer] or call your pharmacy and they will forward the refill request to us. Please allow 3 business days for your refill to be completed.          Additional Information About Your Visit        [employer] Information     [employer] lets you send messages to your doctor, view your test results, renew your prescriptions, schedule appointments and more. To sign up, go to [email or web address] . Click on \"Log in\" on the left side of the screen, which will take you to the Welcome page. Then click on \"Sign up Now\" on the right side of the page.     You will be asked to enter the access code listed below, as well as some personal information. Please follow the directions to create your username and password.     Your access code is: [email or web address]  Expires: 2018 10:55 AM     Your access code will  in 90 days. If you need help or a new code, please call your [place] clinic or [phone].        Care EveryWhere ID     This is your Care EveryWhere ID. This could be used by other organizations to access your [facility] medical records  [id number]        Your Vitals Were     Pulse Respirations Height Pulse Oximetry BMI (Body Mass Index)       66 16 5' 10\" (1.778 m) 100% " 37.16 kg/m2        Blood Pressure from Last 3 Encounters:   02/27/18 140/70   01/30/18 138/80   01/18/18 130/70    Weight from Last 3 Encounters:   02/27/18 259 lb (117.5 kg)   01/30/18 259 lb (117.5 kg)   01/18/18 259 lb (117.5 kg)              We Performed the Following     D dimer quantitative     Follow-Up with Vascular Medicine        Primary Care Provider Office Phone # Fax #    Obi Strange [phone] [address] [facility] [address]        Equal Access to Services     [facility]: Hadii aad anny hadasho Sosharri, waaxda luqadaha, qaybta kaalmada adehermiloyasheri, corin blank . So [facility] [phone].    ATENCIÓN: Si habla español, tiene a dumont disposición servicios gratuitos de asistencia lingüística. [facility] [phone].    We comply with applicable federal civil rights laws and [place] laws. We do not discriminate on the basis of race, color, national origin, age, disability, sex, sexual orientation, or gender identity.            Thank you!     Thank you for choosing [facility]  for your care. Our goal is always to provide you with excellent care. Hearing back from our patients is one way we can continue to improve our services. Please take a few minutes to complete the written survey that you may receive in the mail after your visit with us. Thank you!             Your Updated Medication List - Protect others around you: Learn how to safely use, store and throw away your medicines at www.disposemymeds.org.          This list is accurate as of 2/27/18 10:55 AM.  Always use your most recent med list.                   Brand Name Dispense Instructions for use Diagnosis    ACE NOT PRESCRIBED (INTENTIONAL)     0 each    ACE Inhibitor not prescribed due to Other:  Had cough on ACEI in past    Type 2 diabetes, HbA1c goal < 7% (H)       aspirin  MG EC tablet     30 tablet    Take 1 tablet (325 mg) by mouth daily    Status post total  right knee replacement       atorvastatin 40 MG tablet    LIPITOR    90 tablet    Take 1 tablet (40 mg) by mouth At Bedtime    Pre-diabetes       * blood glucose monitoring test strip    ACCU-CHEK LAURA    100 strip    1Use to test blood sugar 2 times daily or as directed.    Glucose intolerance (impaired glucose tolerance)       * blood glucose monitoring test strip    ACCU-CHEK LAURA PLUS    100 each    Use to test blood sugar 1 time daily    Glucose intolerance (impaired glucose tolerance)       CINNAMON PO      Take 1 tablet by mouth daily        fish oil-omega-3 fatty acids 1000 MG capsule      Take 1 g by mouth every evening        furosemide 20 MG tablet    LASIX    60 tablet    Take 1 tablet (20 mg) by mouth 2 times daily    Pedal edema       losartan 100 MG tablet    COZAAR    30 tablet    Take 1 tablet (100 mg) by mouth daily    Essential hypertension       MULTIVITAMIN ADULT PO      Take 1 tablet by mouth daily Reported on 5/12/2017        * order for DME     1 each    Equipment being ordered: Walker Wheels () and Walker () Treatment Diagnosis: Impaired functional mobility    Status post total right knee replacement       * order for DME     1 each    1: Gradient Compression Wraps; 2: Cast Boots; 3: BLE knee or thigh high 20-30 mm Hg compression stocking; 4: BLE knee or thigh high custom compression stocking; 5: Alternative BLE knee high or thigh compression garments (velcro/buckling)    Leg swelling       tamsulosin 0.4 MG capsule    FLOMAX    90 capsule    Take 1 capsule (0.4 mg) by mouth At Bedtime    Benign prostatic hyperplasia with lower urinary tract symptoms, unspecified morphology       triamcinolone 0.5 % cream    KENALOG    30 g    Apply sparingly to affected area three times daily.    Stasis dermatitis of both legs       * Notice:  This list has 4 medication(s) that are the same as other medications prescribed for you. Read the directions carefully, and ask your doctor or other care  provider to review them with you.

## 2018-02-27 NOTE — PROGRESS NOTES
Vascular Medicine Progress Note     Damien Vallecillo is a 82 year old male who was admitted on (Not on file). Patient is here for follow-up on venous insufficiency study    Interval History   Patient is doing well with physical therapy/compression therapy  D Doppler ultrasound showed evidence of insufficiency in both lower extremities questionable left lower extremity DVT below the knee other at patient looks normal and he his pain is better and swelling is better    Physical Exam       BP: 140/70 Pulse: 66   Resp: 16 SpO2: 100 %      Vitals:    02/27/18 1011   Weight: 259 lb (117.5 kg)     Vital Signs with Ranges  Pulse:  [66] 66  Resp:  [16] 16  BP: (140)/(70) 140/70  SpO2:  [100 %] 100 %  [unfilled]    Constitutional: awake, alert, cooperative, no apparent distress, and appears stated age  Eyes: Lids and lashes normal, pupils equal, round and reactive to light, extra ocular muscles intact, sclera clear, conjunctiva normal  ENT: normocepalic, without obvious abnormality, oropharynx pink and moist  Hematologic / Lymphatic: no lymphadenopathy  Respiratory: No increased work of breathing, good air exchange, clear to auscultation bilaterally, no crackles or wheezing  Cardiovascular: regular rate and rhythm, normal S1 and S2 and no murmur noted  GI: Normal bowel sounds, soft, non-distended, non-tender  Skin: no redness, warmth, or swelling, no rashes and no lesions  Musculoskeletal: There is no redness, warmth, or swelling of the joints.  Full range of motion noted.  Motor strength is 5 out of 5 all extremities bilaterally.  Tone is normal.  Neurologic: Awake, alert, oriented to name, place and time.  Cranial nerves II-XII are grossly intact.  Motor is 5 out of 5 bilaterally.    Neuropsychiatric:  Normal affect, memory, insight.  Pulses: Same as previous exam. No carotid bruits appreciated.     Medications         Data   No results found for this or any previous visit (from the past 24 hour(s)).  IMPRESSION:  1.  No evidence for DVT.  2. Superficial venous insufficiency in the right lesser saphenous vein  in the proximal calf and in the left lesser saphenous vein at the  saphenopopliteal junction. There is also superficial venous  insufficiency in the left accessory greater saphenous vein.  3. Deep venous insufficiency in the left popliteal vein.  Assessment & Plan   (I48.0) Paroxysmal atrial fibrillation (H)   Comment: Continue with current treatment    (M79.89) Leg swelling  Comment: Evidence of venous insufficiency patient would benefit from compression treatment, were referred the patient for lymphedema treatment    (I87.2) Venous (peripheral) insufficiency  Comment: Patient will benefit from compression treatment  Plan: D dimer quantitative            (I87.2) Venous stasis dermatitis of both lower extremities  Comment: [facility] staff to schedule          Summary  Follow-up with the patient in 3 months from now      Arnav Cunningham MD

## 2018-03-02 ENCOUNTER — HOSPITAL ENCOUNTER (OUTPATIENT)
Dept: OCCUPATIONAL THERAPY | Facility: CLINIC | Age: 83
Setting detail: THERAPIES SERIES
End: 2018-03-02
Attending: INTERNAL MEDICINE
Payer: MEDICARE

## 2018-03-02 PROCEDURE — 97140 MANUAL THERAPY 1/> REGIONS: CPT | Mod: GO

## 2018-03-02 PROCEDURE — 40000445 ZZHC STATISTIC OT VISIT, LYMPHEDEMA

## 2018-03-06 ENCOUNTER — HOSPITAL ENCOUNTER (OUTPATIENT)
Dept: OCCUPATIONAL THERAPY | Facility: CLINIC | Age: 83
Setting detail: THERAPIES SERIES
End: 2018-03-06
Attending: INTERNAL MEDICINE
Payer: MEDICARE

## 2018-03-06 PROCEDURE — 97140 MANUAL THERAPY 1/> REGIONS: CPT | Mod: GO

## 2018-03-06 PROCEDURE — 40000445 ZZHC STATISTIC OT VISIT, LYMPHEDEMA

## 2018-03-09 ENCOUNTER — HOSPITAL ENCOUNTER (OUTPATIENT)
Dept: OCCUPATIONAL THERAPY | Facility: CLINIC | Age: 83
Setting detail: THERAPIES SERIES
End: 2018-03-09
Attending: INTERNAL MEDICINE
Payer: MEDICARE

## 2018-03-09 PROCEDURE — 97140 MANUAL THERAPY 1/> REGIONS: CPT | Mod: GO

## 2018-03-09 PROCEDURE — 40000445 ZZHC STATISTIC OT VISIT, LYMPHEDEMA

## 2018-03-12 ENCOUNTER — OFFICE VISIT (OUTPATIENT)
Dept: FAMILY MEDICINE | Facility: CLINIC | Age: 83
End: 2018-03-12
Payer: MEDICARE

## 2018-03-12 VITALS
HEART RATE: 60 BPM | TEMPERATURE: 97.6 F | HEIGHT: 70 IN | SYSTOLIC BLOOD PRESSURE: 128 MMHG | DIASTOLIC BLOOD PRESSURE: 68 MMHG | WEIGHT: 250 LBS | BODY MASS INDEX: 35.79 KG/M2 | RESPIRATION RATE: 14 BRPM

## 2018-03-12 DIAGNOSIS — R79.9 ABNORMAL FINDING OF BLOOD CHEMISTRY: ICD-10-CM

## 2018-03-12 DIAGNOSIS — R60.0 PEDAL EDEMA: Primary | ICD-10-CM

## 2018-03-12 DIAGNOSIS — M1A.9XX0 CHRONIC GOUT WITHOUT TOPHUS, UNSPECIFIED CAUSE, UNSPECIFIED SITE: ICD-10-CM

## 2018-03-12 DIAGNOSIS — R73.03 PRE-DIABETES: ICD-10-CM

## 2018-03-12 DIAGNOSIS — R03.0 ELEVATED BLOOD PRESSURE READING WITHOUT DIAGNOSIS OF HYPERTENSION: ICD-10-CM

## 2018-03-12 LAB
HBA1C MFR BLD: 6 % (ref 4.3–6)
URATE SERPL-MCNC: 7.5 MG/DL (ref 3.5–7.2)

## 2018-03-12 PROCEDURE — 84550 ASSAY OF BLOOD/URIC ACID: CPT | Performed by: FAMILY MEDICINE

## 2018-03-12 PROCEDURE — 36415 COLL VENOUS BLD VENIPUNCTURE: CPT | Performed by: FAMILY MEDICINE

## 2018-03-12 PROCEDURE — 99214 OFFICE O/P EST MOD 30 MIN: CPT | Performed by: FAMILY MEDICINE

## 2018-03-12 PROCEDURE — 83036 HEMOGLOBIN GLYCOSYLATED A1C: CPT | Performed by: FAMILY MEDICINE

## 2018-03-12 RX ORDER — FUROSEMIDE 20 MG
20 TABLET ORAL 2 TIMES DAILY
Qty: 180 TABLET | Refills: 1 | Status: SHIPPED | OUTPATIENT
Start: 2018-03-12 | End: 2018-07-12

## 2018-03-12 NOTE — PROGRESS NOTES
"  SUBJECTIVE:   Damien Vallecillo is a 82 year old male who presents to clinic today for the following health issues:      Medication Followup of Lasix    Taking Medication as prescribed: yes    Side Effects:  None    Medication Helping Symptoms:  yes       Pedal edema  (primary encounter diagnosis): Patient states it takes his wife an hour and a half to wrap his legs. He walks well with wraps on. Patient also notes he urinates \"like crazy\" with furosemide, nocturia varies.  Pedal edema being treated with sequential compression  wraps    Abnormal finding of blood chemistry  Elevated glucose   Glucose   Date Value Ref Range Status   01/30/2018 103 (H) 70 - 99 mg/dL Final   ]    Chronic gout without tophus, unspecified cause, unspecified site no therapies, quiesecent. Brief 1 day flare in last few months  Uric Acid   Date Value Ref Range Status   10/11/2006 7.2 3.5 - 8.5 mg/dL Final   ]    Elevated blood pressure reading without diagnosis of hypertension at intake  BP Readings from Last 6 Encounters:   03/12/18 128/68   02/27/18 140/70   01/30/18 138/80   01/18/18 130/70   12/14/17 156/88   12/05/17 (!) 158/91     Problem list and histories reviewed & adjusted, as indicated.  Additional history: none    Reviewed and updated as needed this visit by clinical staff  Tobacco        Past Medical History:   Diagnosis Date     Acute suppurative arthritis (H)      Acute, but ill-defined, cerebrovascular disease      Arthritis      Chronic headaches      Diabetes (H)     \"Pre-diabetes\"     Glucose intolerance (impaired glucose tolerance) 5/31/2013     Gout, unspecified      Hammer toe of left foot 10/21/2016     Hematuria      History of arthroplasty of right knee 8/17/2017     History of blood transfusion      Left knee pain, unspecified chronicity 5/8/2017     Lentigo maligna (H)      Malignant neoplasm of rectosigmoid junction (H)      Morbid obesity due to excess calories (H) 10/21/2016     Other convulsions     last seizure " 7-8 years ago...not certain if these were true seizres or not..     Pedal edema 1/18/2018     Pre-diabetes 10/21/2016     RBC microcytosis 2/14/2017     Right knee pain, unspecified chronicity 5/8/2017     Syncope      Tubulovillous adenoma of colon      Venous stasis dermatitis of both lower extremities 10/21/2016       Past Surgical History:   Procedure Laterality Date     ARTHROPLASTY KNEE Right 6/19/2017    Procedure: ARTHROPLASTY KNEE;  Right total knee arthroplasty, Hammer toe repair toe 2,3,4,5 left foot with osteotomy;  Surgeon: Alec Raymond MD;  Location:  OR     C NONSPECIFIC PROCEDURE      right heel surgery; spur removed.     COLONOSCOPY N/A 6/23/2015    Procedure: COMBINED COLONOSCOPY, SINGLE OR MULTIPLE BIOPSY/POLYPECTOMY BY BIOPSY;  Surgeon: Kimberly Alfaro MD;  Location:  GI     COLONOSCOPY N/A 7/30/2015    Procedure: COLONOSCOPY;  Surgeon: Enrique Salazar MD;  Location:  OR     HERNIORRHAPHY EPIGASTRIC  4/27/2012    Procedure:HERNIORRHAPHY EPIGASTRIC; Epigastric Hernia Repair with mesh ; Surgeon:BOLIVAR OLIVEIRA; Location: OR     LAPAROSCOPIC ASSISTED COLECTOMY Right 7/30/2015    Procedure: LAPAROSCOPIC ASSISTED COLECTOMY;  Surgeon: Enrique Salazar MD;  Location:  OR     ORTHOPEDIC SURGERY      lt knee arthroplasty     REALIGN PATELLA Right 10/9/2017    Procedure: REALIGN PATELLA;  Revision right total knee arthroplasty;  Surgeon: Alec Raymond MD;  Location:  OR     REPAIR HAMMER TOE Left 6/19/2017    Procedure: REPAIR HAMMER TOE;  Hammer toe repair toe 2,3,4,5 left foot with osteotomy   ;  Surgeon: Beverly Kim DPM, Podiatry/Foot and Ankle Surgery;  Location:  OR     SIGMOIDOSCOPY FLEXIBLE  5/29/2013    Procedure: SIGMOIDOSCOPY FLEXIBLE;  SIGMOIDOSCOPY FLEXIBLE (FV) Needs blood sugar ck'd;  Surgeon: Enrique Salazar MD;  Location:  GI     skin cancer excision         Family History   Problem Relation Age of Onset     Cancer - colorectal Mother       "CEREBROVASCULAR DISEASE Father        Social History   Substance Use Topics     Smoking status: Never Smoker     Smokeless tobacco: Never Used     Alcohol use 0.0 oz/week     0 Standard drinks or equivalent per week      Comment: Occas       Reviewed and updated as needed this visit by Provider         ROS:  Feels well . No dyspnea, fever     This document serves as a record of the services and decisions personally performed and made by Obi Strange MD. It was created on his behalf by Vannesa Schreiber, a trained medical scribe.  The creation of this document is based on the scribe's personal observations and the provider's statements to the medical scribe.  Vannesa Schreiber, March 12, 2018 9:55 AM    OBJECTIVE:     /70 (BP Location: Right arm, Patient Position: Chair, Cuff Size: Adult Large)  Pulse 60  Temp 97.6  F (36.4  C) (Oral)  Resp 14  Ht 1.778 m (5' 10\")  Wt 113.4 kg (250 lb)  BMI 35.87 kg/m2  Body mass index is 35.87 kg/(m^2).  BP on repeat   BP Readings from Last 1 Encounters:   03/12/18 128/68             ASSESSMENT/PLAN:     ASSESSMENT / PLAN:  (R60.0) Pedal edema  (primary encounter diagnosis)  Comment: Discussed custom compression hose when edema decreases  Plan: furosemide (LASIX) 20 MG tablet,             (R79.9) Abnormal finding of blood chemistry   Comment: broaden data base    Plan: Hemoglobin A1c            (M1A.9XX0) Chronic gout without tophus, unspecified cause, unspecified site  Comment: [place] database no therapies  Plan: Uric acid            (R73.03) Pre-diabetes  Comment:  Diabetes has been absent  Lab Results   Component Value Date    A1C 5.5 09/26/2017    A1C 5.9 04/17/2017    A1C 6.0 10/21/2016    A1C 6.3 07/31/2015    A1C 6.3 07/22/2015       Plan: Hemoglobin A1c    (R03.0) Elevated blood pressure reading without diagnosis of hypertension  Comment: Second reading normal     RTC 4 months     The information in this document, created by the medical scribe for me, accurately " reflects the services I personally performed and the decisions made by me. I have reviewed and approved this document for accuracy prior to leaving the patient care area.  Obi Strange MD March 12, 2018 9:55 AM    Obi Strange MD  [facility]

## 2018-03-12 NOTE — LETTER
March 13, 2018      Damien Hollandedgar  [address] [address] [facility] [address]        Dear ,    We are writing to inform you of your test results.    Pretty stable. Diabetes is staying away.    Resulted Orders   Hemoglobin A1c   Result Value Ref Range    Hemoglobin A1C 6.0 4.3 - 6.0 %   Uric acid   Result Value Ref Range    Uric Acid 7.5 (H) 3.5 - 7.2 mg/dL       If you have any questions or concerns, please call the clinic at the number listed above.       Sincerely,        Obi Strange MD

## 2018-03-12 NOTE — MR AVS SNAPSHOT
After Visit Summary   3/12/2018    Damien Vallecillo    MRN: [id number]           Patient Information     Date Of Birth          1935        Visit Information        Provider Department      3/12/2018 10:00 AM Obi Strange MD [facility]        Today's Diagnoses     Pedal edema    -  1    Abnormal finding of blood chemistry         Chronic gout without tophus, unspecified cause, unspecified site        Pre-diabetes        Elevated blood pressure reading without diagnosis of hypertension           Follow-ups after your visit        Follow-up notes from your care team     Return in about 4 months (around 7/12/2018).      Your next 10 appointments already scheduled     Mar 16, 2018  2:00 PM CDT   Lymphedema Treatment with Vinicio KJ Edward   [facility] Lymphedema OT ([facility])    [address] [address] [phone]            Jul 12, 2018 10:00 AM CDT   SHORT with Obi Strange MD   [facility]    [address] [place] [address] [phone]              Who to contact     If you have questions or need follow up information about today's clinic visit or your schedule please contact [facility] directly at [phone].  Normal or non-critical lab and imaging results will be communicated to you by MyChart, letter or phone within 4 business days after the clinic has received the results. If you do not hear from us within 7 days, please contact the clinic through MyChart or phone. If you have a critical or abnormal lab result, we will notify you by phone as soon as possible.  Submit refill requests through [employer] ([employer] or call your pharmacy and they will forward the refill request to us. Please allow 3 business days for your refill to be completed.          Additional Information About Your Visit        [employer] Information     [employer] lets you send messages  "to your doctor, view your test results, renew your prescriptions, schedule appointments and more. To sign up, go to [email or web address] . Click on \"Log in\" on the left side of the screen, which will take you to the Welcome page. Then click on \"Sign up Now\" on the right side of the page.     You will be asked to enter the access code listed below, as well as some personal information. Please follow the directions to create your username and password.     Your access code is: [email or web address]  Expires: 2018 11:55 AM     Your access code will  in 90 days. If you need help or a new code, please call your [place] clinic or [phone].        Care EveryWhere ID     This is your Care EveryWhere ID. This could be used by other organizations to access your [place] medical records  [id number]        Your Vitals Were     Pulse Temperature Respirations Height BMI (Body Mass Index)       60 97.6  F (36.4  C) (Oral) 14 5' 10\" (1.778 m) 35.87 kg/m2        Blood Pressure from Last 3 Encounters:   18 128/68   18 140/70   18 138/80    Weight from Last 3 Encounters:   18 250 lb (113.4 kg)   18 259 lb (117.5 kg)   18 259 lb (117.5 kg)              We Performed the Following     Hemoglobin A1c     Uric acid          Today's Medication Changes          These changes are accurate as of 3/12/18 11:59 PM.  If you have any questions, ask your nurse or doctor.               Stop taking these medicines if you haven't already. Please contact your care team if you have questions.     tamsulosin 0.4 MG capsule   Commonly known as:  FLOMAX   Stopped by:  bOi Strange MD                Where to get your medicines      These medications were sent to [facility] [address]  [address] [facility] [address]     Phone:  [phone]     furosemide 20 MG tablet                Primary Care Provider Office Phone # Fax #    Obi Strange MD " [phone]       [address]  [facility] [address]        Equal Access to Services     ZENON MART : Hadii aad ku hadshandaalber Aldo, wajuan joseda lujuanita, calistata kaalmada amparo, corin murraynelson gibson [facility] [phone].    ATENCIÓN: Si habla español, tiene a dumont disposición servicios gratuitos de asistencia lingüística. Llame al [phone].    We comply with applicable federal civil rights laws and [place] laws. We do not discriminate on the basis of race, color, national origin, age, disability, sex, sexual orientation, or gender identity.            Thank you!     Thank you for choosing [facility]  for your care. Our goal is always to provide you with excellent care. Hearing back from our patients is one way we can continue to improve our services. Please take a few minutes to complete the written survey that you may receive in the mail after your visit with us. Thank you!             Your Updated Medication List - Protect others around you: Learn how to safely use, store and throw away your medicines at www.disposemymeds.org.          This list is accurate as of 3/12/18 11:59 PM.  Always use your most recent med list.                   Brand Name Dispense Instructions for use Diagnosis    ACE NOT PRESCRIBED (INTENTIONAL)     0 each    ACE Inhibitor not prescribed due to Other:  Had cough on ACEI in past    Type 2 diabetes, HbA1c goal < 7% (H)       aspirin  MG EC tablet     30 tablet    Take 1 tablet (325 mg) by mouth daily    Status post total right knee replacement       atorvastatin 40 MG tablet    LIPITOR    90 tablet    Take 1 tablet (40 mg) by mouth At Bedtime    Pre-diabetes       * blood glucose monitoring test strip    ACCU-CHEK LAURA    100 strip    1Use to test blood sugar 2 times daily or as directed.    Glucose intolerance (impaired glucose tolerance)       * blood glucose monitoring test strip    ACCU-CHEK LAURA PLUS    100 each     Use to test blood sugar 1 time daily    Glucose intolerance (impaired glucose tolerance)       CINNAMON PO      Take 1 tablet by mouth daily        fish oil-omega-3 fatty acids 1000 MG capsule      Take 1 g by mouth every evening        furosemide 20 MG tablet    LASIX    180 tablet    Take 1 tablet (20 mg) by mouth 2 times daily    Pedal edema       losartan 100 MG tablet    COZAAR    30 tablet    Take 1 tablet (100 mg) by mouth daily    Essential hypertension       MULTIVITAMIN ADULT PO      Take 1 tablet by mouth daily Reported on 5/12/2017        * order for DME     1 each    Equipment being ordered: Walker Wheels () and Walker () Treatment Diagnosis: Impaired functional mobility    Status post total right knee replacement       * order for DME     1 each    1: Gradient Compression Wraps; 2: Cast Boots; 3: BLE knee or thigh high 20-30 mm Hg compression stocking; 4: BLE knee or thigh high custom compression stocking; 5: Alternative BLE knee high or thigh compression garments (velcro/buckling)    Leg swelling       triamcinolone 0.5 % cream    KENALOG    30 g    Apply sparingly to affected area three times daily.    Stasis dermatitis of both legs       * Notice:  This list has 4 medication(s) that are the same as other medications prescribed for you. Read the directions carefully, and ask your doctor or other care provider to review them with you.

## 2018-03-12 NOTE — NURSING NOTE
"Chief Complaint   Patient presents with     Recheck Medication     questions about Lasix        Initial /70 (BP Location: Right arm, Patient Position: Chair, Cuff Size: Adult Large)  Pulse 60  Temp 97.6  F (36.4  C) (Oral)  Resp 14  Ht 5' 10\" (1.778 m)  Wt 250 lb (113.4 kg)  BMI 35.87 kg/m2 Estimated body mass index is 35.87 kg/(m^2) as calculated from the following:    Height as of this encounter: 5' 10\" (1.778 m).    Weight as of this encounter: 250 lb (113.4 kg).  Medication Reconciliation: complete rt arm Marlene Juarez MA      "

## 2018-03-13 ENCOUNTER — HOSPITAL ENCOUNTER (OUTPATIENT)
Dept: OCCUPATIONAL THERAPY | Facility: CLINIC | Age: 83
Setting detail: THERAPIES SERIES
End: 2018-03-13
Attending: INTERNAL MEDICINE
Payer: MEDICARE

## 2018-03-13 PROCEDURE — 40000445 ZZHC STATISTIC OT VISIT, LYMPHEDEMA

## 2018-03-13 PROCEDURE — 97140 MANUAL THERAPY 1/> REGIONS: CPT | Mod: GO

## 2018-03-15 ENCOUNTER — TELEPHONE (OUTPATIENT)
Dept: FAMILY MEDICINE | Facility: CLINIC | Age: 83
End: 2018-03-15

## 2018-03-16 ENCOUNTER — HOSPITAL ENCOUNTER (OUTPATIENT)
Dept: OCCUPATIONAL THERAPY | Facility: CLINIC | Age: 83
Setting detail: THERAPIES SERIES
End: 2018-03-16
Attending: INTERNAL MEDICINE
Payer: MEDICARE

## 2018-03-16 PROCEDURE — 40000445 ZZHC STATISTIC OT VISIT, LYMPHEDEMA

## 2018-03-16 PROCEDURE — 97140 MANUAL THERAPY 1/> REGIONS: CPT | Mod: GO

## 2018-03-16 NOTE — TELEPHONE ENCOUNTER
Looked online for information where to send this for a long time.  Called 1-800-MEDICARE.  On hold over 10 minutes.  Advised need to call provider line [phone].  Helen Quan RN

## 2018-03-19 NOTE — TELEPHONE ENCOUNTER
Tried calling provider # and just went through choices and could not get human.  Not sure what more to do with this.  Helen Quan RN

## 2018-03-20 NOTE — TELEPHONE ENCOUNTER
Routing to ordering provider to see if information on where to send the appeal letter.  Dr. Strange had advised contact OT for information on what to do with the letter if unable to find on-line or by calling.  Letter is on my desk and I can sure fax to you or if you can provider any information that would be very helpful.  Thanks, Helen Quan RN

## 2018-04-17 ENCOUNTER — HOSPITAL ENCOUNTER (OUTPATIENT)
Dept: OCCUPATIONAL THERAPY | Facility: CLINIC | Age: 83
Setting detail: THERAPIES SERIES
End: 2018-04-17
Attending: INTERNAL MEDICINE
Payer: MEDICARE

## 2018-04-17 PROCEDURE — 97535 SELF CARE MNGMENT TRAINING: CPT | Mod: GO

## 2018-04-17 PROCEDURE — 40000445 ZZHC STATISTIC OT VISIT, LYMPHEDEMA

## 2018-04-24 ENCOUNTER — OFFICE VISIT (OUTPATIENT)
Dept: SURGERY | Facility: CLINIC | Age: 83
End: 2018-04-24
Payer: MEDICARE

## 2018-04-24 VITALS
SYSTOLIC BLOOD PRESSURE: 132 MMHG | OXYGEN SATURATION: 97 % | BODY MASS INDEX: 35.79 KG/M2 | WEIGHT: 250 LBS | HEART RATE: 88 BPM | RESPIRATION RATE: 16 BRPM | DIASTOLIC BLOOD PRESSURE: 68 MMHG | HEIGHT: 70 IN

## 2018-04-24 DIAGNOSIS — M79.89 LEG SWELLING: Primary | ICD-10-CM

## 2018-04-24 DIAGNOSIS — I89.0 LYMPHEDEMA OF EXTREMITY: ICD-10-CM

## 2018-04-24 DIAGNOSIS — I87.2 VENOUS STASIS DERMATITIS OF BOTH LOWER EXTREMITIES: ICD-10-CM

## 2018-04-24 DIAGNOSIS — I87.2 VENOUS (PERIPHERAL) INSUFFICIENCY: ICD-10-CM

## 2018-04-24 PROCEDURE — 99214 OFFICE O/P EST MOD 30 MIN: CPT | Performed by: INTERNAL MEDICINE

## 2018-04-24 NOTE — LETTER
[facility] 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 Arnav Cunningham      [facility] [address]  [facility] [address]  Phone: [phone]  Fax: [phone]

## 2018-04-24 NOTE — PROGRESS NOTES
Vascular Medicine Progress Note     Damien Vallecillo is a 82 year old male who was admitted on (Not on file).  Patient is here for follow-up status post MLD treatment and compression treatment    Interval History   Leg swelling is much better swelling is down by 80% and 5 cm circumference less than when I examined the patient  Symptoms and signs of venous insufficiency is much better responding to compression    Physical Exam       BP: 132/68 Pulse: 88   Resp: 16 SpO2: 97 %      Vitals:    04/24/18 1321   Weight: 113.4 kg (250 lb)     Vital Signs with Ranges  Pulse:  [88] 88  Resp:  [16] 16  BP: (132)/(68) 132/68  SpO2:  [97 %] 97 %  [unfilled]    Constitutional: awake, alert, cooperative, no apparent distress, and appears stated age  Eyes: Lids and lashes normal, pupils equal, round and reactive to light, extra ocular muscles intact, sclera clear, conjunctiva normal  ENT: normocepalic, without obvious abnormality, oropharynx pink and moist  Hematologic / Lymphatic: no lymphadenopathy  Respiratory: No increased work of breathing, good air exchange, clear to auscultation bilaterally, no crackles or wheezing  Cardiovascular: regular rate and rhythm, normal S1 and S2 and no murmur noted  GI: Normal bowel sounds, soft, non-distended, non-tender  Skin: no redness, warmth, or swelling, no rashes and no lesions  Musculoskeletal: There is no redness, warmth, or swelling of the joints.  Full range of motion noted.  Motor strength is 5 out of 5 all extremities bilaterally.  Tone is normal.  Neurologic: Awake, alert, oriented to name, place and time.  Cranial nerves II-XII are grossly intact.  Motor is 5 out of 5 bilaterally.    Neuropsychiatric:  Normal affect, memory, insight.  Pulses: Same as previous exam. No carotid bruits appreciated.     Medications         Data   No results found for this or any previous visit (from the past 24 hour(s)).    Assessment & Plan   No diagnosis found.      Summary: Secondary  lymphedema, responding to MLD and compression treatment  Bilateral lower extremity venous insufficiency both in superficial and deep systems, responding to compression  Patient will need a letter to be addressed to  To Whom It May Concern  Patient has also carries the diagnosis of secondary lymphedema to chronic venous insufficiency with symptomatic varicose veins causing pain swelling secondary lymphedema and restless leg symptoms  Patient may have 2 copies please and the letter should be mailed to his residence address    Arnav Cunningham MD

## 2018-04-24 NOTE — MR AVS SNAPSHOT
"              After Visit Summary   4/24/2018    Damien Vallecillo    MRN: [id number]           Patient Information     Date Of Birth          1935        Visit Information        Provider Department      4/24/2018 1:30 PM Arnav Cunningham MD [facility]      Today's Diagnoses     Leg swelling    -  1    Venous (peripheral) insufficiency        Venous stasis dermatitis of both lower extremities        Lymphedema of extremity           Follow-ups after your visit        Follow-up notes from your care team     Return in about 6 months (around 10/24/2018).      Your next 10 appointments already scheduled     Jul 12, 2018 10:00 AM CDT   SHORT with Obi Strange MD   [facility]    [address] [place] [address] [phone]              Who to contact     If you have questions or need follow up information about today's clinic visit or your schedule please contact [facility] directly at [phone].  Normal or non-critical lab and imaging results will be communicated to you by FanBoomhart, letter or phone within 4 business days after the clinic has received the results. If you do not hear from us within 7 days, please contact the clinic through FanBoomhart or phone. If you have a critical or abnormal lab result, we will notify you by phone as soon as possible.  Submit refill requests through [employer] or call your pharmacy and they will forward the refill request to us. Please allow 3 business days for your refill to be completed.          Additional Information About Your Visit        FanBoomhart Information     [employer]ader lets you send messages to your doctor, view your test results, renew your prescriptions, schedule appointments and more. To sign up, go to [email or web address] . Click on \"Log in\" on the left side of the screen, which will take you to the Welcome page. " "Then click on \"Sign up Now\" on the right side of the page.     You will be asked to enter the access code listed below, as well as some personal information. Please follow the directions to create your username and password.     Your access code is: [email or web address]  Expires: 2018 11:55 AM     Your access code will  in 90 days. If you need help or a new code, please call your [place] clinic or [phone].        Care EveryWhere ID     This is your Care EveryWhere ID. This could be used by other organizations to access your [place] medical records  [id number]        Your Vitals Were     Pulse Respirations Height Pulse Oximetry BMI (Body Mass Index)       88 16 1.778 m (5' 10\") 97% 35.87 kg/m2        Blood Pressure from Last 3 Encounters:   18 132/68   18 128/68   18 140/70    Weight from Last 3 Encounters:   18 113.4 kg (250 lb)   18 113.4 kg (250 lb)   18 117.5 kg (259 lb)              Today, you had the following     No orders found for display       Primary Care Provider Office Phone # Fax #    Obi Strange [phone] [address] [facility] [address]        Equal Access to Services     [facility] : Hadii aad ku hadasho Socorrieali, waaxda luqadaha, qaybta kaalmada adeeggaudencioda, corin blank . So [facility] [phone].    ATENCIÓN: Si habla español, tiene a dumont disposición servicios gratuitos de asistencia lingüística. Haley al [phone].    We comply with applicable federal civil rights laws and [place] laws. We do not discriminate on the basis of race, color, national origin, age, disability, sex, sexual orientation, or gender identity.            Thank you!     Thank you for choosing [facility]  for your care. Our goal is always to provide you with excellent care. Hearing back from our patients is one way we can continue to improve our services. Please take a few minutes to complete the " written survey that you may receive in the mail after your visit with us. Thank you!             Your Updated Medication List - Protect others around you: Learn how to safely use, store and throw away your medicines at www.disposemymeds.org.          This list is accurate as of 4/24/18  2:04 PM.  Always use your most recent med list.                   Brand Name Dispense Instructions for use Diagnosis    ACE NOT PRESCRIBED (INTENTIONAL)     0 each    ACE Inhibitor not prescribed due to Other:  Had cough on ACEI in past    Type 2 diabetes, HbA1c goal < 7% (H)       aspirin 325 MG EC tablet     30 tablet    Take 1 tablet (325 mg) by mouth daily    Status post total right knee replacement       atorvastatin 40 MG tablet    LIPITOR    90 tablet    Take 1 tablet (40 mg) by mouth At Bedtime    Pre-diabetes       * blood glucose monitoring test strip    ACCU-CHEK LAURA    100 strip    1Use to test blood sugar 2 times daily or as directed.    Glucose intolerance (impaired glucose tolerance)       * blood glucose monitoring test strip    ACCU-CHEK LAURA PLUS    100 each    Use to test blood sugar 1 time daily    Glucose intolerance (impaired glucose tolerance)       CINNAMON PO      Take 1 tablet by mouth daily        fish oil-omega-3 fatty acids 1000 MG capsule      Take 1 g by mouth every evening        furosemide 20 MG tablet    LASIX    180 tablet    Take 1 tablet (20 mg) by mouth 2 times daily    Pedal edema       losartan 100 MG tablet    COZAAR    30 tablet    Take 1 tablet (100 mg) by mouth daily    Essential hypertension       MULTIVITAMIN ADULT PO      Take 1 tablet by mouth daily Reported on 5/12/2017        * order for DME     1 each    Equipment being ordered: Walker Wheels () and Walker () Treatment Diagnosis: Impaired functional mobility    Status post total right knee replacement       * order for DME     1 each    1: Gradient Compression Wraps; 2: Cast Boots; 3: BLE knee or thigh high 20-30 mm Hg  compression stocking; 4: BLE knee or thigh high custom compression stocking; 5: Alternative BLE knee high or thigh compression garments (velcro/buckling)    Leg swelling       triamcinolone 0.5 % cream    KENALOG    30 g    Apply sparingly to affected area three times daily.    Stasis dermatitis of both legs       * Notice:  This list has 4 medication(s) that are the same as other medications prescribed for you. Read the directions carefully, and ask your doctor or other care provider to review them with you.

## 2018-04-30 ENCOUNTER — TELEPHONE (OUTPATIENT)
Dept: OTHER | Facility: CLINIC | Age: 83
End: 2018-04-30

## 2018-04-30 NOTE — TELEPHONE ENCOUNTER
Patient called and was requesting 2 separate letters for socks and additional information for coverage of his ultrasound.  Patient was advised that both letters sent (2 of the same printed and sign) were sent for that purpose as the provider felt that was the additional language that was needed by insurance companies.  Patient states understanding and encouraged to call if he needed additional letter/ information  Dorene Justin RN, BSN

## 2018-05-18 ENCOUNTER — PATIENT OUTREACH (OUTPATIENT)
Dept: CARE COORDINATION | Facility: CLINIC | Age: 83
End: 2018-05-18

## 2018-05-18 NOTE — PROGRESS NOTES
Clinic Care Coordination Contact  [facility]/Voicemail       Clinical Data: Care Coordinator Outreach - f/u edema/knee pain  Outreach attempted x 1.  Left message on voicemail with call back information and requested return call.  Plan: Care Coordinator will try to reach patient again in 1-2 business days.    Flakita Mast Care Coordinator RN  [facility]  [phone]  May 18, 2018

## 2018-05-23 NOTE — PROGRESS NOTES
Clinic Care Coordination Contact  OUTREACH    Referral Information:  Referral Source: IP Handoff  Primary Diagnosis: Orthopedic  Chief Complaint   Patient presents with     Clinic Care Coordination - Follow-up     knee pain / edema - CCRN      [place] Utilization:   Utilization    Last refreshed: 5/18/2018  1:33 PM:  No Show Count (past year) 0       Last refreshed: 5/22/2018  3:23 PM:  ED visits 0       Last refreshed: 5/22/2018  3:23 PM:  Hospital admissions 2          Current as of: 5/18/2018  1:33 PM           Clinical Concerns:  Health Maintenance Reviewed: Due/Overdue   Health Maintenance Due   Topic Date Due     MEDICARE ANNUAL WELLNESS VISIT  10/21/2017     FALL RISK ASSESSMENT  10/21/2017     PHQ-9 Q1YR  04/17/2018     APPLE QUESTIONNAIRE 1 YEAR  05/08/2018      Current Medical Concerns:  Knee pain / edema   Patient states that he has gotten the swelling to go down with use of compression stockings   Patient states he purchased the stockings from [employer] - they are the ones that Dr. Strange suggested he have, and they have been working great. However Medicare / insurance will not reimburse him for the stockings and they were very expensive - letter was faxed to medicare and he has not heard anything - however due to competitive bidding they likely will not cover   Patient has attended lymphedema treatment through OT   No concerns right now for pcp - appt. Scheduled in July     Clinical Pathway Name: None     Current Behavioral Concerns: None     Education Provided to patient: call in future if needs arise      Medication Management:  States compliance - no concerns      Functional Status:  Mobility Status: Independent w/Device  Equipment Currently Used at Home: crutches, walker, rolling, other (see comments) (compression stockings )  Transportation means:: Regular car, Family     Psychosocial:  Financial/Insurance: no concerns   Current living arrangement:: I live in a private home with spouse     Resources  and Interventions:  Current Resources:  None  Advanced Care Plans/Directives on file:: No        Goals: unofficial goal - wear compression stockings     Patient/Caregiver understanding: yes      Next 5 appointments (look out 90 days)     Jul 12, 2018 10:00 AM CDT   SHORT with Obi Strange MD   [facility]    [address] [place] [address]   [phone]                    Plan:   Patient will continue working with lymphedema therapy as needed and wear compression stockings   Closing care coordination at this time patient to call if future needs arise     Flakita Mast Care Coordinator RN  [facility]  [phone]  May 23, 2018

## 2018-06-05 ENCOUNTER — PATIENT OUTREACH (OUTPATIENT)
Dept: CARE COORDINATION | Facility: CLINIC | Age: 83
End: 2018-06-05

## 2018-06-05 NOTE — PROGRESS NOTES
Clinic Care Coordination Contact    Clinic Care Coordination Contact  OUTREACH    Referral Information:  Referral Source: IP Handoff    Primary Diagnosis: Orthopedic    Chief Complaint   Patient presents with     Clinic Care Coordination - Follow-up     face to face- requesting assist with paperwork, CCRN       [place] Utilization:   Difficulty keeping appointments:: No  Utilization    Last refreshed: 5/23/2018 12:37 PM:  No Show Count (past year) 0       Last refreshed: 5/23/2018 12:37 PM:  ED visits 0       Last refreshed: 5/23/2018 12:37 PM:  Hospital admissions 2          Current as of: 5/23/2018 12:37 PM           Clinical Concerns:  Current Medical Concerns:  Patient in clinic today with wife who is attending appt. with pcp     Discussed frustations with   Damien brings paperwork with him today from CMS medicare -   Patient was advised by OT that he needed stockings for lymphedema treatment - these stockings were ordered for patient by [facility] - CCRN unable to see notes from [facility] in epic - patient was advised that if he paid out of pocket $680 at time of order he would get a discount so he paid it on credit card.   Patient states that the order was received from [facility]   Patient has tried to submit to medicare for reimbursement and they have sent him a letter asking   1. For the provider/supplier to bill medicare or write a letter stating they will not bill medicare (however the company will not bill as patient paid cash for the stockings)   2. Completed CMS claim form   3. Itemized bill     Discussed competitive bidding - and that if medicare would cover it would need to be dispensed from certain company - at this time it is unknown if insurance will cover especially if dispensed from incorrect DME company that is not approved on competitive bidding. Patient does have bill at home and will provide CCRN with Lucy phone number - CCRN will try to connect with Lucy to see if she has any other information.   At  this time patient is aware he likely will not get paid back for the stockings.     Patient also notes frustrations that labs/echo was not covered by insurance as well     Current Behavioral Concerns: patient pleasant and talkative   No concerns noted        Health Maintenance Reviewed: Due/Overdue   Health Maintenance Due   Topic Date Due     MEDICARE ANNUAL WELLNESS VISIT  10/21/2017     FALL RISK ASSESSMENT  10/21/2017     PHQ-9 Q1YR  04/17/2018     APPLE QUESTIONNAIRE 1 YEAR  05/08/2018     Clinical Pathway: None    Medication Management:  No concerns      Functional Status:  Dependent ADLs:: Ambulation-cane  Bed or wheelchair confined:: No  Mobility Status: Independent w/Device    Living Situation:  Current living arrangement:: I live in a private home with spouse    Diet/Exercise/Sleep:  Diet:: Regular  Inadequate nutrition (GOAL):: No  Food Insecurity: No  Tube Feeding: No    Transportation:  Transportation concerns (GOAL):: No  Transportation means:: Regular car, Family     Psychosocial:  Mental health DX:: No  Mental health management concern (GOAL):: No  Informal Support system:: Spouse     Financial/Insurance:   Financial/Insurance concerns (GOAL):: NO      Resources and Interventions:  Current Resources: None      Equipment Currently Used at Home: crutches, walker, rolling, other (see comments) (compression stockings )    Advance Care Plan/Directive  Advanced Care Plans/Directives on file:: No  Advanced Care Plan/Directive Status: Not Applicable    Goals: No concerns     Patient/Caregiver understanding: yes        Future Appointments              In 1 month Obi Strange MD [facility]          Plan:   Patient will get phone number to CCRN of [facility] who assisted in ordering stockings - CCRN will try to assist with getting reimbursed for stockings     Flakita Mast Care Coordinator RN  [facility]  [phone]  June 5, 2018

## 2018-06-13 ENCOUNTER — PATIENT OUTREACH (OUTPATIENT)
Dept: CARE COORDINATION | Facility: CLINIC | Age: 83
End: 2018-06-13

## 2018-06-13 NOTE — PROGRESS NOTES
Clinic Care Coordination Contact    Patient had asked for CCRN to assist with getting reimbursement from medicare for compression stockings he received from [facility]     Patient paid $668.00 and then tried to get reimbursed by medicare     The letter he received from medicare asked for specific documentation.   Providers have written letters and he still has not gotten reimbursed.     CCRN placed call to  orthotics to question if patient had used competitive bidding supplier if it would have been covered and CCRN was told that medicare will not cover the cost of compression stockings - no matter what documentation is provided     CCRN placed call to patient and was not able to leave a message as it asked for a code to be put in - which CCRN does not have.     CCRN will mail letter to patient with this information and the original paperwork he dropped off for CCRN at the clinic     No further outreaches will be made at this time     Flakita Mast Care Coordinator RN  [facility]  [phone]  June 13, 2018

## 2018-06-13 NOTE — LETTER
[facility]  [address] [address]  [phone]    June 13, 2018    Damien Vallecillo  [address] [facility] [address]      Dear Damien -     I tried to call you today but your voicemail said I had to enter a code and I did not have a code so I was not able to leave you a message.     I am writing to give you an update. About a week ago we met in the clinic and you had asked for assistance with getting reimbursed for compression stockings.     I have spoken to the [facility] orthotics department and they have advised that medicare will NOT pay for the compression stockings.     At this time I dont believe there is anything we can do to change this outcome. I am very sorry I was not much help in resolving this matter.     I have enclosed the original paperwork you left for me at the clinic. If there is anything in the future that I can do to help please let me know.     Take care,     Sincerely  Flakita Mast Care Coordinator RN  [facility]  [phone]  June 13, 2018

## 2018-07-12 ENCOUNTER — OFFICE VISIT (OUTPATIENT)
Dept: FAMILY MEDICINE | Facility: CLINIC | Age: 83
End: 2018-07-12
Payer: MEDICARE

## 2018-07-12 VITALS
RESPIRATION RATE: 18 BRPM | HEART RATE: 62 BPM | SYSTOLIC BLOOD PRESSURE: 140 MMHG | DIASTOLIC BLOOD PRESSURE: 68 MMHG | TEMPERATURE: 98.1 F | OXYGEN SATURATION: 98 %

## 2018-07-12 DIAGNOSIS — Z96.651 HISTORY OF ARTHROPLASTY OF RIGHT KNEE: ICD-10-CM

## 2018-07-12 DIAGNOSIS — R03.0 ELEVATED BLOOD PRESSURE READING WITHOUT DIAGNOSIS OF HYPERTENSION: ICD-10-CM

## 2018-07-12 DIAGNOSIS — B35.1 ONYCHOMYCOSIS: ICD-10-CM

## 2018-07-12 DIAGNOSIS — R60.0 PEDAL EDEMA: Primary | ICD-10-CM

## 2018-07-12 DIAGNOSIS — T15.90XA FOREIGN BODY IN EYE, UNSPECIFIED LATERALITY, INITIAL ENCOUNTER: ICD-10-CM

## 2018-07-12 LAB
ANION GAP SERPL CALCULATED.3IONS-SCNC: 8 MMOL/L (ref 3–14)
BUN SERPL-MCNC: 14 MG/DL (ref 7–30)
CALCIUM SERPL-MCNC: 8.6 MG/DL (ref 8.5–10.1)
CHLORIDE SERPL-SCNC: 107 MMOL/L (ref 94–109)
CO2 SERPL-SCNC: 27 MMOL/L (ref 20–32)
CREAT SERPL-MCNC: 0.84 MG/DL (ref 0.66–1.25)
GFR SERPL CREATININE-BSD FRML MDRD: 88 ML/MIN/1.7M2
GLUCOSE SERPL-MCNC: 129 MG/DL (ref 70–99)
POTASSIUM SERPL-SCNC: 4.2 MMOL/L (ref 3.4–5.3)
SODIUM SERPL-SCNC: 142 MMOL/L (ref 133–144)

## 2018-07-12 PROCEDURE — 36415 COLL VENOUS BLD VENIPUNCTURE: CPT | Performed by: FAMILY MEDICINE

## 2018-07-12 PROCEDURE — 80048 BASIC METABOLIC PNL TOTAL CA: CPT | Performed by: FAMILY MEDICINE

## 2018-07-12 PROCEDURE — 99214 OFFICE O/P EST MOD 30 MIN: CPT | Performed by: FAMILY MEDICINE

## 2018-07-12 RX ORDER — FUROSEMIDE 20 MG
20 TABLET ORAL 2 TIMES DAILY
Qty: 180 TABLET | Refills: 1 | Status: SHIPPED | OUTPATIENT
Start: 2018-07-12 | End: 2022-03-29

## 2018-07-12 NOTE — MR AVS SNAPSHOT
After Visit Summary   7/12/2018    Damien Vallecillo    MRN: [id number]           Patient Information     Date Of Birth          1935        Visit Information        Provider Department      7/12/2018 10:00 AM Obi Strange MD [facility]        Today's Diagnoses     Pedal edema    -  1    Foreign body in eye, unspecified laterality, initial encounter        Onychomycosis        History of arthroplasty of right knee        Elevated blood pressure reading without diagnosis of hypertension          Care Instructions    New Shingles Vaccination          Follow-ups after your visit        Your next 10 appointments already scheduled     Jul 31, 2018   Procedure with Tiffany Cheema MD   [facility] ([facility])    [address] [place] [address] [phone]           [facility] is located at [address] [place]              Who to contact     If you have questions or need follow up information about today's clinic visit or your schedule please contact [facility] directly at [phone].  Normal or non-critical lab and imaging results will be communicated to you by MyChart, letter or phone within 4 business days after the clinic has received the results. If you do not hear from us within 7 days, please contact the clinic through MyChart or phone. If you have a critical or abnormal lab result, we will notify you by phone as soon as possible.  Submit refill requests through [employer] or call your pharmacy and they will forward the refill request to us. Please allow 3 business days for your refill to be completed.          Additional Information About Your Visit        Care EveryWhere ID     This is your Care EveryWhere ID. This could be used by other organizations to access your [place] medical records  [id number]        Your Vitals Were     Pulse Temperature Respirations Pulse Oximetry           62 98.1  F (36.7  C) (Oral) 18 98%         Blood Pressure from Last 3 Encounters:   07/12/18 140/68   04/24/18 132/68   03/12/18 128/68    Weight from Last 3 Encounters:   04/24/18 250 lb (113.4 kg)   03/12/18 250 lb (113.4 kg)   02/27/18 259 lb (117.5 kg)              We Performed the Following     Basic metabolic panel  (Ca, Cl, CO2, Creat, Gluc, K, Na, BUN)          Where to get your medicines      These medications were sent to [facility] [address]  [address] [facility] [address]     Phone:  [phone]     furosemide 20 MG tablet          Primary Care Provider Office Phone # Fax #    Obi Strange [phone]  [id number]        Equal Access to Services     [facility] : Hadii benjamin mccormick hadasho Soomaali, waaxda luqadaha, qaybta kaalmada adeegyada, corin blank . So [facility] [phone].    ATENCIÓN: Si habla español, tiene a dumont disposición servicios gratuitos de asistencia lingüística. Llame al [phone].    We comply with applicable federal civil rights laws and [place] laws. We do not discriminate on the basis of race, color, national origin, age, disability, sex, sexual orientation, or gender identity.            Thank you!     Thank you for choosing [facility]  for your care. Our goal is always to provide you with excellent care. Hearing back from our patients is one way we can continue to improve our services. Please take a few minutes to complete the written survey that you may receive in the mail after your visit with us. Thank you!             Your Updated Medication List - Protect others around you: Learn how to safely use, store and throw away your medicines at www.disposemymeds.org.          This list is accurate as of 7/12/18  3:24 PM.  Always use your most recent med list.                   Brand Name Dispense Instructions for use Diagnosis     ACE NOT PRESCRIBED (INTENTIONAL)     0 each    ACE Inhibitor not prescribed due to Other:  Had cough on ACEI in past    Type 2 diabetes, HbA1c goal < 7% (H)       aspirin 325 MG EC tablet     30 tablet    Take 1 tablet (325 mg) by mouth daily    Status post total right knee replacement       atorvastatin 40 MG tablet    LIPITOR    90 tablet    Take 1 tablet (40 mg) by mouth At Bedtime    Pre-diabetes       * blood glucose monitoring test strip    ACCU-CHEK LAURA    100 strip    1Use to test blood sugar 2 times daily or as directed.    Glucose intolerance (impaired glucose tolerance)       * blood glucose monitoring test strip    ACCU-CHEK LAURA PLUS    100 each    Use to test blood sugar 1 time daily    Glucose intolerance (impaired glucose tolerance)       CINNAMON PO      Take 1 tablet by mouth daily        fish oil-omega-3 fatty acids 1000 MG capsule      Take 1 g by mouth every evening        furosemide 20 MG tablet    LASIX    180 tablet    Take 1 tablet (20 mg) by mouth 2 times daily    Pedal edema       losartan 100 MG tablet    COZAAR    30 tablet    Take 1 tablet (100 mg) by mouth daily    Essential hypertension       MULTIVITAMIN ADULT PO      Take 1 tablet by mouth daily Reported on 5/12/2017        * order for DME     1 each    Equipment being ordered: Walker Wheels () and Walker () Treatment Diagnosis: Impaired functional mobility    Status post total right knee replacement       * order for DME     1 each    1: Gradient Compression Wraps; 2: Cast Boots; 3: BLE knee or thigh high 20-30 mm Hg compression stocking; 4: BLE knee or thigh high custom compression stocking; 5: Alternative BLE knee high or thigh compression garments (velcro/buckling)    Leg swelling       triamcinolone 0.5 % cream    KENALOG    30 g    Apply sparingly to affected area three times daily.    Stasis dermatitis of both legs       * Notice:  This list has 4 medication(s) that are the same as other medications prescribed  for you. Read the directions carefully, and ask your doctor or other care provider to review them with you.

## 2018-07-12 NOTE — PROGRESS NOTES
"  SUBJECTIVE:   Damien Vallecillo is a 82 year old male who presents to clinic today for the following health issues:    Pt presents to the clinic with the following complaints. Pt has been having lots of right knee pain with a history of two surgeries. Pt also has left big toe that has been bothering his as well. Pt also states he has had a few bug fly into both eyes and is concerned that the bugs may be stuck in his eye/eyelid.         Problem list and histories reviewed & adjusted, as indicated.  Additional history:         Reviewed and updated as needed this visit by clinical staff  Tobacco  Allergies  Meds  Med Hx  Surg Hx  Fam Hx  Soc Hx      Reviewed and updated as needed this visit by Provider     Pedal edema  (primary encounter diagnosis) well controlled with support hose diuretics  Foreign body in eye, unspecified laterality, initial encounter occ ,blames \"gnats\"  Onychomycosis Left hallux major he wonders if this is old trauma. Occ pain last 2 years  History of arthroplasty of right knee 2 surgeons recommend no further surgery for subluxed patella. Still has variable pain  Elevated blood pressure reading without diagnosis of hypertension   BP Readings from Last 6 Encounters:   07/12/18 140/68   04/24/18 132/68   03/12/18 128/68   02/27/18 140/70   01/30/18 138/80   01/18/18 130/70       Past Medical History:   Diagnosis Date     Acute suppurative arthritis (H)      Acute, but ill-defined, cerebrovascular disease      Arthritis      Chronic headaches      Diabetes (H)     \"Pre-diabetes\"     Glucose intolerance (impaired glucose tolerance) 5/31/2013     Gout, unspecified      Hammer toe of left foot 10/21/2016     Hematuria      History of arthroplasty of right knee 08/17/2017     History of blood transfusion      Left knee pain, unspecified chronicity 5/8/2017     Lentigo maligna (H)      Malignant neoplasm of rectosigmoid junction (H)      Morbid obesity due to excess calories (H) 10/21/2016     Other " convulsions     last seizure 7-8 years ago...not certain if these were true seizres or not..     Pedal edema 1/18/2018     Pre-diabetes 10/21/2016     RBC microcytosis 2/14/2017     Right knee pain, unspecified chronicity 5/8/2017     Syncope      Tubulovillous adenoma of colon      Venous stasis dermatitis of both lower extremities 10/21/2016       Past Surgical History:   Procedure Laterality Date     ARTHROPLASTY KNEE Right 6/19/2017    Procedure: ARTHROPLASTY KNEE;  Right total knee arthroplasty, Hammer toe repair toe 2,3,4,5 left foot with osteotomy;  Surgeon: Alec Raymond MD;  Location:  OR     C NONSPECIFIC PROCEDURE      right heel surgery; spur removed.     COLONOSCOPY N/A 6/23/2015    Procedure: COMBINED COLONOSCOPY, SINGLE OR MULTIPLE BIOPSY/POLYPECTOMY BY BIOPSY;  Surgeon: Kimberly Alfaro MD;  Location:  GI     COLONOSCOPY N/A 7/30/2015    Procedure: COLONOSCOPY;  Surgeon: Enrique Salazar MD;  Location:  OR     HERNIORRHAPHY EPIGASTRIC  4/27/2012    Procedure:HERNIORRHAPHY EPIGASTRIC; Epigastric Hernia Repair with mesh ; Surgeon:BOLIVAR OLIVEIRA; Location: OR     LAPAROSCOPIC ASSISTED COLECTOMY Right 7/30/2015    Procedure: LAPAROSCOPIC ASSISTED COLECTOMY;  Surgeon: Enrique Salazar MD;  Location:  OR     ORTHOPEDIC SURGERY      lt knee arthroplasty     REALIGN PATELLA Right 10/9/2017    Procedure: REALIGN PATELLA;  Revision right total knee arthroplasty;  Surgeon: Alec Raymond MD;  Location:  OR     REPAIR HAMMER TOE Left 6/19/2017    Procedure: REPAIR HAMMER TOE;  Hammer toe repair toe 2,3,4,5 left foot with osteotomy   ;  Surgeon: Beverly Kim DPM, Podiatry/Foot and Ankle Surgery;  Location:  OR     SIGMOIDOSCOPY FLEXIBLE  5/29/2013    Procedure: SIGMOIDOSCOPY FLEXIBLE;  SIGMOIDOSCOPY FLEXIBLE (FV) Needs blood sugar ck'd;  Surgeon: Enrique Salazar MD;  Location:  GI     skin cancer excision         Family History   Problem Relation Age of Onset      Cancer - colorectal Mother      Cerebrovascular Disease Father        Social History   Substance Use Topics     Smoking status: Never Smoker     Smokeless tobacco: Never Used     Alcohol use 0.0 oz/week     0 Standard drinks or equivalent per week      Comment: Occas     ROS no falls, no systemic symptoms  /68 (BP Location: Right arm, Patient Position: Chair, Cuff Size: Adult Regular)  Pulse 62  Temp 98.1  F (36.7  C) (Oral)  Resp 18  SpO2 98%  Tr edema, sock line bilat  XRays knee reviewed  Eyes w/o injection, FB  L hallux major thickened nail    ASSESSMENT / PLAN:  (R60.0) Pedal edema  (primary encounter diagnosis)  Comment: discussed dustin hx  Plan: Basic metabolic panel  (Ca, Cl, CO2, Creat,         Gluc, K, Na, BUN), furosemide (LASIX) 20 MG         tablet        sox    (T15.90XA) Foreign body in eye, unspecified laterality, initial encounter  Comment: sensation  Plan: discussed home lavage. Exam reassuring. If insect source true, may have very small chitin piece    (B35.1) Onychomycosis  Comment:he desires no therapies  Plan:     (Z96.651) History of arthroplasty of right knee  Comment: discussed PT vs home ex  Plan: he will purse the latter    (R03.0) Elevated blood pressure reading without diagnosis of hypertension  Comment: usually OK  Plan: monitor          Obi Strange MD

## 2018-07-12 NOTE — LETTER
July 13, 2018      Damien Vallecillo  [address] [facility] [id number]        Dear ,    We are writing to inform you of your test results.    Tests are all quite stable     ALICE HAMEED       Resulted Orders   Basic metabolic panel  (Ca, Cl, CO2, Creat, Gluc, K, Na, BUN)   Result Value Ref Range    Sodium 142 133 - 144 mmol/L    Potassium 4.2 3.4 - 5.3 mmol/L    Chloride 107 94 - 109 mmol/L    Carbon Dioxide 27 20 - 32 mmol/L    Anion Gap 8 3 - 14 mmol/L    Glucose 129 (H) 70 - 99 mg/dL      Comment:      Non Fasting    Urea Nitrogen 14 7 - 30 mg/dL    Creatinine 0.84 0.66 - 1.25 mg/dL    GFR Estimate 88 >60 mL/min/1.7m2      Comment:      Non  GFR Calc    GFR Estimate If Black >90 >60 mL/min/1.7m2      Comment:       GFR Calc    Calcium 8.6 8.5 - 10.1 mg/dL       If you have any questions or concerns, please call the clinic at the number listed above.       Sincerely,        Alice Hameed MD/MM

## 2018-07-26 NOTE — PROGRESS NOTES
Outpatient Occupational Therapy Discharge Note     Patient: Damien Vallecillo  : 1935    Beginning/End Dates of Reporting Period:  2/15/18 to 2018    Referring Provider: Arnav Cunningham Diagnosis: leg swelling    Client Self Report:       Objective Measurements:see flowsheet                                                        Outcome Measures (most recent score):  Lymphedema Life Impact Scale (score range 0-72). A higher score indicates greater impairment.: 20-post score from pre score 36 indicates benefit of services    Goals:   Goal Identifier 1   Goal Description In order to improve functional mobility for activities of living, by the completion of intensive treatment,  patient and/or caregiver will:   Target Date 18   Date Met      Progress:     Goal Identifier 1a   Goal Description tolerate gradient compression bandaging/wearing compression garments 23 hrs/day to prevent re-acccumulation of extracellular fluid for reductionsneeded to promote stability with mobility and aide reductions in R knee pain when ambulating   Target Date 18   Date Met  18   Progress:     Goal Identifier 1b   Goal Description demonstrate independence in applying gradient compression bandages to build I with home management of BLE edema/lymphedema for improved pain tolerance in R knee with ambulation and improve LB dressing I/clothing fit   Target Date 18   Date Met  18   Progress:     Goal Identifier 1c   Goal Description demonstrate independence in performing prescribed exercises to facilate the muscle pumping systems for max reductions needed to aide improvements in pain tolerance when ambulating and LB dressing/clothing fit   Target Date 18   Date Met  18   Progress:     Goal Identifier 1d   Goal Description be independent in donning/doffing, wearing schedule, and care of compression garments for I building with home management of LE edema/lymphedema for  improvements in pain tolerance with mobility and optimal stability with ambulation tasks   Target Date 04/20/18   Date Met  04/17/18   Progress:     Goal Identifier 2   Goal Description Display total BLE volume reduction of .5L+ for reductions needed to aide improvements in pain with ambulation and LB dressing I/clothing fit   Target Date 04/20/18   Date Met  03/09/18   Progress:     Goal Identifier     Goal Description     Target Date     Date Met      Progress:     Goal Identifier     Goal Description     Target Date     Date Met      Progress:     Progress Toward Goals:   Progress this reporting period: Pt and his spouse have built I with GCB use and reduced BLE swelling. Pt has advanced to custom compression stockings (knee high LLE and thigh high RLE), and managed well with them. He struggles with donning and doffing but able to perform. Pt appropriate to discharge 2/2 I with home management and successful reductions in BLE swelling/lymphedema.      Plan:  Discharge from therapy.    Discharge:    Reason for Discharge: Patient has met all goals.  No further expectation of progress.    Equipment Issued: RLE thigh high custom stocking; LLE knee high stocking    Discharge Plan: Patient to continue home program.

## 2018-07-26 NOTE — ADDENDUM NOTE
Encounter addended by: Vinicio Leon OT on: 7/26/2018  2:25 PM<BR>     Actions taken: Sign clinical note, Episode resolved

## 2018-07-31 ENCOUNTER — HOSPITAL ENCOUNTER (OUTPATIENT)
Facility: CLINIC | Age: 83
Discharge: HOME | End: 2018-07-31
Attending: COLON & RECTAL SURGERY | Admitting: COLON & RECTAL SURGERY
Payer: MEDICARE

## 2018-07-31 VITALS
OXYGEN SATURATION: 99 % | HEIGHT: 70 IN | WEIGHT: 250 LBS | BODY MASS INDEX: 35.79 KG/M2 | RESPIRATION RATE: 14 BRPM | SYSTOLIC BLOOD PRESSURE: 150 MMHG | DIASTOLIC BLOOD PRESSURE: 80 MMHG

## 2018-07-31 LAB — COLONOSCOPY: NORMAL

## 2018-07-31 PROCEDURE — G0105 COLORECTAL SCRN; HI RISK IND: HCPCS | Performed by: COLON & RECTAL SURGERY

## 2018-07-31 PROCEDURE — 25000128 H RX IP 250 OP 636: Performed by: COLON & RECTAL SURGERY

## 2018-07-31 PROCEDURE — 45378 DIAGNOSTIC COLONOSCOPY: CPT | Performed by: COLON & RECTAL SURGERY

## 2018-07-31 PROCEDURE — G0500 MOD SEDAT ENDO SERVICE >5YRS: HCPCS | Performed by: COLON & RECTAL SURGERY

## 2018-07-31 RX ORDER — FENTANYL CITRATE 50 UG/ML
INJECTION, SOLUTION INTRAMUSCULAR; INTRAVENOUS PRN
Status: DISCONTINUED | OUTPATIENT
Start: 2018-07-31 | End: 2018-07-31 | Stop reason: HOSPADM

## 2018-07-31 RX ORDER — ONDANSETRON 4 MG/1
4 TABLET, ORALLY DISINTEGRATING ORAL EVERY 6 HOURS PRN
Status: DISCONTINUED | OUTPATIENT
Start: 2018-07-31 | End: 2018-07-31 | Stop reason: HOSPADM

## 2018-07-31 RX ORDER — ONDANSETRON 2 MG/ML
4 INJECTION INTRAMUSCULAR; INTRAVENOUS EVERY 6 HOURS PRN
Status: DISCONTINUED | OUTPATIENT
Start: 2018-07-31 | End: 2018-07-31 | Stop reason: HOSPADM

## 2018-07-31 RX ORDER — FLUMAZENIL 0.1 MG/ML
0.2 INJECTION, SOLUTION INTRAVENOUS
Status: DISCONTINUED | OUTPATIENT
Start: 2018-07-31 | End: 2018-07-31 | Stop reason: HOSPADM

## 2018-07-31 RX ORDER — ONDANSETRON 2 MG/ML
4 INJECTION INTRAMUSCULAR; INTRAVENOUS
Status: DISCONTINUED | OUTPATIENT
Start: 2018-07-31 | End: 2018-07-31 | Stop reason: HOSPADM

## 2018-07-31 RX ORDER — NALOXONE HYDROCHLORIDE 0.4 MG/ML
.1-.4 INJECTION, SOLUTION INTRAMUSCULAR; INTRAVENOUS; SUBCUTANEOUS
Status: DISCONTINUED | OUTPATIENT
Start: 2018-07-31 | End: 2018-07-31 | Stop reason: HOSPADM

## 2018-07-31 RX ORDER — LIDOCAINE 40 MG/G
CREAM TOPICAL
Status: DISCONTINUED | OUTPATIENT
Start: 2018-07-31 | End: 2018-07-31 | Stop reason: HOSPADM

## 2018-07-31 NOTE — IP AVS SNAPSHOT
MRN:[id number]                      After Visit Summary   7/31/2018    Damien Vallecillo    MRN: [id number]           Thank you!     Thank you for choosing [facility] for your care. Our goal is always to provide you with excellent care. Hearing back from our patients is one way we can continue to improve our services. Please take a few minutes to complete the written survey that you may receive in the mail after you visit. If you would like to speak to someone directly about your visit please contact Patient Relations at [phone]. Thank you!          Patient Information     Date Of Birth          1935        About your hospital stay     You were admitted on:  July 31, 2018 You last received care in the:  [facility]    You were discharged on:  July 31, 2018       Who to Call     For medical emergencies, please call 911.  For non-urgent questions about your medical care, please call your primary care provider or clinic, [phone]  For questions related to your surgery, please call your surgery clinic        Attending Provider     Provider Specialty    Tiffany Cheema MD Colon and Rectal Surgery       Primary Care Provider Office Phone # Fax #    Obi Strange [phone]      Further instructions from your care team         Understanding Diverticulosis and Diverticulitis     Pouches or diverticula usually occur in the lower part of the colon called the sigmoid.      Diverticulitis occurs when the pouches become inflamed.     The colon (large intestine) is the last part of the digestive tract. It absorbs water from stool and changes it from a liquid to a solid. In certain cases, small pouches called diverticula can form in the colon wall. This condition is called diverticulosis. The pouches can become infected. If this happens, it becomes a more serious problem called diverticulitis. These problems can be painful. But they can be managed.    Managing Your Condition  Diet changes or taking medications are often tried first. These may be enough to bring relief. If the case is bad, surgery may be done. You and your doctor can discuss the plan that is best for you.  If You Have Diverticulosis  Diet changes are often enough to control symptoms. The main changes are adding fiber (roughage) and drinking more water. Fiber absorbs water as it travels through your colon. This helps your stool stay soft and move smoothly. Water helps this process. If needed, you may be told to take over-the-counter stool softeners. To help relieve pain, antispasmodic medications may be prescribed.  If You Have Diverticulitis  Treatment depends on how bad your symptoms are.  For mild symptoms: You may be put on a liquid diet for a short time. You may also be prescribed antibiotics. If these two steps relieve your symptoms, you may then be prescribed a high-fiber diet. If you still have symptoms, your doctor will discuss further treatment options with you.  For severe symptoms: You may need to be admitted to the hospital. There, you can be given IV antibiotics and fluids. Once symptoms are under control, the above treatments may be tried. If these don t control your condition, your doctor may discuss the option of having surgery with you.  [place] to Colon Health  Help keep your colon healthy with a diet that includes plenty of high-fiber fruits, vegetables, and whole grains. Drink plenty of liquids like water and juice. Your doctor may also recommend avoiding seeds and nuts.          2544-1183 [facility], [address] [place] [address], [place] [address]. All rights reserved. This information is not intended as a substitute for professional medical care. Always follow your healthcare professional's instructions.    HIGH FIBER DIET  Fiber is present in all fruits, vegetables, cereals and grains. Fiber passes through the body undigested. A high fiber diet helps food move through the  intestinal tract. The added bulk is helpful in preventing constipation. In people with diverticulosis it serves to clean out the pouches along the colon wall while preventing new ones from forming. A high fiber diet also reduces the risk of colon cancer, decreases blood cholesterol and prevents high blood sugar in people with diabetes.    The foods listed below are high in fiber and should be included in your diet. If you are not used to high fiber foods, start with 1 or 2 foods from this list. Every 3-4 days add a new one to your diet until you are eating 4 high fiber foods per day. This should give you 20-35 Gm of fiber/day. It is also important to drink a lot of water when you are on this diet (6-8 glasses a day). Water causes the fiber to swell and increases the benefit.    FOODS HIGH IN DIETARY FIBER:  BREADS: Made with 100% whole wheat flour; rowdy, wheat or rye crackers; tortillas, bran muffins  CEREALS: Whole grain cereal with bran (Chex, Raisin Bran, Corn Bran), oatmeal, rolled oats, granola, wheat flakes, brown rice  NUTS: Any nuts  FRUITS: All fresh fruits along with edible skins, (bananas, citrus fruit, mangoes, pears, prunes, raisins, apples, pineapple, apricot, melon, jams and marmalades), fruit juices (especially prune juice)  VEGETABLES: All types, preferably raw or lightly cooked: especially, celery, eggplant, potatoes, spinach, broccoli, brussel sprouts, winter squash, carrots, cauliflower, soybeans, lentils, fresh and dried beans of all kinds  OTHER: Popcorn, any spices      [address], [place] [address]. All rights reserved. This information is not intended as a substitute for professional medical care. Always follow your healthcare professional's instructions.    Pending Results     No orders found from 7/29/2018 to 8/1/2018.            Admission Information     Date & Time Provider Department Dept. Phone    7/31/2018 Tiffany Cheema MD [place]  "[facility] [phone]      Your Vitals Were     Blood Pressure Respirations Height Weight Pulse Oximetry BMI (Body Mass Index)    135/69 12 1.778 m (5' 10\") 113.4 kg (250 lb) 99% 35.87 kg/m2      Care EveryWhere ID     This is your Care EveryWhere ID. This could be used by other organizations to access your [place] medical records  [id number]        Equal Access to Services     ZENON MART : Hadii aad ku hadasho Soomaali, waaxda luqadaha, qaybta kaalmada adeegyada, corin sanzin hayviridianan ron sariharry laburke . So [facility] [phone].    ATENCIÓN: Si habla español, tiene a dumont disposición servicios gratuitos de asistencia lingüística. Llame al [phone].    We comply with applicable federal civil rights laws and [place] laws. We do not discriminate on the basis of race, color, national origin, age, disability, sex, sexual orientation, or gender identity.               Review of your medicines      CONTINUE these medicines which have NOT CHANGED        Dose / Directions    ACE NOT PRESCRIBED (INTENTIONAL)   Used for:  Type 2 diabetes, HbA1c goal < 7% (H)        ACE Inhibitor not prescribed due to Other:  Had cough on ACEI in past   Quantity:  0 each   Refills:  0       aspirin 325 MG EC tablet   Used for:  Status post total right knee replacement        Dose:  325 mg   Take 1 tablet (325 mg) by mouth daily   Quantity:  30 tablet   Refills:  0       atorvastatin 40 MG tablet   Commonly known as:  LIPITOR   Used for:  Pre-diabetes        Dose:  40 mg   Take 1 tablet (40 mg) by mouth At Bedtime   Quantity:  90 tablet   Refills:  3       * blood glucose monitoring test strip   Commonly known as:  ACCU-CHEK LAURA   Used for:  Glucose intolerance (impaired glucose tolerance)        1Use to test blood sugar 2 times daily or as directed.   Quantity:  100 strip   Refills:  3       * blood glucose monitoring test strip   Commonly known as:  ACCU-CHEK LAURA PLUS   Used for:  Glucose intolerance (impaired glucose " tolerance)        Use to test blood sugar 1 time daily   Quantity:  100 each   Refills:  11       CINNAMON PO        Dose:  1 tablet   Take 1 tablet by mouth daily   Refills:  0       fish oil-omega-3 fatty acids 1000 MG capsule        Dose:  1 g   Take 1 g by mouth every evening   Refills:  0       furosemide 20 MG tablet   Commonly known as:  LASIX   Used for:  Pedal edema        Dose:  20 mg   Take 1 tablet (20 mg) by mouth 2 times daily   Quantity:  180 tablet   Refills:  1       losartan 100 MG tablet   Commonly known as:  COZAAR   Used for:  Essential hypertension        Dose:  100 mg   Take 1 tablet (100 mg) by mouth daily   Quantity:  30 tablet   Refills:  2       MULTIVITAMIN ADULT PO        Dose:  1 tablet   Take 1 tablet by mouth daily Reported on 5/12/2017   Refills:  0       * order for DME   Used for:  Status post total right knee replacement        Equipment being ordered: Walker Wheels () and Walker () Treatment Diagnosis: Impaired functional mobility   Quantity:  1 each   Refills:  0       * order for DME   Used for:  Leg swelling        1: Gradient Compression Wraps; 2: Cast Boots; 3: BLE knee or thigh high 20-30 mm Hg compression stocking; 4: BLE knee or thigh high custom compression stocking; 5: Alternative BLE knee high or thigh compression garments (velcro/buckling)   Quantity:  1 each   Refills:  0       triamcinolone 0.5 % cream   Commonly known as:  KENALOG   Used for:  Stasis dermatitis of both legs        Apply sparingly to affected area three times daily.   Quantity:  30 g   Refills:  1       * Notice:  This list has 4 medication(s) that are the same as other medications prescribed for you. Read the directions carefully, and ask your doctor or other care provider to review them with you.             Protect others around you: Learn how to safely use, store and throw away your medicines at www.disposemymeds.org.             Medication List: This is a list of all your medications  and when to take them. Check marks below indicate your daily home schedule. Keep this list as a reference.      Medications           Morning Afternoon Evening Bedtime As Needed    ACE NOT PRESCRIBED (INTENTIONAL)   ACE Inhibitor not prescribed due to Other:  Had cough on ACEI in past                                aspirin 325 MG EC tablet   Take 1 tablet (325 mg) by mouth daily                                atorvastatin 40 MG tablet   Commonly known as:  LIPITOR   Take 1 tablet (40 mg) by mouth At Bedtime                                * blood glucose monitoring test strip   Commonly known as:  ACCU-CHEK LAURA   1Use to test blood sugar 2 times daily or as directed.                                * blood glucose monitoring test strip   Commonly known as:  ACCU-CHEK LAURA PLUS   Use to test blood sugar 1 time daily                                CINNAMON PO   Take 1 tablet by mouth daily                                fish oil-omega-3 fatty acids 1000 MG capsule   Take 1 g by mouth every evening                                furosemide 20 MG tablet   Commonly known as:  LASIX   Take 1 tablet (20 mg) by mouth 2 times daily                                losartan 100 MG tablet   Commonly known as:  COZAAR   Take 1 tablet (100 mg) by mouth daily                                MULTIVITAMIN ADULT PO   Take 1 tablet by mouth daily Reported on 5/12/2017                                * order for DME   Equipment being ordered: Walker Wheels () and Walker () Treatment Diagnosis: Impaired functional mobility                                * order for DME   1: Gradient Compression Wraps; 2: Cast Boots; 3: BLE knee or thigh high 20-30 mm Hg compression stocking; 4: BLE knee or thigh high custom compression stocking; 5: Alternative BLE knee high or thigh compression garments (velcro/buckling)                                triamcinolone 0.5 % cream   Commonly known as:  KENALOG   Apply sparingly to affected area  three times daily.                                * Notice:  This list has 4 medication(s) that are the same as other medications prescribed for you. Read the directions carefully, and ask your doctor or other care provider to review them with you.

## 2018-07-31 NOTE — OR NURSING
Pt denies pain ,procedure in progress ,noted  In sedatio  meds versed that pt had rashes from it before md notefied pt denies itching and no rashes noted

## 2018-07-31 NOTE — DISCHARGE INSTRUCTIONS
Understanding Diverticulosis and Diverticulitis     Pouches or diverticula usually occur in the lower part of the colon called the sigmoid.      Diverticulitis occurs when the pouches become inflamed.     The colon (large intestine) is the last part of the digestive tract. It absorbs water from stool and changes it from a liquid to a solid. In certain cases, small pouches called diverticula can form in the colon wall. This condition is called diverticulosis. The pouches can become infected. If this happens, it becomes a more serious problem called diverticulitis. These problems can be painful. But they can be managed.   Managing Your Condition  Diet changes or taking medications are often tried first. These may be enough to bring relief. If the case is bad, surgery may be done. You and your doctor can discuss the plan that is best for you.  If You Have Diverticulosis  Diet changes are often enough to control symptoms. The main changes are adding fiber (roughage) and drinking more water. Fiber absorbs water as it travels through your colon. This helps your stool stay soft and move smoothly. Water helps this process. If needed, you may be told to take over-the-counter stool softeners. To help relieve pain, antispasmodic medications may be prescribed.  If You Have Diverticulitis  Treatment depends on how bad your symptoms are.  For mild symptoms: You may be put on a liquid diet for a short time. You may also be prescribed antibiotics. If these two steps relieve your symptoms, you may then be prescribed a high-fiber diet. If you still have symptoms, your doctor will discuss further treatment options with you.  For severe symptoms: You may need to be admitted to the hospital. There, you can be given IV antibiotics and fluids. Once symptoms are under control, the above treatments may be tried. If these don t control your condition, your doctor may discuss the option of having surgery with you.  [place] [facility]  [facility]  Help keep your colon healthy with a diet that includes plenty of high-fiber fruits, vegetables, and whole grains. Drink plenty of liquids like water and juice. Your doctor may also recommend avoiding seeds and nuts.          [id number] Sam Mark, [address] [place] [address], [place] [address]. All rights reserved. This information is not intended as a substitute for professional medical care. Always follow your healthcare professional's instructions.    HIGH FIBER DIET  Fiber is present in all fruits, vegetables, cereals and grains. Fiber passes through the body undigested. A high fiber diet helps food move through the intestinal tract. The added bulk is helpful in preventing constipation. In people with diverticulosis it serves to clean out the pouches along the colon wall while preventing new ones from forming. A high fiber diet also reduces the risk of colon cancer, decreases blood cholesterol and prevents high blood sugar in people with diabetes.    The foods listed below are high in fiber and should be included in your diet. If you are not used to high fiber foods, start with 1 or 2 foods from this list. Every 3-4 days add a new one to your diet until you are eating 4 high fiber foods per day. This should give you 20-35 Gm of fiber/day. It is also important to drink a lot of water when you are on this diet (6-8 glasses a day). Water causes the fiber to swell and increases the benefit.    FOODS HIGH IN DIETARY FIBER:  BREADS: Made with 100% whole wheat flour; rowdy, wheat or rye crackers; tortillas, bran muffins  CEREALS: Whole grain cereal with bran (Chex, Raisin Bran, Corn Bran), oatmeal, rolled oats, granola, wheat flakes, brown rice  NUTS: Any nuts  FRUITS: All fresh fruits along with edible skins, (bananas, citrus fruit, mangoes, pears, prunes, raisins, apples, pineapple, apricot, melon, jams and marmalades), fruit juices (especially prune juice)  VEGETABLES: All types, preferably raw or lightly  cooked: especially, celery, eggplant, potatoes, spinach, broccoli, brussel sprouts, winter squash, carrots, cauliflower, soybeans, lentils, fresh and dried beans of all kinds  OTHER: Popcorn, any spices      [id number] Sam Mark, [address] [place] [address], [place] [address]. All rights reserved. This information is not intended as a substitute for professional medical care. Always follow your healthcare professional's instructions.

## 2018-07-31 NOTE — H&P
Pre-Endoscopy History and Physical     Damien Vallecillo MRN# [id number]   YOB: 1935 Age: 82 year old     Date of Procedure: 2018  Primary care provider: Obi Strange  Type of Endoscopy: colonoscopy  Reason for Procedure: surveillance  Type of Anesthesia Anticipated: Moderate Sedation    HPI:    Damien is a 82 year old male who will be undergoing the above procedure.      A history and physical has been performed. The patient's medications and allergies have been reviewed. The risks and benefits of the procedure and the sedation options and risks were discussed with the patient.  All questions were answered and informed consent was obtained.      He denies a personal or family history of anesthesia complications or bleeding disorders.     Allergies   Allergen Reactions     Levetiracetam [Keppra] Rash     Oxcarbazepine [Trileptal] Rash        Prior to Admission Medications   Prescriptions Last Dose Informant Patient Reported? Taking?   ACE NOT PRESCRIBED, INTENTIONAL, Unknown  No No   Sig: ACE Inhibitor not prescribed due to Other:  Had cough on ACEI in past         CINNAMON PO Past Week  Yes Yes   Sig: Take 1 tablet by mouth daily    Multiple Vitamins-Minerals (MULTIVITAMIN ADULT PO) Past Month  Yes Yes   Sig: Take 1 tablet by mouth daily Reported on 2017   aspirin  MG EC tablet Past Week  No Yes   Sig: Take 1 tablet (325 mg) by mouth daily   atorvastatin (LIPITOR) 40 MG tablet Past Week  No Yes   Sig: Take 1 tablet (40 mg) by mouth At Bedtime   blood glucose (ACCU-CHEK LAURA) test strip More than a month  No No   SiUse to test blood sugar 2 times daily or as directed.   blood glucose monitoring (ACCU-CHEK LAURA PLUS) test strip More than a month  No No   Sig: Use to test blood sugar 1 time daily   fish oil-omega-3 fatty acids 1000 MG capsule Past Week  Yes Yes   Sig: Take 1 g by mouth every evening   furosemide (LASIX) 20 MG tablet 2018  No No   Sig: Take 1 tablet (20 mg) by  "mouth 2 times daily   losartan (COZAAR) 100 MG tablet Unknown  No No   Sig: Take 1 tablet (100 mg) by mouth daily   order for DME   No No   Sig: Equipment being ordered: Walker Wheels () and Walker ()  Treatment Diagnosis: Impaired functional mobility   order for DME   No No   Si: Gradient Compression Wraps; 2: Cast Boots; 3: BLE knee or thigh high 20-30 mm Hg compression stocking; 4: BLE knee or thigh high custom compression stocking; 5: Alternative BLE knee high or thigh compression garments (velcro/buckling)   triamcinolone (KENALOG) 0.5 % cream More than a month  No No   Sig: Apply sparingly to affected area three times daily.      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)     Morbid obesity due to excess calories (H)     Hammer toe of left foot     Venous stasis dermatitis of both lower extremities     RBC microcytosis     Left knee pain, unspecified chronicity     S/P total knee arthroplasty     History of arthroplasty of right knee     Pedal edema        Past Medical History:   Diagnosis Date     Acute suppurative arthritis (H)      Acute, but ill-defined, cerebrovascular disease      Arthritis      Chronic headaches      Diabetes (H)     \"Pre-diabetes\"     Glucose intolerance (impaired glucose tolerance) 2013     Gout, unspecified      Hammer toe of left foot 10/21/2016     Hematuria      History of arthroplasty of right knee 2017     History of blood transfusion      Left knee pain, unspecified chronicity 2017     Lentigo maligna (H)      Malignant neoplasm of rectosigmoid junction (H)      Morbid obesity due to excess " calories (H) 10/21/2016     Other convulsions     last seizure 7-8 years ago...not certain if these were true seizres or not..     Pedal edema 1/18/2018     Pre-diabetes 10/21/2016     RBC microcytosis 2/14/2017     Right knee pain, unspecified chronicity 5/8/2017     Syncope      Tubulovillous adenoma of colon      Venous stasis dermatitis of both lower extremities 10/21/2016        Past Surgical History:   Procedure Laterality Date     ARTHROPLASTY KNEE Right 6/19/2017    Procedure: ARTHROPLASTY KNEE;  Right total knee arthroplasty, Hammer toe repair toe 2,3,4,5 left foot with osteotomy;  Surgeon: Alec Raymond MD;  Location:  OR     C NONSPECIFIC PROCEDURE      right heel surgery; spur removed.     COLONOSCOPY N/A 6/23/2015    Procedure: COMBINED COLONOSCOPY, SINGLE OR MULTIPLE BIOPSY/POLYPECTOMY BY BIOPSY;  Surgeon: Kimberly Alfaro MD;  Location:  GI     COLONOSCOPY N/A 7/30/2015    Procedure: COLONOSCOPY;  Surgeon: Enrique Salazar MD;  Location:  OR     HERNIORRHAPHY EPIGASTRIC  4/27/2012    Procedure:HERNIORRHAPHY EPIGASTRIC; Epigastric Hernia Repair with mesh ; Surgeon:BOLIVAR OLIVEIRA; Location: OR     LAPAROSCOPIC ASSISTED COLECTOMY Right 7/30/2015    Procedure: LAPAROSCOPIC ASSISTED COLECTOMY;  Surgeon: Enrique Salazar MD;  Location:  OR     ORTHOPEDIC SURGERY      lt knee arthroplasty     REALIGN PATELLA Right 10/9/2017    Procedure: REALIGN PATELLA;  Revision right total knee arthroplasty;  Surgeon: Alec Raymond MD;  Location:  OR     REPAIR HAMMER TOE Left 6/19/2017    Procedure: REPAIR HAMMER TOE;  Hammer toe repair toe 2,3,4,5 left foot with osteotomy   ;  Surgeon: Beverly Kim DPM, Podiatry/Foot and Ankle Surgery;  Location:  OR     SIGMOIDOSCOPY FLEXIBLE  5/29/2013    Procedure: SIGMOIDOSCOPY FLEXIBLE;  SIGMOIDOSCOPY FLEXIBLE (FV) Needs blood sugar ck'd;  Surgeon: Enrique Salazar MD;  Location:  GI     skin cancer excision         Social History  "  Substance Use Topics     Smoking status: Never Smoker     Smokeless tobacco: Never Used     Alcohol use 0.0 oz/week     0 Standard drinks or equivalent per week      Comment: Occas       Family History   Problem Relation Age of Onset     Cancer - colorectal Mother      Cerebrovascular Disease Father        REVIEW OF SYSTEMS:     5 point ROS negative except as noted above in HPI, including Gen., Resp., CV, GI &  system review.      PHYSICAL EXAM:   /85  Resp 11  Ht 1.778 m (5' 10\")  Wt 113.4 kg (250 lb)  SpO2 97%  BMI 35.87 kg/m2 Estimated body mass index is 35.87 kg/(m^2) as calculated from the following:    Height as of this encounter: 1.778 m (5' 10\").    Weight as of this encounter: 113.4 kg (250 lb).   GENERAL APPEARANCE: healthy and alert  MENTAL STATUS: alert  AIRWAY EXAM: Mallampatti Class II (visualization of the soft palate, fauces, and uvula)  RESP: lungs clear to auscultation - no rales, rhonchi or wheezes  CV: regular rates and rhythm      DIAGNOSTICS:    Not indicated      IMPRESSION   ASA Class 2 - Mild systemic disease        PLAN:       Plan for colonoscopy. We discussed the risks, benefits and alternatives and the patient wished to proceed.    The above has been forwarded to the consulting provider.      Signed Electronically by: Tiffany Cheema MD  July 31, 2018    "

## 2018-08-17 ENCOUNTER — OFFICE VISIT (OUTPATIENT)
Dept: FAMILY MEDICINE | Facility: CLINIC | Age: 83
End: 2018-08-17
Payer: MEDICARE

## 2018-08-17 ENCOUNTER — TELEPHONE (OUTPATIENT)
Dept: FAMILY MEDICINE | Facility: CLINIC | Age: 83
End: 2018-08-17

## 2018-08-17 VITALS
HEIGHT: 70 IN | WEIGHT: 250 LBS | TEMPERATURE: 97.9 F | SYSTOLIC BLOOD PRESSURE: 134 MMHG | RESPIRATION RATE: 18 BRPM | DIASTOLIC BLOOD PRESSURE: 60 MMHG | BODY MASS INDEX: 35.79 KG/M2 | HEART RATE: 79 BPM | OXYGEN SATURATION: 97 %

## 2018-08-17 DIAGNOSIS — K13.0 CHEILITIS: ICD-10-CM

## 2018-08-17 DIAGNOSIS — L71.9 ROSACEA: Primary | ICD-10-CM

## 2018-08-17 DIAGNOSIS — L71.1 RHINOPHYMA: ICD-10-CM

## 2018-08-17 DIAGNOSIS — R73.03 PRE-DIABETES: ICD-10-CM

## 2018-08-17 DIAGNOSIS — Z96.651 STATUS POST TOTAL RIGHT KNEE REPLACEMENT: ICD-10-CM

## 2018-08-17 DIAGNOSIS — I10 ESSENTIAL HYPERTENSION: ICD-10-CM

## 2018-08-17 LAB — HBA1C MFR BLD: 5.7 % (ref 0–5.6)

## 2018-08-17 PROCEDURE — 36415 COLL VENOUS BLD VENIPUNCTURE: CPT | Performed by: FAMILY MEDICINE

## 2018-08-17 PROCEDURE — 83036 HEMOGLOBIN GLYCOSYLATED A1C: CPT | Performed by: FAMILY MEDICINE

## 2018-08-17 PROCEDURE — 99214 OFFICE O/P EST MOD 30 MIN: CPT | Performed by: FAMILY MEDICINE

## 2018-08-17 RX ORDER — LOSARTAN POTASSIUM 100 MG/1
100 TABLET ORAL DAILY
Qty: 30 TABLET | Refills: 2 | Status: CANCELLED | OUTPATIENT
Start: 2018-08-17

## 2018-08-17 RX ORDER — METRONIDAZOLE 7.5 MG/G
GEL TOPICAL 2 TIMES DAILY
Qty: 45 G | Refills: 11 | Status: SHIPPED | OUTPATIENT
Start: 2018-08-17 | End: 2018-09-28

## 2018-08-17 RX ORDER — CLINDAMYCIN PHOSPHATE 11.9 MG/ML
SOLUTION TOPICAL 2 TIMES DAILY
Qty: 60 ML | Refills: 11 | Status: SHIPPED | OUTPATIENT
Start: 2018-08-17 | End: 2019-09-30

## 2018-08-17 ASSESSMENT — ANXIETY QUESTIONNAIRES
5. BEING SO RESTLESS THAT IT IS HARD TO SIT STILL: NOT AT ALL
2. NOT BEING ABLE TO STOP OR CONTROL WORRYING: NOT AT ALL
3. WORRYING TOO MUCH ABOUT DIFFERENT THINGS: NOT AT ALL
GAD7 TOTAL SCORE: 0
6. BECOMING EASILY ANNOYED OR IRRITABLE: NOT AT ALL
1. FEELING NERVOUS, ANXIOUS, OR ON EDGE: NOT AT ALL
7. FEELING AFRAID AS IF SOMETHING AWFUL MIGHT HAPPEN: NOT AT ALL
IF YOU CHECKED OFF ANY PROBLEMS ON THIS QUESTIONNAIRE, HOW DIFFICULT HAVE THESE PROBLEMS MADE IT FOR YOU TO DO YOUR WORK, TAKE CARE OF THINGS AT HOME, OR GET ALONG WITH OTHER PEOPLE: NOT DIFFICULT AT ALL

## 2018-08-17 ASSESSMENT — PATIENT HEALTH QUESTIONNAIRE - PHQ9: 5. POOR APPETITE OR OVEREATING: NOT AT ALL

## 2018-08-17 NOTE — PROGRESS NOTES
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 Denies soreness or pain.           Essential hypertension - Has not been taking his losartan because he forgot to get it from the pharmacy.   BP Readings from Last 1 Encounters:   08/17/18 134/60   Without losartan    Pre-diabetes - Has been eating healthy and exercising   Lab Results   Component Value Date    A1C 6.0 03/12/2018    A1C 5.5 09/26/2017    A1C 5.9 04/17/2017    A1C 6.0 10/21/2016    A1C 6.3 07/31/2015         Cheilitis variable erythema in the folds of the angle of lips    Status post total right knee replacement he has been doing exercises.  He tweaked something and developed pain exacerbated by extension to  neutral earlier this week.  He has been treating this with his exercises pain is fading he is almost back to his 30 leg lifts twice daily      Medications Reviewed      Problem list and histories reviewed & adjusted, as indicated.  Additional history: as documented    Past Medical History:   Diagnosis Date     Acute suppurative arthritis (H)      Acute, but ill-defined, cerebrovascular disease      Arthritis      Chronic headaches      Diabetes (H)     \"Pre-diabetes\"     Glucose intolerance (impaired glucose tolerance) 5/31/2013     Gout, unspecified      Hammer toe of left foot 10/21/2016     Hematuria      History of arthroplasty of right knee 08/17/2017     History of blood transfusion      Left knee pain, unspecified chronicity 5/8/2017     Lentigo maligna (H)      Malignant neoplasm of rectosigmoid junction (H)      Morbid obesity due to excess calories (H) 10/21/2016     Other convulsions     last seizure 7-8 years ago...not certain if these were true seizres or not..     Pedal edema 1/18/2018     Pre-diabetes 10/21/2016     RBC microcytosis 2/14/2017     Right knee pain, unspecified chronicity 5/8/2017     " Syncope      Tubulovillous adenoma of colon      Venous stasis dermatitis of both lower extremities 10/21/2016     Past Surgical History:   Procedure Laterality Date     ARTHROPLASTY KNEE Right 6/19/2017    Procedure: ARTHROPLASTY KNEE;  Right total knee arthroplasty, Hammer toe repair toe 2,3,4,5 left foot with osteotomy;  Surgeon: Alec Raymond MD;  Location:  OR     C NONSPECIFIC PROCEDURE      right heel surgery; spur removed.     COLONOSCOPY N/A 6/23/2015    Procedure: COMBINED COLONOSCOPY, SINGLE OR MULTIPLE BIOPSY/POLYPECTOMY BY BIOPSY;  Surgeon: Kimberly Alfaro MD;  Location:  GI     COLONOSCOPY N/A 7/30/2015    Procedure: COLONOSCOPY;  Surgeon: Enrique Salazar MD;  Location:  OR     COLONOSCOPY N/A 7/31/2018    Procedure: COLONOSCOPY;  Colonoscopy;  Surgeon: Tiffany Cheema MD;  Location:  GI     HERNIORRHAPHY EPIGASTRIC  4/27/2012    Procedure:HERNIORRHAPHY EPIGASTRIC; Epigastric Hernia Repair with mesh ; Surgeon:BOLIVAR OLIVEIRA; Location: OR     LAPAROSCOPIC ASSISTED COLECTOMY Right 7/30/2015    Procedure: LAPAROSCOPIC ASSISTED COLECTOMY;  Surgeon: Enrique Salazar MD;  Location:  OR     ORTHOPEDIC SURGERY      lt knee arthroplasty     REALIGN PATELLA Right 10/9/2017    Procedure: REALIGN PATELLA;  Revision right total knee arthroplasty;  Surgeon: Alec Raymond MD;  Location:  OR     REPAIR HAMMER TOE Left 6/19/2017    Procedure: REPAIR HAMMER TOE;  Hammer toe repair toe 2,3,4,5 left foot with osteotomy   ;  Surgeon: Beverly Kim DPM, Podiatry/Foot and Ankle Surgery;  Location:  OR     SIGMOIDOSCOPY FLEXIBLE  5/29/2013    Procedure: SIGMOIDOSCOPY FLEXIBLE;  SIGMOIDOSCOPY FLEXIBLE (FV) Needs blood sugar ck'd;  Surgeon: Enrique Salazar MD;  Location:  GI     skin cancer excision       Family History   Problem Relation Age of Onset     Cancer - colorectal Mother      Cerebrovascular Disease Father      Social History   Substance Use Topics     Smoking  "status: Never Smoker     Smokeless tobacco: Never Used     Alcohol use 0.0 oz/week     0 Standard drinks or equivalent per week      Comment: Occas     Reviewed and updated as needed this visit by clinical staff       Reviewed and updated as needed this visit by Provider         ROS:  No fevers, falls,     This document serves as a record of the services and decisions personally performed and made by Obi Strange MD. It was created on his behalf by Villa Leyva, a trained medical scribe.  The creation of this document is based on the scribe's personal observations and the provider's statements to the medical scribe.  Villa Leyva, August 17, 2018 9:09 AM    OBJECTIVE:     /60 (BP Location: Right arm, Patient Position: Chair, Cuff Size: Adult Large)  Pulse 79  Temp 97.9  F (36.6  C) (Oral)  Resp 18  Ht 1.778 m (5' 10\")  Wt 113.4 kg (250 lb)  SpO2 97%  BMI 35.87 kg/m2  Body mass index is 35.87 kg/(m^2).    GENERAL: Healthy, Alert, No acute distress  HEENT: Hearing aids  Skin: He has rhinophyma.  Telangiectasia.  Right nare and skin superior is prominent.  Not discolored not ulcerated not painful not indurated  MS:Cane, use of compression stockings: mid calf on left, thigh high on right.   PSYCH: Affect normal/bright    Diagnostic Test Results:  No results found for this or any previous visit (from the past 24 hour(s)).  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  Discussed natural history of blood pressure in the geriatric " population    (K13.0) Cheilitis  Comment: Discussed natural history.  Plan: OTC antifungals as needed    (R73.03) Pre-diabetes  Comment: Not diabetes yet  Plan: Hemoglobin A1c            The information in this document, created by the medical scribe for me, accurately reflects the services I personally performed and the decisions made by me. I have reviewed and approved this document for accuracy prior to leaving the patient care area.  Obi Strange MD August 17, 2018 9:09 AM    Obi Strange MD  [facility]

## 2018-08-17 NOTE — TELEPHONE ENCOUNTER
Metrogel 0.75% gel (including other strengths we tried) was too expensive.  Patient is requesting a different product.  Would metronidazole tabs work?      Patient already picked up the clindamycin solution.    Thanks,  Whit Spangler [facility]  [phone]

## 2018-08-17 NOTE — TELEPHONE ENCOUNTER
Pharmacy notified and they will contact patient    Mary Ellen Michelle/JAY Mcneal---[facility]

## 2018-08-17 NOTE — LETTER
August 17, 2018      Damien [place]RAUZ [place]  [id number] [facility] [id number]        Dear ,    We are writing to inform you of your test results.    Still not diabetes!     Resulted Orders   Hemoglobin A1c   Result Value Ref Range    Hemoglobin A1C 5.7 (H) 0 - 5.6 %      Comment:      Normal <5.7% Prediabetes 5.7-6.4%  Diabetes 6.5% or higher - adopted from ADA   consensus guidelines.         If you have any questions or concerns, please call the clinic at the number listed above.       Sincerely,        Obi Strange MD

## 2018-08-17 NOTE — MR AVS SNAPSHOT
"              After Visit Summary   8/17/2018    Damien Vallecillo    MRN: [id number]           Patient Information     Date Of Birth          1935        Visit Information        Provider Department      8/17/2018 8:45 AM Obi Strange MD [facility]        Today's Diagnoses     Rosacea    -  1    Essential hypertension        Rhinophyma        Cheilitis        Pre-diabetes          Care Instructions    - use of 2 different antifungal medication at the same time.           Follow-ups after your visit        Follow-up notes from your care team     Return in about 6 weeks (around 9/28/2018).      Who to contact     If you have questions or need follow up information about today's clinic visit or your schedule please contact [facility] directly at [phone].  Normal or non-critical lab and imaging results will be communicated to you by MyChart, letter or phone within 4 business days after the clinic has received the results. If you do not hear from us within 7 days, please contact the clinic through MyChart or phone. If you have a critical or abnormal lab result, we will notify you by phone as soon as possible.  Submit refill requests through [employer] or call your pharmacy and they will forward the refill request to us. Please allow 3 business days for your refill to be completed.          Additional Information About Your Visit        Care EveryWhere ID     This is your Care EveryWhere ID. This could be used by other organizations to access your [place] medical records  [id number]        Your Vitals Were     Pulse Temperature Respirations Height Pulse Oximetry BMI (Body Mass Index)    79 97.9  F (36.6  C) (Oral) 18 5' 10\" (1.778 m) 97% 35.87 kg/m2       Blood Pressure from Last 3 Encounters:   08/17/18 134/60   07/31/18 150/80   07/12/18 140/68    Weight from Last 3 Encounters:   08/17/18 250 lb (113.4 kg)   07/31/18 250 lb (113.4 kg)   04/24/18 250 lb (113.4 kg)         "      We Performed the Following     Hemoglobin A1c          Today's Medication Changes          These changes are accurate as of 8/17/18  9:21 AM.  If you have any questions, ask your nurse or doctor.               Start taking these medicines.        Dose/Directions    clindamycin 1 % solution   Commonly known as:  CLEOCIN T   Used for:  Rhinophyma   Started by:  Obi Strange MD        Apply topically 2 times daily   Quantity:  60 mL   Refills:  11       metroNIDAZOLE 0.75 % topical gel   Commonly known as:  METROGEL   Used for:  Rhinophyma   Started by:  Obi Strange MD        Apply topically 2 times daily   Quantity:  45 g   Refills:  11            Where to get your medicines      These medications were sent to [facility] [address]  [address] [facility] [address]     Phone:  [phone]     clindamycin 1 % solution    metroNIDAZOLE 0.75 % topical gel                Primary Care Provider Office Phone # Fax #    Obi Strange [phone] [address] [facility] [address]        Equal Access to Services     [facility]: Hadii aad ku hadasho Soomaali, waaxda luqadaha, qaybta kaalmada adeegyada, waxay selina blank . So [facility] [phone].    ATENCIÓN: Si habla español, tiene a dumont disposición servicios gratuitos de asistencia lingüística. Llame al [phone].    We comply with applicable federal civil rights laws and [place] laws. We do not discriminate on the basis of race, color, national origin, age, disability, sex, sexual orientation, or gender identity.            Thank you!     Thank you for choosing [facility]  for your care. Our goal is always to provide you with excellent care. Hearing back from our patients is one way we can continue to improve our services. Please take a few minutes to complete the written survey that you may receive in the mail after your visit with us.  Thank you!             Your Updated Medication List - Protect others around you: Learn how to safely use, store and throw away your medicines at www.disposemymeds.org.          This list is accurate as of 8/17/18  9:21 AM.  Always use your most recent med list.                   Brand Name Dispense Instructions for use Diagnosis    ACE NOT PRESCRIBED (INTENTIONAL)     0 each    ACE Inhibitor not prescribed due to Other:  Had cough on ACEI in past    Type 2 diabetes, HbA1c goal < 7% (H)       aspirin 325 MG EC tablet     30 tablet    Take 1 tablet (325 mg) by mouth daily    Status post total right knee replacement       atorvastatin 40 MG tablet    LIPITOR    90 tablet    Take 1 tablet (40 mg) by mouth At Bedtime    Pre-diabetes       * blood glucose monitoring test strip    ACCU-CHEK LAURA    100 strip    1Use to test blood sugar 2 times daily or as directed.    Glucose intolerance (impaired glucose tolerance)       * blood glucose monitoring test strip    ACCU-CHEK LAURA PLUS    100 each    Use to test blood sugar 1 time daily    Glucose intolerance (impaired glucose tolerance)       CINNAMON PO      Take 1 tablet by mouth daily        clindamycin 1 % solution    CLEOCIN T    60 mL    Apply topically 2 times daily    Rhinophyma       fish oil-omega-3 fatty acids 1000 MG capsule      Take 1 g by mouth every evening        furosemide 20 MG tablet    LASIX    180 tablet    Take 1 tablet (20 mg) by mouth 2 times daily    Pedal edema       metroNIDAZOLE 0.75 % topical gel    METROGEL    45 g    Apply topically 2 times daily    Rhinophyma       MULTIVITAMIN ADULT PO      Take 1 tablet by mouth daily Reported on 5/12/2017        * order for DME     1 each    Equipment being ordered: Walker Wheels () and Walker () Treatment Diagnosis: Impaired functional mobility    Status post total right knee replacement       * order for DME     1 each    1: Gradient Compression Wraps; 2: Cast Boots; 3: BLE knee or thigh high  20-30 mm Hg compression stocking; 4: BLE knee or thigh high custom compression stocking; 5: Alternative BLE knee high or thigh compression garments (velcro/buckling)    Leg swelling       triamcinolone 0.5 % cream    KENALOG    30 g    Apply sparingly to affected area three times daily.    Stasis dermatitis of both legs       * Notice:  This list has 4 medication(s) that are the same as other medications prescribed for you. Read the directions carefully, and ask your doctor or other care provider to review them with you.

## 2018-08-18 ASSESSMENT — PATIENT HEALTH QUESTIONNAIRE - PHQ9: SUM OF ALL RESPONSES TO PHQ QUESTIONS 1-9: 1

## 2018-08-18 ASSESSMENT — ANXIETY QUESTIONNAIRES: GAD7 TOTAL SCORE: 0

## 2018-09-28 ENCOUNTER — OFFICE VISIT (OUTPATIENT)
Dept: FAMILY MEDICINE | Facility: CLINIC | Age: 83
End: 2018-09-28
Payer: MEDICARE

## 2018-09-28 VITALS
TEMPERATURE: 97.5 F | HEART RATE: 116 BPM | DIASTOLIC BLOOD PRESSURE: 82 MMHG | SYSTOLIC BLOOD PRESSURE: 186 MMHG | WEIGHT: 250 LBS | HEIGHT: 70 IN | BODY MASS INDEX: 35.79 KG/M2 | RESPIRATION RATE: 16 BRPM

## 2018-09-28 DIAGNOSIS — L71.1 RHINOPHYMA: ICD-10-CM

## 2018-09-28 DIAGNOSIS — L71.9 ROSACEA: Primary | ICD-10-CM

## 2018-09-28 DIAGNOSIS — K13.0 CHEILOSIS: ICD-10-CM

## 2018-09-28 PROCEDURE — 99213 OFFICE O/P EST LOW 20 MIN: CPT | Performed by: FAMILY MEDICINE

## 2018-09-28 RX ORDER — DOXYCYCLINE HYCLATE 100 MG/1
20 TABLET, DELAYED RELEASE ORAL 2 TIMES DAILY
Qty: 28 TABLET | Status: ON HOLD | COMMUNITY
Start: 2018-09-28 | End: 2018-12-28

## 2018-09-28 RX ORDER — CLOTRIMAZOLE 1 %
CREAM (GRAM) TOPICAL 2 TIMES DAILY
Qty: 15 G | Refills: 1 | Status: ON HOLD | COMMUNITY
Start: 2018-09-28 | End: 2018-12-28

## 2018-09-28 RX ORDER — BENZOCAINE/MENTHOL 6 MG-10 MG
LOZENGE MUCOUS MEMBRANE
Qty: 30 G | Refills: 0 | Status: ON HOLD | COMMUNITY
Start: 2018-09-28 | End: 2018-12-28

## 2018-09-28 NOTE — PROGRESS NOTES
"  SUBJECTIVE:   Damien Vallecillo is a 82 year old male who presents to clinic today for the following health issues:    Rosacea  (primary encounter diagnosis) - Attended the Dermatologist earlier this month and was recommended to change his medication for rosacea. Wondering if Dr Strange agrees with this change in medication. Recalls that when he uses MetroGel on his face, the skin on his nose becomes irritated.     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  Additional history: none    Past Medical History:   Diagnosis Date     Acute suppurative arthritis (H)      Acute, but ill-defined, cerebrovascular disease      Arthritis      Cheilosis 9/28/2018     Chronic headaches      Diabetes (H)     \"Pre-diabetes\"     Glucose intolerance (impaired glucose tolerance) 5/31/2013     Gout, unspecified      Hammer toe of left foot 10/21/2016     Hematuria      History of arthroplasty of right knee 08/17/2017     History of blood transfusion      Left knee pain, unspecified chronicity 5/8/2017     Lentigo maligna (H)      Malignant neoplasm of rectosigmoid junction (H)      Morbid obesity due to excess calories (H) 10/21/2016     Other convulsions     last seizure 7-8 years ago...not certain if these were true seizres or not..     Pedal edema 1/18/2018     Pre-diabetes 10/21/2016     RBC microcytosis 2/14/2017     Rhinophyma 9/28/2018     Right knee pain, unspecified chronicity 5/8/2017     Rosacea 9/28/2018     Syncope      Tubulovillous adenoma of colon      Venous stasis dermatitis of both lower extremities 10/21/2016     Past Surgical History:   Procedure Laterality Date     ARTHROPLASTY KNEE Right 6/19/2017    Procedure: ARTHROPLASTY KNEE;  Right total knee arthroplasty, Hammer toe repair toe 2,3,4,5 left foot with osteotomy;  " Surgeon: Alec Raymond MD;  Location:  OR     C NONSPECIFIC PROCEDURE      right heel surgery; spur removed.     COLONOSCOPY N/A 6/23/2015    Procedure: COMBINED COLONOSCOPY, SINGLE OR MULTIPLE BIOPSY/POLYPECTOMY BY BIOPSY;  Surgeon: Kimberly Alfaro MD;  Location:  GI     COLONOSCOPY N/A 7/30/2015    Procedure: COLONOSCOPY;  Surgeon: Enrique Salazar MD;  Location:  OR     COLONOSCOPY N/A 7/31/2018    Procedure: COLONOSCOPY;  Colonoscopy;  Surgeon: Tiffany Cheema MD;  Location:  GI     HERNIORRHAPHY EPIGASTRIC  4/27/2012    Procedure:HERNIORRHAPHY EPIGASTRIC; Epigastric Hernia Repair with mesh ; Surgeon:BOLIVAR OLIVEIRA; Location: OR     LAPAROSCOPIC ASSISTED COLECTOMY Right 7/30/2015    Procedure: LAPAROSCOPIC ASSISTED COLECTOMY;  Surgeon: Enrique Salazar MD;  Location:  OR     ORTHOPEDIC SURGERY      lt knee arthroplasty     REALIGN PATELLA Right 10/9/2017    Procedure: REALIGN PATELLA;  Revision right total knee arthroplasty;  Surgeon: Alec Raymond MD;  Location:  OR     REPAIR HAMMER TOE Left 6/19/2017    Procedure: REPAIR HAMMER TOE;  Hammer toe repair toe 2,3,4,5 left foot with osteotomy   ;  Surgeon: Beverly Kim DPM, Podiatry/Foot and Ankle Surgery;  Location:  OR     SIGMOIDOSCOPY FLEXIBLE  5/29/2013    Procedure: SIGMOIDOSCOPY FLEXIBLE;  SIGMOIDOSCOPY FLEXIBLE (FV) Needs blood sugar ck'd;  Surgeon: Enrique Salazar MD;  Location:  GI     skin cancer excision       Family History   Problem Relation Age of Onset     Cancer - colorectal Mother      Cerebrovascular Disease Father      Social History   Substance Use Topics     Smoking status: Never Smoker     Smokeless tobacco: Never Used     Alcohol use 0.0 oz/week     0 Standard drinks or equivalent per week      Comment: Occas     Reviewed and updated as needed this visit by clinical staff  Tobacco  Allergies  Meds  Med Hx  Surg Hx  Fam Hx  Soc Hx      Reviewed and updated as needed this visit by  "Provider       ROS:  Denies fever or SOB    This document serves as a record of the services and decisions personally performed and made by Obi Strange MD. It was created on his behalf by Villa Leyva, a trained medical scribe.  The creation of this document is based on the scribe's personal observations and the provider's statements to the medical scribe.  Villa Leyva, September 28, 2018 9:06 AM  OBJECTIVE:   /82 (BP Location: Left arm, Patient Position: Chair, Cuff Size: Adult Large)  Pulse 116  Temp 97.5  F (36.4  C) (Oral)  Resp 16  Ht 1.778 m (5' 10\")  Wt 113.4 kg (250 lb)  BMI 35.87 kg/m2  Body mass index is 35.87 kg/(m^2).    GENERAL: Healthy, Alert, No acute distress  EARS: Hearing aids bilaterally  Mild rhinophyma mild cheilitis    Diagnostic Test Results:  No results found for this or any previous visit (from the past 24 hour(s)).  ASSESSMENT/PLAN:   ASSESSMENT / PLAN:  (L71.9) Rosacea  (primary encounter diagnosis)  Comment: This is a reasonable treatment  Plan:  Doxycycline        Hyclate 100 MG TBEC        I concur with dermatology    (K13.0) Cheilosis  Comment: This is a reasonable treatment  Plan: clotrimazole (LOTRIMIN) 1 % cream        I concur with dermatology    (L71.1) Rhinophyma  Comment: hydrocortisone (CORTAID) 1 % cream,  Plan: This is a reasonable treatment          Obi Strange MD      Vaccinations: Flu shot late Oct. [facility]     The information in this document, created by the medical scribe for me, accurately reflects the services I personally performed and the decisions made by me. I have reviewed and approved this document for accuracy prior to leaving the patient care area.  Obi Strange MD September 28, 2018 9:10 AM    Obi Strange MD  [facility]"

## 2018-09-28 NOTE — MR AVS SNAPSHOT
"              After Visit Summary   9/28/2018    Damien Vallecillo    MRN: [id number]           Patient Information     Date Of Birth          1935        Visit Information        Provider Department      9/28/2018 9:00 AM Obi Strange MD [facility]        Today's Diagnoses     Rosacea    -  1    Cheilosis        Rhinophyma          Care Instructions    - Receive Flu shot late October.           Follow-ups after your visit        Your next 10 appointments already scheduled     Oct 09, 2018  9:00 AM CDT   Return Visit with Arnav Cunningham MD   [facility]    [address] [facility], [address]  [facility] [address] [phone]              Who to contact     If you have questions or need follow up information about today's clinic visit or your schedule please contact [facility] directly at [phone].  Normal or non-critical lab and imaging results will be communicated to you by MyChart, letter or phone within 4 business days after the clinic has received the results. If you do not hear from us within 7 days, please contact the clinic through MyChart or phone. If you have a critical or abnormal lab result, we will notify you by phone as soon as possible.  Submit refill requests through [employer] or call your pharmacy and they will forward the refill request to us. Please allow 3 business days for your refill to be completed.          Additional Information About Your Visit        Care EveryWhere ID     This is your Care EveryWhere ID. This could be used by other organizations to access your [place] medical records  [id number]        Your Vitals Were     Pulse Temperature Respirations Height BMI (Body Mass Index)       116 97.5  F (36.4  C) (Oral) 16 5' 10\" (1.778 m) 35.87 kg/m2        Blood Pressure from Last 3 Encounters:   09/28/18 186/82   08/17/18 134/60   07/31/18 150/80    Weight from Last 3 Encounters: "   09/28/18 250 lb (113.4 kg)   08/17/18 250 lb (113.4 kg)   07/31/18 250 lb (113.4 kg)              Today, you had the following     No orders found for display         Today's Medication Changes          These changes are accurate as of 9/28/18 12:36 PM.  If you have any questions, ask your nurse or doctor.               These medicines have changed or have updated prescriptions.        Dose/Directions    Doxycycline Hyclate 100 MG Tbec   This may have changed:    - medication strength  - when to take this   Used for:  Rosacea   Changed by:  Obi Strange MD        Dose:  20 mg   Take 20 mg by mouth 2 times daily   Quantity:  28 tablet   Refills:  0         Stop taking these medicines if you haven't already. Please contact your care team if you have questions.     metroNIDAZOLE 0.75 % topical gel   Commonly known as:  METROGEL   Stopped by:  Obi Strange MD                    Primary Care Provider Office Phone # Fax #    Obi Strange [phone] [address] [facility] [address]        Equal Access to Services     [facility]: Hadii aad ku hadasho Soomaali, waaxda luqadaha, qaybta kaalmada adeegyada, waxay selina haydino blank . So [facility] [phone].    ATENCIÓN: Si habla español, tiene a dumont disposición servicios gratuitos de asistencia lingüística. Llame al [phone].    We comply with applicable federal civil rights laws and [place] laws. We do not discriminate on the basis of race, color, national origin, age, disability, sex, sexual orientation, or gender identity.            Thank you!     Thank you for choosing [facility]  for your care. Our goal is always to provide you with excellent care. Hearing back from our patients is one way we can continue to improve our services. Please take a few minutes to complete the written survey that you may receive in the mail after your visit with us. Thank you!             Your Updated Medication  List - Protect others around you: Learn how to safely use, store and throw away your medicines at www.disposemymeds.org.          This list is accurate as of 9/28/18 12:36 PM.  Always use your most recent med list.                   Brand Name Dispense Instructions for use Diagnosis    ACE NOT PRESCRIBED (INTENTIONAL)     0 each    ACE Inhibitor not prescribed due to Other:  Had cough on ACEI in past    Type 2 diabetes, HbA1c goal < 7% (H)       aspirin 325 MG EC tablet     30 tablet    Take 1 tablet (325 mg) by mouth daily    Status post total right knee replacement       atorvastatin 40 MG tablet    LIPITOR    90 tablet    Take 1 tablet (40 mg) by mouth At Bedtime    Pre-diabetes       * blood glucose monitoring test strip    ACCU-CHEK ALURA    100 strip    1Use to test blood sugar 2 times daily or as directed.    Glucose intolerance (impaired glucose tolerance)       * blood glucose monitoring test strip    ACCU-CHEK LAURA PLUS    100 each    Use to test blood sugar 1 time daily    Glucose intolerance (impaired glucose tolerance)       CINNAMON PO      Take 1 tablet by mouth daily        clindamycin 1 % solution    CLEOCIN T    60 mL    Apply topically 2 times daily    Rhinophyma       clotrimazole 1 % cream    LOTRIMIN    15 g    Apply topically 2 times daily    Cheilosis       Doxycycline Hyclate 100 MG Tbec     28 tablet    Take 20 mg by mouth 2 times daily    Rosacea       fish oil-omega-3 fatty acids 1000 MG capsule      Take 1 g by mouth every evening        furosemide 20 MG tablet    LASIX    180 tablet    Take 1 tablet (20 mg) by mouth 2 times daily    Pedal edema       hydrocortisone 1 % cream    CORTAID    30 g    Apply sparingly to affected area three times daily for 14 days.    Rhinophyma       MULTIVITAMIN ADULT PO      Take 1 tablet by mouth daily Reported on 5/12/2017        * order for DME     1 each    Equipment being ordered: Walker Wheels () and Walker () Treatment Diagnosis: Impaired  functional mobility    Status post total right knee replacement       * order for DME     1 each    1: Gradient Compression Wraps; 2: Cast Boots; 3: BLE knee or thigh high 20-30 mm Hg compression stocking; 4: BLE knee or thigh high custom compression stocking; 5: Alternative BLE knee high or thigh compression garments (velcro/buckling)    Leg swelling       triamcinolone 0.5 % cream    KENALOG    30 g    Apply sparingly to affected area three times daily.    Stasis dermatitis of both legs       * Notice:  This list has 4 medication(s) that are the same as other medications prescribed for you. Read the directions carefully, and ask your doctor or other care provider to review them with you.

## 2018-10-09 ENCOUNTER — OFFICE VISIT (OUTPATIENT)
Dept: SURGERY | Facility: CLINIC | Age: 83
End: 2018-10-09
Attending: INTERNAL MEDICINE
Payer: MEDICARE

## 2018-10-09 VITALS
BODY MASS INDEX: 35.79 KG/M2 | OXYGEN SATURATION: 97 % | DIASTOLIC BLOOD PRESSURE: 74 MMHG | SYSTOLIC BLOOD PRESSURE: 138 MMHG | RESPIRATION RATE: 14 BRPM | HEIGHT: 70 IN | WEIGHT: 250 LBS | HEART RATE: 70 BPM

## 2018-10-09 DIAGNOSIS — I87.2 VENOUS (PERIPHERAL) INSUFFICIENCY: ICD-10-CM

## 2018-10-09 DIAGNOSIS — I87.2 VENOUS STASIS DERMATITIS OF BOTH LOWER EXTREMITIES: ICD-10-CM

## 2018-10-09 DIAGNOSIS — I89.0 LYMPHEDEMA OF EXTREMITY: ICD-10-CM

## 2018-10-09 DIAGNOSIS — M79.89 LEG SWELLING: ICD-10-CM

## 2018-10-09 PROCEDURE — 99214 OFFICE O/P EST MOD 30 MIN: CPT | Performed by: INTERNAL MEDICINE

## 2018-10-09 NOTE — PROGRESS NOTES
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    Interval History   Patient is doing well legs are both almost back to normal with compression treatment and status post MLD, no signs of inflammation, cellulitis, patient is exercising daily    Physical Exam       BP: 138/74 Pulse: 70   Resp: 14 SpO2: 97 %      Vitals:    10/09/18 0852   Weight: 250 lb (113.4 kg)     Vital Signs with Ranges  Pulse:  [70] 70  Resp:  [14] 14  BP: (138)/(74) 138/74  SpO2:  [97 %] 97 %  [unfilled]    Constitutional: awake, alert, cooperative, no apparent distress, and appears stated age  Eyes: Lids and lashes normal, pupils equal, round and reactive to light, extra ocular muscles intact, sclera clear, conjunctiva normal  ENT: normocepalic, without obvious abnormality, oropharynx pink and moist  Hematologic / Lymphatic: no lymphadenopathy  Respiratory: No increased work of breathing, good air exchange, clear to auscultation bilaterally, no crackles or wheezing  Cardiovascular: regular rate and rhythm, normal S1 and S2 and no murmur noted  GI: Normal bowel sounds, soft, non-distended, non-tender  Skin: no redness, warmth, or swelling, no rashes and no lesions  Musculoskeletal: There is no redness, warmth, or swelling of the joints.  Full range of motion noted.  Motor strength is 5 out of 5 all extremities bilaterally.  Tone is normal.  Neurologic: Awake, alert, oriented to name, place and time.  Cranial nerves II-XII are grossly intact.  Motor is 5 out of 5 bilaterally.    Neuropsychiatric:  Normal affect, memory, insight.  Pulses: Same as previous exam with no change palpable pulses equal and bilateral  . No carotid bruits appreciated.     Medications         Data   No results found for this or any previous visit (from the past 24 hour(s)).    Assessment & Plan   (M79.89) Leg swelling  Comment: Resolved  Plan: Continue with  compression    (I87.2) Venous (peripheral) insufficiency  Comment: Stable  Plan: Continue with compression    (I87.2) Venous stasis dermatitis of both lower extremities  Comment: Stable no evidence of cellulitis  Plan: Continue with compression    (I89.0) Lymphedema of extremity  Comment: Improved by almost 90% only 10% residual swelling  Plan: Continue with compression        Summary: Follow-up with the patient in 6-12 months    Arnav Cunningham MD

## 2018-10-09 NOTE — MR AVS SNAPSHOT
"              After Visit Summary   10/9/2018    Damien Vallecillo    MRN: [id number]           Patient Information     Date Of Birth          1935        Visit Information        Provider Department      10/9/2018 9:00 AM Arnav Cunningham MD [facility]      Today's Diagnoses     Leg swelling        Venous (peripheral) insufficiency        Venous stasis dermatitis of both lower extremities        Lymphedema of extremity           Follow-ups after your visit        Follow-up notes from your care team     Return in about 1 year (around 10/9/2019).      Who to contact     If you have questions or need follow up information about today's clinic visit or your schedule please contact [facility] directly at [phone].  Normal or non-critical lab and imaging results will be communicated to you by MyChart, letter or phone within 4 business days after the clinic has received the results. If you do not hear from us within 7 days, please contact the clinic through MyChart or phone. If you have a critical or abnormal lab result, we will notify you by phone as soon as possible.  Submit refill requests through [employer] or call your pharmacy and they will forward the refill request to us. Please allow 3 business days for your refill to be completed.          Additional Information About Your Visit        Care EveryWhere ID     This is your Care EveryWhere ID. This could be used by other organizations to access your [facility] medical records  [id number]        Your Vitals Were     Pulse Respirations Height Pulse Oximetry BMI (Body Mass Index)       70 14 5' 10\" (1.778 m) 97% 35.87 kg/m2        Blood Pressure from Last 3 Encounters:   10/09/18 138/74   09/28/18 186/82   08/17/18 134/60    Weight from Last 3 Encounters:   10/09/18 250 lb (113.4 kg)   09/28/18 250 lb (113.4 kg)   08/17/18 250 lb (113.4 kg)              We Performed the Following  "    Follow-Up with Vascular Medicine        Primary Care Provider Office Phone # Fax #    Obi Strange [phone] [address] [facility] [address]        Equal Access to Services     JAZMINETWIN BARRON : Amairani benjamin mccormick romuloo Socorrieali, waaxda luqadaha, qaybta kaalmada adeegyada, corin johnsondino davis. So [facility] [phone].    ATENCIÓN: Si habla español, tiene a dumont disposición servicios gratuitos de asistencia lingüística. Llame al [phone].    We comply with applicable federal civil rights laws and [place] laws. We do not discriminate on the basis of race, color, national origin, age, disability, sex, sexual orientation, or gender identity.            Thank you!     Thank you for choosing [facility]  for your care. Our goal is always to provide you with excellent care. Hearing back from our patients is one way we can continue to improve our services. Please take a few minutes to complete the written survey that you may receive in the mail after your visit with us. Thank you!             Your Updated Medication List - Protect others around you: Learn how to safely use, store and throw away your medicines at www.disposemymeds.org.          This list is accurate as of 10/9/18  9:19 AM.  Always use your most recent med list.                   Brand Name Dispense Instructions for use Diagnosis    ACE NOT PRESCRIBED (INTENTIONAL)     0 each    ACE Inhibitor not prescribed due to Other:  Had cough on ACEI in past    Type 2 diabetes, HbA1c goal < 7% (H)       aspirin 325 MG EC tablet     30 tablet    Take 1 tablet (325 mg) by mouth daily    Status post total right knee replacement       atorvastatin 40 MG tablet    LIPITOR    90 tablet    Take 1 tablet (40 mg) by mouth At Bedtime    Pre-diabetes       * blood glucose monitoring test strip    ACCU-CHEK LAURA    100 strip    1Use to test blood sugar 2 times daily or as directed.    Glucose intolerance  (impaired glucose tolerance)       * blood glucose monitoring test strip    ACCU-CHEK LAURA PLUS    100 each    Use to test blood sugar 1 time daily    Glucose intolerance (impaired glucose tolerance)       CINNAMON PO      Take 1 tablet by mouth daily        clindamycin 1 % solution    CLEOCIN T    60 mL    Apply topically 2 times daily    Rhinophyma       clotrimazole 1 % cream    LOTRIMIN    15 g    Apply topically 2 times daily    Cheilosis       Doxycycline Hyclate 100 MG Tbec     28 tablet    Take 20 mg by mouth 2 times daily    Rosacea       fish oil-omega-3 fatty acids 1000 MG capsule      Take 1 g by mouth every evening        furosemide 20 MG tablet    LASIX    180 tablet    Take 1 tablet (20 mg) by mouth 2 times daily    Pedal edema       hydrocortisone 1 % cream    CORTAID    30 g    Apply sparingly to affected area three times daily for 14 days.    Rhinophyma       MULTIVITAMIN ADULT PO      Take 1 tablet by mouth daily Reported on 5/12/2017        * order for DME     1 each    Equipment being ordered: Walker Wheels () and Walker () Treatment Diagnosis: Impaired functional mobility    Status post total right knee replacement       * order for DME     1 each    1: Gradient Compression Wraps; 2: Cast Boots; 3: BLE knee or thigh high 20-30 mm Hg compression stocking; 4: BLE knee or thigh high custom compression stocking; 5: Alternative BLE knee high or thigh compression garments (velcro/buckling)    Leg swelling       triamcinolone 0.5 % cream    KENALOG    30 g    Apply sparingly to affected area three times daily.    Stasis dermatitis of both legs       * Notice:  This list has 4 medication(s) that are the same as other medications prescribed for you. Read the directions carefully, and ask your doctor or other care provider to review them with you.

## 2018-12-28 ENCOUNTER — TELEPHONE (OUTPATIENT)
Dept: FAMILY MEDICINE | Facility: CLINIC | Age: 83
End: 2018-12-28

## 2018-12-28 ENCOUNTER — APPOINTMENT (OUTPATIENT)
Dept: GENERAL RADIOLOGY | Facility: CLINIC | Age: 83
End: 2018-12-28
Attending: EMERGENCY MEDICINE
Payer: MEDICARE

## 2018-12-28 ENCOUNTER — HOSPITAL ENCOUNTER (OUTPATIENT)
Facility: CLINIC | Age: 83
Setting detail: OBSERVATION
Discharge: HOME | End: 2018-12-29
Attending: EMERGENCY MEDICINE | Admitting: INTERNAL MEDICINE
Payer: MEDICARE

## 2018-12-28 DIAGNOSIS — S82.891A ANKLE FRACTURE, RIGHT, CLOSED, INITIAL ENCOUNTER: ICD-10-CM

## 2018-12-28 DIAGNOSIS — S82.891A CLOSED FRACTURE OF RIGHT ANKLE, INITIAL ENCOUNTER: Primary | ICD-10-CM

## 2018-12-28 PROBLEM — S82.899A ANKLE FRACTURE: Status: ACTIVE | Noted: 2018-12-28

## 2018-12-28 LAB
ANION GAP SERPL CALCULATED.3IONS-SCNC: 5 MMOL/L (ref 3–14)
BASOPHILS # BLD AUTO: 0 10E9/L (ref 0–0.2)
BASOPHILS NFR BLD AUTO: 0.4 %
BUN SERPL-MCNC: 12 MG/DL (ref 7–30)
CALCIUM SERPL-MCNC: 8.4 MG/DL (ref 8.5–10.1)
CHLORIDE SERPL-SCNC: 106 MMOL/L (ref 94–109)
CO2 SERPL-SCNC: 28 MMOL/L (ref 20–32)
CREAT SERPL-MCNC: 0.78 MG/DL (ref 0.66–1.25)
DIFFERENTIAL METHOD BLD: ABNORMAL
EOSINOPHIL # BLD AUTO: 0.2 10E9/L (ref 0–0.7)
EOSINOPHIL NFR BLD AUTO: 1.8 %
ERYTHROCYTE [DISTWIDTH] IN BLOOD BY AUTOMATED COUNT: 13.3 % (ref 10–15)
GFR SERPL CREATININE-BSD FRML MDRD: 84 ML/MIN/{1.73_M2}
GLUCOSE BLDC GLUCOMTR-MCNC: 131 MG/DL (ref 70–99)
GLUCOSE SERPL-MCNC: 98 MG/DL (ref 70–99)
HCT VFR BLD AUTO: 37.4 % (ref 40–53)
HGB BLD-MCNC: 12.4 G/DL (ref 13.3–17.7)
IMM GRANULOCYTES # BLD: 0.1 10E9/L (ref 0–0.4)
IMM GRANULOCYTES NFR BLD: 0.6 %
INR PPP: 1.19 (ref 0.86–1.14)
LYMPHOCYTES # BLD AUTO: 1.1 10E9/L (ref 0.8–5.3)
LYMPHOCYTES NFR BLD AUTO: 11.6 %
MCH RBC QN AUTO: 29.2 PG (ref 26.5–33)
MCHC RBC AUTO-ENTMCNC: 33.2 G/DL (ref 31.5–36.5)
MCV RBC AUTO: 88 FL (ref 78–100)
MONOCYTES # BLD AUTO: 1.3 10E9/L (ref 0–1.3)
MONOCYTES NFR BLD AUTO: 14.4 %
NEUTROPHILS # BLD AUTO: 6.4 10E9/L (ref 1.6–8.3)
NEUTROPHILS NFR BLD AUTO: 71.2 %
NRBC # BLD AUTO: 0 10*3/UL
NRBC BLD AUTO-RTO: 0 /100
PLATELET # BLD AUTO: 217 10E9/L (ref 150–450)
POTASSIUM SERPL-SCNC: 3.3 MMOL/L (ref 3.4–5.3)
RBC # BLD AUTO: 4.24 10E12/L (ref 4.4–5.9)
SODIUM SERPL-SCNC: 139 MMOL/L (ref 133–144)
WBC # BLD AUTO: 9 10E9/L (ref 4–11)

## 2018-12-28 PROCEDURE — 40000986 XR ANKLE RT G/E 3 VW

## 2018-12-28 PROCEDURE — 80048 BASIC METABOLIC PNL TOTAL CA: CPT | Performed by: INTERNAL MEDICINE

## 2018-12-28 PROCEDURE — 99285 EMERGENCY DEPT VISIT HI MDM: CPT | Mod: 25

## 2018-12-28 PROCEDURE — 00000146 ZZHCL STATISTIC GLUCOSE BY METER IP

## 2018-12-28 PROCEDURE — 85610 PROTHROMBIN TIME: CPT | Performed by: INTERNAL MEDICINE

## 2018-12-28 PROCEDURE — 12000000 ZZH R&B MED SURG/OB

## 2018-12-28 PROCEDURE — A9270 NON-COVERED ITEM OR SERVICE: HCPCS | Mod: GY | Performed by: INTERNAL MEDICINE

## 2018-12-28 PROCEDURE — 99222 1ST HOSP IP/OBS MODERATE 55: CPT | Mod: AI | Performed by: INTERNAL MEDICINE

## 2018-12-28 PROCEDURE — 73610 X-RAY EXAM OF ANKLE: CPT | Mod: RT

## 2018-12-28 PROCEDURE — 29515 APPLICATION SHORT LEG SPLINT: CPT | Mod: RT

## 2018-12-28 PROCEDURE — 85025 COMPLETE CBC W/AUTO DIFF WBC: CPT | Performed by: INTERNAL MEDICINE

## 2018-12-28 PROCEDURE — 73562 X-RAY EXAM OF KNEE 3: CPT | Mod: RT

## 2018-12-28 PROCEDURE — 36415 COLL VENOUS BLD VENIPUNCTURE: CPT | Performed by: INTERNAL MEDICINE

## 2018-12-28 PROCEDURE — 25000132 ZZH RX MED GY IP 250 OP 250 PS 637: Mod: GY | Performed by: INTERNAL MEDICINE

## 2018-12-28 RX ORDER — ONDANSETRON 2 MG/ML
4 INJECTION INTRAMUSCULAR; INTRAVENOUS EVERY 6 HOURS PRN
Status: DISCONTINUED | OUTPATIENT
Start: 2018-12-28 | End: 2018-12-29 | Stop reason: HOSPADM

## 2018-12-28 RX ORDER — DOXYCYCLINE HYCLATE 20 MG
20 TABLET ORAL 2 TIMES DAILY
COMMUNITY
End: 2021-09-16

## 2018-12-28 RX ORDER — POTASSIUM CHLORIDE 29.8 MG/ML
20 INJECTION INTRAVENOUS
Status: DISCONTINUED | OUTPATIENT
Start: 2018-12-28 | End: 2018-12-29 | Stop reason: HOSPADM

## 2018-12-28 RX ORDER — POTASSIUM CHLORIDE 1500 MG/1
20-40 TABLET, EXTENDED RELEASE ORAL
Status: DISCONTINUED | OUTPATIENT
Start: 2018-12-28 | End: 2018-12-29 | Stop reason: HOSPADM

## 2018-12-28 RX ORDER — TAMSULOSIN HYDROCHLORIDE 0.4 MG/1
0.4 CAPSULE ORAL DAILY
Status: DISCONTINUED | OUTPATIENT
Start: 2018-12-29 | End: 2018-12-29 | Stop reason: HOSPADM

## 2018-12-28 RX ORDER — DOXYCYCLINE HYCLATE 20 MG
20 TABLET ORAL 2 TIMES DAILY
Status: DISCONTINUED | OUTPATIENT
Start: 2018-12-28 | End: 2018-12-29 | Stop reason: HOSPADM

## 2018-12-28 RX ORDER — POTASSIUM CHLORIDE 1.5 G/1.58G
20-40 POWDER, FOR SOLUTION ORAL
Status: DISCONTINUED | OUTPATIENT
Start: 2018-12-28 | End: 2018-12-29 | Stop reason: HOSPADM

## 2018-12-28 RX ORDER — POLYETHYLENE GLYCOL 3350 17 G/17G
17 POWDER, FOR SOLUTION ORAL DAILY PRN
Status: DISCONTINUED | OUTPATIENT
Start: 2018-12-28 | End: 2018-12-29 | Stop reason: HOSPADM

## 2018-12-28 RX ORDER — POTASSIUM CHLORIDE 7.45 MG/ML
10 INJECTION INTRAVENOUS
Status: DISCONTINUED | OUTPATIENT
Start: 2018-12-28 | End: 2018-12-29 | Stop reason: HOSPADM

## 2018-12-28 RX ORDER — SODIUM CHLORIDE 9 MG/ML
INJECTION, SOLUTION INTRAVENOUS CONTINUOUS
Status: DISCONTINUED | OUTPATIENT
Start: 2018-12-29 | End: 2018-12-29 | Stop reason: HOSPADM

## 2018-12-28 RX ORDER — NALOXONE HYDROCHLORIDE 0.4 MG/ML
.1-.4 INJECTION, SOLUTION INTRAMUSCULAR; INTRAVENOUS; SUBCUTANEOUS
Status: DISCONTINUED | OUTPATIENT
Start: 2018-12-28 | End: 2018-12-29 | Stop reason: HOSPADM

## 2018-12-28 RX ORDER — BISACODYL 10 MG
10 SUPPOSITORY, RECTAL RECTAL DAILY PRN
Status: DISCONTINUED | OUTPATIENT
Start: 2018-12-28 | End: 2018-12-29 | Stop reason: HOSPADM

## 2018-12-28 RX ORDER — OXYCODONE HYDROCHLORIDE 5 MG/1
5 TABLET ORAL EVERY 4 HOURS PRN
Status: DISCONTINUED | OUTPATIENT
Start: 2018-12-28 | End: 2018-12-29 | Stop reason: HOSPADM

## 2018-12-28 RX ORDER — ONDANSETRON 4 MG/1
4 TABLET, ORALLY DISINTEGRATING ORAL EVERY 6 HOURS PRN
Status: DISCONTINUED | OUTPATIENT
Start: 2018-12-28 | End: 2018-12-29 | Stop reason: HOSPADM

## 2018-12-28 RX ORDER — SODIUM PHOSPHATE,MONO-DIBASIC 19G-7G/118
1 ENEMA (ML) RECTAL DAILY
COMMUNITY

## 2018-12-28 RX ORDER — ACETAMINOPHEN 325 MG/1
650 TABLET ORAL EVERY 4 HOURS PRN
Status: DISCONTINUED | OUTPATIENT
Start: 2018-12-28 | End: 2018-12-29 | Stop reason: HOSPADM

## 2018-12-28 RX ORDER — HYDROMORPHONE HYDROCHLORIDE 1 MG/ML
0.3 INJECTION, SOLUTION INTRAMUSCULAR; INTRAVENOUS; SUBCUTANEOUS
Status: DISCONTINUED | OUTPATIENT
Start: 2018-12-28 | End: 2018-12-29 | Stop reason: HOSPADM

## 2018-12-28 RX ORDER — TAMSULOSIN HYDROCHLORIDE 0.4 MG/1
0.4 CAPSULE ORAL DAILY
COMMUNITY
End: 2021-09-16

## 2018-12-28 RX ORDER — ATORVASTATIN CALCIUM 40 MG/1
40 TABLET, FILM COATED ORAL AT BEDTIME
Status: DISCONTINUED | OUTPATIENT
Start: 2018-12-28 | End: 2018-12-29 | Stop reason: HOSPADM

## 2018-12-28 RX ORDER — POTASSIUM CL/LIDO/0.9 % NACL 10MEQ/0.1L
10 INTRAVENOUS SOLUTION, PIGGYBACK (ML) INTRAVENOUS
Status: DISCONTINUED | OUTPATIENT
Start: 2018-12-28 | End: 2018-12-29 | Stop reason: HOSPADM

## 2018-12-28 RX ADMIN — POTASSIUM CHLORIDE 40 MEQ: 1500 TABLET, EXTENDED RELEASE ORAL at 19:28

## 2018-12-28 RX ADMIN — POTASSIUM CHLORIDE 20 MEQ: 1500 TABLET, EXTENDED RELEASE ORAL at 21:14

## 2018-12-28 RX ADMIN — ATORVASTATIN CALCIUM 40 MG: 40 TABLET, FILM COATED ORAL at 21:14

## 2018-12-28 ASSESSMENT — ACTIVITIES OF DAILY LIVING (ADL)
NUMBER_OF_TIMES_PATIENT_HAS_FALLEN_WITHIN_LAST_SIX_MONTHS: 1
TOILETING: 0-->INDEPENDENT
FALL_HISTORY_WITHIN_LAST_SIX_MONTHS: YES
RETIRED_COMMUNICATION: 0-->UNDERSTANDS/COMMUNICATES WITHOUT DIFFICULTY
COGNITION: 0 - NO COGNITION ISSUES REPORTED
TRANSFERRING: 0-->INDEPENDENT
ADLS_ACUITY_SCORE: 15
RETIRED_EATING: 0-->INDEPENDENT
DRESS: 0-->INDEPENDENT
AMBULATION: 0-->INDEPENDENT
ADLS_ACUITY_SCORE: 15
BATHING: 0-->INDEPENDENT
SWALLOWING: 0-->SWALLOWS FOODS/LIQUIDS WITHOUT DIFFICULTY

## 2018-12-28 ASSESSMENT — ENCOUNTER SYMPTOMS
HEADACHES: 0
BACK PAIN: 0

## 2018-12-28 ASSESSMENT — MIFFLIN-ST. JEOR
SCORE: 1952.08
SCORE: 1847.05

## 2018-12-28 NOTE — ED NOTES
"[facility]  ED Nurse Handoff Report    Damien Vallecillo is a 83 year old male   ED Chief complaint: Fall  . ED Diagnosis:   Final diagnoses:   Ankle fracture, right, closed, initial encounter     Allergies:   Allergies   Allergen Reactions     Levetiracetam [Keppra] Rash     Oxcarbazepine [Trileptal] Rash       Code Status: Full Code  Activity level - Baseline/Home:  Independent. Activity Level - Current:   Total Care. Lift room needed: Yes. Bariatric: No   Needed: No   Isolation: No. Infection: Not Applicable.     Vital Signs:   Vitals:    12/28/18 1233 12/28/18 1237 12/28/18 1245   BP:  166/78    Resp: 16     Temp: 98.4  F (36.9  C)     TempSrc: Temporal     SpO2: 99%  99%   Weight: 115.7 kg (255 lb)     Height: 1.753 m (5' 9\")         Cardiac Rhythm:  ,      Pain level: 0-10 Pain Scale: 10  Patient confused: No. Patient Falls Risk: Yes.   Elimination Status: Has voided   Patient Report - Initial Complaint: Fall. Focused Assessment: Right knee and ankle swelling.   Tests Performed: Labs Ordered and Resulted from Time of ED Arrival Up to the Time of Departure from the ED - No data to display  XR Knee Right 3 Views   Preliminary Result   IMPRESSION: There is a moderate joint effusion. There are several   areas of soft tissue calcification surrounding the patella similar to   the prior study. There is moderate lateral subluxation of the patella.   No evidence of acute fracture. No evidence of hardware loosening of   the components of the arthroplasty.      Ankle XR, G/E 3 views, right   Final Result   IMPRESSION:     1. There is a transverse fracture through the medial malleolus with   some distraction but no significant displacement of the distal   fracture fragment.   2. Minimal cortical step-off and possible associated lucency strongly   suggests a nondisplaced posterior malleolar fracture, seen on the   lateral view.   3. There is soft tissue swelling over the lateral malleolus. Small   area " of elevation of the cortex in the distal lateral fibula,   suspicious for a subtle nondisplaced fracture. Irregularity and some   deformity of the posterior fibula on the lateral view. This   abnormality could be chronic and related to an ankle fracture from   1/7/2014.      SONI DOAN MD        Treatments provided: See MAR  Family Comments: Wife at bedside  OBS brochure/video discussed/provided to patient:  N/A  ED Medications: Medications - No data to display  Drips infusing:  No  For the majority of the shift, the patient's behavior Green. Interventions performed were monitor.     Severe Sepsis OR Septic Shock Diagnosis Present: No      ED Nurse Name/Phone Number: Marc Abdullahi, RECEIVING UNIT ED HANDOFF REVIEW    Above ED Nurse Handoff Report was reviewed: Yes  Reviewed by: Bette Rosado on December 28, 2018 at 3:05 PM     2:39 PM

## 2018-12-28 NOTE — PROGRESS NOTES
Aware of patient in ED    Reviewed xrays with Dr. Card  Will treat ankle fracture non-operatively    Non-WB on right LE  Continue splint  Pain medication as needed  PT/OT  Likely will need dispo planning with Social Work    Follow-up in Dr. Card's clinic ([facility]) in 2 weeks for recheck/cast - Call Vannesa for appointment [phone]    May have diet    Vannesa Lucas PAC

## 2018-12-28 NOTE — ED NOTES
Pt presents via EMS for complaint of a fall yesterday. Pt states he slipped on the ice on his driveway yesterday and states he landed on his butt, but his right leg bent under him. Denies LOC and was able to get up with assistance of his wife and a chair. Notable swelling of the right knee and ankle. ABC intact, A&Ox4. CMS intact.

## 2018-12-28 NOTE — TELEPHONE ENCOUNTER
Wife calling and states fell on ice around noon yesterday.  (R) ankle and up leg swollen and black and blue and he can't put weight on leg.  Wondering if should call 911.  Advised if unable to weight bear to get in for visit then ambulance would be best option and ER evaluation.  Wife agrees with plan.  Helen Quan RN

## 2018-12-28 NOTE — ED PROVIDER NOTES
History     Chief Complaint:  Fall      HPI   Damien Vallecillo is a 83 year old male with a history of diabetes and recent knee arthroplasty who presents to the emergency department for evaluation of a fall. The patient reports that yesterday at 1200 he slipped on the ice and fell on his driveway. He fell on his butt and his right leg folded under his left leg. He had a right knee replacement this past year which was painful prior to the fall, but he is now having worsening right knee pain and lateral ankle pain and is unable to put weight on it. He normally ambulates on his own, but has been using crutches since the fall. The patient denies loss of consciousness, headache, right hip pain, or back pain.     Allergies:  Keppra  Trileptal      Medications:    Aspirin 325 mg   Lipitor  Cleocin  Lotrimin  Doxycycline hyclate  Lasix  Cortaid  Kenalog      Past Medical History:    Acute suppurative arthritis  Cerebrovascular disease  Cheilosis   Chronic headaches  Diabetes  Gout  Lower GI bleed  Thyroid nodule   Hammer toe of left foot  Hematuria  History of blood transfusion  Lentigo maligna  Malignant neoplasm of rectosigmoid junction  Morbid obesity  Convulsions  Pedal edema  RBC microcytosis  Rhinophyma   Rosacea  Syncope  Tubulovillous adenoma of colon  Venous statis dermatitis bilateral lower extremities     Past Surgical History:    Bilateral knee arthroplasty  Right heel surgery  Colonoscopy  Herniorrhaphy epigastric  Laparoscopic colectomy  Realign patella  Hammer toe repair  Sigmoidoscopy   Skin cancer excision     Family History:    Colorectal cancer  Cerebrovascular disease    Social History:  Negative for tobacco use.  Occasional alcohol use.   Marital Status:        Review of Systems   Musculoskeletal: Negative for back pain.        Positive for right knee and ankle pain.  Negative for right hip pain.    Neurological: Negative for syncope and headaches.   All other systems reviewed and are  "negative.      Physical Exam     Patient Vitals for the past 24 hrs:   BP Temp Temp src Heart Rate Resp SpO2 Height Weight   12/28/18 1245 -- -- -- -- -- 99 % -- --   12/28/18 1237 166/78 -- -- -- -- -- -- --   12/28/18 1233 -- 98.4  F (36.9  C) Temporal 73 16 99 % 1.753 m (5' 9\") 115.7 kg (255 lb)       Physical Exam  Constitutional: vitals reviewed  Eyes: EOMI  ENT: No oropharyngeal lacerations  Respiratory: Normal effort, symmetric chest rise  Cardiovascular: Distal pulses palpable and symmetric, distal extremities warm, heart rate normal and rhythm regular  Gastrointestinal: No tenderness to palpation or guarding  MSK: Full ROM of neck without pain or rigidity, no thoracic or lumbar spine tenderness or step offs, no chest wall tenderness or crepitus.  Ecchymosis and edema over the lateral aspect of the right ankle joint.  Decreased range of motion at the right ankle due to pain.  No tenderness of the foot bones.  There is no fibular head tenderness.  Mild tenderness at the medial aspect of the left knee with a moderate to large effusion present.  The patient is able to straight leg raise without assistance on the affected side.  There is tenderness along the distal lateral and medial malleoli on the right side.  No pain with logrolling of the hips bilaterally.  Skin: No diaphoresis, lacerations, or lesions  Neurologic: Alert and oriented, follows commands in all extremities, sensation to light touch intact in all extremities, full strength and coordination  : Normal external male genitalia      Emergency Department Course   Imaging:  Knee XR, 3 views, Right:   IMPRESSION: There is a moderate joint effusion. There are several  areas of soft tissue calcification surrounding the patella similar to  the prior study. There is moderate lateral subluxation of the patella.  No evidence of acute fracture. No evidence of hardware loosening of  the components of the arthroplasty.  Reading per radiology.     Ankle XR, G/E " 3 views, Right:  IMPRESSION:    1. There is a transverse fracture through the medial malleolus with  some distraction but no significant displacement of the distal  fracture fragment.  2. Minimal cortical step-off and possible associated lucency strongly  suggests a nondisplaced posterior malleolar fracture, seen on the  lateral view.  3. There is soft tissue swelling over the lateral malleolus. Small  area of elevation of the cortex in the distal lateral fibula,  suspicious for a subtle nondisplaced fracture. Irregularity and some  deformity of the posterior fibula on the lateral view. This  abnormality could be chronic and related to an ankle fracture from  1/7/2014.  Reading per radiology.     Ankle XR, G/E 3 views, Right:  No change in alignment  Reading per radiology.     Radiographic findings were communicated with the patient who voiced understanding of the findings.    Procedures:   Splint Placement    PLACEMENT: Custom Orthoglass splint was applied to the right lower extremity and after placement I checked and adjusted the fit to ensure proper positioning. The patient was more comfortable with the splint in place. Sensation and circulation are intact after splint placement.     Emergency Department Course:  1237 Nursing notes and vitals reviewed. I performed an exam of the patient as documented above.     The patient was sent for a knee XR and two ankle XRs while in the emergency department, findings above.     1403 I rechecked the patient and discussed the results of his workup thus far.     1409 I consulted with Vannesa Lucas, Orthopedics, regarding the patient's history and presentation here in the emergency department.    1422  I consulted with Dr. Duggan of the hospitalist services. They are in agreement to accept the patient for admission.    1445  A splint procedure was performed as outlined in the procedure note above.  The patient tolerated well and there were no complications.    Findings and  plan explained to the Patient who consents to admission. Discussed the patient with Dr. Duggan, who will admit the patient to a med/surg bed for further monitoring, evaluation, and treatment.    Impression & Plan    Medical Decision Making:  Patient who presents with right ankle pain and right knee pain after a fall yesterday.  He has chronic problems with his right knee and chronic patellar subluxation.  His ankle exam is concerning for potential fracture.  X-ray confirms bimalleolar and possible trimalleolar fracture without significant displacement.  Neurovascularly he is intact.  No tenderness of any of the foot bones.  Regarding the knee injury, no acute fracture is seen on x-ray but there is a large effusion.  The patient does have intermittent effusions on this side.  His straight leg raise is intact, so I have low concern for serious extensor mechanism injury.  He was placed in a short leg splint for the fracture and repeat films with stable alignment.  CMS remains intact..  Orthopedics was consulted and recommended admission to the hospital and no acute indication for surgery.  The patient and his family are agreeable to the admission and his care was signed out to the hospitalist team.  He left the emergency department in stable condition.      Diagnosis:    ICD-10-CM    1. Ankle fracture, right, closed, initial encounter S82.891A        Disposition:  Admitted to Dr. Duggan    Scribe Disclosure:  I, Lev Jacobson, am serving as a scribe on 12/28/2018 at 12:37 PM to personally document services performed by Dr. Aleshia MD based on my observations and the provider's statements to me.       Lev Jacobson  12/28/2018   [facility] EMERGENCY DEPARTMENT       Zaid Monk MD  12/28/18 [id number]

## 2018-12-28 NOTE — ED NOTES
Bed: ED06  Expected date: 12/28/18  Expected time: 12:08 PM  Means of arrival: Ambulance  Comments:  sherri [phone]  82yo M

## 2018-12-28 NOTE — PHARMACY-ADMISSION MEDICATION HISTORY
Admission medication history interview status for this patient is complete. See [facility] admission navigator for allergy information, prior to admission medications and immunization status.     Medication history interview source(s):Patient  Medication history resources (including written lists, pill bottles, clinic record):None  Primary pharmacy: [facility]    Changes made to PTA medication list:  Added: tamsulosin, GSM/Ch  Deleted: clotrimazole oint, hydrocortisone cream  Changed: none    Actions taken by pharmacist (provider contacted, etc):None   Additional medication history information:doxycycline is for rosacea  Medication reconciliation/reorder completed by provider prior to medication history? No    Prior to Admission medications    Medication Sig Last Dose Taking? Auth Provider   aspirin  MG EC tablet Take 1 tablet (325 mg) by mouth daily 12/28/2018 at am Yes Alec Raymond MD   atorvastatin (LIPITOR) 40 MG tablet Take 1 tablet (40 mg) by mouth At Bedtime 12/27/2018 at hs Yes Obi Strange MD   CINNAMON PO Take 1 tablet by mouth daily  12/28/2018 at am Yes Reported, Patient   clindamycin (CLEOCIN T) 1 % solution Apply topically 2 times daily 12/28/2018 at x 1 Yes Obi Strange MD   doxycycline hyclate (PERIOSTAT) 20 MG tablet Take 20 mg by mouth 2 times daily 12/28/2018 at x 1 Yes Unknown, Entered By History   fish oil-omega-3 fatty acids 1000 MG capsule Take 1 g by mouth every evening 12/27/2018 at pm Yes Unknown, Entered By History   furosemide (LASIX) 20 MG tablet Take 1 tablet (20 mg) by mouth 2 times daily 12/28/2018 at x 1 Yes Obi Strange MD   glucosamine-chondroitin 500-400 MG CAPS per capsule Take 1 capsule by mouth 2 times daily 12/28/2018 at x 1 Yes Unknown, Entered By History   Multiple Vitamins-Minerals (MULTIVITAMIN ADULT PO) Take 1 tablet by mouth daily Reported on 5/12/2017 12/28/2018 at am Yes Reported, Patient   tamsulosin (FLOMAX) 0.4 MG capsule Take 0.4 mg by mouth  daily 12/28/2018 at am Yes Unknown, Entered By History   ACE NOT PRESCRIBED, INTENTIONAL, ACE Inhibitor not prescribed due to Other:  Had cough on ACEI in past         Naren, Nimisha ARENAS MD   blood glucose (ACCU-CHEK LAURA) test strip 1Use to test blood sugar 2 times daily or as directed.   Obi Strange MD   blood glucose monitoring (ACCU-CHEK LAURA PLUS) test strip Use to test blood sugar 1 time daily   Obi Strange MD   order for DME 1: Gradient Compression Wraps; 2: Cast Boots; 3: BLE knee or thigh high 20-30 mm Hg compression stocking; 4: BLE knee or thigh high custom compression stocking; 5: Alternative BLE knee high or thigh compression garments (velcro/buckling)   Arnav Cunningham MD   order for DME Equipment being ordered: Walker Wheels () and Walker ()  Treatment Diagnosis: Impaired functional mobility   Alec Raymond MD   triamcinolone (KENALOG) 0.5 % cream Apply sparingly to affected area three times daily. More than a month at Unknown time  Obi Strange MD

## 2018-12-29 ENCOUNTER — APPOINTMENT (OUTPATIENT)
Dept: PHYSICAL THERAPY | Facility: CLINIC | Age: 83
End: 2018-12-29
Attending: INTERNAL MEDICINE
Payer: MEDICARE

## 2018-12-29 VITALS
DIASTOLIC BLOOD PRESSURE: 62 MMHG | OXYGEN SATURATION: 98 % | BODY MASS INDEX: 41.19 KG/M2 | HEART RATE: 67 BPM | SYSTOLIC BLOOD PRESSURE: 131 MMHG | HEIGHT: 69 IN | RESPIRATION RATE: 20 BRPM | WEIGHT: 278.1 LBS | TEMPERATURE: 98.9 F

## 2018-12-29 PROBLEM — S82.891A CLOSED RIGHT ANKLE FRACTURE: Status: ACTIVE | Noted: 2018-12-29

## 2018-12-29 LAB
POTASSIUM SERPL-SCNC: 4.1 MMOL/L (ref 3.4–5.3)
POTASSIUM SERPL-SCNC: 4.5 MMOL/L (ref 3.4–5.3)

## 2018-12-29 PROCEDURE — 84132 ASSAY OF SERUM POTASSIUM: CPT | Performed by: INTERNAL MEDICINE

## 2018-12-29 PROCEDURE — 97530 THERAPEUTIC ACTIVITIES: CPT | Mod: GP | Performed by: PHYSICAL THERAPIST

## 2018-12-29 PROCEDURE — 36415 COLL VENOUS BLD VENIPUNCTURE: CPT | Performed by: INTERNAL MEDICINE

## 2018-12-29 PROCEDURE — 40000193 ZZH STATISTIC PT WARD VISIT: Performed by: PHYSICAL THERAPIST

## 2018-12-29 PROCEDURE — 25000128 H RX IP 250 OP 636: Performed by: INTERNAL MEDICINE

## 2018-12-29 PROCEDURE — 99217 ZZC OBSERVATION CARE DISCHARGE: CPT | Performed by: INTERNAL MEDICINE

## 2018-12-29 PROCEDURE — 25000132 ZZH RX MED GY IP 250 OP 250 PS 637: Mod: GY | Performed by: INTERNAL MEDICINE

## 2018-12-29 PROCEDURE — 97161 PT EVAL LOW COMPLEX 20 MIN: CPT | Mod: GP | Performed by: PHYSICAL THERAPIST

## 2018-12-29 PROCEDURE — G0378 HOSPITAL OBSERVATION PER HR: HCPCS

## 2018-12-29 PROCEDURE — 99207 ZZC CDG-CODE CATEGORY CHANGED: CPT | Performed by: INTERNAL MEDICINE

## 2018-12-29 RX ORDER — ACETAMINOPHEN 325 MG/1
650 TABLET ORAL EVERY 4 HOURS PRN
COMMUNITY
Start: 2018-12-29 | End: 2019-04-04

## 2018-12-29 RX ORDER — OXYCODONE HYDROCHLORIDE 5 MG/1
5 TABLET ORAL EVERY 4 HOURS PRN
Qty: 10 TABLET | Refills: 0 | Status: SHIPPED | OUTPATIENT
Start: 2018-12-29 | End: 2019-01-02

## 2018-12-29 RX ADMIN — SODIUM CHLORIDE: 9 INJECTION, SOLUTION INTRAVENOUS at 00:41

## 2018-12-29 RX ADMIN — TAMSULOSIN HYDROCHLORIDE 0.4 MG: 0.4 CAPSULE ORAL at 08:36

## 2018-12-29 ASSESSMENT — ACTIVITIES OF DAILY LIVING (ADL)
ADLS_ACUITY_SCORE: 14

## 2018-12-29 NOTE — DISCHARGE SUMMARY
[facility]    Discharge Summary  Hospitalist    Date of Admission:  12/28/2018  Date of Discharge:  12/29/2018  Discharging Provider: Jose Elias Carpenter MD  Date of Service (when I saw the patient): 12/29/18    Discharge Diagnoses      1.  Mechanical fall.  The patient denies having any syncopal event.     2.  Right ankle fracture.  Splinted in the emergency room.  Orthopedic surgery recommending outpatient follow-up     3.  Right knee swelling.  No evidence of fracture    3.  Diabetes mellitus type II     CODE STATUS:      History of Present Illness   Damien Vallecillo is an 83 year old male who presented with right ankle fracture after a fall.  Please see H&P and hospital course for details    Hospital Course   This is an 83-year-old gentleman with a past medical history significant for prediabetes, arthritis, colon cancer status post laparoscopic-assisted colectomy, who presents to the hospital after a fall and concerns for ankle pain.      1.  Mechanical fall.  The patient denies having any syncopal event.  He denies hitting his head or any loss of consciousness.  2.  Right ankle fracture.  He had a splint placed in the ER.  He was put on nonweightbearing status.  He was seen by orthopedic surgery and recommended to continue nonweightbearing status, PT/OT evaluation.  Also recommended follow-up in Dr. Card's clinic ([facility]) in 2 weeks for recheck/cast.   His pain was well controlled during hospital course.  He was ordered oxycodone 10 pills for breakthrough pain.  He was advised to follow-up as outpatient if he needs further pain control. I ordered wheelchair for him.I discussed discharge plan with patient's family.  They are upset that patient is not going to TCU. I explained to them that he is not qualifying for TCU due to his observation status. Home PT/OT ordered. Family agreed to home discharge after I explained to them.   3.  Right knee swelling.  The patient appears to have effusion in  the right knee joint, although no acute fracture was seen on the x-ray.  The patient mentions that he had a right knee arthroplasty done previously, and has an issue with effusion since then.  It looks like the surgery was done in 10/2017.  He stated that he develops right knee swelling intermittently.  No evidence of infection or acute fracture.  3.  Diabetes.  The patient's most recent hemoglobin A1c is 5.7.  No need for any additional medication sliding scale at this time.      CODE STATUS:  I discussed with the patient who would like to be full code     Plan of care was discussed with the patient and his wife at bedside in great detail.  All their questions were answered.  They are comfortable with the plan and agreed with it.     Significant Results and Procedures   Results for orders placed or performed during the hospital encounter of 12/28/18   Ankle XR, G/E 3 views, right    Narrative    XR ANKLE RT G/E 3 VW  12/28/2018 1:07 PM    HISTORY:  Fall yesterday with swelling to the right ankle.    COMPARISON:  1/7/2014.      Impression    IMPRESSION:    1. There is a transverse fracture through the medial malleolus with  some distraction but no significant displacement of the distal  fracture fragment.  2. Minimal cortical step-off and possible associated lucency strongly  suggests a nondisplaced posterior malleolar fracture, seen on the  lateral view.  3. There is soft tissue swelling over the lateral malleolus. Small  area of elevation of the cortex in the distal lateral fibula,  suspicious for a subtle nondisplaced fracture. Irregularity and some  deformity of the posterior fibula on the lateral view. This  abnormality could be chronic and related to an ankle fracture from  1/7/2014.    SONI DOAN MD   XR Knee Right 3 Views    Narrative    RIGHT KNEE THREE VIEWS  12/28/2018 1:08 PM     HISTORY: Fall yesterday, swelling of the right knee.    COMPARISON: 11/10/2017 x-ray.      Impression    IMPRESSION: There  is a moderate joint effusion. There are several  areas of soft tissue calcification surrounding the patella similar to  the prior study. There is moderate lateral subluxation of the patella.  No evidence of acute fracture. No evidence of hardware loosening of  the components of the arthroplasty.    JANE PACE MD   Ankle XR, G/E 3 views, right    Narrative    XR RIGHT ANKLE THREE OR MORE VIEWS   12/28/2018 3:22 PM     HISTORY: Post splint.    COMPARISON: Film from earlier today.      Impression    IMPRESSION: Splint/cast material seen. This makes the nondisplaced  lateral and minimally displaced medial malleolus fractures less  apparent. There is no change in position or alignment. There is likely  a posterior malleolar fracture as well that is nondisplaced.    JANE PACE MD           Pending Results   None  Code Status   Full Code       Primary Care Physician   Obi Strange        Discharge Disposition   Discharged to home  Condition at discharge: Stable    Consultations This Hospital Stay   ORTHOPEDIC SURGERY IP CONSULT  SOCIAL WORK IP CONSULT  PHYSICAL THERAPY ADULT IP CONSULT  OCCUPATIONAL THERAPY ADULT IP CONSULT    Time Spent on this Encounter   I, Jose Elias Carpenter MD, personally saw the patient today and spent greater than 30 minutes discharging this patient.    Discharge Orders      Home Care PT Referral for Hospital Discharge      Home Care OT Referral for Hospital Discharge      Reason for your hospital stay    Right ankle fracture     Activity    Your activity upon discharge: Nonweightbearing status per orthopedic surgery recommendations.  Home PT/OT for further recommendations     MD face to face encounter    Documentation of Face to Face and Certification for Home Health Services    I certify that patient: Damien Vallecillo is under my care and that I, or a nurse practitioner or physician's assistant working with me, had a face-to-face encounter that meets the physician face-to-face encounter  requirements with this patient on: 12/29/2018.    This encounter with the patient was in whole, or in part, for the following medical condition, which is the primary reason for home health care: fall with right ankle fracture.    I certify that, based on my findings, the following services are medically necessary home health services: Occupational Therapy and Physical Therapy.    My clinical findings support the need for the above services because: Occupational Therapy Services are needed to assess and treat cognitive ability and address ADL safety due to impairment in gait due to fall with right ankle fracture. and Physical Therapy Services are needed to assess and treat the following functional impairments: Fall with right ankle fracture.    Further, I certify that my clinical findings support that this patient is homebound (i.e. absences from home require considerable and taxing effort and are for medical reasons or [employer] services or infrequently or of short duration when for other reasons) because: Has right ankle fracture after a fall and unable to walk, splint on right foot.    Based on the above findings. I certify that this patient is confined to the home and needs intermittent skilled nursing care, physical therapy and/or speech therapy.  The patient is under my care, and I have initiated the establishment of the plan of care.  This patient will be followed by a physician who will periodically review the plan of care.  Physician/Provider to provide follow up care: Obi Strange    Attending hospital physician (the Medicare certified [place] provider): Jose Elias Carpenter MD  Physician Signature: See electronic signature associated with these discharge orders.  Date: 12/29/2018     Follow-up and recommended labs and tests     Follow up with primary care provider, Obi Strange, within 7 days   Follow up with orthopedic surgery as scheduled     Diet    Follow this diet upon discharge: Orders Placed  This Encounter      Moderate Consistent CHO Diet     Discharge Medications   Current Discharge Medication List      START taking these medications    Details   acetaminophen (TYLENOL) 325 MG tablet Take 2 tablets (650 mg) by mouth every 4 hours as needed for mild pain    Associated Diagnoses: Closed fracture of right ankle, initial encounter      oxyCODONE (ROXICODONE) 5 MG tablet Take 1 tablet (5 mg) by mouth every 4 hours as needed for moderate to severe pain  Qty: 10 tablet, Refills: 0    Associated Diagnoses: Closed fracture of right ankle, initial encounter         CONTINUE these medications which have NOT CHANGED    Details   aspirin  MG EC tablet Take 1 tablet (325 mg) by mouth daily  Qty: 30 tablet    Associated Diagnoses: Status post total right knee replacement      atorvastatin (LIPITOR) 40 MG tablet Take 1 tablet (40 mg) by mouth At Bedtime  Qty: 90 tablet, Refills: 3    Associated Diagnoses: Pre-diabetes      CINNAMON PO Take 1 tablet by mouth daily       clindamycin (CLEOCIN T) 1 % solution Apply topically 2 times daily  Qty: 60 mL, Refills: 11    Associated Diagnoses: Rhinophyma      doxycycline hyclate (PERIOSTAT) 20 MG tablet Take 20 mg by mouth 2 times daily      fish oil-omega-3 fatty acids 1000 MG capsule Take 1 g by mouth every evening      furosemide (LASIX) 20 MG tablet Take 1 tablet (20 mg) by mouth 2 times daily  Qty: 180 tablet, Refills: 1    Associated Diagnoses: Pedal edema      glucosamine-chondroitin 500-400 MG CAPS per capsule Take 1 capsule by mouth 2 times daily      Multiple Vitamins-Minerals (MULTIVITAMIN ADULT PO) Take 1 tablet by mouth daily Reported on 5/12/2017      tamsulosin (FLOMAX) 0.4 MG capsule Take 0.4 mg by mouth daily      ACE NOT PRESCRIBED, INTENTIONAL, ACE Inhibitor not prescribed due to Other:  Had cough on ACEI in past        Qty: 0 each, Refills: 0    Associated Diagnoses: Type 2 diabetes, HbA1c goal < 7% (H)      !! blood glucose (ACCU-CHEK LAURA) test  strip 1Use to test blood sugar 2 times daily or as directed.  Qty: 100 strip, Refills: 3    Associated Diagnoses: Glucose intolerance (impaired glucose tolerance)      !! blood glucose monitoring (ACCU-CHEK LAURA PLUS) test strip Use to test blood sugar 1 time daily  Qty: 100 each, Refills: 11    Associated Diagnoses: Glucose intolerance (impaired glucose tolerance)      !! order for DME 1: Gradient Compression Wraps; 2: Cast Boots; 3: BLE knee or thigh high 20-30 mm Hg compression stocking; 4: BLE knee or thigh high custom compression stocking; 5: Alternative BLE knee high or thigh compression garments (velcro/buckling)  Qty: 1 each, Refills: 0    Associated Diagnoses: Leg swelling      !! order for DME Equipment being ordered: Walker Wheels () and Walker ()  Treatment Diagnosis: Impaired functional mobility  Qty: 1 each, Refills: 0    Associated Diagnoses: Status post total right knee replacement      triamcinolone (KENALOG) 0.5 % cream Apply sparingly to affected area three times daily.  Qty: 30 g, Refills: 1    Associated Diagnoses: Stasis dermatitis of both legs       !! - Potential duplicate medications found. Please discuss with provider.          Allergies   Allergies   Allergen Reactions     Levetiracetam [Keppra] Rash     Oxcarbazepine [Trileptal] Rash     Data   Most Recent 3 CBC's:  Recent Labs   Lab Test 12/28/18  1657 10/11/17  1510 09/26/17  1457 06/22/17  0602  05/15/17  1030   WBC 9.0  --  7.6  --   --  5.9   HGB 12.4* 10.2* 13.3  --    < > 12.1*   MCV 88  --  82  --   --  78     --  233 153   < > 210    < > = values in this interval not displayed.      Most Recent 3 BMP's:  Recent Labs   Lab Test 12/29/18  1028 12/29/18  0214 12/28/18  1657 07/12/18  1057 01/30/18  1048   NA  --   --  139 142 139   POTASSIUM 4.5 4.1 3.3* 4.2 4.0   CHLORIDE  --   --  106 107 103   CO2  --   --  28 27 29   BUN  --   --  12 14 18   CR  --   --  0.78 0.84 0.84   ANIONGAP  --   --  5 8 7   ANURAG  --   --   8.4* 8.6 9.2   GLC  --   --  98 129* 103*     Most Recent 2 LFT's:  Recent Labs   Lab Test 01/30/18  1048 09/26/17  1457   AST 16 13   ALT 25 22   ALKPHOS 80 74   BILITOTAL 0.8 0.6     Most Recent INR's and Anticoagulation Dosing History:  Anticoagulation Dose History     Recent Dosing and Labs Latest Ref Rng & Units 5/7/2010 9/24/2013 6/30/2015 8/4/2015 8/5/2015 8/10/2015 12/28/2018    INR 0.86 - 1.14 0.92 1.13 1.11 1.16(H) 1.09 1.15(H) 1.19(H)        Most Recent 3 Troponin's:No lab results found.  Most Recent Cholesterol Panel:  Recent Labs   Lab Test 08/04/15  1157 04/28/15  1119   CHOL  --  134   LDL  --  52   HDL  --  56   TRIG 77 128     Most Recent 6 Bacteria Isolates From Any Culture (See EPIC Reports for Culture Details):No lab results found.  Most Recent TSH, T4 and A1c Labs:  Recent Labs   Lab Test 08/17/18  0929  04/28/15  1119   TSH  --   --  2.92   A1C 5.7*   < > 6.0    < > = values in this interval not displayed.

## 2018-12-29 NOTE — PLAN OF CARE
PT: PT eval completed. Pt is status post R non operative ankle fracture. Pt lives with spouse in 1 story home with 1 stairs to enter. Pt is NWB on R .   Discharge Planner PT   Patient plan for discharge: Would be agreeable to TCU, but he is unsure re: finances for TCU  Current status: Pt was cued for NWB precautions with verbal and visual cues. Pt educated on PT role and POC. Pt was cued for supine to sit following non wt precuations. CGA.  Pt was educated on how to perform sit<>Stand with hand placement and technique with FWW, CGA x 2 for safety. Cues for LE for better performance of non wt bearing precautions. Pt was educated on use of walker with ambulation, cues for step progression avoiding wt bearing, small steps.  CGA. 5 feet. Pt was educated on transfer onto knee scooter. Unable to perform due to balance and lifting LE onto scooter- also poor tolerance to bearing wt on knee. Pt was cued for 1 stair with chair into home. Pt brother was able to see set up as well. Pt encouraged to have help if discharge home ekaterina with entry into home. Pt was cued for W/C navigation. Tolerating that well, educated on having leg rests for W/c as well. Pt was educated on commode, shower chair, reacher. DME handout provided. Pt planning on sponge bathing initially as reports tub shower.   Barriers to return to prior living situation: needing A for transfers, stair, poor endurance with use of FWW, unable to perform knee scooter  Recommendations for discharge: TCU, if not able to discharge to TCU recommend home with home PT/OT, commode, reacher, use of FWW, W/C with elevating leg rests( please assist with W/C)  Rationale for recommendations: Pt needing A with transfers and limited ambulation, will need A with ADLs but is able to manage with CGA only so family could potentially provide.

## 2018-12-29 NOTE — PLAN OF CARE
Patient arrived to floor at 1530, alert, and oriented x4. Non-WB on RLE. RLE wrapped in splint, CMS intact. Denies pain and nausea. Tolerating diet, good appetite, NPO at midnight. IV placed. Potassium 3.3, replacement given, recheck next shift, and tomorrow AM. Up to commode with pivot on left foot. Using urinal, voiding adequate amounts. Orthopedic and SW consult placed.

## 2018-12-29 NOTE — UTILIZATION REVIEW
"  Admission Status; Secondary Review Determination         Under the authority of the Utilization Management Committee, the utilization review process indicated a secondary review on the above patient.  The review outcome is based on review of the medical records, discussions with staff, and applying clinical experience noted on the date of the review.          (x) Observation Status Appropriate - This patient does not meet hospital inpatient criteria and is placed in observation status. If this patient's primary payer is Medicare and was admitted as an inpatient, Condition Code 44 should be used and patient status changed to \"observation\".     RATIONALE FOR DETERMINATION   83-year-old gentleman with a past medical history significant for prediabetes, arthritis, colon cancer status post laparoscopic-assisted colectomy, who presents to the hospital after a fall and concerns for ankle pain. Further evaluation revealed right ankle fracture, non-operative management per ortho. No other severe injuries. The severity of illness, intensity of service provided, expected LOS and risk for adverse outcome make the care appropriate for further observation; however, doesn't meet criteria for hospital inpatient admission. Dr. Carpenter  notified of this determination.    This document was produced using voice recognition software.      The information on this document is developed by the utilization review team in order for the business office to ensure compliance.  This only denotes the appropriateness of proper admission status and does not reflect the quality of care rendered.         The definitions of Inpatient Status and Observation Status used in making the determination above are those provided in the CMS Coverage Manual, Chapter 1 and Chapter 6, section 70.4.      Sincerely,     TOMAS VIVEROS MD    System Medical Director  Utilization Management  [facility].      "

## 2018-12-29 NOTE — CONSULTS
Care Transition Initial Assessment - SW  Reason For Consult: discharge planning  Met with: pt and his niece    Active Problems:    Ankle fracture       DATA  Lives With: spouse    Quality of Family Relationships: helpful, involved, supportive   Quality of Family Relationships: helpful, involved, supportive     ASSESSMENT  Cognitive Status:  A&O  Met with pt and his niece.  He reports he lives with his wife in a town house he reports there are a few steps getting into his house.  He reports prior this hospitalization he has been independent with house chores, meals and self care.  He denies that him or his wife currently have any services.  He reports he uses a cane stick when walking longer distances but does not use it in the house.  He has been to [facility] in the past following a knee replacement but doesn't think think it was helpful and didn't care for the services he received.        PLAN  Financial costs for the patient includes: uncertain   Patient given options and choices for discharge: discharge needs still uncertain   Patient/family is agreeable to the plan?  NA at this time  Patient Goals and Preferences: return home    SW following discharge needs still uncertain at this time      ADDENDUM:  Was just notified that pt was discharge today at 5:30 and PT recommending HC and wc.  I discussed this with pt and will put on discharge instructions as well.  Family will provide transportation and  at 5:30.  Referral was placed for HC.  MD WILL NEED TO WRITE HC ORDERS AND ORDER FOR WC

## 2018-12-29 NOTE — PLAN OF CARE
Patient alert and oriented x4. Up Ax2 with pivot to commode, non-WB RLE, CMS intact. Denies pain. Tolerating diet, good appetite, denies nausea. PT and OT consulted. Possible discharge later today.

## 2018-12-29 NOTE — DISCHARGE INSTRUCTIONS
[facility] for physical therapy and occupational therapy [phone]  Call [facility] or Tanja SCHMITT for pricing options for wheelchair    Follow-up in Dr. Card's clinic ([facility]) in 2 weeks for recheck/cast - Call [facility] for appointment [phone]

## 2018-12-29 NOTE — PROGRESS NOTES
12/29/18 1358   Quick Adds   Type of Visit Initial PT Evaluation   Living Environment   Lives With spouse   Living Arrangements house   Living Environment Comment Pt lives in 1 level home with basement. 1 small threhold to enter the house.    Self-Care   Usual Activity Tolerance good   Current Activity Tolerance poor   Equipment Currently Used at Home cane, straight;walker, rolling  (walker)   Functional Level Prior   Ambulation 0-->independent   Transferring 0-->independent   Toileting 0-->independent   Bathing 0-->independent   Communication 0-->understands/communicates without difficulty   Swallowing 0-->swallows foods/liquids without difficulty   Cognition 0 - no cognition issues reported   Fall history within last six months yes   Number of times patient has fallen within last six months 1   Prior Functional Level Comment Pt reports typically independent with ADLs and IADLs   General Information   Onset of Illness/Injury or Date of Surgery - Date 12/28/18   Referring Physician Dr. Carpenter   Patient/Family Goals Statement Pt is hoping to D/C home   Pertinent History of Current Problem (include personal factors and/or comorbidities that impact the POC) This is an 83-year-old gentleman with a past medical history significant for prediabetes, arthritis, colon cancer status post laparoscopic-assisted colectomy, who presents to the hospital after a fall and concerns for ankle pain. Pt fx ankle and is NWB.   Precautions/Limitations fall precautions   Weight-Bearing Status - RLE nonweight-bearing   Cognitive Status Examination   Orientation orientation to person, place and time   Level of Consciousness alert   Follows Commands and Answers Questions 100% of the time   Personal Safety and Judgment intact   Memory intact   Pain Assessment   Patient Currently in Pain Yes, see Vital Sign flowsheet   Range of Motion (ROM)   ROM Comment casted R LE from just below knee to ankle   Strength   Strength Comments good upper body  "strength with transfers, limited endurance with NWB   Bed Mobility   Bed Mobility Comments cga   Transfer Skills   Transfer Comments cga   Gait   Gait Comments cga, limited to about 5 feet, fatiguing    Balance   Balance Comments tolerating ambulation with FWW, unable to transfer onto knee scooter   Modality Interventions   Planned Modality Interventions Cryotherapy   General Therapy Interventions   Planned Therapy Interventions balance training;gait training;bed mobility training;transfer training;risk factor education;home program guidelines;progressive activity/exercise   Clinical Impression   Criteria for Skilled Therapeutic Intervention yes, treatment indicated   PT Diagnosis decreased functional mobilty status post R ankle fracture   Influenced by the following impairments NWB, pain, decreased strength   Functional limitations due to impairments decreased transfers, bed mob, ambulation, stairs   Clinical Presentation Stable/Uncomplicated   Clinical Presentation Rationale improving    Clinical Decision Making (Complexity) Low complexity   Therapy Frequency` daily   Predicted Duration of Therapy Intervention (days/wks) 1 day   Anticipated Equipment Needs at Discharge bedside commode;reacher;shower chair;wheelchair   Anticipated Discharge Disposition Home with Home Therapy;Home with Assist   Risk & Benefits of therapy have been explained Yes   Patient, Family & other staff in agreement with plan of care Yes   [facility] TM \"6 Clicks\"   2016, Trustees of [facility], under license to [employer].  All rights reserved.   6 Clicks Short Forms Basic Mobility Inpatient Short Form   [facility]  \"6 Clicks\" V.2 Basic Mobility Inpatient Short Form   1. Turning from your back to your side while in a flat bed without using bedrails? 3 - A Little   2. Moving from lying on your back to sitting on the side of a flat bed without using bedrails? 3 - A Little   3. Moving to and from a bed to a " chair (including a wheelchair)? 3 - A Little   4. Standing up from a chair using your arms (e.g., wheelchair, or bedside chair)? 3 - A Little   5. To walk in hospital room? 2 - A Lot   6. Climbing 3-5 steps with a railing? 1 - Total   Basic Mobility Raw Score (Score out of 24.Lower scores equate to lower levels of function) 15   Total Evaluation Time   Total Evaluation Time (Minutes) 10

## 2018-12-29 NOTE — PLAN OF CARE
Pt vitals stable. Denies pain, declined pain medicine offer x3. Slept well overnight. Swelling to right knee/toes, splint in place to ankle. Pink blanchable area to left shin. Pt NPo at midnight, waiting ortho consult. Started IVF. K+ recheck @ 0100 was 4.1.

## 2018-12-30 NOTE — PROGRESS NOTES
Pt discharged home with PT/OT with family to transport. VSS at time of discharge. Reviewed AVS + home medication schedule, no questions at this time. Verbalizes understanding of recommended follow-up. Sent with all personal belongings + written Rx x 2: oxycodone, wheelchair.

## 2018-12-31 ENCOUNTER — TELEPHONE (OUTPATIENT)
Dept: FAMILY MEDICINE | Facility: CLINIC | Age: 83
End: 2018-12-31

## 2018-12-31 ENCOUNTER — DOCUMENTATION ONLY (OUTPATIENT)
Dept: CARE COORDINATION | Facility: CLINIC | Age: 83
End: 2018-12-31

## 2018-12-31 NOTE — TELEPHONE ENCOUNTER
[facility] for Closed Fracture Of Right Ankle, Initial Encounter, Ankle Fracture, Right, Closed, Initial Encounter    No ER follow up scheduled with PCP     Harriett Carrillo/AUNDREA

## 2018-12-31 NOTE — PROGRESS NOTES
Dear ,   Medicare Home Health regulations requires [facility] to provide an initial assessment visit either within 48 hours of the patient's return home, or on the physician ordered Start of Care date.    There will be a delay in the Initial Assessment for Damien Vallecillo; MRN [id number]  We anticipate the Start of care will be 01/02/19 date.      Sincerely [facility]  Chente Peterson  [phone]

## 2019-01-02 NOTE — TELEPHONE ENCOUNTER
"Hospital/TCU/ED for chronic condition Discharge Protocol    \"Hi, my name is Helen Quan, a registered nurse, and I am calling from [facility].  I am calling to follow up and see how things are going for you after your recent emergency visit/hospital/TCU stay.\"    Tell me how you are doing now that you are home?\" Spoke to Viviana PT [facility].  Non-weight bearing and needs wheelchair.  Frustrated sent home with none weight bearing and only has walker and not able to get around and wanted to go to TCU and could not because stay was considered observational.      Helen Quan, RN         "

## 2019-01-08 ENCOUNTER — TELEPHONE (OUTPATIENT)
Dept: FAMILY MEDICINE | Facility: CLINIC | Age: 84
End: 2019-01-08

## 2019-01-08 NOTE — TELEPHONE ENCOUNTER
Kassi calling from  Home Care OT-    Fell with ankle fracture 12/28/18.    2 visits in next month.    Verbal order given.  Will fax for MD signature.  Helen Quan RN

## 2019-01-09 ENCOUNTER — TRANSFERRED RECORDS (OUTPATIENT)
Dept: HEALTH INFORMATION MANAGEMENT | Facility: CLINIC | Age: 84
End: 2019-01-09

## 2019-01-10 ENCOUNTER — OFFICE VISIT (OUTPATIENT)
Dept: FAMILY MEDICINE | Facility: CLINIC | Age: 84
End: 2019-01-10
Payer: MEDICARE

## 2019-01-10 VITALS
WEIGHT: 278.1 LBS | HEIGHT: 70 IN | DIASTOLIC BLOOD PRESSURE: 85 MMHG | BODY MASS INDEX: 39.81 KG/M2 | RESPIRATION RATE: 16 BRPM | TEMPERATURE: 98.1 F | HEART RATE: 88 BPM | SYSTOLIC BLOOD PRESSURE: 142 MMHG

## 2019-01-10 DIAGNOSIS — R03.0 ELEVATED BLOOD PRESSURE READING WITHOUT DIAGNOSIS OF HYPERTENSION: Primary | ICD-10-CM

## 2019-01-10 DIAGNOSIS — G40.209 PARTIAL SYMPTOMATIC EPILEPSY WITH COMPLEX PARTIAL SEIZURES, NOT INTRACTABLE, WITHOUT STATUS EPILEPTICUS (H): ICD-10-CM

## 2019-01-10 PROCEDURE — 99213 OFFICE O/P EST LOW 20 MIN: CPT | Performed by: FAMILY MEDICINE

## 2019-01-10 ASSESSMENT — MIFFLIN-ST. JEOR: SCORE: 1967.7

## 2019-01-10 NOTE — PROGRESS NOTES
"  SUBJECTIVE:   Damien Vallecillo is a 83 year old male who presents to clinic today for the following health issues:    Drivers license form to be filled out     Elevated blood pressure reading without diagnosis of hypertension  (primary encounter diagnosis)--elevated today.     BP Readings from Last 3 Encounters:   01/10/19 142/85   12/29/18 131/62   10/09/18 138/74     Partial symptomatic epilepsy with complex partial seizures, not intractable, without status epilepticus (H)--Per patient he was told he\" never had any seizures\" but we have no records available. Last documented episode was in 2011, first episode was in the late 1980's.     Fractured right ankle--Patient fractured his right ankle on 12/28/2018. He is going to attend PT for this soon.     Problem list and histories reviewed & adjusted, as indicated.  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     COLONOSCOPY N/A 6/23/2015    Procedure: COMBINED COLONOSCOPY, SINGLE OR MULTIPLE BIOPSY/POLYPECTOMY BY BIOPSY;  Surgeon: Kimberly Alfaro MD;  Location:  GI     COLONOSCOPY N/A 7/30/2015    Procedure: COLONOSCOPY;  Surgeon: Enrique Salazar MD;  Location:  OR     COLONOSCOPY N/A 7/31/2018    Procedure: COLONOSCOPY;  Colonoscopy;  Surgeon: Tiffany Cheema MD;  Location:  GI     HERNIORRHAPHY EPIGASTRIC  4/27/2012    Procedure:HERNIORRHAPHY EPIGASTRIC; Epigastric Hernia Repair with mesh ; Surgeon:BOLIVAR OLIVEIRA; Location: OR     LAPAROSCOPIC ASSISTED COLECTOMY Right 7/30/2015    Procedure: LAPAROSCOPIC ASSISTED COLECTOMY;  Surgeon: Enrique Salazar MD;  Location:  OR     ORTHOPEDIC SURGERY      lt knee arthroplasty     REALIGN PATELLA Right 10/9/2017    Procedure: REALIGN PATELLA;  Revision right total knee arthroplasty;  Surgeon: Alec Raymond MD;  Location:  OR     REPAIR HAMMER TOE Left 6/19/2017    Procedure: REPAIR HAMMER TOE;  Hammer toe repair toe 2,3,4,5 left foot with osteotomy   ;  Surgeon: Beverly Kim DPM, Podiatry/Foot and Ankle Surgery;  Location:  OR     SIGMOIDOSCOPY FLEXIBLE  5/29/2013    Procedure: SIGMOIDOSCOPY FLEXIBLE;  SIGMOIDOSCOPY FLEXIBLE ([facility]) Needs blood sugar ck'd;  Surgeon: Enrique Salazar MD;  Location:  GI     skin cancer excision         Family History   Problem Relation Age of Onset     Cancer - colorectal Mother      Cerebrovascular Disease Father        Social History     Tobacco Use     Smoking status: Never Smoker     Smokeless tobacco: Never Used   Substance Use Topics     Alcohol use: Yes     Alcohol/week: 0.0 oz     Comment: Occas  "    Reviewed and updated as needed this visit by clinical staff  Tobacco  Allergies  Meds       Reviewed and updated as needed this visit by Provider         ROS:  No recent seizures or episodes of syncope.     This document serves as a record of the services and decisions personally performed and made by Obi Strange MD. It was created on his behalf by Rubio Jasso, a trained medical scribe. The creation of this document is based on the provider's statements to the medical scribe.  Rubio Jasso January 10, 2019 2:53 PM    OBJECTIVE:     /85 (BP Location: Left arm, Patient Position: Chair, Cuff Size: Adult Large)   Pulse 88   Temp 98.1  F (36.7  C) (Oral)   Resp 16   Ht 1.778 m (5' 10\")   Wt 126.1 kg (278 lb 1.6 oz)   BMI 39.90 kg/m    Body mass index is 39.9 kg/m .     GEN: Healthy, alert, no distress   MS: cast on right foot.    Diagnostic Test Results:  none     ASSESSMENT/PLAN:   ASSESSMENT / PLAN:  (R03.0) Elevated blood pressure reading without diagnosis of hypertension  (primary encounter diagnosis)   BP Readings from Last 6 Encounters:   01/10/19 142/85   12/29/18 131/62   10/09/18 138/74   09/28/18 186/82   08/17/18 134/60   07/31/18 150/80       Comment: BP elevated today. Will continue to monitor.     (G40.209) Partial symptomatic epilepsy with complex partial seizures, not intractable, without status epilepticus (H)  Comment:form completed check q 4 years   Last documented seizure was in 2011. No recent seizures.     The information in this document, created by the medical scribe for me, accurately reflects the services I personally performed and the decisions made by me. I have reviewed and approved this document for accuracy prior to leaving the patient care area.  January 10, 2019 2:57 PM    Obi Strange MD  [facility]  "

## 2019-01-14 ENCOUNTER — TRANSFERRED RECORDS (OUTPATIENT)
Dept: HEALTH INFORMATION MANAGEMENT | Facility: CLINIC | Age: 84
End: 2019-01-14

## 2019-01-22 ENCOUNTER — TRANSFERRED RECORDS (OUTPATIENT)
Dept: HEALTH INFORMATION MANAGEMENT | Facility: CLINIC | Age: 84
End: 2019-01-22

## 2019-02-04 ENCOUNTER — TELEPHONE (OUTPATIENT)
Dept: FAMILY MEDICINE | Facility: CLINIC | Age: 84
End: 2019-02-04

## 2019-02-04 DIAGNOSIS — Z53.9 DIAGNOSIS NOT YET DEFINED: Primary | ICD-10-CM

## 2019-02-04 PROCEDURE — G0180 MD CERTIFICATION HHA PATIENT: HCPCS | Performed by: FAMILY MEDICINE

## 2019-02-04 NOTE — TELEPHONE ENCOUNTER
Received a 5 page fax from [facility]. Home Health Certification and Plan Of Care. Fax to Dr. Strange. Ruth Behrens.

## 2019-02-18 ENCOUNTER — TELEPHONE (OUTPATIENT)
Dept: FAMILY MEDICINE | Facility: CLINIC | Age: 84
End: 2019-02-18

## 2019-02-19 ENCOUNTER — TRANSFERRED RECORDS (OUTPATIENT)
Dept: HEALTH INFORMATION MANAGEMENT | Facility: CLINIC | Age: 84
End: 2019-02-19

## 2019-02-25 ENCOUNTER — MEDICAL CORRESPONDENCE (OUTPATIENT)
Dept: HEALTH INFORMATION MANAGEMENT | Facility: CLINIC | Age: 84
End: 2019-02-25

## 2019-04-04 ENCOUNTER — OFFICE VISIT (OUTPATIENT)
Dept: FAMILY MEDICINE | Facility: CLINIC | Age: 84
End: 2019-04-04
Payer: MEDICARE

## 2019-04-04 VITALS
HEIGHT: 70 IN | DIASTOLIC BLOOD PRESSURE: 76 MMHG | TEMPERATURE: 97.3 F | RESPIRATION RATE: 16 BRPM | BODY MASS INDEX: 38.65 KG/M2 | HEART RATE: 75 BPM | SYSTOLIC BLOOD PRESSURE: 134 MMHG | WEIGHT: 270 LBS

## 2019-04-04 DIAGNOSIS — Z13.6 CARDIOVASCULAR SCREENING; LDL GOAL LESS THAN 100: ICD-10-CM

## 2019-04-04 DIAGNOSIS — Z96.651 STATUS POST TOTAL RIGHT KNEE REPLACEMENT: ICD-10-CM

## 2019-04-04 DIAGNOSIS — R73.03 PRE-DIABETES: ICD-10-CM

## 2019-04-04 DIAGNOSIS — Z86.39 HISTORY OF THYROID NODULE: ICD-10-CM

## 2019-04-04 DIAGNOSIS — S82.891D CLOSED FRACTURE OF RIGHT ANKLE WITH ROUTINE HEALING, SUBSEQUENT ENCOUNTER: ICD-10-CM

## 2019-04-04 DIAGNOSIS — I10 ESSENTIAL HYPERTENSION: ICD-10-CM

## 2019-04-04 DIAGNOSIS — I87.2 VENOUS STASIS DERMATITIS OF BOTH LOWER EXTREMITIES: ICD-10-CM

## 2019-04-04 DIAGNOSIS — R71.8 RBC MICROCYTOSIS: ICD-10-CM

## 2019-04-04 DIAGNOSIS — R73.02 GLUCOSE INTOLERANCE (IMPAIRED GLUCOSE TOLERANCE): Primary | ICD-10-CM

## 2019-04-04 LAB
ALBUMIN SERPL-MCNC: 3.8 G/DL (ref 3.4–5)
ALP SERPL-CCNC: 70 U/L (ref 40–150)
ALT SERPL W P-5'-P-CCNC: 32 U/L (ref 0–70)
ANION GAP SERPL CALCULATED.3IONS-SCNC: 9 MMOL/L (ref 3–14)
AST SERPL W P-5'-P-CCNC: 22 U/L (ref 0–45)
BASOPHILS # BLD AUTO: 0 10E9/L (ref 0–0.2)
BASOPHILS NFR BLD AUTO: 0.6 %
BILIRUB SERPL-MCNC: 1 MG/DL (ref 0.2–1.3)
BUN SERPL-MCNC: 14 MG/DL (ref 7–30)
CALCIUM SERPL-MCNC: 9.4 MG/DL (ref 8.5–10.1)
CHLORIDE SERPL-SCNC: 107 MMOL/L (ref 94–109)
CO2 SERPL-SCNC: 25 MMOL/L (ref 20–32)
CREAT SERPL-MCNC: 0.8 MG/DL (ref 0.66–1.25)
DIFFERENTIAL METHOD BLD: NORMAL
EOSINOPHIL # BLD AUTO: 0.4 10E9/L (ref 0–0.7)
EOSINOPHIL NFR BLD AUTO: 5.7 %
ERYTHROCYTE [DISTWIDTH] IN BLOOD BY AUTOMATED COUNT: 14.4 % (ref 10–15)
GFR SERPL CREATININE-BSD FRML MDRD: 83 ML/MIN/{1.73_M2}
GLUCOSE SERPL-MCNC: 199 MG/DL (ref 70–99)
HBA1C MFR BLD: 6.4 % (ref 0–5.6)
HCT VFR BLD AUTO: 45.7 % (ref 40–53)
HGB BLD-MCNC: 15.2 G/DL (ref 13.3–17.7)
LYMPHOCYTES # BLD AUTO: 0.9 10E9/L (ref 0.8–5.3)
LYMPHOCYTES NFR BLD AUTO: 14.8 %
MCH RBC QN AUTO: 29.1 PG (ref 26.5–33)
MCHC RBC AUTO-ENTMCNC: 33.3 G/DL (ref 31.5–36.5)
MCV RBC AUTO: 88 FL (ref 78–100)
MONOCYTES # BLD AUTO: 0.6 10E9/L (ref 0–1.3)
MONOCYTES NFR BLD AUTO: 9.9 %
NEUTROPHILS # BLD AUTO: 4.4 10E9/L (ref 1.6–8.3)
NEUTROPHILS NFR BLD AUTO: 69 %
PLATELET # BLD AUTO: 184 10E9/L (ref 150–450)
POTASSIUM SERPL-SCNC: 3.9 MMOL/L (ref 3.4–5.3)
PROT SERPL-MCNC: 7.2 G/DL (ref 6.8–8.8)
RBC # BLD AUTO: 5.22 10E12/L (ref 4.4–5.9)
SODIUM SERPL-SCNC: 141 MMOL/L (ref 133–144)
TSH SERPL DL<=0.005 MIU/L-ACNC: 3.41 MU/L (ref 0.4–4)
WBC # BLD AUTO: 6.4 10E9/L (ref 4–11)

## 2019-04-04 PROCEDURE — 84443 ASSAY THYROID STIM HORMONE: CPT | Performed by: FAMILY MEDICINE

## 2019-04-04 PROCEDURE — 83036 HEMOGLOBIN GLYCOSYLATED A1C: CPT | Performed by: FAMILY MEDICINE

## 2019-04-04 PROCEDURE — 36415 COLL VENOUS BLD VENIPUNCTURE: CPT | Performed by: FAMILY MEDICINE

## 2019-04-04 PROCEDURE — 80053 COMPREHEN METABOLIC PANEL: CPT | Performed by: FAMILY MEDICINE

## 2019-04-04 PROCEDURE — 99214 OFFICE O/P EST MOD 30 MIN: CPT | Performed by: FAMILY MEDICINE

## 2019-04-04 PROCEDURE — 85025 COMPLETE CBC W/AUTO DIFF WBC: CPT | Performed by: FAMILY MEDICINE

## 2019-04-04 ASSESSMENT — MIFFLIN-ST. JEOR: SCORE: 1930.96

## 2019-04-04 NOTE — LETTER
April 4, 2019      Damien Vallecillo  [address]  [place] [facility] [id number]        Dear ,    We are writing to inform you of your test results.    This all looks pretty good.  It still not diabetes, but it's pretty close     Resulted Orders   Hemoglobin A1c   Result Value Ref Range    Hemoglobin A1C 6.4 (H) 0 - 5.6 %      Comment:      Normal <5.7% Prediabetes 5.7-6.4%  Diabetes 6.5% or higher - adopted from ADA   consensus guidelines.     Comprehensive metabolic panel   Result Value Ref Range    Sodium 141 133 - 144 mmol/L    Potassium 3.9 3.4 - 5.3 mmol/L    Chloride 107 94 - 109 mmol/L    Carbon Dioxide 25 20 - 32 mmol/L    Anion Gap 9 3 - 14 mmol/L    Glucose 199 (H) 70 - 99 mg/dL      Comment:      Fasting specimen    Urea Nitrogen 14 7 - 30 mg/dL    Creatinine 0.80 0.66 - 1.25 mg/dL    GFR Estimate 83 >60 mL/min/[1.73_m2]      Comment:      Non  GFR Calc  Starting 12/18/2018, serum creatinine based estimated GFR (eGFR) will be   calculated using the Chronic Kidney Disease Epidemiology Collaboration   (CKD-EPI) equation.      GFR Estimate If Black >90 >60 mL/min/[1.73_m2]      Comment:       GFR Calc  Starting 12/18/2018, serum creatinine based estimated GFR (eGFR) will be   calculated using the Chronic Kidney Disease Epidemiology Collaboration   (CKD-EPI) equation.      Calcium 9.4 8.5 - 10.1 mg/dL    Bilirubin Total 1.0 0.2 - 1.3 mg/dL    Albumin 3.8 3.4 - 5.0 g/dL    Protein Total 7.2 6.8 - 8.8 g/dL    Alkaline Phosphatase 70 40 - 150 U/L    ALT 32 0 - 70 U/L    AST 22 0 - 45 U/L   TSH with free T4 reflex   Result Value Ref Range    TSH 3.41 0.40 - 4.00 mU/L   CBC with platelets and differential   Result Value Ref Range    WBC 6.4 4.0 - 11.0 10e9/L    RBC Count 5.22 4.4 - 5.9 10e12/L    Hemoglobin 15.2 13.3 - 17.7 g/dL    Hematocrit 45.7 40.0 - 53.0 %    MCV 88 78 - 100 fl    MCH 29.1 26.5 - 33.0 pg    MCHC 33.3 31.5 - 36.5 g/dL    RDW 14.4 10.0 - 15.0 %    Platelet  Count 184 150 - 450 10e9/L    % Neutrophils 69.0 %    % Lymphocytes 14.8 %    % Monocytes 9.9 %    % Eosinophils 5.7 %    % Basophils 0.6 %    Absolute Neutrophil 4.4 1.6 - 8.3 10e9/L    Absolute Lymphocytes 0.9 0.8 - 5.3 10e9/L    Absolute Monocytes 0.6 0.0 - 1.3 10e9/L    Absolute Eosinophils 0.4 0.0 - 0.7 10e9/L    Absolute Basophils 0.0 0.0 - 0.2 10e9/L    Diff Method Automated Method        If you have any questions or concerns, please call the clinic at the number listed above.       Sincerely,        Obi Strange MD

## 2019-04-04 NOTE — LETTER
[facility]  [id number] [place] [id number]  [phone]          April 4, 2019    Damien Vallecillo                                                                                                                     [address] [facility] [address]            Sarah Quezada,    Mr Vallecillo suffers from chronic R knee pain. He has recurrent patellar instability, and transfers from bed and chair are difficult. Uneven surfaces add additional difficulty.  In addition, he uses compression hose for chronic venous insufficiency with skin involvement        Sincerely,         Obi Strange MD

## 2019-04-04 NOTE — PROGRESS NOTES
SUBJECTIVE:   Damien Vallecillo is a 83 year old male who presents to clinic today for the following health issues:      Musculoskeletal Problem       Follow-up on rt ankle       Problem list and histories reviewed & adjusted, as indicated.  Additional history:             Glucose intolerance (impaired glucose tolerance)  (primary encounter diagnosis)   Lab Results   Component Value Date    A1C 6.4 04/04/2019    A1C 5.7 08/17/2018    A1C 6.0 03/12/2018    A1C 5.5 09/26/2017    A1C 5.9 04/17/2017     Looking like prediabetes  Status post total right knee replacement he would [facility] his difficulties with his knee in hopes to generate additional benefits  Pre-diabetes present  Venous stasis dermatitis of both lower extremities he has some skin discoloration of his left lower leg.  He developed blistering under his compression sock on the right and therefore is currently training with compression wraps  RBC microcytosis in the past   closed fracture of right ankle with routine healing, subsequent encounter he was in a walking boot.  He is being followed at [facility] orthopedics.  He was to have had a follow-up, but he is attending physician had to cancel.  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  However, review of the medical record shows no imaging, no clinical diagnosis.  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     COLONOSCOPY N/A 6/23/2015    Procedure: COMBINED COLONOSCOPY, SINGLE OR MULTIPLE BIOPSY/POLYPECTOMY BY BIOPSY;  Surgeon: Kimberly Alfaro MD;  Location:  GI     COLONOSCOPY N/A 7/30/2015    Procedure: COLONOSCOPY;  Surgeon: Enrique Salazar MD;  Location:  OR     COLONOSCOPY N/A 7/31/2018    Procedure: COLONOSCOPY;  Colonoscopy;  Surgeon: Tiffany Cheema MD;  Location:  GI     HERNIORRHAPHY EPIGASTRIC  4/27/2012    Procedure:HERNIORRHAPHY EPIGASTRIC; Epigastric Hernia Repair with mesh ; Surgeon:BOLIVAR OLIVEIRA; Location: OR     LAPAROSCOPIC ASSISTED COLECTOMY Right 7/30/2015    Procedure: LAPAROSCOPIC ASSISTED COLECTOMY;  Surgeon: Enrique Salazar MD;  Location:  OR     ORTHOPEDIC SURGERY      lt knee arthroplasty     REALIGN PATELLA Right " "10/9/2017    Procedure: REALIGN PATELLA;  Revision right total knee arthroplasty;  Surgeon: Alec Raymond MD;  Location: [facility] OR     REPAIR HAMMER TOE Left 6/19/2017    Procedure: REPAIR HAMMER TOE;  Hammer toe repair toe 2,3,4,5 left foot with osteotomy   ;  Surgeon: Beverly Kim DPM, Podiatry/Foot and Ankle Surgery;  Location: [facility] OR     SIGMOIDOSCOPY FLEXIBLE  5/29/2013    Procedure: SIGMOIDOSCOPY FLEXIBLE;  SIGMOIDOSCOPY FLEXIBLE ([facility]) Needs blood sugar ck'd;  Surgeon: Enrique Salazar MD;  Location:  GI     skin cancer excision         Family History   Problem Relation Age of Onset     Cancer - colorectal Mother      Cerebrovascular Disease Father        Social History     Tobacco Use     Smoking status: Never Smoker     Smokeless tobacco: Never Used   Substance Use Topics     Alcohol use: Yes     Alcohol/week: 0.0 oz     Comment: Occas     ROS skin \"blistering\" of the right lower leg has resolved.  Pain is minimal.  No falls  /76   Pulse 75   Temp 97.3  F (36.3  C) (Oral)   Resp 16   Ht 1.778 m (5' 10\")   Wt 122.5 kg (270 lb)   BMI 38.74 kg/m    Left leg with postinflammatory discoloration of the lower third of the calf.  No edema  Right is wrapped  ASSESSMENT / PLAN:  (R73.02) Glucose intolerance (impaired glucose tolerance)  (primary encounter diagnosis)  Comment: Prediabetes  Plan:     (Z96.651) Status post total right knee replacement  Comment: A letter was generated for him  Plan:     (R73.03) Pre-diabetes  Comment: Periodic  Plan: Hemoglobin A1c            (I87.2) Venous stasis dermatitis of both lower extremities  Comment: He has in the past worn compression hose  Plan:     (R71.8) RBC microcytosis  Comment: Assess periodically  Plan: CBC with platelets and differential            (S82.891D) Closed fracture of right ankle with routine healing, subsequent encounter  Comment: He is ambulating now  Plan: Return to the care of orthopedics    (Z96.651) History of arthroplasty of right " knee  Comment: He reports that his patellar dislocates a lot  Plan: A letter is written    (Z13.6) CARDIOVASCULAR SCREENING; LDL GOAL LESS THAN 100  Comment: Discussed  Plan: He has been on statin this suggests hyperlipidemia    (I10) Essential hypertension  Comment: Controlled  Plan: Comprehensive metabolic panel            (Z86.39) History of thyroid nodule  Comment: Assess, but update records  Plan: TSH with free T4 reflex        2        Obi Strange MD

## 2019-04-09 ENCOUNTER — OFFICE VISIT (OUTPATIENT)
Dept: SURGERY | Facility: CLINIC | Age: 84
End: 2019-04-09
Payer: MEDICARE

## 2019-04-09 VITALS
HEART RATE: 71 BPM | SYSTOLIC BLOOD PRESSURE: 130 MMHG | DIASTOLIC BLOOD PRESSURE: 84 MMHG | HEIGHT: 70 IN | WEIGHT: 270 LBS | OXYGEN SATURATION: 99 % | RESPIRATION RATE: 16 BRPM | BODY MASS INDEX: 38.65 KG/M2

## 2019-04-09 DIAGNOSIS — I87.2 VENOUS (PERIPHERAL) INSUFFICIENCY: ICD-10-CM

## 2019-04-09 DIAGNOSIS — I87.2 VENOUS STASIS DERMATITIS OF BOTH LOWER EXTREMITIES: ICD-10-CM

## 2019-04-09 DIAGNOSIS — M79.89 LEG SWELLING: Primary | ICD-10-CM

## 2019-04-09 PROCEDURE — 99213 OFFICE O/P EST LOW 20 MIN: CPT | Performed by: INTERNAL MEDICINE

## 2019-04-09 ASSESSMENT — MIFFLIN-ST. JEOR: SCORE: 1930.96

## 2019-04-09 NOTE — PROGRESS NOTES
Vascular Medicine Progress Note     Damien Vallecillo is a 83 year old male who is here for follow-up on venous insufficiency    Interval History   Patient is not wearing his compression stockings today secondary to wound that he sustained on his lateral aspect of the right leg so patient is wrapping the leg the swelling is down no signs of inflammation or infection    Physical Exam       BP: 130/84 Pulse: 71   Resp: 16 SpO2: 99 %      Vitals:    04/09/19 0941   Weight: 270 lb (122.5 kg)     Vital Signs with Ranges  Pulse:  [71] 71  Resp:  [16] 16  BP: (130)/(84) 130/84  SpO2:  [99 %] 99 %  [unfilled]    Constitutional: awake, alert, cooperative, no apparent distress, and appears stated age  Eyes: Lids and lashes normal, pupils equal, round and reactive to light, extra ocular muscles intact, sclera clear, conjunctiva normal  ENT: normocepalic, without obvious abnormality, oropharynx pink and moist  Hematologic / Lymphatic: no lymphadenopathy  Respiratory: No increased work of breathing, good air exchange, clear to auscultation bilaterally, no crackles or wheezing  Cardiovascular: regular rate and rhythm, normal S1 and S2 and no murmur noted  GI: Normal bowel sounds, soft, non-distended, non-tender  Skin: no redness, warmth, or swelling, no rashes and no lesions  Musculoskeletal: There is no redness, warmth, or swelling of the joints.  Full range of motion noted.  Motor strength is 5 out of 5 all extremities bilaterally.  Tone is normal.  Neurologic: Awake, alert, oriented to name, place and time.  Cranial nerves II-XII are grossly intact.  Motor is 5 out of 5 bilaterally.    Neuropsychiatric:  Normal affect, memory, insight.  Pulses: Intact. No carotid bruits appreciated.     Medications         Data   No results found for this or any previous visit (from the past 24 hour(s)).    Assessment & Plan   No diagnosis found.      Summary: Bilateral lower extremity venous insufficiency more prominent on the left  side  Continue with compression treatment, currently patient is on or using wraps  Follow-up in 2 months    Arnav Cunningham MD

## 2019-04-12 ENCOUNTER — TELEPHONE (OUTPATIENT)
Dept: OTHER | Facility: CLINIC | Age: 84
End: 2019-04-12

## 2019-04-12 NOTE — TELEPHONE ENCOUNTER
Attempted to reach patient on home/mobile number to schedule 2 month follow up with Dr. Cunningham but VM was not set up and I could not leave a message.

## 2019-06-17 ENCOUNTER — TELEPHONE (OUTPATIENT)
Dept: ORTHOPEDICS | Facility: CLINIC | Age: 84
End: 2019-06-17

## 2019-06-17 NOTE — TELEPHONE ENCOUNTER
Received call from wife, Murial, calling for patient who is the background. She states they received a call about an hour ago from Dr. Raymond's office. She is unsure of how to retrieve the number from her phone. She states she has been transferred several times.   Informed that Dr. Raymond is no longer with [facility]. Provided his new office number. They verbalized understanding.     MARK Ontiveros RN

## 2019-06-25 ENCOUNTER — OFFICE VISIT (OUTPATIENT)
Dept: SURGERY | Facility: CLINIC | Age: 84
End: 2019-06-25
Attending: INTERNAL MEDICINE
Payer: MEDICARE

## 2019-06-25 VITALS — RESPIRATION RATE: 16 BRPM | HEIGHT: 70 IN | WEIGHT: 270 LBS | BODY MASS INDEX: 38.65 KG/M2

## 2019-06-25 DIAGNOSIS — I87.2 VENOUS STASIS DERMATITIS OF BOTH LOWER EXTREMITIES: ICD-10-CM

## 2019-06-25 DIAGNOSIS — M79.89 LEG SWELLING: ICD-10-CM

## 2019-06-25 DIAGNOSIS — I89.0 LYMPHEDEMA OF EXTREMITY: ICD-10-CM

## 2019-06-25 DIAGNOSIS — I87.2 VENOUS (PERIPHERAL) INSUFFICIENCY: ICD-10-CM

## 2019-06-25 PROCEDURE — 99214 OFFICE O/P EST MOD 30 MIN: CPT | Performed by: INTERNAL MEDICINE

## 2019-06-25 ASSESSMENT — MIFFLIN-ST. JEOR: SCORE: 1930.96

## 2019-06-25 NOTE — PROGRESS NOTES
Vascular Medicine Progress Note     Damien Vallecillo is a 83 year old male who is here for follow-up on venous insufficiency    Interval History   Patient is doing well with compression stockings, asymptomatic, minimal swelling at the right knee which is chronic patient has no signs or symptoms of cellulitis or infection patient is doing well    Physical Exam       BP: (P) 126/72 Pulse: (P) 62   Resp: 16 SpO2: (P) 98 %      Vitals:    06/25/19 0943   Weight: 122.5 kg (270 lb)     Vital Signs with Ranges  Pulse:  [62] (P) 62  Resp:  [16] 16  BP: (P) 126/72  SpO2:  [98 %] (P) 98 %  [unfilled]    Constitutional: awake, alert, cooperative, no apparent distress, and appears stated age  Eyes: Lids and lashes normal, pupils equal, round and reactive to light, extra ocular muscles intact, sclera clear, conjunctiva normal  ENT: normocepalic, without obvious abnormality, oropharynx pink and moist  Hematologic / Lymphatic: no lymphadenopathy  Respiratory: No increased work of breathing, good air exchange, clear to auscultation bilaterally, no crackles or wheezing  Cardiovascular: regular rate and rhythm, normal S1 and S2 and no murmur noted  GI: Normal bowel sounds, soft, non-distended, non-tender  Skin: no redness, warmth, or swelling, no rashes and no lesions  Musculoskeletal: There is no redness, warmth, or swelling of the joints.  Full range of motion noted.  Motor strength is 5 out of 5 all extremities bilaterally.  Tone is normal.  Neurologic: Awake, alert, oriented to name, place and time.  Cranial nerves II-XII are grossly intact.  Motor is 5 out of 5 bilaterally.    Neuropsychiatric:  Normal affect, memory, insight.  Pulses: Intact  . No carotid bruits appreciated.     Medications         Data   No results found for this or any previous visit (from the past 24 hour(s)).    Assessment & Plan   (M79.89) Leg swelling  Comment: Controlled  Plan: Continue with compression    (I87.2) Venous (peripheral)  insufficiency  Comment: Controlled on compression stockings  Plan: Continue the same management    (I87.2) Venous stasis dermatitis of both lower extremities  Comment: Same as above  Plan: Continue with compression    (I89.0) Lymphedema of extremity  Comment: Controlled on compression  Plan: Continue with the same management        Summary: Follow-up as needed    Arnav Cunningham MD

## 2019-09-30 ENCOUNTER — OFFICE VISIT (OUTPATIENT)
Dept: FAMILY MEDICINE | Facility: CLINIC | Age: 84
End: 2019-09-30
Payer: MEDICARE

## 2019-09-30 ENCOUNTER — ANCILLARY PROCEDURE (OUTPATIENT)
Dept: GENERAL RADIOLOGY | Facility: CLINIC | Age: 84
End: 2019-09-30
Attending: FAMILY MEDICINE
Payer: MEDICARE

## 2019-09-30 VITALS
WEIGHT: 273.25 LBS | SYSTOLIC BLOOD PRESSURE: 143 MMHG | HEART RATE: 59 BPM | DIASTOLIC BLOOD PRESSURE: 83 MMHG | OXYGEN SATURATION: 97 % | BODY MASS INDEX: 39.21 KG/M2 | TEMPERATURE: 97.7 F

## 2019-09-30 DIAGNOSIS — G40.209 PARTIAL SYMPTOMATIC EPILEPSY WITH COMPLEX PARTIAL SEIZURES, NOT INTRACTABLE, WITHOUT STATUS EPILEPTICUS (H): ICD-10-CM

## 2019-09-30 DIAGNOSIS — I10 ESSENTIAL HYPERTENSION: ICD-10-CM

## 2019-09-30 DIAGNOSIS — G62.9 PERIPHERAL POLYNEUROPATHY: ICD-10-CM

## 2019-09-30 DIAGNOSIS — Z23 NEED FOR PROPHYLACTIC VACCINATION AND INOCULATION AGAINST INFLUENZA: ICD-10-CM

## 2019-09-30 DIAGNOSIS — R73.03 PRE-DIABETES: ICD-10-CM

## 2019-09-30 DIAGNOSIS — L71.1 RHINOPHYMA: ICD-10-CM

## 2019-09-30 DIAGNOSIS — M79.672 LEFT FOOT PAIN: Primary | ICD-10-CM

## 2019-09-30 LAB
ALBUMIN SERPL-MCNC: 3.8 G/DL (ref 3.4–5)
ALP SERPL-CCNC: 65 U/L (ref 40–150)
ALT SERPL W P-5'-P-CCNC: 33 U/L (ref 0–70)
ANION GAP SERPL CALCULATED.3IONS-SCNC: 6 MMOL/L (ref 3–14)
AST SERPL W P-5'-P-CCNC: 17 U/L (ref 0–45)
BILIRUB SERPL-MCNC: 1.2 MG/DL (ref 0.2–1.3)
BUN SERPL-MCNC: 17 MG/DL (ref 7–30)
CALCIUM SERPL-MCNC: 9.3 MG/DL (ref 8.5–10.1)
CHLORIDE SERPL-SCNC: 108 MMOL/L (ref 94–109)
CO2 SERPL-SCNC: 28 MMOL/L (ref 20–32)
CREAT SERPL-MCNC: 0.77 MG/DL (ref 0.66–1.25)
FOLATE SERPL-MCNC: 52.4 NG/ML
GFR SERPL CREATININE-BSD FRML MDRD: 84 ML/MIN/{1.73_M2}
GLUCOSE SERPL-MCNC: 149 MG/DL (ref 70–99)
HBA1C MFR BLD: 6 % (ref 0–5.6)
POTASSIUM SERPL-SCNC: 4.2 MMOL/L (ref 3.4–5.3)
PROT SERPL-MCNC: 7.1 G/DL (ref 6.8–8.8)
SODIUM SERPL-SCNC: 142 MMOL/L (ref 133–144)
T PALLIDUM AB SER QL: NONREACTIVE
VIT B12 SERPL-MCNC: 390 PG/ML (ref 193–986)

## 2019-09-30 PROCEDURE — 36415 COLL VENOUS BLD VENIPUNCTURE: CPT | Performed by: FAMILY MEDICINE

## 2019-09-30 PROCEDURE — 82607 VITAMIN B-12: CPT | Performed by: FAMILY MEDICINE

## 2019-09-30 PROCEDURE — 83036 HEMOGLOBIN GLYCOSYLATED A1C: CPT | Performed by: FAMILY MEDICINE

## 2019-09-30 PROCEDURE — G0008 ADMIN INFLUENZA VIRUS VAC: HCPCS | Performed by: FAMILY MEDICINE

## 2019-09-30 PROCEDURE — 73630 X-RAY EXAM OF FOOT: CPT | Mod: LT

## 2019-09-30 PROCEDURE — 86780 TREPONEMA PALLIDUM: CPT | Performed by: FAMILY MEDICINE

## 2019-09-30 PROCEDURE — 99214 OFFICE O/P EST MOD 30 MIN: CPT | Mod: 25 | Performed by: FAMILY MEDICINE

## 2019-09-30 PROCEDURE — 90662 IIV NO PRSV INCREASED AG IM: CPT | Performed by: FAMILY MEDICINE

## 2019-09-30 PROCEDURE — 82746 ASSAY OF FOLIC ACID SERUM: CPT | Performed by: FAMILY MEDICINE

## 2019-09-30 PROCEDURE — 80053 COMPREHEN METABOLIC PANEL: CPT | Performed by: FAMILY MEDICINE

## 2019-09-30 RX ORDER — HYDROCORTISONE 2.5 %
CREAM (GRAM) TOPICAL
Refills: 3 | COMMUNITY
Start: 2019-06-20 | End: 2021-09-16

## 2019-09-30 RX ORDER — CLINDAMYCIN PHOSPHATE 11.9 MG/ML
SOLUTION TOPICAL 2 TIMES DAILY
Qty: 60 ML | Refills: 11 | Status: SHIPPED | OUTPATIENT
Start: 2019-09-30 | End: 2020-09-16

## 2019-09-30 RX ORDER — ATORVASTATIN CALCIUM 40 MG/1
40 TABLET, FILM COATED ORAL AT BEDTIME
Qty: 90 TABLET | Refills: 3 | Status: SHIPPED | OUTPATIENT
Start: 2019-09-30 | End: 2020-09-16

## 2019-09-30 NOTE — ASSESSMENT & PLAN NOTE
Ongoing. Xray shows OA.. Neurology referral for bilateral EMG placed. Symptoms suggest denervation

## 2019-09-30 NOTE — ASSESSMENT & PLAN NOTE
Will check labs today.    Hemoglobin A1C   Date Value Ref Range Status   09/30/2019 6.0 (H) 0 - 5.6 % Final     Comment:     Normal <5.7% Prediabetes 5.7-6.4%  Diabetes 6.5% or higher - adopted from ADA   consensus guidelines.     04/04/2019 6.4 (H) 0 - 5.6 % Final     Comment:     Normal <5.7% Prediabetes 5.7-6.4%  Diabetes 6.5% or higher - adopted from ADA   consensus guidelines.     08/17/2018 5.7 (H) 0 - 5.6 % Final     Comment:     Normal <5.7% Prediabetes 5.7-6.4%  Diabetes 6.5% or higher - adopted from ADA   consensus guidelines.       Wt Readings from Last 4 Encounters:   09/30/19 123.9 kg (273 lb 4 oz)   06/25/19 122.5 kg (270 lb)   04/09/19 122.5 kg (270 lb)   04/04/19 122.5 kg (270 lb)

## 2019-09-30 NOTE — PROGRESS NOTES
"Subjective     Damien Vallecillo is a 83 year old male who presents to clinic today for the following health issues:    HPI   Concern - left big toe pain   Onset: six months     Description:   Left big toe pain     Intensity: mild    Progression of Symptoms:  worsening    Accompanying Signs & Symptoms:  None     Previous history of similar problem:   None     Precipitating factors:   Worsened by: none     Alleviating factors:  Improved by: none     Therapies Tried and outcome: none     Pre-diabetes   Lab Results   Component Value Date    A1C 6.0 09/30/2019    A1C 6.4 04/04/2019    A1C 5.7 08/17/2018    A1C 6.0 03/12/2018    A1C 5.5 09/26/2017       Rhinophyma refills  Partial symptomatic epilepsy with complex partial seizures, not intractable, without status epilepticus (H) treated by neurology 2011, last known episode 2011  Peripheral polyneuropathy  Cane, balance issues. L hallux major occasionally extends w/o control: putting on sox etc  Need for prophylactic vaccination and inoculation against influenza offered    Past Medical History:   Diagnosis Date     Acute suppurative arthritis (H)      Acute, but ill-defined, cerebrovascular disease      Arthritis      Cheilosis 9/28/2018     Chronic headaches      Diabetes (H)     \"Pre-diabetes\"     Glucose intolerance (impaired glucose tolerance) 5/31/2013     Gout, unspecified      Hammer toe of left foot 10/21/2016     Hematuria      History of arthroplasty of right knee 08/17/2017     History of blood transfusion      Left knee pain, unspecified chronicity 5/8/2017     Lentigo maligna (H)      Malignant neoplasm of rectosigmoid junction (H)      Morbid obesity due to excess calories (H) 10/21/2016     Other convulsions     last seizure 7-8 years ago...not certain if these were true seizres or not..     Pedal edema 1/18/2018     Peripheral polyneuropathy 9/30/2019     Pre-diabetes 10/21/2016     RBC microcytosis 2/14/2017     Rhinophyma 9/28/2018     Right knee pain, " unspecified chronicity 5/8/2017     Rosacea 9/28/2018     Syncope      Tubulovillous adenoma of colon      Venous stasis dermatitis of both lower extremities 10/21/2016     Past Surgical History:   Procedure Laterality Date     ARTHROPLASTY KNEE Right 6/19/2017    Procedure: ARTHROPLASTY KNEE;  Right total knee arthroplasty, Hammer toe repair toe 2,3,4,5 left foot with osteotomy;  Surgeon: Alec Raymond MD;  Location:  OR     C NONSPECIFIC PROCEDURE      right heel surgery; spur removed.     COLONOSCOPY N/A 6/23/2015    Procedure: COMBINED COLONOSCOPY, SINGLE OR MULTIPLE BIOPSY/POLYPECTOMY BY BIOPSY;  Surgeon: Kimberly Alfaro MD;  Location:  GI     COLONOSCOPY N/A 7/30/2015    Procedure: COLONOSCOPY;  Surgeon: Enrique Salazar MD;  Location:  OR     COLONOSCOPY N/A 7/31/2018    Procedure: COLONOSCOPY;  Colonoscopy;  Surgeon: Tiffany Cheema MD;  Location:  GI     HERNIORRHAPHY EPIGASTRIC  4/27/2012    Procedure:HERNIORRHAPHY EPIGASTRIC; Epigastric Hernia Repair with mesh ; Surgeon:BOLIVAR OLIVEIRA; Location: OR     LAPAROSCOPIC ASSISTED COLECTOMY Right 7/30/2015    Procedure: LAPAROSCOPIC ASSISTED COLECTOMY;  Surgeon: Enrique Salazar MD;  Location:  OR     ORTHOPEDIC SURGERY      lt knee arthroplasty     REALIGN PATELLA Right 10/9/2017    Procedure: REALIGN PATELLA;  Revision right total knee arthroplasty;  Surgeon: Alec Raymond MD;  Location:  OR     REPAIR HAMMER TOE Left 6/19/2017    Procedure: REPAIR HAMMER TOE;  Hammer toe repair toe 2,3,4,5 left foot with osteotomy   ;  Surgeon: Beverly Kim DPM, Podiatry/Foot and Ankle Surgery;  Location:  OR     SIGMOIDOSCOPY FLEXIBLE  5/29/2013    Procedure: SIGMOIDOSCOPY FLEXIBLE;  SIGMOIDOSCOPY FLEXIBLE (FV) Needs blood sugar ck'd;  Surgeon: Enrique Salazar MD;  Location:  GI     skin cancer excision       Family History   Problem Relation Age of Onset     Cancer - colorectal Mother      Cerebrovascular Disease  Father      Social History     Tobacco Use     Smoking status: Never Smoker     Smokeless tobacco: Never Used   Substance Use Topics     Alcohol use: Yes     Alcohol/week: 0.0 standard drinks     Comment: Occas     Reviewed and updated as needed this visit by Provider       Review of Systems   No falls, nose painless      Objective    BP (!) 143/83 (BP Location: Right arm, Patient Position: Chair, Cuff Size: Adult Large)   Pulse 59   Temp 97.7  F (36.5  C) (Oral)   Wt 123.9 kg (273 lb 4 oz)   SpO2 97%   BMI 39.21 kg/m    Body mass index is 39.21 kg/m .  Physical Exam   GENERAL: healthy, alert and no distress  PSYCH: mentation appears normal, affect normal/bright  L foot with surgical changes, no edema, warm  NEURO I extract no reflexes in L leg. No clonus, no babinski    Diagnostic Test Results:  Labs reviewed in Epic  Results for orders placed or performed in visit on 09/30/19 (from the past 24 hour(s))   XR Foot Left G/E 3 Views    Narrative    LEFT FOOT THREE OR MORE VIEWS  9/30/2019 9:12 AM     HISTORY: Left foot pain.    COMPARISON: 5/10/2017.      Impression    IMPRESSION: No acute-appearing bony abnormality. Interval placement of  suture anchors in the second and third metatarsal heads. There is also  some new bony irregularity of the second and third metatarsal heads  which may be postoperative. Previously seen second metatarsophalangeal  joint dislocation and third metatarsophalangeal joint subluxation are  no longer present but there may be some residual dorsal subluxation of  the second MTP. Distal toes are not well evaluated since they are  fixed in partial flexion. There is likely osteoarthritis at the third  proximal interphalangeal joints and prior arthrodesis of the second  proximal interphalangeal joint. Degenerative changes at multiple  tarsometatarsal joints and talonavicular joint appear similar to the  prior exam. Prominent posterior and plantar calcaneal spurs without  change.   HEMOGLOBIN  A1C   Result Value Ref Range    Hemoglobin A1C 6.0 (H) 0 - 5.6 %   Comprehensive metabolic panel   Result Value Ref Range    Sodium 142 133 - 144 mmol/L    Potassium 4.2 3.4 - 5.3 mmol/L    Chloride 108 94 - 109 mmol/L    Carbon Dioxide 28 20 - 32 mmol/L    Anion Gap 6 3 - 14 mmol/L    Glucose 149 (H) 70 - 99 mg/dL    Urea Nitrogen 17 7 - 30 mg/dL    Creatinine 0.77 0.66 - 1.25 mg/dL    GFR Estimate 84 >60 mL/min/[1.73_m2]    GFR Estimate If Black >90 >60 mL/min/[1.73_m2]    Calcium 9.3 8.5 - 10.1 mg/dL    Bilirubin Total 1.2 0.2 - 1.3 mg/dL    Albumin 3.8 3.4 - 5.0 g/dL    Protein Total 7.1 6.8 - 8.8 g/dL    Alkaline Phosphatase 65 40 - 150 U/L    ALT 33 0 - 70 U/L    AST 17 0 - 45 U/L           Assessment & Plan   Problem List Items Addressed This Visit        Nervous and Auditory    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)     Last 2011. Did not tolerate valproate, keppra, levetiracetam         Peripheral polyneuropathy     Neurology referral for bilateral EMG. Serologic eval completion         Relevant Orders    Vitamin B12 (Completed)    Folate (Completed)    Treponema Abs w Reflex to RPR and Titer (Completed)    NEUROLOGY ADULT REFERRAL    Comprehensive metabolic panel (Completed)    Left foot pain - Primary     Ongoing. Xray shows OA.. Neurology referral for bilateral EMG placed. Symptoms suggest denervation          Relevant Orders    XR Foot Left G/E 3 Views (Completed)    Vitamin B12 (Completed)    Folate (Completed)    Treponema Abs w Reflex to RPR and Titer (Completed)    NEUROLOGY ADULT REFERRAL       Other    Pre-diabetes     Will check labs today.    Hemoglobin A1C   Date Value Ref Range Status   09/30/2019 6.0 (H) 0 - 5.6 % Final     Comment:     Normal <5.7% Prediabetes 5.7-6.4%  Diabetes 6.5% or higher - adopted from ADA   consensus guidelines.     04/04/2019 6.4 (H) 0 - 5.6 % Final     Comment:     Normal <5.7% Prediabetes 5.7-6.4%  Diabetes 6.5% or higher - adopted from ADA  "  consensus guidelines.     08/17/2018 5.7 (H) 0 - 5.6 % Final     Comment:     Normal <5.7% Prediabetes 5.7-6.4%  Diabetes 6.5% or higher - adopted from ADA   consensus guidelines.       Wt Readings from Last 4 Encounters:   09/30/19 123.9 kg (273 lb 4 oz)   06/25/19 122.5 kg (270 lb)   04/09/19 122.5 kg (270 lb)   04/04/19 122.5 kg (270 lb)            Relevant Medications    atorvastatin (LIPITOR) 40 MG tablet    Other Relevant Orders    HEMOGLOBIN A1C (Completed)    Rhinophyma     Inflammation controlled Refill.          Relevant Medications    clindamycin (CLEOCIN T) 1 % external solution      Other Visit Diagnoses     Need for prophylactic vaccination and inoculation against influenza        Relevant Orders    INFLUENZA (HIGH DOSE) 3 VALENT VACCINE [89597] (Completed)    Essential hypertension             BMI:   Estimated body mass index is 39.21 kg/m  as calculated from the following:    Height as of 6/25/19: 1.778 m (5' 10\").    Weight as of this encounter: 123.9 kg (273 lb 4 oz).     No follow-ups on file.    Scribe Disclosure:   I, Kimberly Jenkins, am serving as a scribe; to document services personally performed by Obi Strange MD - -based on data collection and the provider's statements to me.     Provider Disclosure:  I agree with above History, Review of Systems, Physical exam and Plan.  I have reviewed the content of the documentation and have edited it as needed. I have personally performed the services documented here and the documentation accurately represents those services and the decisions I have made.      Electronically signed by:  Obi Strange MD  [facility]    "

## 2019-09-30 NOTE — LETTER
October 1, 2019      Damien Vallecillo  [address]  [place] [facility] [address]        Dear ,    We are writing to inform you of your test results.  Tests are all OK.         Resulted Orders   HEMOGLOBIN A1C   Result Value Ref Range    Hemoglobin A1C 6.0 (H) 0 - 5.6 %      Comment:      Normal <5.7% Prediabetes 5.7-6.4%  Diabetes 6.5% or higher - adopted from ADA   consensus guidelines.     Vitamin B12   Result Value Ref Range    Vitamin B12 390 193 - 986 pg/mL   Folate   Result Value Ref Range    Folate 52.4 >5.4 ng/mL   Treponema Abs w Reflex to RPR and Titer   Result Value Ref Range    Treponema Antibodies Nonreactive NR^Nonreactive   Comprehensive metabolic panel   Result Value Ref Range    Sodium 142 133 - 144 mmol/L    Potassium 4.2 3.4 - 5.3 mmol/L    Chloride 108 94 - 109 mmol/L    Carbon Dioxide 28 20 - 32 mmol/L    Anion Gap 6 3 - 14 mmol/L    Glucose 149 (H) 70 - 99 mg/dL    Urea Nitrogen 17 7 - 30 mg/dL    Creatinine 0.77 0.66 - 1.25 mg/dL    GFR Estimate 84 >60 mL/min/[1.73_m2]      Comment:      Non  GFR Calc  Starting 12/18/2018, serum creatinine based estimated GFR (eGFR) will be   calculated using the Chronic Kidney Disease Epidemiology Collaboration   (CKD-EPI) equation.      GFR Estimate If Black >90 >60 mL/min/[1.73_m2]      Comment:       GFR Calc  Starting 12/18/2018, serum creatinine based estimated GFR (eGFR) will be   calculated using the Chronic Kidney Disease Epidemiology Collaboration   (CKD-EPI) equation.      Calcium 9.3 8.5 - 10.1 mg/dL    Bilirubin Total 1.2 0.2 - 1.3 mg/dL    Albumin 3.8 3.4 - 5.0 g/dL    Protein Total 7.1 6.8 - 8.8 g/dL    Alkaline Phosphatase 65 40 - 150 U/L    ALT 33 0 - 70 U/L    AST 17 0 - 45 U/L       If you have any questions or concerns, please call the clinic at the number listed above.       Sincerely,        Obi Strange MD

## 2019-10-14 ENCOUNTER — TRANSFERRED RECORDS (OUTPATIENT)
Dept: HEALTH INFORMATION MANAGEMENT | Facility: CLINIC | Age: 84
End: 2019-10-14

## 2019-11-12 ENCOUNTER — TRANSFERRED RECORDS (OUTPATIENT)
Dept: HEALTH INFORMATION MANAGEMENT | Facility: CLINIC | Age: 84
End: 2019-11-12

## 2019-11-12 DIAGNOSIS — E11.9 DIABETES MELLITUS (H): ICD-10-CM

## 2019-11-12 DIAGNOSIS — M54.50 LUMBAGO: Primary | ICD-10-CM

## 2019-11-12 DIAGNOSIS — G62.9 POLYNEUROPATHY: ICD-10-CM

## 2019-11-12 DIAGNOSIS — G89.29 OTHER CHRONIC PAIN: ICD-10-CM

## 2019-11-12 DIAGNOSIS — D51.9 VITAMIN B12 DEFICIENCY ANEMIA: ICD-10-CM

## 2019-11-12 DIAGNOSIS — R53.83 FATIGUE: ICD-10-CM

## 2019-11-13 ENCOUNTER — DOCUMENTATION ONLY (OUTPATIENT)
Dept: FAMILY MEDICINE | Facility: CLINIC | Age: 84
End: 2019-11-13

## 2019-11-13 DIAGNOSIS — E78.5 HYPERLIPIDEMIA LDL GOAL <160: ICD-10-CM

## 2019-11-13 DIAGNOSIS — R79.89 ELEVATED SERUM CREATININE: ICD-10-CM

## 2019-11-13 DIAGNOSIS — M1A.9XX0 CHRONIC GOUT WITHOUT TOPHUS, UNSPECIFIED CAUSE, UNSPECIFIED SITE: Primary | ICD-10-CM

## 2019-11-13 NOTE — PROGRESS NOTES
Patient is coming in for a lab only appointment 11/15/2019 for outside orders.  shows he is also due for a lipid panel and microalbumin test. Please advise. Thank you.

## 2019-11-15 ENCOUNTER — TELEPHONE (OUTPATIENT)
Dept: FAMILY MEDICINE | Facility: CLINIC | Age: 84
End: 2019-11-15

## 2019-11-15 DIAGNOSIS — R53.83 FATIGUE: ICD-10-CM

## 2019-11-15 DIAGNOSIS — D51.9 VITAMIN B12 DEFICIENCY ANEMIA: ICD-10-CM

## 2019-11-15 DIAGNOSIS — M1A.9XX0 CHRONIC GOUT WITHOUT TOPHUS, UNSPECIFIED CAUSE, UNSPECIFIED SITE: ICD-10-CM

## 2019-11-15 DIAGNOSIS — G89.29 OTHER CHRONIC PAIN: ICD-10-CM

## 2019-11-15 DIAGNOSIS — E11.9 DIABETES MELLITUS (H): ICD-10-CM

## 2019-11-15 DIAGNOSIS — R60.0 PEDAL EDEMA: ICD-10-CM

## 2019-11-15 DIAGNOSIS — M54.50 LUMBAGO: ICD-10-CM

## 2019-11-15 DIAGNOSIS — G62.9 POLYNEUROPATHY: ICD-10-CM

## 2019-11-15 DIAGNOSIS — E78.5 HYPERLIPIDEMIA LDL GOAL <160: ICD-10-CM

## 2019-11-15 DIAGNOSIS — R79.89 ELEVATED SERUM CREATININE: ICD-10-CM

## 2019-11-15 LAB
CHOLEST SERPL-MCNC: 118 MG/DL
CREAT UR-MCNC: 226 MG/DL
FOLATE SERPL-MCNC: 44.6 NG/ML
GLUCOSE SERPL-MCNC: 139 MG/DL (ref 70–99)
HBA1C MFR BLD: 6.1 % (ref 0–5.6)
HDLC SERPL-MCNC: 75 MG/DL
LDLC SERPL CALC-MCNC: 29 MG/DL
MICROALBUMIN UR-MCNC: 21 MG/L
MICROALBUMIN/CREAT UR: 9.12 MG/G CR (ref 0–17)
NONHDLC SERPL-MCNC: 43 MG/DL
T4 FREE SERPL-MCNC: 1.12 NG/DL (ref 0.76–1.46)
TRIGL SERPL-MCNC: 72 MG/DL
TSH SERPL DL<=0.005 MIU/L-ACNC: 2.99 MU/L (ref 0.4–4)
URATE SERPL-MCNC: 6.4 MG/DL (ref 3.5–7.2)
VIT B12 SERPL-MCNC: 315 PG/ML (ref 193–986)

## 2019-11-15 PROCEDURE — 84165 PROTEIN E-PHORESIS SERUM: CPT | Performed by: FAMILY MEDICINE

## 2019-11-15 PROCEDURE — 82607 VITAMIN B-12: CPT | Performed by: FAMILY MEDICINE

## 2019-11-15 PROCEDURE — 84439 ASSAY OF FREE THYROXINE: CPT | Performed by: FAMILY MEDICINE

## 2019-11-15 PROCEDURE — 82947 ASSAY GLUCOSE BLOOD QUANT: CPT | Performed by: FAMILY MEDICINE

## 2019-11-15 PROCEDURE — 84443 ASSAY THYROID STIM HORMONE: CPT | Performed by: FAMILY MEDICINE

## 2019-11-15 PROCEDURE — 82043 UR ALBUMIN QUANTITATIVE: CPT | Performed by: FAMILY MEDICINE

## 2019-11-15 PROCEDURE — 83036 HEMOGLOBIN GLYCOSYLATED A1C: CPT | Performed by: FAMILY MEDICINE

## 2019-11-15 PROCEDURE — 83090 ASSAY OF HOMOCYSTEINE: CPT | Performed by: FAMILY MEDICINE

## 2019-11-15 PROCEDURE — 00000402 ZZHCL STATISTIC TOTAL PROTEIN: Performed by: FAMILY MEDICINE

## 2019-11-15 PROCEDURE — 82746 ASSAY OF FOLIC ACID SERUM: CPT | Performed by: PHYSICIAN ASSISTANT

## 2019-11-15 PROCEDURE — 84550 ASSAY OF BLOOD/URIC ACID: CPT | Performed by: FAMILY MEDICINE

## 2019-11-15 PROCEDURE — 83921 ORGANIC ACID SINGLE QUANT: CPT | Performed by: FAMILY MEDICINE

## 2019-11-15 PROCEDURE — 80061 LIPID PANEL: CPT | Performed by: FAMILY MEDICINE

## 2019-11-15 PROCEDURE — 36415 COLL VENOUS BLD VENIPUNCTURE: CPT | Performed by: FAMILY MEDICINE

## 2019-11-15 NOTE — TELEPHONE ENCOUNTER
Appears [facility] with hand written note if Dr. Strange wants him to continue Furosemide that he needs [facility].  I believe we need to fax RX.  Fax # [phone] attn: Yady Quiñonez LPN or Jeferson Pedro LPN or Joy Hernandez LPN.  I do not see we have filled Furosemide since 7/12/18 for 6 month RX.  Papers to Dr. Strange.  Please advise.  Helen Quan RN

## 2019-11-15 NOTE — TELEPHONE ENCOUNTER
"Records indicate he ran out February Fx at that time  No edema at Sept visit  Potassium   Date Value Ref Range Status   09/30/2019 4.2 3.4 - 5.3 mmol/L Final     GFR Estimate   Date Value Ref Range Status   09/30/2019 84 >60 mL/min/[1.73_m2] Final     Comment:     Non  GFR Calc  Starting 12/18/2018, serum creatinine based estimated GFR (eGFR) will be   calculated using the Chronic Kidney Disease Epidemiology Collaboration   (CKD-EPI) equation.       GFR Estimate If Black   Date Value Ref Range Status   09/30/2019 >90 >60 mL/min/[1.73_m2] Final     Comment:      GFR Calc  Starting 12/18/2018, serum creatinine based estimated GFR (eGFR) will be   calculated using the Chronic Kidney Disease Epidemiology Collaboration   (CKD-EPI) equation.       Has he been taking it from elsewhere? [facility] says \"talk to primary about [facility]semide\"  Phone visit is appropriate  Abstract [facility] papers  Obi Strange MD          "

## 2019-11-15 NOTE — TELEPHONE ENCOUNTER
Telephone appointment scheduled 11/18/19 with [facility]. Papers dropped off patient, in [facility] in box.     Lauren Napoles

## 2019-11-15 NOTE — LETTER
November 22, 2019      Damien Val[place]  [address] [place] [place] [address]        Dear ,    We are writing to inform you of your test results. Tests al look quite good      Resulted Orders   Albumin Random Urine Quantitative with Creat Ratio   Result Value Ref Range    Creatinine Urine 226 mg/dL    Albumin Urine mg/L 21 mg/L    Albumin Urine mg/g Cr 9.12 0 - 17 mg/g Cr   Lipid panel reflex to direct LDL Non-fasting   Result Value Ref Range    Cholesterol 118 <200 mg/dL    Triglycerides 72 <150 mg/dL      Comment:      Fasting specimen    HDL Cholesterol 75 >39 mg/dL    LDL Cholesterol Calculated 29 <100 mg/dL      Comment:      Desirable:       <100 mg/dl    Non HDL Cholesterol 43 <130 mg/dL   Uric acid   Result Value Ref Range    Uric Acid 6.4 3.5 - 7.2 mg/dL       If you have any questions or concerns, please call the clinic at the number listed above.       Sincerely,        Obi Strange MD

## 2019-11-15 NOTE — LETTER
[address] [address]  [phone]          December 6, 2019    Damien Vallecillo                                                                                                                     [address] [place] [address]            Re Damien,    Discontinue furosemide    Sincerely,         Obi Strange MD

## 2019-11-18 ENCOUNTER — VIRTUAL VISIT (OUTPATIENT)
Dept: FAMILY MEDICINE | Facility: CLINIC | Age: 84
End: 2019-11-18
Payer: MEDICARE

## 2019-11-18 DIAGNOSIS — I87.2 VENOUS STASIS DERMATITIS OF BOTH LOWER EXTREMITIES: Primary | ICD-10-CM

## 2019-11-18 LAB
ALBUMIN SERPL ELPH-MCNC: 4.1 G/DL (ref 3.7–5.1)
ALPHA1 GLOB SERPL ELPH-MCNC: 0.3 G/DL (ref 0.2–0.4)
ALPHA2 GLOB SERPL ELPH-MCNC: 0.8 G/DL (ref 0.5–0.9)
B-GLOBULIN SERPL ELPH-MCNC: 0.8 G/DL (ref 0.6–1)
GAMMA GLOB SERPL ELPH-MCNC: 0.9 G/DL (ref 0.7–1.6)
M PROTEIN SERPL ELPH-MCNC: 0 G/DL
PROT PATTERN SERPL ELPH-IMP: NORMAL

## 2019-11-18 PROCEDURE — G2012 BRIEF CHECK IN BY MD/QHP: HCPCS | Performed by: FAMILY MEDICINE

## 2019-11-18 NOTE — PROGRESS NOTES
"Damien Vallecillo is a 84 year old male who is being evaluated via a telephone visit.      The patient has been notified of following (by M.A) Forrest Rose CMA ([facility])      \"We have found that certain health care needs can be provided without the need for a physical exam.  This service lets us provide the care you need with a short phone conversation.  If a prescription is necessary we can send it directly to your pharmacy.  If lab work is needed we can place an order for that and you can then stop by our lab to have the test done at a later time.    Since this is like an office visit,  will bill your insurance company for this service.  Please check with your medical insurance if this type of telephone/virtual is covered . You may be responsible for the cost of this service if insurance coverage is denied.  The typical cost is $30 (10min), $59(11-20min) and $85 (21-30min)     If during the course of the call the physician/provider feels a telephone visit is not appropriate, you will not be charged for this service\"    Consent has been obtained for this service by care team member: yes.  See the scanned image in the medical record.    S: Subjective     Damien Vallecillo is a 84 year old male who presents to clinic today for the following health issues:    Pt dropped off forms that need to be completed and are in Dr. Strange's inbox. Has questions regarding furosemide.       Patient was previously on furosemide associated with his venous stasis.  He wears compression hose, and reports he has not had significant edema now for a very long time.  Record review would suggest that this was stopped last December.  [facility], and reports that they used to supply it for him but do so no longer.    This all being the case, it would seem that he does not need furosemide any longer.  That medicine will be discontinued          Reviewed and updated as needed this visit by " "Provider               Objective    There were no vitals taken for this visit.  There is no height or weight on file to calculate BMI.  Physical Exam               ASSESSMENT / PLAN:  (I87.2) Venous stasis dermatitis of both lower extremities  (primary encounter diagnosis)  Comment: continue compression hose  Plan:           Obi Strange MD              Pertinent parts of the the patient's medical history reviewed and confirmed by the provider included :   Past Medical History:   Diagnosis Date     Acute suppurative arthritis (H)      Acute, but ill-defined, cerebrovascular disease      Arthritis      Cheilosis 9/28/2018     Chronic headaches      Diabetes (H)     \"Pre-diabetes\"     Elevated serum creatinine 11/13/2019    GFR Estimate Date Value Ref Range Status 09/30/2019 84 >60 mL/min/[1.73_m2] Final   Comment:   Non  GFR Calc Starting 12/18/2018, serum creatinine based estimated GFR (eGFR) will be  calculated using the Chronic Kidney Disease Epidemiology Collaboration  (CKD-EPI) equation.         Glucose intolerance (impaired glucose tolerance) 5/31/2013     Gout, unspecified      Hammer toe of left foot 10/21/2016     Hematuria      History of arthroplasty of right knee 08/17/2017     History of blood transfusion      Hyperlipidemia LDL goal <160 11/13/2019     Left knee pain, unspecified chronicity 5/8/2017     Lentigo maligna (H)      Malignant neoplasm of rectosigmoid junction (H)      Morbid obesity due to excess calories (H) 10/21/2016     Other convulsions     last seizure 7-8 years ago...not certain if these were true seizres or not..     Pedal edema 1/18/2018     Peripheral polyneuropathy 9/30/2019     Pre-diabetes 10/21/2016     RBC microcytosis 2/14/2017     Rhinophyma 9/28/2018     Right knee pain, unspecified chronicity 5/8/2017     Rosacea 9/28/2018     Syncope      Tubulovillous adenoma of colon      Venous stasis dermatitis of both lower extremities 10/21/2016       Past Surgical " History:   Procedure Laterality Date     ARTHROPLASTY KNEE Right 6/19/2017    Procedure: ARTHROPLASTY KNEE;  Right total knee arthroplasty, Hammer toe repair toe 2,3,4,5 left foot with osteotomy;  Surgeon: Alec Raymond MD;  Location:  OR     C NONSPECIFIC PROCEDURE      right heel surgery; spur removed.     COLONOSCOPY N/A 6/23/2015    Procedure: COMBINED COLONOSCOPY, SINGLE OR MULTIPLE BIOPSY/POLYPECTOMY BY BIOPSY;  Surgeon: Kimberly Alfaro MD;  Location:  GI     COLONOSCOPY N/A 7/30/2015    Procedure: COLONOSCOPY;  Surgeon: Enrique Salazar MD;  Location:  OR     COLONOSCOPY N/A 7/31/2018    Procedure: COLONOSCOPY;  Colonoscopy;  Surgeon: Tiffany Cheema MD;  Location:  GI     HERNIORRHAPHY EPIGASTRIC  4/27/2012    Procedure:HERNIORRHAPHY EPIGASTRIC; Epigastric Hernia Repair with mesh ; Surgeon:BOLIVAR OLIVEIRA; Location: OR     LAPAROSCOPIC ASSISTED COLECTOMY Right 7/30/2015    Procedure: LAPAROSCOPIC ASSISTED COLECTOMY;  Surgeon: Enrique Salazar MD;  Location:  OR     ORTHOPEDIC SURGERY      lt knee arthroplasty     REALIGN PATELLA Right 10/9/2017    Procedure: REALIGN PATELLA;  Revision right total knee arthroplasty;  Surgeon: Alec Raymond MD;  Location:  OR     REPAIR HAMMER TOE Left 6/19/2017    Procedure: REPAIR HAMMER TOE;  Hammer toe repair toe 2,3,4,5 left foot with osteotomy   ;  Surgeon: Beverly Kim DPM, Podiatry/Foot and Ankle Surgery;  Location:  OR     SIGMOIDOSCOPY FLEXIBLE  5/29/2013    Procedure: SIGMOIDOSCOPY FLEXIBLE;  SIGMOIDOSCOPY FLEXIBLE (FV) Needs blood sugar ck'd;  Surgeon: Enrique Salazar MD;  Location:  GI     skin cancer excision         Family History   Problem Relation Age of Onset     Cancer - colorectal Mother      Cerebrovascular Disease Father        Social History     Tobacco Use     Smoking status: Never Smoker     Smokeless tobacco: Never Used   Substance Use Topics     Alcohol use: Yes     Alcohol/week: 0.0 standard  drinks     Comment: Occas          Total time of call between patient and provider was 4 minutes     Obi Strange MD   (MD signature)  ===================================================    I have reviewed the note as documented above.  This accurately captures the substance of my conversation with the patient,    Additional provider notes:

## 2019-11-20 ENCOUNTER — TRANSFERRED RECORDS (OUTPATIENT)
Dept: HEALTH INFORMATION MANAGEMENT | Facility: CLINIC | Age: 84
End: 2019-11-20

## 2019-11-20 LAB — HCYS SERPL-SCNC: 9.2 UMOL/L (ref 4–12)

## 2019-11-21 LAB — METHYLMALONATE SERPL-SCNC: 0.22 UMOL/L (ref 0–0.4)

## 2019-12-06 RX ORDER — FUROSEMIDE 20 MG
20 TABLET ORAL 2 TIMES DAILY
Qty: 180 TABLET | Refills: 1 | Status: CANCELLED | OUTPATIENT
Start: 2019-12-06

## 2020-03-23 NOTE — TELEPHONE ENCOUNTER
Scheduled consult appt for pt for Tuesday Jan. 30th at 10:00am with Dr. Cunningham at our [facility]. Gave pt the clinic location information. Pt is in agreement with this appt and had no further questions.   No

## 2020-07-08 NOTE — NURSING NOTE
"Chief Complaint   Patient presents with     RECHECK     from 4/17 Cough        Initial /82 (BP Location: Left arm, Patient Position: Chair, Cuff Size: Adult Large)  Pulse 72  Temp 98.1  F (36.7  C) (Oral)  Resp 14  Ht 5' 10\" (1.778 m)  Wt 268 lb (121.6 kg)  BMI 38.45 kg/m2 Estimated body mass index is 38.45 kg/(m^2) as calculated from the following:    Height as of this encounter: 5' 10\" (1.778 m).    Weight as of this encounter: 268 lb (121.6 kg).  Medication Reconciliation: complete left arm Marlene Juarez MA      " Clia Id #: [id number] Clia Id #: [id number]

## 2020-08-05 ENCOUNTER — TRANSFERRED RECORDS (OUTPATIENT)
Dept: HEALTH INFORMATION MANAGEMENT | Facility: CLINIC | Age: 85
End: 2020-08-05

## 2020-08-05 LAB — RETINOPATHY: NORMAL

## 2020-08-11 ENCOUNTER — OFFICE VISIT (OUTPATIENT)
Dept: FAMILY MEDICINE | Facility: CLINIC | Age: 85
End: 2020-08-11
Payer: MEDICARE

## 2020-08-11 ENCOUNTER — ANCILLARY PROCEDURE (OUTPATIENT)
Dept: GENERAL RADIOLOGY | Facility: CLINIC | Age: 85
End: 2020-08-11
Attending: NURSE PRACTITIONER
Payer: MEDICARE

## 2020-08-11 VITALS
HEART RATE: 66 BPM | DIASTOLIC BLOOD PRESSURE: 76 MMHG | TEMPERATURE: 98.1 F | OXYGEN SATURATION: 97 % | SYSTOLIC BLOOD PRESSURE: 134 MMHG
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
## 2020-08-11 PROCEDURE — 73610 X-RAY EXAM OF ANKLE: CPT | Mod: RT

## 2020-08-11 PROCEDURE — 99214 OFFICE O/P EST MOD 30 MIN: CPT | Performed by: NURSE PRACTITIONER

## 2020-08-11 PROCEDURE — 73562 X-RAY EXAM OF KNEE 3: CPT | Mod: RT

## 2020-08-11 NOTE — PROGRESS NOTES
"Subjective     Damien Vallecillo is a 84 year old male who presents to clinic today for the following health issues:    HPI       Joint Pain    Onset: 2 hours ago    Description:   Location: right elbow, right knee, right ankle  Character: \"just hurts'        Precipitating factors:   Trauma or overuse: YES- fell off a curb    Alleviating factors:  Improved by: nothing  Therapies Tried and outcome: did try anything  While walking outside Damien fell off a curb about 2 hours ago, landed on his right side. Denies striking his head or any loss of consciousness.    Has pain of the right lateral ankle and right knee.  Also has an abrasion on his right elbow.    History of a right knee replacement.     He has also had left inguinal pain for several weeks, this is not related to the fall or any injury.  Increased pain with walking, denies pain at this time.     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)     Morbid obesity due to excess calories (H)     Hammer toe of left foot     Venous stasis dermatitis of both lower extremities     RBC microcytosis     Left knee pain, unspecified chronicity     S/P total knee arthroplasty     History of arthroplasty of right knee     Pedal edema     Rosacea     Cheilosis     Rhinophyma     Ankle fracture     Closed right ankle fracture     Peripheral polyneuropathy     Left foot pain     Hyperlipidemia LDL goal <160     Elevated serum creatinine     Past Surgical History:   Procedure Laterality Date     ARTHROPLASTY KNEE Right 6/19/2017    Procedure: ARTHROPLASTY KNEE;  Right total knee arthroplasty, Hammer toe repair toe 2,3,4,5 left foot with osteotomy;  Surgeon: Alec Raymond MD;  Location: [facility]     C NONSPECIFIC PROCEDURE      right heel surgery; spur removed.     COLONOSCOPY N/A 6/23/2015    Procedure: COMBINED COLONOSCOPY, SINGLE OR " MULTIPLE BIOPSY/POLYPECTOMY BY BIOPSY;  Surgeon: Kimberly Alfaro MD;  Location:  GI     COLONOSCOPY N/A 7/30/2015    Procedure: COLONOSCOPY;  Surgeon: Enrique Salazar MD;  Location:  OR     COLONOSCOPY N/A 7/31/2018    Procedure: COLONOSCOPY;  Colonoscopy;  Surgeon: Tiffany Cheema MD;  Location:  GI     HERNIORRHAPHY EPIGASTRIC  4/27/2012    Procedure:HERNIORRHAPHY EPIGASTRIC; Epigastric Hernia Repair with mesh ; Surgeon:BOLIVAR OLIVEIRA; Location: OR     LAPAROSCOPIC ASSISTED COLECTOMY Right 7/30/2015    Procedure: LAPAROSCOPIC ASSISTED COLECTOMY;  Surgeon: Enrique Salazar MD;  Location:  OR     ORTHOPEDIC SURGERY      lt knee arthroplasty     REALIGN PATELLA Right 10/9/2017    Procedure: REALIGN PATELLA;  Revision right total knee arthroplasty;  Surgeon: Alec Raymond MD;  Location:  OR     REPAIR HAMMER TOE Left 6/19/2017    Procedure: REPAIR HAMMER TOE;  Hammer toe repair toe 2,3,4,5 left foot with osteotomy   ;  Surgeon: Beverly Kim DPM, Podiatry/Foot and Ankle Surgery;  Location:  OR     SIGMOIDOSCOPY FLEXIBLE  5/29/2013    Procedure: SIGMOIDOSCOPY FLEXIBLE;  SIGMOIDOSCOPY FLEXIBLE (FV) Needs blood sugar ck'd;  Surgeon: Enrique Salazar MD;  Location:  GI     skin cancer excision         Social History     Tobacco Use     Smoking status: Never Smoker     Smokeless tobacco: Never Used   Substance Use Topics     Alcohol use: Yes     Alcohol/week: 0.0 standard drinks     Comment: Occas     Family History   Problem Relation Age of Onset     Cancer - colorectal Mother      Cerebrovascular Disease Father          Current Outpatient Medications   Medication Sig Dispense Refill     ACE NOT PRESCRIBED, INTENTIONAL, ACE Inhibitor not prescribed due to Other:  Had cough on ACEI in past       0 each 0     atorvastatin (LIPITOR) 40 MG tablet Take 1 tablet (40 mg) by mouth At Bedtime 90 tablet 3     blood glucose (ACCU-CHEK LAURA) test strip 1Use to test blood sugar 2 times  daily or as directed. 100 strip 3     blood glucose monitoring (ACCU-CHEK LAURA PLUS) test strip Use to test blood sugar 1 time daily 100 each 11     CINNAMON PO Take 1 tablet by mouth daily        clindamycin (CLEOCIN T) 1 % external solution Apply topically 2 times daily 60 mL 11     doxycycline hyclate (PERIOSTAT) 20 MG tablet Take 20 mg by mouth 2 times daily       fish oil-omega-3 fatty acids 1000 MG capsule Take 1 g by mouth every evening       furosemide (LASIX) 20 MG tablet Take 1 tablet (20 mg) by mouth 2 times daily 180 tablet 1     glucosamine-chondroitin 500-400 MG CAPS per capsule Take 1 capsule by mouth 2 times daily       hydrocortisone 2.5 % cream CAMILLE TO CORNERS OF MOUTH BID FOR 2 TO 3 DAYS THEN PRN WITH FLARES  3     Multiple Vitamins-Minerals (MULTIVITAMIN ADULT PO) Take 1 tablet by mouth daily Reported on 5/12/2017       order for DME 1: Gradient Compression Wraps; 2: Cast Boots; 3: BLE knee or thigh high 20-30 mm Hg compression stocking; 4: BLE knee or thigh high custom compression stocking; 5: Alternative BLE knee high or thigh compression garments (velcro/buckling) 1 each 0     order for DME Equipment being ordered: Walker Wheels () and Walker ()  Treatment Diagnosis: Impaired functional mobility 1 each 0     tamsulosin (FLOMAX) 0.4 MG capsule Take 0.4 mg by mouth daily       triamcinolone (KENALOG) 0.5 % cream Apply sparingly to affected area three times daily. 30 g 1     BP Readings from Last 3 Encounters:   08/11/20 134/76   09/30/19 (!) 143/83   06/25/19 (P) 126/72    Wt Readings from Last 3 Encounters:   09/30/19 123.9 kg (273 lb 4 oz)   06/25/19 122.5 kg (270 lb)   04/09/19 122.5 kg (270 lb)      Reviewed and updated as needed this visit by Provider         Review of Systems   CONSTITUTIONAL: NEGATIVE for fever, chills, change in weight  MUSCULOSKELETAL: see HPI  NEURO: NEGATIVE for weakness, dizziness or paresthesias  PSYCHIATRIC: NEGATIVE for changes in mood or affect       Objective    /76 (BP Location: Left arm, Patient Position: Sitting, Cuff Size: Adult Large)   Pulse 66   Temp 98.1  F (36.7  C) (Oral)   SpO2 97%   There is no height or weight on file to calculate BMI.  Physical Exam   GENERAL: healthy, alert and no distress  RESP: lungs clear to auscultation - no rales, rhonchi or wheezes  CV: regular rate and rhythm, normal S1 S2, no S3 or S4, no murmur, click or rub, no peripheral edema and peripheral pulses strong  MS: Right forearm with a 2 cm abrasion, full ROM of elbow without pain.   Right knee:  Superficial abrasion, walking with a cane per his baseline, no tenderness upon palpation.  Right lateral ankle with tenderness upon palpation, no swelling, increased pain with internal rotation.   PSYCH: mentation appears normal, affect normal/bright            Assessment & Plan   Assessment  Damien was seen today for musculoskeletal problem.    Diagnoses and all orders for this visit:    Fall, initial encounter:  Abrasions on right forearm and right knee cleansed, both superficial.  Discussed applying ice for 15-20 min tid, elevate.    Right knee xray:  Knee dislocation, Damien reports as previously present.  Will await final reading by radiology.  Right ankle xray:  Appears without fracture, await final reading by radiology.  -     XR Knee Right 3 Views; Future  -     XR Ankle Right G/E 3 Views; Future    Follow up in 1 week if pain does not improve.     Susan Haase, APRN [facility]

## 2020-08-14 ENCOUNTER — TRANSFERRED RECORDS (OUTPATIENT)
Dept: HEALTH INFORMATION MANAGEMENT | Facility: CLINIC | Age: 85
End: 2020-08-14

## 2020-09-14 NOTE — PROGRESS NOTES
Pre-Visit Planning :     Future Appointments   Date Time Provider Department Center   9/16/2020  8:40 AM Obi Strange MD [facility]      Arrival Time for this Appointment:  8:30 AM    Appointment Notes for this encounter:    leg pain/ swelling     Questionnaires Reviewed/Assigned:       Spoke to patient via phone. Are there any additional questions or concerns you'd like to review with your provider during your visit?  NO/YES:[id number]     Last OV with provider:       Hospital ER Visits:      Is the visit preventive?      Specialty Visits:       Imaging and Lab Review:     Recent Procedures:      MEDS ;    Current Outpatient Medications   Medication     ACE NOT PRESCRIBED, INTENTIONAL,     atorvastatin (LIPITOR) 40 MG tablet     blood glucose (ACCU-CHEK LAURA) test strip     blood glucose monitoring (ACCU-CHEK LAURA PLUS) test strip     CINNAMON PO     clindamycin (CLEOCIN T) 1 % external solution     doxycycline hyclate (PERIOSTAT) 20 MG tablet     fish oil-omega-3 fatty acids 1000 MG capsule     furosemide (LASIX) 20 MG tablet     glucosamine-chondroitin 500-400 MG CAPS per capsule     hydrocortisone 2.5 % cream     Multiple Vitamins-Minerals (MULTIVITAMIN ADULT PO)     order for DME     order for DME     tamsulosin (FLOMAX) 0.4 MG capsule     triamcinolone (KENALOG) 0.5 % cream     No current facility-administered medications for this visit.         Is there anything on your medication list that needs to be updated?       Health Maintenance Due   Topic Date Due     HEPATITIS B IMMUNIZATION (1 of 3 - Risk 3-dose series) 11/09/1954     ZOSTER IMMUNIZATION (2 of 3) 12/16/2015     MEDICARE ANNUAL WELLNESS VISIT  10/21/2017     FALL RISK ASSESSMENT  08/17/2019     PHQ-2  01/01/2020     BMP  03/30/2020     DTAP/TDAP/TD IMMUNIZATION (2 - Td) 06/23/2020     INFLUENZA VACCINE (1) 09/01/2020     ALT  09/30/2020     CMP  09/30/2020     ANNUAL REVIEW OF HM ORDERS  09/30/2020       Preferred pharmacy:  "    MyChart:    Questionnaire Review :  {\"Lastly, it appears there are some questions your care team has for you.     If MyChart ACTIVE \"If you get a chance to do your questions on Dr. Zhart, your appointment will be much faster and smoother so feel free to sign in and go through those, otherwise please plan on arriving early so that you have time to complete them.\"   If MyChart INACTIVE \"Please plan on arriving early so that you have time to complete your questionnaires prior to seeing your provider.\"       Patient preferred phone number: [phone]    Halina Link Registered Nurse, [facility]   [phone]    "

## 2020-09-16 ENCOUNTER — OFFICE VISIT (OUTPATIENT)
Dept: FAMILY MEDICINE | Facility: CLINIC | Age: 85
End: 2020-09-16
Payer: MEDICARE

## 2020-09-16 VITALS
OXYGEN SATURATION: 98 % | RESPIRATION RATE: 16 BRPM | HEART RATE: 74 BPM | TEMPERATURE: 97.9 F | DIASTOLIC BLOOD PRESSURE: 82 MMHG | BODY MASS INDEX: 39.46 KG/M2 | WEIGHT: 275 LBS | SYSTOLIC BLOOD PRESSURE: 138 MMHG

## 2020-09-16 DIAGNOSIS — L71.9 ROSACEA: ICD-10-CM

## 2020-09-16 DIAGNOSIS — R79.89 ELEVATED SERUM CREATININE: ICD-10-CM

## 2020-09-16 DIAGNOSIS — I10 ESSENTIAL HYPERTENSION: ICD-10-CM

## 2020-09-16 DIAGNOSIS — M1A.9XX0 CHRONIC GOUT WITHOUT TOPHUS, UNSPECIFIED CAUSE, UNSPECIFIED SITE: ICD-10-CM

## 2020-09-16 DIAGNOSIS — Z87.19 HISTORY OF LOWER GI BLEEDING: ICD-10-CM

## 2020-09-16 DIAGNOSIS — S83.004S DISLOCATION OF RIGHT PATELLA, SEQUELA: Primary | ICD-10-CM

## 2020-09-16 DIAGNOSIS — R15.1 FECAL SMEARING: ICD-10-CM

## 2020-09-16 DIAGNOSIS — Z23 ENCOUNTER FOR IMMUNIZATION: ICD-10-CM

## 2020-09-16 DIAGNOSIS — E78.5 HYPERLIPIDEMIA LDL GOAL <160: ICD-10-CM

## 2020-09-16 DIAGNOSIS — R73.03 PRE-DIABETES: ICD-10-CM

## 2020-09-16 DIAGNOSIS — M20.42 HAMMER TOE OF LEFT FOOT: ICD-10-CM

## 2020-09-16 DIAGNOSIS — Z23 NEED FOR PROPHYLACTIC VACCINATION AND INOCULATION AGAINST INFLUENZA: ICD-10-CM

## 2020-09-16 DIAGNOSIS — L71.1 RHINOPHYMA: ICD-10-CM

## 2020-09-16 DIAGNOSIS — R60.0 PEDAL EDEMA: ICD-10-CM

## 2020-09-16 PROBLEM — S83.004A DISLOCATION OF RIGHT PATELLA: Status: ACTIVE | Noted: 2020-09-16

## 2020-09-16 PROBLEM — R15.9 FECAL INCONTINENCE: Status: ACTIVE | Noted: 2020-09-16

## 2020-09-16 LAB
ALBUMIN SERPL-MCNC: 3.9 G/DL (ref 3.4–5)
ALP SERPL-CCNC: 64 U/L (ref 40–150)
ALT SERPL W P-5'-P-CCNC: 38 U/L (ref 0–70)
ANION GAP SERPL CALCULATED.3IONS-SCNC: 5 MMOL/L (ref 3–14)
AST SERPL W P-5'-P-CCNC: 18 U/L (ref 0–45)
BASOPHILS # BLD AUTO: 0 10E9/L (ref 0–0.2)
BASOPHILS NFR BLD AUTO: 0.5 %
BILIRUB SERPL-MCNC: 1.2 MG/DL (ref 0.2–1.3)
BUN SERPL-MCNC: 16 MG/DL (ref 7–30)
CALCIUM SERPL-MCNC: 9.4 MG/DL (ref 8.5–10.1)
CHLORIDE SERPL-SCNC: 107 MMOL/L (ref 94–109)
CO2 SERPL-SCNC: 27 MMOL/L (ref 20–32)
CREAT SERPL-MCNC: 0.8 MG/DL (ref 0.66–1.25)
CREAT UR-MCNC: 197 MG/DL
DIFFERENTIAL METHOD BLD: NORMAL
EOSINOPHIL # BLD AUTO: 0.3 10E9/L (ref 0–0.7)
EOSINOPHIL NFR BLD AUTO: 4.3 %
ERYTHROCYTE [DISTWIDTH] IN BLOOD BY AUTOMATED COUNT: 13.9 % (ref 10–15)
GFR SERPL CREATININE-BSD FRML MDRD: 82 ML/MIN/{1.73_M2}
GLUCOSE SERPL-MCNC: 146 MG/DL (ref 70–99)
HBA1C MFR BLD: 5.9 % (ref 0–5.6)
HCT VFR BLD AUTO: 44.8 % (ref 40–53)
HGB BLD-MCNC: 15.1 G/DL (ref 13.3–17.7)
LYMPHOCYTES # BLD AUTO: 0.9 10E9/L (ref 0.8–5.3)
LYMPHOCYTES NFR BLD AUTO: 14.3 %
MCH RBC QN AUTO: 29.9 PG (ref 26.5–33)
MCHC RBC AUTO-ENTMCNC: 33.7 G/DL (ref 31.5–36.5)
MCV RBC AUTO: 89 FL (ref 78–100)
MICROALBUMIN UR-MCNC: 22 MG/L
MICROALBUMIN/CREAT UR: 11.17 MG/G CR (ref 0–17)
MONOCYTES # BLD AUTO: 0.7 10E9/L (ref 0–1.3)
MONOCYTES NFR BLD AUTO: 12.1 %
NEUTROPHILS # BLD AUTO: 4.1 10E9/L (ref 1.6–8.3)
NEUTROPHILS NFR BLD AUTO: 68.8 %
PLATELET # BLD AUTO: 177 10E9/L (ref 150–450)
POTASSIUM SERPL-SCNC: 4.3 MMOL/L (ref 3.4–5.3)
PROT SERPL-MCNC: 7.3 G/DL (ref 6.8–8.8)
RBC # BLD AUTO: 5.05 10E12/L (ref 4.4–5.9)
SODIUM SERPL-SCNC: 139 MMOL/L (ref 133–144)
URATE SERPL-MCNC: 5.9 MG/DL (ref 3.5–7.2)
WBC # BLD AUTO: 6 10E9/L (ref 4–11)

## 2020-09-16 PROCEDURE — 90471 IMMUNIZATION ADMIN: CPT | Performed by: FAMILY MEDICINE

## 2020-09-16 PROCEDURE — 80053 COMPREHEN METABOLIC PANEL: CPT | Performed by: FAMILY MEDICINE

## 2020-09-16 PROCEDURE — 83036 HEMOGLOBIN GLYCOSYLATED A1C: CPT | Performed by: FAMILY MEDICINE

## 2020-09-16 PROCEDURE — 82043 UR ALBUMIN QUANTITATIVE: CPT | Performed by: FAMILY MEDICINE

## 2020-09-16 PROCEDURE — 90715 TDAP VACCINE 7 YRS/> IM: CPT | Performed by: FAMILY MEDICINE

## 2020-09-16 PROCEDURE — G0008 ADMIN INFLUENZA VIRUS VAC: HCPCS | Mod: 59 | Performed by: FAMILY MEDICINE

## 2020-09-16 PROCEDURE — 84550 ASSAY OF BLOOD/URIC ACID: CPT | Performed by: FAMILY MEDICINE

## 2020-09-16 PROCEDURE — 99214 OFFICE O/P EST MOD 30 MIN: CPT | Mod: 25 | Performed by: FAMILY MEDICINE

## 2020-09-16 PROCEDURE — 85025 COMPLETE CBC W/AUTO DIFF WBC: CPT | Performed by: FAMILY MEDICINE

## 2020-09-16 PROCEDURE — 36415 COLL VENOUS BLD VENIPUNCTURE: CPT | Performed by: FAMILY MEDICINE

## 2020-09-16 PROCEDURE — 90662 IIV NO PRSV INCREASED AG IM: CPT | Performed by: FAMILY MEDICINE

## 2020-09-16 RX ORDER — ATORVASTATIN CALCIUM 40 MG/1
40 TABLET, FILM COATED ORAL AT BEDTIME
Qty: 90 TABLET | Refills: 3 | Status: SHIPPED | OUTPATIENT
Start: 2020-09-16 | End: 2022-03-29

## 2020-09-16 RX ORDER — CLINDAMYCIN PHOSPHATE 11.9 MG/ML
SOLUTION TOPICAL 2 TIMES DAILY
Qty: 60 ML | Refills: 11 | Status: SHIPPED | OUTPATIENT
Start: 2020-09-16 | End: 2023-06-27

## 2020-09-16 NOTE — ASSESSMENT & PLAN NOTE
He is starting to have fecal soiling.  We discussed differential.  He will start by taking fiber which he has taken before

## 2020-09-16 NOTE — ASSESSMENT & PLAN NOTE
Patient wanted to talk about his pedal edema which is treated with diuretics as well as elevation and socks.  However today he has no edema

## 2020-09-16 NOTE — PROGRESS NOTES
"Subjective     Damien Vallecillo is a 84 year old male who presents to clinic today for the following health issues:    HPI       Musculoskeletal problem/pain  Onset/Duration: 2 weeks ago   Description  Location: Leg- right  Joint Swelling: YES  Redness: no  Pain: YES  Warmth: no  Intensity:  mild,   Progression of Symptoms:  worsening  Accompanying signs and symptoms:   Fevers: no  Numbness/tingling/weakness: no  History  Trauma to the area: YES Yes fell of curb   Recent illness:  no  Previous similar problem: YES  Previous evaluation:  YES Ortho doctor   Precipitating or alleviating factors:  Aggravating factors include: walking and getting up makes a worse   Therapies tried and outcome: Ibuprofen he was advised to take Tylenol but that was ineffective.  The risks of ibuprofen discussed    Numerous other issues to address delineated below  Past Medical History:   Diagnosis Date     Acute suppurative arthritis (H)      Acute, but ill-defined, cerebrovascular disease      Arthritis      Cheilosis 9/28/2018     Chronic headaches      Diabetes (H)     \"Pre-diabetes\"     Dislocation of right patella 9/16/2020     Elevated serum creatinine 11/13/2019    GFR Estimate Date Value Ref Range Status 09/30/2019 84 >60 mL/min/[1.73_m2] Final   Comment:   Non  GFR Calc Starting 12/18/2018, serum creatinine based estimated GFR (eGFR) will be  calculated using the Chronic Kidney Disease Epidemiology Collaboration  (CKD-EPI) equation.         Fecal incontinence 9/16/2020     Glucose intolerance (impaired glucose tolerance) 5/31/2013     Gout, unspecified      Hammer toe of left foot 10/21/2016     Hematuria      History of arthroplasty of right knee 08/17/2017     History of blood transfusion      Hyperlipidemia LDL goal <160 11/13/2019     Left knee pain, unspecified chronicity 5/8/2017     Lentigo maligna (H)      Malignant neoplasm of rectosigmoid junction (H)      Morbid obesity due to excess calories (H) 10/21/2016 "     Other convulsions     last seizure 7-8 years ago...not certain if these were true seizres or not..     Pedal edema 1/18/2018     Peripheral polyneuropathy 9/30/2019     Pre-diabetes 10/21/2016     RBC microcytosis 2/14/2017     Rhinophyma 9/28/2018     Right knee pain, unspecified chronicity 5/8/2017     Rosacea 9/28/2018     Syncope      Tubulovillous adenoma of colon      Venous stasis dermatitis of both lower extremities 10/21/2016       Past Surgical History:   Procedure Laterality Date     ARTHROPLASTY KNEE Right 6/19/2017    Procedure: ARTHROPLASTY KNEE;  Right total knee arthroplasty, Hammer toe repair toe 2,3,4,5 left foot with osteotomy;  Surgeon: Alec Raymond MD;  Location:  OR     C NONSPECIFIC PROCEDURE      right heel surgery; spur removed.     COLONOSCOPY N/A 6/23/2015    Procedure: COMBINED COLONOSCOPY, SINGLE OR MULTIPLE BIOPSY/POLYPECTOMY BY BIOPSY;  Surgeon: Kimberly Alfaro MD;  Location:  GI     COLONOSCOPY N/A 7/30/2015    Procedure: COLONOSCOPY;  Surgeon: Enrique Salazar MD;  Location:  OR     COLONOSCOPY N/A 7/31/2018    Procedure: COLONOSCOPY;  Colonoscopy;  Surgeon: Tiffany Cheema MD;  Location:  GI     HERNIORRHAPHY EPIGASTRIC  4/27/2012    Procedure:HERNIORRHAPHY EPIGASTRIC; Epigastric Hernia Repair with mesh ; Surgeon:BOLIVAR OLIVEIRA; Location: OR     LAPAROSCOPIC ASSISTED COLECTOMY Right 7/30/2015    Procedure: LAPAROSCOPIC ASSISTED COLECTOMY;  Surgeon: Enrique Salazar MD;  Location:  OR     ORTHOPEDIC SURGERY      lt knee arthroplasty     REALIGN PATELLA Right 10/9/2017    Procedure: REALIGN PATELLA;  Revision right total knee arthroplasty;  Surgeon: Alec Raymond MD;  Location:  OR     REPAIR HAMMER TOE Left 6/19/2017    Procedure: REPAIR HAMMER TOE;  Hammer toe repair toe 2,3,4,5 left foot with osteotomy   ;  Surgeon: Beverly Kim DPM, Podiatry/Foot and Ankle Surgery;  Location:  OR     SIGMOIDOSCOPY FLEXIBLE  5/29/2013     Procedure: SIGMOIDOSCOPY FLEXIBLE;  SIGMOIDOSCOPY FLEXIBLE (FV) Needs blood sugar ck'd;  Surgeon: Enrique Salazar MD;  Location: [facility] GI     skin cancer excision         Family History   Problem Relation Age of Onset     Cancer - colorectal Mother      Cerebrovascular Disease Father        Social History     Tobacco Use     Smoking status: Never Smoker     Smokeless tobacco: Never Used   Substance Use Topics     Alcohol use: Yes     Alcohol/week: 0.0 standard drinks     Comment: Occas         Review of Systems     No chest pain dyspnea fevers      Objective    There were no vitals taken for this visit.  There is no height or weight on file to calculate BMI.   /82 (BP Location: Right arm, Patient Position: Chair, Cuff Size: Adult Large)   Pulse 74   Temp 97.9  F (36.6  C) (Oral)   Resp 16   Wt 124.7 kg (275 lb)   SpO2 98%   BMI 39.46 kg/m      Physical Exam   No current edema.  His face is red mild he is having some discomfort about his right hip in the buttocks in the groin            Assessment & Plan   Problem List Items Addressed This Visit        Digestive    Fecal incontinence     He is starting to have fecal soiling.  We discussed differential.  He will start by taking fiber which he has taken before            Endocrine    Gout     Quiesced and.  We would like uric acid below 5         Relevant Orders    Uric acid (Completed)    Pre-diabetes     Periodic A1c         Relevant Orders    Hemoglobin A1c (Completed)    Hyperlipidemia LDL goal <160     Discussed cessation of statin therapy.  Tolerated.  Continue         Relevant Medications    atorvastatin (LIPITOR) 40 MG tablet    Other Relevant Orders    COMPREHENSIVE METABOLIC PANEL (Completed)       Musculoskeletal and Integumentary    Hammer toe of left foot     He is told there is nothing to be done         Pedal edema     Patient wanted to talk about his pedal edema which is treated with diuretics as well as elevation and socks.  However today he  has no edema         Rosacea     [facility]         Relevant Medications    clindamycin (CLEOCIN T) 1 % external solution    Dislocation of right patella - Primary     He has had 2 operations, and it has dislocated laterally.  He has seen 3 orthopedist who told him they can do very little for him.  He would like referral to [place]         Relevant Orders    Orthopedic & Spine  Referral       Other    History of lower GI bleeding     Now remote.  Assess         Relevant Orders    CBC with platelets and differential (Completed)    Rhinophyma     [facility] topical steroid         Relevant Medications    clindamycin (CLEOCIN T) 1 % external solution    Elevated serum creatinine     Not CKD         Relevant Orders    Albumin Random Urine Quantitative with Creat Ratio (Completed)      Other Visit Diagnoses     Essential hypertension        Relevant Orders    COMPREHENSIVE METABOLIC PANEL (Completed)    Encounter for immunization        Relevant Orders    VACCINE ADMINISTRATION, INITIAL (Completed)    ADMIN INFLUENZA VIRUS VAC (MEDICARE) (Completed)    TDAP VACCINE (ADACEL) (Completed)    Need for prophylactic vaccination and inoculation against influenza        Relevant Orders    FLUZONE HIGH DOSE 65+  [30868] (Completed)           His hip pain suggest both spinal and hip joint origins.  We shall try Voltaren gel for all of his musculoskeletal complaints        Return for recheck.    Obi Strange MD  [facility]

## 2020-09-16 NOTE — ASSESSMENT & PLAN NOTE
He has had 2 operations, and it has dislocated laterally.  He has seen 3 orthopedist who told him they can do very little for him.  He would like referral to [place]

## 2020-09-17 ENCOUNTER — TELEPHONE (OUTPATIENT)
Dept: FAMILY MEDICINE | Facility: CLINIC | Age: 85
End: 2020-09-17

## 2020-09-17 NOTE — TELEPHONE ENCOUNTER
Attempted calling patient.  Not able to leave a message due to mailbox not set up:  -Tests are all real good     Lisa Magill, CMA

## 2021-02-05 ENCOUNTER — IMMUNIZATION (OUTPATIENT)
Dept: NURSING | Facility: CLINIC | Age: 86
End: 2021-02-05
Payer: MEDICARE

## 2021-02-05 PROCEDURE — 0001A PR COVID VAC PFIZER DIL RECON 30 MCG/0.3 ML IM: CPT

## 2021-02-05 PROCEDURE — 91300 PR COVID VAC PFIZER DIL RECON 30 MCG/0.3 ML IM: CPT

## 2021-02-26 ENCOUNTER — IMMUNIZATION (OUTPATIENT)
Dept: NURSING | Facility: CLINIC | Age: 86
End: 2021-02-26
Attending: INTERNAL MEDICINE
Payer: MEDICARE

## 2021-02-26 PROCEDURE — 0002A PR COVID VAC PFIZER DIL RECON 30 MCG/0.3 ML IM: CPT

## 2021-02-26 PROCEDURE — 91300 PR COVID VAC PFIZER DIL RECON 30 MCG/0.3 ML IM: CPT

## 2021-05-03 ENCOUNTER — NURSE TRIAGE (OUTPATIENT)
Dept: NURSING | Facility: CLINIC | Age: 86
End: 2021-05-03

## 2021-05-03 NOTE — TELEPHONE ENCOUNTER
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 They need a prescription of amoxicillin antibiotics because he's had both knees replaced. Pharmacy:  [facility] pharmacy. Please call Alejandrina, his wife, to notify her of the status of this request:  [phone].  Thank you,  Veronica Medellin, RN  [facility]    Reason for Disposition    Request for URGENT new prescription or refill of 'essential' medication (i.e., likelihood of harm to patient if not taken) and triager unable to fill per department policy    Additional Information    Negative: Drug overdose and triager unable to answer question    Negative: Caller requesting information unrelated to medicine    Negative: Caller requesting a prescription for Strep throat and has a positive culture result    Negative: Rash while taking a medication or within 3 days of stopping it    Negative: Immunization reaction suspected    Negative: Asthma and having symptoms of asthma (cough, wheezing, etc.)    Negative: Breastfeeding questions about mother's medicines and diet    Negative: MORE THAN A DOUBLE DOSE of a prescription or over-the-counter (OTC) drug    Negative: DOUBLE DOSE (an extra dose or lesser amount) of over-the-counter (OTC) drug and any symptoms (e.g., dizziness, nausea, pain, sleepiness)    Negative: DOUBLE DOSE (an extra dose or lesser amount) of prescription drug and any symptoms (e.g., dizziness, nausea, pain, sleepiness)    Negative: Took another person's prescription drug    Negative: DOUBLE DOSE (an extra dose or lesser amount) of prescription drug and NO symptoms (Exception: a double dose of antibiotics)    Negative: Diabetes drug error or overdose (e.g., took wrong type of insulin or took extra dose)    Negative: Caller has medication question about med not prescribed by PCP and triager unable to answer question (e.g., compatibility with other med, storage)    Protocols used: MEDICATION QUESTION CALL-A-OH

## 2021-05-06 ENCOUNTER — TELEPHONE (OUTPATIENT)
Dept: FAMILY MEDICINE | Facility: CLINIC | Age: 86
End: 2021-05-06

## 2021-05-06 DIAGNOSIS — Z96.651 HISTORY OF ARTHROPLASTY OF RIGHT KNEE: Primary | ICD-10-CM

## 2021-05-06 RX ORDER — AMOXICILLIN AND CLAVULANATE POTASSIUM 500; 125 MG/1; MG/1
TABLET, FILM COATED ORAL
Qty: 4 TABLET | Refills: 0 | Status: SHIPPED | OUTPATIENT
Start: 2021-05-06 | End: 2022-03-29

## 2021-05-06 NOTE — TELEPHONE ENCOUNTER
Nurse Triage  Open     5/3/2021  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 They need a prescription of amoxicillin antibiotics because he's had both knees replaced. Pharmacy:  [facility] pharmacy. Please call Alejandrina, his wife, to notify her of the status of this request:  [phone].  Thank you,  Veronica Medellin RN  [facility]     Reason for Disposition    Request for URGENT new prescription or refill of 'essential' medication (i.e., likelihood of harm to patient if not taken) and triager unable to fill per department policy    Additional Information    Negative: Drug overdose and triager unable to answer question    Negative: Caller requesting information unrelated to medicine    Negative: Caller requesting a prescription for Strep throat and has a positive culture result    Negative: Rash while taking a medication or within 3 days of stopping it    Negative: Immunization reaction suspected    Negative: Asthma and having symptoms of asthma (cough, wheezing, etc.)    Negative: Breastfeeding questions about mother's medicines and diet    Negative: MORE THAN A DOUBLE DOSE of a prescription or over-the-counter (OTC) drug    Negative: DOUBLE DOSE (an extra dose or lesser amount) of over-the-counter (OTC) drug and any symptoms (e.g., dizziness, nausea, pain, sleepiness)    Negative: DOUBLE DOSE (an extra dose or lesser amount) of prescription drug and any symptoms (e.g., dizziness, nausea, pain, sleepiness)    Negative: Took another person's prescription drug    Negative: DOUBLE DOSE (an extra dose or lesser amount) of prescription drug and NO symptoms  (Exception: a double dose of antibiotics)    Negative: Diabetes drug error or overdose (e.g., took wrong type of insulin or took extra dose)    Negative: Caller has medication question about med not prescribed by PCP and triager unable to answer question (e.g., compatibility with other med, storage)    Protocols used: MEDICATION QUESTION CALL-A-OH                     5/3/21 1:14 PM    GEOFF ADAMS (Emergency Contact) contacted Veronica Medellin RN    This encounter is not signed. The conversation may still be ongoing.  Contract     Primary Care Clinics   Additional Documentation    Care Advice:    Patient/Caregiver will follow care advice?: Other, see documentation     CALL BACK IF:  ,   DISCUSS with PCP and CALLBACK by NURSE WITHIN 1 HO...      Protocols Used:    Medication Question Call-A-OH      Encounter Info:    Billing Info,   History,   Allergies,   Detailed Report      SmartForms     [facility] Pharmacy Call Tracking   Orders Placed     None  Medication Renewals and Changes       None     Medication List   Visit Diagnoses       None     Problem List

## 2021-09-16 ENCOUNTER — OFFICE VISIT (OUTPATIENT)
Dept: FAMILY MEDICINE | Facility: CLINIC | Age: 86
End: 2021-09-16
Payer: MEDICARE

## 2021-09-16 VITALS
BODY MASS INDEX: 39.52 KG/M2 | TEMPERATURE: 97.9 F | WEIGHT: 275.44 LBS | SYSTOLIC BLOOD PRESSURE: 126 MMHG | HEART RATE: 83 BPM | DIASTOLIC BLOOD PRESSURE: 78 MMHG | OXYGEN SATURATION: 98 %

## 2021-09-16 DIAGNOSIS — L71.1 RHINOPHYMA: ICD-10-CM

## 2021-09-16 DIAGNOSIS — R79.89 ELEVATED SERUM CREATININE: ICD-10-CM

## 2021-09-16 DIAGNOSIS — R45.3 DEMORALIZATION AND APATHY: ICD-10-CM

## 2021-09-16 DIAGNOSIS — R79.9 ABNORMAL FINDING OF BLOOD CHEMISTRY, UNSPECIFIED: ICD-10-CM

## 2021-09-16 DIAGNOSIS — R15.9 INCONTINENCE OF FECES, UNSPECIFIED FECAL INCONTINENCE TYPE: ICD-10-CM

## 2021-09-16 DIAGNOSIS — R29.6 FALLS FREQUENTLY: ICD-10-CM

## 2021-09-16 DIAGNOSIS — G62.9 PERIPHERAL POLYNEUROPATHY: ICD-10-CM

## 2021-09-16 DIAGNOSIS — Z12.5 SPECIAL SCREENING FOR MALIGNANT NEOPLASM OF PROSTATE: ICD-10-CM

## 2021-09-16 DIAGNOSIS — G40.209 PARTIAL SYMPTOMATIC EPILEPSY WITH COMPLEX PARTIAL SEIZURES, NOT INTRACTABLE, WITHOUT STATUS EPILEPTICUS (H): ICD-10-CM

## 2021-09-16 DIAGNOSIS — E66.01 MORBID OBESITY DUE TO EXCESS CALORIES (H): ICD-10-CM

## 2021-09-16 DIAGNOSIS — Z00.00 ENCOUNTER FOR MEDICARE ANNUAL WELLNESS EXAM: Primary | ICD-10-CM

## 2021-09-16 DIAGNOSIS — E79.0 HYPERURICEMIA: ICD-10-CM

## 2021-09-16 DIAGNOSIS — R73.03 PREDIABETES: ICD-10-CM

## 2021-09-16 DIAGNOSIS — F43.21 GRIEVING: ICD-10-CM

## 2021-09-16 DIAGNOSIS — R31.9 HEMATURIA, UNSPECIFIED TYPE: ICD-10-CM

## 2021-09-16 DIAGNOSIS — R60.0 PEDAL EDEMA: ICD-10-CM

## 2021-09-16 DIAGNOSIS — R41.840 COGNITIVE ATTENTION DEFICIT: ICD-10-CM

## 2021-09-16 DIAGNOSIS — E78.5 HYPERLIPIDEMIA LDL GOAL <160: ICD-10-CM

## 2021-09-16 LAB
ALBUMIN UR-MCNC: NEGATIVE MG/DL
APPEARANCE UR: CLEAR
BILIRUB UR QL STRIP: NEGATIVE
COLOR UR AUTO: YELLOW
GLUCOSE UR STRIP-MCNC: NEGATIVE MG/DL
HBA1C MFR BLD: 6.4 % (ref 0–5.6)
HGB UR QL STRIP: NEGATIVE
KETONES UR STRIP-MCNC: NEGATIVE MG/DL
LEUKOCYTE ESTERASE UR QL STRIP: NEGATIVE
NITRATE UR QL: NEGATIVE
PH UR STRIP: 7 [PH] (ref 5–7)
SP GR UR STRIP: 1.02 (ref 1–1.03)
UROBILINOGEN UR STRIP-ACNC: 0.2 E.U./DL

## 2021-09-16 PROCEDURE — 80061 LIPID PANEL: CPT | Performed by: FAMILY MEDICINE

## 2021-09-16 PROCEDURE — 82043 UR ALBUMIN QUANTITATIVE: CPT | Performed by: FAMILY MEDICINE

## 2021-09-16 PROCEDURE — G0103 PSA SCREENING: HCPCS | Performed by: FAMILY MEDICINE

## 2021-09-16 PROCEDURE — G0439 PPPS, SUBSEQ VISIT: HCPCS | Performed by: FAMILY MEDICINE

## 2021-09-16 PROCEDURE — 90662 IIV NO PRSV INCREASED AG IM: CPT | Performed by: FAMILY MEDICINE

## 2021-09-16 PROCEDURE — 80053 COMPREHEN METABOLIC PANEL: CPT | Performed by: FAMILY MEDICINE

## 2021-09-16 PROCEDURE — 99214 OFFICE O/P EST MOD 30 MIN: CPT | Mod: 25 | Performed by: FAMILY MEDICINE

## 2021-09-16 PROCEDURE — G0008 ADMIN INFLUENZA VIRUS VAC: HCPCS | Performed by: FAMILY MEDICINE

## 2021-09-16 PROCEDURE — 84550 ASSAY OF BLOOD/URIC ACID: CPT | Performed by: FAMILY MEDICINE

## 2021-09-16 PROCEDURE — 36415 COLL VENOUS BLD VENIPUNCTURE: CPT | Performed by: FAMILY MEDICINE

## 2021-09-16 PROCEDURE — 81003 URINALYSIS AUTO W/O SCOPE: CPT | Performed by: FAMILY MEDICINE

## 2021-09-16 PROCEDURE — 83036 HEMOGLOBIN GLYCOSYLATED A1C: CPT | Performed by: FAMILY MEDICINE

## 2021-09-16 RX ORDER — DOXYCYCLINE HYCLATE 20 MG
20 TABLET ORAL 2 TIMES DAILY
Status: ON HOLD | COMMUNITY
Start: 2021-09-16 | End: 2023-06-16

## 2021-09-16 NOTE — ASSESSMENT & PLAN NOTE
Patient reports small amounts of visible blood in his urine a few episodes in the last month or so.  Painless.  Broaden database

## 2021-09-16 NOTE — PATIENT INSTRUCTIONS
Patient Education   Personalized Prevention Plan  You are due for the preventive services outlined below.  Your care team is available to assist you in scheduling these services.  If you have already completed any of these items, please share that information with your care team to update in your medical record.  Health Maintenance Due   Topic Date Due     Annual Wellness Visit  10/21/2017     PHQ-2  01/01/2021     Basic Metabolic Panel  03/16/2021     Flu Vaccine (1) 09/01/2021     A1C Lab  09/16/2021     Liver Monitoring Lab  09/16/2021     Comprehensive Metabolic Panel  09/16/2021     Kidney Microalbumin Urine Test  09/16/2021     ANNUAL REVIEW OF HM ORDERS  09/16/2021     FALL RISK ASSESSMENT  09/16/2021     Uric Acid Levels  09/16/2021        Patient Education   Personalized Prevention Plan  You are due for the preventive services outlined below.  Your care team is available to assist you in scheduling these services.  If you have already completed any of these items, please share that information with your care team to update in your medical record.  Health Maintenance Due   Topic Date Due     Annual Wellness Visit  10/21/2017     PHQ-2  01/01/2021     Basic Metabolic Panel  03/16/2021     Flu Vaccine (1) 09/01/2021     A1C Lab  09/16/2021     Liver Monitoring Lab  09/16/2021     Comprehensive Metabolic Panel  09/16/2021     Kidney Microalbumin Urine Test  09/16/2021     ANNUAL REVIEW OF HM ORDERS  09/16/2021     FALL RISK ASSESSMENT  09/16/2021     Uric Acid Levels  09/16/2021        Patient Education   Personalized Prevention Plan  You are due for the preventive services outlined below.  Your care team is available to assist you in scheduling these services.  If you have already completed any of these items, please share that information with your care team to update in your medical record.  Health Maintenance Due   Topic Date Due     PHQ-2  01/01/2021     Basic Metabolic Panel  03/16/2021     Flu Vaccine  (1) 09/01/2021     A1C Lab  09/16/2021     Liver Monitoring Lab  09/16/2021     Comprehensive Metabolic Panel  09/16/2021     Kidney Microalbumin Urine Test  09/16/2021     ANNUAL REVIEW OF HM ORDERS  09/16/2021     FALL RISK ASSESSMENT  09/16/2021     Uric Acid Levels  09/16/2021      Tucks or similar  Water only

## 2021-09-16 NOTE — LETTER
September 20, 2021      Damien Vallecillo  [address]  [facility] [address]        Dear ,    We are writing to inform you of your test results. Tests are all real good!       Resulted Orders   Comprehensive metabolic panel (BMP + Alb, Alk Phos, ALT, AST, Total. Bili, TP)   Result Value Ref Range    Sodium 138 133 - 144 mmol/L    Potassium 4.8 3.4 - 5.3 mmol/L    Chloride 106 94 - 109 mmol/L    Carbon Dioxide (CO2) 27 20 - 32 mmol/L    Anion Gap 5 3 - 14 mmol/L    Urea Nitrogen 16 7 - 30 mg/dL    Creatinine 0.87 0.66 - 1.25 mg/dL    Calcium 9.3 8.5 - 10.1 mg/dL    Glucose 115 (H) 70 - 99 mg/dL    Alkaline Phosphatase 46 40 - 150 U/L    AST 30 0 - 45 U/L    ALT 52 0 - 70 U/L    Protein Total 7.1 6.8 - 8.8 g/dL    Albumin 3.9 3.4 - 5.0 g/dL    Bilirubin Total 1.3 0.2 - 1.3 mg/dL    GFR Estimate 79 >60 mL/min/1.73m2      Comment:      As of July 11, 2021, eGFR is calculated by the CKD-EPI creatinine equation, without race adjustment. eGFR can be influenced by muscle mass, exercise, and diet. The reported eGFR is an estimation only and is only applicable if the renal function is stable.   Hemoglobin A1c   Result Value Ref Range    Hemoglobin A1C 6.4 (H) 0.0 - 5.6 %      Comment:      Normal <5.7%   Prediabetes 5.7-6.4%    Diabetes 6.5% or higher     Note: Adopted from ADA consensus guidelines.   Albumin Random Urine Quantitative with Creat Ratio   Result Value Ref Range    Creatinine Urine mg/dL 170 mg/dL    Albumin Urine mg/L 18 mg/L    Albumin Urine mg/g Cr 10.59 0.00 - 17.00 mg/g Cr   Uric acid   Result Value Ref Range    Uric Acid 6.6 3.5 - 7.2 mg/dL   Lipid panel reflex to direct LDL Non-fasting   Result Value Ref Range    Cholesterol 124 <200 mg/dL    Triglycerides 88 <150 mg/dL    Direct Measure HDL 75 >=40 mg/dL    LDL Cholesterol Calculated 31 <=100 mg/dL    Non HDL Cholesterol 49 <130 mg/dL    Patient Fasting > 8hrs? Yes     Narrative    Cholesterol  Desirable:  <200  mg/dL    Triglycerides  Normal:  Less than 150 mg/dL  Borderline High:  150-199 mg/dL  High:  200-499 mg/dL  Very High:  Greater than or equal to 500 mg/dL    Direct Measure HDL  Female:  Greater than or equal to 50 mg/dL   Male:  Greater than or equal to 40 mg/dL    LDL Cholesterol  Desirable:  <100mg/dL  Above Desirable:  100-129 mg/dL   Borderline High:  130-159 mg/dL   High:  160-189 mg/dL   Very High:  >= 190 mg/dL    Non HDL Cholesterol  Desirable:  130 mg/dL  Above Desirable:  130-159 mg/dL  Borderline High:  160-189 mg/dL  High:  190-219 mg/dL  Very High:  Greater than or equal to 220 mg/dL   PSA, screen   Result Value Ref Range    Prostate Specific Antigen Screen 1.78 0.00 - 4.00 ug/L   UA Macro with Reflex to Micro and Culture - lab collect   Result Value Ref Range    Color Urine Yellow Colorless, Straw, Light Yellow, Yellow    Appearance Urine Clear Clear    Glucose Urine Negative Negative mg/dL    Bilirubin Urine Negative Negative    Ketones Urine Negative Negative mg/dL    Specific Gravity Urine 1.020 1.003 - 1.035    Blood Urine Negative Negative    pH Urine 7.0 5.0 - 7.0    Protein Albumin Urine Negative Negative mg/dL    Urobilinogen Urine 0.2 0.2, 1.0 E.U./dL    Nitrite Urine Negative Negative    Leukocyte Esterase Urine Negative Negative    Narrative    Microscopic not indicated       If you have any questions or concerns, please call the clinic at the number listed above.       Sincerely,      Obi Strange MD

## 2021-09-16 NOTE — ASSESSMENT & PLAN NOTE
Uses a cane.  Stairs in the house.  Would like a prescription for a power stair lift.  He has had 2 falls with no injury in the last year.  Assessment of his strength shows asymmetric but bilateral leg weakness.  Neuropathy continues as well.  At this time, he is not interested in further formal evaluation nor therapeutic interventions

## 2021-09-16 NOTE — ASSESSMENT & PLAN NOTE
[facility]  Although he has a list of medications, he is not familiar with his medications.  Numerous stressors.  Suspect depressive component.  Serologic review

## 2021-09-16 NOTE — ASSESSMENT & PLAN NOTE
Unchanged.  He washes his intergluteal tissue with soap and applies lotion liberally.  Discussed alternate care avoidance of soaps the use of regular wipes

## 2021-09-16 NOTE — ASSESSMENT & PLAN NOTE
"This year 1 son , another hospitalized with GI bleed, additional losses due to other illness.  He reports he is \"doing okay\" and declines interventions.  Monitor  "

## 2021-09-16 NOTE — PROGRESS NOTES
"  SUBJECTIVE:   Damien Vallecillo is a 85 year old male who presents for Preventive Visit.      Patient has been advised of split billing requirements and indicates understanding: Yes  Are you in the first 12 months of your Medicare Part B coverage?  No    Physical Health:    In general, how would you rate your overall physical health? fair    Outside of work, how many days during the week do you exercise? 6-7 days/week    Outside of work, approximately how many minutes a day do you exercise?30-45 minutes    If you drink alcohol do you typically have >3 drinks per day or >7 drinks per week? No    Do you usually eat at least 4 servings of fruit and vegetables a day, include whole grains & fiber and avoid regularly eating high fat or \"junk\" foods? Yes    Do you have any problems taking medications regularly?  No    Do you have any side effects from medications? none    Needs assistance for the following daily activities: no assistance needed    Which of the following safety concerns are present in your home?  none identified     Hearing impairment: No    In the past 6 months, have you been bothered by leaking of urine? No leaking of urine pt sometimes pt has noticed a little bit of blood in his urine     Mental Health:    In general, how would you rate your overall mental or emotional health? good  PHQ-2 Score:    He is accompanied by his spouse  Do you feel safe in your environment? Yes    Have you ever done Advance Care Planning? (For example, a Health Directive, POLST, or a discussion with a medical provider or your loved ones about your wishes): No, advance care planning information given to patient to review.  Patient plans to discuss their wishes with loved ones or provider.      Additional concerns to address?  No    Fall risk:  Fallen 2 or more times in the past year?: No  Any fall with injury in the past year?: No    Cognitive Screenin) Repeat 3 items (Leader, Season, Table)    2) Clock draw: NORMAL  3) 3 " item recall: Recalls 3 objects  Results: 3 items recalled: COGNITIVE IMPAIRMENT LESS LIKELY    Mini-CogTM Copyright S [employer]. Licensed by the author for use in [facility]; reprinted with permission (edgar@.[facility]). All rights reserved.      Do you have sleep apnea, excessive snoring or daytime drowsiness?: no            Reviewed and updated as needed this visit by clinical staff  Tobacco  Allergies  Meds   Med Hx  Surg Hx  Fam Hx  Soc Hx        Reviewed and updated as needed this visit by Provider    Meds             Social History     Tobacco Use     Smoking status: Never Smoker     Smokeless tobacco: Never Used   Substance Use Topics     Alcohol use: Yes     Alcohol/week: 0.0 standard drinks     Comment: Occas                           Current providers sharing in care for this patient include:   Patient Care Team:  Obi Strange MD as PCP - General (Family Practice)  Obi Strange MD as Assigned PCP  Alec Raymond MD as MD (Orthopedics)  Beverly Kim DPM, Podiatry/Foot and Ankle Surgery (Podiatry)    The following health maintenance items are reviewed in Epic and correct as of today:  Health Maintenance   Topic Date Due     BMP  03/16/2021     ALT  09/16/2021     CMP  09/16/2021     MICROALBUMIN  09/16/2021     FALL RISK ASSESSMENT  09/16/2021     URIC ACID  09/16/2021     MEDICARE ANNUAL WELLNESS VISIT  09/16/2022     A1C  09/16/2022     ANNUAL REVIEW OF HM ORDERS  09/16/2022     COLORECTAL CANCER SCREENING  07/31/2023     ADVANCE CARE PLANNING  09/16/2026     DTAP/TDAP/TD IMMUNIZATION (5 - Td or Tdap) 09/16/2030     PHQ-2  Completed     INFLUENZA VACCINE  Completed     Pneumococcal Vaccine: 65+ Years  Completed     COVID-19 Vaccine  Completed     ZOSTER IMMUNIZATION  Addressed     IPV IMMUNIZATION  Aged Out     MENINGITIS IMMUNIZATION  Aged Out     LIPID  Discontinued     DIABETIC FOOT EXAM  Discontinued     EYE EXAM  Discontinued           ROS:  Constitutional, HEENT,  "cardiovascular, pulmonary, gi and gu systems are negative, except as otherwise noted.    OBJECTIVE:   /78 (BP Location: Right arm, Patient Position: Chair, Cuff Size: Adult Regular)   Pulse 83   Temp 97.9  F (36.6  C) (Oral)   Wt 124.9 kg (275 lb 7 oz)   SpO2 98%   BMI 39.52 kg/m   Estimated body mass index is 39.52 kg/m  as calculated from the following:    Height as of 19: 1.778 m (5' 10\").    Weight as of this encounter: 124.9 kg (275 lb 7 oz).  EXAM:   Affect flat  Ears clear  Neck without nodes or thyromegaly  Chest clear  Regular rate and rhythm without murmur abdomen obese soft    Extremities with trace edema compression hose below the knee bilaterally        ASSESSMENT / PLAN:     Problem List Items Addressed This Visit     RESOLVED: Abnormal finding of blood chemistry, unspecified     Cognitive attention deficit     [facility]  Although he has a list of medications, he is not familiar with his medications.  Numerous stressors.  Suspect depressive component.  Serologic review         Relevant Orders    Vitamin B12    Folate    Elevated serum creatinine     Not CKD         Falls frequently     Uses a cane.  Stairs in the house.  Would like a prescription for a power stair lift.  He has had 2 falls with no injury in the last year.  Assessment of his strength shows asymmetric but bilateral leg weakness.  Neuropathy continues as well.  At this time, he is not interested in further formal evaluation nor therapeutic interventions         Relevant Orders    Miscellaneous Order for DME - ONLY FOR DME    Fecal incontinence     Unchanged.  He washes his intergluteal tissue with soap and applies lotion liberally.  Discussed alternate care avoidance of soaps the use of regular wipes         Grieving     This year 1 son , another hospitalized with GI bleed, additional losses due to other illness.  He reports he is \"doing okay\" and declines interventions. " " Monitor         Relevant Orders    Vitamin B12    Folate    Hematuria, unspecified type     Patient reports small amounts of visible blood in his urine a few episodes in the last month or so.  Painless.  Broaden database         Relevant Orders    UA Macro with Reflex to Micro and Culture - lab collect (Completed)    Hyperlipidemia LDL goal <160     Statin therapy tolerated.  Continue         Relevant Orders    Comprehensive metabolic panel (BMP + Alb, Alk Phos, ALT, AST, Total. Bili, TP)    Hemoglobin A1c (Completed)    Albumin Random Urine Quantitative with Creat Ratio    Hyperuricemia     Once, a few years ago.  Normal since.  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  No therapies         Pedal edema     He wears knee-high compression hose, today both legs         Peripheral polyneuropathy     Untreated.  Painless.  Contributes to falls         Prediabetes    Relevant Orders    Hemoglobin A1c (Completed)    Rhinophyma     Treated with topical clindamycin         Special screening for malignant neoplasm of prostate     Broaden database         Relevant Orders    PSA, screen      Other Visit Diagnoses     Encounter for Medicare annual wellness exam    -  Primary    Demoralization and apathy         Relevant Orders    TSH with free T4 reflex          Patient has been advised of split billing requirements and indicates understanding: Yes    COUNSELING:  Reviewed preventive health counseling, as reflected in patient instructions       Regular exercise       Bladder control    Estimated body mass index is 39.52 kg/m  as calculated from the following:    Height as of 6/25/19: 1.778 m (5' 10\").    Weight as of this encounter: 124.9 kg (275 lb 7 oz).    Weight management plan: Not in geriatric " population    He reports that he has never smoked. He has never used smokeless tobacco.    Appropriate preventive services were discussed with this patient, including applicable screening as appropriate for cardiovascular disease, diabetes, osteopenia/osteoporosis, and glaucoma.  As appropriate for age/gender, discussed screening for colorectal cancer, prostate cancer, breast cancer, and cervical cancer. Checklist reviewing preventive services available has been given to the patient.    Reviewed patients plan of care and provided an AVS. The Basic Care Plan (routine screening as documented in Health Maintenance) for Damien meets the Care Plan requirement. This Care Plan has been established and reviewed with the Patient.    Counseling Resources:  ATP IV Guidelines  Pooled Cohorts Equation Calculator  Breast Cancer Risk Calculator  BRCA-Related Cancer Risk Assessment: FHS-7 Tool  FRAX Risk Assessment  ICSI Preventive Guidelines  Dietary Guidelines for Americans, 2010  USDA's MyPlate  ASA Prophylaxis  Lung CA Screening    Obi Strange MD  [facility]

## 2021-09-18 LAB
ALBUMIN SERPL-MCNC: 3.9 G/DL (ref 3.4–5)
ALP SERPL-CCNC: 46 U/L (ref 40–150)
ALT SERPL W P-5'-P-CCNC: 52 U/L (ref 0–70)
ANION GAP SERPL CALCULATED.3IONS-SCNC: 5 MMOL/L (ref 3–14)
AST SERPL W P-5'-P-CCNC: 30 U/L (ref 0–45)
BILIRUB SERPL-MCNC: 1.3 MG/DL (ref 0.2–1.3)
BUN SERPL-MCNC: 16 MG/DL (ref 7–30)
CALCIUM SERPL-MCNC: 9.3 MG/DL (ref 8.5–10.1)
CHLORIDE BLD-SCNC: 106 MMOL/L (ref 94–109)
CHOLEST SERPL-MCNC: 124 MG/DL
CO2 SERPL-SCNC: 27 MMOL/L (ref 20–32)
CREAT SERPL-MCNC: 0.87 MG/DL (ref 0.66–1.25)
CREAT UR-MCNC: 170 MG/DL
FASTING STATUS PATIENT QL REPORTED: YES
GFR SERPL CREATININE-BSD FRML MDRD: 79 ML/MIN/1.73M2
GLUCOSE BLD-MCNC: 115 MG/DL (ref 70–99)
HDLC SERPL-MCNC: 75 MG/DL
LDLC SERPL CALC-MCNC: 31 MG/DL
MICROALBUMIN UR-MCNC: 18 MG/L
MICROALBUMIN/CREAT UR: 10.59 MG/G CR (ref 0–17)
NONHDLC SERPL-MCNC: 49 MG/DL
POTASSIUM BLD-SCNC: 4.8 MMOL/L (ref 3.4–5.3)
PROT SERPL-MCNC: 7.1 G/DL (ref 6.8–8.8)
PSA SERPL-MCNC: 1.78 UG/L (ref 0–4)
SODIUM SERPL-SCNC: 138 MMOL/L (ref 133–144)
TRIGL SERPL-MCNC: 88 MG/DL
URATE SERPL-MCNC: 6.6 MG/DL (ref 3.5–7.2)

## 2022-02-17 PROBLEM — R79.89 ELEVATED SERUM CREATININE: Status: ACTIVE | Noted: 2019-11-13

## 2022-03-23 NOTE — PROGRESS NOTES
Pre-Visit Planning   Next 5 appointments (look out 90 days)    Mar 29, 2022  2:00 PM  (Arrive by 1:40 PM)  Provider Visit with Obi Strange MD  [facility] ([address] [id number] [phone]        Appointment Notes for this encounter:   (R) leg problem       Questionnaires Reviewed/Assigned  Additional questionnaires assigned: PHQ-2    Patient preferred phone number: [phone]    Contacted patient via phone/[employer]. Are there any additional questions or concerns you'd like to review with your provider during your visit? No.Message left for pt w/reminder of arrival time for appt./HH, RN    Visit is not preventive.    Meds  ASK PT @ TIME OF APPT    Entered patient-preferred pharmacy. and [facility] [address]     Current Outpatient Medications   Medication     ACE NOT PRESCRIBED, INTENTIONAL,     amoxicillin-clavulanate (AUGMENTIN) 500-125 MG tablet     atorvastatin (LIPITOR) 40 MG tablet     blood glucose (ACCU-CHEK LAURA) test strip     blood glucose monitoring (ACCU-CHEK LAURA PLUS) test strip     CINNAMON PO     clindamycin (CLEOCIN T) 1 % external solution     doxycycline hyclate (PERIOSTAT) 20 MG tablet     fish oil-omega-3 fatty acids 1000 MG capsule     furosemide (LASIX) 20 MG tablet     glucosamine-chondroitin 500-400 MG CAPS per capsule     Multiple Vitamins-Minerals (MULTIVITAMIN ADULT PO)     order for DME     order for DME     No current facility-administered medications for this visit.       Health Maintenance   Health Maintenance Due   Topic Date Due     PHQ-2 (once per calendar year)  01/01/2022     BMP  03/16/2022       Health Maintenance reviewed and Health Maintenance orders pended    MyChart  Patient is not active on MyChart. Encouraged MyChart activation.  PT DECLINED    Call Summary  Advised patient to call back at [phone] if needed.   Halina  Nikolay, Registered Nurse, PAL (Patient Advocate Liaison)   [facility]     [phone]

## 2022-03-29 ENCOUNTER — OFFICE VISIT (OUTPATIENT)
Dept: FAMILY MEDICINE | Facility: CLINIC | Age: 87
End: 2022-03-29
Payer: MEDICARE

## 2022-03-29 ENCOUNTER — ANCILLARY PROCEDURE (OUTPATIENT)
Dept: GENERAL RADIOLOGY | Facility: CLINIC | Age: 87
End: 2022-03-29
Attending: FAMILY MEDICINE
Payer: MEDICARE

## 2022-03-29 VITALS
TEMPERATURE: 97.5 F | OXYGEN SATURATION: 98 % | WEIGHT: 279.56 LBS | RESPIRATION RATE: 16 BRPM | HEART RATE: 71 BPM | DIASTOLIC BLOOD PRESSURE: 84 MMHG | SYSTOLIC BLOOD PRESSURE: 132 MMHG | BODY MASS INDEX: 40.11 KG/M2

## 2022-03-29 DIAGNOSIS — E11.9 TYPE 2 DIABETES MELLITUS WITHOUT COMPLICATION, UNSPECIFIED WHETHER LONG TERM INSULIN USE (H): ICD-10-CM

## 2022-03-29 DIAGNOSIS — R35.0 BENIGN PROSTATIC HYPERPLASIA WITH URINARY FREQUENCY: ICD-10-CM

## 2022-03-29 DIAGNOSIS — R41.840 COGNITIVE ATTENTION DEFICIT: ICD-10-CM

## 2022-03-29 DIAGNOSIS — E66.01 MORBID OBESITY DUE TO EXCESS CALORIES (H): ICD-10-CM

## 2022-03-29 DIAGNOSIS — E78.5 HYPERLIPIDEMIA LDL GOAL <160: ICD-10-CM

## 2022-03-29 DIAGNOSIS — M25.561 RIGHT KNEE PAIN, UNSPECIFIED CHRONICITY: Primary | ICD-10-CM

## 2022-03-29 DIAGNOSIS — M25.561 RIGHT KNEE PAIN, UNSPECIFIED CHRONICITY: ICD-10-CM

## 2022-03-29 DIAGNOSIS — R60.0 PEDAL EDEMA: ICD-10-CM

## 2022-03-29 DIAGNOSIS — R73.03 PREDIABETES: ICD-10-CM

## 2022-03-29 DIAGNOSIS — Z12.5 SPECIAL SCREENING FOR MALIGNANT NEOPLASM OF PROSTATE: ICD-10-CM

## 2022-03-29 DIAGNOSIS — Z96.651 HISTORY OF ARTHROPLASTY OF RIGHT KNEE: ICD-10-CM

## 2022-03-29 DIAGNOSIS — L71.9 ROSACEA: ICD-10-CM

## 2022-03-29 DIAGNOSIS — R79.89 ELEVATED SERUM CREATININE: ICD-10-CM

## 2022-03-29 DIAGNOSIS — N40.1 BENIGN PROSTATIC HYPERPLASIA WITH URINARY FREQUENCY: ICD-10-CM

## 2022-03-29 LAB
FOLATE SERPL-MCNC: 32.2 NG/ML
VIT B12 SERPL-MCNC: 372 PG/ML (ref 193–986)

## 2022-03-29 PROCEDURE — 73562 X-RAY EXAM OF KNEE 3: CPT | Mod: RT | Performed by: RADIOLOGY

## 2022-03-29 PROCEDURE — 80048 BASIC METABOLIC PNL TOTAL CA: CPT | Performed by: FAMILY MEDICINE

## 2022-03-29 PROCEDURE — 82607 VITAMIN B-12: CPT | Performed by: FAMILY MEDICINE

## 2022-03-29 PROCEDURE — 99214 OFFICE O/P EST MOD 30 MIN: CPT | Performed by: FAMILY MEDICINE

## 2022-03-29 PROCEDURE — 82746 ASSAY OF FOLIC ACID SERUM: CPT | Performed by: FAMILY MEDICINE

## 2022-03-29 PROCEDURE — G0103 PSA SCREENING: HCPCS | Performed by: FAMILY MEDICINE

## 2022-03-29 PROCEDURE — 36415 COLL VENOUS BLD VENIPUNCTURE: CPT | Performed by: FAMILY MEDICINE

## 2022-03-29 PROCEDURE — 84443 ASSAY THYROID STIM HORMONE: CPT | Performed by: FAMILY MEDICINE

## 2022-03-29 RX ORDER — TAMSULOSIN HYDROCHLORIDE 0.4 MG/1
1 CAPSULE ORAL DAILY
COMMUNITY
Start: 2021-12-20 | End: 2022-03-29

## 2022-03-29 RX ORDER — ATORVASTATIN CALCIUM 40 MG/1
40 TABLET, FILM COATED ORAL AT BEDTIME
Qty: 90 TABLET | Refills: 3 | Status: SHIPPED | OUTPATIENT
Start: 2022-03-29 | End: 2023-05-25

## 2022-03-29 RX ORDER — FUROSEMIDE 20 MG
20 TABLET ORAL 2 TIMES DAILY
Qty: 180 TABLET | Refills: 1 | Status: SHIPPED | OUTPATIENT
Start: 2022-03-29 | End: 2022-09-21

## 2022-03-29 RX ORDER — COVID-19 ANTIGEN TEST
220 KIT MISCELLANEOUS DAILY
Status: ON HOLD | COMMUNITY
End: 2023-05-26

## 2022-03-29 RX ORDER — METRONIDAZOLE 7.5 MG/G
GEL TOPICAL
COMMUNITY
Start: 2022-02-17

## 2022-03-29 RX ORDER — TAMSULOSIN HYDROCHLORIDE 0.4 MG/1
0.4 CAPSULE ORAL DAILY
Qty: 90 CAPSULE | Refills: 3 | Status: SHIPPED | OUTPATIENT
Start: 2022-03-29 | End: 2023-09-26

## 2022-03-29 NOTE — LETTER
Damien ARAUZ Skyleredgar  [address]  [facility] [address]  [phone] (home) [phone] (work)    Thank you for choosing [facility] today for your health care needs.     [facility] is transforming primary care  At [facility], we re dedicated to constantly improve how we serve the health care needs of our patients and communities. We re currently making changes to the way we deliver care.     Changes you ll notice include:    An emphasis on building a relationship with a primary care provider    Access to a PAL (personal advocate and liaison) to help guide you with your care needs    Appointment lengths tailored to your specific needs and greater access to a care team to help you and your provider improve and maintain your health and well-being    Improved online access to your care team    Benefits of a primary care provider  If you don t have a designated primary care provider, we encourage you to get to know our care team online and find a provider you d like to see. Most of our providers have a short video on their online provider page. Visit [email or web address] to explore our providers and locations.    Benefits of having a primary care provider include:      They get to know you - your health history, family history and goals, making it easier to make a health plan together.     You get to know them - making health-related conversations and decisions easier      Primary care doctors help you when you re sick or hurt - but also focus on keeping you healthy with preventive care and screenings.      A doctor who sees you regularly is more likely to notice changes in your health.     You ll be connected to a broad care team who partners with your provider to support you.    Patient Advocate Liaison (PAL)   To help make sure you get the right care, at the right time, we include PALs, or Patient Advocate Liaisons, as part of your care team. Your PAL will be your first line of contact. They ll  advocate for your needs and help you navigate our services, connecting you with care team members and community resources to ensure your care is well coordinated. You ll be introduced to a PAL in an upcoming visit.     Expanded care team access with tailored appointment lengths  Depending on your health care needs, you may have longer or shorter appointments and see additional care team providers - including Medication Therapy Management (MTM) pharmacists, diabetes educators, behavioral health clinicians, or social workers. At times, they may be included in your visit with your provider, or you may see them individually.     Online access to your health care records and care team  [employer] is our online tool that makes it easy to see your health care information and communicate with your care team.     [employer] allows you to:     View your health maintenance plan so you know when you re due for a preventive screening    Send secure messages to your care team    View your health history and visit summaries     Schedule appointments     Complete questionnaires and eCheck-in before appointments      Get care from your provider with an e-visit      View and pay your bill     Sign up at [email or web address]. Once you have an account, you also can download the mobile valentin.     Connecting to fast and convenient care  When you need fast, convenient care - consider one of the following options:       Video Visit: A convenient care option for visiting with your provider out of the comfort of your own home. Most of the things you come to the clinic to address with your provider can now be done virtually through a video. This includes your chronic medication follow up, questions or concerns you may have, and even your annual Medicare Wellness Visit.       Phone Visit: Another convenient option for follow up of common problems that may require a more in-depth discussion with your provider.       E-visit: When you need acute care  quickly, or have a quick question about your medication, an E-visit is completed through [employer] and your provider will respond within one business day.        Halina Link, Registered Nurse, PAL (Patient Advocate Liaison)   [facility]     [phone]

## 2022-03-29 NOTE — LETTER
March 31, 2022      Damien Vallecillo  [address]  [facility] [address]        Dear ,    We are writing to inform you of your test results. Tests are all normal.  Good news, no help       Resulted Orders   TSH with free T4 reflex   Result Value Ref Range    TSH 3.16 0.40 - 4.00 mU/L   Vitamin B12   Result Value Ref Range    Vitamin B12 372 193 - 986 pg/mL   Folate   Result Value Ref Range    Folic Acid 32.2 >=5.4 ng/mL      Comment:      Deficient: <3.4 ng/mL  Indeterminate: 3.4-5.4 ng/mL  Normal: > 5.4 ng/mL   BASIC METABOLIC PANEL   Result Value Ref Range    Sodium 138 133 - 144 mmol/L    Potassium 3.7 3.4 - 5.3 mmol/L    Chloride 105 94 - 109 mmol/L    Carbon Dioxide (CO2) 30 20 - 32 mmol/L    Anion Gap 3 3 - 14 mmol/L    Urea Nitrogen 19 7 - 30 mg/dL    Creatinine 0.89 0.66 - 1.25 mg/dL    Calcium 9.5 8.5 - 10.1 mg/dL    Glucose 117 (H) 70 - 99 mg/dL    GFR Estimate 84 >60 mL/min/1.73m2      Comment:      Effective December 21, 2021 eGFRcr in adults is calculated using the 2021 CKD-EPI creatinine equation which includes age and gender (Nathaniel et al., NEJM, DOI: 10.1056/VUAWhc7661669)   PSA, screen   Result Value Ref Range    Prostate Specific Antigen Screen 1.87 0.00 - 4.00 ug/L       If you have any questions or concerns, please call the clinic at the number listed above.       Sincerely,      Obi Strange MD

## 2022-03-29 NOTE — PROGRESS NOTES
Assessment & Plan   Problem List Items Addressed This Visit     Benign prostatic hyperplasia with urinary frequency     [facility].  Refill.         Relevant Medications    tamsulosin (FLOMAX) 0.4 MG capsule    Cognitive attention deficit     Appears cogent today.  Monitor         Elevated serum creatinine     Monitor further.  Normal at moment         Relevant Orders    BASIC METABOLIC PANEL    History of arthroplasty of right knee     Appears radiographically stable         Relevant Medications    diclofenac (VOLTAREN) 1 % topical gel    Hyperlipidemia LDL goal <160     Statin therapy tolerated.  Continue         Relevant Medications    atorvastatin (LIPITOR) 40 MG tablet    Morbid obesity due to excess calories (H)     Weight loss will be fruitful, but difficult in this age group         Pedal edema     Diuretics compression hose.  He recently bought some compression hose with smaller numbers are more comfortable.  Discussed edema today is 2+         Relevant Medications    naproxen sodium 220 MG capsule    furosemide (LASIX) 20 MG tablet    Prediabetes     Periodic A1c         Right knee pain - Primary     No trauma.  Discomfort developed about 6 weeks ago.  He found an OTC compression brace large enough to fit with the patellar hole that he is found is improving things exam shows tenderness at the lateral inferior anterior aspect of the joint line.  Recommend Voltaren gel         Relevant Medications    naproxen sodium 220 MG capsule    diclofenac (VOLTAREN) 1 % topical gel    Other Relevant Orders    XR Knee Right 3 Views (Completed)    Rosacea     Refills         Relevant Medications    metroNIDAZOLE (METROGEL) 0.75 % external gel    diclofenac (VOLTAREN) 1 % topical gel    Special screening for malignant neoplasm of prostate     He is having prosthetic symptoms.  Broaden database         Relevant Orders    PSA, screen    Type 2 diabetes mellitus  without complication, unspecified whether long term insulin use (H)     He does not fulfill criteria for diabetes.  Retired this diagnosis                             Return in 6 months (on 9/29/2022) for appt, Physical Exam, go.    Obi Strange MD  [facility]    Richi Quzeada is a 86 year old who presents for the following health issues     HPI       Where in your body do you have pain? Musculoskeletal problem/pain  Onset/Duration: 2017, has been getting worse over time   Description  Location: right calf, right knee   Joint Swelling: YES  Redness: no  Pain: YES  Warmth: no  Intensity:  mild  Progression of Symptoms:  improving  Accompanying signs and symptoms:   Fevers: no  Numbness/tingling/weakness: no  History  Trauma to the area: no  Recent illness:  no  Previous similar problem: YES  Previous evaluation:  no  Precipitating or alleviating factors:  Aggravating factors include: sitting  Therapies tried and outcome: knee brace, Aleeve, Voltaren with some relief           Review of Systems   No falls.  No other complaints      Objective    /84 (BP Location: Right arm, Patient Position: Chair, Cuff Size: Adult Large)   Pulse 71   Temp 97.5  F (36.4  C) (Oral)   Resp 16   Wt 126.8 kg (279 lb 9 oz)   SpO2 98%   BMI 40.11 kg/m    Body mass index is 40.11 kg/m .  Physical Exam   MSK as described    Results for orders placed or performed in visit on 03/29/22 (from the past 24 hour(s))   Vitamin B12   Result Value Ref Range    Vitamin B12 372 193 - 986 pg/mL   Folate   Result Value Ref Range    Folic Acid 32.2 >=5.4 ng/mL         Obi Strange MD

## 2022-03-30 LAB
ANION GAP SERPL CALCULATED.3IONS-SCNC: 3 MMOL/L (ref 3–14)
BUN SERPL-MCNC: 19 MG/DL (ref 7–30)
CALCIUM SERPL-MCNC: 9.5 MG/DL (ref 8.5–10.1)
CHLORIDE BLD-SCNC: 105 MMOL/L (ref 94–109)
CO2 SERPL-SCNC: 30 MMOL/L (ref 20–32)
CREAT SERPL-MCNC: 0.89 MG/DL (ref 0.66–1.25)
GFR SERPL CREATININE-BSD FRML MDRD: 84 ML/MIN/1.73M2
GLUCOSE BLD-MCNC: 117 MG/DL (ref 70–99)
POTASSIUM BLD-SCNC: 3.7 MMOL/L (ref 3.4–5.3)
PSA SERPL-MCNC: 1.87 UG/L (ref 0–4)
SODIUM SERPL-SCNC: 138 MMOL/L (ref 133–144)
TSH SERPL DL<=0.005 MIU/L-ACNC: 3.16 MU/L (ref 0.4–4)

## 2022-03-30 NOTE — ASSESSMENT & PLAN NOTE
Diuretics compression hose.  He recently bought some compression hose with smaller numbers are more comfortable.  Discussed edema today is 2+

## 2022-03-30 NOTE — ASSESSMENT & PLAN NOTE
No trauma.  Discomfort developed about 6 weeks ago.  He found an OTC compression brace large enough to fit with the patellar hole that he is found is improving things exam shows tenderness at the lateral inferior anterior aspect of the joint line.  Recommend Voltaren gel

## 2022-05-12 ENCOUNTER — ALLIED HEALTH/NURSE VISIT (OUTPATIENT)
Dept: FAMILY MEDICINE | Facility: CLINIC | Age: 87
End: 2022-05-12
Payer: MEDICARE

## 2022-05-12 DIAGNOSIS — Z23 NEED FOR COVID-19 VACCINE: Primary | ICD-10-CM

## 2022-05-12 PROCEDURE — 91305 COVID-19,PF,PFIZER (12+ YRS): CPT

## 2022-05-12 PROCEDURE — 99207 PR NO CHARGE NURSE ONLY: CPT

## 2022-05-12 PROCEDURE — 0054A COVID-19,PF,PFIZER (12+ YRS): CPT

## 2022-05-27 NOTE — H&P
Admitted:     12/28/2018      PRIMARY CARE PROVIDER:  Dr. Obi Strange.      ASSESSMENT AND PLAN:  This is an 83-year-old gentleman with a past medical history significant for prediabetes, arthritis, colon cancer status post laparoscopic-assisted colectomy, who presents to the hospital after a fall and concerns for ankle pain.      1.  Mechanical fall.  The patient denies having any syncopal event.   2.  Right ankle fracture.  At this time, the patient was admitted to the hospital.  He had a splint placed in the ER.  He will be nonweightbearing on the right lower extremity.  We will request a formal consultation from Orthopedics to evaluate the patient.  He will be n.p.o. after midnight in case he requires surgical repair for this.  He will have pain medications available as needed to keep him comfortable.     3.  Right knee swelling.  The patient appears to have effusion in the right knee joint, although no acute fracture was seen on the x-ray.  The patient mentions that he had a right knee arthroplasty done previously, and has an issue with effusion since then.  It looks like the surgery was done in 10/2017.  He mentions that the swelling does happen on and off and is not new.  We will continue to watch this and await orthopedic evaluation.   3.  Diabetes.  The patient's most recent hemoglobin A1c is 5.7.  No need for any additional medication or sliding scale at this time.      We will obtain the patient's baseline labs including CBC, BMP, INR and EKG for preoperative evaluation     CODE STATUS:  I discussed with the patient who would like to be full code     Plan of care was discussed with the patient and his wife at bedside in great detail.  All their questions were answered.  They are comfortable with the plan and agreed with it.      CHIEF COMPLAINT:  Fall.      HISTORY OF PRESENT ILLNESS:  This is an 83-year-old gentleman who came to the hospital after a fall and ankle pain.  The patient mentions that  Oncology Rooming Note    May 27, 2022 3:17 PM   Tip Colin is a 48 year old male who presents for:    Chief Complaint   Patient presents with     Hematology     Video Visit Return with bone survey results related to Abnormal SPEP     Initial Vitals: There were no vitals taken for this visit. Estimated body mass index is 25.02 kg/m  as calculated from the following:    Height as of 5/18/22: 1.829 m (6').    Weight as of 5/18/22: 83.7 kg (184 lb 8 oz). There is no height or weight on file to calculate BSA.  No Pain (0) Comment: Data Unavailable   No LMP for male patient.  Allergies reviewed: Yes  Medications reviewed: Yes    Medications: Medication refills not needed today.  Pharmacy name entered into [employer]:    [facility]Y #[id number] CLOSED - [place], [place] - [address] AT [facility] [id number] [facility] [employer] - [place], [place] - [address]    Clinical concerns: Video Visit Return with bone survey results related to Abnormal SPEP      ALYSSA VILLAFANA MA             "yesterday around noontime, he slipped outside in his driveway on black ice.  He said that he fell on his buttock area; however, his right leg came underneath and he has been having increased pain in the ankle since then.  He was able to get up and walk around but had increasing difficulty with that.  He denies hitting his head, having any loss of consciousness or syncope.  No report of any chest pain, shortness of breath.  He feels quite well otherwise systemically.  He denies any recent illnesses.      PHYSICAL EXAMINATION:   VITAL SIGNS:  Blood pressure is 130/73, heart rate is 67, respiratory rate 16, oxygen is 97% on room air, temperature 98.2.   GENERAL APPEARANCE:  This is an elderly male lying in the bed, no distress, calm and cooperative.   HEENT:  Mucous membranes are moist.   NECK:  Supple.   CARDIOVASCULAR:  Regular rhythm, normal S1 and S2.  No murmur, rubs or gallops.   PULMONARY:  His lungs are clear to auscultation bilaterally in both sides.   ABDOMEN:  Soft, nontender with positive bowel sounds.   EXTREMITIES:  His right lower extremity is in a splint with a dressing applied to that.  He is able to move his digits.  His right knee has moderate effusion without any redness or any signs of infection.   NEUROLOGIC:  Awake, alert, oriented x 3.  Strength testing the right leg is limited with acute fracture, otherwise appears neurologically intact.  Sensations are intact.   PSYCHIATRIC:  Alert and cooperative and appropriate affect.       Past Medical History    I have reviewed this patient's medical history and updated it with pertinent information if needed.   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  Past Surgical History:   Procedure Laterality Date     ARTHROPLASTY KNEE Right 6/19/2017    Procedure: ARTHROPLASTY KNEE;  Right total knee arthroplasty, Hammer toe repair toe 2,3,4,5 left foot with osteotomy;  Surgeon: Alec Raymond MD;  Location:  OR      NONSPECIFIC PROCEDURE      right heel surgery; spur removed.     COLONOSCOPY N/A 6/23/2015    Procedure: COMBINED COLONOSCOPY, SINGLE OR MULTIPLE BIOPSY/POLYPECTOMY BY BIOPSY;  Surgeon: Kimberly Alfaro MD;  Location:  GI     COLONOSCOPY N/A 7/30/2015    Procedure: COLONOSCOPY;  Surgeon: Enrique Salazar MD;  Location:  OR     COLONOSCOPY N/A 7/31/2018    Procedure: COLONOSCOPY;  Colonoscopy;  Surgeon: Tiffany Cheema MD;  Location:  GI     HERNIORRHAPHY EPIGASTRIC  4/27/2012    Procedure:HERNIORRHAPHY EPIGASTRIC; Epigastric Hernia Repair with mesh ; Surgeon:BOLIVAR OLIVEIRA; Location: OR     LAPAROSCOPIC ASSISTED COLECTOMY Right 7/30/2015    Procedure: LAPAROSCOPIC ASSISTED COLECTOMY;  Surgeon: Enrique Salazar MD;  Location:  OR     ORTHOPEDIC SURGERY      lt knee arthroplasty     REALIGN PATELLA Right  10/9/2017    Procedure: REALIGN PATELLA;  Revision right total knee arthroplasty;  Surgeon: Alec aRymond MD;  Location: [facility] OR     REPAIR HAMMER TOE Left 2017    Procedure: REPAIR HAMMER TOE;  Hammer toe repair toe 2,3,4,5 left foot with osteotomy   ;  Surgeon: Beverly Kim DPM, Podiatry/Foot and Ankle Surgery;  Location: [facility] OR     SIGMOIDOSCOPY FLEXIBLE  2013    Procedure: SIGMOIDOSCOPY FLEXIBLE;  SIGMOIDOSCOPY FLEXIBLE (FV) Needs blood sugar ck'd;  Surgeon: Enrique Salazar MD;  Location:  GI     skin cancer excision         Prior to Admission Medications   Prior to Admission Medications   Prescriptions Last Dose Informant Patient Reported? Taking?   ACE NOT PRESCRIBED, INTENTIONAL,   No No   Sig: ACE Inhibitor not prescribed due to Other:  Had cough on ACEI in past         CINNAMON PO 2018 at am  Yes Yes   Sig: Take 1 tablet by mouth daily    Multiple Vitamins-Minerals (MULTIVITAMIN ADULT PO) 2018 at am  Yes Yes   Sig: Take 1 tablet by mouth daily Reported on 2017   aspirin  MG EC tablet 2018 at am  No Yes   Sig: Take 1 tablet (325 mg) by mouth daily   atorvastatin (LIPITOR) 40 MG tablet 2018 at hs  No Yes   Sig: Take 1 tablet (40 mg) by mouth At Bedtime   blood glucose (ACCU-CHEK LAURA) test strip   No No   SiUse to test blood sugar 2 times daily or as directed.   blood glucose monitoring (ACCU-CHEK LAURA PLUS) test strip   No No   Sig: Use to test blood sugar 1 time daily   clindamycin (CLEOCIN T) 1 % solution 2018 at x 1  No Yes   Sig: Apply topically 2 times daily   doxycycline hyclate (PERIOSTAT) 20 MG tablet 2018 at x 1  Yes Yes   Sig: Take 20 mg by mouth 2 times daily   fish oil-omega-3 fatty acids 1000 MG capsule 2018 at pm  Yes Yes   Sig: Take 1 g by mouth every evening   furosemide (LASIX) 20 MG tablet 2018 at x 1  No Yes   Sig: Take 1 tablet (20 mg) by mouth 2 times daily   glucosamine-chondroitin 500-400 MG CAPS  per capsule 2018 at x 1  Yes Yes   Sig: Take 1 capsule by mouth 2 times daily   order for DME   No No   Sig: Equipment being ordered: Walker Wheels () and Walker ()  Treatment Diagnosis: Impaired functional mobility   order for DME   No No   Si: Gradient Compression Wraps; 2: Cast Boots; 3: BLE knee or thigh high 20-30 mm Hg compression stocking; 4: BLE knee or thigh high custom compression stocking; 5: Alternative BLE knee high or thigh compression garments (velcro/buckling)   tamsulosin (FLOMAX) 0.4 MG capsule 2018 at am  Yes Yes   Sig: Take 0.4 mg by mouth daily   triamcinolone (KENALOG) 0.5 % cream More than a month at Unknown time  No No   Sig: Apply sparingly to affected area three times daily.      Facility-Administered Medications: None     Allergies   Allergies   Allergen Reactions     Levetiracetam [Keppra] Rash     Oxcarbazepine [Trileptal] Rash       Social History   I have reviewed this patient's social history and updated it with pertinent information if needed.   Social History     Socioeconomic History     Marital status:      Spouse name: Not on file     Number of children: Not on file     Years of education: Not on file     Highest education level: Not on file   Social Needs     Financial resource strain: Not on file     Food insecurity - worry: Not on file     Food insecurity - inability: Not on file     Transportation needs - medical: Not on file     Transportation needs - non-medical: Not on file   Occupational History     Not on file   Tobacco Use     Smoking status: Never Smoker     Smokeless tobacco: Never Used   Substance and Sexual Activity     Alcohol use: Yes     Alcohol/week: 0.0 oz     Comment: Occas     Drug use: No     Sexual activity: Yes     Partners: Female   Other Topics Concern     Parent/sibling w/ CABG, MI or angioplasty before 65F 55M? No   Social History Narrative     Not on file       Family History   I have reviewed this patient's family  history and updated it with pertinent information if needed.   Family History   Problem Relation Age of Onset     Cancer - colorectal Mother      Cerebrovascular Disease Father        Review of Systems   The 10 point Review of Systems is negative other than noted in the HPI or here.     Physical Exam   Temp: 97.2  F (36.2  C) Temp src: Oral BP: 138/73 Pulse: 67 Heart Rate: 73 Resp: 16 SpO2: 97 % O2 Device: None (Room air)    Vital Signs with Ranges  Temp:  [97.2  F (36.2  C)-98.4  F (36.9  C)] 97.2  F (36.2  C)  Pulse:  [67-73] 67  Heart Rate:  [73] 73  Resp:  [16] 16  BP: (138-166)/(73-78) 138/73  SpO2:  [97 %-100 %] 97 %  278 lbs 1.6 oz      Data   Data reviewed today:  I personally reviewed     Lab data and imaging results from today have been reviewed.     Recent Labs   Lab 12/28/18  1657   WBC 9.0   HGB 12.4*   MCV 88      INR 1.19*      POTASSIUM 3.3*   CHLORIDE 106   CO2 28   BUN 12   CR 0.78   ANIONGAP 5   ANURAG 8.4*   GLC 98       Recent Results (from the past 24 hour(s))   Ankle XR, G/E 3 views, right    Narrative    XR ANKLE RT G/E 3 VW  12/28/2018 1:07 PM    HISTORY:  Fall yesterday with swelling to the right ankle.    COMPARISON:  1/7/2014.      Impression    IMPRESSION:    1. There is a transverse fracture through the medial malleolus with  some distraction but no significant displacement of the distal  fracture fragment.  2. Minimal cortical step-off and possible associated lucency strongly  suggests a nondisplaced posterior malleolar fracture, seen on the  lateral view.  3. There is soft tissue swelling over the lateral malleolus. Small  area of elevation of the cortex in the distal lateral fibula,  suspicious for a subtle nondisplaced fracture. Irregularity and some  deformity of the posterior fibula on the lateral view. This  abnormality could be chronic and related to an ankle fracture from  1/7/2014.    SONI DOAN MD   XR Knee Right 3 Views    Narrative    RIGHT KNEE THREE VIEWS   2018 1:08 PM     HISTORY: Fall yesterday, swelling of the right knee.    COMPARISON: 11/10/2017 x-ray.      Impression    IMPRESSION: There is a moderate joint effusion. There are several  areas of soft tissue calcification surrounding the patella similar to  the prior study. There is moderate lateral subluxation of the patella.  No evidence of acute fracture. No evidence of hardware loosening of  the components of the arthroplasty.    JANE PACE MD   Ankle XR, G/E 3 views, right    Narrative    XR RIGHT ANKLE THREE OR MORE VIEWS   2018 3:22 PM     HISTORY: Post splint.    COMPARISON: Film from earlier today.      Impression    IMPRESSION: Splint/cast material seen. This makes the nondisplaced  lateral and minimally displaced medial malleolus fractures less  apparent. There is no change in position or alignment. There is likely  a posterior malleolar fracture as well that is nondisplaced.    MD ELKIN SOLIS MD             D: 2018   T: 2018   MT: JEREMIAS      Name:     HORTENSIA ADAMS   MRN:      [id number]        Account:      [id number]   :      1935        Admitted:     2018                   Document: [id number]

## 2022-09-11 ENCOUNTER — NURSE TRIAGE (OUTPATIENT)
Dept: NURSING | Facility: CLINIC | Age: 87
End: 2022-09-11

## 2022-09-11 NOTE — TELEPHONE ENCOUNTER
"Triage call  Patient calling started rectal bleeding today thinks it is  Polyps.  He did not see any clots but there is  Blood coming.  He denies any itching.  Wife wants him to be seen by his PCP Dr Strange on 9/12/2022.  No appointments available.    Per protocol  See PCP in 24 hours.  Care advice given.  Verbalizes understanding and agrees with plan.  Routing to PCP    Bibi Alonso RN   [facility] Nurse Advisor  4:41 PM 9/11/2022        Reason for Disposition    MODERATE rectal bleeding (small blood clots, passing blood without stool, or toilet water turns red)    Additional Information    Negative: Shock suspected (e.g., cold/pale/clammy skin, too weak to stand, low BP, rapid pulse)    Negative: Difficult to awaken or acting confused (e.g., disoriented, slurred speech)    Negative: Passed out (i.e., lost consciousness, collapsed and was not responding)    Negative: [1] Vomiting AND [2] contains red blood or black (\"coffee ground\") material  (Exception: few red streaks in vomit that only happened once)    Negative: Sounds like a life-threatening emergency to the triager    Negative: Diarrhea is main symptom    Negative: Stool color other than brown or tan is main concern  (no bleeding and no melena)    Negative: SEVERE rectal bleeding (large blood clots; constant or on and off bleeding)    Negative: SEVERE dizziness (e.g., unable to stand, requires support to walk, feels like passing out now)    Negative: [1] MODERATE rectal bleeding (small blood clots, passing blood without stool, or toilet water turns red) AND [2] more than once a day    Negative: Pale skin (pallor) of new-onset or worsening    Negative: Black or tarry bowel movements (Exception: chronic-unchanged black-grey bowel movements AND is taking iron pills or Pepto-bismol)    Negative: [1] Constant abdominal pain AND [2] present > 2 hours    Negative: Rectal foreign body (i.e., now or within past week;  inserted or swallowed)    Negative: " High-risk adult (e.g., prior surgery on aorta, abdominal aortic aneurysm)    Negative: Taking Coumadin (warfarin) or other strong blood thinner, or known bleeding disorder (e.g., thrombocytopenia)    Negative: Known cirrhosis of the liver (or history of liver failure or ascites)    Negative: [1] Colonoscopy AND [2] in past 72 hours    Negative: Patient sounds very sick or weak to the triager    Protocols used: RECTAL BLEEDING-A-AH

## 2022-09-12 ENCOUNTER — NURSE TRIAGE (OUTPATIENT)
Dept: NURSING | Facility: CLINIC | Age: 87
End: 2022-09-12

## 2022-09-12 ENCOUNTER — HOSPITAL ENCOUNTER (EMERGENCY)
Facility: CLINIC | Age: 87
Discharge: HOME | End: 2022-09-12
Attending: EMERGENCY MEDICINE | Admitting: EMERGENCY MEDICINE
Payer: MEDICARE

## 2022-09-12 VITALS
DIASTOLIC BLOOD PRESSURE: 98 MMHG | HEART RATE: 84 BPM | TEMPERATURE: 97.9 F | OXYGEN SATURATION: 98 % | SYSTOLIC BLOOD PRESSURE: 154 MMHG | RESPIRATION RATE: 16 BRPM

## 2022-09-12 VITALS
HEART RATE: 75 BPM | RESPIRATION RATE: 16 BRPM | TEMPERATURE: 96.7 F | OXYGEN SATURATION: 96 % | SYSTOLIC BLOOD PRESSURE: 164 MMHG | DIASTOLIC BLOOD PRESSURE: 93 MMHG

## 2022-09-12 DIAGNOSIS — K64.4 BLEEDING EXTERNAL HEMORRHOIDS: ICD-10-CM

## 2022-09-12 LAB
BASOPHILS # BLD AUTO: 0 10E3/UL (ref 0–0.2)
BASOPHILS NFR BLD AUTO: 1 %
EOSINOPHIL # BLD AUTO: 0.2 10E3/UL (ref 0–0.7)
EOSINOPHIL NFR BLD AUTO: 3 %
ERYTHROCYTE [DISTWIDTH] IN BLOOD BY AUTOMATED COUNT: 13.2 % (ref 10–15)
HCT VFR BLD AUTO: 44.3 % (ref 40–53)
HGB BLD-MCNC: 14.5 G/DL (ref 13.3–17.7)
HOLD SPECIMEN: NORMAL
IMM GRANULOCYTES # BLD: 0 10E3/UL
IMM GRANULOCYTES NFR BLD: 1 %
LYMPHOCYTES # BLD AUTO: 0.8 10E3/UL (ref 0.8–5.3)
LYMPHOCYTES NFR BLD AUTO: 14 %
MCH RBC QN AUTO: 29.1 PG (ref 26.5–33)
MCHC RBC AUTO-ENTMCNC: 32.7 G/DL (ref 31.5–36.5)
MCV RBC AUTO: 89 FL (ref 78–100)
MONOCYTES # BLD AUTO: 0.7 10E3/UL (ref 0–1.3)
MONOCYTES NFR BLD AUTO: 12 %
NEUTROPHILS # BLD AUTO: 4.1 10E3/UL (ref 1.6–8.3)
NEUTROPHILS NFR BLD AUTO: 69 %
NRBC # BLD AUTO: 0 10E3/UL
NRBC BLD AUTO-RTO: 0 /100
PLATELET # BLD AUTO: 159 10E3/UL (ref 150–450)
RBC # BLD AUTO: 4.99 10E6/UL (ref 4.4–5.9)
WBC # BLD AUTO: 5.9 10E3/UL (ref 4–11)

## 2022-09-12 PROCEDURE — 99283 EMERGENCY DEPT VISIT LOW MDM: CPT | Mod: 25,27

## 2022-09-12 PROCEDURE — 36415 COLL VENOUS BLD VENIPUNCTURE: CPT | Performed by: EMERGENCY MEDICINE

## 2022-09-12 PROCEDURE — 46320 REMOVAL OF HEMORRHOID CLOT: CPT

## 2022-09-12 PROCEDURE — 250N000013 HC RX MED GY IP 250 OP 250 PS 637: Performed by: EMERGENCY MEDICINE

## 2022-09-12 PROCEDURE — 99284 EMERGENCY DEPT VISIT MOD MDM: CPT

## 2022-09-12 PROCEDURE — 85025 COMPLETE CBC W/AUTO DIFF WBC: CPT | Performed by: EMERGENCY MEDICINE

## 2022-09-12 RX ORDER — LIDOCAINE HYDROCHLORIDE AND EPINEPHRINE 10; 10 MG/ML; UG/ML
INJECTION, SOLUTION INFILTRATION; PERINEURAL
Status: DISCONTINUED
Start: 2022-09-12 | End: 2022-09-12 | Stop reason: HOSPADM

## 2022-09-12 RX ADMIN — COCOA BUTTER, PHENYLEPHRINE HYDROCHLORIDE 1 SUPPOSITORY: 2211; 6.25 SUPPOSITORY RECTAL at 22:33

## 2022-09-12 ASSESSMENT — ENCOUNTER SYMPTOMS
LIGHT-HEADEDNESS: 0
ANAL BLEEDING: 1
RECTAL PAIN: 1
DIZZINESS: 0
ANAL BLEEDING: 1
ABDOMINAL PAIN: 0
RECTAL PAIN: 0

## 2022-09-12 ASSESSMENT — ACTIVITIES OF DAILY LIVING (ADL)
ADLS_ACUITY_SCORE: 35
ADLS_ACUITY_SCORE: 33

## 2022-09-12 NOTE — TELEPHONE ENCOUNTER
Likely hemorrhoids  Colonoscopy 2018, no polyps: polyps unlikely in this short time frame  Diverticular bled possinbe, usually self resolving  Mon Iram FUCHS OK Check  Hgb F2F  Colonoscopy due 2023, if indicated  Obi Strange MD

## 2022-09-12 NOTE — TELEPHONE ENCOUNTER
"Lots of rectal bleeding since yesterday and now worse.  See encounter yesterday.  Now uncontrolled and soaking his shorts.    Per protocol needs to go to ER.    They agree with plan and will call 911 if he becomes weak or unable to get to ER on his own.  He declined ambulance at this time.    Will use sanitary napkin in his shorts.  Will ask for triage nurse when signing in to ER to be roomed asap due to blood precautions.    Shaniqua Conway RN  [facility] Nurse Advisor          Reason for Disposition    SEVERE rectal bleeding (large blood clots; constant or on and off bleeding)    Additional Information    Negative: Shock suspected (e.g., cold/pale/clammy skin, too weak to stand, low BP, rapid pulse)    Negative: Difficult to awaken or acting confused (e.g., disoriented, slurred speech)    Negative: Passed out (i.e., lost consciousness, collapsed and was not responding)    Negative: [1] Vomiting AND [2] contains red blood or black (\"coffee ground\") material  (Exception: few red streaks in vomit that only happened once)    Negative: Sounds like a life-threatening emergency to the triager    Protocols used: RECTAL BLEEDING-A-AH      "

## 2022-09-12 NOTE — ED PROVIDER NOTES
History   Chief Complaint:  Rectal Bleeding     HPI   Damien Vallecillo is a 86 year old male with history of hyperlipidemia and type II diabetes who presents accompanied by his wife for evaluation of rectal bleeding. On 9/7/22 the patient started to develop an area of swelling around his rectum. Since then it has been increasing in size and has become painful. Yesterday he developed rectal bleeding after having a bowel movement that has been ongoing since then, prompting him to come into the ED for evaluation. He denies any history of similar bleeding but does have a history of some GI bleeding in 2015 that was attributed to a recent polypectomy. He is not anticoagulated.     Review of Systems   Gastrointestinal: Positive for anal bleeding and rectal pain.   All other systems reviewed and are negative.    Allergies:  Keppra  Trileptal     Medications:  Lipitor  Periostat  Lasix  Glucosamine-chondroitin  Naproxen  Flomax     Past Medical History:     Gout   GI bleed   Epilepsy with complex partial seizures  Venous stasis dermatitis of both lower extremities   Pedal edema  Rosacea  Peripheral neuropathy  Hyperlipidemia  Diabetes mellitus type II   Cognitive attention deficit  BPH      Past Surgical History:    Right knee arthroplasty  Colonoscopy  Epigastric herniorrhaphy   Laparoscopic assisted colectomy right  Left knee arthroplasty Realign patella right  Repair hammer toe left   Sigmoidoscopy flexible   Skin cancer excision   Right heel surgery      Family History:    Colorectal cancer - Mother  Cerebrovascular disease - Father      Social History:  The patient presents to the ED with his wife.   Marital status:        Physical Exam     Patient Vitals for the past 24 hrs:   BP Temp Pulse Resp SpO2   09/12/22 0843 (!) 162/90 -- -- -- --   09/12/22 0841 -- 97.9  F (36.6  C) 84 16 98 %       Physical Exam  VS: Reviewed per above  HENT: normal speech  EYES: sclera anicteric  CV: Rate as noted, regular rhythm.    RESP: Effort normal.   GI: no tenderness/rebound/guarding, not distended.  Rectal: Large thrombosed hemorrhoid around the periphery of the anus at approximately 9:00.  There is evidence of mild oozing of blood from skin defect overlying thrombosed portion of hemorrhoid.  No evidence of bright red blood coming from anus itself.  NEURO: Alert, moving all extremities  MSK: No deformity of the extremities  SKIN: Warm and dry    Emergency Department Course     Laboratory:  Labs Ordered and Resulted from Time of ED Arrival to Time of ED Departure   CBC WITH PLATELETS AND DIFFERENTIAL       Result Value    WBC Count 5.9      RBC Count 4.99      Hemoglobin 14.5      Hematocrit 44.3      MCV 89      MCH 29.1      MCHC 32.7      RDW 13.2      Platelet Count 159      % Neutrophils 69      % Lymphocytes 14      % Monocytes 12      % Eosinophils 3      % Basophils 1      % Immature Granulocytes 1      NRBCs per 100 WBC 0      Absolute Neutrophils 4.1      Absolute Lymphocytes 0.8      Absolute Monocytes 0.7      Absolute Eosinophils 0.2      Absolute Basophils 0.0      Absolute Immature Granulocytes 0.0      Absolute NRBCs 0.0          Procedures  PROCEDURE NOTE:  Excision of External Thrombosed Hemorrhoids  Indication:  External thrombosed hemorrhoids, pain  Description of Procedure:  Informed verbal consent. Site was correctly identified.  Patient was placed in prone position.  Assisted by tech who helped with exposure of thrombosed hemorrhoid. The hemorrhoid had evidence of skin defect. Multiple blood clots were removed with reduction in patients pain.  The wound was left open. Plan is for follow up with colorectal surgery for wound check.  Patient tolerated procedure well, no complications.    Emergency Department Course:     Reviewed:  I reviewed nursing notes, vitals and past medical history    Assessments:  0940:  I obtained history and examined the patient as noted above.     1031: I rechecked the patient and explained  findings.  A hemorrhoid care procedure was performed as outlined in the procedure note above.  The patient tolerated well and there were no complications.     1111: I rechecked the patient and explained findings.      Disposition:  The patient was discharged to home.     Impression & Plan       Medical Decision Making:  Patient presents to the ER for evaluation of bright red blood per rectum.  On arrival vital signs reassuring.  Hemoglobin is stable.  On exam there is evidence of mild oozing from skin defect of thrombosed hemorrhoid.  Clot was extracted from this thrombosed hemorrhoid at bedside.  After local infiltration of lidocaine with epinephrine and applying Surgifoam and gauze dressing, bleeding subsided.  Low suspicion for other source of bleeding at this time.  Information for colorectal surgery clinic provided to discuss surgical management of this large hemorrhoid.  Return precautions discussed prior to discharge.  Discussed MiraLAX use to help keep stools soft.  Unfortunately, patient cannot physically sit in the bathtub for sitz bath's and thus a Oksana bottle provided to help keep his buttock clean.  Return precautions discussed prior to discharge.     Diagnosis:    ICD-10-CM    1. Bleeding external hemorrhoids  K64.4         Scribe Disclosure:  I, Renan Bob, am serving as a scribe at 9:40 AM on 9/12/2022 to document services personally performed by Stephan Nick MD based on my observations and the provider's statements to me.           Stephan Nick MD  09/12/22 1737

## 2022-09-12 NOTE — ED TRIAGE NOTES
at ER this morning, external hemorrhoid. Clots removed from hemorrhoid. Once they returned home, he noted some blood in the toilet bowl after a bowel movement. This has happened a couple times since then.

## 2022-09-12 NOTE — ED TRIAGE NOTES
Patient presents to the ED reporting that has a lump near his anus that has been getting progressively harder over the past 3 days. States that today when he had a bowel movement he started bleeding from the lump. Denies use of blood thinners.

## 2022-09-13 NOTE — ED PROVIDER NOTES
History   Chief Complaint:  Rectal Bleeding     The history is provided by the patient and the spouse.      Damien Vallecillo is a 86 year old male with history of type 2 diabetes, epilepsy, hyperlipidemia and peripheral neuropathy who presents with rectal bleeding.  The patient was seen in the emergency department earlier today where he was diagnosed with thrombosed external hemorrhoid.  Excision of thrombosed clots from the hemorrhoid was performed by Dr. Nick.  Since discharge from the hospital the patient noted a mild amount of bleeding from the hemorrhoid into his depends and is somewhat increased amount of blood after passing a bowel movement therefore he presented back to the emergency department as he was told to return if the bleeding worsened.  He denies rectal pain or hard stools. He notes that he has been taking metamucil to keep his stools soft. No abdominal pain or blood thinner use. No dizziness or lightheadedness. His wife adds that the patient had 12 inches of his colon removed due to polyps.  This hemorrhoid started becoming swollen around September 7 and has been increasing in size until he presented today and had the above excision performed.  He denies any fevers or any other symptoms at this time.    Review of Systems   Gastrointestinal: Positive for anal bleeding. Negative for abdominal pain and rectal pain.   Neurological: Negative for dizziness and light-headedness.   All other systems reviewed and are negative.    Allergies:  Keppra  Trileptal      Medications:  Lipitor  Periostat  Lasix  Glucosamine-chondroitin  Naproxen  Flomax      Past Medical History:     Gout   GI bleed   Epilepsy with complex partial seizures  Venous stasis dermatitis of both lower extremities   Pedal edema  Rosacea  Peripheral neuropathy  Hyperlipidemia  Diabetes mellitus type II   Cognitive attention deficit  BPH       Past Surgical History:    Right knee arthroplasty  Colonoscopy  Epigastric herniorrhaphy    Laparoscopic assisted colectomy right  Left knee arthroplasty Realign patella right  Repair hammer toe left   Sigmoidoscopy flexible   Skin cancer excision   Right heel surgery       Family History:    Colorectal cancer - Mother  Cerebrovascular disease - Father       Social History:  Presents with his wife    Presents via private vehicle  PCP: Obi Strange     Physical Exam     Patient Vitals for the past 24 hrs:   BP Temp Temp src Pulse Resp SpO2   09/12/22 1813 (!) 164/93 (!) 96.7  F (35.9  C) Temporal 75 16 96 %       Physical Exam  General: Well appearing, nontoxic. Resting comfortably  Head:  Scalp, face, and head appear normal  Eyes:  Pupils equal, round, and reactive to light    Conjunctivae noninjected and sclera white  ENT:    The nose is normal    Ears/pinnae are normal  Neck:  Normal range of motion  CV:  Skin is warm and well perfused.  Resp:  No increased work of breathing  Abd:  Obese, soft, non tender to palpation.  Rectal Exam: Large thrombosed external hemorrhoid at the left lateral aspect of the anal canal with central skin ulceration and evidence of recent bleeding.  No active bleeding.  This lesion does not extend into the internal anal canal or rectum.  No blood on digital rectal examination.   Rectal tone normal.  No internal masses or lesions palpated.   MSK:  Normal tone  Skin:  No rash or lesions noted.  Neuro: Speech is normal and fluent    Moves all extremities spontaneously  Psych:  Awake, Alert. Normal affect      Appropriate interactions           Emergency Department Course   Emergency Department Course:     Reviewed:  I reviewed nursing notes, vitals, past medical history and Care Everywhere    Assessments:  ED Course as of 09/12/22 2333   Mon Sep 12, 2022   2157 I obtained history and examined the patient, as noted above.   2333 I rechecked the patient. Amenable to discharge.       Interventions:  2233 Phenylephrine-cocoa butter, 1 suppository, Rectal    Disposition:  The  patient was discharged to home.     Impression & Plan   Medical Decision Making:  Damien Vallecillo is a 86 year old male who presents for evaluation of bleeding from an external hemorrhoid which was incised and had clots removed earlier this morning in the emergency department.  On my evaluation he is well-appearing, hemodynamically stable and afebrile.  Rectal exam reveals a large external hemorrhoid with evidence of incision/skin ulceration over the central portion of this.  No active bleeding from the hemorrhoid is seen.  There is a small amount of dark blood in the patient's depends but no evidence of any severe hemorrhage.  Digital rectal exam revealed no blood inside the rectal vault and no extension of the hemorrhoid internally.  CBC earlier today was normal.  Patient has no clinical signs or symptoms to suggest worsening anemia.  Bleeding is controlled on my evaluation.  A phenylephrine suppository was placed and I recommended he continue these as needed at home as well as Preparation H cream and sitz bath's.  He should follow-up closely with his primary care physician and colorectal surgery.  No evidence of any acute infectious process at this time.  I discussed with the patient that he may experience a small amount of bleeding with passing bowel movements and mild oozing while the area of the incision/ulceration heals.  Patient is not anticoagulated and is not on any antiplatelet agents.  If he has bleeding I recommended the use of a phenylephrine suppository and local application of pressure.  He should return to the emergency department for any bleeding he is unable to control, large-volume hemorrhage, dizziness, lightheadedness, shortness of breath, syncope or other worsening.  Patient is agreeable to plan of care.  Close return precautions were provided and he was discharged in stable condition.    Diagnosis:    ICD-10-CM    1. Bleeding external hemorrhoids  K64.4        Discharge Medications:  New  Prescriptions    PHENYLEPHRINE (TRONOLANE) 0.25 % SUPPOSITORY    Place 1 suppository rectally 2 times daily as needed for hemorrhoids (bleeding hemorrhoid)    PRAMOX-PE-GLYCERIN-PETROLATUM (PREPARATION H) 1-0.25-14.4-15 % CREA CREAM    Cleanse the affected area by patting or blotting with an appropriate cleansing wipe. Gently dry by patting or blotting with a tissue or a soft  cloth before applying cream.  - apply externally or in the lower portion of the anal canal only  - apply externally to the affected area up to 4 times daily, especially at night, in the  morning or after each bowel movement       Scribe Disclosure:  Aubree JOHNSON, am serving as a scribe at 9:46 PM on 9/12/2022 to document services personally performed by Rubio Carl MD based on my observations and the provider's statements to me.            Rubio Carl MD  09/13/22 0816

## 2022-09-21 ENCOUNTER — OFFICE VISIT (OUTPATIENT)
Dept: FAMILY MEDICINE | Facility: CLINIC | Age: 87
End: 2022-09-21
Payer: MEDICARE

## 2022-09-21 VITALS
DIASTOLIC BLOOD PRESSURE: 80 MMHG | BODY MASS INDEX: 39.11 KG/M2 | SYSTOLIC BLOOD PRESSURE: 146 MMHG | HEART RATE: 70 BPM | TEMPERATURE: 97.7 F | RESPIRATION RATE: 16 BRPM | OXYGEN SATURATION: 96 % | WEIGHT: 272.6 LBS

## 2022-09-21 DIAGNOSIS — R79.89 ELEVATED SERUM CREATININE: ICD-10-CM

## 2022-09-21 DIAGNOSIS — R29.6 FALLS FREQUENTLY: ICD-10-CM

## 2022-09-21 DIAGNOSIS — E11.9 TYPE 2 DIABETES MELLITUS WITHOUT COMPLICATION, UNSPECIFIED WHETHER LONG TERM INSULIN USE (H): ICD-10-CM

## 2022-09-21 DIAGNOSIS — Z23 ENCOUNTER FOR IMMUNIZATION: ICD-10-CM

## 2022-09-21 DIAGNOSIS — R60.0 PEDAL EDEMA: ICD-10-CM

## 2022-09-21 DIAGNOSIS — K64.4 EXTERNAL HEMORRHOIDS: ICD-10-CM

## 2022-09-21 DIAGNOSIS — E66.01 MORBID OBESITY DUE TO EXCESS CALORIES (H): ICD-10-CM

## 2022-09-21 DIAGNOSIS — S83.004S DISLOCATION OF RIGHT PATELLA, SEQUELA: ICD-10-CM

## 2022-09-21 DIAGNOSIS — R73.03 PREDIABETES: ICD-10-CM

## 2022-09-21 DIAGNOSIS — M1A.9XX0 CHRONIC GOUT WITHOUT TOPHUS, UNSPECIFIED CAUSE, UNSPECIFIED SITE: ICD-10-CM

## 2022-09-21 DIAGNOSIS — Z00.00 WELLNESS EXAMINATION: Primary | ICD-10-CM

## 2022-09-21 DIAGNOSIS — G40.209 PARTIAL SYMPTOMATIC EPILEPSY WITH COMPLEX PARTIAL SEIZURES, NOT INTRACTABLE, WITHOUT STATUS EPILEPTICUS (H): ICD-10-CM

## 2022-09-21 DIAGNOSIS — G62.9 PERIPHERAL POLYNEUROPATHY: ICD-10-CM

## 2022-09-21 PROBLEM — F43.21 GRIEVING: Status: RESOLVED | Noted: 2021-09-16 | Resolved: 2022-09-21

## 2022-09-21 PROBLEM — S82.899A ANKLE FRACTURE: Status: RESOLVED | Noted: 2018-12-28 | Resolved: 2022-09-21

## 2022-09-21 PROBLEM — S82.891A CLOSED RIGHT ANKLE FRACTURE: Status: RESOLVED | Noted: 2018-12-29 | Resolved: 2022-09-21

## 2022-09-21 PROBLEM — M79.672 LEFT FOOT PAIN: Status: RESOLVED | Noted: 2019-09-30 | Resolved: 2022-09-21

## 2022-09-21 LAB
ALBUMIN SERPL-MCNC: 3.8 G/DL (ref 3.4–5)
ALP SERPL-CCNC: 52 U/L (ref 40–150)
ALT SERPL W P-5'-P-CCNC: 42 U/L (ref 0–70)
ANION GAP SERPL CALCULATED.3IONS-SCNC: 6 MMOL/L (ref 3–14)
AST SERPL W P-5'-P-CCNC: 27 U/L (ref 0–45)
BILIRUB SERPL-MCNC: 1 MG/DL (ref 0.2–1.3)
BUN SERPL-MCNC: 16 MG/DL (ref 7–30)
CALCIUM SERPL-MCNC: 8.9 MG/DL (ref 8.5–10.1)
CHLORIDE BLD-SCNC: 105 MMOL/L (ref 94–109)
CO2 SERPL-SCNC: 28 MMOL/L (ref 20–32)
CREAT SERPL-MCNC: 0.83 MG/DL (ref 0.66–1.25)
GFR SERPL CREATININE-BSD FRML MDRD: 86 ML/MIN/1.73M2
GLUCOSE BLD-MCNC: 121 MG/DL (ref 70–99)
HBA1C MFR BLD: 6.5 % (ref 0–5.6)
POTASSIUM BLD-SCNC: 4.1 MMOL/L (ref 3.4–5.3)
PROT SERPL-MCNC: 6.8 G/DL (ref 6.8–8.8)
SODIUM SERPL-SCNC: 139 MMOL/L (ref 133–144)
URATE SERPL-MCNC: 6.9 MG/DL (ref 3.5–7.2)

## 2022-09-21 PROCEDURE — 80053 COMPREHEN METABOLIC PANEL: CPT | Performed by: FAMILY MEDICINE

## 2022-09-21 PROCEDURE — 83036 HEMOGLOBIN GLYCOSYLATED A1C: CPT | Performed by: FAMILY MEDICINE

## 2022-09-21 PROCEDURE — 91312 COVID-19,PF,PFIZER BOOSTER BIVALENT: CPT | Performed by: FAMILY MEDICINE

## 2022-09-21 PROCEDURE — 36415 COLL VENOUS BLD VENIPUNCTURE: CPT | Performed by: FAMILY MEDICINE

## 2022-09-21 PROCEDURE — 0124A COVID-19,PF,PFIZER BOOSTER BIVALENT: CPT | Performed by: FAMILY MEDICINE

## 2022-09-21 PROCEDURE — 99214 OFFICE O/P EST MOD 30 MIN: CPT | Mod: 25 | Performed by: FAMILY MEDICINE

## 2022-09-21 PROCEDURE — 82043 UR ALBUMIN QUANTITATIVE: CPT | Performed by: FAMILY MEDICINE

## 2022-09-21 PROCEDURE — 84550 ASSAY OF BLOOD/URIC ACID: CPT | Performed by: FAMILY MEDICINE

## 2022-09-21 PROCEDURE — G0439 PPPS, SUBSEQ VISIT: HCPCS | Performed by: FAMILY MEDICINE

## 2022-09-21 RX ORDER — FUROSEMIDE 20 MG
20 TABLET ORAL 2 TIMES DAILY
Qty: 180 TABLET | Refills: 1 | Status: SHIPPED | OUTPATIENT
Start: 2022-09-21 | End: 2023-11-13

## 2022-09-21 ASSESSMENT — ACTIVITIES OF DAILY LIVING (ADL): CURRENT_FUNCTION: NO ASSISTANCE NEEDED

## 2022-09-21 NOTE — PROGRESS NOTES
"SUBJECTIVE:   Damien is a 86 year old who presents for Preventive Visit.      Patient has been advised of split billing requirements and indicates understanding: Yes  Are you in the first 12 months of your Medicare coverage?  No    Healthy Habits:     In general, how would you rate your overall health?  Good    Frequency of exercise:  6-7 days/week    Duration of exercise:  15-30 minutes    Do you usually eat at least 4 servings of fruit and vegetables a day, include whole grains    & fiber and avoid regularly eating high fat or \"junk\" foods?  No    Taking medications regularly:  Yes    Barriers to taking medications:  None    Medication side effects:  None    Ability to successfully perform activities of daily living:  No assistance needed    Home Safety:  No safety concerns identified    Hearing Impairment:  No hearing concerns    In the past 6 months, have you been bothered by leaking of urine?  No    In general, how would you rate your overall mental or emotional health?  Excellent      PHQ-2 Total Score: 0    Additional concerns today:  No    Do you feel safe in your environment? Yes    Have you ever done Advance Care Planning? (For example, a Health Directive, POLST, or a discussion with a medical provider or your loved ones about your wishes): Yes, patient states has an Advance Care Planning document and will bring a copy to the clinic.      Fall risk  Fallen 2 or more times in the past year?: No  Any fall with injury in the past year?: No  click delete button to remove this line now  Cognitive Screening   1) Repeat 3 items (Leader, Season, Table)    2) Clock draw: NORMAL  3) 3 item recall: Recalls 2 objects   Results: NORMAL clock, 1-2 items recalled: COGNITIVE IMPAIRMENT LESS LIKELY    Mini-CogTM Copyright CALIN Haney. Licensed by the author for use in [facility]; reprinted with permission (edgar@.[facility]). All rights reserved.      Do you have sleep apnea, excessive snoring or daytime drowsiness?: " no    Reviewed and updated as needed this visit by clinical staff   Tobacco  Allergies  Meds   Med Hx  Surg Hx  Fam Hx  Soc Hx          Reviewed and updated as needed this visit by Provider                   Social History     Tobacco Use     Smoking status: Never Smoker     Smokeless tobacco: Never Used   Substance Use Topics     Alcohol use: Yes     Alcohol/week: 0.0 standard drinks     Comment: Occas     If you drink alcohol do you typically have >3 drinks per day or >7 drinks per week? No    Alcohol Use 9/21/2022   Prescreen: >3 drinks/day or >7 drinks/week? No           ED/UC Followup:    Facility:  [facility]  Date of visit: 09/12/2022  Reason for visit: Bleeding External Hemorrhoids  Current Status: patient is feeling good. Blood stopped 3 days ago      Current providers sharing in care for this patient include:   Patient Care Team:  Obi Strange MD as PCP - General (Family Practice)  Obi Strange MD as Assigned PCP  Alec Raymond MD as MD (Orthopedics)  Beverly Kim DPM, Podiatry/Foot and Ankle Surgery (Podiatry)  Tiffany Goldstein RN as Personal Advocate & Liaison (PAL) (Family Medicine)    The following health maintenance items are reviewed in Epic and correct as of today:  Health Maintenance   Topic Date Due     INFLUENZA VACCINE (1) 09/01/2022     CMP  09/16/2022     MICROALBUMIN  09/16/2022     URIC ACID  09/16/2022     BMP  09/29/2022     COLORECTAL CANCER SCREENING  07/31/2023     MEDICARE ANNUAL WELLNESS VISIT  09/21/2023     A1C  09/21/2023     ANNUAL REVIEW OF HM ORDERS  09/21/2023     FALL RISK ASSESSMENT  09/21/2023     ADVANCE CARE PLANNING  09/21/2027     DTAP/TDAP/TD IMMUNIZATION (5 - Td or Tdap) 09/16/2030     PHQ-2 (once per calendar year)  Completed     Pneumococcal Vaccine: 65+ Years  Completed     COVID-19 Vaccine  Completed     ZOSTER IMMUNIZATION  Addressed     IPV IMMUNIZATION  Aged Out     MENINGITIS IMMUNIZATION  Aged Out     ALT   "Discontinued     LIPID  Discontinued     DIABETIC FOOT EXAM  Discontinued     EYE EXAM  Discontinued               Review of Systems  As below.    OBJECTIVE:   BP (!) 146/80 (BP Location: Right arm, Patient Position: Sitting, Cuff Size: Adult Large)   Pulse 70   Temp 97.7  F (36.5  C) (Oral)   Resp 16   Wt 123.7 kg (272 lb 9.6 oz)   SpO2 96%   BMI 39.11 kg/m   Estimated body mass index is 39.11 kg/m  as calculated from the following:    Height as of 6/25/19: 1.778 m (5' 10\").    Weight as of this encounter: 123.7 kg (272 lb 9.6 oz).  Physical Exam  Constitutional:       Appearance: Normal appearance. He is obese.   HENT:      Head: Atraumatic.      Right Ear: Tympanic membrane normal.      Left Ear: Tympanic membrane normal.      Nose: Nose normal.      Mouth/Throat:      Mouth: Mucous membranes are moist.   Eyes:      Pupils: Pupils are equal, round, and reactive to light.   Cardiovascular:      Rate and Rhythm: Normal rate and regular rhythm.      Heart sounds: Normal heart sounds.   Pulmonary:      Effort: Pulmonary effort is normal.      Breath sounds: Normal breath sounds.   Abdominal:      General: Bowel sounds are normal.   Musculoskeletal:      Cervical back: Neck supple.      Right lower leg: Edema present.      Left lower leg: Edema present.      Comments: R knee w/o effusion bruise   Skin:     General: Skin is warm and dry.   Neurological:      General: No focal deficit present.      Mental Status: He is alert.   Psychiatric:         Mood and Affect: Mood normal.               ASSESSMENT / PLAN:       Problem List Items Addressed This Visit     Dislocation of right patella     Chronic, surgeries unsuccessful. He wears an elastic knee sleeve           Elevated serum creatinine     Not CKD           Relevant Orders    COMPREHENSIVE METABOLIC PANEL    Albumin Random Urine Quantitative with Creat Ratio    Encounter for immunization     offered           Relevant Orders    COVID-19,PF,PFIZER BOOSTER " "BIVALENT (Completed)    External hemorrhoids     To ED twice. Bleeding has stopped. Appt with colo-rectal. Discussed           Falls frequently     Single point cane, recent fall with knee contusion. Previous 5 yrs ago (per pt). Discussed platform cane PT (declined)           Gout     Quiescent. No therapies.           Relevant Orders    Uric acid    Morbid obesity due to excess calories (H)     Weight stable through the pandemic           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)     Remote dx, no therapies/no sz > 10 yrs. He doubts the diagnosis ([place])           Pedal edema     Compression hose             Relevant Medications    furosemide (LASIX) 20 MG tablet    Peripheral polyneuropathy     Contributes t fall risk           RESOLVED: Prediabetes     Periodic A1C           Relevant Orders    HEMOGLOBIN A1C (Completed)    Type 2 diabetes mellitus without complication, unspecified whether long term insulin use (H)     Hemoglobin A1C   Date Value Ref Range Status   09/21/2022 6.5 (H) 0.0 - 5.6 % Final     Comment:     Normal <5.7%   Prediabetes 5.7-6.4%    Diabetes 6.5% or higher     Note: Adopted from ADA consensus guidelines.   09/16/2020 5.9 (H) 0 - 5.6 % Final     Comment:     Normal <5.7% Prediabetes 5.7-6.4%  Diabetes 6.5% or higher - adopted from ADA   consensus guidelines.       .was prediabetes. Needs 2 abnormals for formal Dx             Other Visit Diagnoses     Wellness examination    -  Primary          Patient has been advised of split billing requirements and indicates understanding: Yes    COUNSELING:  Reviewed preventive health counseling, as reflected in patient instructions    Estimated body mass index is 39.11 kg/m  as calculated from the following:    Height as of 6/25/19: 1.778 m (5' 10\").    Weight as of this encounter: 123.7 kg (272 lb 9.6 oz).    Weight management plan: maintain    He reports that he has never smoked. He has never used smokeless tobacco.      Appropriate " preventive services were discussed with this patient, including applicable screening as appropriate for cardiovascular disease, diabetes, osteopenia/osteoporosis, and glaucoma.  As appropriate for age/gender, discussed screening for colorectal cancer, prostate cancer, breast cancer, and cervical cancer. Checklist reviewing preventive services available has been given to the patient.    Reviewed patients plan of care and provided an AVS. The Basic Care Plan (routine screening as documented in Health Maintenance) for Damien meets the Care Plan requirement. This Care Plan has been established and reviewed with the Patient.    Counseling Resources:  ATP IV Guidelines  Pooled Cohorts Equation Calculator  Breast Cancer Risk Calculator  Breast Cancer: Medication to Reduce Risk  FRAX Risk Assessment  ICSI Preventive Guidelines  Dietary Guidelines for Americans, 2010  [employer]'s MyPlate  ASA Prophylaxis  Lung CA Screening    Obi Strange MD  [facility]    Identified Health Risks:

## 2022-09-21 NOTE — ASSESSMENT & PLAN NOTE
Single point cane, recent fall with knee contusion. Previous 5 yrs ago (per pt). Discussed platform cane PT (declined)

## 2022-09-21 NOTE — LETTER
September 22, 2022      Damien Vallecillo  [address]  [place] [id number]        Dear ,    We are writing to inform you of your test results.    Labs are all normal or stable.      Resulted Orders   HEMOGLOBIN A1C   Result Value Ref Range    Hemoglobin A1C 6.5 (H) 0.0 - 5.6 %      Comment:      Normal <5.7%   Prediabetes 5.7-6.4%    Diabetes 6.5% or higher     Note: Adopted from ADA consensus guidelines.   COMPREHENSIVE METABOLIC PANEL   Result Value Ref Range    Sodium 139 133 - 144 mmol/L    Potassium 4.1 3.4 - 5.3 mmol/L    Chloride 105 94 - 109 mmol/L    Carbon Dioxide (CO2) 28 20 - 32 mmol/L    Anion Gap 6 3 - 14 mmol/L    Urea Nitrogen 16 7 - 30 mg/dL    Creatinine 0.83 0.66 - 1.25 mg/dL    Calcium 8.9 8.5 - 10.1 mg/dL    Glucose 121 (H) 70 - 99 mg/dL    Alkaline Phosphatase 52 40 - 150 U/L    AST 27 0 - 45 U/L    ALT 42 0 - 70 U/L    Protein Total 6.8 6.8 - 8.8 g/dL    Albumin 3.8 3.4 - 5.0 g/dL    Bilirubin Total 1.0 0.2 - 1.3 mg/dL    GFR Estimate 86 >60 mL/min/1.73m2      Comment:      Effective December 21, 2021 eGFRcr in adults is calculated using the 2021 CKD-EPI creatinine equation which includes age and gender (Nathaniel et al., NEJ, DOI: 10.1056/HHOVru5302610)   Albumin Random Urine Quantitative with Creat Ratio   Result Value Ref Range    Creatinine Urine mg/dL 150 mg/dL    Albumin Urine mg/L 10 mg/L    Albumin Urine mg/g Cr 6.67 0.00 - 17.00 mg/g Cr   Uric acid   Result Value Ref Range    Uric Acid 6.9 3.5 - 7.2 mg/dL       If you have any questions or concerns, please call the clinic at the number listed above.       Sincerely,      Obi Strange MD

## 2022-09-21 NOTE — PROGRESS NOTES
{PROVIDER CHARTING PREFERENCE:834331}    Subjective   Damien is a 86 year old{ACCOMPANIED BY STATEMENT (Optional):898517}, presenting for the following health issues:  No chief complaint on file.      HPI     {SUPERLIST (Optional):540271}  {additonal problems for provider to add (Optional):980343}    Review of Systems   {ROS COMP (Optional):486370}      Objective    There were no vitals taken for this visit.  There is no height or weight on file to calculate BMI.  Physical Exam   {Exam List (Optional):659478}    {Diagnostic Test Results (Optional):999647}    {AMBULATORY ATTESTATION (Optional):082762}

## 2022-09-21 NOTE — ASSESSMENT & PLAN NOTE
Hemoglobin A1C   Date Value Ref Range Status   09/21/2022 6.5 (H) 0.0 - 5.6 % Final     Comment:     Normal <5.7%   Prediabetes 5.7-6.4%    Diabetes 6.5% or higher     Note: Adopted from ADA consensus guidelines.   09/16/2020 5.9 (H) 0 - 5.6 % Final     Comment:     Normal <5.7% Prediabetes 5.7-6.4%  Diabetes 6.5% or higher - adopted from ADA   consensus guidelines.       .was prediabetes. Needs 2 abnormals for formal Dx

## 2022-09-22 LAB
CREAT UR-MCNC: 150 MG/DL
MICROALBUMIN UR-MCNC: 10 MG/L
MICROALBUMIN/CREAT UR: 6.67 MG/G CR (ref 0–17)

## 2022-10-06 ENCOUNTER — TRANSFERRED RECORDS (OUTPATIENT)
Dept: HEALTH INFORMATION MANAGEMENT | Facility: CLINIC | Age: 87
End: 2022-10-06

## 2023-01-16 ENCOUNTER — TELEPHONE (OUTPATIENT)
Dept: FAMILY MEDICINE | Facility: CLINIC | Age: 88
End: 2023-01-16

## 2023-01-16 NOTE — TELEPHONE ENCOUNTER
Reason for Call:  Appointment Request    Patient requesting this type of appt:  Wrist pain x 6 weeks and having problems with right knee    Requested provider: Obi Strange    Reason patient unable to be scheduled: Not within requested timeframe    When does patient want to be seen/preferred time: asap    Comments: is wondering if he could be worked in asap.     [phone] ok to leave detailed message    Call taken on 1/16/2023 at 9:11 AM by Haily Dominguez

## 2023-01-17 ENCOUNTER — OFFICE VISIT (OUTPATIENT)
Dept: FAMILY MEDICINE | Facility: CLINIC | Age: 88
End: 2023-01-17
Payer: MEDICARE

## 2023-01-17 VITALS
SYSTOLIC BLOOD PRESSURE: 140 MMHG | RESPIRATION RATE: 20 BRPM | BODY MASS INDEX: 41.47 KG/M2 | OXYGEN SATURATION: 99 % | DIASTOLIC BLOOD PRESSURE: 78 MMHG | WEIGHT: 280 LBS | HEIGHT: 69 IN | TEMPERATURE: 98 F | HEART RATE: 68 BPM

## 2023-01-17 DIAGNOSIS — G40.209 PARTIAL SYMPTOMATIC EPILEPSY WITH COMPLEX PARTIAL SEIZURES, NOT INTRACTABLE, WITHOUT STATUS EPILEPTICUS (H): ICD-10-CM

## 2023-01-17 DIAGNOSIS — E66.01 MORBID OBESITY DUE TO EXCESS CALORIES (H): ICD-10-CM

## 2023-01-17 DIAGNOSIS — G62.9 PERIPHERAL POLYNEUROPATHY: Primary | ICD-10-CM

## 2023-01-17 DIAGNOSIS — R29.6 FALLS FREQUENTLY: ICD-10-CM

## 2023-01-17 DIAGNOSIS — Z96.651 HISTORY OF ARTHROPLASTY OF RIGHT KNEE: ICD-10-CM

## 2023-01-17 DIAGNOSIS — S83.004S DISLOCATION OF RIGHT PATELLA, SEQUELA: ICD-10-CM

## 2023-01-17 PROBLEM — E11.9 TYPE 2 DIABETES MELLITUS WITHOUT COMPLICATION, UNSPECIFIED WHETHER LONG TERM INSULIN USE (H): Status: RESOLVED | Noted: 2021-09-16 | Resolved: 2023-01-17

## 2023-01-17 LAB
CRP SERPL-MCNC: <3 MG/L
HOLD SPECIMEN: NORMAL

## 2023-01-17 PROCEDURE — 36415 COLL VENOUS BLD VENIPUNCTURE: CPT | Performed by: FAMILY MEDICINE

## 2023-01-17 PROCEDURE — 99214 OFFICE O/P EST MOD 30 MIN: CPT | Performed by: FAMILY MEDICINE

## 2023-01-17 PROCEDURE — 86140 C-REACTIVE PROTEIN: CPT | Performed by: FAMILY MEDICINE

## 2023-01-17 RX ORDER — GABAPENTIN 300 MG/1
300 CAPSULE ORAL AT BEDTIME
Qty: 90 CAPSULE | Refills: 1 | Status: SHIPPED | OUTPATIENT
Start: 2023-01-17 | End: 2023-05-25

## 2023-01-17 ASSESSMENT — PAIN SCALES - GENERAL: PAINLEVEL: MILD PAIN (2)

## 2023-01-17 NOTE — LETTER
January 19, 2023      Damien [place]cillo  [address] [facility] [address]        Dear ,    We are writing to inform you of your test results.    Your inflammatory test is normal showing no significant inflammation.       Resulted Orders   CRP, inflammation   Result Value Ref Range    CRP Inflammation <3.00 <5.00 mg/L       If you have any questions or concerns, please call the clinic at the number listed above.       Sincerely,      Obi Strange MD

## 2023-01-17 NOTE — ASSESSMENT & PLAN NOTE
EMG documented sensory polyneuropathy length dependent.  Lower extremities.  May be occurring in his hand.  Discomfort in left median nerve versus C6 distribution.  Broaden database

## 2023-01-17 NOTE — ASSESSMENT & PLAN NOTE
Believes his right knee is getting weak.  He fell shoveling the driveway, also fell in the bathroom, neither with head trauma.  He has been doing physical therapy exercises for 2 decades.  He is not interested in revisiting PT or physiatry.  I recommend use a cane or walker.

## 2023-01-17 NOTE — ASSESSMENT & PLAN NOTE
Longstanding.  Status post arthroplasty with rapid revision.  Seen by orthopedics who felt that surgical interventions for the patella would likely be unsuccessful.  He has found a wrap with anterior padding that he thinks is helpful

## 2023-01-17 NOTE — PROGRESS NOTES
Assessment & Plan   Problem List Items Addressed This Visit     Dislocation of right patella     Longstanding.  Status post arthroplasty with rapid revision.  Seen by orthopedics who felt that surgical interventions for the patella would likely be unsuccessful.  He has found a wrap with anterior padding that he thinks is helpful         Falls frequently     Believes his right knee is getting weak.  He fell shoveling the driveway, also fell in the bathroom, neither with head trauma.  He has been doing physical therapy exercises for 2 decades.  He is not interested in revisiting PT or physiatry.  I recommend use a cane or walker.         History of arthroplasty of right knee     All hardware intact with no evidence of loosening at last evaluation         Morbid obesity due to excess calories (H)     Weight is slowly increasing.         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  Indefinite epileptic therapy         Relevant Medications    gabapentin (NEURONTIN) 300 MG capsule    Peripheral polyneuropathy - Primary     EMG documented sensory polyneuropathy length dependent.  Lower extremities.  May be occurring in his hand.  Discomfort in left median nerve versus C6 distribution.  Broaden database         Relevant Medications    gabapentin (NEURONTIN) 300 MG capsule    Other Relevant Orders    CRP, inflammation    Adult Neurology  Referral                     Return lab go.   Follow-up Visit   Expected date: Feb 28, 2023      Follow Up Appointment Details:     Follow-up with whom?: Me    Follow-Up for what?: Acute Issue Recheck    Additional Details: polyneuropathy falls    How?: In Person    Is this an as-needed follow-up?: No                    Obi Strange MD  [facility]    Richi Quezada is a 87 year old, presenting for the following health issues:  Arthritis      HPI     Concern - Arthritis  Onset: Two months ago  Description: Wrist/  "thumb ache  Intensity: moderate  Progression of Symptoms:  intermittent  Accompanying Signs & Symptoms: notice more when lifting  Previous history of similar problem: None  Precipitating factors:        Worsened by: lifting  Alleviating factors:        Improved by: resting  Therapies tried and outcome: Voltaren gel is sometimes effective        Review of Systems   Is developing discomfort starting at the base of his left thumb extending up his forearm.      Objective    BP (!) 140/78   Pulse 68   Temp 98  F (36.7  C) (Oral)   Resp 20   Ht 1.753 m (5' 9\")   Wt 127 kg (280 lb)   SpO2 99%   BMI 41.35 kg/m    Body mass index is 41.35 kg/m .  Physical Exam   No pain no synovitis no deformity can be elicited.  Tinel's is negative        Obi Strange MD              "

## 2023-01-18 ENCOUNTER — PATIENT OUTREACH (OUTPATIENT)
Dept: FAMILY MEDICINE | Facility: CLINIC | Age: 88
End: 2023-01-18
Payer: MEDICARE

## 2023-01-18 NOTE — TELEPHONE ENCOUNTER
"[facility] received incoming call from pt's wife, Alejandrina (CTC)  -Spouse inquiring about pt's neurology referral  -[facility] notes Dr. Strange placed neurology  referral at pt's OV yesterday 1/17/23  Adult Neurology  Referral [[id number]]      -Spouse states that she called to schedule neurology appt and neurology clinic states they do not see referral  -PAL states she will fax referral over to ensure referral has been received by neurology clinic  -[facility] gave the following instructions, \"[facility] will call you to coordinate your care as prescribed by your provider. If you don't hear from a representative within 2 business days, please call [phone]\"  -[facility] faxed referral to [phone]  -PAL provided direct line [phone] if pt or spouse has any other questions or concerns    Patient's wife was given an opportunity to ask questions, verbalized understanding of plan, and is agreeable.      Katt ALMANZAR RN  Patient Advocate Liaison - PAL RN  [facility]  [phone]    "

## 2023-01-18 NOTE — LETTER
Damien Vallecillo  [address]  [place] [facility] [address]    Monty Quezada,    Thank you for choosing [facility] today for your health care needs.     [facility] is transforming primary care  At [facility], we re dedicated to constantly improve how we serve the health care needs of our patients and communities. We re currently making changes to the way we deliver care.     Changes you ll notice include:    An emphasis on building a relationship with a primary care provider    Access to a PAL (patient advocate and liaison) to help guide you with your care needs    Appointment lengths tailored to your specific needs and greater access to a care team to help you and your provider improve and maintain your health and well-being    Improved online access to your care team    Benefits of a primary care provider  If you don t have a designated primary care provider, we encourage you to get to know our care team online and find a provider you d like to see. Most of our providers have a short video on their online provider page. Visit [email or web address] to explore our providers and locations.    Benefits of having a primary care provider include:      They get to know you - your health history, family history and goals, making it easier to make a health plan together.     You get to know them - making health-related conversations and decisions easier      Primary care doctors help you when you re sick or hurt - but also focus on keeping you healthy with preventive care and screenings.      A doctor who sees you regularly is more likely to notice changes in your health.     You ll be connected to a broad care team who partners with your provider to support you.    Patient Advocate Liaison (PAL)   To help make sure you get the right care, at the right time, we include PALs, or Patient Advocate Liaisons, as part of your care team. Your PAL will be your first line of contact. They ll advocate for your needs and help  you navigate our services, connecting you with care team members and community resources to ensure your care is well coordinated. You ll be introduced to a PAL in an upcoming visit.     Expanded care team access with tailored appointment lengths  Depending on your health care needs, you may have longer or shorter appointments and see additional care team providers - including Medication Therapy Management (MTM) pharmacists, diabetes educators, behavioral health clinicians, or social workers. At times, they may be included in your visit with your provider, or you may see them individually.     Online access to your health care records and care team  [employer] is our online tool that makes it easy to see your health care information and communicate with your care team.     [employer] allows you to:     View your health maintenance plan so you know when you re due for a preventive screening    Send secure messages to your care team    View your health history and visit summaries     Schedule appointments     Complete questionnaires and eCheck-in before appointments      Get care from your provider with an e-visit      View and pay your bill     Sign up at [employer]. Once you have an account, you also can download the mobile valentin.     Connecting to fast and convenient care  When you need fast, convenient care - consider one of the following options:       Video Visit: A convenient care option for visiting with your provider out of the comfort of your own home. Most of the things you come to the clinic to address with your provider can now be done virtually through a video. This includes your chronic medication follow up, questions or concerns you may have, and even your annual Medicare Wellness Visit.       Phone Visit: Another convenient option for follow up of common problems that may require a more in-depth discussion with your provider.       E-visit: When you need acute care quickly, or have a quick question  about your medication, an E-visit is completed through [employer] and your provider will respond within one business day.                Katt ALMANZAR RN  Patient Advocate Liaison - PAL RN  [facility]  [phone]

## 2023-01-18 NOTE — TELEPHONE ENCOUNTER
SB 3 PAL Welcome Letter Sent    Katt ALMANZAR RN   Patient Advocate Aleksandra GEORGE RN  [facility]  [phone]

## 2023-01-23 ENCOUNTER — PATIENT OUTREACH (OUTPATIENT)
Dept: FAMILY MEDICINE | Facility: CLINIC | Age: 88
End: 2023-01-23
Payer: MEDICARE

## 2023-01-23 NOTE — TELEPHONE ENCOUNTER
Forms/Letter Request    Type of form/letter: DMV    Have you been seen for this request: Yes     Do we have the form/letter: Yes: At the Bronze STation given to the TC    When is form/letter needed by: end of January    How would you like the form/letter returned: Mail     Patient would like a copy mailed home and to the DMV    Patient Notified form requests are processed in 3-5 business days:Yes    Okay to leave a detailed message?: Yes at Home number on file [phone] (home)

## 2023-01-23 NOTE — TELEPHONE ENCOUNTER
PAL received incoming call from pt's wife, Alejandrina (CTC) - [facility]  -Alejandrina states that she will be bringing in a form today 1/23/23 for Dr. Strange to fill out, pt's 's license needs renewal and DMV is requiring a medical form be filled out with physician approval for pt to continue to drive  -Awaiting arrival of form at this time      Katt ALMANZAR RN  Patient Advocate Liaison - PAL RN  [facility]  [phone]

## 2023-01-24 NOTE — TELEPHONE ENCOUNTER
RYAN Restrepo on file.  Advised of below.  Wanting to know if copy available for pick-up?  She states she requested to have copy to pick-?  Helen Quan RN

## 2023-01-30 ENCOUNTER — TELEPHONE (OUTPATIENT)
Dept: FAMILY MEDICINE | Facility: CLINIC | Age: 88
End: 2023-01-30
Payer: MEDICARE

## 2023-01-30 NOTE — TELEPHONE ENCOUNTER
"Wife calls,     ~called neurology to schedule EMG, \"no order\", wants to confirm  ~reviewed/discussed, appears BW wanted neurology consult?  ~wife informed to call [phone] and schedule appointment  ~informed BW out of office this week    **ROUTED TO COVERING PROVIDER, CAN YOU CONFIRM THIS IS CORRECT? ROUTE TO SB3 PAL TO INFORM WIFE    Peripheral polyneuropathy - Primary        EMG documented sensory polyneuropathy length dependent.  Lower extremities.  May be occurring in his hand.  Discomfort in left median nerve versus C6 distribution.  Broaden database           Other Relevant Orders      CRP, inflammation     Adult Neurology  Referral       Obi Strange MD   [address] [facility] [address]   Phone: [phone]   Fax: [phone]    Diagnoses: Peripheral polyneuropathy   Order: Adult Neurology  Referral       Comment: Please be aware that coverage of these services is subject to the terms and limitations of your health insurance plan.  Call member services at your health plan with any benefit or coverage questions.   [facility] will call you to coordinate your care as prescribed by your provider. If you don't hear from a representative within 2 business days, please call [phone].     Julia Prabhakar RN, BSN  [facility]    "

## 2023-01-31 NOTE — TELEPHONE ENCOUNTER
Please call. Since Dr. Strange is out of office for 2 weeks, I'm not sure. It appears that he already had an EMG and wants patient to follow-up with neurology from what I am reading.    Mahamed Aragon PA-C on 1/31/2023 at 7:50 AM (covering for Dr. Strange)

## 2023-01-31 NOTE — TELEPHONE ENCOUNTER
PAL called and spoke to pt's wife, Alejandrina  -Alejandrina states she called [phone] and scheduled neurology consult. However, pt is not able to be seen until June/July  -Alejandrina wanting to know if there is anything she can do to get pt in sooner  -PAL recommends spouse call neurology scheduling line back and ask to be placed on cancellation list, Alejandrina agrees    Patient's wife was given an opportunity to ask questions, verbalized understanding of plan, and is agreeable.    Katt ALMANZAR RN  Patient Advocate Liaison - PAL RN  [facility]  [phone]

## 2023-02-09 NOTE — PROGRESS NOTES
INITIAL NEUROLOGY CONSULTATION    DATE OF VISIT: 2/10/2023  CLINIC LOCATION: [facility]  MRN: [id number]  PATIENT NAME: Damien Vallecillo  YOB: 1935    REASON FOR VISIT: No chief complaint on file.    HISTORY OF PRESENT ILLNESS:                                                    Mr. Damein Vallecillo is 87 year old right handed male patient with past medical history of gout, hyperlipidemia, seizures, obesity, prediabetes, and peripheral polyneuropathy, who was seen today for left hand paresthesias/pain.    Per patient's report, he has known peripheral polyneuropathy for several years, but developed left hand pain and paresthesias *** ago.  No additional new neurological symptoms.  He is on 300 mg of gabapentin at bedtime.    Laboratory evaluation from September 2022 includes unremarkable CMP (except elevated glucose of 121), elevated hemoglobin A1C of 6.5, and normal CBC.  Folate (32.2), PSA, TSH (3.16), and vitamin B12 (372) were normal in March 2022.    According to scanned report from [facility] from 10/14/2019, EMG was consistent with moderate length dependent axonal sensorimotor polyneuropathy along with evidence of active right L5-S1 radiculopathy.  Tabulated data from EMG report were personally reviewed and independently interpreted.     EEG from 5/19/2010 was normal.    The patient was previously seen by Dr. Griffith at [facility] for low back/left great toe pain, peripheral polyneuropathy, and obstructive sleep apnea.  Notes were reviewed.    No additional useful information is available in Care Everywhere, which was reviewed.  PAST MEDICAL/SURGICAL HISTORY:                                                    I personally reviewed patient's past medical and surgical history with the patient at today's visit.  MEDICATIONS:                                                    I personally reviewed patient's medications and allergies with the  patient at today's visit.  ALLERGIES:                                                      Allergies   Allergen Reactions     Levetiracetam [Keppra] Rash     Oxcarbazepine [Trileptal] Rash     EXAM:                                                    VITAL SIGNS:   There were no vitals taken for this visit.  Mini-Cog Assessment:       General: pt is in NAD, cooperative.  Skin: normal turgor, moist mucous membranes, no lesions/rashes noticed.  HEENT: ATNC, EOMI, PERRL, white sclera, normal conjunctiva, no nystagmus or ptosis. No carotid bruits bilaterally.  Respiratory: lung sounds clear to auscultation bilaterally, no crackles, wheezes, rhonchi. Symmetric lung excursion, no accessory respiratory muscle use.  Cardiovascular: normal S1/S2, no murmurs/rubs/gallops.   Abdomen: Not distended.  : deferred.    Neurological:  Mental: alert, follows commands,  /5 with ***/3 on memory recall, no aphasia or dysarthria. Fund of knowledge is {MYAPPROPRIATE:601897}  Cranial Nerves:  CN II: visual acuity - able to accurately count fingers with each eye. Visual fields intact, fundi: discs sharp, no papilledema and normal vessels bilaterally.  CN III, IV, VI: EOM intact, pupils equal and reactive  CN V: facial sensation nl  CN VII: face symmetric, no facial droop  CN VIII: hearing normal  CN IX: palate elevation symmetric, uvula at midline  CN XI SCM normal, shoulder shrug nl  CN XII: tongue midline  Motor: Strength: 5/5 in all major groups of all extremities. Normal tone. No abnormal movements. No pronator drift b/l.  Reflexes: Triceps, biceps, brachioradialis, patellar, and achilles reflexes normal and symmetric. No clonus noted. Toes are down-going b/l.   Sensory: light touch, pinprick, and vibration intact. Romberg: negative.  Coordination: FNF and heel-shin tests intact b/l.   Gait:  Normal, able to tandem walk *** without difficulty.  DATA:   LABS/EEG/IMAGING/OTHER STUDIES: I reviewed pertinent medical records, as detailed in  the history of present illness.  ASSESSMENT AND PLAN:      ASSESSMENT: Damien Vallecillo is a 87 year old male patient with listed above past medical history, who presents with ***.    We had a detailed discussion with the patient regarding his presenting complaints.  The neurological exam today is ***.    DIAGNOSES:  No diagnosis found.  PLAN: There are no Patient Instructions on file for this visit.    Total Time: *** minutes spent on the date of the encounter doing chart review, history and exam, documentation and further activities per the note.    Cholo Koenig MD  [facility]  (Chart documentation was completed in part with Dragon voice-recognition software. Even though reviewed, some grammatical, spelling, and word errors may remain.)

## 2023-02-10 ENCOUNTER — OFFICE VISIT (OUTPATIENT)
Dept: NEUROLOGY | Facility: CLINIC | Age: 88
End: 2023-02-10
Attending: FAMILY MEDICINE
Payer: MEDICARE

## 2023-02-10 VITALS — HEART RATE: 76 BPM | SYSTOLIC BLOOD PRESSURE: 135 MMHG | OXYGEN SATURATION: 96 % | DIASTOLIC BLOOD PRESSURE: 88 MMHG

## 2023-02-10 DIAGNOSIS — M79.642 PAIN OF LEFT HAND: ICD-10-CM

## 2023-02-10 DIAGNOSIS — G62.9 PERIPHERAL POLYNEUROPATHY: Primary | ICD-10-CM

## 2023-02-10 PROCEDURE — 99204 OFFICE O/P NEW MOD 45 MIN: CPT | Performed by: PSYCHIATRY & NEUROLOGY

## 2023-02-10 NOTE — PATIENT INSTRUCTIONS
AFTER VISIT SUMMARY (AVS):    At today's visit we thoroughly discussed various diagnostic possibilities for your symptoms, possible future evaluation (EMG), and the plan.    We decided to monitor your symptoms for recurrence.  Please come back in such case.    Next follow-up appointment is on as needed basis.    Please do not hesitate to call me with any questions or concerns.    Thanks.

## 2023-02-10 NOTE — PROGRESS NOTES
"Damien Vallecillo is a 87 year old male who presents for:  Chief Complaint   Patient presents with     Follow Up     Hand pain         Initial Vitals:  /88 (BP Location: Left arm, Patient Position: Sitting, Cuff Size: Adult Large)   Pulse 76   SpO2 96%  Estimated body mass index is 41.35 kg/m  as calculated from the following:    Height as of 1/17/23: 1.753 m (5' 9\").    Weight as of 1/17/23: 127 kg (280 lb).. There is no height or weight on file to calculate BSA. BP completed using cuff size: leighann Quintanilla    "

## 2023-02-10 NOTE — LETTER
2/10/2023         RE: Damien Vallecillo  [address]  [facility] [address]        Dear Colleague,    Thank you for referring your patient, Damien Vallecillo, to the [facility]. Please see a copy of my visit note below.    INITIAL NEUROLOGY CONSULTATION    DATE OF VISIT: 2/10/2023  CLINIC LOCATION: [facility]  MRN: [id number]  PATIENT NAME: Damien Vallecillo  YOB: 1935    REASON FOR VISIT:   Chief Complaint   Patient presents with     Follow Up     Hand pain      HISTORY OF PRESENT ILLNESS:                                                    Mr. Damien Vallecillo is 87 year old right handed male patient with past medical history of gout, hyperlipidemia, seizures, obesity, diabetes, and peripheral polyneuropathy, who was seen today for left hand paresthesias/pain.  Accompanied by his wife.    Per patient's report, he has known peripheral polyneuropathy for several years, but developed left thumb/index and wrist pain without associated paresthesias or weakness approximately 3 to 4 weeks ago.  Symptoms subsided over time, and for the last 4-5 days completely resolved.  No additional new neurological symptoms.  He is on 300 mg of gabapentin at bedtime for neuropathy, which seem to control his symptoms well.    Laboratory evaluation from September 2022 includes unremarkable CMP (except elevated glucose of 121), elevated hemoglobin A1C of 6.5, and normal CBC.  Folate (32.2), PSA, TSH (3.16), and vitamin B12 (372) were normal in March 2022.    According to scanned report from [facility] from 10/14/2019, EMG was consistent with moderate length dependent axonal sensorimotor polyneuropathy along with evidence of active right L5-S1 radiculopathy.  Tabulated data from EMG report were personally reviewed and independently interpreted.     EEG from 5/19/2010 was normal.    The patient was previously seen by Dr. Riggins at [facility] for low back/left great toe pain, peripheral polyneuropathy, and obstructive sleep apnea.  Notes were reviewed.    Brain MRI with and without contrast from 6/15/2010, performed after syncopal spell, demonstrated mild cerebral atrophy without evidence of acute intracranial pathology.  Images were personally reviewed and independently interpreted.    No additional useful information is available in Care Everywhere, which was reviewed.  PAST MEDICAL/SURGICAL HISTORY:                                                    I personally reviewed patient's past medical and surgical history with the patient at today's visit.  MEDICATIONS:                                                    I personally reviewed patient's medications and allergies with the patient at today's visit.  ALLERGIES:                                                      Allergies   Allergen Reactions     Levetiracetam [Keppra] Rash     Oxcarbazepine [Trileptal] Rash     EXAM:                                                    VITAL SIGNS:   /88 (BP Location: Left arm, Patient Position: Sitting, Cuff Size: Adult Large)   Pulse 76   SpO2 96%   Mini-Cog Assessment:  Mini Cog Assessment  Clock Draw Score: 2 Normal  3 Item Recall: 3 objects recalled  Mini Cog Total Score: 5  Administered by: : Tiffany GRAVES    General: pt is in NAD, cooperative.  Skin: normal turgor, moist mucous membranes, no lesions/rashes noticed.  HEENT: ATNC, EOMI, PERRL, white sclera, normal conjunctiva, no nystagmus or ptosis. No carotid bruits bilaterally.  Respiratory: lung sounds clear to auscultation bilaterally, no crackles, wheezes, rhonchi. Symmetric lung excursion, no accessory respiratory muscle use.  Cardiovascular: normal S1/S2, no murmurs/rubs/gallops.   Abdomen: Not distended.  : deferred.    Neurological:  Mental: alert, follows commands, Mini Cog Total Score: 5/5 with 3/3 on memory recall, no aphasia or dysarthria. Fund of knowledge is appropriate for  age.  Cranial Nerves:  CN II: visual acuity - able to accurately count fingers with each eye. Visual fields intact, fundi: discs sharp, no papilledema and normal vessels bilaterally.  CN III, IV, VI: EOM intact, pupils equal and reactive  CN V: facial sensation nl  CN VII: face symmetric, no facial droop  CN VIII: hearing normal  CN IX: palate elevation symmetric, uvula at midline  CN XI SCM normal, shoulder shrug nl  CN XII: tongue midline  Motor: Strength: 5/5 in all major groups of all extremities. Normal tone. No abnormal movements. No pronator drift b/l.  Phalen's and Tinel's tests are negative bilaterally.  Reflexes: Triceps, biceps, brachioradialis reflexes normal and symmetric, patellar and achilles reflexes absent bilaterally. No clonus noted. Toes are down-going b/l.   Sensory: light touch, pinprick, and vibration reduced in both feet. Romberg: negative.  Coordination: FNF and heel-shin tests intact b/l.   Gait: Mildly unsteady, uses a cane.  DATA:   LABS/EEG/IMAGING/OTHER STUDIES: I reviewed pertinent medical records, as detailed in the history of present illness.  ASSESSMENT AND PLAN:      ASSESSMENT: Damien Vallecillo is a 87 year old male patient with listed above past medical history, including stable peripheral polyneuropathy, who presents with left thumb/index finger and wrist pain that started 3 to 4 weeks and completely resolved 4 to 5 days ago.    We had a detailed discussion with the patient and his wife regarding his presenting complaints.  The neurological exam today is consistent with known history of peripheral polyneuropathy.  The patient reports complete resolution of left hand symptoms.  His Phalen's and Tinel's tests are negative bilaterally.  We discussed the utility of obtaining EMG, but decided to monitor for symptoms' recurrence.  He was advised to come back in such case.    Regarding his peripheral polyneuropathy, he reports that his symptoms are stable on current gabapentin dose.  He  "will follow-up with his primary care provider.    DIAGNOSES:    ICD-10-CM    1. Peripheral polyneuropathy  G62.9 Adult Neurology  Referral      2. Pain of left hand  M79.642         PLAN: At today's visit we thoroughly discussed various diagnostic possibilities for patient's symptoms, possible future evaluation (EMG), and the plan.    We decided to monitor his symptoms for recurrence.  He was advised to come back in such case.    Next follow-up appointment is on as needed basis.    Total Time: 46 minutes spent on the date of the encounter doing chart review, history and exam, documentation and further activities per the note.    Cholo Koenig MD  [facility]  (Chart documentation was completed in part with Dragon voice-recognition software. Even though reviewed, some grammatical, spelling, and word errors may remain.)          Damien Vallecillo is a 87 year old male who presents for:  Chief Complaint   Patient presents with     Follow Up     Hand pain         Initial Vitals:  /88 (BP Location: Left arm, Patient Position: Sitting, Cuff Size: Adult Large)   Pulse 76   SpO2 96%  Estimated body mass index is 41.35 kg/m  as calculated from the following:    Height as of 1/17/23: 1.753 m (5' 9\").    Weight as of 1/17/23: 127 kg (280 lb).. There is no height or weight on file to calculate BSA. BP completed using cuff size: large      Tiffany Quintanilla        Again, thank you for allowing me to participate in the care of your patient.        Sincerely,        Cholo Koenig MD    "

## 2023-02-10 NOTE — PROGRESS NOTES
INITIAL NEUROLOGY CONSULTATION    DATE OF VISIT: 2/10/2023  CLINIC LOCATION: [facility]  MRN: [id number]  PATIENT NAME: Damien Vallecillo  YOB: 1935    REASON FOR VISIT:   Chief Complaint   Patient presents with     Follow Up     Hand pain      HISTORY OF PRESENT ILLNESS:                                                    Mr. Damien Vallecillo is 87 year old right handed male patient with past medical history of gout, hyperlipidemia, seizures, obesity, diabetes, and peripheral polyneuropathy, who was seen today for left hand paresthesias/pain.  Accompanied by his wife.    Per patient's report, he has known peripheral polyneuropathy for several years, but developed left thumb/index and wrist pain without associated paresthesias or weakness approximately 3 to 4 weeks ago.  Symptoms subsided over time, and for the last 4-5 days completely resolved.  No additional new neurological symptoms.  He is on 300 mg of gabapentin at bedtime for neuropathy, which seem to control his symptoms well.    Laboratory evaluation from September 2022 includes unremarkable CMP (except elevated glucose of 121), elevated hemoglobin A1C of 6.5, and normal CBC.  Folate (32.2), PSA, TSH (3.16), and vitamin B12 (372) were normal in March 2022.    According to scanned report from [facility] from 10/14/2019, EMG was consistent with moderate length dependent axonal sensorimotor polyneuropathy along with evidence of active right L5-S1 radiculopathy.  Tabulated data from EMG report were personally reviewed and independently interpreted.     EEG from 5/19/2010 was normal.    The patient was previously seen by Dr. Riggins at [facility] for low back/left great toe pain, peripheral polyneuropathy, and obstructive sleep apnea.  Notes were reviewed.    Brain MRI with and without contrast from 6/15/2010, performed after syncopal spell, demonstrated mild cerebral atrophy without evidence  of acute intracranial pathology.  Images were personally reviewed and independently interpreted.    No additional useful information is available in Care Everywhere, which was reviewed.  PAST MEDICAL/SURGICAL HISTORY:                                                    I personally reviewed patient's past medical and surgical history with the patient at today's visit.  MEDICATIONS:                                                    I personally reviewed patient's medications and allergies with the patient at today's visit.  ALLERGIES:                                                      Allergies   Allergen Reactions     Levetiracetam [Keppra] Rash     Oxcarbazepine [Trileptal] Rash     EXAM:                                                    VITAL SIGNS:   /88 (BP Location: Left arm, Patient Position: Sitting, Cuff Size: Adult Large)   Pulse 76   SpO2 96%   Mini-Cog Assessment:  Mini Cog Assessment  Clock Draw Score: 2 Normal  3 Item Recall: 3 objects recalled  Mini Cog Total Score: 5  Administered by: : Tiffany GRAVES    General: pt is in NAD, cooperative.  Skin: normal turgor, moist mucous membranes, no lesions/rashes noticed.  HEENT: ATNC, EOMI, PERRL, white sclera, normal conjunctiva, no nystagmus or ptosis. No carotid bruits bilaterally.  Respiratory: lung sounds clear to auscultation bilaterally, no crackles, wheezes, rhonchi. Symmetric lung excursion, no accessory respiratory muscle use.  Cardiovascular: normal S1/S2, no murmurs/rubs/gallops.   Abdomen: Not distended.  : deferred.    Neurological:  Mental: alert, follows commands, Mini Cog Total Score: 5/5 with 3/3 on memory recall, no aphasia or dysarthria. Fund of knowledge is appropriate for age.  Cranial Nerves:  CN II: visual acuity - able to accurately count fingers with each eye. Visual fields intact, fundi: discs sharp, no papilledema and normal vessels bilaterally.  CN III, IV, VI: EOM intact, pupils equal and reactive  CN V: facial sensation  nl  CN VII: face symmetric, no facial droop  CN VIII: hearing normal  CN IX: palate elevation symmetric, uvula at midline  CN XI SCM normal, shoulder shrug nl  CN XII: tongue midline  Motor: Strength: 5/5 in all major groups of all extremities. Normal tone. No abnormal movements. No pronator drift b/l.  Phalen's and Tinel's tests are negative bilaterally.  Reflexes: Triceps, biceps, brachioradialis reflexes normal and symmetric, patellar and achilles reflexes absent bilaterally. No clonus noted. Toes are down-going b/l.   Sensory: light touch, pinprick, and vibration reduced in both feet. Romberg: negative.  Coordination: FNF and heel-shin tests intact b/l.   Gait: Mildly unsteady, uses a cane.  DATA:   LABS/EEG/IMAGING/OTHER STUDIES: I reviewed pertinent medical records, as detailed in the history of present illness.  ASSESSMENT AND PLAN:      ASSESSMENT: Damien Vallecillo is a 87 year old male patient with listed above past medical history, including stable peripheral polyneuropathy, who presents with left thumb/index finger and wrist pain that started 3 to 4 weeks and completely resolved 4 to 5 days ago.    We had a detailed discussion with the patient and his wife regarding his presenting complaints.  The neurological exam today is consistent with known history of peripheral polyneuropathy.  The patient reports complete resolution of left hand symptoms.  His Phalen's and Tinel's tests are negative bilaterally.  We discussed the utility of obtaining EMG, but decided to monitor for symptoms' recurrence.  He was advised to come back in such case.    Regarding his peripheral polyneuropathy, he reports that his symptoms are stable on current gabapentin dose.  He will follow-up with his primary care provider.    DIAGNOSES:    ICD-10-CM    1. Peripheral polyneuropathy  G62.9 Adult Neurology  Referral      2. Pain of left hand  M79.642         PLAN: At today's visit we thoroughly discussed various diagnostic  possibilities for patient's symptoms, possible future evaluation (EMG), and the plan.    We decided to monitor his symptoms for recurrence.  He was advised to come back in such case.    Next follow-up appointment is on as needed basis.    Total Time: 46 minutes spent on the date of the encounter doing chart review, history and exam, documentation and further activities per the note.    Cholo Koenig MD  [facility] Neurology  (Chart documentation was completed in part with Dragon voice-recognition software. Even though reviewed, some grammatical, spelling, and word errors may remain.)

## 2023-03-03 NOTE — ASSESSMENT & PLAN NOTE
[facility]  [address]  [facility] [address]  Phone: [phone]  Fax: [phone]   March 3, 2023        Clarke Moreira  [address]  [facility] [address]            Dear Clarke,    You are currently under the care of [facility] for your warfarin (Coumadin , Jantoven ) therapy.  We are contacting you because our records show you were due for an INR on 1/24/23.    There are potentially serious risks when taking warfarin without careful monitoring and we want to make sure you are safely managed.  Routine lab monitoring is required for warfarin refills.     Please call [phone] as soon as possible to schedule an appointment.  If there has been a change in your care or other concerns, please let us know so we can help and or update our records.     Sincerely,       [facility]         Neurology referral for bilateral EMG. Serologic eval completion

## 2023-03-07 ENCOUNTER — OFFICE VISIT (OUTPATIENT)
Dept: FAMILY MEDICINE | Facility: CLINIC | Age: 88
End: 2023-03-07
Payer: MEDICARE

## 2023-03-07 VITALS
OXYGEN SATURATION: 99 % | HEART RATE: 70 BPM | RESPIRATION RATE: 18 BRPM | DIASTOLIC BLOOD PRESSURE: 76 MMHG | WEIGHT: 282 LBS | TEMPERATURE: 97.5 F | SYSTOLIC BLOOD PRESSURE: 138 MMHG | HEIGHT: 69 IN | BODY MASS INDEX: 41.77 KG/M2

## 2023-03-07 DIAGNOSIS — S83.004S DISLOCATION OF RIGHT PATELLA, SEQUELA: ICD-10-CM
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
## 2023-03-07 DIAGNOSIS — E11.9 TYPE 2 DIABETES MELLITUS WITHOUT COMPLICATION, UNSPECIFIED WHETHER LONG TERM INSULIN USE (H): Primary | ICD-10-CM

## 2023-03-07 DIAGNOSIS — R53.81 PHYSICAL DECONDITIONING: ICD-10-CM

## 2023-03-07 DIAGNOSIS — G47.10 HYPERSOMNIA: ICD-10-CM

## 2023-03-07 DIAGNOSIS — G47.33 OSA (OBSTRUCTIVE SLEEP APNEA): ICD-10-CM

## 2023-03-07 DIAGNOSIS — G62.9 PERIPHERAL POLYNEUROPATHY: ICD-10-CM

## 2023-03-07 PROCEDURE — 99214 OFFICE O/P EST MOD 30 MIN: CPT | Performed by: FAMILY MEDICINE

## 2023-03-07 PROCEDURE — 36415 COLL VENOUS BLD VENIPUNCTURE: CPT | Performed by: FAMILY MEDICINE

## 2023-03-07 PROCEDURE — 80048 BASIC METABOLIC PNL TOTAL CA: CPT | Performed by: FAMILY MEDICINE

## 2023-03-07 NOTE — LETTER
March 8, 2023      Damien Vallecillo  [address] [facility] [address]        Dear ,    We are writing to inform you of your test results.    Kidneys are good. We continue to watch that glucose       Resulted Orders   BASIC METABOLIC PANEL   Result Value Ref Range    Sodium 140 136 - 145 mmol/L    Potassium 4.3 3.4 - 5.3 mmol/L    Chloride 102 98 - 107 mmol/L    Carbon Dioxide (CO2) 26 22 - 29 mmol/L    Anion Gap 12 7 - 15 mmol/L    Urea Nitrogen 17.5 8.0 - 23.0 mg/dL    Creatinine 0.80 0.67 - 1.17 mg/dL    Calcium 9.4 8.8 - 10.2 mg/dL    Glucose 155 (H) 70 - 99 mg/dL    GFR Estimate 86 >60 mL/min/1.73m2      Comment:      eGFR calculated using 2021 CKD-EPI equation.       If you have any questions or concerns, please call the clinic at the number listed above.       Sincerely,      Obi Strange MD

## 2023-03-07 NOTE — PROGRESS NOTES
"  Assessment & Plan   Problem List Items Addressed This Visit     Dislocation of right patella     Has elastic sleeves that he believes stabilizes his knees knees         Fall     Unable to wear his sleeves in the shower, he lost his balance.  He has grab bars, which he used.  He ended up sitting down without trauma, was unable to get up.  Called paramedics to assist.  He is considering converting his shower to a walk-in shower.  Discussed formal OT assessment additional grab bars safety assessment.  He declines interventions         Hypersomnia     He is tired in the day.  He blames gabapentin, but finds the benefit worthy of continuation.  He declines further study.  Differential discussed         RENAE (obstructive sleep apnea)     Patient reports a remote history of study as well as a \"machine\" at home.  No records.  He declines reinvestigation         Peripheral polyneuropathy     Low-dose gabapentin effective in reducing discomfort, now brief and rare.  He would like to continue in spite of perceived hypersomnia         Physical deconditioning     Unable to arise from the floor.        Other Visit Diagnoses     Type 2 diabetes mellitus without complication, unspecified whether long term insulin use (H)    -  Primary    Relevant Orders    BASIC METABOLIC PANEL                        Return for Lab Work, go.   Follow-up Visit   Expected date:  Sep 22, 2023 (Approximate)      Follow Up Appointment Details:     Follow-up with whom?: Me    Follow-Up for what?: Medicare Wellness    Any Additional Chronic Condition Management?: General (Other)    Welcome or Annual?: Annual Wellness    How?: In Person                    Obi Strange MD  [facility] ANNIE Quezada is a 87 year old, presenting for the following health issues:  Recheck Medication      HPI     Medication Followup of Gabapentin    Taking Medication as prescribed: yes    Side Effects:  Drowsiness     Medication Helping " "Symptoms:  yes        Review of Systems   As described      Objective    /76 (BP Location: Right arm, Patient Position: Sitting, Cuff Size: Adult Large)   Pulse 70   Temp 97.5  F (36.4  C) (Oral)   Resp 18   Ht 1.753 m (5' 9\")   Wt 127.9 kg (282 lb)   SpO2 99%   BMI 41.64 kg/m    Body mass index is 41.64 kg/m .  Physical Exam     Broad-based gait  Uses hands to arise from chair  Obi Strange MD                    "
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
## 2023-03-08 PROBLEM — R53.81 PHYSICAL DECONDITIONING: Status: ACTIVE | Noted: 2023-03-08

## 2023-03-08 PROBLEM — G47.33 OSA (OBSTRUCTIVE SLEEP APNEA): Status: ACTIVE | Noted: 2023-03-08

## 2023-03-08 LAB
ANION GAP SERPL CALCULATED.3IONS-SCNC: 12 MMOL/L (ref 7–15)
BUN SERPL-MCNC: 17.5 MG/DL (ref 8–23)
CALCIUM SERPL-MCNC: 9.4 MG/DL (ref 8.8–10.2)
CHLORIDE SERPL-SCNC: 102 MMOL/L (ref 98–107)
CREAT SERPL-MCNC: 0.8 MG/DL (ref 0.67–1.17)
DEPRECATED HCO3 PLAS-SCNC: 26 MMOL/L (ref 22–29)
GFR SERPL CREATININE-BSD FRML MDRD: 86 ML/MIN/1.73M2
GLUCOSE SERPL-MCNC: 155 MG/DL (ref 70–99)
POTASSIUM SERPL-SCNC: 4.3 MMOL/L (ref 3.4–5.3)
SODIUM SERPL-SCNC: 140 MMOL/L (ref 136–145)

## 2023-03-08 NOTE — ASSESSMENT & PLAN NOTE
Low-dose gabapentin effective in reducing discomfort, now brief and rare.  He would like to continue in spite of perceived hypersomnia

## 2023-03-08 NOTE — ASSESSMENT & PLAN NOTE
Unable to wear his sleeves in the shower, he lost his balance.  He has grab bars, which he used.  He ended up sitting down without trauma, was unable to get up.  Called paramedics to assist.  He is considering converting his shower to a walk-in shower.  Discussed formal OT assessment additional grab bars safety assessment.  He declines interventions

## 2023-03-08 NOTE — ASSESSMENT & PLAN NOTE
"Patient reports a remote history of study as well as a \"machine\" at home.  No records.  He declines reinvestigation  "

## 2023-03-08 NOTE — ASSESSMENT & PLAN NOTE
He is tired in the day.  He blames gabapentin, but finds the benefit worthy of continuation.  He declines further study.  Differential discussed

## 2023-05-25 ENCOUNTER — HOSPITAL ENCOUNTER (OUTPATIENT)
Facility: CLINIC | Age: 88
Setting detail: OBSERVATION
Discharge: HOME | End: 2023-05-26
Attending: EMERGENCY MEDICINE | Admitting: INTERNAL MEDICINE
Payer: MEDICARE

## 2023-05-25 DIAGNOSIS — D64.9 ANEMIA, UNSPECIFIED TYPE: ICD-10-CM

## 2023-05-25 DIAGNOSIS — K92.2 LOWER GI BLEED: ICD-10-CM

## 2023-05-25 LAB
ABO/RH(D): NORMAL
ANION GAP SERPL CALCULATED.3IONS-SCNC: 8 MMOL/L (ref 7–15)
ANTIBODY SCREEN: NEGATIVE
BASOPHILS # BLD AUTO: 0 10E3/UL (ref 0–0.2)
BASOPHILS NFR BLD AUTO: 0 %
BUN SERPL-MCNC: 25.6 MG/DL (ref 8–23)
CALCIUM SERPL-MCNC: 8.5 MG/DL (ref 8.8–10.2)
CHLORIDE SERPL-SCNC: 107 MMOL/L (ref 98–107)
CREAT SERPL-MCNC: 0.89 MG/DL (ref 0.67–1.17)
DEPRECATED HCO3 PLAS-SCNC: 25 MMOL/L (ref 22–29)
EOSINOPHIL # BLD AUTO: 0.1 10E3/UL (ref 0–0.7)
EOSINOPHIL NFR BLD AUTO: 1 %
ERYTHROCYTE [DISTWIDTH] IN BLOOD BY AUTOMATED COUNT: 13.8 % (ref 10–15)
GFR SERPL CREATININE-BSD FRML MDRD: 83 ML/MIN/1.73M2
GLUCOSE BLDC GLUCOMTR-MCNC: 138 MG/DL (ref 70–99)
GLUCOSE BLDC GLUCOMTR-MCNC: 139 MG/DL (ref 70–99)
GLUCOSE BLDC GLUCOMTR-MCNC: 218 MG/DL (ref 70–99)
GLUCOSE SERPL-MCNC: 278 MG/DL (ref 70–99)
HBA1C MFR BLD: 6.7 %
HCT VFR BLD AUTO: 28.7 % (ref 40–53)
HGB BLD-MCNC: 8.9 G/DL (ref 13.3–17.7)
HGB BLD-MCNC: 9.3 G/DL (ref 13.3–17.7)
HGB BLD-MCNC: 9.4 G/DL (ref 13.3–17.7)
IMM GRANULOCYTES # BLD: 0.1 10E3/UL
IMM GRANULOCYTES NFR BLD: 1 %
INR PPP: 1.24 (ref 0.85–1.15)
LYMPHOCYTES # BLD AUTO: 0.9 10E3/UL (ref 0.8–5.3)
LYMPHOCYTES NFR BLD AUTO: 11 %
MCH RBC QN AUTO: 30 PG (ref 26.5–33)
MCHC RBC AUTO-ENTMCNC: 32.8 G/DL (ref 31.5–36.5)
MCV RBC AUTO: 92 FL (ref 78–100)
MONOCYTES # BLD AUTO: 0.5 10E3/UL (ref 0–1.3)
MONOCYTES NFR BLD AUTO: 6 %
NEUTROPHILS # BLD AUTO: 7 10E3/UL (ref 1.6–8.3)
NEUTROPHILS NFR BLD AUTO: 81 %
NRBC # BLD AUTO: 0 10E3/UL
NRBC BLD AUTO-RTO: 0 /100
PLATELET # BLD AUTO: 177 10E3/UL (ref 150–450)
POTASSIUM SERPL-SCNC: 3.8 MMOL/L (ref 3.4–5.3)
RBC # BLD AUTO: 3.13 10E6/UL (ref 4.4–5.9)
SODIUM SERPL-SCNC: 140 MMOL/L (ref 136–145)
SPECIMEN EXPIRATION DATE: NORMAL
WBC # BLD AUTO: 8.6 10E3/UL (ref 4–11)

## 2023-05-25 PROCEDURE — 82962 GLUCOSE BLOOD TEST: CPT

## 2023-05-25 PROCEDURE — 85018 HEMOGLOBIN: CPT | Performed by: PHYSICIAN ASSISTANT

## 2023-05-25 PROCEDURE — 36415 COLL VENOUS BLD VENIPUNCTURE: CPT | Performed by: PHYSICIAN ASSISTANT

## 2023-05-25 PROCEDURE — 96376 TX/PRO/DX INJ SAME DRUG ADON: CPT

## 2023-05-25 PROCEDURE — 250N000013 HC RX MED GY IP 250 OP 250 PS 637: Performed by: INTERNAL MEDICINE

## 2023-05-25 PROCEDURE — 83036 HEMOGLOBIN GLYCOSYLATED A1C: CPT | Performed by: PHYSICIAN ASSISTANT

## 2023-05-25 PROCEDURE — 85025 COMPLETE CBC W/AUTO DIFF WBC: CPT | Performed by: EMERGENCY MEDICINE

## 2023-05-25 PROCEDURE — 80048 BASIC METABOLIC PNL TOTAL CA: CPT | Performed by: EMERGENCY MEDICINE

## 2023-05-25 PROCEDURE — 99285 EMERGENCY DEPT VISIT HI MDM: CPT | Mod: 25

## 2023-05-25 PROCEDURE — 86850 RBC ANTIBODY SCREEN: CPT | Performed by: EMERGENCY MEDICINE

## 2023-05-25 PROCEDURE — G0378 HOSPITAL OBSERVATION PER HR: HCPCS

## 2023-05-25 PROCEDURE — 250N000011 HC RX IP 250 OP 636: Performed by: PHYSICIAN ASSISTANT

## 2023-05-25 PROCEDURE — 250N000012 HC RX MED GY IP 250 OP 636 PS 637: Performed by: PHYSICIAN ASSISTANT

## 2023-05-25 PROCEDURE — 96374 THER/PROPH/DIAG INJ IV PUSH: CPT

## 2023-05-25 PROCEDURE — C9113 INJ PANTOPRAZOLE SODIUM, VIA: HCPCS | Performed by: PHYSICIAN ASSISTANT

## 2023-05-25 PROCEDURE — 99222 1ST HOSP IP/OBS MODERATE 55: CPT | Mod: AI | Performed by: PHYSICIAN ASSISTANT

## 2023-05-25 PROCEDURE — 36415 COLL VENOUS BLD VENIPUNCTURE: CPT | Performed by: EMERGENCY MEDICINE

## 2023-05-25 PROCEDURE — 85610 PROTHROMBIN TIME: CPT | Performed by: EMERGENCY MEDICINE

## 2023-05-25 RX ORDER — ATROPINE SULFATE 0.1 MG/ML
1 INJECTION INTRAVENOUS
Status: DISCONTINUED | OUTPATIENT
Start: 2023-05-25 | End: 2023-05-26 | Stop reason: HOSPADM

## 2023-05-25 RX ORDER — VITAMIN E 268 MG
1000 CAPSULE ORAL EVERY EVENING
COMMUNITY

## 2023-05-25 RX ORDER — ACETAMINOPHEN 325 MG/1
650 TABLET ORAL EVERY 6 HOURS PRN
Status: DISCONTINUED | OUTPATIENT
Start: 2023-05-25 | End: 2023-05-26 | Stop reason: HOSPADM

## 2023-05-25 RX ORDER — NALOXONE HYDROCHLORIDE 0.4 MG/ML
0.4 INJECTION, SOLUTION INTRAMUSCULAR; INTRAVENOUS; SUBCUTANEOUS
Status: DISCONTINUED | OUTPATIENT
Start: 2023-05-25 | End: 2023-05-26 | Stop reason: HOSPADM

## 2023-05-25 RX ORDER — BISACODYL 5 MG
10 TABLET, DELAYED RELEASE (ENTERIC COATED) ORAL ONCE
Status: COMPLETED | OUTPATIENT
Start: 2023-05-25 | End: 2023-05-25

## 2023-05-25 RX ORDER — NALOXONE HYDROCHLORIDE 0.4 MG/ML
0.2 INJECTION, SOLUTION INTRAMUSCULAR; INTRAVENOUS; SUBCUTANEOUS
Status: DISCONTINUED | OUTPATIENT
Start: 2023-05-25 | End: 2023-05-26 | Stop reason: HOSPADM

## 2023-05-25 RX ORDER — POLYETHYLENE GLYCOL 3350 17 G/17G
238 POWDER, FOR SOLUTION ORAL ONCE
Status: COMPLETED | OUTPATIENT
Start: 2023-05-25 | End: 2023-05-25

## 2023-05-25 RX ORDER — DEXTROSE MONOHYDRATE 25 G/50ML
25-50 INJECTION, SOLUTION INTRAVENOUS
Status: DISCONTINUED | OUTPATIENT
Start: 2023-05-25 | End: 2023-05-26 | Stop reason: HOSPADM

## 2023-05-25 RX ORDER — SIMETHICONE 40MG/0.6ML
133 SUSPENSION, DROPS(FINAL DOSAGE FORM)(ML) ORAL
Status: DISCONTINUED | OUTPATIENT
Start: 2023-05-25 | End: 2023-05-26 | Stop reason: HOSPADM

## 2023-05-25 RX ORDER — ONDANSETRON 4 MG/1
4 TABLET, ORALLY DISINTEGRATING ORAL EVERY 6 HOURS PRN
Status: DISCONTINUED | OUTPATIENT
Start: 2023-05-25 | End: 2023-05-26 | Stop reason: HOSPADM

## 2023-05-25 RX ORDER — DIPHENHYDRAMINE HYDROCHLORIDE 50 MG/ML
25-50 INJECTION INTRAMUSCULAR; INTRAVENOUS
Status: DISCONTINUED | OUTPATIENT
Start: 2023-05-25 | End: 2023-05-26 | Stop reason: HOSPADM

## 2023-05-25 RX ORDER — NICOTINE POLACRILEX 4 MG
15-30 LOZENGE BUCCAL
Status: DISCONTINUED | OUTPATIENT
Start: 2023-05-25 | End: 2023-05-26 | Stop reason: HOSPADM

## 2023-05-25 RX ORDER — ACETAMINOPHEN 650 MG/1
650 SUPPOSITORY RECTAL EVERY 6 HOURS PRN
Status: DISCONTINUED | OUTPATIENT
Start: 2023-05-25 | End: 2023-05-26 | Stop reason: HOSPADM

## 2023-05-25 RX ORDER — EPINEPHRINE 1 MG/ML
0.1 INJECTION, SOLUTION, CONCENTRATE INTRAVENOUS
Status: DISCONTINUED | OUTPATIENT
Start: 2023-05-25 | End: 2023-05-26 | Stop reason: HOSPADM

## 2023-05-25 RX ORDER — FLUMAZENIL 0.1 MG/ML
0.2 INJECTION, SOLUTION INTRAVENOUS
Status: DISCONTINUED | OUTPATIENT
Start: 2023-05-25 | End: 2023-05-26 | Stop reason: HOSPADM

## 2023-05-25 RX ORDER — ATORVASTATIN CALCIUM 40 MG/1
20 TABLET, FILM COATED ORAL EVERY EVENING
COMMUNITY

## 2023-05-25 RX ORDER — MAGNESIUM CARB/ALUMINUM HYDROX 105-160MG
296 TABLET,CHEWABLE ORAL ONCE
Status: DISCONTINUED | OUTPATIENT
Start: 2023-05-26 | End: 2023-05-26 | Stop reason: HOSPADM

## 2023-05-25 RX ORDER — ONDANSETRON 2 MG/ML
4 INJECTION INTRAMUSCULAR; INTRAVENOUS EVERY 6 HOURS PRN
Status: DISCONTINUED | OUTPATIENT
Start: 2023-05-25 | End: 2023-05-26 | Stop reason: HOSPADM

## 2023-05-25 RX ORDER — LIDOCAINE 40 MG/G
CREAM TOPICAL
Status: DISCONTINUED | OUTPATIENT
Start: 2023-05-25 | End: 2023-05-26 | Stop reason: HOSPADM

## 2023-05-25 RX ORDER — FENTANYL CITRATE 50 UG/ML
50-100 INJECTION, SOLUTION INTRAMUSCULAR; INTRAVENOUS EVERY 5 MIN PRN
Status: DISCONTINUED | OUTPATIENT
Start: 2023-05-25 | End: 2023-05-26 | Stop reason: HOSPADM

## 2023-05-25 RX ADMIN — INSULIN ASPART 2 UNITS: 100 INJECTION, SOLUTION INTRAVENOUS; SUBCUTANEOUS at 15:37

## 2023-05-25 RX ADMIN — BISACODYL 10 MG: 5 TABLET, COATED ORAL at 16:58

## 2023-05-25 RX ADMIN — PANTOPRAZOLE SODIUM 40 MG: 40 INJECTION, POWDER, FOR SOLUTION INTRAVENOUS at 13:25

## 2023-05-25 RX ADMIN — PANTOPRAZOLE SODIUM 40 MG: 40 INJECTION, POWDER, FOR SOLUTION INTRAVENOUS at 22:13

## 2023-05-25 RX ADMIN — POLYETHYLENE GLYCOL 3350 238 G: 17 POWDER, FOR SOLUTION ORAL at 16:59

## 2023-05-25 ASSESSMENT — ACTIVITIES OF DAILY LIVING (ADL)
ADLS_ACUITY_SCORE: 24
ADLS_ACUITY_SCORE: 35
ADLS_ACUITY_SCORE: 35

## 2023-05-25 NOTE — ED NOTES
Bed: [facility]  Expected date: 5/25/23  Expected time: 10:14 AM  Means of arrival: Ambulance  Comments:  [facility]

## 2023-05-25 NOTE — H&P
[facility]    History and Physical - Hospitalist Service       Date of Admission:  5/25/2023    Assessment & Plan      Damien Vallecillo is a 87 year old male with history of prediabetes, epilepsy, peripheral polyneuropathy with frequent falls, venous stasis, BPH, arthritis and HLP admitted on 5/25/2023 with complaint of painless bright red blood per rectum.    In the ED he is afebrile with heart rate 94, pressure 143/80 and breathing comfortably on room air without hypoxia.  Orthostatic vital signs are negative without lightheadedness or dizziness with standing.  Lab work is remarkable for normal BMP with the exception of BUN 25.6, glucose 278 and CBC remarkable for hemoglobin 9.4 when previously 14.5 in September.    #Painless bright red blood per rectum  -Reports multiple episodes of painless bright red blood per rectum starting on 5/21  -Colonoscopy from 2018 did show evidence of diverticula and patient reports daily use of NSAID and alcohol  -BUN slightly elevated so possibly could be upper GI but patient is reporting no pain and bright red blood noted in stool more suspicious for diverticular bleed  -We will obtain type and cross and check hemoglobin every 6 hours  -Clear liquid diet for now  -GI consult  -Protonix IV twice daily    #Acute blood loss anemia  -Hemoglobin in September was 14.5 with hemoglobin of 9.4 today  -Orthostatics negative  -Plan to check hemoglobins every 6 hours  -Conditional unit of blood ordered for hemoglobin less than 7 (patient signed consent)    #Prediabetes  -Patient reports a history of prediabetes and is not on any medicines  -We will order sliding scale for n.p.o. status    #History of epilepsy  -Not on medication    #History of peripheral polyneuropathy with frequent falls  -Uses a cane    #Venous stasis  -Hold Lasix for now    #BPH  -Continue Flomax    #History of arthritis    #HLP  -Continue atorvastatin        Diet:  Clear liquid diet  DVT Prophylaxis:  "Pneumatic Compression Devices  Fernandez Catheter: Not present  Lines: None     Cardiac Monitoring: None  Code Status:   Full code    Clinically Significant Risk Factors Present on Admission               # Coagulation Defect: INR = 1.24 (Ref range: 0.85 - 1.15) and/or PTT = N/A, will monitor for bleeding    # Hypertension: Home medication list includes antihypertensive(s)      # Obesity: Estimated body mass index is 39.87 kg/m  as calculated from the following:    Height as of this encounter: 1.753 m (5' 9\").    Weight as of this encounter: 122.5 kg (270 lb).            Disposition Plan      Expected Discharge Date: 05/26/2023                The patient's care was discussed with the Patient, Patient's Family and ED Consultant(s).    Britta Lainez PA-C  Hospitalist Service  [facility]  Securely message with [employer] (more info)  Text page via [facility] Paging/Directory     ______________________________________________________________________    Chief Complaint   Painless bright red blood per rectum      History of Present Illness   Damien Vallecillo is a 87 year old male who presents with painless bright red blood per rectum since 5/22.  He is unable to quantify how many episodes he has of this but states that it happens every time he goes to the bathroom.  Most recently in the ED the stool was maroonish.  He denies abdominal pain, nausea, vomiting, fever, chills, lightheadedness, dizziness, shortness of breath and cough with this.  He does use Aleve daily for his arthritis and has some amount of alcohol daily but no history of alcohol withdrawal.  He denies history of upper GI bleed.  He has not been sick recently or started any new medicines.      Past Medical History    Past Medical History:   Diagnosis Date     Acute suppurative arthritis (H)      Acute, but ill-defined, cerebrovascular disease      Ankle fracture 12/28/2018     Arthritis      Benign prostatic hyperplasia with urinary " "frequency 3/29/2022     Cheilosis 9/28/2018     Chronic headaches      Closed right ankle fracture 12/29/2018     Cognitive attention deficit 9/16/2021     Diabetes (H)     \"Pre-diabetes\"     Dislocation of right patella 9/16/2020     Elevated serum creatinine 11/13/2019    GFR Estimate Date Value Ref Range Status 09/30/2019 84 >60 mL/min/[1.73_m2] Final   Comment:   Non  GFR Calc Starting 12/18/2018, serum creatinine based estimated GFR (eGFR) will be  calculated using the Chronic Kidney Disease Epidemiology Collaboration  (CKD-EPI) equation.         Encounter for immunization 9/21/2022     External hemorrhoids 9/21/2022     Fall 3/8/2023     Fecal incontinence 9/16/2020     Glucose intolerance (impaired glucose tolerance) 5/31/2013     Gout, unspecified      Grieving 9/16/2021     Hammer toe of left foot 10/21/2016     Hematuria      History of arthroplasty of right knee 08/17/2017     History of blood transfusion      Hyperlipidemia LDL goal <160 11/13/2019     Hypersomnia 3/7/2023     Left foot pain 9/30/2019     Left knee pain, unspecified chronicity 5/8/2017     Lentigo maligna (H)      Malignant neoplasm of rectosigmoid junction (H)      Morbid obesity due to excess calories (H) 10/21/2016     RENAE (obstructive sleep apnea) 3/8/2023     Other convulsions     last seizure 7-8 years ago...not certain if these were true seizres or not..     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     Right knee pain, unspecified chronicity 5/8/2017     Rosacea 9/28/2018     Syncope      Tubulovillous adenoma of colon      Venous stasis dermatitis of both lower extremities 10/21/2016       Past Surgical History   Past Surgical History:   Procedure Laterality Date     ARTHROPLASTY KNEE Right 6/19/2017    Procedure: ARTHROPLASTY KNEE;  Right total knee arthroplasty, Hammer toe " repair toe 2,3,4,5 left foot with osteotomy;  Surgeon: Alec Raymond MD;  Location:  OR     COLONOSCOPY N/A 6/23/2015    Procedure: COMBINED COLONOSCOPY, SINGLE OR MULTIPLE BIOPSY/POLYPECTOMY BY BIOPSY;  Surgeon: Kimberly Alfaro MD;  Location:  GI     COLONOSCOPY N/A 7/30/2015    Procedure: COLONOSCOPY;  Surgeon: Enrique Salazar MD;  Location:  OR     COLONOSCOPY N/A 7/31/2018    Procedure: COLONOSCOPY;  Colonoscopy;  Surgeon: Tiffany Cheema MD;  Location:  GI     HERNIORRHAPHY EPIGASTRIC  4/27/2012    Procedure:HERNIORRHAPHY EPIGASTRIC; Epigastric Hernia Repair with mesh ; Surgeon:BOLIVAR OLIVEIRA; Location: OR     LAPAROSCOPIC ASSISTED COLECTOMY Right 7/30/2015    Procedure: LAPAROSCOPIC ASSISTED COLECTOMY;  Surgeon: Enrique Salazar MD;  Location:  OR     ORTHOPEDIC SURGERY      lt knee arthroplasty     REALIGN PATELLA Right 10/9/2017    Procedure: REALIGN PATELLA;  Revision right total knee arthroplasty;  Surgeon: Alec Raymond MD;  Location:  OR     REPAIR HAMMER TOE Left 6/19/2017    Procedure: REPAIR HAMMER TOE;  Hammer toe repair toe 2,3,4,5 left foot with osteotomy   ;  Surgeon: Beverly Kim DPM, Podiatry/Foot and Ankle Surgery;  Location:  OR     SIGMOIDOSCOPY FLEXIBLE  5/29/2013    Procedure: SIGMOIDOSCOPY FLEXIBLE;  SIGMOIDOSCOPY FLEXIBLE (FV) Needs blood sugar ck'd;  Surgeon: Enrique Salazar MD;  Location:  GI     skin cancer excision       ZZC NONSPECIFIC PROCEDURE      right heel surgery; spur removed.       Prior to Admission Medications   Prior to Admission Medications   Prescriptions Last Dose Informant Patient Reported? Taking?   ACE NOT PRESCRIBED, INTENTIONAL,   No No   Sig: ACE Inhibitor not prescribed due to Other:  Had cough on ACEI in past         CINNAMON PO   Yes No   Sig: Take 1 tablet by mouth daily    Multiple Vitamins-Minerals (MULTIVITAMIN ADULT PO)   Yes No   Sig: Take 1 tablet by mouth daily Reported on 5/12/2017    atorvastatin (LIPITOR) 40 MG tablet   No No   Sig: Take 1 tablet (40 mg) by mouth At Bedtime   blood glucose (ACCU-CHEK LAURA) test strip   No No   SiUse to test blood sugar 2 times daily or as directed.   blood glucose monitoring (ACCU-CHEK LAURA PLUS) test strip   No No   Sig: Use to test blood sugar 1 time daily   clindamycin (CLEOCIN T) 1 % external solution   No No   Sig: Apply topically 2 times daily   diclofenac (VOLTAREN) 1 % topical gel   Yes No   Sig: APPLY 2 GRAMS TOPICALLY TWICE A DAY AS NEEDED TO AFFECTED AREA FOR PAIN. *USE DOSE CARD IN BOX TO MEASURE DOSE. MAX 32 GRAMS PER DAY.   doxycycline hyclate (PERIOSTAT) 20 MG tablet   Yes No   Sig: Take 1 tablet (20 mg) by mouth 2 times daily   fish oil-omega-3 fatty acids 1000 MG capsule   Yes No   Sig: Take 1 g by mouth every evening   furosemide (LASIX) 20 MG tablet   No No   Sig: Take 1 tablet (20 mg) by mouth 2 times daily   gabapentin (NEURONTIN) 300 MG capsule   No No   Sig: Take 1 capsule (300 mg) by mouth At Bedtime   glucosamine-chondroitin 500-400 MG CAPS per capsule   Yes No   Sig: Take 1 capsule by mouth 2 times daily   metroNIDAZOLE (METROGEL) 0.75 % external gel   Yes No   Sig: APPLY THIN LAYER TO FACE TWICE A DAY FOR ROSACEA   naproxen sodium 220 MG capsule   Yes No   Sig: Take 220 mg by mouth daily   order for DME   No No   Sig: Equipment being ordered: Walker Wheels () and Walker ()  Treatment Diagnosis: Impaired functional mobility   order for DME   No No   Si: Gradient Compression Wraps; 2: Cast Boots; 3: BLE knee or thigh high 20-30 mm Hg compression stocking; 4: BLE knee or thigh high custom compression stocking; 5: Alternative BLE knee high or thigh compression garments (velcro/buckling)   pramox-pe-glycerin-petrolatum (PREPARATION H) 1-0.25-14.4-15 % CREA cream   No No   Sig: Cleanse the affected area by patting or blotting with an appropriate cleansing wipe. Gently dry by patting or blotting with a tissue or a  soft  cloth before applying cream.  - apply externally or in the lower portion of the anal canal only  - apply externally to the affected area up to 4 times daily, especially at night, in the  morning or after each bowel movement   tamsulosin (FLOMAX) 0.4 MG capsule   No No   Sig: Take 1 capsule (0.4 mg) by mouth daily      Facility-Administered Medications: None        Physical Exam   Vital Signs: Temp: 97.8  F (36.6  C) Temp src: Oral BP: 121/66 Pulse: 99   Resp: 18 SpO2: 100 % O2 Device: None (Room air)    Weight: 270 lbs 0 oz    General Appearance: Alert and oriented x3  Respiratory: Clear to auscultation bilaterally  Cardiovascular: RRR without murmur  GI: Bowel sounds are present without tenderness  Skin: No rashes or open sores are noted      Medical Decision Making       55 MINUTES SPENT BY ME on the date of service doing chart review, history, exam, documentation & further activities per the note.      Data     I have personally reviewed the following data over the past 24 hrs:    8.6  \   9.4 (L)   / 177     140 107 25.6 (H) /  278 (H)   3.8 25 0.89 \       INR:  1.24 (H) PTT:  N/A   D-dimer:  N/A Fibrinogen:  N/A       Imaging results reviewed over the past 24 hrs:   No results found for this or any previous visit (from the past 24 hour(s)).

## 2023-05-25 NOTE — CONSULTS
"[facility] - Gastroenterology Consultation    Consultation Assessment/Plan:  1.) Hematochezia:  - 4 days of hematochezia with noted drop in hgb and slightly elevated BUN in a patient with a history of diverticulosis and daily Aleve use  - unclear source of bleeding, but diverticular bleed and NSAID induced ulcer are likely possibilities  - will plant for EGD (+/- colonoscopy) tomorrow morning (prep ordered for this evening, NPO after midnight tonight)  - agree with empiric IV PPI    60 minutes was spent providing patient care, including patient evaluation, reviewing documentation/test results, and .    Juan Grimaldo MD    Office: [phone]    ---------------------------------------------------------------------------------------------------------------------------  Patient Name: Damien Vallecillo      YOB: 1935 (Age: 87 year old)   Medical Record #: [id number]       Primary Physician: Obi Strange   Referring Provider: Calin Gunn MD   Admit Date/Time: 5/25/2023 10:29 AM       I was asked to see this patient by Calin Gunn MD for evaluation of hematochezia, anemia.    History of Present Illness:  88 y/o male with history of left hemicolectomy (due to large, unresectable polyp), diveticulosis, chronic back pain (tx: Aleve every day), peripheral neuropathy and seizures who presents with 4 days of rectal bleeding.  Upon presentation he was noted to have hgb~9.5 (about 5 grams below last hgb in 2022) He states this has been intermittent throughout the day and night.  Associated with otherwise \"normal\" appearing stool.  The bleeding has changed from bright red to more \"burgundy\" in color today.  His last colonoscopy in 2018 demonstrated many diveticula, but was otherwise normal to the level of the ileocolonic anastomosis in the ascending colon.  He takes Aleve daily for back pain for the past ~1 year.  He does not take antacid medication.  He denies abdominal pain, " "nausea or vomiting.     Past Medical History:  Past Medical History:   Diagnosis Date     Acute suppurative arthritis (H)      Acute, but ill-defined, cerebrovascular disease      Ankle fracture 12/28/2018     Arthritis      Benign prostatic hyperplasia with urinary frequency 3/29/2022     Cheilosis 9/28/2018     Chronic headaches      Closed right ankle fracture 12/29/2018     Cognitive attention deficit 9/16/2021     Diabetes (H)     \"Pre-diabetes\"     Dislocation of right patella 9/16/2020     Elevated serum creatinine 11/13/2019    GFR Estimate Date Value Ref Range Status 09/30/2019 84 >60 mL/min/[1.73_m2] Final   Comment:   Non  GFR Calc Starting 12/18/2018, serum creatinine based estimated GFR (eGFR) will be  calculated using the Chronic Kidney Disease Epidemiology Collaboration  (CKD-EPI) equation.         Encounter for immunization 9/21/2022     External hemorrhoids 9/21/2022     Fall 3/8/2023     Fecal incontinence 9/16/2020     Glucose intolerance (impaired glucose tolerance) 5/31/2013     Gout, unspecified      Grieving 9/16/2021     Hammer toe of left foot 10/21/2016     Hematuria      History of arthroplasty of right knee 08/17/2017     History of blood transfusion      Hyperlipidemia LDL goal <160 11/13/2019     Hypersomnia 3/7/2023     Left foot pain 9/30/2019     Left knee pain, unspecified chronicity 5/8/2017     Lentigo maligna (H)      Malignant neoplasm of rectosigmoid junction (H)      Morbid obesity due to excess calories (H) 10/21/2016     RENAE (obstructive sleep apnea) 3/8/2023     Other convulsions     last seizure 7-8 years ago...not certain if these were true seizres or not..     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     Right knee pain, unspecified chronicity 5/8/2017     Rosacea 9/28/2018     Syncope      Tubulovillous adenoma of " colon      Venous stasis dermatitis of both lower extremities 10/21/2016     Past Surgical History:   Procedure Laterality Date     ARTHROPLASTY KNEE Right 6/19/2017    Procedure: ARTHROPLASTY KNEE;  Right total knee arthroplasty, Hammer toe repair toe 2,3,4,5 left foot with osteotomy;  Surgeon: Alec Raymond MD;  Location:  OR     COLONOSCOPY N/A 6/23/2015    Procedure: COMBINED COLONOSCOPY, SINGLE OR MULTIPLE BIOPSY/POLYPECTOMY BY BIOPSY;  Surgeon: Kimberly Alfaro MD;  Location:  GI     COLONOSCOPY N/A 7/30/2015    Procedure: COLONOSCOPY;  Surgeon: Enrique Salazar MD;  Location:  OR     COLONOSCOPY N/A 7/31/2018    Procedure: COLONOSCOPY;  Colonoscopy;  Surgeon: Tiffany Cheema MD;  Location:  GI     HERNIORRHAPHY EPIGASTRIC  4/27/2012    Procedure:HERNIORRHAPHY EPIGASTRIC; Epigastric Hernia Repair with mesh ; Surgeon:BOLIVAR OLIVEIRA; Location: OR     LAPAROSCOPIC ASSISTED COLECTOMY Right 7/30/2015    Procedure: LAPAROSCOPIC ASSISTED COLECTOMY;  Surgeon: Enrique Salazar MD;  Location:  OR     ORTHOPEDIC SURGERY      lt knee arthroplasty     REALIGN PATELLA Right 10/9/2017    Procedure: REALIGN PATELLA;  Revision right total knee arthroplasty;  Surgeon: Alec Raymond MD;  Location:  OR     REPAIR HAMMER TOE Left 6/19/2017    Procedure: REPAIR HAMMER TOE;  Hammer toe repair toe 2,3,4,5 left foot with osteotomy   ;  Surgeon: Beverly Kim DPM, Podiatry/Foot and Ankle Surgery;  Location:  OR     SIGMOIDOSCOPY FLEXIBLE  5/29/2013    Procedure: SIGMOIDOSCOPY FLEXIBLE;  SIGMOIDOSCOPY FLEXIBLE (FV) Needs blood sugar ck'd;  Surgeon: Enrique Salazar MD;  Location:  GI     skin cancer excision       ZZC NONSPECIFIC PROCEDURE      right heel surgery; spur removed.       Review of Systems: A comprehensive review of systems was negative except for items noted in HPI/Subjective.    Current Medications:  No current outpatient medications on file.      "  Allergies/Sensitivities:   Allergies   Allergen Reactions     Levetiracetam [Keppra] Rash     Oxcarbazepine [Oxcarbazepine] Rash          Social History:   Social History     Socioeconomic History     Marital status:      Spouse name: Not on file     Number of children: Not on file     Years of education: Not on file     Highest education level: Not on file   Occupational History     Not on file   Tobacco Use     Smoking status: Never     Smokeless tobacco: Never   Vaping Use     Vaping status: Never Used   Substance and Sexual Activity     Alcohol use: Yes     Alcohol/week: 0.0 standard drinks of alcohol     Comment: Occas     Drug use: No     Sexual activity: Yes     Partners: Female   Other Topics Concern     Parent/sibling w/ CABG, MI or angioplasty before 65F 55M? No   Social History Narrative     Not on file     Social Determinants of Health     Financial Resource Strain: Not on file   Food Insecurity: Not on file   Transportation Needs: Not on file   Physical Activity: Not on file   Stress: Not on file   Social Connections: Not on file   Intimate Partner Violence: Not on file   Housing Stability: Not on file     Family History:   Family History   Problem Relation Age of Onset     Cancer - colorectal Mother      Cerebrovascular Disease Father        Physical Exam:  BP (!) 140/76 (BP Location: Left arm)   Pulse 90   Temp 98.8  F (37.1  C) (Oral)   Resp 19   Ht 1.753 m (5' 9\")   Wt 121.3 kg (267 lb 6.7 oz)   SpO2 99%   BMI 39.49 kg/m     General Appearance: Comfortable, laying in bed  Eyes: Normal  HEENT: Normal  Neck: Normal, no palpable lymph nodes  Respiratory: Normal  Cardiovascular: Normal  Gastrointestinal: Normal bowel sounds  Musculoskeletal: Normal  Lymphatic: Normal  Skin: Normal  Neurologic: Normal     Labs/Imaging:  Recent Labs   Lab Test 05/25/23  1038 09/12/22  1001 09/16/20  0933 04/04/19  0909 12/28/18  1657 10/11/17  1510 09/26/17  1457 06/22/17  0602 06/19/17  1346 " 05/15/17  1030 02/13/17  1115 08/10/15  0835 08/08/15  0600 08/05/15  0650 08/04/15  1157 07/01/15  0207 06/30/15  1810   WBC 8.6 5.9 6.0 6.4 9.0  --  7.6  --   --  5.9   < >  --   --   --   --    < > 10.3   HGB 9.4* 14.5 15.1 15.2 12.4* 10.2* 13.3  --    < > 12.1*   < >  --   --   --   --    < > 12.4*   MCV 92 89 89 88 88  --  82  --   --  78   < >  --   --   --   --    < > 83    159 177 184 217  --  233 153   < > 210   < >  --    < > 252  --    < > 261   INR 1.24*  --   --   --  1.19*  --   --   --   --   --   --  1.15*  --  1.09 1.16*  --  1.11    < > = values in this interval not displayed.     No lab results found.    Invalid input(s): FERRITIN  Recent Labs   Lab Test 05/25/23  1038 03/07/23  1557 09/21/22  1207 03/29/22  1449 09/16/21  1111 09/16/20  0933 09/30/19  0949   POTASSIUM 3.8 4.3 4.1 3.7 4.8 4.3 4.2   CHLORIDE 107 102 105 105 106 107 108   BUN 25.6* 17.5 16 19 16 16 17     Recent Labs   Lab Test 09/21/22  1207 09/16/21  1111 09/16/20  0933 09/30/19  0949 04/04/19  0909 01/30/18  1048 09/26/17  1457   PROTEIN  --  Negative  --   --   --   --   --    ALBUMIN 3.8 3.9 3.9 3.8 3.8 3.9 3.8   BILITOTAL 1.0 1.3 1.2 1.2 1.0 0.8 0.6   AST 27 30 18 17 22 16 13   ALT 42 52 38 33 32 25 22

## 2023-05-25 NOTE — PLAN OF CARE
PRIMARY DIAGNOSIS: RECTAL BLEEDING     OUTPATIENT/OBSERVATION GOALS TO BE MET BEFORE DISCHARGE  1. Orthostatic performed: No    2. Stable Hgb Yes.   Recent Labs   Lab Test 05/25/23  1624 05/25/23  1038 09/12/22  1001   HGB 9.3* 9.4* 14.5       3. Resolved or declined bleeding episodes: No,  with a small amount of bleeding Last episode: 1700    4. Appropriate testing complete: Yes    5. Cleared for discharge by consultants (if involved): No    6. Safe discharge environment identified: Yes    Discharge Planner Nurse   Safe discharge environment identified: Yes  Barriers to discharge: Yes       Entered by: KRISTIAN RONQUILLO RN 05/25/2023    Vital signs:  Temp: 97.7  F (36.5  C) Temp src: Oral BP: (!) 156/79 Pulse: 86   Resp: 18 SpO2: 99 % O2 Device: None (Room air) Pt alert and oriented x4. Denies pain, nausea, vomiting. On clear liquid diet. Tolerated diet. Pt is doing bowel prep for coloscopy tomorrow started @1700. Pt had x1 loose dark stool before bowel prep started and another small brown BM mixed with blood after bowel prep initiated. Completed 1 out of 2 Miralax bottle. Pt is assist of x1 with a cane. GI following. Plan for EGD (+/-Colonoscopy) tomorrow. Trending Hgb Q6 hrs. NPO after midnight. Will continue to monitor.      Please review provider order for any additional goals.   Nurse to notify provider when observation goals have been met and patient is ready for discharge.

## 2023-05-25 NOTE — ED PROVIDER NOTES
History     Chief Complaint:  Rectal Bleeding       HPI   Damien Vallecillo is a 87 year old male presenting to the emergency department for evaluation of rectal bleeding.  Patient reports that symptoms first began about 4 days previous.  He noted the presence of bright red blood mixed in with stool.  He notes symptoms did seem a little bit better the day after that, though again yesterday and today, has noted recurrent bleeding.  He denies any associated abdominal pain or rectal pain.  He is not currently on anticoagulation.  Does note some fatigue particularly with activity, though denies any chest pain or shortness of breath.  Denies any symptoms at rest.  Last colonoscopy in 2018.      Independent Historian:   Wife present at bedside notes patient did have to change the bed sheets yesterday.    Review of External Notes:   Reviewed colonoscopy from 2018 where patient was noted to have diverticulosis      Medications:    See EMR    Past Medical History:    Past Medical History:   Diagnosis Date     Acute suppurative arthritis (H)      Acute, but ill-defined, cerebrovascular disease      Ankle fracture 12/28/2018     Arthritis      Benign prostatic hyperplasia with urinary frequency 3/29/2022     Cheilosis 9/28/2018     Chronic headaches      Closed right ankle fracture 12/29/2018     Cognitive attention deficit 9/16/2021     Diabetes (H)      Dislocation of right patella 9/16/2020     Elevated serum creatinine 11/13/2019     Encounter for immunization 9/21/2022     External hemorrhoids 9/21/2022     Fall 3/8/2023     Fecal incontinence 9/16/2020     Glucose intolerance (impaired glucose tolerance) 5/31/2013     Gout, unspecified      Grieving 9/16/2021     Hammer toe of left foot 10/21/2016     Hematuria      History of arthroplasty of right knee 08/17/2017     History of blood transfusion      Hyperlipidemia LDL goal <160 11/13/2019     Hypersomnia 3/7/2023     Left foot pain 9/30/2019     Left knee pain,  unspecified chronicity 5/8/2017     Lentigo maligna (H)      Malignant neoplasm of rectosigmoid junction (H)      Morbid obesity due to excess calories (H) 10/21/2016     RENAE (obstructive sleep apnea) 3/8/2023     Other convulsions      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     Right knee pain, unspecified chronicity 5/8/2017     Rosacea 9/28/2018     Syncope      Tubulovillous adenoma of colon      Venous stasis dermatitis of both lower extremities 10/21/2016       Past Surgical History:    Past Surgical History:   Procedure Laterality Date     ARTHROPLASTY KNEE Right 6/19/2017    Procedure: ARTHROPLASTY KNEE;  Right total knee arthroplasty, Hammer toe repair toe 2,3,4,5 left foot with osteotomy;  Surgeon: Alec Raymond MD;  Location:  OR     COLONOSCOPY N/A 6/23/2015    Procedure: COMBINED COLONOSCOPY, SINGLE OR MULTIPLE BIOPSY/POLYPECTOMY BY BIOPSY;  Surgeon: Kimberly Alfaro MD;  Location:  GI     COLONOSCOPY N/A 7/30/2015    Procedure: COLONOSCOPY;  Surgeon: Enrique Salazar MD;  Location:  OR     COLONOSCOPY N/A 7/31/2018    Procedure: COLONOSCOPY;  Colonoscopy;  Surgeon: Tiffany Cheema MD;  Location:  GI     HERNIORRHAPHY EPIGASTRIC  4/27/2012    Procedure:HERNIORRHAPHY EPIGASTRIC; Epigastric Hernia Repair with mesh ; Surgeon:BOLIVAR OLIVEIRA; Location: OR     LAPAROSCOPIC ASSISTED COLECTOMY Right 7/30/2015    Procedure: LAPAROSCOPIC ASSISTED COLECTOMY;  Surgeon: Enrique Salazar MD;  Location:  OR     ORTHOPEDIC SURGERY      lt knee arthroplasty     REALIGN PATELLA Right 10/9/2017    Procedure: REALIGN PATELLA;  Revision right total knee arthroplasty;  Surgeon: Alec Raymond MD;  Location:  OR     REPAIR HAMMER TOE Left 6/19/2017    Procedure: REPAIR HAMMER TOE;  Hammer toe repair toe 2,3,4,5 left foot with  "osteotomy   ;  Surgeon: Beverly Kim DPM, Podiatry/Foot and Ankle Surgery;  Location:  OR     SIGMOIDOSCOPY FLEXIBLE  5/29/2013    Procedure: SIGMOIDOSCOPY FLEXIBLE;  SIGMOIDOSCOPY FLEXIBLE (FV) Needs blood sugar ck'd;  Surgeon: Enrique Salazar MD;  Location:  GI     skin cancer excision       [facility] NONSPECIFIC PROCEDURE      right heel surgery; spur removed.        Physical Exam     Patient Vitals for the past 24 hrs:   BP Temp Temp src Pulse Resp SpO2 Height Weight   05/25/23 1328 133/74 -- -- -- -- 100 % -- --   05/25/23 1318 130/75 -- -- 78 -- 100 % -- --   05/25/23 1308 -- -- -- -- -- 99 % -- --   05/25/23 1258 127/82 -- -- -- -- 98 % -- --   05/25/23 1248 134/75 -- -- 78 -- 99 % -- --   05/25/23 1238 -- -- -- -- -- 99 % -- --   05/25/23 1228 -- -- -- -- -- 99 % -- --   05/25/23 1218 -- -- -- -- -- 98 % -- --   05/25/23 1216 121/66 -- -- 99 -- 100 % -- --   05/25/23 1215 115/75 -- -- 95 -- 94 % -- --   05/25/23 1214 -- -- -- -- -- 99 % -- --   05/25/23 1213 -- -- -- -- -- 100 % -- --   05/25/23 1212 123/66 -- -- 84 -- 99 % -- --   05/25/23 1208 123/66 -- -- -- -- 99 % -- --   05/25/23 1148 -- -- -- 77 -- 99 % -- --   05/25/23 1142 138/72 -- -- -- -- 98 % -- --   05/25/23 1138 -- -- -- -- -- 99 % -- --   05/25/23 1128 -- -- -- -- -- 99 % -- --   05/25/23 1127 134/66 -- -- -- -- 97 % -- --   05/25/23 1118 -- -- -- 84 -- 100 % -- --   05/25/23 1112 139/79 -- -- -- -- 99 % -- --   05/25/23 1108 -- -- -- -- -- 97 % -- --   05/25/23 1058 -- -- -- -- -- 98 % -- --   05/25/23 1057 126/75 -- -- -- -- 98 % -- --   05/25/23 1048 126/72 -- -- -- -- 98 % -- --   05/25/23 1036 (!) 143/80 97.8  F (36.6  C) Oral 94 18 99 % 1.753 m (5' 9\") 122.5 kg (270 lb)        Physical Exam  General:   Well-nourished   Speaking in full sentences  Eyes:   Conjunctiva without injection or scleral icterus  ENT:   Moist mucous membranes   Nares patent   Pinnae normal  Neck:   Full ROM   No stiffness appreciated  Resp:   Lungs CTAB   No " crackles, wheezing or audible rubs   Good air movement  CV:    Normal rate, regular rhythm   S1 and S2 present   No murmur, gallop or rub  GI:   BS present   Abdomen soft without distention   Non-tender to light and deep palpation   No guarding or rebound tenderness  Skin:   Warm, dry, well perfused   No rashes or open wounds on exposed skin  MSK:   Moves all extremities   No focal deformities or swelling  Neuro:   Alert   Answers questions appropriately   Moves all extremities equally   Gait stable  Psych:   Normal affect, normal mood        Emergency Department Course     Laboratory:  Labs Ordered and Resulted from Time of ED Arrival to Time of ED Departure   BASIC METABOLIC PANEL - Abnormal       Result Value    Sodium 140      Potassium 3.8      Chloride 107      Carbon Dioxide (CO2) 25      Anion Gap 8      Urea Nitrogen 25.6 (*)     Creatinine 0.89      Calcium 8.5 (*)     Glucose 278 (*)     GFR Estimate 83     INR - Abnormal    INR 1.24 (*)    CBC WITH PLATELETS AND DIFFERENTIAL - Abnormal    WBC Count 8.6      RBC Count 3.13 (*)     Hemoglobin 9.4 (*)     Hematocrit 28.7 (*)     MCV 92      MCH 30.0      MCHC 32.8      RDW 13.8      Platelet Count 177      % Neutrophils 81      % Lymphocytes 11      % Monocytes 6      % Eosinophils 1      % Basophils 0      % Immature Granulocytes 1      NRBCs per 100 WBC 0      Absolute Neutrophils 7.0      Absolute Lymphocytes 0.9      Absolute Monocytes 0.5      Absolute Eosinophils 0.1      Absolute Basophils 0.0      Absolute Immature Granulocytes 0.1      Absolute NRBCs 0.0     TYPE AND SCREEN, ADULT    ABO/RH(D) A POS      Antibody Screen Negative      SPECIMEN EXPIRATION DATE [id number]     ABO/RH TYPE AND SCREEN        Procedures   None    Emergency Department Course & Assessments:             Interventions:  Medications   pantoprazole (PROTONIX) IV push injection 40 mg (40 mg Intravenous $Given 5/25/23 1328)        Independent Interpretation (X-rays, CTs,  rhythm strip):  None    Consultations/Discussion of Management or Tests:  Discussed case with hospitalist service        Social Determinants of Health affecting care:   None    Disposition:  The patient was admitted to the hospital under the care of Dr. Gunn.     Impression & Plan        Medical Decision Making:  Damien Vallecillo is a 87 year old year old male who presents to the ER with rectal bleeding.  VS on presentation reveal elevated BP, though are otherwise unremarkable.  Possible sources of bleeding include upper GI sources (ulcer, gastritis, AVM, tumor, etc) vs lower GI sources (tumor, diverticulosis, AVM, meckels diverticulum, etc), as well as colitis, aortoenteric fistula, hemorrhoids, fissures, ischemic colitis, infectious colitis (i.e. bacteral causes such as salmonella, shigella, E. Coli, camylobacter).    Work-up here in the emergency department is consistent with GI bleeding, although the exact source of bleeding remains unclear at this time.  I am most suspicious for lower GI sources given red color to blood, as well as prior history of diverticulosis, though cannot definitively exclude upper sources (BUN mildly elevated).  Risk factors for bleeding include regular NSAID use.  Patient is not on blood thinning medications which would complicate their current presentation.  He has no abdominal pain nor rectal pain, and abdominal exam is soft and non-tender, for which I feel advanced imaging can be deferred safely at this time. Hemodynamically, the patient has remained in stable condition, and for that reason, patient has not required emergent blood transfusion or emergent consultation by gastroenterology for endoscopy or colonoscopy.    Given the degree of bleeding, and risk factors identified above, the patient will be admitted to the hospital for further treatment and cares.  A type and screen has been obtained.  Currently, the patient is hemodynamically stable.  The case was discussed with the  accepting hospital team, who are in agreement with the plan of care and have accepted the patient to their service.  Patient was updated regarding the proposed plan of care and was agreeable.      Diagnosis:    ICD-10-CM    1. Lower GI bleed  K92.2       2. Anemia, unspecified type  D64.9            5/25/2023   Obi Chin MD Roach, Brian Donald, MD  05/25/23 1640

## 2023-05-25 NOTE — ED NOTES
"[facility]  ED Nurse Handoff Report    ED Chief complaint: Rectal Bleeding  . ED Diagnosis:   Final diagnoses:   Lower GI bleed   Anemia, unspecified type       Allergies:   Allergies   Allergen Reactions     Levetiracetam [Keppra] Rash     Oxcarbazepine [Oxcarbazepine] Rash       Code Status: Full Code    Activity level - Baseline/Home:  independent.  Activity Level - Current:   assist of 1.   Lift room needed: No.   Bariatric: No   Needed: No   Isolation: No.   Infection: Not Applicable.     Respiratory status: Room air    Vital Signs (within 30 minutes):   Vitals:    05/25/23 1036   BP: (!) 143/80   Pulse: 94   Resp: 18   Temp: 97.8  F (36.6  C)   TempSrc: Oral   SpO2: 99%   Weight: 122.5 kg (270 lb)   Height: 1.753 m (5' 9\")       Cardiac Rhythm:  ,      Pain level:    Patient confused: No.   Patient Falls Risk: nonskid shoes/slippers when out of bed, patient and family education, assistive device/personal items within reach, and activity supervised.   Elimination Status: Has voided     Patient Report - Initial Complaint: rectal bleeding .   Focused Assessment:  Damien Vallecillo is a 87 year old male presenting to the emergency department for evaluation of rectal bleeding.  Patient reports that symptoms first began about 4 days previous.  He noted the presence of bright red blood mixed in with stool.  He notes symptoms did seem a little bit better the day after that, though again yesterday and today, has noted recurrent bleeding.  He denies any associated abdominal pain or rectal pain.  He is not currently on anticoagulation.  Does note some fatigue particularly with activity, though denies any chest pain or shortness of breath.  Denies any symptoms at rest.  Last colonoscopy in 2018.    Abnormal Results:   Labs Ordered and Resulted from Time of ED Arrival to Time of ED Departure   BASIC METABOLIC PANEL - Abnormal       Result Value    Sodium 140      Potassium 3.8      Chloride " 107      Carbon Dioxide (CO2) 25      Anion Gap 8      Urea Nitrogen 25.6 (*)     Creatinine 0.89      Calcium 8.5 (*)     Glucose 278 (*)     GFR Estimate 83     INR - Abnormal    INR 1.24 (*)    CBC WITH PLATELETS AND DIFFERENTIAL - Abnormal    WBC Count 8.6      RBC Count 3.13 (*)     Hemoglobin 9.4 (*)     Hematocrit 28.7 (*)     MCV 92      MCH 30.0      MCHC 32.8      RDW 13.8      Platelet Count 177      % Neutrophils 81      % Lymphocytes 11      % Monocytes 6      % Eosinophils 1      % Basophils 0      % Immature Granulocytes 1      NRBCs per 100 WBC 0      Absolute Neutrophils 7.0      Absolute Lymphocytes 0.9      Absolute Monocytes 0.5      Absolute Eosinophils 0.1      Absolute Basophils 0.0      Absolute Immature Granulocytes 0.1      Absolute NRBCs 0.0     TYPE AND SCREEN, ADULT    SPECIMEN EXPIRATION DATE [id number]     ABO/RH TYPE AND SCREEN        No orders to display       Treatments provided: labs  Family Comments: wife and niece at bedside  OBS brochure/video discussed/provided to patient:  Yes  ED Medications: Medications - No data to display    Drips infusing:  No  For the majority of the shift this patient was Green.   Interventions performed were n/a.    Sepsis treatment initiated: No    Cares/treatment/interventions/medications to be completed following ED care:     ED Nurse Name: Vivienne Tyson RN  12:10 PM   RECEIVING UNIT ED HANDOFF REVIEW    Above ED Nurse Handoff Report was reviewed: Yes  Reviewed by: KRISTIAN RONQUILLO RN on May 25, 2023 at 1:59 PM

## 2023-05-25 NOTE — ED NOTES
Bed: ED22  Expected date: 5/25/23  Expected time: 10:21 AM  Means of arrival:   Comments:  ED35.

## 2023-05-25 NOTE — PLAN OF CARE
ROOM # 205    Living Situation (if not independent, order SW consult): home with spouse  Facility name:  : [facility] (spouse)    Activity level at baseline: Ind with a cane  Activity level on admit: assist of x1 with a cane    Who will be transporting you at discharge: wife    Patient registered to observation; given Patient Bill of Rights; given the opportunity to ask questions about observation status and their plan of care.  Patient has been oriented to the observation room, bathroom and call light is in place.    Discussed discharge goals and expectations with patient/family.

## 2023-05-25 NOTE — PHARMACY-ADMISSION MEDICATION HISTORY
Pharmacist Admission Medication History    Admission medication history is complete. The information provided in this note is only as accurate as the sources available at the time of the update.    Medication reconciliation/reorder completed by provider prior to medication history? No    Information Source(s): Patient, Hospital records, CareEverywhere/SureScripts and pt list from wife's purse via in-person    Pertinent Information: ----    Changes made to PTA medication list:    Added: flaxseed, preparation h prn, diclofenac gel bid, lipitor 20mg daily    Deleted: lipitor 40mg daily, prn voltaren gel, gabapentin 300mg at bedtime, scheduled preparation h    Changed: fish oil from daily to bid per pt list    Medication Affordability:  Not including over the counter (OTC) medications, was there a time in the past 3 months when you did not take your medications as prescribed because of cost?: No    Allergies reviewed with patient and updates made in EHR: yes    Medication History Completed By: Eula Telles [facility] 5/25/2023 2:14 PM    Prior to Admission medications    Medication Sig Last Dose Taking? Auth Provider Long Term End Date   atorvastatin (LIPITOR) 20 MG tablet Take 20 mg by mouth every evening 5/24/2023 at pm Yes Unknown, Entered By History No    CINNAMON PO Take 1 tablet by mouth daily  5/24/2023 at am Yes Reported, Patient     clindamycin (CLEOCIN T) 1 % external solution Apply topically 2 times daily 5/24/2023 at pm Yes Obi Strange MD     diclofenac (VOLTAREN) 1 % topical gel Apply 2 g topically 2 times daily 5/24/2023 at pm Yes Unknown, Entered By History     doxycycline hyclate (PERIOSTAT) 20 MG tablet Take 1 tablet (20 mg) by mouth 2 times daily 5/24/2023 at pm Yes Obi Strange MD     fish oil-omega-3 fatty acids 1000 MG capsule Take 1 g by mouth 2 times daily 5/24/2023 at pm Yes Unknown, Entered By History     Flaxseed, Linseed, (FLAXSEED OIL) 1000 MG CAPS Take 1,000 mg by mouth daily 5/24/2023 at  am Yes Unknown, Entered By History     furosemide (LASIX) 20 MG tablet Take 1 tablet (20 mg) by mouth 2 times daily 5/24/2023 at afternoon Yes Obi Strange MD Yes    glucosamine-chondroitin 500-400 MG CAPS per capsule Take 1 capsule by mouth daily 5/24/2023 at am Yes Unknown, Entered By History     metroNIDAZOLE (METROGEL) 0.75 % external gel APPLY THIN LAYER TO FACE TWICE A DAY FOR ROSACEA 5/24/2023 at pm Yes Reported, Patient     Multiple Vitamins-Minerals (MULTIVITAMIN ADULT PO) Take 1 tablet by mouth daily Reported on 5/12/2017 5/24/2023 at am Yes Reported, Patient     naproxen sodium 220 MG capsule Take 220 mg by mouth daily 5/24/2023 at am Yes Reported, Patient     pramox-pe-glycerin-petrolatum (PREPARATION H) 1-0.25-14.4-15 % CREA cream Place rectally 4 times daily as needed for hemorrhoids Use as directed Unknown at ? Yes Unknown, Entered By History     tamsulosin (FLOMAX) 0.4 MG capsule Take 1 capsule (0.4 mg) by mouth daily 5/24/2023 at am Yes Obi Strange MD

## 2023-05-25 NOTE — ED TRIAGE NOTES
Pt reports 4 days of bright red blood in toilet after BM. States it slowed down yesterday, but this morning had two episodes. No blood thinner use. Pt states he feels weaker. EMS reports . ABCs intact. A&OX4.     Triage Assessment     Row Name 05/25/23 1035       Triage Assessment (Adult)    Airway WDL WDL       Respiratory WDL    Respiratory WDL WDL       Skin Circulation/Temperature WDL    Skin Circulation/Temperature WDL WDL       Cardiac WDL    Cardiac WDL WDL       Peripheral/Neurovascular WDL    Peripheral Neurovascular WDL WDL       Cognitive/Neuro/Behavioral WDL    Cognitive/Neuro/Behavioral WDL WDL

## 2023-05-26 VITALS
DIASTOLIC BLOOD PRESSURE: 67 MMHG | HEART RATE: 56 BPM | OXYGEN SATURATION: 100 % | WEIGHT: 267.42 LBS | RESPIRATION RATE: 18 BRPM | TEMPERATURE: 98.1 F | HEIGHT: 69 IN | SYSTOLIC BLOOD PRESSURE: 146 MMHG | BODY MASS INDEX: 39.61 KG/M2

## 2023-05-26 LAB
ANION GAP SERPL CALCULATED.3IONS-SCNC: 8 MMOL/L (ref 7–15)
BUN SERPL-MCNC: 19.8 MG/DL (ref 8–23)
CALCIUM SERPL-MCNC: 8.5 MG/DL (ref 8.8–10.2)
CHLORIDE SERPL-SCNC: 107 MMOL/L (ref 98–107)
COLONOSCOPY: NORMAL
CREAT SERPL-MCNC: 0.83 MG/DL (ref 0.67–1.17)
DEPRECATED HCO3 PLAS-SCNC: 26 MMOL/L (ref 22–29)
ERYTHROCYTE [DISTWIDTH] IN BLOOD BY AUTOMATED COUNT: 14.1 % (ref 10–15)
GFR SERPL CREATININE-BSD FRML MDRD: 85 ML/MIN/1.73M2
GLUCOSE BLDC GLUCOMTR-MCNC: 119 MG/DL (ref 70–99)
GLUCOSE BLDC GLUCOMTR-MCNC: 142 MG/DL (ref 70–99)
GLUCOSE BLDC GLUCOMTR-MCNC: 144 MG/DL (ref 70–99)
GLUCOSE SERPL-MCNC: 150 MG/DL (ref 70–99)
HCT VFR BLD AUTO: 26.5 % (ref 40–53)
HGB BLD-MCNC: 8.4 G/DL (ref 13.3–17.7)
HGB BLD-MCNC: 8.6 G/DL (ref 13.3–17.7)
HGB BLD-MCNC: 8.6 G/DL (ref 13.3–17.7)
MCH RBC QN AUTO: 29.9 PG (ref 26.5–33)
MCHC RBC AUTO-ENTMCNC: 32.5 G/DL (ref 31.5–36.5)
MCV RBC AUTO: 92 FL (ref 78–100)
PLATELET # BLD AUTO: 149 10E3/UL (ref 150–450)
POTASSIUM SERPL-SCNC: 3.6 MMOL/L (ref 3.4–5.3)
RBC # BLD AUTO: 2.88 10E6/UL (ref 4.4–5.9)
SODIUM SERPL-SCNC: 141 MMOL/L (ref 136–145)
UPPER GI ENDOSCOPY: NORMAL
WBC # BLD AUTO: 6.4 10E3/UL (ref 4–11)

## 2023-05-26 PROCEDURE — G0500 MOD SEDAT ENDO SERVICE >5YRS: HCPCS | Performed by: INTERNAL MEDICINE

## 2023-05-26 PROCEDURE — 36415 COLL VENOUS BLD VENIPUNCTURE: CPT | Performed by: PHYSICIAN ASSISTANT

## 2023-05-26 PROCEDURE — 82310 ASSAY OF CALCIUM: CPT | Performed by: PHYSICIAN ASSISTANT

## 2023-05-26 PROCEDURE — 96376 TX/PRO/DX INJ SAME DRUG ADON: CPT

## 2023-05-26 PROCEDURE — 82962 GLUCOSE BLOOD TEST: CPT

## 2023-05-26 PROCEDURE — G0378 HOSPITAL OBSERVATION PER HR: HCPCS

## 2023-05-26 PROCEDURE — 43235 EGD DIAGNOSTIC BRUSH WASH: CPT | Performed by: INTERNAL MEDICINE

## 2023-05-26 PROCEDURE — C9113 INJ PANTOPRAZOLE SODIUM, VIA: HCPCS | Performed by: PHYSICIAN ASSISTANT

## 2023-05-26 PROCEDURE — 45378 DIAGNOSTIC COLONOSCOPY: CPT | Performed by: INTERNAL MEDICINE

## 2023-05-26 PROCEDURE — 99239 HOSP IP/OBS DSCHRG MGMT >30: CPT | Performed by: HOSPITALIST

## 2023-05-26 PROCEDURE — 250N000011 HC RX IP 250 OP 636: Performed by: INTERNAL MEDICINE

## 2023-05-26 PROCEDURE — 250N000009 HC RX 250: Performed by: INTERNAL MEDICINE

## 2023-05-26 PROCEDURE — 85027 COMPLETE CBC AUTOMATED: CPT | Performed by: PHYSICIAN ASSISTANT

## 2023-05-26 PROCEDURE — 250N000011 HC RX IP 250 OP 636: Performed by: PHYSICIAN ASSISTANT

## 2023-05-26 RX ORDER — FLUMAZENIL 0.1 MG/ML
0.2 INJECTION, SOLUTION INTRAVENOUS
Status: DISCONTINUED | OUTPATIENT
Start: 2023-05-26 | End: 2023-05-26 | Stop reason: HOSPADM

## 2023-05-26 RX ORDER — ATROPINE SULFATE 0.1 MG/ML
1 INJECTION INTRAVENOUS
Status: DISCONTINUED | OUTPATIENT
Start: 2023-05-26 | End: 2023-05-26 | Stop reason: HOSPADM

## 2023-05-26 RX ORDER — FENTANYL CITRATE 50 UG/ML
50-100 INJECTION, SOLUTION INTRAMUSCULAR; INTRAVENOUS EVERY 5 MIN PRN
Status: DISCONTINUED | OUTPATIENT
Start: 2023-05-26 | End: 2023-05-26 | Stop reason: HOSPADM

## 2023-05-26 RX ORDER — DIPHENHYDRAMINE HYDROCHLORIDE 50 MG/ML
25-50 INJECTION INTRAMUSCULAR; INTRAVENOUS
Status: DISCONTINUED | OUTPATIENT
Start: 2023-05-26 | End: 2023-05-26 | Stop reason: HOSPADM

## 2023-05-26 RX ORDER — NALOXONE HYDROCHLORIDE 0.4 MG/ML
0.2 INJECTION, SOLUTION INTRAMUSCULAR; INTRAVENOUS; SUBCUTANEOUS
Status: DISCONTINUED | OUTPATIENT
Start: 2023-05-26 | End: 2023-05-26 | Stop reason: HOSPADM

## 2023-05-26 RX ORDER — NALOXONE HYDROCHLORIDE 0.4 MG/ML
0.4 INJECTION, SOLUTION INTRAMUSCULAR; INTRAVENOUS; SUBCUTANEOUS
Status: DISCONTINUED | OUTPATIENT
Start: 2023-05-26 | End: 2023-05-26 | Stop reason: HOSPADM

## 2023-05-26 RX ORDER — LIDOCAINE 40 MG/G
CREAM TOPICAL
Status: DISCONTINUED | OUTPATIENT
Start: 2023-05-26 | End: 2023-05-26 | Stop reason: HOSPADM

## 2023-05-26 RX ORDER — ONDANSETRON 2 MG/ML
4 INJECTION INTRAMUSCULAR; INTRAVENOUS
Status: DISCONTINUED | OUTPATIENT
Start: 2023-05-26 | End: 2023-05-26 | Stop reason: HOSPADM

## 2023-05-26 RX ORDER — SIMETHICONE 40MG/0.6ML
133 SUSPENSION, DROPS(FINAL DOSAGE FORM)(ML) ORAL
Status: DISCONTINUED | OUTPATIENT
Start: 2023-05-26 | End: 2023-05-26 | Stop reason: HOSPADM

## 2023-05-26 RX ORDER — EPINEPHRINE 1 MG/ML
0.1 INJECTION, SOLUTION INTRAMUSCULAR; SUBCUTANEOUS
Status: DISCONTINUED | OUTPATIENT
Start: 2023-05-26 | End: 2023-05-26 | Stop reason: HOSPADM

## 2023-05-26 RX ADMIN — FENTANYL CITRATE 50 MCG: 50 INJECTION, SOLUTION INTRAMUSCULAR; INTRAVENOUS at 10:14

## 2023-05-26 RX ADMIN — INSULIN ASPART 1 UNITS: 100 INJECTION, SOLUTION INTRAVENOUS; SUBCUTANEOUS at 06:49

## 2023-05-26 RX ADMIN — TOPICAL ANESTHETIC 0.5 ML: 200 SPRAY DENTAL; PERIODONTAL at 10:02

## 2023-05-26 RX ADMIN — MIDAZOLAM 1 MG: 1 INJECTION INTRAMUSCULAR; INTRAVENOUS at 10:04

## 2023-05-26 RX ADMIN — INSULIN ASPART 1 UNITS: 100 INJECTION, SOLUTION INTRAVENOUS; SUBCUTANEOUS at 11:47

## 2023-05-26 RX ADMIN — PANTOPRAZOLE SODIUM 40 MG: 40 INJECTION, POWDER, FOR SOLUTION INTRAVENOUS at 08:04

## 2023-05-26 RX ADMIN — FENTANYL CITRATE 50 MCG: 50 INJECTION, SOLUTION INTRAMUSCULAR; INTRAVENOUS at 10:04

## 2023-05-26 ASSESSMENT — ACTIVITIES OF DAILY LIVING (ADL)
ADLS_ACUITY_SCORE: 24
ADLS_ACUITY_SCORE: 28
ADLS_ACUITY_SCORE: 24

## 2023-05-26 NOTE — PLAN OF CARE
PRIMARY DIAGNOSIS: GI BLEED    OUTPATIENT/OBSERVATION GOALS TO BE MET BEFORE DISCHARGE  1. Orthostatic performed: No    2. Stable Hgb Yes.   Recent Labs   Lab Test 05/26/23  0628 05/25/23  2342 05/25/23 1952   HGB 8.6*  8.6* 8.4* 8.9*       3. Resolved or declined bleeding episodes: Yes Last episode: 0700, clear/ pink liquid    4. Appropriate testing complete: No    5. Cleared for discharge by consultants (if involved): No    6. Safe discharge environment identified: Yes    Discharge Planner Nurse   Safe discharge environment identified: Yes  Barriers to discharge: Yes       Entered by: Helen Pina RN 05/26/2023 11:13 AM   Pt awake and comfortable in bed. NPO since midnight for EGD/ colonoscopy this am. Denies pain. Up to bathroom SBA with cane. No BM.   Please review provider order for any additional goals.   Nurse to notify provider when observation goals have been met and patient is ready for discharge.

## 2023-05-26 NOTE — PLAN OF CARE
PRIMARY DIAGNOSIS: GI BLEED    OUTPATIENT/OBSERVATION GOALS TO BE MET BEFORE DISCHARGE  1. Orthostatic performed: No    2. Stable Hgb Yes.   Recent Labs   Lab Test 05/26/23  0628 05/25/23  2342 05/25/23 1952   HGB 8.6*  8.6* 8.4* 8.9*       3. Resolved or declined bleeding episodes: Yes Last episode: 0700, clear/ pink liquid    4. Appropriate testing complete: yes    5. Cleared for discharge by consultants (if involved): yes    6. Safe discharge environment identified: Yes    Discharge Planner Nurse   Safe discharge environment identified: Yes  Barriers to discharge: Yes       Entered by: Helen Pina RN 05/26/2023    Pt returned from EGD/ colonoscopy A&O.  Family at bedside. MD giving results of scopes and discharge orders. Pt vitally stable, denies pain, ready to go home. IV removed and getting dressed. Wife will transport home.   Please review provider order for any additional goals.   Nurse to notify provider when observation goals have been met and patient is ready for discharge.

## 2023-05-26 NOTE — PLAN OF CARE
PRIMARY DIAGNOSIS: GI BLEED    OUTPATIENT/OBSERVATION GOALS TO BE MET BEFORE DISCHARGE  1. Orthostatic performed: No    2. Stable Hgb Yes.   Recent Labs   Lab Test 05/25/23  1952 05/25/23  1624 05/25/23  1038   HGB 8.9* 9.3* 9.4*       3. Resolved or declined bleeding episodes: Yes Last episode: not yet witness in this shift    4. Appropriate testing complete: Yes    5. Cleared for discharge by consultants (if involved): No    6. Safe discharge environment identified: Yes    Discharge Planner Nurse   Safe discharge environment identified: Yes  Barriers to discharge: Yes       Entered by: Giancarlo Santizo RN 05/25/2023     Vitals are Temp: 97.9  F (36.6  C) Temp src: Oral BP: 112/76 Pulse: 88   Resp: 18 SpO2: 98 %.    Patient is Alert and Oriented x4.He is 1 Assist with Cane.  Pt is on a Clear liquid diet. He denies pain.  Patient is Saline locked. Will continue observation and providing cares.    Please review provider order for any additional goals.   Nurse to notify provider when observation goals have been met and patient is ready for discharge.

## 2023-05-26 NOTE — PLAN OF CARE
"Patient's After Visit Summary was reviewed with patient and/or spouse.   Patient verbalized understanding of After Visit Summary, recommended follow up and was given an opportunity to ask questions.   Discharge medications sent home with patient/family: Not applicable   Discharged with spouse      Pt discharge to home with wife. Vitally stable and well upon discharge from room 205. All belongings and paperwork sent home with pt.     BP (!) 146/67 (BP Location: Left arm)   Pulse 56   Temp 98.1  F (36.7  C) (Oral)   Resp 18   Ht 1.753 m (5' 9\")   Wt 121.3 kg (267 lb 6.7 oz)   SpO2 100%   BMI 39.49 kg/m      OBSERVATION patient END time: 1240          "

## 2023-05-26 NOTE — DISCHARGE SUMMARY
"[facility]  Hospitalist Discharge Summary      Date of Admission:  5/25/2023  Date of Discharge:  5/26/2023  Discharging Provider: Calin Gunn MD  Discharge Service: Hospitalist Service    Discharge Diagnoses   Hematochezia, acute blood loss anemia    Clinically Significant Risk Factors     # DMII: A1C = 6.7 % (Ref range: <5.7 %) within past 6 months  # Obesity: Estimated body mass index is 39.49 kg/m  as calculated from the following:    Height as of this encounter: 1.753 m (5' 9\").    Weight as of this encounter: 121.3 kg (267 lb 6.7 oz).       Follow-ups Needed After Discharge   Follow-up Appointments     Follow-up and recommended labs and tests       Follow up with primary care provider, Obi Strange, within 7 days for   hospital follow- up.  The following labs/tests are recommended: follow-up   CBC to check hemoglobin.             Unresulted Labs Ordered in the Past 30 Days of this Admission     No orders found for last 31 day(s).      These results will be followed up by NA    Discharge Disposition   Discharged to home  Condition at discharge: Stable    Hospital Course   Damien Vallecillo is a 87 year old male who presents with painless bright red blood per rectum since 5/22.  He is unable to quantify how many episodes he has of this but states that it happens every time he goes to the bathroom.  Most recently in the ED the stool was maroonish.  He denies abdominal pain, nausea, vomiting, fever, chills, lightheadedness, dizziness, shortness of breath and cough with this.  He does use Aleve daily for his arthritis and has some amount of alcohol daily but no history of alcohol withdrawal.  He denies history of upper GI bleed.  He has not been sick recently or started any new medicines.    I assumed patient care today.  EGD and colonoscopy were done.  He has been recommended to stop his NSAIDs.  He likely had a diverticular bleed.  He had a normal stool today.  His family is in the room. "  He would like to discharge home.  He will discharge and has been instructed to stop all NSAIDs.  Can follow-up with his primary care doctor for repeat hemoglobin.  I also indicated to him that he might still have some intermittent bleeding and that he should return to the emergency room if he has excessive bleeding or any other symptoms like lightheadedness.    Consultations This Hospital Stay   GASTROENTEROLOGY IP CONSULT    Code Status   Full Code    Time Spent on this Encounter   I, Calin Gunn MD, personally saw the patient today and spent greater than 30 minutes discharging this patient.       Calin Gunn MD  [facility] OBSERVATION DEPT  [address] [place] [address]  Phone: [phone]  ______________________________________________________________________    Physical Exam   Vital Signs: Temp: 98.2  F (36.8  C) Temp src: Oral BP: 131/61 Pulse: 59   Resp: 16 SpO2: 100 % O2 Device: None (Room air) Oxygen Delivery: 3 LPM  Weight: 267 lbs 6.69 oz  Constitutional: awake, alert, cooperative, no apparent distress, and appears stated age  Eyes: Lids and lashes normal, pupils equal, round and reactive to light, extra ocular muscles intact, sclera clear, conjunctiva normal  ENT: Normocephalic, without obvious abnormality, atraumatic, sinuses nontender on palpation, external ears without lesions, oral pharynx with moist mucous membranes, tonsils without erythema or exudates, gums normal and good dentition.  Respiratory: No increased work of breathing, good air exchange, clear to auscultation bilaterally, no crackles or wheezing  Cardiovascular: Normal apical impulse, regular rate and rhythm, normal S1 and S2, no S3 or S4, and no murmur noted       Primary Care Physician   Obi Strange    Discharge Orders      Reason for your hospital stay    Bright red blood per rectum. Likely diverticular bleed.     Follow-up and recommended labs and tests     Follow up with primary care  provider, Obi Strange, within 7 days for hospital follow- up.  The following labs/tests are recommended: follow-up CBC to check hemoglobin.     Activity    Your activity upon discharge: activity as tolerated     Diet    Follow this diet upon discharge: Advance to a regular diet as tolerated       Significant Results and Procedures   Most Recent 3 CBC's:Recent Labs   Lab Test 05/26/23  0628 05/25/23  2342 05/25/23  1952 05/25/23  1624 05/25/23  1038 09/12/22  1001   WBC 6.4  --   --   --  8.6 5.9   HGB 8.6*  8.6* 8.4* 8.9*   < > 9.4* 14.5   MCV 92  --   --   --  92 89   *  --   --   --  177 159    < > = values in this interval not displayed.   ,   Results for orders placed or performed in visit on 03/29/22   XR Knee Right 3 Views    Narrative    KNEE RIGHT THREE VIEW  DATE/TIME: 3/29/2022 2:44 PM    INDICATION: Right knee pain, unspecified chronicity.    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 Postop  right total knee arthroplasty with patellar resurfacing. No acute  displaced periprosthetic fracture. Chronic bone fragment or  heterotopic ossification along the medial patella, similar to prior.  Lucency along the posterolateral tibial tray component of the right  total knee arthroplasty is redemonstrated and could be seen with  loosening. Probable knee joint effusion. Heterotopic bone fragments  about the right knee are redemonstrated, particularly along the medial  joint line at and below, similar to prior.    DORON ORDONEZ MD         SYSTEM ID:  [id number]       Discharge Medications   Current Discharge Medication List      CONTINUE these medications which have NOT CHANGED    Details   atorvastatin (LIPITOR) 20 MG tablet Take 20 mg by mouth every evening      CINNAMON PO Take 1 tablet by mouth daily       clindamycin (CLEOCIN T) 1 % external solution Apply topically 2 times daily  Qty: 60 mL, Refills: 11    Associated Diagnoses: Rhinophyma       diclofenac (VOLTAREN) 1 % topical gel Apply 2 g topically 2 times daily      doxycycline hyclate (PERIOSTAT) 20 MG tablet Take 1 tablet (20 mg) by mouth 2 times daily      fish oil-omega-3 fatty acids 1000 MG capsule Take 1 g by mouth 2 times daily      Flaxseed, Linseed, (FLAXSEED OIL) 1000 MG CAPS Take 1,000 mg by mouth daily      furosemide (LASIX) 20 MG tablet Take 1 tablet (20 mg) by mouth 2 times daily  Qty: 180 tablet, Refills: 1    Associated Diagnoses: Pedal edema      glucosamine-chondroitin 500-400 MG CAPS per capsule Take 1 capsule by mouth daily      metroNIDAZOLE (METROGEL) 0.75 % external gel APPLY THIN LAYER TO FACE TWICE A DAY FOR ROSACEA    Associated Diagnoses: Rosacea      Multiple Vitamins-Minerals (MULTIVITAMIN ADULT PO) Take 1 tablet by mouth daily Reported on 5/12/2017      pramox-pe-glycerin-petrolatum (PREPARATION H) 1-0.25-14.4-15 % CREA cream Place rectally 4 times daily as needed for hemorrhoids Use as directed      tamsulosin (FLOMAX) 0.4 MG capsule Take 1 capsule (0.4 mg) by mouth daily  Qty: 90 capsule, Refills: 3    Associated Diagnoses: Benign prostatic hyperplasia with urinary frequency      ACE NOT PRESCRIBED, INTENTIONAL, ACE Inhibitor not prescribed due to Other:  Had cough on ACEI in past        Qty: 0 each, Refills: 0    Associated Diagnoses: Type 2 diabetes, HbA1c goal < 7% (H)      !! blood glucose (ACCU-CHEK LAURA) test strip 1Use to test blood sugar 2 times daily or as directed.  Qty: 100 strip, Refills: 3    Associated Diagnoses: Glucose intolerance (impaired glucose tolerance)      !! blood glucose monitoring (ACCU-CHEK LAURA PLUS) test strip Use to test blood sugar 1 time daily  Qty: 100 each, Refills: 11    Associated Diagnoses: Glucose intolerance (impaired glucose tolerance)      !! order for DME 1: Gradient Compression Wraps; 2: Cast Boots; 3: BLE knee or thigh high 20-30 mm Hg compression stocking; 4: BLE knee or thigh high custom compression stocking; 5:  Alternative BLE knee high or thigh compression garments (velcro/buckling)  Qty: 1 each, Refills: 0    Associated Diagnoses: Leg swelling      !! order for DME Equipment being ordered: Walker Wheels () and Walker ()  Treatment Diagnosis: Impaired functional mobility  Qty: 1 each, Refills: 0    Associated Diagnoses: Status post total right knee replacement       !! - Potential duplicate medications found. Please discuss with provider.      STOP taking these medications       naproxen sodium 220 MG capsule Comments:   Reason for Stopping:             Allergies   Allergies   Allergen Reactions     Levetiracetam [Keppra] Rash     Oxcarbazepine [Oxcarbazepine] Rash

## 2023-05-26 NOTE — PLAN OF CARE
PRIMARY DIAGNOSIS: GI BLEED    OUTPATIENT/OBSERVATION GOALS TO BE MET BEFORE DISCHARGE  1. Orthostatic performed: No    2. Stable Hgb No.   Recent Labs   Lab Test 05/25/23  2342 05/25/23  1952 05/25/23  1624   HGB 8.4* 8.9* 9.3*       3. Resolved or declined bleeding episodes: Yes Last episode:not witness since resumption except regular toilet visit which was yellow and watery    4. Appropriate testing complete: Yes    5. Cleared for discharge by consultants (if involved): No    6. Safe discharge environment identified: Yes    Discharge Planner Nurse   Safe discharge environment identified: Yes  Barriers to discharge: Yes       Entered by: Giancarlo Santizo RN 05/26/2023       Vitals are Temp: 98  F (36.7  C) Temp src: Oral BP: (!) 148/74 Pulse: 75   Resp: 18 SpO2: 98 %.  Patient is Alert and Oriented x4. Pt is 1 Assist with Cane.on  a Clear liquid diet. Denies pain.  Patient is Saline locked., had schedule pantoprazole I v 40 mg, patient slept pretty good, on room air, no fresh complaint.had bowel pre,  Magnesium citrate solution is not available according to pharmacy staff when contacted via [facility]    PLAN: To for EGD with probably colonoscopy today, hgb Q6 check.   Note; @6;55am passes bright light red watery poop!   Please review provider order for any additional goals.   Nurse to notify provider when observation goals have been met and patient is ready for discharge.

## 2023-05-26 NOTE — PLAN OF CARE
PRIMARY DIAGNOSIS: GI BLEED    OUTPATIENT/OBSERVATION GOALS TO BE MET BEFORE DISCHARGE  1. Orthostatic performed: No    2. Stable Hgb No.   Recent Labs   Lab Test 05/25/23  2342 05/25/23  1952 05/25/23  1624   HGB 8.4* 8.9* 9.3*       3. Resolved or declined bleeding episodes: Yes Last episode:not witness since resumption except regular toilet visit which was yellow and watery    4. Appropriate testing complete: Yes    5. Cleared for discharge by consultants (if involved): No    6. Safe discharge environment identified: Yes    Discharge Planner Nurse   Safe discharge environment identified: Yes  Barriers to discharge: Yes       Entered by: Giancarlo Santizo RN 05/26/2023       Vitals are Temp: 97.8  F (36.6  C) Temp src: Oral BP: (!) 151/70 Pulse: 89   Resp: 20 SpO2: 99 %.  Patient is Alert and Oriented x4. Pt is 1 Assist with Cane.on  a Clear liquid diet. Denies pain.  Patient is Saline locked., had schedule pantoprazole I v 40 mg, patient is sleeping, on room air, no fresh complaint. Will continue observation and provide cares as needed.  Please review provider order for any additional goals.   Nurse to notify provider when observation goals have been met and patient is ready for discharge.

## 2023-05-30 ENCOUNTER — PATIENT OUTREACH (OUTPATIENT)
Dept: CARE COORDINATION | Facility: CLINIC | Age: 88
End: 2023-05-30
Payer: MEDICARE

## 2023-05-30 NOTE — PROGRESS NOTES
Connected Care Resource Center Contact  [facility]/Voicemail     Clinical Data: Transitional Care Management Outreach     Outreach attempted x 2.  Unable to leave message on patient's voicemail with [facility]'s 24/7 scheduling and nurse triage phone number [phone] ([phone]) for questions/concerns or schedule an appointment with an [facility] provider.     Plan:  [facility] will do no further outreaches at this time.       Vannesa Forbes  Community Health Worker  [facility] Care Resource [place], [facility]      *Connected Care Resource Team does NOT follow patient ongoing. Referrals are identified based on internal discharge reports and the outreach is to ensure patient has an understanding of their discharge instructions.

## 2023-05-31 ENCOUNTER — PATIENT OUTREACH (OUTPATIENT)
Dept: FAMILY MEDICINE | Facility: CLINIC | Age: 88
End: 2023-05-31
Payer: MEDICARE

## 2023-05-31 NOTE — TELEPHONE ENCOUNTER
"PAL RN - Hospital Follow-up     Patient status since discharge: \"I'm doing better, but ever since I had the endoscopy done it feels like I have butterflies in my stomach. Like my stomach feels uneasy on/off throughout the day.\"     -Denies: nausea/vomiting, blood in stool, abnormal stool color, abdominal pain, lightheadedness, worsening or new weakness  -Reports he is eating normally and had normal BM this morning     Follow up  Follow up instructions/recommendations:       \"Follow up with primary care provider, Obi Strange, within 7 days for   hospital follow- up.  The following labs/tests are recommended: follow-up   CBC to check hemoglobin.\"       Medications  Changes to chronic meds? 0-1    Medication reconciliation: discharge medications reconciled and changed, per note/orders. Pt to discontinue daily Aleve for arthritis. Avoid NSAIDs.     Appointment   Primary Care Provider appointment scheduled: Yes. (confirm)     Future Appointments 5/31/2023 - 11/27/2023      Date Visit Type Length Department Provider     6/1/2023  2:30 PM ED/HOSP FOLLOW UP 30 min [facility] Obi Strange MD    Location Instructions:     [facility] is located at [address] next to the [facility]. Free parking is available; access the lot by turning east from [facility] onto [address] [place] [address].              9/26/2023 10:00 AM ANNUAL WELLNESS 30 min [facility] Obi Peña MD    Location Instructions:     [facility] is located at [address] next to the [facility] movie [facility]. Free parking is available; access the lot by turning east from [facility] onto [address] [place] [address].                 Other appointments scheduled: none    Questions about medications: None     Medication therapy management (MTM) referral placed:  No     Other information  New diagnoses of heart failure, COPD, diabetes, or MI? No       Call Summary  Questions or concerns: none "     Introduced as assigned PAL, educated on role, provided direct line [phone]    Patient was given an opportunity to ask questions, verbalized understanding of plan, and is agreeable.    Katt ALMANZAR RN  Patient Advocate Liaison - PAL RN  [facility]  [phone]

## 2023-06-01 ENCOUNTER — OFFICE VISIT (OUTPATIENT)
Dept: FAMILY MEDICINE | Facility: CLINIC | Age: 88
End: 2023-06-01
Payer: MEDICARE

## 2023-06-01 ENCOUNTER — ANCILLARY PROCEDURE (OUTPATIENT)
Dept: GENERAL RADIOLOGY | Facility: CLINIC | Age: 88
End: 2023-06-01
Attending: FAMILY MEDICINE
Payer: MEDICARE

## 2023-06-01 VITALS
HEART RATE: 71 BPM | SYSTOLIC BLOOD PRESSURE: 124 MMHG | DIASTOLIC BLOOD PRESSURE: 70 MMHG | OXYGEN SATURATION: 97 % | TEMPERATURE: 97.9 F | BODY MASS INDEX: 40.65 KG/M2 | WEIGHT: 275.3 LBS

## 2023-06-01 DIAGNOSIS — M54.50 MIDLINE LOW BACK PAIN WITHOUT SCIATICA, UNSPECIFIED CHRONICITY: ICD-10-CM

## 2023-06-01 DIAGNOSIS — K92.2 LOWER GI BLEEDING: Primary | ICD-10-CM

## 2023-06-01 DIAGNOSIS — Z23 HIGH PRIORITY FOR 2019-NCOV VACCINE: ICD-10-CM

## 2023-06-01 LAB
BASOPHILS # BLD AUTO: 0 10E3/UL (ref 0–0.2)
BASOPHILS NFR BLD AUTO: 1 %
EOSINOPHIL # BLD AUTO: 0.2 10E3/UL (ref 0–0.7)
EOSINOPHIL NFR BLD AUTO: 4 %
ERYTHROCYTE [DISTWIDTH] IN BLOOD BY AUTOMATED COUNT: 14.2 % (ref 10–15)
HCT VFR BLD AUTO: 25.2 % (ref 40–53)
HGB BLD-MCNC: 8.2 G/DL (ref 13.3–17.7)
IMM GRANULOCYTES # BLD: 0 10E3/UL
IMM GRANULOCYTES NFR BLD: 1 %
LYMPHOCYTES # BLD AUTO: 1.1 10E3/UL (ref 0.8–5.3)
LYMPHOCYTES NFR BLD AUTO: 18 %
MCH RBC QN AUTO: 29.5 PG (ref 26.5–33)
MCHC RBC AUTO-ENTMCNC: 32.5 G/DL (ref 31.5–36.5)
MCV RBC AUTO: 91 FL (ref 78–100)
MONOCYTES # BLD AUTO: 0.8 10E3/UL (ref 0–1.3)
MONOCYTES NFR BLD AUTO: 13 %
NEUTROPHILS # BLD AUTO: 3.9 10E3/UL (ref 1.6–8.3)
NEUTROPHILS NFR BLD AUTO: 65 %
PLATELET # BLD AUTO: 243 10E3/UL (ref 150–450)
RBC # BLD AUTO: 2.78 10E6/UL (ref 4.4–5.9)
WBC # BLD AUTO: 6 10E3/UL (ref 4–11)

## 2023-06-01 PROCEDURE — 0124A COVID-19 BIVALENT 12+ (PFIZER): CPT | Performed by: FAMILY MEDICINE

## 2023-06-01 PROCEDURE — 72100 X-RAY EXAM L-S SPINE 2/3 VWS: CPT | Mod: TC | Performed by: RADIOLOGY

## 2023-06-01 PROCEDURE — 36415 COLL VENOUS BLD VENIPUNCTURE: CPT | Performed by: FAMILY MEDICINE

## 2023-06-01 PROCEDURE — 99495 TRANSJ CARE MGMT MOD F2F 14D: CPT | Mod: 25 | Performed by: FAMILY MEDICINE

## 2023-06-01 PROCEDURE — 91312 COVID-19 BIVALENT 12+ (PFIZER): CPT | Performed by: FAMILY MEDICINE

## 2023-06-01 PROCEDURE — 85025 COMPLETE CBC W/AUTO DIFF WBC: CPT | Performed by: FAMILY MEDICINE

## 2023-06-01 NOTE — PROGRESS NOTES
"  Assessment & Plan   Problem List Items Addressed This Visit     High priority for 2019-nCoV vaccine     offered         Relevant Orders    COVID-19 BIVALENT 12+ (PFIZER) (Completed)    Lower GI bleeding - Primary     Diverticular. Eval reviewed. Previous colectomy. No current sx. Discussed dustin history. Check hgb         Relevant Orders    CBC with platelets and differential (Completed)    Midline low back pain without sciatica, unspecified chronicity     No radicular sx. Dependable pain standing w/o radiation, none at rest nor supine. Suspect deconditioning/facet joint pathology. Discussed options. He would prefer PT 1st         Relevant Orders    XR Lumbar Spine 2/3 Views (Completed)    Physical Therapy Referral               MED REC REQUIRED  Post Medication Reconciliation Status: discharge medications reconciled, continue medications without change  BMI:   Estimated body mass index is 40.65 kg/m  as calculated from the following:    Height as of 5/25/23: 1.753 m (5' 9\").    Weight as of this encounter: 124.9 kg (275 lb 4.8 oz).   Weight management plan: not in this age group        Obi Strange MD  [facility]    Richi Quezada is a 87 year old, presenting for the following health issues:  Hospital F/U        6/1/2023     2:06 PM   Additional Questions   Roomed by Indy Ca     [facility]       Hospital Follow-up Visit:    Hospital/Nursing Home/IP Rehab Facility: [facility]  Date of Admission: 5/25/23  Date of Discharge: 5/26/23  Reason(s) for Admission: Anemia    Was your hospitalization related to COVID-19? No   Problems taking medications regularly:  None  Medication changes since discharge: provider discontinued Aleve and switched to Tylenol  Problems adhering to non-medication therapy:  None    Summary of hospitalization:  [facility] discharge summary reviewed  Diagnostic Tests/Treatments reviewed.  Follow up needed: Hgb  Other Healthcare " Providers Involved in Patient s Care:         None  Update since discharge: improved.         Plan of care communicated with patient and family                 Review of Systems   As described      Objective    /70 (BP Location: Right arm, Patient Position: Sitting, Cuff Size: Adult Large)   Pulse 71   Temp 97.9  F (36.6  C) (Oral)   Wt 124.9 kg (275 lb 4.8 oz)   SpO2 97%   BMI 40.65 kg/m    Body mass index is 40.65 kg/m .  Physical Exam     Broad based gait    Results for orders placed or performed in visit on 06/01/23   XR Lumbar Spine 2/3 Views     Status: None    Narrative    LUMBAR SPINE 2/3 VIEWS 6/1/2023 2:57 PM    INDICATION: Midline low back pain without sciatica, unspecified  chronicity.    COMPARISON: None      Impression    IMPRESSION: Five lumbar vertebral bodies. Slight convex left  angulation across L2-L3. Grade 1 approximately 4 mm anterolisthesis of  L5 on S1. No definite pars defect. Low-grade retrolisthesis of L1-L4.  Mild to moderate endplate osteophyte formation most pronounced  ventrally at T12-L1 and L1-L2. Mild to moderate facet arthropathy.    BELTRAN AVITIA MD         SYSTEM ID:  [id number]   Results for orders placed or performed in visit on 06/01/23   CBC with platelets and differential     Status: Abnormal   Result Value Ref Range    WBC Count 6.0 4.0 - 11.0 10e3/uL    RBC Count 2.78 (L) 4.40 - 5.90 10e6/uL    Hemoglobin 8.2 (L) 13.3 - 17.7 g/dL    Hematocrit 25.2 (L) 40.0 - 53.0 %    MCV 91 78 - 100 fL    MCH 29.5 26.5 - 33.0 pg    MCHC 32.5 31.5 - 36.5 g/dL    RDW 14.2 10.0 - 15.0 %    Platelet Count 243 150 - 450 10e3/uL    % Neutrophils 65 %    % Lymphocytes 18 %    % Monocytes 13 %    % Eosinophils 4 %    % Basophils 1 %    % Immature Granulocytes 1 %    Absolute Neutrophils 3.9 1.6 - 8.3 10e3/uL    Absolute Lymphocytes 1.1 0.8 - 5.3 10e3/uL    Absolute Monocytes 0.8 0.0 - 1.3 10e3/uL    Absolute Eosinophils 0.2 0.0 - 0.7 10e3/uL    Absolute Basophils 0.0 0.0 - 0.2  10e3/uL    Absolute Immature Granulocytes 0.0 <=0.4 10e3/uL   CBC with platelets and differential     Status: Abnormal    Narrative    The following orders were created for panel order CBC with platelets and differential.  Procedure                               Abnormality         Status                     ---------                               -----------         ------                     CBC with platelets and d...[[id number]]  Abnormal            Final result                 Please view results for these tests on the individual orders.       Obi Strange MD

## 2023-06-02 ENCOUNTER — TELEPHONE (OUTPATIENT)
Dept: FAMILY MEDICINE | Facility: CLINIC | Age: 88
End: 2023-06-02
Payer: MEDICARE

## 2023-06-02 DIAGNOSIS — K92.2 LOWER GI BLEEDING: Primary | ICD-10-CM

## 2023-06-02 PROBLEM — M54.50 MIDLINE LOW BACK PAIN WITHOUT SCIATICA, UNSPECIFIED CHRONICITY: Status: ACTIVE | Noted: 2023-06-02

## 2023-06-02 PROBLEM — Z23 HIGH PRIORITY FOR 2019-NCOV VACCINE: Status: ACTIVE | Noted: 2023-06-02

## 2023-06-02 NOTE — RESULT ENCOUNTER NOTE
Blood count is stable, but at the low end.  Let me check it again next week.  I will have my staff set you up for a lab only appointment  In the meantime, get some iron and vitamins.  1 multivitamin a day, 3 iron pills a day.  They are over-the-counter.  Give your body something to grow blood with  ALICE HAMEED

## 2023-06-02 NOTE — TELEPHONE ENCOUNTER
PAL called and spoke with pt and wife, Alejandrina ([facility])  -Relayed result note from Dr. Strange, see previous note  -Advised to pick-up OTC multivitamin (1 daily) and iron supplement (3 daily), begin taking   -Educated on iron rich foods   -Scheduled lab only appt 6/8/23 at 9:00am for repeat CBC    Patient and wife were given an opportunity to ask questions, verbalized understanding of plan, and is agreeable.    Katt ALMANZAR RN  Patient Advocate Liaison - PAL RN  [facility]  [phone]

## 2023-06-02 NOTE — ASSESSMENT & PLAN NOTE
Diverticular. Eval reviewed. Previous colectomy. No current sx. Discussed dustin history. Check hgb

## 2023-06-02 NOTE — TELEPHONE ENCOUNTER
----- Message from Alice Hameed MD sent at 6/2/2023  8:38 AM CDT -----  Blood count is stable, but at the low end.  Let me check it again next week.  I will have my staff set you up for a lab only appointment  In the meantime, get some iron and vitamins.  1 multivitamin a day, 3 iron pills a day.  They are over-the-counter.  Give your body something to grow blood with  ALICE HAMEED

## 2023-06-02 NOTE — ASSESSMENT & PLAN NOTE
No radicular sx. Dependable pain standing w/o radiation, none at rest nor supine. Suspect deconditioning/facet joint pathology. Discussed options. He would prefer PT 1st

## 2023-06-12 ENCOUNTER — HOSPITAL ENCOUNTER (OUTPATIENT)
Facility: CLINIC | Age: 88
Setting detail: OBSERVATION
Discharge: HOME | End: 2023-06-16
Attending: EMERGENCY MEDICINE | Admitting: INTERNAL MEDICINE
Payer: MEDICARE

## 2023-06-12 DIAGNOSIS — Z87.19 HISTORY OF GI DIVERTICULAR BLEED: ICD-10-CM

## 2023-06-12 DIAGNOSIS — K92.1 HEMATOCHEZIA: ICD-10-CM

## 2023-06-12 DIAGNOSIS — D64.9 ANEMIA, UNSPECIFIED TYPE: Primary | ICD-10-CM

## 2023-06-12 DIAGNOSIS — K92.2 LOWER GI BLEED: ICD-10-CM

## 2023-06-12 LAB
ABO/RH(D): NORMAL
ABO/RH(D): NORMAL
ALBUMIN SERPL BCG-MCNC: 3.9 G/DL (ref 3.5–5.2)
ALP SERPL-CCNC: 48 U/L (ref 40–129)
ALT SERPL W P-5'-P-CCNC: 16 U/L (ref 10–50)
ANION GAP SERPL CALCULATED.3IONS-SCNC: 10 MMOL/L (ref 7–15)
ANTIBODY SCREEN: NEGATIVE
ANTIBODY SCREEN: NEGATIVE
AST SERPL W P-5'-P-CCNC: 19 U/L (ref 10–50)
BASOPHILS # BLD AUTO: 0 10E3/UL (ref 0–0.2)
BASOPHILS NFR BLD AUTO: 0 %
BILIRUB SERPL-MCNC: 0.5 MG/DL
BUN SERPL-MCNC: 13.7 MG/DL (ref 8–23)
CALCIUM SERPL-MCNC: 8.7 MG/DL (ref 8.8–10.2)
CHLORIDE SERPL-SCNC: 105 MMOL/L (ref 98–107)
CREAT SERPL-MCNC: 0.88 MG/DL (ref 0.67–1.17)
DEPRECATED HCO3 PLAS-SCNC: 25 MMOL/L (ref 22–29)
EOSINOPHIL # BLD AUTO: 0.3 10E3/UL (ref 0–0.7)
EOSINOPHIL NFR BLD AUTO: 5 %
ERYTHROCYTE [DISTWIDTH] IN BLOOD BY AUTOMATED COUNT: 15.4 % (ref 10–15)
GFR SERPL CREATININE-BSD FRML MDRD: 84 ML/MIN/1.73M2
GLUCOSE SERPL-MCNC: 185 MG/DL (ref 70–99)
HCT VFR BLD AUTO: 26.5 % (ref 40–53)
HGB BLD-MCNC: 8 G/DL (ref 13.3–17.7)
HOLD SPECIMEN: NORMAL
HOLD SPECIMEN: NORMAL
IMM GRANULOCYTES # BLD: 0 10E3/UL
IMM GRANULOCYTES NFR BLD: 0 %
LIPASE SERPL-CCNC: 16 U/L (ref 13–60)
LYMPHOCYTES # BLD AUTO: 0.7 10E3/UL (ref 0.8–5.3)
LYMPHOCYTES NFR BLD AUTO: 15 %
MCH RBC QN AUTO: 28.5 PG (ref 26.5–33)
MCHC RBC AUTO-ENTMCNC: 30.2 G/DL (ref 31.5–36.5)
MCV RBC AUTO: 94 FL (ref 78–100)
MONOCYTES # BLD AUTO: 0.5 10E3/UL (ref 0–1.3)
MONOCYTES NFR BLD AUTO: 11 %
NEUTROPHILS # BLD AUTO: 3.5 10E3/UL (ref 1.6–8.3)
NEUTROPHILS NFR BLD AUTO: 69 %
NRBC # BLD AUTO: 0 10E3/UL
NRBC BLD AUTO-RTO: 0 /100
PLATELET # BLD AUTO: 215 10E3/UL (ref 150–450)
POTASSIUM SERPL-SCNC: 4.1 MMOL/L (ref 3.4–5.3)
PROT SERPL-MCNC: 5.9 G/DL (ref 6.4–8.3)
RBC # BLD AUTO: 2.81 10E6/UL (ref 4.4–5.9)
SODIUM SERPL-SCNC: 140 MMOL/L (ref 136–145)
SPECIMEN EXPIRATION DATE: NORMAL
SPECIMEN EXPIRATION DATE: NORMAL
WBC # BLD AUTO: 5.1 10E3/UL (ref 4–11)

## 2023-06-12 PROCEDURE — 82947 ASSAY GLUCOSE BLOOD QUANT: CPT | Performed by: EMERGENCY MEDICINE

## 2023-06-12 PROCEDURE — G0378 HOSPITAL OBSERVATION PER HR: HCPCS

## 2023-06-12 PROCEDURE — 99285 EMERGENCY DEPT VISIT HI MDM: CPT | Mod: 25

## 2023-06-12 PROCEDURE — 86901 BLOOD TYPING SEROLOGIC RH(D): CPT | Performed by: EMERGENCY MEDICINE

## 2023-06-12 PROCEDURE — 96374 THER/PROPH/DIAG INJ IV PUSH: CPT

## 2023-06-12 PROCEDURE — 80053 COMPREHEN METABOLIC PANEL: CPT | Performed by: EMERGENCY MEDICINE

## 2023-06-12 PROCEDURE — 83690 ASSAY OF LIPASE: CPT | Performed by: EMERGENCY MEDICINE

## 2023-06-12 PROCEDURE — 82310 ASSAY OF CALCIUM: CPT | Performed by: EMERGENCY MEDICINE

## 2023-06-12 PROCEDURE — 250N000011 HC RX IP 250 OP 636: Performed by: EMERGENCY MEDICINE

## 2023-06-12 PROCEDURE — 36415 COLL VENOUS BLD VENIPUNCTURE: CPT | Performed by: EMERGENCY MEDICINE

## 2023-06-12 PROCEDURE — 86850 RBC ANTIBODY SCREEN: CPT | Performed by: EMERGENCY MEDICINE

## 2023-06-12 PROCEDURE — 85025 COMPLETE CBC W/AUTO DIFF WBC: CPT | Performed by: EMERGENCY MEDICINE

## 2023-06-12 PROCEDURE — 258N000003 HC RX IP 258 OP 636: Performed by: STUDENT IN AN ORGANIZED HEALTH CARE EDUCATION/TRAINING PROGRAM

## 2023-06-12 PROCEDURE — 99223 1ST HOSP IP/OBS HIGH 75: CPT | Mod: AI | Performed by: STUDENT IN AN ORGANIZED HEALTH CARE EDUCATION/TRAINING PROGRAM

## 2023-06-12 PROCEDURE — C9113 INJ PANTOPRAZOLE SODIUM, VIA: HCPCS | Performed by: EMERGENCY MEDICINE

## 2023-06-12 RX ORDER — ACETAMINOPHEN 650 MG/1
650 SUPPOSITORY RECTAL EVERY 6 HOURS PRN
Status: DISCONTINUED | OUTPATIENT
Start: 2023-06-12 | End: 2023-06-16 | Stop reason: HOSPADM

## 2023-06-12 RX ORDER — BISACODYL 10 MG
10 SUPPOSITORY, RECTAL RECTAL DAILY PRN
Status: DISCONTINUED | OUTPATIENT
Start: 2023-06-12 | End: 2023-06-16 | Stop reason: HOSPADM

## 2023-06-12 RX ORDER — DEXTROSE MONOHYDRATE 25 G/50ML
25-50 INJECTION, SOLUTION INTRAVENOUS
Status: DISCONTINUED | OUTPATIENT
Start: 2023-06-12 | End: 2023-06-16 | Stop reason: HOSPADM

## 2023-06-12 RX ORDER — ACETAMINOPHEN 325 MG/1
650 TABLET ORAL EVERY 6 HOURS PRN
Status: DISCONTINUED | OUTPATIENT
Start: 2023-06-12 | End: 2023-06-16 | Stop reason: HOSPADM

## 2023-06-12 RX ORDER — ONDANSETRON 2 MG/ML
4 INJECTION INTRAMUSCULAR; INTRAVENOUS EVERY 6 HOURS PRN
Status: DISCONTINUED | OUTPATIENT
Start: 2023-06-12 | End: 2023-06-16 | Stop reason: HOSPADM

## 2023-06-12 RX ORDER — SODIUM CHLORIDE 9 MG/ML
INJECTION, SOLUTION INTRAVENOUS CONTINUOUS
Status: DISCONTINUED | OUTPATIENT
Start: 2023-06-12 | End: 2023-06-13

## 2023-06-12 RX ORDER — NICOTINE POLACRILEX 4 MG
15-30 LOZENGE BUCCAL
Status: DISCONTINUED | OUTPATIENT
Start: 2023-06-12 | End: 2023-06-16 | Stop reason: HOSPADM

## 2023-06-12 RX ORDER — POLYETHYLENE GLYCOL 3350 17 G/17G
17 POWDER, FOR SOLUTION ORAL DAILY PRN
Status: DISCONTINUED | OUTPATIENT
Start: 2023-06-12 | End: 2023-06-16 | Stop reason: HOSPADM

## 2023-06-12 RX ORDER — ONDANSETRON 4 MG/1
4 TABLET, ORALLY DISINTEGRATING ORAL EVERY 6 HOURS PRN
Status: DISCONTINUED | OUTPATIENT
Start: 2023-06-12 | End: 2023-06-16 | Stop reason: HOSPADM

## 2023-06-12 RX ADMIN — SODIUM CHLORIDE: 9 INJECTION, SOLUTION INTRAVENOUS at 23:50

## 2023-06-12 RX ADMIN — PANTOPRAZOLE SODIUM 80 MG: 40 INJECTION, POWDER, FOR SOLUTION INTRAVENOUS at 22:31

## 2023-06-12 ASSESSMENT — ACTIVITIES OF DAILY LIVING (ADL)
ADLS_ACUITY_SCORE: 35

## 2023-06-12 NOTE — ED TRIAGE NOTES
"Pt reports that he was discharged from the hospital last Friday for rectal bleeding, pt reports that since he has had dark stools but today noted that he had \"bright red diarrhea\" Pt not on thinners. PT VSS and ABC's intact      "

## 2023-06-12 NOTE — ED NOTES
PIT/Triage Evaluation    Patient presented with rectal bleeding.  Recent hospitalization earlier this month for similar.  Noted melanotic stool since discharge.  Today, noted recurrent BRBPR.  Denies abdominal pain.  Not on anticoagulation.  Notes feelings of weakness    Exam is notable for:  Even, non-labored respirations  Abd soft, NT, ND    Appropriate interventions for symptom management were initiated if applicable.  Appropriate diagnostic tests were initiated if indicated.    Important information for subsequent clinician:  Recent admission for GI bleed, ?diverticular.  Recurrent BRBPR    I briefly evaluated the patient and developed an initial plan of care. I discussed this plan and explained that this brief interaction does not constitute a full evaluation. Patient/family understands that they should wait to be fully evaluated and discuss any test results with another clinician prior to leaving the hospital.       Obi Chin MD  06/12/23 [id number]

## 2023-06-13 ENCOUNTER — APPOINTMENT (OUTPATIENT)
Dept: PHYSICAL THERAPY | Facility: CLINIC | Age: 88
End: 2023-06-13
Attending: STUDENT IN AN ORGANIZED HEALTH CARE EDUCATION/TRAINING PROGRAM
Payer: MEDICARE

## 2023-06-13 LAB
ANION GAP SERPL CALCULATED.3IONS-SCNC: 7 MMOL/L (ref 7–15)
BUN SERPL-MCNC: 10.8 MG/DL (ref 8–23)
CALCIUM SERPL-MCNC: 8.6 MG/DL (ref 8.8–10.2)
CHLORIDE SERPL-SCNC: 108 MMOL/L (ref 98–107)
CREAT SERPL-MCNC: 0.79 MG/DL (ref 0.67–1.17)
DEPRECATED HCO3 PLAS-SCNC: 25 MMOL/L (ref 22–29)
ERYTHROCYTE [DISTWIDTH] IN BLOOD BY AUTOMATED COUNT: 15.2 % (ref 10–15)
GFR SERPL CREATININE-BSD FRML MDRD: 87 ML/MIN/1.73M2
GLUCOSE BLDC GLUCOMTR-MCNC: 105 MG/DL (ref 70–99)
GLUCOSE BLDC GLUCOMTR-MCNC: 128 MG/DL (ref 70–99)
GLUCOSE BLDC GLUCOMTR-MCNC: 135 MG/DL (ref 70–99)
GLUCOSE BLDC GLUCOMTR-MCNC: 181 MG/DL (ref 70–99)
GLUCOSE SERPL-MCNC: 141 MG/DL (ref 70–99)
HCT VFR BLD AUTO: 24.9 % (ref 40–53)
HGB BLD-MCNC: 7.5 G/DL (ref 13.3–17.7)
HGB BLD-MCNC: 7.7 G/DL (ref 13.3–17.7)
HGB BLD-MCNC: 7.7 G/DL (ref 13.3–17.7)
MAGNESIUM SERPL-MCNC: 2.1 MG/DL (ref 1.7–2.3)
MCH RBC QN AUTO: 28.5 PG (ref 26.5–33)
MCHC RBC AUTO-ENTMCNC: 30.1 G/DL (ref 31.5–36.5)
MCV RBC AUTO: 95 FL (ref 78–100)
PHOSPHATE SERPL-MCNC: 3.1 MG/DL (ref 2.5–4.5)
PLATELET # BLD AUTO: 193 10E3/UL (ref 150–450)
POTASSIUM SERPL-SCNC: 4.2 MMOL/L (ref 3.4–5.3)
RBC # BLD AUTO: 2.63 10E6/UL (ref 4.4–5.9)
SODIUM SERPL-SCNC: 140 MMOL/L (ref 136–145)
WBC # BLD AUTO: 3.9 10E3/UL (ref 4–11)

## 2023-06-13 PROCEDURE — 82962 GLUCOSE BLOOD TEST: CPT

## 2023-06-13 PROCEDURE — 80048 BASIC METABOLIC PNL TOTAL CA: CPT | Performed by: STUDENT IN AN ORGANIZED HEALTH CARE EDUCATION/TRAINING PROGRAM

## 2023-06-13 PROCEDURE — 85027 COMPLETE CBC AUTOMATED: CPT | Performed by: STUDENT IN AN ORGANIZED HEALTH CARE EDUCATION/TRAINING PROGRAM

## 2023-06-13 PROCEDURE — 96361 HYDRATE IV INFUSION ADD-ON: CPT

## 2023-06-13 PROCEDURE — 36415 COLL VENOUS BLD VENIPUNCTURE: CPT | Performed by: PHYSICIAN ASSISTANT

## 2023-06-13 PROCEDURE — 250N000013 HC RX MED GY IP 250 OP 250 PS 637: Performed by: HOSPITALIST

## 2023-06-13 PROCEDURE — 86850 RBC ANTIBODY SCREEN: CPT | Performed by: STUDENT IN AN ORGANIZED HEALTH CARE EDUCATION/TRAINING PROGRAM

## 2023-06-13 PROCEDURE — 97530 THERAPEUTIC ACTIVITIES: CPT | Mod: GP | Performed by: PHYSICAL THERAPIST

## 2023-06-13 PROCEDURE — 85018 HEMOGLOBIN: CPT | Performed by: PHYSICIAN ASSISTANT

## 2023-06-13 PROCEDURE — 36415 COLL VENOUS BLD VENIPUNCTURE: CPT | Performed by: HOSPITALIST

## 2023-06-13 PROCEDURE — 86901 BLOOD TYPING SEROLOGIC RH(D): CPT | Performed by: STUDENT IN AN ORGANIZED HEALTH CARE EDUCATION/TRAINING PROGRAM

## 2023-06-13 PROCEDURE — G0378 HOSPITAL OBSERVATION PER HR: HCPCS

## 2023-06-13 PROCEDURE — 250N000011 HC RX IP 250 OP 636: Performed by: STUDENT IN AN ORGANIZED HEALTH CARE EDUCATION/TRAINING PROGRAM

## 2023-06-13 PROCEDURE — 99232 SBSQ HOSP IP/OBS MODERATE 35: CPT | Performed by: HOSPITALIST

## 2023-06-13 PROCEDURE — 83735 ASSAY OF MAGNESIUM: CPT | Performed by: STUDENT IN AN ORGANIZED HEALTH CARE EDUCATION/TRAINING PROGRAM

## 2023-06-13 PROCEDURE — 85018 HEMOGLOBIN: CPT | Mod: 91 | Performed by: HOSPITALIST

## 2023-06-13 PROCEDURE — 36415 COLL VENOUS BLD VENIPUNCTURE: CPT | Performed by: STUDENT IN AN ORGANIZED HEALTH CARE EDUCATION/TRAINING PROGRAM

## 2023-06-13 PROCEDURE — 96376 TX/PRO/DX INJ SAME DRUG ADON: CPT

## 2023-06-13 PROCEDURE — C9113 INJ PANTOPRAZOLE SODIUM, VIA: HCPCS | Performed by: STUDENT IN AN ORGANIZED HEALTH CARE EDUCATION/TRAINING PROGRAM

## 2023-06-13 PROCEDURE — 97161 PT EVAL LOW COMPLEX 20 MIN: CPT | Mod: GP | Performed by: PHYSICAL THERAPIST

## 2023-06-13 PROCEDURE — 84100 ASSAY OF PHOSPHORUS: CPT | Performed by: STUDENT IN AN ORGANIZED HEALTH CARE EDUCATION/TRAINING PROGRAM

## 2023-06-13 RX ORDER — ACETAMINOPHEN 500 MG
500-1000 TABLET ORAL EVERY 6 HOURS PRN
COMMUNITY
End: 2024-07-10

## 2023-06-13 RX ORDER — ATORVASTATIN CALCIUM 20 MG/1
20 TABLET, FILM COATED ORAL EVERY EVENING
Status: DISCONTINUED | OUTPATIENT
Start: 2023-06-13 | End: 2023-06-16 | Stop reason: HOSPADM

## 2023-06-13 RX ORDER — FUROSEMIDE 20 MG
20 TABLET ORAL 2 TIMES DAILY
Status: DISCONTINUED | OUTPATIENT
Start: 2023-06-13 | End: 2023-06-16 | Stop reason: HOSPADM

## 2023-06-13 RX ORDER — TAMSULOSIN HYDROCHLORIDE 0.4 MG/1
0.4 CAPSULE ORAL AT BEDTIME
Status: DISCONTINUED | OUTPATIENT
Start: 2023-06-13 | End: 2023-06-16 | Stop reason: HOSPADM

## 2023-06-13 RX ORDER — ACETAMINOPHEN 500 MG
500-1000 TABLET ORAL EVERY 6 HOURS PRN
Status: DISCONTINUED | OUTPATIENT
Start: 2023-06-13 | End: 2023-06-13

## 2023-06-13 RX ORDER — FERROUS SULFATE 325(65) MG
325 TABLET ORAL 2 TIMES DAILY
Status: DISCONTINUED | OUTPATIENT
Start: 2023-06-13 | End: 2023-06-16 | Stop reason: HOSPADM

## 2023-06-13 RX ADMIN — ATORVASTATIN CALCIUM 20 MG: 20 TABLET, FILM COATED ORAL at 21:53

## 2023-06-13 RX ADMIN — PANTOPRAZOLE SODIUM 40 MG: 40 INJECTION, POWDER, LYOPHILIZED, FOR SOLUTION INTRAVENOUS at 09:50

## 2023-06-13 RX ADMIN — FUROSEMIDE 20 MG: 20 TABLET ORAL at 17:43

## 2023-06-13 RX ADMIN — PANTOPRAZOLE SODIUM 40 MG: 40 INJECTION, POWDER, LYOPHILIZED, FOR SOLUTION INTRAVENOUS at 21:52

## 2023-06-13 RX ADMIN — TAMSULOSIN HYDROCHLORIDE 0.4 MG: 0.4 CAPSULE ORAL at 21:53

## 2023-06-13 RX ADMIN — FERROUS SULFATE TAB 325 MG (65 MG ELEMENTAL FE) 325 MG: 325 (65 FE) TAB at 21:53

## 2023-06-13 ASSESSMENT — ACTIVITIES OF DAILY LIVING (ADL)
ADLS_ACUITY_SCORE: 35
ADLS_ACUITY_SCORE: 31
ADLS_ACUITY_SCORE: 35
ADLS_ACUITY_SCORE: 35
ADLS_ACUITY_SCORE: 33
ADLS_ACUITY_SCORE: 31
ADLS_ACUITY_SCORE: 35
ADLS_ACUITY_SCORE: 35
ADLS_ACUITY_SCORE: 31
ADLS_ACUITY_SCORE: 35

## 2023-06-13 NOTE — ED NOTES
[facility]  ED Nurse Handoff Report    ED Chief complaint: Rectal Bleeding  . ED Diagnosis:   Final diagnoses:   Lower GI bleed   Hematochezia   History of GI diverticular bleed       Allergies:   Allergies   Allergen Reactions     Levetiracetam [Keppra] Rash     Oxcarbazepine [Oxcarbazepine] Rash       Code Status: Full Code    Activity level - Baseline/Home:  independent.  Activity Level - Current:   standby. With cane  Lift room needed: No.   Bariatric: No   Needed: No   Isolation: No.   Infection: Not Applicable.     Respiratory status: Room air    Vital Signs (within 30 minutes):   Vitals:    06/12/23 1527 06/12/23 1529   BP:  (!) 156/76   Pulse: 66    Resp: 18    Temp: 98.2  F (36.8  C)    TempSrc: Temporal    SpO2: 100%    Weight:  122 kg (269 lb)       Cardiac Rhythm:  ,      Pain level:    Patient confused: No.   Patient Falls Risk: nonskid shoes/slippers when out of bed, assistive device/personal items within reach, and room door open.   Elimination Status: Has voided     Patient Report - Initial Complaint: Rectal bleeding.   Focused Assessment: No abd pain. Bright red stooling. Denies nausea.  HPI   Damien Vallecillo is a 87 year old male with a complex past medical history pertinent for recent hospitalization secondary to possible diverticular bleed with subsequent EGD and colonoscopy which were unremarkable who read presents due to ongoing blood in stool.  Patient stated that he had improved bloody stools for approximately 2 days after discharge.  3 days following his discharge he began to have a return of melena.  He denies any fever or constitutional symptoms.  He notes that in the last 24 hours he developed bright red blood per rectum similar to what led him to admission during his prior hospitalization.  He is not having any pain with defecation.  He denies any abdominal pain.  Denies chest pain or shortness of breath.  He is not on blood thinner medications.  He has  not been taking NSAID medications.       Abnormal Results:   Labs Ordered and Resulted from Time of ED Arrival to Time of ED Departure   COMPREHENSIVE METABOLIC PANEL - Abnormal       Result Value    Sodium 140      Potassium 4.1      Chloride 105      Carbon Dioxide (CO2) 25      Anion Gap 10      Urea Nitrogen 13.7      Creatinine 0.88      Calcium 8.7 (*)     Glucose 185 (*)     Alkaline Phosphatase 48      AST 19      ALT 16      Protein Total 5.9 (*)     Albumin 3.9      Bilirubin Total 0.5      GFR Estimate 84     CBC WITH PLATELETS AND DIFFERENTIAL - Abnormal    WBC Count 5.1      RBC Count 2.81 (*)     Hemoglobin 8.0 (*)     Hematocrit 26.5 (*)     MCV 94      MCH 28.5      MCHC 30.2 (*)     RDW 15.4 (*)     Platelet Count 215      % Neutrophils 69      % Lymphocytes 15      % Monocytes 11      % Eosinophils 5      % Basophils 0      % Immature Granulocytes 0      NRBCs per 100 WBC 0      Absolute Neutrophils 3.5      Absolute Lymphocytes 0.7 (*)     Absolute Monocytes 0.5      Absolute Eosinophils 0.3      Absolute Basophils 0.0      Absolute Immature Granulocytes 0.0      Absolute NRBCs 0.0     LIPASE - Normal    Lipase 16     TYPE AND SCREEN, ADULT    ABO/RH(D) A POS      Antibody Screen Negative      SPECIMEN EXPIRATION DATE [id number]     ABO/RH TYPE AND SCREEN        No orders to display       Treatments provided: See MAR  Family Comments: No family at bedside.  OBS brochure/video discussed/provided to patient:  Yes  ED Medications:   Medications   melatonin tablet 1 mg (has no administration in time range)   ondansetron (ZOFRAN ODT) ODT tab 4 mg (has no administration in time range)     Or   ondansetron (ZOFRAN) injection 4 mg (has no administration in time range)   acetaminophen (TYLENOL) tablet 650 mg (has no administration in time range)     Or   acetaminophen (TYLENOL) Suppository 650 mg (has no administration in time range)   polyethylene glycol (MIRALAX) Packet 17 g (has no administration in  time range)   bisacodyl (DULCOLAX) suppository 10 mg (has no administration in time range)   pantoprazole (PROTONIX) IV push injection 80 mg (80 mg Intravenous $Given 6/12/23 223)       Drips infusing:  No  For the majority of the shift this patient was Green.   Interventions performed were .    Sepsis treatment initiated: No    Cares/treatment/interventions/medications to be completed following ED care: NPO at midnight, monitor for bleeding    ED Nurse Name: Manisha Jensen RN  11:07 PM     RECEIVING UNIT ED HANDOFF REVIEW    Above ED Nurse Handoff Report was reviewed: Yes  Reviewed by: Tong Buitrago RN on June 13, 2023 at 2:10 PM

## 2023-06-13 NOTE — PROGRESS NOTES
[facility]  OUTPATIENT PHYSICAL THERAPY EVALUATION  PLAN OF TREATMENT FOR OUTPATIENT REHABILITATION  (COMPLETE FOR INITIAL CLAIMS ONLY)  Patient's Last Name, First Name, M.I.  YOB: 1935  Damien Vallecillo                        Provider's Name  [facility] Medical Record No.  [id number]                             Onset Date:  06/12/23   Start of Care Date:  06/13/23   Type:     _X_PT   ___OT   ___SLP Medical Diagnosis:  Melena              PT Diagnosis:  Impaired functional mobility Visits from SOC:  1     See note for plan of treatment, functional goals and certification details    I CERTIFY THE NEED FOR THESE SERVICES FURNISHED UNDER        THIS PLAN OF TREATMENT AND WHILE UNDER MY CARE     (Physician co-signature of this document indicates review and certification of the therapy plan).              06/13/23 1971   Appointment Info   Signing Clinician's Name / Credentials (PT) Mariola Mandujano DPT   Quick Adds   Quick Adds Certification   Living Environment   People in Home spouse   Current Living Arrangements house   Home Accessibility stairs within home   Number of Stairs, Within Home, Primary greater than 10 stairs   Stair Railings, Within Home, Primary railing on right side (ascending)   Transportation Anticipated family or friend will provide   Living Environment Comments Pt can utilize chair lift for stair management if needed, can also stay on main level if needed. No stairs to enter home.   Self-Care   Usual Activity Tolerance good   Current Activity Tolerance moderate   Regular Exercise Yes   Activity/Exercise Type other (see comments)  (completes a series of LE exercises given to him by a previous PT every AM)   Exercise Amount/Frequency daily   Equipment Currently Used at Home cane, straight   Fall history within last six months yes   Number of times patient has fallen within last six months 1   General Information   Onset of  Illness/Injury or Date of Surgery 06/12/23   Referring Physician Cathryn Cantu DO   Patient/Family Therapy Goals Statement (PT) Return home   Pertinent History of Current Problem (include personal factors and/or comorbidities that impact the POC) Mr. Damien Vallecillo is a 87 year old male with history of prediabetes, epilepsy, peripheral polyneuropathy with frequent falls, venous stasis, BPH, arthritis and HLP admitted on 06/12/23 with complaint of painless bright red blood per rectum.  Pending ongoing clinical improvement with likely discharge on 6/13.   Existing Precautions/Restrictions fall   Cognition   Affect/Mental Status (Cognition) WNL   Orientation Status (Cognition) oriented x 4   Follows Commands (Cognition) WNL   Pain Assessment   Patient Currently in Pain No   Integumentary/Edema   Integumentary/Edema no deficits were identifed   Posture    Posture Forward head position;Protracted shoulders   Range of Motion (ROM)   Range of Motion ROM is WFL   Strength (Manual Muscle Testing)   Strength (Manual Muscle Testing) Deficits observed during functional mobility   Strength Comments Requires UE support to complete transfers   Bed Mobility   Comment, (Bed Mobility) sit<>supine with SBA   Transfers   Comment, (Transfers) sit<>stand with SBA   Gait/Stairs (Locomotion)   Comment, (Gait/Stairs) SBA with single end cane, x 5'   Balance   Balance Comments Requires UE support for safe ambulation balance   Sensory Examination   Sensory Perception patient reports no sensory changes   Coordination   Coordination no deficits were identified   Muscle Tone   Muscle Tone no deficits were identified   Clinical Impression   Criteria for Skilled Therapeutic Intervention Yes, treatment indicated   PT Diagnosis (PT) Impaired functional mobility   Influenced by the following impairments Impaired activity tolerance   Functional limitations due to impairments Difficulty with transfers, ambulation tolerance   Clinical Presentation  (PT Evaluation Complexity) Stable/Uncomplicated   Clinical Presentation Rationale medically progressing, clear rehab POC   Clinical Decision Making (Complexity) low complexity   Planned Therapy Interventions (PT) bed mobility training;gait training;patient/family education;transfer training;progressive activity/exercise;home program guidelines   Risk & Benefits of therapy have been explained evaluation/treatment results reviewed;care plan/treatment goals reviewed;risks/benefits reviewed;current/potential barriers reviewed;participants voiced agreement with care plan;participants included;patient;spouse/significant other   PT Total Evaluation Time   PT Eval, Low Complexity Minutes (56227) 10   Therapy Certification   Start of care date 06/13/23   Certification date from 06/13/23   Certification date to 06/13/23   Medical Diagnosis Melena   Physical Therapy Goals   PT Frequency One time eval and treatment only   PT Predicted Duration/Target Date for Goal Attainment 06/13/23   PT Goals Bed Mobility;Transfers;Gait   PT: Bed Mobility Modified independent;Supine to/from sit   PT: Transfers Modified independent;Sit to/from stand   PT: Gait Modified independent;Assistive device;50 feet   PT Discharge Planning   PT Discharge Recommendation (DC Rec) home   PT Rationale for DC Rec Pt appears to be very near baseline mobility. Pt and spouse report no concerns with return to home once medically stable.   PT Brief overview of current status SBA with walking stick   Total Session Time   Total Session Time (sum of timed and untimed services) 10

## 2023-06-13 NOTE — PLAN OF CARE
ROOM # 222    Living Situation (if not independent, order SW consult): Home with wife  : Alejandrina Vallecillo (wife) [phone]    Activity level at baseline: Ind  Activity level on admit: AX1    Who will be transporting you at discharge: Alejandrina (wife)     Patient registered to observation; given Patient Bill of Rights; given the opportunity to ask questions about observation status and their plan of care.  Patient has been oriented to the observation room, bathroom and call light is in place.    Discussed discharge goals and expectations with patient/family.       OBSERVATION patient IN TIME: 3:36pm

## 2023-06-13 NOTE — PLAN OF CARE
Physical Therapy Discharge Summary    Reason for therapy discharge:    All goals and outcomes met, no further needs identified.    Progress towards therapy goal(s). See goals on Care Plan in [facility] electronic health record for goal details.  Goals met    Therapy recommendation(s):    No further therapy is recommended.

## 2023-06-13 NOTE — PROGRESS NOTES
[facility]    Medicine Progress Note - Hospitalist Service    Date of Admission:  6/12/2023    Assessment & Plan     Mr. Damien Vallecillo is a 87 year old male with history of prediabetes, epilepsy, peripheral polyneuropathy with frequent falls, venous stasis, BPH, arthritis and HLP admitted on 06/12/23 with complaint of painless bright red blood per rectum.  Pending ongoing clinical improvement with likely discharge on 6/13.    Melena / hematachezia   Concern for GI bleed   History of NSAID use, no blood thinner use   Acute on chronic blood loss anemia, Fe deficiency   Hemoglobin in September was 14.5 with hemoglobin of 9.4 in May. Reports multiple episodes of painless bright red blood per rectum starting on 5/21. Was recently admitted from 5/25 -5/26 for similar complaints, at that time EGD and colonoscopy were performed with suspicion for diverticular bleed. Instructed to stop all NSAIDs at that time to follow-up with his PCP for repeat hemoglobin check.  Presented again today as he had a toilet full of bloody stools and was concerned because this had been a lot more than he was having since his discharge. He was hemodynamically stable with blood pressures 156/76 and heart rate in the 60s.      -Type and screen ordered  -Conditional unit of blood ordered for hemoglobin less than 7   -Did not ordered GI consult as he recently had EGD and colonoscopy which showed possible diverticular bleed.  -Repeat hemoglobin morning trended down to 7.5 but it stayed stable.  -Close monitoring for any evidence of bleed overnight  -IV PPI out of abundance of caution  -No known varices and no indication for octreotide or ceftriaxone    Prediabetes  Patient reports a history of prediabetes and is not on any medicines  -sliding scale insulin      History of epilepsy  -Not on medications      History of peripheral polyneuropathy with frequent falls  -Uses a cane     BPH -PTA Flomax once meds verified      History  of arthritis     HLP -PTA atorvastatin once meds verified     Obesity - BMI 39             Diet: NPO per Anesthesia Guidelines for Procedure/Surgery Except for: Meds, Ice Chips    DVT Prophylaxis: Pneumatic Compression Devices  Fernandez Catheter: Not present  Lines: None     Cardiac Monitoring: None  Code Status: Full Code        Disposition Plan      Expected Discharge Date: 06/14/2023                  Dana Minor MD  Hospitalist Service  [facility]  Securely message with [employer] (more info)  Text page via [employer] Paging/Directory   ______________________________________________________________________    Interval History   Patient is admitted to the hospital with rectal bleeding, hematochezia.  Patient was admitted with similar symptoms and of May and had EGD, colonoscopy and was diagnosed with diverticular bleed.  Patient was told to avoid NSAIDs, aspirin.  Patient has been doing it he said he is doing well but yesterday he had huge amount of blood.  He denies any dizziness, lightheadedness.  He denies any chest pain, shortness of breath.    Physical Exam   Vital Signs: Temp: 98.2  F (36.8  C) Temp src: Oral BP: 135/79 Pulse: 66   Resp: 20 SpO2: 100 % O2 Device: None (Room air)    Weight: 269 lbs 0 oz    Constitutional: awake, alert, cooperative, no apparent distress, and appears stated age  Eyes: Lids and lashes normal, pupils equal, round and reactive to light, extra ocular muscles intact, sclera clear, conjunctiva normal  Respiratory: No increased work of breathing, good air exchange, clear to auscultation bilaterally, no crackles or wheezing  Cardiovascular: Normal apical impulse, regular rate and rhythm, normal S1 and S2, no S3 or S4, and no murmur noted  GI: No scars, normal bowel sounds, soft, non-distended, non-tender, no masses palpated, no hepatosplenomegally      35 MINUTES SPENT BY ME on the date of service doing chart review, history, exam, documentation & further activities  per the note.      Data     I have personally reviewed the following data over the past 24 hrs:    3.9 (L)  \   7.7 (L)   / 193     140 108 (H) 10.8 /  128 (H)   4.2 25 0.79 \       ALT: 16 AST: 19 AP: 48 TBILI: 0.5   ALB: 3.9 TOT PROTEIN: 5.9 (L) LIPASE: 16       Imaging results reviewed over the past 24 hrs:   No results found for this or any previous visit (from the past 24 hour(s)).  Recent Labs   Lab 06/13/23  1200 06/13/23  1135 06/13/23  0759 06/13/23  0058 06/12/23  1557   WBC  --   --  3.9*  --  5.1   HGB 7.7*  --  7.5*  --  8.0*   MCV  --   --  95  --  94   PLT  --   --  193  --  215   NA  --   --  140  --  140   POTASSIUM  --   --  4.2  --  4.1   CHLORIDE  --   --  108*  --  105   CO2  --   --  25  --  25   BUN  --   --  10.8  --  13.7   CR  --   --  0.79  --  0.88   ANIONGAP  --   --  7  --  10   ANURAG  --   --  8.6*  --  8.7*   GLC  --  128* 141* 135* 185*   ALBUMIN  --   --   --   --  3.9   PROTTOTAL  --   --   --   --  5.9*   BILITOTAL  --   --   --   --  0.5   ALKPHOS  --   --   --   --  48   ALT  --   --   --   --  16   AST  --   --   --   --  19   LIPASE  --   --   --   --  16

## 2023-06-13 NOTE — PHARMACY-ADMISSION MEDICATION HISTORY
"Pharmacist Admission Medication History    Admission medication history is complete. The information provided in this note is only as accurate as the sources available at the time of the update.    Medication reconciliation/reorder completed by provider prior to medication history? No    Information Source(s): Patient and Family member via in-person    Pertinent Information: Only changes since discharge in May was to add Tylenol prn and OTC Iron tid. Doxycycline left on list but changed to \"not taking\" per patient.    Changes made to PTA medication list:    Added: Tylenol, Iron tablet.    Deleted: None    Changed: None    Medication Affordability:  Not including over the counter (OTC) medications, was there a time in the past 3 months when you did not take your medications as prescribed because of cost?: No    Allergies reviewed with patient and updates made in EHR: yes    Medication History Completed By: Jose Carlos Selby [facility] 6/13/2023 2:25 PM    Prior to Admission medications    Medication Sig Last Dose Taking? Auth Provider Long Term End Date   acetaminophen (TYLENOL) 500 MG tablet Take 500-1,000 mg by mouth every 6 hours as needed for mild pain 6/12/2023 Yes Unknown, Entered By History     atorvastatin (LIPITOR) 20 MG tablet Take 20 mg by mouth every evening 6/11/2023 at HS Yes Unknown, Entered By History No    CINNAMON PO Take 1 tablet by mouth daily  6/12/2023 at AM Yes Reported, Patient     clindamycin (CLEOCIN T) 1 % external solution Apply topically 2 times daily Unknown Yes Obi Strange MD     diclofenac (VOLTAREN) 1 % topical gel Apply 2 g topically 2 times daily 6/12/2023 at AM Yes Unknown, Entered By History     Ferrous Sulfate (IRON PO) Take 1 tablet by mouth 3 times daily OTC, unknown strength. 6/12/2023 at AM Yes Unknown, Entered By History     fish oil-omega-3 fatty acids 1000 MG capsule Take 1 g by mouth 2 times daily 6/12/2023 at AM Yes Unknown, Entered By History     Flaxseed Linseed, " (FLAXSEED OIL) 1000 MG CAPS Take 1,000 mg by mouth daily 6/12/2023 at am Yes Unknown, Entered By History     furosemide (LASIX) 20 MG tablet Take 1 tablet (20 mg) by mouth 2 times daily 6/12/2023 Yes Obi Strange MD Yes    glucosamine-chondroitin 500-400 MG CAPS per capsule Take 1 capsule by mouth daily 6/12/2023 at AM Yes Unknown, Entered By History     metroNIDAZOLE (METROGEL) 0.75 % external gel APPLY THIN LAYER TO FACE TWICE A DAY FOR ROSACEA 6/12/2023 at AM Yes Reported, Patient     Multiple Vitamins-Minerals (MULTIVITAMIN ADULT PO) Take 1 tablet by mouth daily Reported on 5/12/2017 6/12/2023 at AM Yes Reported, Patient     pramox-pe-glycerin-petrolatum (PREPARATION H) 1-0.25-14.4-15 % CREA cream Place rectally 4 times daily as needed for hemorrhoids Use as directed Past Month Yes Unknown, Entered By History     ACE NOT PRESCRIBED, INTENTIONAL, ACE Inhibitor not prescribed due to Other:  Had cough on ACEI in past         Nimisha Hester MD     doxycycline hyclate (PERIOSTAT) 20 MG tablet Take 1 tablet (20 mg) by mouth 2 times daily  Patient not taking: Reported on 6/13/2023 Not Taking  Obi Strange MD     order for DME 1: Gradient Compression Wraps; 2: Cast Boots; 3: BLE knee or thigh high 20-30 mm Hg compression stocking; 4: BLE knee or thigh high custom compression stocking; 5: Alternative BLE knee high or thigh compression garments (velcro/buckling)   Arnav Cunningham MD     order for DME Equipment being ordered: Walker Wheels () and Walker ()  Treatment Diagnosis: Impaired functional mobility   Alec Raymond MD     tamsulosin (FLOMAX) 0.4 MG capsule Take 1 capsule (0.4 mg) by mouth daily 6/11/2023 at HS  Obi Strange MD

## 2023-06-13 NOTE — H&P
[facility]  History and Physical - Hospitalist Service  Date of Admission:  6/12/2023    Assessment & Plan     Mr. Damien Vallecillo is a 87 year old male with history of prediabetes, epilepsy, peripheral polyneuropathy with frequent falls, venous stasis, BPH, arthritis and HLP admitted on 06/12/23 with complaint of painless bright red blood per rectum.  Pending ongoing clinical improvement with likely discharge on 6/13.    Melena / hematachezia   Concern for GI bleed   History of NSAID use, no blood thinner use   Acute on chronic blood loss anemia, Fe deficiency   Hemoglobin in September was 14.5 with hemoglobin of 9.4 in May. Reports multiple episodes of painless bright red blood per rectum starting on 5/21. Was recently admitted from 5/25 -5/26 for similar complaints, at that time EGD and colonoscopy were performed with suspicion for diverticular bleed. Instructed to stop all NSAIDs at that time to follow-up with his PCP for repeat hemoglobin check.  Presented again today as he had a toilet full of bloody stools and was concerned because this had been a lot more than he was having since his discharge. He was hemodynamically stable with blood pressures 156/76 and heart rate in the 60s.  At this time I suspect that this was likely a diverticular bleed that looked very large given his episode of diarrhea as well.  Plan to admit and monitor overnight to ensure stability of hemoglobin.   -HGB trend, last melva 8.4 , 8.0 on admission   -Type and screen ordered  -Conditional unit of blood ordered for hemoglobin less than 7   -Confused with active bleeding or hemoglobin less than 7  -Holding off on GI consult given his recent admission  -Repeat hemoglobin in a.m.  -Close monitoring for any evidence of bleed  -IV PPI out of abundance of caution  -No known varices and no indication for octreotide or ceftriaxone    Prediabetes  Patient reports a history of prediabetes and is not on any medicines  -sliding  "scale insulin      History of epilepsy  -Not on medications      History of peripheral polyneuropathy with frequent falls  -Uses a cane     BPH -PTA Flomax once meds verified      History of arthritis     HLP -PTA atorvastatin once meds verified     Obesity - BMI 39         Diet: NPO per Anesthesia Guidelines for Procedure/Surgery Except for: Meds, Ice Chips  DVT Prophylaxis: Pneumatic Compression Devices  Fernandez Catheter: Not present  Lines: None     Cardiac Monitoring: None  Code Status: Full Code     Clinically Significant Risk Factors Present on Admission                  # Hypertension: Home medication list includes antihypertensive(s)     # DMII: A1C = 6.7 % (Ref range: <5.7 %) within past 6 months    # Obesity: Estimated body mass index is 39.72 kg/m  as calculated from the following:    Height as of 5/25/23: 1.753 m (5' 9\").    Weight as of this encounter: 122 kg (269 lb).            Disposition Plan      Expected Discharge Date: 06/13/2023                  Cathryn Cantu DO  Hospitalist Service  [facility]  Securely message with [employer] (more info)  Text page via [employer] Paging/Directory     ______________________________________________________________________    Chief Complaint   Melena / hematachezia     History is obtained from the patient, electronic health record and emergency department physician    History of Present Illness   Mr. Damien Vallecillo is a 87 year old male with history of prediabetes, epilepsy, peripheral polyneuropathy with frequent falls, venous stasis, BPH, arthritis and HLP admitted on 06/12/23 with complaint of painless bright red blood per rectum.    Hemoglobin in September was 14.5 with hemoglobin of 9.4 in May. Reports multiple episodes of painless bright red blood per rectum starting on 5/21. Was recently admitted from 5/25 -5/26 for similar complaints, at that time EGD and colonoscopy were performed with suspicion for diverticular bleed. Instructed to stop " "all NSAIDs at that time to follow-up with his PCP for repeat hemoglobin check.  Presented again today as he had a toilet full of bloody stools and was concerned because this had been a lot more than he was having since his discharge. He was hemodynamically stable with blood pressures 156/76 and heart rate in the 60s.  At this time I suspect that this was likely a diverticular bleed that looked very large given his episode of diarrhea as well.  Plan to admit and monitor overnight to ensure stability of hemoglobin.     Patient does not report any lightheadedness, dizziness or headaches.  He has no abdominal pain, nausea vomiting.  He has had 1 episode of diarrhea and reports that it was bloody. No other new concerns or complaints today.       Past Medical History    Past Medical History:   Diagnosis Date     Acute suppurative arthritis (H)      Acute, but ill-defined, cerebrovascular disease      Ankle fracture 12/28/2018     Arthritis      Benign prostatic hyperplasia with urinary frequency 3/29/2022     Cheilosis 9/28/2018     Chronic headaches      Closed right ankle fracture 12/29/2018     Cognitive attention deficit 9/16/2021     Diabetes (H)     \"Pre-diabetes\"     Dislocation of right patella 9/16/2020     Elevated serum creatinine 11/13/2019    GFR Estimate Date Value Ref Range Status 09/30/2019 84 >60 mL/min/[1.73_m2] Final   Comment:   Non  GFR Calc Starting 12/18/2018, serum creatinine based estimated GFR (eGFR) will be  calculated using the Chronic Kidney Disease Epidemiology Collaboration  (CKD-EPI) equation.         Encounter for immunization 9/21/2022     External hemorrhoids 9/21/2022     Fall 3/8/2023     Fecal incontinence 9/16/2020     Glucose intolerance (impaired glucose tolerance) 5/31/2013     Gout, unspecified      Grieving 9/16/2021     Hammer toe of left foot 10/21/2016     Hematuria      History of arthroplasty of right knee 08/17/2017     History of blood transfusion      " Hyperlipidemia LDL goal <160 11/13/2019     Hypersomnia 3/7/2023     Left foot pain 9/30/2019     Left knee pain, unspecified chronicity 5/8/2017     Lentigo maligna (H)      Malignant neoplasm of rectosigmoid junction (H)      Morbid obesity due to excess calories (H) 10/21/2016     RENAE (obstructive sleep apnea) 3/8/2023     Other convulsions     last seizure 7-8 years ago...not certain if these were true seizres or not..     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     Right knee pain, unspecified chronicity 5/8/2017     Rosacea 9/28/2018     Syncope      Tubulovillous adenoma of colon      Venous stasis dermatitis of both lower extremities 10/21/2016       Past Surgical History   Past Surgical History:   Procedure Laterality Date     ARTHROPLASTY KNEE Right 6/19/2017    Procedure: ARTHROPLASTY KNEE;  Right total knee arthroplasty, Hammer toe repair toe 2,3,4,5 left foot with osteotomy;  Surgeon: Alec Raymond MD;  Location:  OR     COLONOSCOPY N/A 6/23/2015    Procedure: COMBINED COLONOSCOPY, SINGLE OR MULTIPLE BIOPSY/POLYPECTOMY BY BIOPSY;  Surgeon: Kimberly Alfaro MD;  Location:  GI     COLONOSCOPY N/A 7/30/2015    Procedure: COLONOSCOPY;  Surgeon: Enrique Salazar MD;  Location:  OR     COLONOSCOPY N/A 7/31/2018    Procedure: COLONOSCOPY;  Colonoscopy;  Surgeon: Tiffany Cheema MD;  Location:  GI     COLONOSCOPY N/A 5/26/2023    Procedure: Colonoscopy;  Surgeon: Juan Grimaldo MD;  Location:  GI     ESOPHAGOSCOPY, GASTROSCOPY, DUODENOSCOPY (EGD), COMBINED N/A 5/26/2023    Procedure: Esophagoscopy, gastroscopy, duodenoscopy (EGD), combined;  Surgeon: Juan Grimaldo MD;  Location:  GI     HERNIORRHAPHY EPIGASTRIC  4/27/2012    Procedure:HERNIORRHAPHY EPIGASTRIC; Epigastric Hernia Repair with mesh ; Surgeon:BOLIVAR OLIVEIRA;  Location:[facility] OR     LAPAROSCOPIC ASSISTED COLECTOMY Right 7/30/2015    Procedure: LAPAROSCOPIC ASSISTED COLECTOMY;  Surgeon: Enrique Salazar MD;  Location:  OR     ORTHOPEDIC SURGERY      lt knee arthroplasty     REALIGN PATELLA Right 10/9/2017    Procedure: REALIGN PATELLA;  Revision right total knee arthroplasty;  Surgeon: Alec Raymond MD;  Location: [facility] OR     REPAIR HAMMER TOE Left 6/19/2017    Procedure: REPAIR HAMMER TOE;  Hammer toe repair toe 2,3,4,5 left foot with osteotomy   ;  Surgeon: Beverly Kim DPM, Podiatry/Foot and Ankle Surgery;  Location: [facility] OR     SIGMOIDOSCOPY FLEXIBLE  5/29/2013    Procedure: SIGMOIDOSCOPY FLEXIBLE;  SIGMOIDOSCOPY FLEXIBLE (FV) Needs blood sugar ck'd;  Surgeon: Enrique Salazar MD;  Location:  GI     skin cancer excision       ZZC NONSPECIFIC PROCEDURE      right heel surgery; spur removed.       Prior to Admission Medications   Prior to Admission Medications   Prescriptions Last Dose Informant Patient Reported? Taking?   ACE NOT PRESCRIBED, INTENTIONAL,   No No   Sig: ACE Inhibitor not prescribed due to Other:  Had cough on ACEI in past         CINNAMON PO   Yes No   Sig: Take 1 tablet by mouth daily    Flaxseed, Linseed, (FLAXSEED OIL) 1000 MG CAPS   Yes No   Sig: Take 1,000 mg by mouth daily   Multiple Vitamins-Minerals (MULTIVITAMIN ADULT PO)   Yes No   Sig: Take 1 tablet by mouth daily Reported on 5/12/2017   atorvastatin (LIPITOR) 20 MG tablet   Yes No   Sig: Take 20 mg by mouth every evening   clindamycin (CLEOCIN T) 1 % external solution   No No   Sig: Apply topically 2 times daily   diclofenac (VOLTAREN) 1 % topical gel   Yes No   Sig: Apply 2 g topically 2 times daily   doxycycline hyclate (PERIOSTAT) 20 MG tablet   Yes No   Sig: Take 1 tablet (20 mg) by mouth 2 times daily   fish oil-omega-3 fatty acids 1000 MG capsule   Yes No   Sig: Take 1 g by mouth 2 times daily   furosemide (LASIX) 20 MG tablet   No No   Sig: Take 1 tablet (20 mg) by mouth 2  times daily   glucosamine-chondroitin 500-400 MG CAPS per capsule   Yes No   Sig: Take 1 capsule by mouth daily   metroNIDAZOLE (METROGEL) 0.75 % external gel   Yes No   Sig: APPLY THIN LAYER TO FACE TWICE A DAY FOR ROSACEA   order for DME   No No   Sig: Equipment being ordered: Walker Wheels () and Walker ()  Treatment Diagnosis: Impaired functional mobility   order for DME   No No   Si: Gradient Compression Wraps; 2: Cast Boots; 3: BLE knee or thigh high 20-30 mm Hg compression stocking; 4: BLE knee or thigh high custom compression stocking; 5: Alternative BLE knee high or thigh compression garments (velcro/buckling)   pramox-pe-glycerin-petrolatum (PREPARATION H) 1-0.25-14.4-15 % CREA cream   Yes No   Sig: Place rectally 4 times daily as needed for hemorrhoids Use as directed   tamsulosin (FLOMAX) 0.4 MG capsule   No No   Sig: Take 1 capsule (0.4 mg) by mouth daily      Facility-Administered Medications: None        Physical Exam   Vital Signs: Temp: 98.2  F (36.8  C) Temp src: Temporal BP: (!) 156/76 Pulse: 66   Resp: 18 SpO2: 100 % O2 Device: None (Room air)    Weight: 269 lbs 0 oz    Constitutional: awake, alert, cooperative, no apparent distress, and appears stated age  HEENT: Normocephalic, atraumatic  Respiratory: Normal work of breathing, good air exchange, clear to auscultation bilaterally, no crackles or wheezing   Cardiovascular: Regular rate and rhythm, no murmurs appreciated  GI: obese abdomen, Soft and nontender to palpation, nondistended, no rebound or guarding  Skin: normal skin color, texture, turgor  Musculoskeletal: No deformities, bilateral LE edema present 2-3+  Neurologic: Alert and oriented, no focal deficits   Neuropsychiatric: General: normal, calm and normal eye contact     Data   ------------------------- PAST 24 HR DATA REVIEWED -----------------------------------------------    I have personally reviewed the following data over the past 24 hrs:    5.1  \   8.0 (L)   / 215      140 105 13.7 /  185 (H)   4.1 25 0.88 \       ALT: 16 AST: 19 AP: 48 TBILI: 0.5   ALB: 3.9 TOT PROTEIN: 5.9 (L) LIPASE: 16       Imaging results reviewed over the past 24 hrs:   No results found for this or any previous visit (from the past 24 hour(s)).

## 2023-06-13 NOTE — ED PROVIDER NOTES
History   Chief Complaint:  Rectal Bleeding     HPI   Damien Vallecillo is a 87 year old male with a complex past medical history pertinent for recent hospitalization secondary to possible diverticular bleed with subsequent EGD and colonoscopy which were unremarkable who read presents due to ongoing blood in stool.  Patient stated that he had improved bloody stools for approximately 2 days after discharge.  3 days following his discharge he began to have a return of melena.  He denies any fever or constitutional symptoms.  He notes that in the last 24 hours he developed bright red blood per rectum similar to what led him to admission during his prior hospitalization.  He is not having any pain with defecation.  He denies any abdominal pain.  Denies chest pain or shortness of breath.  He is not on blood thinner medications.  He has not been taking NSAID medications.    Independent Historian:   None - Patient Only    Review of External Notes:   Reviewed prior admission for lower GI bleed from 26 May.  Had EGD and colonoscopy performed.  Recommended to stop NSAIDs.  Thought to be secondary to a diverticular bleed.  Recommended to stop NSAIDs and ultimately discharged home. Boaz of hgb 8.4 on that admission.     Medications:    ACE NOT PRESCRIBED, INTENTIONAL,  atorvastatin (LIPITOR) 20 MG tablet  CINNAMON PO  clindamycin (CLEOCIN T) 1 % external solution  diclofenac (VOLTAREN) 1 % topical gel  doxycycline hyclate (PERIOSTAT) 20 MG tablet  fish oil-omega-3 fatty acids 1000 MG capsule  Flaxseed, Linseed, (FLAXSEED OIL) 1000 MG CAPS  furosemide (LASIX) 20 MG tablet  glucosamine-chondroitin 500-400 MG CAPS per capsule  metroNIDAZOLE (METROGEL) 0.75 % external gel  Multiple Vitamins-Minerals (MULTIVITAMIN ADULT PO)  order for DME  order for DME  pramox-pe-glycerin-petrolatum (PREPARATION H) 1-0.25-14.4-15 % CREA cream  tamsulosin (FLOMAX) 0.4 MG capsule      Past Medical History:    Past Medical History:   Diagnosis Date      Acute suppurative arthritis (H)      Acute, but ill-defined, cerebrovascular disease      Ankle fracture 12/28/2018     Arthritis      Benign prostatic hyperplasia with urinary frequency 3/29/2022     Cheilosis 9/28/2018     Chronic headaches      Closed right ankle fracture 12/29/2018     Cognitive attention deficit 9/16/2021     Diabetes (H)      Dislocation of right patella 9/16/2020     Elevated serum creatinine 11/13/2019     Encounter for immunization 9/21/2022     External hemorrhoids 9/21/2022     Fall 3/8/2023     Fecal incontinence 9/16/2020     Glucose intolerance (impaired glucose tolerance) 5/31/2013     Gout, unspecified      Grieving 9/16/2021     Hammer toe of left foot 10/21/2016     Hematuria      History of arthroplasty of right knee 08/17/2017     History of blood transfusion      Hyperlipidemia LDL goal <160 11/13/2019     Hypersomnia 3/7/2023     Left foot pain 9/30/2019     Left knee pain, unspecified chronicity 5/8/2017     Lentigo maligna (H)      Malignant neoplasm of rectosigmoid junction (H)      Morbid obesity due to excess calories (H) 10/21/2016     RENAE (obstructive sleep apnea) 3/8/2023     Other convulsions      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     Right knee pain, unspecified chronicity 5/8/2017     Rosacea 9/28/2018     Syncope      Tubulovillous adenoma of colon      Venous stasis dermatitis of both lower extremities 10/21/2016     Past Surgical History:    Past Surgical History:   Procedure Laterality Date     ARTHROPLASTY KNEE Right 6/19/2017    Procedure: ARTHROPLASTY KNEE;  Right total knee arthroplasty, Hammer toe repair toe 2,3,4,5 left foot with osteotomy;  Surgeon: Alec Raymond MD;  Location: [facility] OR     COLONOSCOPY N/A 6/23/2015    Procedure: COMBINED COLONOSCOPY, SINGLE OR MULTIPLE BIOPSY/POLYPECTOMY BY BIOPSY;   Surgeon: Kimberly Alfaro MD;  Location:  GI     COLONOSCOPY N/A 7/30/2015    Procedure: COLONOSCOPY;  Surgeon: Enrique Salazar MD;  Location:  OR     COLONOSCOPY N/A 7/31/2018    Procedure: COLONOSCOPY;  Colonoscopy;  Surgeon: Tiffany Cheema MD;  Location:  GI     COLONOSCOPY N/A 5/26/2023    Procedure: Colonoscopy;  Surgeon: Juan Grimaldo MD;  Location:  GI     ESOPHAGOSCOPY, GASTROSCOPY, DUODENOSCOPY (EGD), COMBINED N/A 5/26/2023    Procedure: Esophagoscopy, gastroscopy, duodenoscopy (EGD), combined;  Surgeon: Juan Grimaldo MD;  Location:  GI     HERNIORRHAPHY EPIGASTRIC  4/27/2012    Procedure:HERNIORRHAPHY EPIGASTRIC; Epigastric Hernia Repair with mesh ; Surgeon:BOLIVAR OLIVEIRA; Location: OR     LAPAROSCOPIC ASSISTED COLECTOMY Right 7/30/2015    Procedure: LAPAROSCOPIC ASSISTED COLECTOMY;  Surgeon: Enrique Salazar MD;  Location:  OR     ORTHOPEDIC SURGERY      lt knee arthroplasty     REALIGN PATELLA Right 10/9/2017    Procedure: REALIGN PATELLA;  Revision right total knee arthroplasty;  Surgeon: Alec Raymond MD;  Location:  OR     REPAIR HAMMER TOE Left 6/19/2017    Procedure: REPAIR HAMMER TOE;  Hammer toe repair toe 2,3,4,5 left foot with osteotomy   ;  Surgeon: Beverly Kim DPM, Podiatry/Foot and Ankle Surgery;  Location:  OR     SIGMOIDOSCOPY FLEXIBLE  5/29/2013    Procedure: SIGMOIDOSCOPY FLEXIBLE;  SIGMOIDOSCOPY FLEXIBLE (FV) Needs blood sugar ck'd;  Surgeon: Enrique Salazar MD;  Location:  GI     skin cancer excision       ZZC NONSPECIFIC PROCEDURE      right heel surgery; spur removed.      Physical Exam     Patient Vitals for the past 24 hrs:   BP Temp Temp src Pulse Resp SpO2 Weight   06/12/23 1529 (!) 156/76 -- -- -- -- -- 122 kg (269 lb)   06/12/23 1527 -- 98.2  F (36.8  C) Temporal 66 18 100 % --      General: Alert, appears well-developed and well-nourished. Cooperative.     In no acute  distress  HEENT:  Head:  Atraumatic  Ears:  External ears are normal  Mouth/Throat:  Oropharynx is without erythema or exudate and mucous membranes are moist.   Eyes:   Conjunctivae normal and EOM are normal. No scleral icterus.  CV:  Normal rate, regular rhythm, normal heart sounds and radial pulses are 2+ and symmetric.  No murmur.  Resp:  Breath sounds are clear bilaterally    Non-labored, no retractions or accessory muscle use  GI:  Obese.  Abdomen is soft, no distension, no tenderness. No rebound or guarding.  No CVA tenderness bilaterally  Rectal: External hemorrhoid tags noted, no active bleeding.  No anal fissure.  MS:  Normal range of motion. Trace BLE edema.    Normal strength in all 4 extremities.     Back atraumatic.    No midline cervical, thoracic, or lumbar tenderness  Skin:  Warm and dry.  No rash or lesions noted.  Neuro: Alert. Normal strength.  GCS: 15  Psych:  Normal mood and affect.    Emergency Department Course     ECG results from 09/24/13   EKG 12-lead, tracing only     Value    Interpretation ECG Click View Image link to view waveform and result     Imaging:  No orders to display      Report per radiology    Laboratory:  Labs Ordered and Resulted from Time of ED Arrival to Time of ED Departure   COMPREHENSIVE METABOLIC PANEL - Abnormal       Result Value    Sodium 140      Potassium 4.1      Chloride 105      Carbon Dioxide (CO2) 25      Anion Gap 10      Urea Nitrogen 13.7      Creatinine 0.88      Calcium 8.7 (*)     Glucose 185 (*)     Alkaline Phosphatase 48      AST 19      ALT 16      Protein Total 5.9 (*)     Albumin 3.9      Bilirubin Total 0.5      GFR Estimate 84     CBC WITH PLATELETS AND DIFFERENTIAL - Abnormal    WBC Count 5.1      RBC Count 2.81 (*)     Hemoglobin 8.0 (*)     Hematocrit 26.5 (*)     MCV 94      MCH 28.5      MCHC 30.2 (*)     RDW 15.4 (*)     Platelet Count 215      % Neutrophils 69      % Lymphocytes 15      % Monocytes 11      % Eosinophils 5      %  Basophils 0      % Immature Granulocytes 0      NRBCs per 100 WBC 0      Absolute Neutrophils 3.5      Absolute Lymphocytes 0.7 (*)     Absolute Monocytes 0.5      Absolute Eosinophils 0.3      Absolute Basophils 0.0      Absolute Immature Granulocytes 0.0      Absolute NRBCs 0.0     LIPASE - Normal    Lipase 16     TYPE AND SCREEN, ADULT    ABO/RH(D) A POS      Antibody Screen Negative      SPECIMEN EXPIRATION DATE [id number]     ABO/RH TYPE AND SCREEN        Procedures     Emergency Department Course & Assessments:       Interventions:  Medications   melatonin tablet 1 mg (has no administration in time range)   ondansetron (ZOFRAN ODT) ODT tab 4 mg (has no administration in time range)     Or   ondansetron (ZOFRAN) injection 4 mg (has no administration in time range)   acetaminophen (TYLENOL) tablet 650 mg (has no administration in time range)     Or   acetaminophen (TYLENOL) Suppository 650 mg (has no administration in time range)   polyethylene glycol (MIRALAX) Packet 17 g (has no administration in time range)   bisacodyl (DULCOLAX) suppository 10 mg (has no administration in time range)   pantoprazole (PROTONIX) IV push injection 80 mg (80 mg Intravenous $Given 6/12/23 2231)        Independent Interpretation (X-rays, CTs, rhythm strip):  None    Consultations/Discussion of Management or Tests:  2224 I spoke with Dr. Cantu of the hospitalist service who agreed to admission.       Social Determinants of Health affecting care:   None    Disposition:  The patient was admitted to the hospital under the care of Dr. Cantu.     Impression & Plan    CMS Diagnoses: None    Medical Decision Making:  Damien Vallecillo is a 87 year old male who presents with blood in stool.  I considered a broad differential diagnosis for this patient including upper GIB (ulcer, gastritis, avm, tumor, etc) vs lower GIB (tumor, diverticulosis, avm, meckels, etc), colitis, aortoenteric fistula, hemorrhoids, fissures,  Ischemic  colitis, bacterial stool infection (salmonella, shigella, e. Coli, campylobacter).    The workup in the ED is consistent with gastrointestinal bleeding, although it is unclear if it is lower or upper.  I favor lower at this time due to lack of vomiting, no epigastric pain, no history of ulcers or NSAID use.  I did give protonix just in case.  No indication to start octreotide as my suspicion of variceal bleed is so low.    Given amount of blood and exam, I would admit at this point for serial hemoglobins.  Patient was typed and screened.  Discussed with hospitalist.  They are stable and do not need ICU care at this point.    Did not call GI consult at this time given non-massive bleed and stable patient.      Diagnosis:    ICD-10-CM    1. Lower GI bleed  K92.2       2. Hematochezia  K92.1       3. History of GI diverticular bleed  Z87.19            Discharge Medications:  New Prescriptions    No medications on file      6/12/2023   Mason Henriquez MD White, Scott, MD  06/12/23 7624

## 2023-06-14 LAB
ANION GAP SERPL CALCULATED.3IONS-SCNC: 10 MMOL/L (ref 7–15)
BASOPHILS # BLD AUTO: 0 10E3/UL (ref 0–0.2)
BASOPHILS NFR BLD AUTO: 1 %
BUN SERPL-MCNC: 11.7 MG/DL (ref 8–23)
CALCIUM SERPL-MCNC: 8.7 MG/DL (ref 8.8–10.2)
CHLORIDE SERPL-SCNC: 107 MMOL/L (ref 98–107)
CREAT SERPL-MCNC: 0.87 MG/DL (ref 0.67–1.17)
DEPRECATED HCO3 PLAS-SCNC: 24 MMOL/L (ref 22–29)
EOSINOPHIL # BLD AUTO: 0.3 10E3/UL (ref 0–0.7)
EOSINOPHIL NFR BLD AUTO: 5 %
ERYTHROCYTE [DISTWIDTH] IN BLOOD BY AUTOMATED COUNT: 15.3 % (ref 10–15)
GFR SERPL CREATININE-BSD FRML MDRD: 84 ML/MIN/1.73M2
GLUCOSE BLDC GLUCOMTR-MCNC: 114 MG/DL (ref 70–99)
GLUCOSE BLDC GLUCOMTR-MCNC: 124 MG/DL (ref 70–99)
GLUCOSE BLDC GLUCOMTR-MCNC: 137 MG/DL (ref 70–99)
GLUCOSE BLDC GLUCOMTR-MCNC: 141 MG/DL (ref 70–99)
GLUCOSE BLDC GLUCOMTR-MCNC: 179 MG/DL (ref 70–99)
GLUCOSE SERPL-MCNC: 128 MG/DL (ref 70–99)
HCT VFR BLD AUTO: 26.3 % (ref 40–53)
HGB BLD-MCNC: 7.8 G/DL (ref 13.3–17.7)
HGB BLD-MCNC: 8.4 G/DL (ref 13.3–17.7)
IMM GRANULOCYTES # BLD: 0 10E3/UL
IMM GRANULOCYTES NFR BLD: 0 %
LYMPHOCYTES # BLD AUTO: 0.8 10E3/UL (ref 0.8–5.3)
LYMPHOCYTES NFR BLD AUTO: 17 %
MCH RBC QN AUTO: 28.4 PG (ref 26.5–33)
MCHC RBC AUTO-ENTMCNC: 29.7 G/DL (ref 31.5–36.5)
MCV RBC AUTO: 96 FL (ref 78–100)
MONOCYTES # BLD AUTO: 0.7 10E3/UL (ref 0–1.3)
MONOCYTES NFR BLD AUTO: 14 %
NEUTROPHILS # BLD AUTO: 3.1 10E3/UL (ref 1.6–8.3)
NEUTROPHILS NFR BLD AUTO: 63 %
NRBC # BLD AUTO: 0 10E3/UL
NRBC BLD AUTO-RTO: 0 /100
PLATELET # BLD AUTO: 193 10E3/UL (ref 150–450)
POTASSIUM SERPL-SCNC: 4 MMOL/L (ref 3.4–5.3)
RBC # BLD AUTO: 2.75 10E6/UL (ref 4.4–5.9)
SODIUM SERPL-SCNC: 141 MMOL/L (ref 136–145)
WBC # BLD AUTO: 4.8 10E3/UL (ref 4–11)

## 2023-06-14 PROCEDURE — 82962 GLUCOSE BLOOD TEST: CPT

## 2023-06-14 PROCEDURE — 85018 HEMOGLOBIN: CPT | Performed by: PHYSICIAN ASSISTANT

## 2023-06-14 PROCEDURE — C9113 INJ PANTOPRAZOLE SODIUM, VIA: HCPCS | Performed by: STUDENT IN AN ORGANIZED HEALTH CARE EDUCATION/TRAINING PROGRAM

## 2023-06-14 PROCEDURE — 99232 SBSQ HOSP IP/OBS MODERATE 35: CPT | Performed by: PHYSICIAN ASSISTANT

## 2023-06-14 PROCEDURE — 85025 COMPLETE CBC W/AUTO DIFF WBC: CPT | Performed by: HOSPITALIST

## 2023-06-14 PROCEDURE — 250N000013 HC RX MED GY IP 250 OP 250 PS 637: Performed by: HOSPITALIST

## 2023-06-14 PROCEDURE — 80048 BASIC METABOLIC PNL TOTAL CA: CPT | Performed by: HOSPITALIST

## 2023-06-14 PROCEDURE — G0378 HOSPITAL OBSERVATION PER HR: HCPCS

## 2023-06-14 PROCEDURE — 96376 TX/PRO/DX INJ SAME DRUG ADON: CPT

## 2023-06-14 PROCEDURE — 36415 COLL VENOUS BLD VENIPUNCTURE: CPT | Performed by: HOSPITALIST

## 2023-06-14 PROCEDURE — 250N000011 HC RX IP 250 OP 636: Performed by: STUDENT IN AN ORGANIZED HEALTH CARE EDUCATION/TRAINING PROGRAM

## 2023-06-14 PROCEDURE — 36415 COLL VENOUS BLD VENIPUNCTURE: CPT | Performed by: PHYSICIAN ASSISTANT

## 2023-06-14 RX ADMIN — TAMSULOSIN HYDROCHLORIDE 0.4 MG: 0.4 CAPSULE ORAL at 21:53

## 2023-06-14 RX ADMIN — FUROSEMIDE 20 MG: 20 TABLET ORAL at 08:24

## 2023-06-14 RX ADMIN — PANTOPRAZOLE SODIUM 40 MG: 40 INJECTION, POWDER, LYOPHILIZED, FOR SOLUTION INTRAVENOUS at 08:24

## 2023-06-14 RX ADMIN — FERROUS SULFATE TAB 325 MG (65 MG ELEMENTAL FE) 325 MG: 325 (65 FE) TAB at 20:19

## 2023-06-14 RX ADMIN — ATORVASTATIN CALCIUM 20 MG: 20 TABLET, FILM COATED ORAL at 20:18

## 2023-06-14 RX ADMIN — FERROUS SULFATE TAB 325 MG (65 MG ELEMENTAL FE) 325 MG: 325 (65 FE) TAB at 08:24

## 2023-06-14 RX ADMIN — FUROSEMIDE 20 MG: 20 TABLET ORAL at 17:08

## 2023-06-14 RX ADMIN — PANTOPRAZOLE SODIUM 40 MG: 40 INJECTION, POWDER, LYOPHILIZED, FOR SOLUTION INTRAVENOUS at 20:26

## 2023-06-14 ASSESSMENT — ACTIVITIES OF DAILY LIVING (ADL)
ADLS_ACUITY_SCORE: 33

## 2023-06-14 NOTE — PROGRESS NOTES
[facility]    Medicine Progress Note - Hospitalist Service    Date of Admission:  6/12/2023    Assessment & Plan   Damien Vallecillo is a 87 year old male with history of prediabetes, epilepsy, peripheral polyneuropathy with frequent falls, venous stasis, BPH, arthritis and HLP admitted on 06/12/23 with complaint of painless bright red blood per rectum.     Melena / hematachezia   Suspect recurrent diverticular GI bleed  History of NSAID use, no blood thinner use   Acute on chronic blood loss anemia, Fe deficiency   Hemoglobin in September was 14.5 with hemoglobin of 9.4 in May. Reports multiple episodes of painless bright red blood per rectum starting on 5/21. Was recently admitted from 5/25 -5/26 for similar complaints, at that time EGD and colonoscopy were performed with suspicion for diverticular bleed. Instructed to stop all NSAIDs at that time to follow-up with his PCP for repeat hemoglobin check.  Presented again on 6/12 as he had a toilet full of bloody stools and was concerned because this had been a lot more than he was having since his discharge. He was hemodynamically stable with blood pressures 156/76 and heart rate in the 60s.   -suspect recurrent diverticular bleed   -follow Hgb, currently stable at 7.8 (improvement from 7.5 on 6/13)  -Type and screen ordered  -Conditional unit of blood ordered for hemoglobin less than 7   -IV PPI out of abundance of caution  -No known varices and no indication for octreotide or ceftriaxone  -ongoing intermittent bleeding with reports of melena and hematochezia overnight  -monitor I&Os closely   -GI consulted on 6/14, believes bleeding is recurrent from diverticulosis, if any more episodes of fresh blood plan for flexible sigmoidoscopy in AM on 6/15     Prediabetes  Patient reports a history of prediabetes and is not on any medicines  -sliding scale insulin      History of epilepsy  -Not on medications      History of peripheral polyneuropathy with  "frequent falls  -Uses a cane     BPH -PTA Flomax      History of arthritis     HLP -PTA atorvastatin     Obesity - BMI 39      Diet: Full Liquid Diet    DVT Prophylaxis: Pneumatic Compression Devices  Fernandez Catheter: Not present  Lines: None     Cardiac Monitoring: None  Code Status: Full Code      Clinically Significant Risk Factors Present on Admission                  # Hypertension: Home medication list includes antihypertensive(s)     # DMII: A1C = 6.7 % (Ref range: <5.7 %) within past 6 months    # Obesity: Estimated body mass index is 39.72 kg/m  as calculated from the following:    Height as of 5/25/23: 1.753 m (5' 9\").    Weight as of this encounter: 122 kg (269 lb).            Disposition Plan      Expected Discharge Date: 06/15/2023, 12:00 PM              The patient's care was discussed with the Attending Physician, Dr. Gunn, Bedside Nurse, Patient and Patient's Family.    Mari Obrien PA-C  Hospitalist Service  [facility]  Securely message with [employer] (more info)  Text page via [employer] Paging/Directory   ______________________________________________________________________    Interval History   Patient reports an episode of black stool overnight and then 2 hours later had 1 with a large amount of blood.  This morning he had another melanotic stool.  He has not been using any NSAIDs prior to this admission.  Denies any associated abdominal pain, lightheadedness, dizziness, chest pain, or shortness of breath.  Patient's wife is at bedside and both are concerned about ongoing bleeding issues.    Physical Exam   Vital Signs: Temp: 98.4  F (36.9  C) Temp src: Oral BP: 135/74 Pulse: 95   Resp: 18 SpO2: 97 % O2 Device: None (Room air)    Weight: 269 lbs 0 oz    General Appearance: Sitting on edge of bed, pleasant, NAD  Respiratory: CTAB without wheezing or rales  Cardiovascular: RRR without murmur  GI: soft, nontender, non-distended, normoactive bowel sounds   Skin: warm, dry, no " lesions in visualized areas, erythematous changes of LE consistent with venous stasis changes    Medical Decision Making       45 MINUTES SPENT BY ME on the date of service doing chart review, history, exam, documentation & further activities per the note.      Data   ------------------------- PAST 24 HR DATA REVIEWED -----------------------------------------------

## 2023-06-14 NOTE — PLAN OF CARE
PRIMARY DIAGNOSIS: GI BLEED    BP (!) 141/63 (BP Location: Left arm)   Pulse 62   Temp 97.8  F (36.6  C) (Oral)   Resp 18   Wt 122 kg (269 lb)   SpO2 98%   BMI 39.72 kg/m      OUTPATIENT/OBSERVATION GOALS TO BE MET BEFORE DISCHARGE  Orthostatic performed: Yes:          Lying Orthostatic BP: 105/64         Sitting Orthostatic BP: 144/76   1.       Standing Orthostatic BP: 142/70     2. Stable Hgb Yes.   Recent Labs   Lab Test 06/14/23  0640 06/13/23  2244 06/13/23  1200   HGB 7.8* 7.7* 7.7*       3. Resolved or declined bleeding episodes: No,  with a moderate amount of bleeding Last episode: 6/13 @ 2200    4. Appropriate testing complete: Yes    5. Cleared for discharge by consultants (if involved): Yes    6. Safe discharge environment identified: Yes    Discharge Planner Nurse   Safe discharge environment identified: Yes  Barriers to discharge: Yes       Entered by: Asael Sandoval, RN     Pt A&Ox4. VSS on RA. SBA w cane. Tolerating regular diet and PO meds. Denies any pain or discomfort. PT cleared pt, home w cane. BS check 128, no corrections needed. Hgb stable at 7.8. IV PPI given. Bed in lowest position and call light in reach.   Please review provider order for any additional goals.   Nurse to notify provider when observation goals have been met and patient is ready for discharge.

## 2023-06-14 NOTE — PLAN OF CARE
PRIMARY DIAGNOSIS: GI BLEED    OUTPATIENT/OBSERVATION GOALS TO BE MET BEFORE DISCHARGE  /63 (BP Location: Right arm)   Pulse 62   Temp 98  F (36.7  C) (Oral)   Resp 18   Wt 122 kg (269 lb)   SpO2 100%   BMI 39.72 kg/m    Orthostatic performed: Yes:          Lying Orthostatic BP: 105/64         Sitting Orthostatic BP: 144/76   1.       Standing Orthostatic BP: 142/70     2. Stable Hgb Yes.   Recent Labs   Lab Test 06/14/23  0640 06/13/23  2244 06/13/23  1200   HGB 7.8* 7.7* 7.7*       3. Resolved or declined bleeding episodes: No,  with a moderate amount of bleeding Last episode: 06/14/22    4. Appropriate testing complete: Yes    5. Cleared for discharge by consultants (if involved): No    6. Safe discharge environment identified: Yes    Discharge Planner Nurse   Safe discharge environment identified: Yes  Barriers to discharge: Yes       Entered by: Asael Sandoval RN     Pt A&Ox4. VSS on RA. SBA w cane. NPO except for ice chips and meds. Denies any pain or discomfort. Pt had one black tarry stool this am, hat in toilet. Assessing stools from here on out. , 1U held approved by provider Karol WISE GI consult following. Hgb stable 7.8. Bed in lowest position and call light in reach.   Please review provider order for any additional goals.   Nurse to notify provider when observation goals have been met and patient is ready for discharge.

## 2023-06-14 NOTE — CONSULTS
"Gastroenterology Consultation      Damien Vallecillo MRN# [id number]   YOB: 1935 Age: 87 year old   Date of Admission: 6/12/2023     Reason for consult: I was asked by Mari Obrien to evaluate this patient for rectal bleeding.               History of Present Illness:   This patient is a 87 year old male who presents with rectal bleeding. He was recently in the hospital for rectal bleeding and had an EGD and colonoscopy May 25. Small amount of red blood in the left colon and it was suspected the bleeding was diverticular. He has had some black stool, but had red stool Monday and came to the hospital. He reports no bleeding until last night when he had two stools, one black and one red. Today, he had a small amount of black stool. Hemoglobin is stable. He has no symptoms and bleeding is painless. Has had a right hemicolectomy for a large polyp.              Past Medical History:     Past Medical History:   Diagnosis Date     Acute suppurative arthritis (H)      Acute, but ill-defined, cerebrovascular disease      Ankle fracture 12/28/2018     Arthritis      Benign prostatic hyperplasia with urinary frequency 3/29/2022     Cheilosis 9/28/2018     Chronic headaches      Closed right ankle fracture 12/29/2018     Cognitive attention deficit 9/16/2021     Diabetes (H)     \"Pre-diabetes\"     Dislocation of right patella 9/16/2020     Elevated serum creatinine 11/13/2019    GFR Estimate Date Value Ref Range Status 09/30/2019 84 >60 mL/min/[1.73_m2] Final   Comment:   Non  GFR Calc Starting 12/18/2018, serum creatinine based estimated GFR (eGFR) will be  calculated using the Chronic Kidney Disease Epidemiology Collaboration  (CKD-EPI) equation.         Encounter for immunization 9/21/2022     External hemorrhoids 9/21/2022     Fall 3/8/2023     Fecal incontinence 9/16/2020     Glucose intolerance (impaired glucose tolerance) 5/31/2013     Gout, unspecified      Grieving 9/16/2021     Hammer toe " of left foot 10/21/2016     Hematuria      History of arthroplasty of right knee 08/17/2017     History of blood transfusion      Hyperlipidemia LDL goal <160 11/13/2019     Hypersomnia 3/7/2023     Left foot pain 9/30/2019     Left knee pain, unspecified chronicity 5/8/2017     Lentigo maligna (H)      Malignant neoplasm of rectosigmoid junction (H)      Morbid obesity due to excess calories (H) 10/21/2016     RENAE (obstructive sleep apnea) 3/8/2023     Other convulsions     last seizure 7-8 years ago...not certain if these were true seizres or not..     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     Right knee pain, unspecified chronicity 5/8/2017     Rosacea 9/28/2018     Syncope      Tubulovillous adenoma of colon      Venous stasis dermatitis of both lower extremities 10/21/2016             Past Surgical History:     Past Surgical History:   Procedure Laterality Date     ARTHROPLASTY KNEE Right 6/19/2017    Procedure: ARTHROPLASTY KNEE;  Right total knee arthroplasty, Hammer toe repair toe 2,3,4,5 left foot with osteotomy;  Surgeon: Alec Raymond MD;  Location:  OR     COLONOSCOPY N/A 6/23/2015    Procedure: COMBINED COLONOSCOPY, SINGLE OR MULTIPLE BIOPSY/POLYPECTOMY BY BIOPSY;  Surgeon: Kimberly Alfaro MD;  Location:  GI     COLONOSCOPY N/A 7/30/2015    Procedure: COLONOSCOPY;  Surgeon: Enrique Salazar MD;  Location:  OR     COLONOSCOPY N/A 7/31/2018    Procedure: COLONOSCOPY;  Colonoscopy;  Surgeon: Tiffany Cheema MD;  Location:  GI     COLONOSCOPY N/A 5/26/2023    Procedure: Colonoscopy;  Surgeon: Juan Grimaldo MD;  Location:  GI     ESOPHAGOSCOPY, GASTROSCOPY, DUODENOSCOPY (EGD), COMBINED N/A 5/26/2023    Procedure: Esophagoscopy, gastroscopy, duodenoscopy (EGD), combined;  Surgeon: Juan Grimaldo MD;  Location:  GI      HERNIORRHAPHY EPIGASTRIC  4/27/2012    Procedure:HERNIORRHAPHY EPIGASTRIC; Epigastric Hernia Repair with mesh ; Surgeon:BOLIVAR OLIVEIRA; Location: OR     LAPAROSCOPIC ASSISTED COLECTOMY Right 7/30/2015    Procedure: LAPAROSCOPIC ASSISTED COLECTOMY;  Surgeon: Enrique Salazar MD;  Location:  OR     ORTHOPEDIC SURGERY      lt knee arthroplasty     REALIGN PATELLA Right 10/9/2017    Procedure: REALIGN PATELLA;  Revision right total knee arthroplasty;  Surgeon: Alec Raymond MD;  Location:  OR     REPAIR HAMMER TOE Left 6/19/2017    Procedure: REPAIR HAMMER TOE;  Hammer toe repair toe 2,3,4,5 left foot with osteotomy   ;  Surgeon: Beverly Kim DPM, Podiatry/Foot and Ankle Surgery;  Location:  OR     SIGMOIDOSCOPY FLEXIBLE  5/29/2013    Procedure: SIGMOIDOSCOPY FLEXIBLE;  SIGMOIDOSCOPY FLEXIBLE (FV) Needs blood sugar ck'd;  Surgeon: Enrique Salazar MD;  Location:  GI     skin cancer excision       ZZC NONSPECIFIC PROCEDURE      right heel surgery; spur removed.               Social History:     Social History     Socioeconomic History     Marital status:      Spouse name: Not on file     Number of children: Not on file     Years of education: Not on file     Highest education level: Not on file   Occupational History     Not on file   Tobacco Use     Smoking status: Never     Smokeless tobacco: Never   Vaping Use     Vaping status: Never Used   Substance and Sexual Activity     Alcohol use: Yes     Alcohol/week: 0.0 standard drinks of alcohol     Comment: Occas     Drug use: No     Sexual activity: Yes     Partners: Female   Other Topics Concern     Parent/sibling w/ CABG, MI or angioplasty before 65F 55M? No   Social History Narrative     Not on file     Social Determinants of Health     Financial Resource Strain: Not on file   Food Insecurity: Not on file   Transportation Needs: Not on file   Physical Activity: Not on file   Stress: Not on file   Social Connections: Not on file    Intimate Partner Violence: Not on file   Housing Stability: Not on file             Family History:     Family History   Problem Relation Age of Onset     Cancer - colorectal Mother      Cerebrovascular Disease Father              Allergies:      Allergies   Allergen Reactions     Levetiracetam [Keppra] Rash     Oxcarbazepine [Oxcarbazepine] Rash             Medications:     No current outpatient medications on file.             Review of Systems:   10-point review of systems negative except as noted in HPI.            Physical Exam:   Vitals were reviewed  /63 (BP Location: Right arm)   Pulse 62   Temp 98  F (36.7  C) (Oral)   Resp 18   Wt 122 kg (269 lb)   SpO2 100%   BMI 39.72 kg/m    Constitutional:   No distress     Heent:   NC/AT, sclera anicteric     Neck:   No adenopathy, thyroid not palpable   Respiratory:   CTA anteriorly     Cardiovascular:   RRR, no murmur     Gastrointestinal:   Active BS, soft, NT/ND     Extremities:   No clubbing, cyanosis, bilateral edema with stasis changes on shins   Neuro:   Grossly nonfocal     Skin:   No rashes or ecchymoses   Psychiatry:        No evidence of anxiety or depression         Data:     ROUTINE IP LABS  BMP  Last Comprehensive Metabolic Panel:  Lab Results   Component Value Date     06/14/2023    POTASSIUM 4.0 06/14/2023    CHLORIDE 107 06/14/2023    CO2 24 06/14/2023    ANIONGAP 10 06/14/2023     (H) 06/14/2023    BUN 11.7 06/14/2023    CR 0.87 06/14/2023    GFRESTIMATED 84 06/14/2023    ANURAG 8.7 (L) 06/14/2023           CBC  Lab Results   Component Value Date    WBC 4.8 06/14/2023    WBC 6.0 09/16/2020     Lab Results   Component Value Date    RBC 2.75 06/14/2023    RBC 5.05 09/16/2020     Lab Results   Component Value Date    HGB 7.8 06/14/2023    HGB 15.1 09/16/2020     Lab Results   Component Value Date    HCT 26.3 06/14/2023    HCT 44.8 09/16/2020     No components found for: MCT  Lab Results   Component Value Date    MCV 96  06/14/2023    MCV 89 09/16/2020     Lab Results   Component Value Date    MCH 28.4 06/14/2023    MCH 29.9 09/16/2020     Lab Results   Component Value Date    MCHC 29.7 06/14/2023    MCHC 33.7 09/16/2020     Lab Results   Component Value Date    RDW 15.3 06/14/2023    RDW 13.9 09/16/2020     Lab Results   Component Value Date     06/14/2023     09/16/2020       INR  Lab Results   Component Value Date    INR 1.24 05/25/2023    INR 1.19 12/28/2018       PANCREAS  Recent Labs   Lab 06/12/23  1557   LIPASE 16     LFT  Recent Labs   Lab 06/12/23  1557   PROTTOTAL 5.9*   ALBUMIN 3.9   BILITOTAL 0.5   ALKPHOS 48   AST 19   ALT 16                Assessment and Plan:   I believe bleeding is recurrent from diverticulosis. It is difficult to identify a bleeding site unless there is active bleeding. If he has any more episodes of fresh blood, I will do a flexible sigmoidoscopy tomorrow. Will put on full liquids.    30 minutes spent on patient care.     Wilian Escalona MD  [facility]  Office:  [phone]

## 2023-06-14 NOTE — PLAN OF CARE
PRIMARY DIAGNOSIS: GI BLEED    OUTPATIENT/OBSERVATION GOALS TO BE MET BEFORE DISCHARGE  1. Orthostatic performed: No    2. Stable Hgb Yes.   Recent Labs   Lab Test 06/13/23  2244 06/13/23  1200 06/13/23  0759   HGB 7.7* 7.7* 7.5*       3. Resolved or declined bleeding episodes: No,  with a moderate amount of bleeding Last episode: 6/13 2200    4. Appropriate testing complete: Yes    5. Cleared for discharge by consultants (if involved): No    6. Safe discharge environment identified: Yes    Discharge Planner Nurse   Safe discharge environment identified: Yes  Barriers to discharge: Yes       Entered by: Whit Briceño RN 06/14/2023 4:46 AM    pt A&O, vitals stable, pt has been resting in bed tonight. up independent, steady on his feet. Awaiting AM lab draw to monitor hgb. Continue w/ IV PPI.     Please review provider order for any additional goals.   Nurse to notify provider when observation goals have been met and patient is ready for discharge.

## 2023-06-14 NOTE — PROGRESS NOTES
Care Management Discharge Note    Discharge Date: 06/14/2023       Discharge Disposition:      Discharge Services:      Discharge DME:      Discharge Transportation: family or friend will provide      Additional Information:  No additional SW needs identified. Please consult care management if needs arise.     BRITTANY Mancuso, LGJAYLON  Emergency Room   Please contact the SW on the floor in which the patient is staying for any questions or concerns

## 2023-06-14 NOTE — PLAN OF CARE
PRIMARY DIAGNOSIS: GI BLEED    OUTPATIENT/OBSERVATION GOALS TO BE MET BEFORE DISCHARGE  1. Orthostatic performed: no           2. Stable Hgb Yes.   Recent Labs   Lab Test 06/13/23  1200 06/13/23  0759 06/12/23  1557   HGB 7.7* 7.5* 8.0*       3. Resolved or declined bleeding episodes: No,  with a moderate amount of bleeding Last episode: 2035    4. Appropriate testing complete: N/A    5. Cleared for discharge by consultants (if involved): No    6. Safe discharge environment identified: Yes    Discharge Planner Nurse   Safe discharge environment identified: Yes  Barriers to discharge: Yes       Entered by: Whit Briceño RN 06/13/2023 10:29 PM   Pt A&O x4, no c/o pain. Up independent to BR w/ cane, steady. Pt just had x1 BRB stool. Monitor stools & recheck hgb in am.     Please review provider order for any additional goals.   Nurse to notify provider when observation goals have been met and patient is ready for discharge.

## 2023-06-14 NOTE — PLAN OF CARE
PRIMARY DIAGNOSIS: GI BLEED    OUTPATIENT/OBSERVATION GOALS TO BE MET BEFORE DISCHARGE  /74 (BP Location: Right arm)   Pulse 95   Temp 98.4  F (36.9  C) (Oral)   Resp 18   Wt 122 kg (269 lb)   SpO2 97%   BMI 39.72 kg/m    Orthostatic performed: Yes:          Lying Orthostatic BP: 105/64         Sitting Orthostatic BP: 144/76   1.       Standing Orthostatic BP: 142/70     2. Stable Hgb Yes.   Recent Labs   Lab Test 06/14/23  0640 06/13/23  2244 06/13/23  1200   HGB 7.8* 7.7* 7.7*       3. Resolved or declined bleeding episodes: No,  with a moderate amount of bleeding Last episode: 06/14/22    4. Appropriate testing complete: Yes    5. Cleared for discharge by consultants (if involved): No    6. Safe discharge environment identified: Yes    Discharge Planner Nurse   Safe discharge environment identified: Yes  Barriers to discharge: Yes       Entered by: Asael Sandoval, RN     Pt A&Ox4. VSS on RA. SBA w cane. Tolerating full liquid diet and PO meds. Denies any pain or discomfort. , no corrections needed. GI consult recommended flexible sigmoidoscopy if active bleeding persist. Hat in toilet. Hgb stable 7.8. Bed in lowest position and call light in reach.   Please review provider order for any additional goals.   Nurse to notify provider when observation goals have been met and patient is ready for discharge.

## 2023-06-14 NOTE — PLAN OF CARE
PRIMARY DIAGNOSIS: GI BLEED    OUTPATIENT/OBSERVATION GOALS TO BE MET BEFORE DISCHARGE  Orthostatic performed: No    Stable Hgb Yes.   Recent Labs   Lab Test 06/13/23  2244 06/13/23  1200 06/13/23  0759   HGB 7.7* 7.7* 7.5*       Resolved or declined bleeding episodes: No,  with a moderate amount of bleeding Last episode: 6/13 2200    Appropriate testing complete: Yes    Cleared for discharge by consultants (if involved): No    Safe discharge environment identified: Yes    Discharge Planner Nurse   Safe discharge environment identified: Yes  Barriers to discharge: Yes       Entered by: Whit Briceño RN 06/14/2023 2:55 AM   Pt had x1 more bloody BM, more maroon in color this time. Talked w/ GAEL Dyer who ordered hgb check, which is stable. Cont to monitor for bloody BM.   Please review provider order for any additional goals.   Nurse to notify provider when observation goals have been met and patient is ready for discharge.

## 2023-06-14 NOTE — UTILIZATION REVIEW
" Admission Status; Secondary Review Determination     Admission Date: 6/12/2023  5:44 PM       Under the authority of the Utilization Management Committee, the utilization review process indicated a secondary review on the above patient.  The review outcome is based on review of the medical records, discussions with staff, and applying clinical experience noted on the date of the review.          (x) Observation Status Appropriate - This patient does not meet hospital inpatient criteria and is placed in observation status. If this patient's primary payer is Medicare and was admitted as an inpatient, Condition Code 44 should be used and patient status changed to \"observation\".       RATIONALE FOR DETERMINATION      Brief clinical presentation, information copied from the chart, abbreviated and edited for relevant content:       Other than LOS, no criteria noted for IP.     Mr. Damien Vallecillo is a 87 year old male with history of prediabetes, epilepsy, peripheral polyneuropathy with frequent falls, venous stasis, BPH, arthritis and HLP admitted on 06/12/23 with complaint of painless bright red blood per rectum. History   NSAID use, no blood thinner use. Admitted with Acute on chronic blood loss anemia, Fe deficiency. Hemoglobin in September was 14.5 with hemoglobin of 9.4 in May. Reports multiple episodes of painless bright red blood per rectum starting on 5/21. Was recently admitted from 5/25 -5/26 for similar complaints, at that time EGD and colonoscopy were performed with suspicion for diverticular bleed. He was hemodynamically stable with blood pressures 156/76 and heart rate in the 60s.     Likely discharge tomorrow if tolerates full liquid diet and no further bleeding.          The severity of illness, intensity of cares provided, risk for adverse outcome, and expected LOS make the care appropriate for observation.       The information on this document is developed by the utilization review team in order for the " business office to ensure compliance.  This only denotes the appropriateness of proper admission status and does not reflect the quality of care rendered.         The definitions of Inpatient Status and Observation Status used in making the determination above are those provided in the CMS Coverage Manual, Chapter 1 and Chapter 6, section 70.4.      Sincerely,     Vicki Acevedo MD   Utilization Review/ Case Management  [facility].

## 2023-06-15 ENCOUNTER — PATIENT OUTREACH (OUTPATIENT)
Dept: FAMILY MEDICINE | Facility: CLINIC | Age: 88
End: 2023-06-15
Payer: MEDICARE

## 2023-06-15 LAB
COLONOSCOPY: NORMAL
GLUCOSE BLDC GLUCOMTR-MCNC: 112 MG/DL (ref 70–99)
GLUCOSE BLDC GLUCOMTR-MCNC: 132 MG/DL (ref 70–99)
GLUCOSE BLDC GLUCOMTR-MCNC: 142 MG/DL (ref 70–99)
GLUCOSE BLDC GLUCOMTR-MCNC: 151 MG/DL (ref 70–99)
GLUCOSE BLDC GLUCOMTR-MCNC: 153 MG/DL (ref 70–99)
HGB BLD-MCNC: 7.8 G/DL (ref 13.3–17.7)
HGB BLD-MCNC: 8.4 G/DL (ref 13.3–17.7)
HGB BLD-MCNC: 8.8 G/DL (ref 13.3–17.7)

## 2023-06-15 PROCEDURE — 250N000011 HC RX IP 250 OP 636: Performed by: INTERNAL MEDICINE

## 2023-06-15 PROCEDURE — 96376 TX/PRO/DX INJ SAME DRUG ADON: CPT

## 2023-06-15 PROCEDURE — 45378 DIAGNOSTIC COLONOSCOPY: CPT | Performed by: INTERNAL MEDICINE

## 2023-06-15 PROCEDURE — C9113 INJ PANTOPRAZOLE SODIUM, VIA: HCPCS | Performed by: INTERNAL MEDICINE

## 2023-06-15 PROCEDURE — G0500 MOD SEDAT ENDO SERVICE >5YRS: HCPCS | Performed by: INTERNAL MEDICINE

## 2023-06-15 PROCEDURE — 82962 GLUCOSE BLOOD TEST: CPT

## 2023-06-15 PROCEDURE — 250N000013 HC RX MED GY IP 250 OP 250 PS 637: Performed by: HOSPITALIST

## 2023-06-15 PROCEDURE — 85018 HEMOGLOBIN: CPT | Mod: 91 | Performed by: PHYSICIAN ASSISTANT

## 2023-06-15 PROCEDURE — 36415 COLL VENOUS BLD VENIPUNCTURE: CPT | Performed by: PHYSICIAN ASSISTANT

## 2023-06-15 PROCEDURE — G0378 HOSPITAL OBSERVATION PER HR: HCPCS

## 2023-06-15 PROCEDURE — 250N000012 HC RX MED GY IP 250 OP 636 PS 637: Performed by: STUDENT IN AN ORGANIZED HEALTH CARE EDUCATION/TRAINING PROGRAM

## 2023-06-15 PROCEDURE — 99232 SBSQ HOSP IP/OBS MODERATE 35: CPT | Performed by: PHYSICIAN ASSISTANT

## 2023-06-15 PROCEDURE — 250N000013 HC RX MED GY IP 250 OP 250 PS 637: Performed by: NURSE PRACTITIONER

## 2023-06-15 PROCEDURE — 85018 HEMOGLOBIN: CPT | Performed by: INTERNAL MEDICINE

## 2023-06-15 PROCEDURE — C9113 INJ PANTOPRAZOLE SODIUM, VIA: HCPCS | Performed by: STUDENT IN AN ORGANIZED HEALTH CARE EDUCATION/TRAINING PROGRAM

## 2023-06-15 PROCEDURE — 36415 COLL VENOUS BLD VENIPUNCTURE: CPT | Performed by: INTERNAL MEDICINE

## 2023-06-15 PROCEDURE — 250N000013 HC RX MED GY IP 250 OP 250 PS 637: Performed by: INTERNAL MEDICINE

## 2023-06-15 PROCEDURE — 250N000011 HC RX IP 250 OP 636: Performed by: STUDENT IN AN ORGANIZED HEALTH CARE EDUCATION/TRAINING PROGRAM

## 2023-06-15 RX ORDER — EPINEPHRINE 1 MG/ML
0.1 INJECTION, SOLUTION, CONCENTRATE INTRAVENOUS
Status: DISCONTINUED | OUTPATIENT
Start: 2023-06-15 | End: 2023-06-15 | Stop reason: HOSPADM

## 2023-06-15 RX ORDER — NALOXONE HYDROCHLORIDE 0.4 MG/ML
0.2 INJECTION, SOLUTION INTRAMUSCULAR; INTRAVENOUS; SUBCUTANEOUS
Status: DISCONTINUED | OUTPATIENT
Start: 2023-06-15 | End: 2023-06-15 | Stop reason: HOSPADM

## 2023-06-15 RX ORDER — NALOXONE HYDROCHLORIDE 0.4 MG/ML
0.4 INJECTION, SOLUTION INTRAMUSCULAR; INTRAVENOUS; SUBCUTANEOUS
Status: DISCONTINUED | OUTPATIENT
Start: 2023-06-15 | End: 2023-06-15 | Stop reason: HOSPADM

## 2023-06-15 RX ORDER — ATROPINE SULFATE 0.1 MG/ML
1 INJECTION INTRAVENOUS
Status: DISCONTINUED | OUTPATIENT
Start: 2023-06-15 | End: 2023-06-15 | Stop reason: HOSPADM

## 2023-06-15 RX ORDER — FLUMAZENIL 0.1 MG/ML
0.2 INJECTION, SOLUTION INTRAVENOUS
Status: DISCONTINUED | OUTPATIENT
Start: 2023-06-15 | End: 2023-06-15 | Stop reason: HOSPADM

## 2023-06-15 RX ORDER — DIPHENHYDRAMINE HYDROCHLORIDE 50 MG/ML
25-50 INJECTION INTRAMUSCULAR; INTRAVENOUS
Status: DISCONTINUED | OUTPATIENT
Start: 2023-06-15 | End: 2023-06-15 | Stop reason: HOSPADM

## 2023-06-15 RX ORDER — FLUMAZENIL 0.1 MG/ML
0.2 INJECTION, SOLUTION INTRAVENOUS
Status: ACTIVE | OUTPATIENT
Start: 2023-06-15 | End: 2023-06-16

## 2023-06-15 RX ORDER — FENTANYL CITRATE 50 UG/ML
50-100 INJECTION, SOLUTION INTRAMUSCULAR; INTRAVENOUS EVERY 5 MIN PRN
Status: DISCONTINUED | OUTPATIENT
Start: 2023-06-15 | End: 2023-06-15 | Stop reason: HOSPADM

## 2023-06-15 RX ORDER — LIDOCAINE 40 MG/G
CREAM TOPICAL
Status: DISCONTINUED | OUTPATIENT
Start: 2023-06-15 | End: 2023-06-16 | Stop reason: HOSPADM

## 2023-06-15 RX ORDER — SIMETHICONE 40MG/0.6ML
133 SUSPENSION, DROPS(FINAL DOSAGE FORM)(ML) ORAL
Status: DISCONTINUED | OUTPATIENT
Start: 2023-06-15 | End: 2023-06-15 | Stop reason: HOSPADM

## 2023-06-15 RX ADMIN — FERROUS SULFATE TAB 325 MG (65 MG ELEMENTAL FE) 325 MG: 325 (65 FE) TAB at 21:31

## 2023-06-15 RX ADMIN — FENTANYL CITRATE 50 MCG: 50 INJECTION, SOLUTION INTRAMUSCULAR; INTRAVENOUS at 13:39

## 2023-06-15 RX ADMIN — INSULIN ASPART 1 UNITS: 100 INJECTION, SOLUTION INTRAVENOUS; SUBCUTANEOUS at 09:28

## 2023-06-15 RX ADMIN — PANTOPRAZOLE SODIUM 40 MG: 40 INJECTION, POWDER, LYOPHILIZED, FOR SOLUTION INTRAVENOUS at 21:32

## 2023-06-15 RX ADMIN — FUROSEMIDE 20 MG: 20 TABLET ORAL at 09:22

## 2023-06-15 RX ADMIN — SODIUM PHOSPHATE, DIBASIC AND SODIUM PHOSPHATE, MONOBASIC 1 ENEMA: 7; 19 ENEMA RECTAL at 12:00

## 2023-06-15 RX ADMIN — MIDAZOLAM 0.5 MG: 1 INJECTION INTRAMUSCULAR; INTRAVENOUS at 13:39

## 2023-06-15 RX ADMIN — ATORVASTATIN CALCIUM 20 MG: 20 TABLET, FILM COATED ORAL at 21:31

## 2023-06-15 RX ADMIN — SODIUM PHOSPHATE, DIBASIC AND SODIUM PHOSPHATE, MONOBASIC 1 ENEMA: 7; 19 ENEMA RECTAL at 12:10

## 2023-06-15 RX ADMIN — TAMSULOSIN HYDROCHLORIDE 0.4 MG: 0.4 CAPSULE ORAL at 21:31

## 2023-06-15 RX ADMIN — PANTOPRAZOLE SODIUM 40 MG: 40 INJECTION, POWDER, LYOPHILIZED, FOR SOLUTION INTRAVENOUS at 09:38

## 2023-06-15 RX ADMIN — FERROUS SULFATE TAB 325 MG (65 MG ELEMENTAL FE) 325 MG: 325 (65 FE) TAB at 09:22

## 2023-06-15 ASSESSMENT — ACTIVITIES OF DAILY LIVING (ADL)
ADLS_ACUITY_SCORE: 33

## 2023-06-15 NOTE — PROGRESS NOTES
GASTROENTEROLOGY PROGRESS NOTE       SUBJECTIVE:  No stools overnight     OBJECTIVE:  /58 (BP Location: Left arm, Patient Position: Right side, Cuff Size: Adult Regular)   Pulse 61   Temp 97.9  F (36.6  C) (Oral)   Resp 16   Wt 122 kg (269 lb)   SpO2 99%   BMI 39.72 kg/m    Temp (24hrs), Av  F (36.7  C), Min:97.8  F (36.6  C), Max:98.4  F (36.9  C)    Patient Vitals for the past 72 hrs:   Weight   23 1529 122 kg (269 lb)     No intake or output data in the 24 hours ending 06/15/23 0735     PHYSICAL EXAM     Gastrointestinal: Active BS, soft, NT/ND        Recent Labs   Lab Test 23  2201 23  0640 23  2244 23  1200 23  0759 23  1557 23  1624 23  1038 19  0909 18  1657 17  1115 08/10/15  0835   WBC  --  4.8  --   --  3.9* 5.1   < > 8.6   < > 9.0   < >  --    HGB 8.4* 7.8* 7.7*   < > 7.5* 8.0*   < > 9.4*   < > 12.4*   < >  --    MCV  --  96  --   --  95 94   < > 92   < > 88   < >  --    PLT  --  193  --   --  193 215   < > 177   < > 217   < >  --    INR  --   --   --   --   --   --   --  1.24*  --  1.19*  --  1.15*    < > = values in this interval not displayed.     Recent Labs   Lab Test 23  0640 23  0759 23  1557   POTASSIUM 4.0 4.2 4.1   CHLORIDE 107 108* 105   CO2 24 25 25   BUN 11.7 10.8 13.7   ANIONGAP 10 7 10     Recent Labs   Lab Test 23  1557 22  1207 21  1111   ALBUMIN 3.9 3.8 3.9   BILITOTAL 0.5 1.0 1.3   ALT 16 42 52   AST 19 27 30   PROTEIN  --   --  Negative   LIPASE 16  --   --            Principal Problem:    History of GI diverticular bleed    Assessment: No further bleeding.     Plan: Hold on flex sig as no active bleeding. Perhaps home later today.        Sherif Escalona MD  [facility]  Office:  [phone]

## 2023-06-15 NOTE — PLAN OF CARE
PRIMARY DIAGNOSIS: ACUTE PAIN  OUTPATIENT/OBSERVATION GOALS TO BE MET BEFORE DISCHARGE:  1. Pain Status: Improved-controlled with oral pain medications.    2. Return to near baseline physical activity: No    3. Cleared for discharge by consultants (if involved): No       VSS, afeb, offers no c/o pain at this point.Still passing bloody stools.Resting in bed,will con't to monitor bleeding closely,and will alert staff as needed.

## 2023-06-15 NOTE — LETTER
Heather 15, 2023      Damien Vallecillo, : 1935 [facility] [address]      [place] [employer]  Fax: [phone]        To Whom It May Concern,       This is a letter of medical necessity for my patient, Damien Vallecillo to have his air conditioning/heating unit urgently assessed for repair/replacement. Damien's current medical status and conditions require him to be in a properly controlled temperature environment.   For any questions or concerns related to this request, please call our office at [phone].             Sincerely,        Obi Strange MD

## 2023-06-15 NOTE — PROGRESS NOTES
PRIMARY DIAGNOSIS: OUTPATIENT/OBSERVATION GOALS TO BE MET BEFORE DISCHARGE:   1. ADLs back to baseline: Yes    2. Activity and level of assistance: Ambulating independently.    3. Pain status: Pain free.    4. Return to near baseline physical activity: Yes     Discharge Planner Nurse   Safe discharge environment identified: Yes  Barriers to discharge: Yes       Entered by: Macy Clay RN 06/15/2023 1:10 AM    Pt is A&Ox4. VSS on RA. PIV-SL. Pt up Independently w/ cane. Full liquid diet. Denies pain.   Please review provider order for any additional goals.   Nurse to notify provider when observation goals have been met and patient is ready for discharge.

## 2023-06-15 NOTE — PRE-PROCEDURE
Pre-Endoscopy History and Physical     Damien Vallecillo MRN# [id number]   YOB: 1935 Age: 87 year old     Date of Procedure: 6/12/2023  Primary care provider: Obi Strange  Type of Endoscopy: flexible sigmoidoscopy  Reason for Procedure: rectal bleeding  Type of Anesthesia Anticipated: Moderate (conscious) sedation    HPI:    Damien is a 87 year old male who will be undergoing the above procedure.      A history and physical has been performed. The patient's medications and allergies have been reviewed. The risks and benefits of the procedure and the sedation options and risks were discussed with the patient.  All questions were answered and informed consent was obtained.      Allergies   Allergen Reactions     Levetiracetam [Keppra] Rash     Oxcarbazepine [Oxcarbazepine] Rash        Current Facility-Administered Medications   Medication     0.9% sodium chloride BOLUS     acetaminophen (TYLENOL) tablet 650 mg    Or     acetaminophen (TYLENOL) Suppository 650 mg     atorvastatin (LIPITOR) tablet 20 mg     atropine injection 1 mg     benzocaine 20% (HURRICAINE/TOPEX) 20 % spray 0.5 mL     bisacodyl (DULCOLAX) suppository 10 mg     glucose gel 15-30 g    Or     dextrose 50 % injection 25-50 mL    Or     glucagon injection 1 mg     diphenhydrAMINE (BENADRYL) injection 25-50 mg     EPINEPHrine PF (ADRENALIN) injection 0.1 mg     fentaNYL (PF) (SUBLIMAZE) injection  mcg     ferrous sulfate (FEROSUL) tablet 325 mg     flumazenil (ROMAZICON) injection 0.2 mg     furosemide (LASIX) tablet 20 mg     glucagon injection 0.5 mg     insulin aspart (NovoLOG) injection (RAPID ACTING)     insulin aspart (NovoLOG) injection (RAPID ACTING)     lidocaine (LMX4) cream     lidocaine 1 % 0.1-1 mL     melatonin tablet 1 mg     midazolam (VERSED) injection 0.5-2 mg     naloxone (NARCAN) injection 0.2 mg    Or     naloxone (NARCAN) injection 0.4 mg    Or     naloxone (NARCAN) injection 0.2 mg    Or     naloxone (NARCAN)  injection 0.4 mg     ondansetron (ZOFRAN ODT) ODT tab 4 mg    Or     ondansetron (ZOFRAN) injection 4 mg     pantoprazole (PROTONIX) IV push injection 40 mg     polyethylene glycol (MIRALAX) Packet 17 g     simethicone (MYLICON) suspension 133 mg     sodium chloride (PF) 0.9% PF flush 3 mL     sodium chloride (PF) 0.9% PF flush 3 mL     sodium chloride (PF) 0.9% PF flush 3 mL     sodium phosphate (FLEET ENEMA) 1 enema     tamsulosin (FLOMAX) capsule 0.4 mg       Patient Active Problem List   Diagnosis     Gout     ACP (advance care planning)     History of lower GI bleeding     Prediabetes     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)     Morbid obesity due to excess calories (H)     Hammer toe of left foot     Venous stasis dermatitis of both lower extremities     RBC microcytosis     Right knee pain     Left knee pain, unspecified chronicity     S/P total knee arthroplasty     History of arthroplasty of right knee     Pedal edema     Rosacea     Cheilosis     Rhinophyma     Peripheral polyneuropathy     Hyperlipidemia LDL goal <160     Elevated serum creatinine     Fecal incontinence     Dislocation of right patella     Hematuria, unspecified type     Special screening for malignant neoplasm of prostate     Falls frequently     Cognitive attention deficit     Benign prostatic hyperplasia with urinary frequency     Encounter for immunization     External hemorrhoids     Hypersomnia     RENAE (obstructive sleep apnea)     Fall     Physical deconditioning     Lower GI bleed     Anemia, unspecified type     Lower GI bleeding     Midline low back pain without sciatica, unspecified chronicity     High priority for 2019-nCoV vaccine     Hematochezia     History of GI diverticular bleed        Past Medical History:   Diagnosis Date     Acute suppurative arthritis (H)      Acute, but ill-defined, cerebrovascular disease      Ankle fracture 12/28/2018     Arthritis      Benign prostatic hyperplasia with  "urinary frequency 3/29/2022     Cheilosis 9/28/2018     Chronic headaches      Closed right ankle fracture 12/29/2018     Cognitive attention deficit 9/16/2021     Diabetes (H)     \"Pre-diabetes\"     Dislocation of right patella 9/16/2020     Elevated serum creatinine 11/13/2019    GFR Estimate Date Value Ref Range Status 09/30/2019 84 >60 mL/min/[1.73_m2] Final   Comment:   Non  GFR Calc Starting 12/18/2018, serum creatinine based estimated GFR (eGFR) will be  calculated using the Chronic Kidney Disease Epidemiology Collaboration  (CKD-EPI) equation.         Encounter for immunization 9/21/2022     External hemorrhoids 9/21/2022     Fall 3/8/2023     Fecal incontinence 9/16/2020     Glucose intolerance (impaired glucose tolerance) 5/31/2013     Gout, unspecified      Grieving 9/16/2021     Hammer toe of left foot 10/21/2016     Hematuria      History of arthroplasty of right knee 08/17/2017     History of blood transfusion      Hyperlipidemia LDL goal <160 11/13/2019     Hypersomnia 3/7/2023     Left foot pain 9/30/2019     Left knee pain, unspecified chronicity 5/8/2017     Lentigo maligna (H)      Malignant neoplasm of rectosigmoid junction (H)      Morbid obesity due to excess calories (H) 10/21/2016     RENAE (obstructive sleep apnea) 3/8/2023     Other convulsions     last seizure 7-8 years ago...not certain if these were true seizres or not..     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     Right knee pain, unspecified chronicity 5/8/2017     Rosacea 9/28/2018     Syncope      Tubulovillous adenoma of colon      Venous stasis dermatitis of both lower extremities 10/21/2016        Past Surgical History:   Procedure Laterality Date     ARTHROPLASTY KNEE Right 6/19/2017    Procedure: ARTHROPLASTY KNEE;  Right total knee arthroplasty, Hammer toe repair toe " 2,3,4,5 left foot with osteotomy;  Surgeon: Alec Raymond MD;  Location:  OR     COLONOSCOPY N/A 6/23/2015    Procedure: COMBINED COLONOSCOPY, SINGLE OR MULTIPLE BIOPSY/POLYPECTOMY BY BIOPSY;  Surgeon: Kimberly Alfaro MD;  Location:  GI     COLONOSCOPY N/A 7/30/2015    Procedure: COLONOSCOPY;  Surgeon: Enrique Salazar MD;  Location:  OR     COLONOSCOPY N/A 7/31/2018    Procedure: COLONOSCOPY;  Colonoscopy;  Surgeon: Tiffany Cheema MD;  Location:  GI     COLONOSCOPY N/A 5/26/2023    Procedure: Colonoscopy;  Surgeon: Juan Grimaldo MD;  Location:  GI     ESOPHAGOSCOPY, GASTROSCOPY, DUODENOSCOPY (EGD), COMBINED N/A 5/26/2023    Procedure: Esophagoscopy, gastroscopy, duodenoscopy (EGD), combined;  Surgeon: Juan Grimaldo MD;  Location:  GI     HERNIORRHAPHY EPIGASTRIC  4/27/2012    Procedure:HERNIORRHAPHY EPIGASTRIC; Epigastric Hernia Repair with mesh ; Surgeon:BOLIVAR OLIVEIRA; Location: OR     LAPAROSCOPIC ASSISTED COLECTOMY Right 7/30/2015    Procedure: LAPAROSCOPIC ASSISTED COLECTOMY;  Surgeon: Enrique Salazar MD;  Location:  OR     ORTHOPEDIC SURGERY      lt knee arthroplasty     REALIGN PATELLA Right 10/9/2017    Procedure: REALIGN PATELLA;  Revision right total knee arthroplasty;  Surgeon: Alec Raymond MD;  Location:  OR     REPAIR HAMMER TOE Left 6/19/2017    Procedure: REPAIR HAMMER TOE;  Hammer toe repair toe 2,3,4,5 left foot with osteotomy   ;  Surgeon: Beverly Kim DPM, Podiatry/Foot and Ankle Surgery;  Location:  OR     SIGMOIDOSCOPY FLEXIBLE  5/29/2013    Procedure: SIGMOIDOSCOPY FLEXIBLE;  SIGMOIDOSCOPY FLEXIBLE (FV) Needs blood sugar ck'd;  Surgeon: Enrique Salazar MD;  Location:  GI     skin cancer excision       ZZC NONSPECIFIC PROCEDURE      right heel surgery; spur removed.       Social History     Tobacco Use     Smoking status: Never     Smokeless tobacco: Never   Vaping Use     Vaping status: Never Used   Substance Use  Topics     Alcohol use: Yes     Alcohol/week: 0.0 standard drinks of alcohol     Comment: Occas       Family History   Problem Relation Age of Onset     Cancer - colorectal Mother      Cerebrovascular Disease Father             Medications:     Medications Prior to Admission   Medication Sig Dispense Refill Last Dose     acetaminophen (TYLENOL) 500 MG tablet Take 500-1,000 mg by mouth every 6 hours as needed for mild pain   6/12/2023     atorvastatin (LIPITOR) 20 MG tablet Take 20 mg by mouth every evening   6/11/2023 at HS     CINNAMON PO Take 1 tablet by mouth daily    6/12/2023 at AM     clindamycin (CLEOCIN T) 1 % external solution Apply topically 2 times daily 60 mL 11 Unknown     diclofenac (VOLTAREN) 1 % topical gel Apply 2 g topically 2 times daily   6/12/2023 at AM     Ferrous Sulfate (IRON PO) Take 1 tablet by mouth 3 times daily OTC, unknown strength.   6/12/2023 at AM     fish oil-omega-3 fatty acids 1000 MG capsule Take 1 g by mouth 2 times daily   6/12/2023 at AM     Flaxseed, Linseed, (FLAXSEED OIL) 1000 MG CAPS Take 1,000 mg by mouth daily   6/12/2023 at am     furosemide (LASIX) 20 MG tablet Take 1 tablet (20 mg) by mouth 2 times daily 180 tablet 1 6/12/2023     glucosamine-chondroitin 500-400 MG CAPS per capsule Take 1 capsule by mouth daily   6/12/2023 at AM     metroNIDAZOLE (METROGEL) 0.75 % external gel APPLY THIN LAYER TO FACE TWICE A DAY FOR ROSACEA   6/12/2023 at AM     Multiple Vitamins-Minerals (MULTIVITAMIN ADULT PO) Take 1 tablet by mouth daily Reported on 5/12/2017 6/12/2023 at AM     pramox-pe-glycerin-petrolatum (PREPARATION H) 1-0.25-14.4-15 % CREA cream Place rectally 4 times daily as needed for hemorrhoids Use as directed   Past Month     ACE NOT PRESCRIBED, INTENTIONAL, ACE Inhibitor not prescribed due to Other:  Had cough on ACEI in past       0 each 0      doxycycline hyclate (PERIOSTAT) 20 MG tablet Take 1 tablet (20 mg) by mouth 2 times daily (Patient not taking: Reported on  "6/13/2023)   Not Taking     order for DME 1: Gradient Compression Wraps; 2: Cast Boots; 3: BLE knee or thigh high 20-30 mm Hg compression stocking; 4: BLE knee or thigh high custom compression stocking; 5: Alternative BLE knee high or thigh compression garments (velcro/buckling) 1 each 0      order for DME Equipment being ordered: Walker Wheels () and Walker ()  Treatment Diagnosis: Impaired functional mobility 1 each 0      tamsulosin (FLOMAX) 0.4 MG capsule Take 1 capsule (0.4 mg) by mouth daily 90 capsule 3 6/11/2023 at        Scheduled Medications:    atorvastatin  20 mg Oral QPM     ferrous sulfate  325 mg Oral BID     furosemide  20 mg Oral BID     insulin aspart  1-3 Units Subcutaneous TID AC     insulin aspart  1-3 Units Subcutaneous At Bedtime     pantoprazole  40 mg Intravenous BID     sodium chloride (PF)  3 mL Intracatheter Q8H     sodium phosphate  1 enema Rectal BID     tamsulosin  0.4 mg Oral At Bedtime       PRN:  sodium chloride 0.9%, acetaminophen **OR** acetaminophen, atropine, benzocaine 20%, bisacodyl, glucose **OR** dextrose **OR** glucagon, diphenhydrAMINE, EPINEPHrine, fentaNYL, flumazenil, glucagon, lidocaine 4%, lidocaine (buffered or not buffered), melatonin, midazolam, naloxone **OR** naloxone **OR** naloxone **OR** naloxone, ondansetron **OR** ondansetron, polyethylene glycol, simethicone, sodium chloride (PF), sodium chloride (PF)    PHYSICAL EXAM:   /73   Pulse 71   Temp 97.8  F (36.6  C) (Temporal)   Resp 16   Wt 122 kg (269 lb)   SpO2 99%   BMI 39.72 kg/m   Estimated body mass index is 39.72 kg/m  as calculated from the following:    Height as of 5/25/23: 1.753 m (5' 9\").    Weight as of this encounter: 122 kg (269 lb).   RESP: lungs clear to auscultation - no rales, rhonchi or wheezes  CV: regular rates and rhythm    IMPRESSION   ASA Class 2 - Mild systemic disease      Signed Electronically by: Sherif Escalona MD  Heather 15, 2023    .            "

## 2023-06-15 NOTE — TELEPHONE ENCOUNTER
PAL received VM from pt's wife, Alejandrina today 6/15 at 0740    PAL called and spoke with pt's wife, Alejandrina ([facility])   -Alejandrina requesting letter from Dr. Strange stating pt's current medical status and conditions require urgent repair/replacement of home A/C unit  -A/C unit went out yesterday and heating/air company states they won't be able to come out until the end of the month to assess, but if letter of medical necessity is written- can get in asap   -Alejandrina concerned about expected high temperatures next week and impact on pt  -Pt planning to be discharged from hospital today 6/15, PAL scheduled pt for hospital f/u appt with Dr. Strange 6/20/23 at 9:10am    Letter to be faxed to [employer] at [phone] or emailed to [email or web address]     Letter pended, routing to Dr. Strange to review and sign     Katt ALMANZAR RN  Patient Advocate Liaison - PAL RN  [facility]  [phone]

## 2023-06-15 NOTE — PROGRESS NOTES
Spoke with dietary services. Our system is unable to allow a full liquid diet with exceptions for eggs. The pt will need to be advanced to regular diet to be able to order eggs. The writer instructed dietary to continue FLD until directed otherwise.  Nursing to page GI for diet clarification.

## 2023-06-15 NOTE — PROGRESS NOTES
[facility]    Medicine Progress Note - Hospitalist Service    Date of Admission:  6/12/2023    Assessment & Plan   Damien Vallecillo is a 87 year old male with history of prediabetes, epilepsy, peripheral polyneuropathy with frequent falls, venous stasis, BPH, arthritis and HLP admitted on 06/12/23 with complaint of painless bright red blood per rectum.    Melena / hematachezia   Suspect recurrent diverticular GI bleed  History of NSAID use, no blood thinner use   Acute on chronic blood loss anemia, Fe deficiency   Hemoglobin in September was 14.5 with hemoglobin of 9.4 in May. Reports multiple episodes of painless bright red blood per rectum starting on 5/21. Was recently admitted from 5/25 -5/26 for similar complaints, at that time EGD and colonoscopy were performed with suspicion for diverticular bleed. Instructed to stop all NSAIDs at that time to follow-up with his PCP for repeat hemoglobin check.  Presented again on 6/12 as he had a toilet full of bloody stools and was concerned because this had been a lot more than he was having since his discharge. He was hemodynamically stable with blood pressures 156/76 and heart rate in the 60s.   -suspect recurrent diverticular bleed   -follow Hgb, currently stable at 8.4  -Type and screen ordered  -Conditional unit of blood ordered for hemoglobin less than 7   -IV PPI out of abundance of caution  -No known varices and no indication for octreotide or ceftriaxone  -monitor I&Os closely   -GI consulted on 6/14, currently following  -no bleeding overnight but two episodes of melanotic stool on 6/15, GI contacted and s/p flexible sigmoidoscopy showing blood in left colon, highly suspicious for diverticular bleed, if he bleeds again GI will reassess tomorrow     Prediabetes  Patient reports a history of prediabetes and is not on any medicines  -sliding scale insulin      History of epilepsy  -Not on medications      History of peripheral polyneuropathy with  "frequent falls  -Uses a cane     BPH -PTA Flomax      History of arthritis     HLP -PTA atorvastatin    H/o venous stasis: Lasix initially resumed, would hold for now      Obesity - BMI 39      Diet: Full Liquid Diet    DVT Prophylaxis: Pneumatic Compression Devices  Fernandez Catheter: Not present  Lines: None     Cardiac Monitoring: None  Code Status: Full Code      Clinically Significant Risk Factors Present on Admission                  # Hypertension: Home medication list includes antihypertensive(s)     # DMII: A1C = 6.7 % (Ref range: <5.7 %) within past 6 months    # Obesity: Estimated body mass index is 39.72 kg/m  as calculated from the following:    Height as of 5/25/23: 1.753 m (5' 9\").    Weight as of this encounter: 122 kg (269 lb).          Disposition Plan     Expected Discharge Date: 06/15/2023, 12:00 PM              The patient's care was discussed with the Attending Physician, Dr. Gunn, Bedside Nurse, Patient, Patient's Family and GI Team.    Mari Obrien PA-C  Hospitalist Service  [facility]  Securely message with [employer] (more info)  Text page via [facility] Paging/Directory   ______________________________________________________________________    Interval History   Patient reports not stools overnight but two episodes, one larger and one smaller this morning that appeared melanotic. Denies nausea, vomiting, or abdominal pain. Discussed with patient and wife plan to contact GI about recurrent bleeding.     Physical Exam   Vital Signs: Temp: 97.8  F (36.6  C) Temp src: Temporal BP: 114/60 Pulse: 63   Resp: 16 SpO2: 97 % O2 Device: None (Room air) Oxygen Delivery: 2 LPM  Weight: 269 lbs 0 oz  General Appearance: laying in bed, pleasant, NAD  Respiratory: CTAB without wheezing or rales  Cardiovascular: RRR without murmur  GI: soft, nontender, non-distended, normoactive bowel sounds   Skin: warm, dry, no lesions in visualized areas    Medical Decision Making       40 MINUTES " SPENT BY ME on the date of service doing chart review, history, exam, documentation & further activities per the note.      Data   ------------------------- PAST 24 HR DATA REVIEWED -----------------------------------------------

## 2023-06-15 NOTE — PROGRESS NOTES
PRIMARY DIAGNOSIS: OUTPATIENT/OBSERVATION GOALS TO BE MET BEFORE DISCHARGE:   1. ADLs back to baseline: Yes    2. Activity and level of assistance: Ambulating independently.    3. Pain status: Pain free.    4. Return to near baseline physical activity: Yes     Discharge Planner Nurse   Safe discharge environment identified: Yes  Barriers to discharge: Yes       Entered by: Macy Clay RN 06/15/2023 4:43 AM    Pt is A&Ox4. VSS on RA. PIV-SL. Pt up Independently w/ cane. Full liquid diet. Denies pain. No BM's this shift. GI consulted, recommends flexible sigmoidoscopy if bleeding persists. Hemoglobin 8.4.  Please review provider order for any additional goals.   Nurse to notify provider when observation goals have been met and patient is ready for discharge.

## 2023-06-15 NOTE — PLAN OF CARE
"PRIMARY DIAGNOSIS: \"GENERIC\" NURSING GI BLEED  OUTPATIENT/OBSERVATION GOALS TO BE MET BEFORE DISCHARGE:  ADLs back to baseline: Yes    Activity and level of assistance: Up with standby assistance.    Pain status: Pain free.    Return to near baseline physical activity: Yes        RN paged MD Escalona  GI re: continued rectal bleeding. RN received phone call from Endo.MD wants pt. To receive 2 enemas and then be sent to endo.RN clarified with endo staff re: if they want two enemas consecutively OR, do they want 1 at a time, have pt. Go to BR,then receive other enema.Latter was correct. Administration was done thusly and then pt. Was transported to endo.Will await return to floor. Pt. Has been NPO since breakfast this AM.Insulin this noon was held along with breakfast until he returns.                     " Patient has been advised.

## 2023-06-15 NOTE — TELEPHONE ENCOUNTER
PAL faxed letter to [employer] at [phone]    PAL called and spoke with pt's wife, Alejandrina ([facility])  -Updated that letter was faxed  -Original copy of letter placed at  for pick-up  -Alejandrina reports that Damien will not be getting discharged today, going down for colonoscopy, Alejandrina will call back and reschedule hospital f/u appt if need be    Alejandrina was given an opportunity to ask questions, verbalized understanding of plan, and is agreeable.    Katt ALMANZAR RN  Patient Advocate Liaison - [facility]  [phone]

## 2023-06-15 NOTE — PROGRESS NOTES
PRIMARY DIAGNOSIS: OUTPATIENT/OBSERVATION GOALS TO BE MET BEFORE DISCHARGE:   1. ADLs back to baseline: Yes    2. Activity and level of assistance: Ambulating independently.    3. Pain status: Pain free.    4. Return to near baseline physical activity: Yes     Discharge Planner Nurse   Safe discharge environment identified: Yes  Barriers to discharge: Yes       Entered by: Macy Clay RN 06/14/2023 9:38 PM    Pt is A&Ox4. VSS on RA. Pt up Independently w/ cane. Full liquid diet. Denies pain.   Please review provider order for any additional goals.   Nurse to notify provider when observation goals have been met and patient is ready for discharge.

## 2023-06-15 NOTE — LETTER
Heather 15, 2023      Re: Damien Vallecillo  [id number] [facility] [id number]        To Whom It May Concern:    Damien Vallecillo suffers from a number of medical conditions that necessitate air conditioning, the absence of which will adversely affect his health.    This is a letter of medical necessity for air conditioning repair.    Thank you for your assistance      Sincerely,        Obi Strange MD

## 2023-06-16 VITALS
HEART RATE: 63 BPM | RESPIRATION RATE: 16 BRPM | BODY MASS INDEX: 39.72 KG/M2 | TEMPERATURE: 97.7 F | SYSTOLIC BLOOD PRESSURE: 143 MMHG | WEIGHT: 269 LBS | OXYGEN SATURATION: 100 % | DIASTOLIC BLOOD PRESSURE: 71 MMHG

## 2023-06-16 LAB
GLUCOSE BLDC GLUCOMTR-MCNC: 127 MG/DL (ref 70–99)
HGB BLD-MCNC: 7.7 G/DL (ref 13.3–17.7)
HGB BLD-MCNC: 7.9 G/DL (ref 13.3–17.7)

## 2023-06-16 PROCEDURE — 82962 GLUCOSE BLOOD TEST: CPT

## 2023-06-16 PROCEDURE — 99239 HOSP IP/OBS DSCHRG MGMT >30: CPT | Performed by: PHYSICIAN ASSISTANT

## 2023-06-16 PROCEDURE — 96376 TX/PRO/DX INJ SAME DRUG ADON: CPT

## 2023-06-16 PROCEDURE — C9113 INJ PANTOPRAZOLE SODIUM, VIA: HCPCS | Performed by: INTERNAL MEDICINE

## 2023-06-16 PROCEDURE — G0378 HOSPITAL OBSERVATION PER HR: HCPCS

## 2023-06-16 PROCEDURE — 85018 HEMOGLOBIN: CPT | Performed by: INTERNAL MEDICINE

## 2023-06-16 PROCEDURE — 36415 COLL VENOUS BLD VENIPUNCTURE: CPT | Performed by: INTERNAL MEDICINE

## 2023-06-16 PROCEDURE — 250N000011 HC RX IP 250 OP 636: Performed by: INTERNAL MEDICINE

## 2023-06-16 PROCEDURE — 250N000013 HC RX MED GY IP 250 OP 250 PS 637: Performed by: INTERNAL MEDICINE

## 2023-06-16 RX ORDER — FERROUS SULFATE 325(65) MG
325 TABLET ORAL
COMMUNITY
Start: 2023-06-16 | End: 2023-06-16

## 2023-06-16 RX ORDER — FERROUS SULFATE 325(65) MG
325 TABLET ORAL
COMMUNITY
Start: 2023-06-16

## 2023-06-16 RX ADMIN — FERROUS SULFATE TAB 325 MG (65 MG ELEMENTAL FE) 325 MG: 325 (65 FE) TAB at 08:20

## 2023-06-16 RX ADMIN — PANTOPRAZOLE SODIUM 40 MG: 40 INJECTION, POWDER, LYOPHILIZED, FOR SOLUTION INTRAVENOUS at 08:20

## 2023-06-16 ASSESSMENT — ACTIVITIES OF DAILY LIVING (ADL)
ADLS_ACUITY_SCORE: 33

## 2023-06-16 NOTE — DISCHARGE SUMMARY
"[facility]    Discharge Summary  Hospitalist    Date of Admission:  6/12/2023  Date of Discharge:  6/16/2023  Provider:  Karol Obrien PA-C  Date of Service (when I last saw the patient): 06/16/23    Discharge Diagnoses   Melena/hematachezia   Likely recurrent diverticular GI bleed  Acute on chronic blood loss anemia, iron deficiency     Other medical issues:  Past Medical History:   Diagnosis Date     Acute suppurative arthritis (H)      Acute, but ill-defined, cerebrovascular disease      Ankle fracture 12/28/2018     Arthritis      Benign prostatic hyperplasia with urinary frequency 3/29/2022     Cheilosis 9/28/2018     Chronic headaches      Closed right ankle fracture 12/29/2018     Cognitive attention deficit 9/16/2021     Diabetes (H)     \"Pre-diabetes\"     Dislocation of right patella 9/16/2020     Elevated serum creatinine 11/13/2019    GFR Estimate Date Value Ref Range Status 09/30/2019 84 >60 mL/min/[1.73_m2] Final   Comment:   Non  GFR Calc Starting 12/18/2018, serum creatinine based estimated GFR (eGFR) will be  calculated using the Chronic Kidney Disease Epidemiology Collaboration  (CKD-EPI) equation.         Encounter for immunization 9/21/2022     External hemorrhoids 9/21/2022     Fall 3/8/2023     Fecal incontinence 9/16/2020     Glucose intolerance (impaired glucose tolerance) 5/31/2013     Gout, unspecified      Grieving 9/16/2021     Hammer toe of left foot 10/21/2016     Hematuria      History of arthroplasty of right knee 08/17/2017     History of blood transfusion      Hyperlipidemia LDL goal <160 11/13/2019     Hypersomnia 3/7/2023     Left foot pain 9/30/2019     Left knee pain, unspecified chronicity 5/8/2017     Lentigo maligna (H)      Malignant neoplasm of rectosigmoid junction (H)      Morbid obesity due to excess calories (H) 10/21/2016     RENAE (obstructive sleep apnea) 3/8/2023     Other convulsions     last seizure 7-8 years ago...not certain if " these were true seizres or not..     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 Please see the admission history and physical for full details.    Hospital Course   Damien Vallecillo was admitted on 6/12/2023.  The following problems were addressed during his hospitalization:    Melena / hematachezia   Suspect recurrent diverticular GI bleed  History of NSAID use, no blood thinner use   Acute on chronic blood loss anemia, Fe deficiency   Hemoglobin in September was 14.5 with hemoglobin of 9.4 in May. Reports multiple episodes of painless bright red blood per rectum starting on 5/21. Was recently admitted from 5/25 -5/26 for similar complaints, at that time EGD and colonoscopy were performed with suspicion for diverticular bleed. Instructed to stop all NSAIDs at that time to follow-up with his PCP for repeat hemoglobin check.  Presented again on 6/12 as he had a toilet full of bloody stools and was concerned because this had been a lot more than he was having since his discharge. He was hemodynamically stable with blood pressures 156/76 and heart rate in the 60s.   -suspect recurrent diverticular bleed   -serial hemoglobins obtained, stable throughout stay between 7-8, recheck CBC in 7-10 days with PCP  -IV PPI given during stay out of abundance of caution, no need to continue PPI upon discharge   -GI consulted on 6/14, appreciated  recommendations   -no bleeding overnight but two episodes of melanotic stool on 6/15, GI contacted and s/p flexible sigmoidoscopy showing blood in left colon, highly suspicious for diverticular bleed  -no additional bleeding since flexible sigmoidoscopy with advancement to low fiber diet, stable to discharge from GI standpoint   -continue daily ferrous sulfate upon discharge      Prediabetes  Patient reports a history of prediabetes and is not on any medicines  -sliding scale insulin      History of epilepsy  -Not on medications      History of peripheral polyneuropathy with frequent falls  -Uses a cane     BPH -PTA Flomax      History of arthritis     HLP -PTA atorvastatin     H/o venous stasis: Lasix initially resumed then held. Stable to resume upon discharge.     Obesity - BMI 39     Pending Results   None    Code Status   Full Code       Primary Care Physician   Obi Strange    Exam:    BP (!) 143/71 (BP Location: Left arm, Cuff Size: Adult Regular)   Pulse 63   Temp 97.7  F (36.5  C) (Oral)   Resp 16   Wt 122 kg (269 lb)   SpO2 100%   BMI 39.72 kg/m  \  General Appearance: laying in bed, pleasant, NAD  Respiratory: CTAB without wheezing or rales  Cardiovascular: RRR without murmur  GI: soft, nontender, non-distended, normoactive bowel sounds   Skin: warm, dry, no lesions in visualized areas    Discharge Disposition   Discharged to home    Consultations This Hospital Stay   GASTROENTEROLOGY IP CONSULT  PHYSICAL THERAPY ADULT IP CONSULT  GASTROENTEROLOGY IP CONSULT    Time Spent on this Encounter   I, Mari Obrien PA-C, personally saw the patient today and spent greater than 30 minutes discharging this patient.    Discharge Orders      CBC with platelets     Primary Care - Care Coordination Referral      Reason for your hospital stay    You were admitted due to concerns for painless bright red blood per rectum.  Your work-up included basic labs showing a decreased hemoglobin.  Your hemoglobin has  been serially followed throughout your stay and been stable between 7-8.  Due to issues with ongoing bleeding you were seen by GI and underwent flexible sigmoidoscopy on 6/15 which showed blood in the left colon that is suspicious for a diverticular bleed. Since the sigmoidoscopy you have had no bloody stools with stable hemoglobin.  You were able to advance to a low fiber diet and should continue this for the next 5 to 7 days upon discharge.  Avoid any NSAIDs, this includes naproxen, aspirin, or ibuprofen.  Recommend you continue over-the-counter iron supplementation and follow-up with your primary care to monitor improvement in your hemoglobin.     Follow-up and recommended labs and tests     Follow up with primary care provider, Obi Strange, within 7-10 days for hospital follow- up.  The following labs/tests are recommended: CBC.     Activity    Your activity upon discharge: activity as tolerated     Discharge Instructions    1. Change iron supplement to once daily and take with orange juice or supplemental vitamin c to help absorption     Diet    Follow this diet upon discharge: Orders Placed This Encounter      Advance Diet as Tolerated: Low Fiber for 5-7 days upon discharge     Discharge Medications   Current Discharge Medication List      CONTINUE these medications which have CHANGED    Details   ferrous sulfate (FEROSUL) 325 (65 Fe) MG tablet Take 1 tablet (325 mg) by mouth daily (with breakfast)    Associated Diagnoses: Lower GI bleed; Anemia, unspecified type         CONTINUE these medications which have NOT CHANGED    Details   acetaminophen (TYLENOL) 500 MG tablet Take 500-1,000 mg by mouth every 6 hours as needed for mild pain      atorvastatin (LIPITOR) 20 MG tablet Take 20 mg by mouth every evening      CINNAMON PO Take 1 tablet by mouth daily       clindamycin (CLEOCIN T) 1 % external solution Apply topically 2 times daily  Qty: 60 mL, Refills: 11    Associated Diagnoses: Rhinophyma       diclofenac (VOLTAREN) 1 % topical gel Apply 2 g topically 2 times daily      fish oil-omega-3 fatty acids 1000 MG capsule Take 1 g by mouth 2 times daily      Flaxseed, Linseed, (FLAXSEED OIL) 1000 MG CAPS Take 1,000 mg by mouth daily      furosemide (LASIX) 20 MG tablet Take 1 tablet (20 mg) by mouth 2 times daily  Qty: 180 tablet, Refills: 1    Associated Diagnoses: Pedal edema      glucosamine-chondroitin 500-400 MG CAPS per capsule Take 1 capsule by mouth daily      metroNIDAZOLE (METROGEL) 0.75 % external gel APPLY THIN LAYER TO FACE TWICE A DAY FOR ROSACEA    Associated Diagnoses: Rosacea      Multiple Vitamins-Minerals (MULTIVITAMIN ADULT PO) Take 1 tablet by mouth daily Reported on 5/12/2017      pramox-pe-glycerin-petrolatum (PREPARATION H) 1-0.25-14.4-15 % CREA cream Place rectally 4 times daily as needed for hemorrhoids Use as directed      ACE NOT PRESCRIBED, INTENTIONAL, ACE Inhibitor not prescribed due to Other:  Had cough on ACEI in past        Qty: 0 each, Refills: 0    Associated Diagnoses: Type 2 diabetes, HbA1c goal < 7% (H)      !! order for DME 1: Gradient Compression Wraps; 2: Cast Boots; 3: BLE knee or thigh high 20-30 mm Hg compression stocking; 4: BLE knee or thigh high custom compression stocking; 5: Alternative BLE knee high or thigh compression garments (velcro/buckling)  Qty: 1 each, Refills: 0    Associated Diagnoses: Leg swelling      !! order for DME Equipment being ordered: Walker Wheels () and Walker ()  Treatment Diagnosis: Impaired functional mobility  Qty: 1 each, Refills: 0    Associated Diagnoses: Status post total right knee replacement      tamsulosin (FLOMAX) 0.4 MG capsule Take 1 capsule (0.4 mg) by mouth daily  Qty: 90 capsule, Refills: 3    Associated Diagnoses: Benign prostatic hyperplasia with urinary frequency       !! - Potential duplicate medications found. Please discuss with provider.      STOP taking these medications       doxycycline hyclate  (PERIOSTAT) 20 MG tablet Comments:   Reason for Stopping:             Allergies   Allergies   Allergen Reactions     Levetiracetam [Keppra] Rash     Oxcarbazepine [Oxcarbazepine] Rash     Data   Results for orders placed or performed during the hospital encounter of 06/12/23   COLONOSCOPY     Status: None   Result Value Ref Range    COLONOSCOPY       [facility]  _______________________________________________________________________________  Patient Name: Damien Vallecillo          Procedure Date: 6/15/2023 12:27 PM  MRN: [id number]                       Account Number: [id number]  YOB: 1935              Admit Type: Outpatient  Age: 87                               Gender: Male  Attending MD: CRISSY HERNANDEZ MD,  Total Sedation Time: 17 minutes  Instrument Name: 219 - Pediatric Colonoscope   _______________________________________________________________________________     Procedure:                Colonoscopy  Indications:              Hematochezia  Providers:                CRISSY HERNANDEZ MD (Doctor)  Referring MD:               Medicines:                Midazolam 0.5 mg IV, Fentanyl 50 micrograms IV  Complications:            No immediate complications.  _______________________________________________________________________________  Procedure:                Pre-Anesthesia Assessment :                            - Prior to the procedure, a History and Physical                             was performed, and patient medications and                             allergies were reviewed. The patient is competent.                             The risks and benefits of the procedure and the                             sedation options and risks were discussed with the                             patient. All questions were answered and informed                             consent was obtained. Patient identification and                             proposed procedure were  verified by the physician                             in the endoscopy suite. Mental Status Examination:                             alert and oriented. Airway Examination: normal                             oropharyngeal airway and neck mobility. Respiratory                             Examination: clear to auscultation. CV Examination:                             normal. Prophylactic Antibiotics: The duong ent does                             not require prophylactic antibiotics. Prior                             Anticoagulants: The patient has taken no                             anticoagulant or antiplatelet agents. ASA Grade                             Assessment: II - A patient with mild systemic                             disease. After reviewing the risks and benefits,                             the patient was deemed in satisfactory condition to                             undergo the procedure. The anesthesia plan was to                             use moderate sedation / analgesia (conscious                             sedation). Immediately prior to administration of                             medications, the patient was re-assessed for                             adequacy to receive sedatives. The heart rate,                             respiratory rate, oxygen saturations, blood                             pressure, adequacy of pulmonary ventilation, and                              response to care were monitored throughout the                             procedure. The physical status of the patient was                             re-assessed after the procedure.                            After obtaining informed consent, the colonoscope                             was passed under direct vision. Throughout the                             procedure, the patient's blood pressure, pulse, and                             oxygen saturations were monitored continuously. The                              Olympus, Pediatric Colonoscope, Model # [id number]-H190DL,                             [place] # 219, SN # [id number] was introduced through                             the anus and advanced to the terminal ileum. After                             obtaining informed consent, the colonoscope was                             passed under direct vision. Throughout the                             procedure, the patient's blood pressure, pulse, and                              oxygen saturations were monitored continuously. The                             colonoscopy was performed without difficulty. The                             patient tolerated the procedure well. The quality                             of the bowel preparation was good.                                                                                   Findings:       There was evidence of a prior end-to-end ileo-colonic anastomosis at the        hepatic flexure.       Hematin (altered blood/coffee-ground-like material) was found in the        recto-sigmoid colon, in the descending colon and in the transverse colon.       Multiple diverticula were found in the sigmoid colon.                                                                                   Impression:               - End-to-end ileo-colonic anastomosis. Green stool                             coming from ileum.                            - Blood in the recto-sigmoid colon, in the                             descending co bon and in the transverse colon. Most                             blood in left colon.                            - Diverticulosis in the sigmoid colon.                            - No specimens collected.  Recommendation:           Bleeding is from the sigmoid colon. Most likely a                             diverticular bleed. AVMs or Dieulafoy not seen.                             Repeat exam for rebleeding.                                                                                        ______________________  CRISSY HERNANDEZ MD  6/15/2023 2:11:33 PM  I was physically present for the entire viewing portion of the exam.  __________________________CRISSY HERNANDEZ MD  Number of Addenda: 0    Note Initiated On: 6/15/2023 12:27 PM  MRN:                      [id number]  Procedure Date:           6/15/2023 12:27:53 PM  Total Procedure Duration: 0 hours 14 minutes 44 seconds   Estimated Blood Loss:       Scope In: 1:41:58 PM  Scope Out: 1:56:42 PM     Comprehensive metabolic panel     Status: Abnormal   Result Value Ref Range    Sodium 140 136 - 145 mmol/L    Potassium 4.1 3.4 - 5.3 mmol/L    Chloride 105 98 - 107 mmol/L    Carbon Dioxide (CO2) 25 22 - 29 mmol/L    Anion Gap 10 7 - 15 mmol/L    Urea Nitrogen 13.7 8.0 - 23.0 mg/dL    Creatinine 0.88 0.67 - 1.17 mg/dL    Calcium 8.7 (L) 8.8 - 10.2 mg/dL    Glucose 185 (H) 70 - 99 mg/dL    Alkaline Phosphatase 48 40 - 129 U/L    AST 19 10 - 50 U/L    ALT 16 10 - 50 U/L    Protein Total 5.9 (L) 6.4 - 8.3 g/dL    Albumin 3.9 3.5 - 5.2 g/dL    Bilirubin Total 0.5 <=1.2 mg/dL    GFR Estimate 84 >60 mL/min/1.73m2   Lipase     Status: Normal   Result Value Ref Range    Lipase 16 13 - 60 U/L   Extra Tube (Turner Draw)     Status: None    Narrative    The following orders were created for panel order Extra Tube (Turner Draw).  Procedure                               Abnormality         Status                     ---------                               -----------         ------                     Extra Blue Top Tube[[id number]]                              Final result               Extra Red Top Tube[[id number]]                               Final result                 Please view results for these tests on the individual orders.   CBC with platelets and differential     Status: Abnormal   Result Value Ref Range    WBC Count 5.1 4.0 - 11.0 10e3/uL    RBC Count 2.81 (L) 4.40 - 5.90 10e6/uL    Hemoglobin 8.0 (L) 13.3 - 17.7 g/dL    Hematocrit 26.5  (L) 40.0 - 53.0 %    MCV 94 78 - 100 fL    MCH 28.5 26.5 - 33.0 pg    MCHC 30.2 (L) 31.5 - 36.5 g/dL    RDW 15.4 (H) 10.0 - 15.0 %    Platelet Count 215 150 - 450 10e3/uL    % Neutrophils 69 %    % Lymphocytes 15 %    % Monocytes 11 %    % Eosinophils 5 %    % Basophils 0 %    % Immature Granulocytes 0 %    NRBCs per 100 WBC 0 <1 /100    Absolute Neutrophils 3.5 1.6 - 8.3 10e3/uL    Absolute Lymphocytes 0.7 (L) 0.8 - 5.3 10e3/uL    Absolute Monocytes 0.5 0.0 - 1.3 10e3/uL    Absolute Eosinophils 0.3 0.0 - 0.7 10e3/uL    Absolute Basophils 0.0 0.0 - 0.2 10e3/uL    Absolute Immature Granulocytes 0.0 <=0.4 10e3/uL    Absolute NRBCs 0.0 10e3/uL   Extra Blue Top Tube     Status: None   Result Value Ref Range    Hold Specimen JIC    Extra Red Top Tube     Status: None   Result Value Ref Range    Hold Specimen JIC    Glucose by meter     Status: Abnormal   Result Value Ref Range    GLUCOSE BY METER POCT 135 (H) 70 - 99 mg/dL   Basic metabolic panel     Status: Abnormal   Result Value Ref Range    Sodium 140 136 - 145 mmol/L    Potassium 4.2 3.4 - 5.3 mmol/L    Chloride 108 (H) 98 - 107 mmol/L    Carbon Dioxide (CO2) 25 22 - 29 mmol/L    Anion Gap 7 7 - 15 mmol/L    Urea Nitrogen 10.8 8.0 - 23.0 mg/dL    Creatinine 0.79 0.67 - 1.17 mg/dL    Calcium 8.6 (L) 8.8 - 10.2 mg/dL    Glucose 141 (H) 70 - 99 mg/dL    GFR Estimate 87 >60 mL/min/1.73m2   CBC with platelets     Status: Abnormal   Result Value Ref Range    WBC Count 3.9 (L) 4.0 - 11.0 10e3/uL    RBC Count 2.63 (L) 4.40 - 5.90 10e6/uL    Hemoglobin 7.5 (L) 13.3 - 17.7 g/dL    Hematocrit 24.9 (L) 40.0 - 53.0 %    MCV 95 78 - 100 fL    MCH 28.5 26.5 - 33.0 pg    MCHC 30.1 (L) 31.5 - 36.5 g/dL    RDW 15.2 (H) 10.0 - 15.0 %    Platelet Count 193 150 - 450 10e3/uL   Magnesium     Status: Normal   Result Value Ref Range    Magnesium 2.1 1.7 - 2.3 mg/dL   Phosphorus     Status: Normal   Result Value Ref Range    Phosphorus 3.1 2.5 - 4.5 mg/dL   Hemoglobin     Status: Abnormal    Result Value Ref Range    Hemoglobin 7.7 (L) 13.3 - 17.7 g/dL   Glucose by meter     Status: Abnormal   Result Value Ref Range    GLUCOSE BY METER POCT 128 (H) 70 - 99 mg/dL   Glucose by meter     Status: Abnormal   Result Value Ref Range    GLUCOSE BY METER POCT 105 (H) 70 - 99 mg/dL   Glucose by meter     Status: Abnormal   Result Value Ref Range    GLUCOSE BY METER POCT 181 (H) 70 - 99 mg/dL   Hemoglobin     Status: Abnormal   Result Value Ref Range    Hemoglobin 7.7 (L) 13.3 - 17.7 g/dL   Basic metabolic panel     Status: Abnormal   Result Value Ref Range    Sodium 141 136 - 145 mmol/L    Potassium 4.0 3.4 - 5.3 mmol/L    Chloride 107 98 - 107 mmol/L    Carbon Dioxide (CO2) 24 22 - 29 mmol/L    Anion Gap 10 7 - 15 mmol/L    Urea Nitrogen 11.7 8.0 - 23.0 mg/dL    Creatinine 0.87 0.67 - 1.17 mg/dL    Calcium 8.7 (L) 8.8 - 10.2 mg/dL    Glucose 128 (H) 70 - 99 mg/dL    GFR Estimate 84 >60 mL/min/1.73m2   Glucose by meter     Status: Abnormal   Result Value Ref Range    GLUCOSE BY METER POCT 137 (H) 70 - 99 mg/dL   CBC with platelets and differential     Status: Abnormal   Result Value Ref Range    WBC Count 4.8 4.0 - 11.0 10e3/uL    RBC Count 2.75 (L) 4.40 - 5.90 10e6/uL    Hemoglobin 7.8 (L) 13.3 - 17.7 g/dL    Hematocrit 26.3 (L) 40.0 - 53.0 %    MCV 96 78 - 100 fL    MCH 28.4 26.5 - 33.0 pg    MCHC 29.7 (L) 31.5 - 36.5 g/dL    RDW 15.3 (H) 10.0 - 15.0 %    Platelet Count 193 150 - 450 10e3/uL    % Neutrophils 63 %    % Lymphocytes 17 %    % Monocytes 14 %    % Eosinophils 5 %    % Basophils 1 %    % Immature Granulocytes 0 %    NRBCs per 100 WBC 0 <1 /100    Absolute Neutrophils 3.1 1.6 - 8.3 10e3/uL    Absolute Lymphocytes 0.8 0.8 - 5.3 10e3/uL    Absolute Monocytes 0.7 0.0 - 1.3 10e3/uL    Absolute Eosinophils 0.3 0.0 - 0.7 10e3/uL    Absolute Basophils 0.0 0.0 - 0.2 10e3/uL    Absolute Immature Granulocytes 0.0 <=0.4 10e3/uL    Absolute NRBCs 0.0 10e3/uL   Glucose by meter     Status: Abnormal   Result  Value Ref Range    GLUCOSE BY METER POCT 141 (H) 70 - 99 mg/dL   Glucose by meter     Status: Abnormal   Result Value Ref Range    GLUCOSE BY METER POCT 179 (H) 70 - 99 mg/dL   Hemoglobin     Status: Abnormal   Result Value Ref Range    Hemoglobin 8.4 (L) 13.3 - 17.7 g/dL   Glucose by meter     Status: Abnormal   Result Value Ref Range    GLUCOSE BY METER POCT 124 (H) 70 - 99 mg/dL   Glucose by meter     Status: Abnormal   Result Value Ref Range    GLUCOSE BY METER POCT 114 (H) 70 - 99 mg/dL   Hemoglobin     Status: Abnormal   Result Value Ref Range    Hemoglobin 8.8 (L) 13.3 - 17.7 g/dL   Hemoglobin     Status: Abnormal   Result Value Ref Range    Hemoglobin 8.4 (L) 13.3 - 17.7 g/dL   Glucose by meter     Status: Abnormal   Result Value Ref Range    GLUCOSE BY METER POCT 151 (H) 70 - 99 mg/dL   Glucose by meter     Status: Abnormal   Result Value Ref Range    GLUCOSE BY METER POCT 153 (H) 70 - 99 mg/dL   Glucose by meter     Status: Abnormal   Result Value Ref Range    GLUCOSE BY METER POCT 112 (H) 70 - 99 mg/dL   Hemoglobin     Status: Abnormal   Result Value Ref Range    Hemoglobin 7.8 (L) 13.3 - 17.7 g/dL   Glucose by meter     Status: Abnormal   Result Value Ref Range    GLUCOSE BY METER POCT 132 (H) 70 - 99 mg/dL   Glucose by meter     Status: Abnormal   Result Value Ref Range    GLUCOSE BY METER POCT 142 (H) 70 - 99 mg/dL   Hemoglobin     Status: Abnormal   Result Value Ref Range    Hemoglobin 7.7 (L) 13.3 - 17.7 g/dL   Glucose by meter     Status: Abnormal   Result Value Ref Range    GLUCOSE BY METER POCT 127 (H) 70 - 99 mg/dL   Hemoglobin     Status: Abnormal   Result Value Ref Range    Hemoglobin 7.9 (L) 13.3 - 17.7 g/dL   Adult Type and Screen     Status: None   Result Value Ref Range    ABO/RH(D) A POS     Antibody Screen Negative Negative    SPECIMEN EXPIRATION DATE [id number]    Adult Type and Screen     Status: None   Result Value Ref Range    ABO/RH(D) A POS     Antibody Screen Negative Negative     SPECIMEN EXPIRATION DATE [id number]    CBC + differential     Status: Abnormal    Narrative    The following orders were created for panel order CBC + differential.  Procedure                               Abnormality         Status                     ---------                               -----------         ------                     CBC with platelets and d...[[id number]]  Abnormal            Final result                 Please view results for these tests on the individual orders.   ABO/Rh type and screen     Status: None    Narrative    The following orders were created for panel order ABO/Rh type and screen.  Procedure                               Abnormality         Status                     ---------                               -----------         ------                     Adult Type and Screen[[id number]]                            Final result                 Please view results for these tests on the individual orders.   ABO/Rh type and screen     Status: None    Narrative    The following orders were created for panel order ABO/Rh type and screen.  Procedure                               Abnormality         Status                     ---------                               -----------         ------                     Adult Type and Screen[[id number]]                            Edited Result - FINAL        Please view results for these tests on the individual orders.   CBC with Platelets & Differential     Status: Abnormal    Narrative    The following orders were created for panel order CBC with Platelets & Differential.  Procedure                               Abnormality         Status                     ---------                               -----------         ------                     CBC with platelets and d...[[id number]]  Abnormal            Final result                 Please view results for these tests on the individual orders.     Mari Obrien PA-C

## 2023-06-16 NOTE — PLAN OF CARE
Goal Outcome Evaluation:       Patient medically cleared discharged home via private vehicle to home self care at 1630

## 2023-06-16 NOTE — PLAN OF CARE
PRIMARY DIAGNOSIS: GI BLEED    OUTPATIENT/OBSERVATION GOALS TO BE MET BEFORE DISCHARGE  1. Orthostatic performed: N/A    2. Stable Hgb No.   Recent Labs   Lab Test 06/15/23  2201 06/15/23  1449 06/15/23  0836   HGB 7.8* 8.4* 8.8*       3. Resolved or declined bleeding episodes: Yes Last episode: 06/15 day shift    4. Appropriate testing complete: Yes    5. Cleared for discharge by consultants (if involved): No    6. Safe discharge environment identified: No    Discharge Planner Nurse   Safe discharge environment identified: No  Barriers to discharge: Yes       Entered by: Cindy Crow RN 06/16/2023 3:34 AM  Patient is Aox4, VSS, refusing the bed alarm so is independent with his cane in his room, tolerating full liquid diet and oral medications, PIV is saline locked, colonoscopy was done during the day and showed many diverticuli and old blood, no pain noted at this time, 1 soft stool dark brown and green in color, resting well, able to make needs known, will continue to monitor and provide cares.  Please review provider order for any additional goals.   Nurse to notify provider when observation goals have been met and patient is ready for discharge.

## 2023-06-16 NOTE — PLAN OF CARE
PRIMARY DIAGNOSIS: GI BLEED    OUTPATIENT/OBSERVATION GOALS TO BE MET BEFORE DISCHARGE  Orthostatic performed: N/A    Stable Hgb  TBD - next check at 1400 .   Recent Labs   Lab Test 06/16/23  0607 06/15/23  2201 06/15/23  1449   HGB 7.7* 7.8* 8.4*       Resolved or declined bleeding episodes: Yes     Appropriate testing complete: Yes    Cleared for discharge by consultants (if involved): No    Safe discharge environment identified: Yes    Discharge Planner Nurse   Safe discharge environment identified: Yes  Barriers to discharge: Yes       Entered by: Antoni Shahid RN 06/16/2023 1:33 PM     Please review provider order for any additional goals.   Nurse to notify provider when observation goals have been met and patient is ready for discharge.    RN - VSS. Pt denying pain. Up ad dillan with cane. Did have 1x bowel movement - liquid and dark green in color - no obvious signs of bleeding. Pt tolerating advancing diet. Planning afternoon hgb check and possible discharge to home. Spouse at bedside.

## 2023-06-16 NOTE — PLAN OF CARE
PRIMARY DIAGNOSIS: GI BLEED    OUTPATIENT/OBSERVATION GOALS TO BE MET BEFORE DISCHARGE  Orthostatic performed: N/A    Stable Hgb No.   Recent Labs   Lab Test 06/16/23  0607 06/15/23  2201 06/15/23  1449   HGB 7.7* 7.8* 8.4*       Resolved or declined bleeding episodes: Yes Last episode: Dark brown stool observed last evening    Appropriate testing complete: Yes    Cleared for discharge by consultants (if involved): Yes    Safe discharge environment identified: Yes    Discharge Planner Nurse   Safe discharge environment identified: Yes  Barriers to discharge: No       Entered by: Antoni Shahid RN 06/16/2023 10:45 AM     Please review provider order for any additional goals.   Nurse to notify provider when observation goals have been met and patient is ready for discharge.       RN - VSS. Pt denying pain. No further occurrences of bleeding observed. Pt tolerating diet. Up ad dillan with cane in room. Potential to discharge later today.

## 2023-06-16 NOTE — PLAN OF CARE
PRIMARY DIAGNOSIS: GI BLEED    OUTPATIENT/OBSERVATION GOALS TO BE MET BEFORE DISCHARGE  1. Orthostatic performed: N/A    2. Stable Hgb No.   Recent Labs   Lab Test 06/15/23  2201 06/15/23  1449 06/15/23  0836   HGB 7.8* 8.4* 8.8*       3. Resolved or declined bleeding episodes: Yes Last episode: 06/15 day shift    4. Appropriate testing complete: Yes    5. Cleared for discharge by consultants (if involved): No    6. Safe discharge environment identified: No    Discharge Planner Nurse   Safe discharge environment identified: No  Barriers to discharge: Yes       Entered by: Cindy Crow RN 06/16/2023 3:17 AM  Patient is Aox4, VSS, refusing the bed alarm so is independent with his cane in his room, tolerating full liquid diet and oral medications, PIV is saline locked, colonoscopy was done during the day and showed many diverticuli and old blood, strict I & O's, watch stools, no pain noted at this time, no stools up to this point, resting well, able to make needs known, will continue to monitor and provide cares.  Please review provider order for any additional goals.   Nurse to notify provider when observation goals have been met and patient is ready for discharge.

## 2023-06-16 NOTE — PLAN OF CARE
PRIMARY DIAGNOSIS: GI BLEED    OUTPATIENT/OBSERVATION GOALS TO BE MET BEFORE DISCHARGE  1. Orthostatic performed: N/A    2. Stable Hgb No.   Recent Labs   Lab Test 06/15/23  2201 06/15/23  1449 06/15/23  0836   HGB 7.8* 8.4* 8.8*       3. Resolved or declined bleeding episodes: Yes Last episode: 06/15 day shift    4. Appropriate testing complete: Yes    5. Cleared for discharge by consultants (if involved): No    6. Safe discharge environment identified: No    Discharge Planner Nurse   Safe discharge environment identified: No  Barriers to discharge: Yes       Entered by: Cindy Crow RN 06/16/2023 4:35 AM  Patient is Aox4, VSS, refusing the bed alarm so is independent with his cane in his room, tolerating full liquid diet and oral medications, PIV is saline locked, no pain noted at this time, 1 soft stool dark brown and green in color, sleeping well, able to make needs known, will continue to monitor and provide cares.  Please review provider order for any additional goals.   Nurse to notify provider when observation goals have been met and patient is ready for discharge.

## 2023-06-16 NOTE — TELEPHONE ENCOUNTER
PAL received VM from pt's wife, Alejandrina - requesting call back to reschedule hospital f/u appt    PAL called and spoke with Alejandrina (CTC)  -Alejandrina states that hospital physician recommends pt have hospital f/u appt the week of 6/26., current 6/20 appt too soon  -Rescheduled for Tuesday 6/27 at 3:10pm  -Alejandrina reports [employer] is coming out tomorrow to assess AC unit     Alejandrina was given an opportunity to ask questions, verbalized understanding of plan, and is agreeable.    Katt ALMANZAR, RN  Patient Advocate Liaison - PAL RN  [facility]  [phone]

## 2023-06-16 NOTE — PLAN OF CARE
PRIMARY DIAGNOSIS: GI BLEED    OUTPATIENT/OBSERVATION GOALS TO BE MET BEFORE DISCHARGE  Orthostatic performed: No    Stable Hgb Yes.   Recent Labs   Lab Test 06/15/23  1449 06/15/23  0836 06/14/23  2201   HGB 8.4* 8.8* 8.4*       Resolved or declined bleeding episodes: No,  with a moderate amount of bleeding Last episode: 6/15/23    Appropriate testing complete: Yes    Cleared for discharge by consultants (if involved): No    Safe discharge environment identified: No      Pt. Back in room after complete colonoscopy this afternoon.Has not had another stool-bloody or otherwise since.Colonoscopy showed many diverticuli and old blood.Pt. offers no c/o pain/discomfort. Eating dinner and is currently resting comfortably in bed.Up to BR with supervision.Was typed and crossed today.will con't to monitor Hgb and other labs, disposition of rectal bleeding. Will alert staff as warranted.

## 2023-06-16 NOTE — PROGRESS NOTES
GASTROENTEROLOGY PROGRESS NOTE       SUBJECTIVE: Had one BM last night with green and brown stool, no bright red bleeding.      OBJECTIVE:  BP (!) 148/68   Pulse 75   Temp 97.5  F (36.4  C) (Oral)   Resp 16   Wt 122 kg (269 lb)   SpO2 98%   BMI 39.72 kg/m    Temp (24hrs), Av  F (36.7  C), Min:97.8  F (36.6  C), Max:98.4  F (36.9  C)    No data found.  No intake or output data in the 24 hours ending 06/15/23 0735     PHYSICAL EXAM  Eating BF.           Recent Labs   Lab Test 23  0607 06/15/23  2201 06/15/23  1449 23  2201 23  0640 23  1200 23  0759 23  1557 23  1624 23  1038 19  0909 18  1657 17  1115 08/10/15  0835   WBC  --   --   --   --  4.8  --  3.9* 5.1   < > 8.6   < > 9.0   < >  --    HGB 7.7* 7.8* 8.4*   < > 7.8*   < > 7.5* 8.0*   < > 9.4*   < > 12.4*   < >  --    MCV  --   --   --   --  96  --  95 94   < > 92   < > 88   < >  --    PLT  --   --   --   --  193  --  193 215   < > 177   < > 217   < >  --    INR  --   --   --   --   --   --   --   --   --  1.24*  --  1.19*  --  1.15*    < > = values in this interval not displayed.     Recent Labs   Lab Test 23  0640 23  0759 23  1557   POTASSIUM 4.0 4.2 4.1   CHLORIDE 107 108* 105   CO2 24 25 25   BUN 11.7 10.8 13.7   ANIONGAP 10 7 10     Recent Labs   Lab Test 23  1557 22  1207 21  1111   ALBUMIN 3.9 3.8 3.9   BILITOTAL 0.5 1.0 1.3   ALT 16 42 52   AST 19 27 30   PROTEIN  --   --  Negative   LIPASE 16  --   --            Principal Problem:    History of GI diverticular bleed      Assessment: No abdominal pain. No bright bleeding per rectum    Plan: Advance to low residue diet.    - If he develops another bleed then we will consider repeating flex sig. If not discharge home later today.       Dede Soliz. [facility]  Office:  [phone]

## 2023-06-19 ENCOUNTER — PATIENT OUTREACH (OUTPATIENT)
Dept: CARE COORDINATION | Facility: CLINIC | Age: 88
End: 2023-06-19
Payer: MEDICARE

## 2023-06-19 NOTE — LETTER
[facility] CARE COORDINATION  [address]  [facility] [address]     June 20, 2023    Damien Vallecillo  [address] [address]      Dear Damien,    I am a  clinic care coordinator who works with Obi Strange MD with the [facility]. I recently tried to call and was unable to reach you. Below is a description of clinic care coordination and how I can further assist you.       The clinic care coordination team is made up of a registered nurse, , financial resource worker and community health worker who understand the health care system. The goal of clinic care coordination is to help you manage your health and improve access to the health care system. Our team works alongside your provider to assist you in determining your health and social needs. We can help you obtain health care and community resources, providing you with necessary information and education. We can work with you through any barriers and develop a care plan that helps coordinate and strengthen the communication between you and your care team.  Our services are voluntary and are offered without charge to you personally.    Please feel free to contact me with any questions or concerns regarding care coordination and what we can offer.      We are focused on providing you with the highest-quality healthcare experience possible.    Sincerely,     Virginia Smith RN Care Coordinator  [facility]  Email: [email or web address]  Phone: [phone]

## 2023-06-19 NOTE — PROGRESS NOTES
Clinic Care Coordination Contact  [facility]/Voicemail    Referral Source: IP Handoff  Clinical Data: Care Coordinator Outreach  Outreach attempted x 1.  Unable to leave voicemail message. Voicemail prompt that the mailbox was full and unable to accept any new messages at this time.   Plan: Care Coordinator will try to reach patient again in 1-2 business days.    Virginia Smith RN Care Coordinator  [facility]  Email: [email or web address]  Phone: [phone]

## 2023-06-20 NOTE — PROGRESS NOTES
Clinic Care Coordination Contact  [facility]/Voicemail    Referral Source: IP Handoff  Clinical Data: Care Coordinator Outreach  Outreach attempted x 2.  Unable to leave voicemail message. Voicemail prompt that the mailbox was full and unable to accept any new messages at this time.   Plan: Care Coordinator will send care coordination introduction letter with care coordinator contact information and explanation of care coordination services via mail. Care Coordinator will do no further outreaches at this time.    Virginia Smith RN Care Coordinator  [facility]  Email: [email or web address]  Phone: [phone]

## 2023-06-27 ENCOUNTER — OFFICE VISIT (OUTPATIENT)
Dept: FAMILY MEDICINE | Facility: CLINIC | Age: 88
End: 2023-06-27
Payer: MEDICARE

## 2023-06-27 VITALS
TEMPERATURE: 97.6 F | RESPIRATION RATE: 14 BRPM | HEIGHT: 70 IN | BODY MASS INDEX: 39.04 KG/M2 | SYSTOLIC BLOOD PRESSURE: 150 MMHG | WEIGHT: 272.7 LBS | DIASTOLIC BLOOD PRESSURE: 72 MMHG | HEART RATE: 56 BPM | OXYGEN SATURATION: 99 %

## 2023-06-27 DIAGNOSIS — R53.81 PHYSICAL DECONDITIONING: ICD-10-CM

## 2023-06-27 DIAGNOSIS — L71.1 RHINOPHYMA: ICD-10-CM

## 2023-06-27 DIAGNOSIS — Z87.19 HISTORY OF GI DIVERTICULAR BLEED: Primary | ICD-10-CM

## 2023-06-27 DIAGNOSIS — D62 ANEMIA DUE TO BLOOD LOSS, ACUTE: ICD-10-CM

## 2023-06-27 PROBLEM — K92.2 LOWER GI BLEED: Status: RESOLVED | Noted: 2023-05-25 | Resolved: 2023-06-27

## 2023-06-27 PROBLEM — K92.2 LOWER GI BLEEDING: Status: RESOLVED | Noted: 2023-06-02 | Resolved: 2023-06-27

## 2023-06-27 LAB
BASOPHILS # BLD AUTO: 0.1 10E3/UL (ref 0–0.2)
BASOPHILS NFR BLD AUTO: 1 %
EOSINOPHIL # BLD AUTO: 0.2 10E3/UL (ref 0–0.7)
EOSINOPHIL NFR BLD AUTO: 4 %
ERYTHROCYTE [DISTWIDTH] IN BLOOD BY AUTOMATED COUNT: 14.3 % (ref 10–15)
HCT VFR BLD AUTO: 28.8 % (ref 40–53)
HGB BLD-MCNC: 8.8 G/DL (ref 13.3–17.7)
IMM GRANULOCYTES # BLD: 0 10E3/UL
IMM GRANULOCYTES NFR BLD: 0 %
LYMPHOCYTES # BLD AUTO: 1.1 10E3/UL (ref 0.8–5.3)
LYMPHOCYTES NFR BLD AUTO: 20 %
MCH RBC QN AUTO: 27.5 PG (ref 26.5–33)
MCHC RBC AUTO-ENTMCNC: 30.6 G/DL (ref 31.5–36.5)
MCV RBC AUTO: 90 FL (ref 78–100)
MONOCYTES # BLD AUTO: 0.8 10E3/UL (ref 0–1.3)
MONOCYTES NFR BLD AUTO: 15 %
NEUTROPHILS # BLD AUTO: 3.2 10E3/UL (ref 1.6–8.3)
NEUTROPHILS NFR BLD AUTO: 60 %
PLATELET # BLD AUTO: 244 10E3/UL (ref 150–450)
RBC # BLD AUTO: 3.2 10E6/UL (ref 4.4–5.9)
WBC # BLD AUTO: 5.4 10E3/UL (ref 4–11)

## 2023-06-27 PROCEDURE — 99213 OFFICE O/P EST LOW 20 MIN: CPT | Performed by: FAMILY MEDICINE

## 2023-06-27 PROCEDURE — 36415 COLL VENOUS BLD VENIPUNCTURE: CPT | Performed by: FAMILY MEDICINE

## 2023-06-27 PROCEDURE — 85025 COMPLETE CBC W/AUTO DIFF WBC: CPT | Performed by: FAMILY MEDICINE

## 2023-06-27 NOTE — PROGRESS NOTES
Assessment & Plan   Problem List Items Addressed This Visit     Rhinophyma     Doxycycline has been stopped because of reduced iron absorption.  He will continue topical measures         Physical deconditioning     He has a cane         History of GI diverticular bleed - Primary     No further hematochezia.  Hospital records reviewed.  Previous history of same, he feels improved, feeling weak before he was hospitalized.         Relevant Orders    CBC with platelets and differential (Completed)    Anemia due to blood loss, acute     Discussed indications for transfusion.  Reassess current status.  He is on iron repletion             Discussed therapeutic options for recurrent diverticular bleed.  He has already had a partial colectomy.  Site of bleeding has not been identified.  Besides surgical interventions, transfusions as needed is another option.  He will consider       MED REC REQUIRED  Post Medication Reconciliation Status: discharge medications reconciled, continue medications without change    Doxycycline has been ended because of iron reduction effect.  Nonsteroidals have been stopped  Obi Strange MD  [facility]    Richi Quezada is a 87 year old, presenting for the following health issues:  Hospital F/U        6/27/2023     2:46 PM   Additional Questions   Roomed by Indy Ca     [facility]         6/19/2023     2:12 PM   Post Discharge Outreach   Admission Date 6/12/2023   Reason for Admission Melena/hematachezia   Likely recurrent diverticular GI bleed  Acute on chronic blood loss anemia, iron deficiency   Discharge Date 6/16/2023   Discharge Diagnosis Melena/hematachezia   Likely recurrent diverticular GI bleed  Acute on chronic blood loss anemia, iron deficiency   Discharge follow-up appointment scheduled within 14 calendar days?  Yes   Discharge Follow Up Appointment Date 6/27/2023   Discharge Follow Up Appointment Scheduled with? Primary Care Provider     [facility]  "Follow-up Visit:    Hospital/Nursing Home/IP Rehab Facility: [facility]  Date of Admission: 6/12/23  Date of Discharge: 6/16/23  Reason(s) for Admission: Discharge Diagnoses  Melena/hematachezia   Likely recurrent diverticular GI bleed  Acute on chronic blood loss anemia, iron deficiency     Was your hospitalization related to COVID-19? No   Problems taking medications regularly:  None  Medication changes since discharge: None  Problems adhering to non-medication therapy:  None    Summary of hospitalization:  [facility] discharge summary reviewed  Diagnostic Tests/Treatments reviewed.  Follow up needed: He needs hemoglobin  Other Healthcare Providers Involved in Patient s Care:         Specialist appointment - Appointment for endoscopic ultrasound in a few weeks  Update since discharge: improved.   Plan of care communicated with patient and family           Review of Systems   No pain no systemic symptoms no dizziness no falls no hematochezia      Objective    BP (!) 150/72 (BP Location: Right arm, Patient Position: Sitting, Cuff Size: Adult Large)   Pulse 56   Temp 97.6  F (36.4  C) (Oral)   Resp 14   Ht 1.778 m (5' 10\")   Wt 123.7 kg (272 lb 11.2 oz)   SpO2 99%   BMI 39.13 kg/m    Body mass index is 39.13 kg/m .  Physical Exam   No distress  Uses his arms to rise from a chair    Obi Strange MD                  "

## 2023-06-28 NOTE — ASSESSMENT & PLAN NOTE
Doxycycline has been stopped because of reduced iron absorption.  He will continue topical measures

## 2023-06-28 NOTE — ASSESSMENT & PLAN NOTE
No further hematochezia.  Hospital records reviewed.  Previous history of same, he feels improved, feeling weak before he was hospitalized.

## 2023-06-29 ENCOUNTER — TELEPHONE (OUTPATIENT)
Dept: FAMILY MEDICINE | Facility: CLINIC | Age: 88
End: 2023-06-29
Payer: MEDICARE

## 2023-06-29 NOTE — TELEPHONE ENCOUNTER
----- Message from Alice Hameed MD sent at 6/28/2023  8:16 PM CDT -----  Good!  You quit bleeding and your blood count is coming up.    Component      Latest Ref Rng 6/27/2023  3:39 PM   WBC      4.0 - 11.0 10e3/uL 5.4    RBC Count      4.40 - 5.90 10e6/uL 3.20 (L)    Hemoglobin      13.3 - 17.7 g/dL 8.8 (L)    Hematocrit      40.0 - 53.0 % 28.8 (L)    MCV      78 - 100 fL 90    MCH      26.5 - 33.0 pg 27.5    MCHC      31.5 - 36.5 g/dL 30.6 (L)    RDW      10.0 - 15.0 % 14.3    Platelet Count      150 - 450 10e3/uL 244    % Neutrophils      % 60    % Lymphocytes      % 20    % Monocytes      % 15    % Eosinophils      % 4    % Basophils      % 1    % Immature Granulocytes      % 0    Absolute Neutrophils      1.6 - 8.3 10e3/uL 3.2    Absolute Lymphocytes      0.8 - 5.3 10e3/uL 1.1    Absolute Monocytes      0.0 - 1.3 10e3/uL 0.8    Absolute Eosinophils      0.0 - 0.7 10e3/uL 0.2    Absolute Basophils      0.0 - 0.2 10e3/uL 0.1    Absolute Immature Granulocytes      <=0.4 10e3/uL 0.0         ALICE HAMEED

## 2023-06-29 NOTE — TELEPHONE ENCOUNTER
PAL attempted (attempt #1) to contact pt and wife, Alejandrina (CTC)  -Left  requesting call back [phone] to further discuss lab results, awaiting call back at this time    PAL will attempt to contact again tomorrow if no response    Katt ALMANZAR RN  Patient Advocate Liaison - PAL RN  [facility]  [phone]

## 2023-06-30 NOTE — TELEPHONE ENCOUNTER
PAL attempted (attempt #2) to contact pt and wife, Alejandrina (CTC)  -Left  requesting call back [phone] to further discuss lab results, awaiting call back at this time     PAL will attempt to contact again Monday 7/3 if no response     Katt ALMANZAR RN  Patient Advocate Liaison - PAL RN  [facility]  [phone]

## 2023-07-01 NOTE — TELEPHONE ENCOUNTER
Wife, Alejandrina, returning call - consent on file.     Relayed message from provider regarding lab results. All questions answered.     Fran Hassan RN on 7/1/2023 at 10:56 AM

## 2023-07-10 ENCOUNTER — OFFICE VISIT (OUTPATIENT)
Dept: FAMILY MEDICINE | Facility: CLINIC | Age: 88
End: 2023-07-10
Payer: MEDICARE

## 2023-07-10 VITALS
TEMPERATURE: 97.8 F | BODY MASS INDEX: 38.37 KG/M2 | SYSTOLIC BLOOD PRESSURE: 136 MMHG | HEIGHT: 70 IN | RESPIRATION RATE: 16 BRPM | WEIGHT: 268 LBS | HEART RATE: 56 BPM | OXYGEN SATURATION: 98 % | DIASTOLIC BLOOD PRESSURE: 72 MMHG

## 2023-07-10 DIAGNOSIS — Z01.818 PRE-OP EXAM: Primary | ICD-10-CM

## 2023-07-10 DIAGNOSIS — D50.9 IRON DEFICIENCY ANEMIA, UNSPECIFIED IRON DEFICIENCY ANEMIA TYPE: ICD-10-CM

## 2023-07-10 DIAGNOSIS — Z87.19 HISTORY OF GI DIVERTICULAR BLEED: ICD-10-CM

## 2023-07-10 DIAGNOSIS — D64.9 ANEMIA, UNSPECIFIED TYPE: ICD-10-CM

## 2023-07-10 LAB
ANION GAP SERPL CALCULATED.3IONS-SCNC: 11 MMOL/L (ref 7–15)
BASOPHILS # BLD AUTO: 0.1 10E3/UL (ref 0–0.2)
BASOPHILS NFR BLD AUTO: 1 %
BUN SERPL-MCNC: 12.6 MG/DL (ref 8–23)
CALCIUM SERPL-MCNC: 9.3 MG/DL (ref 8.8–10.2)
CHLORIDE SERPL-SCNC: 104 MMOL/L (ref 98–107)
CREAT SERPL-MCNC: 0.93 MG/DL (ref 0.67–1.17)
DEPRECATED HCO3 PLAS-SCNC: 26 MMOL/L (ref 22–29)
EOSINOPHIL # BLD AUTO: 0.3 10E3/UL (ref 0–0.7)
EOSINOPHIL NFR BLD AUTO: 5 %
ERYTHROCYTE [DISTWIDTH] IN BLOOD BY AUTOMATED COUNT: 14.5 % (ref 10–15)
GFR SERPL CREATININE-BSD FRML MDRD: 80 ML/MIN/1.73M2
GLUCOSE SERPL-MCNC: 116 MG/DL (ref 70–99)
HCT VFR BLD AUTO: 35.9 % (ref 40–53)
HGB BLD-MCNC: 10.9 G/DL (ref 13.3–17.7)
IMM GRANULOCYTES # BLD: 0 10E3/UL
IMM GRANULOCYTES NFR BLD: 0 %
LYMPHOCYTES # BLD AUTO: 1 10E3/UL (ref 0.8–5.3)
LYMPHOCYTES NFR BLD AUTO: 17 %
MCH RBC QN AUTO: 26.7 PG (ref 26.5–33)
MCHC RBC AUTO-ENTMCNC: 30.4 G/DL (ref 31.5–36.5)
MCV RBC AUTO: 88 FL (ref 78–100)
MONOCYTES # BLD AUTO: 0.8 10E3/UL (ref 0–1.3)
MONOCYTES NFR BLD AUTO: 13 %
NEUTROPHILS # BLD AUTO: 3.8 10E3/UL (ref 1.6–8.3)
NEUTROPHILS NFR BLD AUTO: 64 %
PLATELET # BLD AUTO: 250 10E3/UL (ref 150–450)
POTASSIUM SERPL-SCNC: 4.3 MMOL/L (ref 3.4–5.3)
RBC # BLD AUTO: 4.08 10E6/UL (ref 4.4–5.9)
SODIUM SERPL-SCNC: 141 MMOL/L (ref 136–145)
WBC # BLD AUTO: 5.9 10E3/UL (ref 4–11)

## 2023-07-10 PROCEDURE — 93000 ELECTROCARDIOGRAM COMPLETE: CPT | Performed by: FAMILY MEDICINE

## 2023-07-10 PROCEDURE — 85025 COMPLETE CBC W/AUTO DIFF WBC: CPT | Performed by: FAMILY MEDICINE

## 2023-07-10 PROCEDURE — 36415 COLL VENOUS BLD VENIPUNCTURE: CPT | Performed by: FAMILY MEDICINE

## 2023-07-10 PROCEDURE — 80048 BASIC METABOLIC PNL TOTAL CA: CPT | Performed by: FAMILY MEDICINE

## 2023-07-10 PROCEDURE — 99214 OFFICE O/P EST MOD 30 MIN: CPT | Performed by: FAMILY MEDICINE

## 2023-07-10 NOTE — LETTER
July 11, 2023      Damien Vallecillo  [address]  [place] [facility] [address]        Dear ,    We are writing to inform you of your test results.    Your hemoglobin is coming back up. All other labs are stable.     Resulted Orders   Basic metabolic panel  (Ca, Cl, CO2, Creat, Gluc, K, Na, BUN)   Result Value Ref Range    Sodium 141 136 - 145 mmol/L    Potassium 4.3 3.4 - 5.3 mmol/L    Chloride 104 98 - 107 mmol/L    Carbon Dioxide (CO2) 26 22 - 29 mmol/L    Anion Gap 11 7 - 15 mmol/L    Urea Nitrogen 12.6 8.0 - 23.0 mg/dL    Creatinine 0.93 0.67 - 1.17 mg/dL    Calcium 9.3 8.8 - 10.2 mg/dL    Glucose 116 (H) 70 - 99 mg/dL    GFR Estimate 80 >60 mL/min/1.73m2   CBC with platelets and differential   Result Value Ref Range    WBC Count 5.9 4.0 - 11.0 10e3/uL    RBC Count 4.08 (L) 4.40 - 5.90 10e6/uL    Hemoglobin 10.9 (L) 13.3 - 17.7 g/dL    Hematocrit 35.9 (L) 40.0 - 53.0 %    MCV 88 78 - 100 fL    MCH 26.7 26.5 - 33.0 pg    MCHC 30.4 (L) 31.5 - 36.5 g/dL    RDW 14.5 10.0 - 15.0 %    Platelet Count 250 150 - 450 10e3/uL    % Neutrophils 64 %    % Lymphocytes 17 %    % Monocytes 13 %    % Eosinophils 5 %    % Basophils 1 %    % Immature Granulocytes 0 %    Absolute Neutrophils 3.8 1.6 - 8.3 10e3/uL    Absolute Lymphocytes 1.0 0.8 - 5.3 10e3/uL    Absolute Monocytes 0.8 0.0 - 1.3 10e3/uL    Absolute Eosinophils 0.3 0.0 - 0.7 10e3/uL    Absolute Basophils 0.1 0.0 - 0.2 10e3/uL    Absolute Immature Granulocytes 0.0 <=0.4 10e3/uL       If you have any questions or concerns, please call the clinic at the number listed above.       Sincerely,      Obi Strange MD

## 2023-07-10 NOTE — PROGRESS NOTES
[facility]  [id number] [place] [id number]  Phone: [phone]  Primary Provider: Alice Hameed  Pre-op Performing Provider: ALICE HAMEED      PREOPERATIVE EVALUATION:  Today's date: 7/10/2023    Damien Vallecillo is a 87 year old male who presents for a preoperative evaluation.      7/10/2023    12:44 PM   Additional Questions   Roomed by Radha LAYTON     Surgical Information:  Surgery/Procedure: Gastro Sugery  Surgery Location: [facility]   Surgeon: Dr. Kae Mccall  Surgery Date: 07/13/2023  Time of Surgery: 1:00pm  Where patient plans to recover: At home with family  Fax number for surgical facility: Note does not need to be faxed, will be available electronically in Epic.    Assessment & Plan     The proposed surgical procedure is considered LOW risk.    Problem List Items Addressed This Visit     Fe deficiency anemia     Hgb rising at last measure         History of GI diverticular bleed     No further blood per rectum         Pre-op exam - Primary     No contraindications to proposed procedure         Relevant Orders    EKG 12-lead complete w/read - Clinics (Completed)    EKG 12-lead complete w/read - Clinics (Completed)    Basic metabolic panel  (Ca, Cl, CO2, Creat, Gluc, K, Na, BUN)    CBC with platelets and differential       MED REC REQUIRED  Post Medication Reconciliation Status: discharge medications reconciled, continue medications without change      Risks and Recommendations:  The patient has the following additional risks and recommendations for perioperative complications:      Antiplatelet or Anticoagulation Medication Instructions:   - Patient is on no antiplatelet or anticoagulation medications.    Additional Medication Instructions:  Patient is to take all scheduled medications on the day of surgery EXCEPT for modifications listed below:  No tamsulosin, furosemide AM of surgery  RECOMMENDATION:  APPROVAL GIVEN to proceed  with proposed procedure, without further diagnostic evaluation.            Subjective       HPI related to upcoming procedure: Proposed endoscopic US with possible needle biopsy      Health Care Directive:  Patient has a Health Care Directive on file      Preoperative Review of :   reviewed - no record of controlled substances prescribed.          Review of Systems  CONSTITUTIONAL: NEGATIVE for fever, chills, change in weight  ENT/MOUTH: NEGATIVE for ear, mouth and throat problems  RESP: NEGATIVE for significant cough or SOB  CV: NEGATIVE for chest pain, palpitations or peripheral edema  HEME/ALLERGY/IMMUNE: NEGATIVE for bleeding problems    Patient Active Problem List    Diagnosis Date Noted     Pre-op exam 07/10/2023     Priority: Medium     Fe deficiency anemia 07/10/2023     Priority: Medium     Hematochezia 06/12/2023     Priority: Medium     History of GI diverticular bleed 06/12/2023     Priority: Medium     Midline low back pain without sciatica, unspecified chronicity 06/02/2023     Priority: Medium     High priority for 2019-nCoV vaccine 06/02/2023     Priority: Medium     Anemia due to blood loss, acute 05/25/2023     Priority: Medium     RENAE (obstructive sleep apnea) 03/08/2023     Priority: Medium     Fall 03/08/2023     Priority: Medium     Physical deconditioning 03/08/2023     Priority: Medium     Hypersomnia 03/07/2023     Priority: Medium     Encounter for immunization 09/21/2022     Priority: Medium     External hemorrhoids 09/21/2022     Priority: Medium     Benign prostatic hyperplasia with urinary frequency 03/29/2022     Priority: Medium     Hematuria, unspecified type 09/16/2021     Priority: Medium     Special screening for malignant neoplasm of prostate 09/16/2021     Priority: Medium     Falls frequently 09/16/2021     Priority: Medium     Cognitive attention deficit 09/16/2021     Priority: Medium     Fecal incontinence 09/16/2020     Priority: Medium     Dislocation of right  patella 09/16/2020     Priority: Medium     Hyperlipidemia LDL goal <160 11/13/2019     Priority: Medium     Elevated serum creatinine 11/13/2019     Priority: Medium     GFR Estimate   Date Value Ref Range Status   09/30/2019 84 >60 mL/min/[1.73_m2] Final     Comment:     Non  GFR Calc  Starting 12/18/2018, serum creatinine based estimated GFR (eGFR) will be   calculated using the Chronic Kidney Disease Epidemiology Collaboration   (CKD-EPI) equation.                Peripheral polyneuropathy 09/30/2019     Priority: Medium     Rosacea 09/28/2018     Priority: Medium     Cheilosis 09/28/2018     Priority: Medium     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     Priority: Medium     Pedal edema 01/18/2018     Priority: Medium     History of arthroplasty of right knee 08/17/2017     Priority: Medium     S/P total knee arthroplasty 06/19/2017     Priority: Medium     Right knee pain 05/08/2017     Priority: Medium     Left knee pain, unspecified chronicity 05/08/2017     Priority: Medium     RBC microcytosis 02/14/2017     Priority: Medium     Prediabetes 10/21/2016     Priority: Medium     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) 10/21/2016     Priority: Medium     Last 2011       Morbid obesity due to excess calories (H) 10/21/2016     Priority: Medium     Hammer toe of left foot 10/21/2016     Priority: Medium     Venous stasis dermatitis of both lower extremities 10/21/2016     Priority: Medium     ACP (advance care planning) 03/14/2012     Priority: Medium     ordered today.       Gout      Priority: Medium     Problem list name updated by automated process. Provider to review        Past Medical History:   Diagnosis Date     Acute suppurative arthritis (H)      Acute, but ill-defined, cerebrovascular disease      Anemia due to blood loss, acute 5/25/2023     Ankle fracture 12/28/2018     Arthritis      Benign prostatic hyperplasia with urinary frequency 3/29/2022     Cheilosis 9/28/2018     Chronic  "headaches      Closed right ankle fracture 12/29/2018     Cognitive attention deficit 9/16/2021     Diabetes (H)     \"Pre-diabetes\"     Dislocation of right patella 9/16/2020     Elevated serum creatinine 11/13/2019    GFR Estimate Date Value Ref Range Status 09/30/2019 84 >60 mL/min/[1.73_m2] Final   Comment:   Non  GFR Calc Starting 12/18/2018, serum creatinine based estimated GFR (eGFR) will be  calculated using the Chronic Kidney Disease Epidemiology Collaboration  (CKD-EPI) equation.         Encounter for immunization 9/21/2022     External hemorrhoids 9/21/2022     Fall 3/8/2023     Fe deficiency anemia 7/10/2023     Fecal incontinence 9/16/2020     Glucose intolerance (impaired glucose tolerance) 5/31/2013     Gout, unspecified      Grieving 9/16/2021     Hammer toe of left foot 10/21/2016     Hematuria      History of arthroplasty of right knee 08/17/2017     History of blood transfusion      Hyperlipidemia LDL goal <160 11/13/2019     Hypersomnia 3/7/2023     Left foot pain 9/30/2019     Left knee pain, unspecified chronicity 5/8/2017     Lentigo maligna (H)      Malignant neoplasm of rectosigmoid junction (H)      Morbid obesity due to excess calories (H) 10/21/2016     RENAE (obstructive sleep apnea) 3/8/2023     Other convulsions     last seizure 7-8 years ago...not certain if these were true seizres or not..     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     Right knee pain, unspecified chronicity 5/8/2017     Rosacea 9/28/2018     Syncope      Tubulovillous adenoma of colon      Venous stasis dermatitis of both lower extremities 10/21/2016     Past Surgical History:   Procedure Laterality Date     ARTHROPLASTY KNEE Right 6/19/2017    Procedure: ARTHROPLASTY KNEE;  Right total knee arthroplasty, Hammer toe repair toe 2,3,4,5 left foot with osteotomy;  " Surgeon: Alec Raymond MD;  Location:  OR     COLONOSCOPY N/A 6/23/2015    Procedure: COMBINED COLONOSCOPY, SINGLE OR MULTIPLE BIOPSY/POLYPECTOMY BY BIOPSY;  Surgeon: Kimberly Alfaro MD;  Location:  GI     COLONOSCOPY N/A 7/30/2015    Procedure: COLONOSCOPY;  Surgeon: Enrique Salazar MD;  Location:  OR     COLONOSCOPY N/A 7/31/2018    Procedure: COLONOSCOPY;  Colonoscopy;  Surgeon: Tiffany Cheema MD;  Location:  GI     COLONOSCOPY N/A 5/26/2023    Procedure: Colonoscopy;  Surgeon: Juan Grimaldo MD;  Location:  GI     COLONOSCOPY N/A 6/15/2023    Procedure: colonoscopy;   no cecum as pt has had partial colectomy;  Surgeon: Sherif Escalona MD;  Location:  GI     ESOPHAGOSCOPY, GASTROSCOPY, DUODENOSCOPY (EGD), COMBINED N/A 5/26/2023    Procedure: Esophagoscopy, gastroscopy, duodenoscopy (EGD), combined;  Surgeon: Juan Grimaldo MD;  Location:  GI     HERNIORRHAPHY EPIGASTRIC  4/27/2012    Procedure:HERNIORRHAPHY EPIGASTRIC; Epigastric Hernia Repair with mesh ; Surgeon:BOLIVAR OLIVEIRA; Location: OR     LAPAROSCOPIC ASSISTED COLECTOMY Right 7/30/2015    Procedure: LAPAROSCOPIC ASSISTED COLECTOMY;  Surgeon: Enrique Salazar MD;  Location:  OR     ORTHOPEDIC SURGERY      lt knee arthroplasty     REALIGN PATELLA Right 10/9/2017    Procedure: REALIGN PATELLA;  Revision right total knee arthroplasty;  Surgeon: Alec Raymond MD;  Location:  OR     REPAIR HAMMER TOE Left 6/19/2017    Procedure: REPAIR HAMMER TOE;  Hammer toe repair toe 2,3,4,5 left foot with osteotomy   ;  Surgeon: Beverly Kim DPM, Podiatry/Foot and Ankle Surgery;  Location:  OR     SIGMOIDOSCOPY FLEXIBLE  5/29/2013    Procedure: SIGMOIDOSCOPY FLEXIBLE;  SIGMOIDOSCOPY FLEXIBLE (FV) Needs blood sugar ck'd;  Surgeon: Enrique Salazar MD;  Location:  GI     skin cancer excision       ZZC NONSPECIFIC PROCEDURE      right heel surgery; spur removed.     Current Outpatient Medications    Medication Sig Dispense Refill     acetaminophen (TYLENOL) 500 MG tablet Take 500-1,000 mg by mouth every 6 hours as needed for mild pain       atorvastatin (LIPITOR) 20 MG tablet Take 20 mg by mouth every evening       CINNAMON PO Take 1 tablet by mouth daily        diclofenac (VOLTAREN) 1 % topical gel Apply 2 g topically 2 times daily       ferrous sulfate (FEROSUL) 325 (65 Fe) MG tablet Take 1 tablet (325 mg) by mouth daily (with breakfast)       fish oil-omega-3 fatty acids 1000 MG capsule Take 1 g by mouth daily       Flaxseed, Linseed, (FLAXSEED OIL) 1000 MG CAPS Take 1,000 mg by mouth daily       furosemide (LASIX) 20 MG tablet Take 1 tablet (20 mg) by mouth 2 times daily 180 tablet 1     glucosamine-chondroitin 500-400 MG CAPS per capsule Take 1 capsule by mouth daily       metroNIDAZOLE (METROGEL) 0.75 % external gel APPLY THIN LAYER TO FACE TWICE A DAY FOR ROSACEA       Multiple Vitamins-Minerals (MULTIVITAMIN ADULT PO) Take 1 tablet by mouth daily Reported on 5/12/2017       order for DME 1: Gradient Compression Wraps; 2: Cast Boots; 3: BLE knee or thigh high 20-30 mm Hg compression stocking; 4: BLE knee or thigh high custom compression stocking; 5: Alternative BLE knee high or thigh compression garments (velcro/buckling) 1 each 0     order for DME Equipment being ordered: Walker Wheels () and Walker ()  Treatment Diagnosis: Impaired functional mobility 1 each 0     pramox-pe-glycerin-petrolatum (PREPARATION H) 1-0.25-14.4-15 % CREA cream Place rectally 4 times daily as needed for hemorrhoids Use as directed       tamsulosin (FLOMAX) 0.4 MG capsule Take 1 capsule (0.4 mg) by mouth daily 90 capsule 3       Allergies   Allergen Reactions     Levetiracetam [Keppra] Rash     Oxcarbazepine [Oxcarbazepine] Rash        Social History     Tobacco Use     Smoking status: Never     Smokeless tobacco: Never   Substance Use Topics     Alcohol use: Yes     Alcohol/week: 0.0 standard drinks of alcohol      "Comment: Occas       History   Drug Use No         Objective     /72 (BP Location: Right arm, Patient Position: Sitting, Cuff Size: Adult Regular)   Pulse 56   Temp 97.8  F (36.6  C) (Oral)   Resp 16   Ht 1.778 m (5' 10\")   Wt 121.6 kg (268 lb)   SpO2 98%   BMI 38.45 kg/m      Physical Exam  Constitutional:       Appearance: Normal appearance.   HENT:      Head: Atraumatic.      Right Ear: Tympanic membrane normal.      Left Ear: Tympanic membrane normal.      Nose: Nose normal.      Mouth/Throat:      Mouth: Mucous membranes are moist.   Eyes:      Pupils: Pupils are equal, round, and reactive to light.   Cardiovascular:      Rate and Rhythm: Normal rate and regular rhythm.      Heart sounds: Murmur heard.      Comments: C/w known tricuspid regurg  Pulmonary:      Effort: Pulmonary effort is normal.      Breath sounds: Normal breath sounds.   Abdominal:      General: Bowel sounds are normal.      Palpations: Abdomen is soft.   Musculoskeletal:      Cervical back: Neck supple.      Right lower leg: Edema present.      Left lower leg: Edema present.   Skin:     General: Skin is warm and dry.   Neurological:      General: No focal deficit present.      Mental Status: He is alert.   Psychiatric:         Mood and Affect: Mood normal.           Recent Labs   Lab Test 06/27/23  1539 06/16/23  1355 06/14/23  2201 06/14/23  0640 06/13/23  1200 06/13/23  0759 05/25/23  1624 05/25/23  1038 03/07/23  1557 09/21/22  1207   HGB 8.8* 7.9*   < > 7.8*   < > 7.5*   < > 9.4*  --   --      --   --  193  --  193   < > 177  --   --    INR  --   --   --   --   --   --   --  1.24*  --   --    NA  --   --   --  141  --  140   < > 140   < > 139   POTASSIUM  --   --   --  4.0  --  4.2   < > 3.8   < > 4.1   CR  --   --   --  0.87  --  0.79   < > 0.89   < > 0.83   A1C  --   --   --   --   --   --   --  6.7*  --  6.5*    < > = values in this interval not displayed.        Diagnostics:  Labs pending at this time.  Results " will be reviewed when available.   EKG: appears normal, NSR, normal axis, normal intervals, no acute ST/T changes c/w ischemia, no LVH by voltage criteria, unchanged from previous tracings    Revised Cardiac Risk Index (RCRI):  The patient has the following serious cardiovascular risks for perioperative complications:   - No serious cardiac risks = 0 points     RCRI Interpretation: 0 points: Class I (very low risk - 0.4% complication rate)           Signed Electronically by: Obi Strange MD  Copy of this evaluation report is provided to requesting physician.    Obi Strange MD

## 2023-07-13 ENCOUNTER — ANESTHESIA EVENT (OUTPATIENT)
Dept: SURGERY | Facility: CLINIC | Age: 88
End: 2023-07-13
Payer: MEDICARE

## 2023-07-13 ENCOUNTER — HOSPITAL ENCOUNTER (OUTPATIENT)
Facility: CLINIC | Age: 88
Discharge: HOME | End: 2023-07-13
Attending: INTERNAL MEDICINE | Admitting: INTERNAL MEDICINE
Payer: MEDICARE

## 2023-07-13 ENCOUNTER — ANESTHESIA (OUTPATIENT)
Dept: SURGERY | Facility: CLINIC | Age: 88
End: 2023-07-13
Payer: MEDICARE

## 2023-07-13 VITALS
RESPIRATION RATE: 16 BRPM | SYSTOLIC BLOOD PRESSURE: 146 MMHG | TEMPERATURE: 97.4 F | OXYGEN SATURATION: 100 % | DIASTOLIC BLOOD PRESSURE: 84 MMHG | HEART RATE: 67 BPM

## 2023-07-13 LAB
GLUCOSE BLDC GLUCOMTR-MCNC: 123 MG/DL (ref 70–99)
UPPER EUS: NORMAL

## 2023-07-13 PROCEDURE — 710N000012 HC RECOVERY PHASE 2, PER MINUTE: Performed by: INTERNAL MEDICINE

## 2023-07-13 PROCEDURE — 272N000002 HC OR SUPPLY OTHER OPNP: Performed by: INTERNAL MEDICINE

## 2023-07-13 PROCEDURE — 999N000141 HC STATISTIC PRE-PROCEDURE NURSING ASSESSMENT: Performed by: INTERNAL MEDICINE

## 2023-07-13 PROCEDURE — 370N000017 HC ANESTHESIA TECHNICAL FEE, PER MIN: Performed by: INTERNAL MEDICINE

## 2023-07-13 PROCEDURE — 360N000076 HC SURGERY LEVEL 3, PER MIN: Performed by: INTERNAL MEDICINE

## 2023-07-13 PROCEDURE — 88305 TISSUE EXAM BY PATHOLOGIST: CPT | Mod: TC | Performed by: INTERNAL MEDICINE

## 2023-07-13 PROCEDURE — 250N000011 HC RX IP 250 OP 636

## 2023-07-13 PROCEDURE — 272N000001 HC OR GENERAL SUPPLY STERILE: Performed by: INTERNAL MEDICINE

## 2023-07-13 PROCEDURE — 258N000003 HC RX IP 258 OP 636: Performed by: ANESTHESIOLOGY

## 2023-07-13 PROCEDURE — 250N000009 HC RX 250

## 2023-07-13 PROCEDURE — 82962 GLUCOSE BLOOD TEST: CPT

## 2023-07-13 RX ORDER — NALOXONE HYDROCHLORIDE 0.4 MG/ML
0.2 INJECTION, SOLUTION INTRAMUSCULAR; INTRAVENOUS; SUBCUTANEOUS
Status: DISCONTINUED | OUTPATIENT
Start: 2023-07-13 | End: 2023-07-13 | Stop reason: HOSPADM

## 2023-07-13 RX ORDER — NALOXONE HYDROCHLORIDE 0.4 MG/ML
0.4 INJECTION, SOLUTION INTRAMUSCULAR; INTRAVENOUS; SUBCUTANEOUS
Status: DISCONTINUED | OUTPATIENT
Start: 2023-07-13 | End: 2023-07-13 | Stop reason: HOSPADM

## 2023-07-13 RX ORDER — LIDOCAINE 40 MG/G
CREAM TOPICAL
Status: DISCONTINUED | OUTPATIENT
Start: 2023-07-13 | End: 2023-07-13 | Stop reason: HOSPADM

## 2023-07-13 RX ORDER — LIDOCAINE HYDROCHLORIDE 20 MG/ML
INJECTION, SOLUTION INFILTRATION; PERINEURAL PRN
Status: DISCONTINUED | OUTPATIENT
Start: 2023-07-13 | End: 2023-07-13

## 2023-07-13 RX ORDER — ONDANSETRON 4 MG/1
4 TABLET, ORALLY DISINTEGRATING ORAL EVERY 30 MIN PRN
Status: DISCONTINUED | OUTPATIENT
Start: 2023-07-13 | End: 2023-07-13 | Stop reason: HOSPADM

## 2023-07-13 RX ORDER — PROPOFOL 10 MG/ML
INJECTION, EMULSION INTRAVENOUS PRN
Status: DISCONTINUED | OUTPATIENT
Start: 2023-07-13 | End: 2023-07-13

## 2023-07-13 RX ORDER — SODIUM CHLORIDE, SODIUM LACTATE, POTASSIUM CHLORIDE, CALCIUM CHLORIDE 600; 310; 30; 20 MG/100ML; MG/100ML; MG/100ML; MG/100ML
INJECTION, SOLUTION INTRAVENOUS CONTINUOUS
Status: DISCONTINUED | OUTPATIENT
Start: 2023-07-13 | End: 2023-07-13 | Stop reason: HOSPADM

## 2023-07-13 RX ORDER — OXYCODONE HYDROCHLORIDE 5 MG/1
10 TABLET ORAL
Status: DISCONTINUED | OUTPATIENT
Start: 2023-07-13 | End: 2023-07-13 | Stop reason: HOSPADM

## 2023-07-13 RX ORDER — OXYCODONE HYDROCHLORIDE 5 MG/1
5 TABLET ORAL
Status: DISCONTINUED | OUTPATIENT
Start: 2023-07-13 | End: 2023-07-13 | Stop reason: HOSPADM

## 2023-07-13 RX ORDER — ONDANSETRON 2 MG/ML
INJECTION INTRAMUSCULAR; INTRAVENOUS PRN
Status: DISCONTINUED | OUTPATIENT
Start: 2023-07-13 | End: 2023-07-13

## 2023-07-13 RX ORDER — ONDANSETRON 2 MG/ML
4 INJECTION INTRAMUSCULAR; INTRAVENOUS EVERY 6 HOURS PRN
Status: DISCONTINUED | OUTPATIENT
Start: 2023-07-13 | End: 2023-07-13 | Stop reason: HOSPADM

## 2023-07-13 RX ORDER — KETAMINE HYDROCHLORIDE 10 MG/ML
INJECTION INTRAMUSCULAR; INTRAVENOUS PRN
Status: DISCONTINUED | OUTPATIENT
Start: 2023-07-13 | End: 2023-07-13

## 2023-07-13 RX ORDER — GLYCOPYRROLATE 0.2 MG/ML
INJECTION, SOLUTION INTRAMUSCULAR; INTRAVENOUS PRN
Status: DISCONTINUED | OUTPATIENT
Start: 2023-07-13 | End: 2023-07-13

## 2023-07-13 RX ORDER — ONDANSETRON 2 MG/ML
4 INJECTION INTRAMUSCULAR; INTRAVENOUS EVERY 30 MIN PRN
Status: DISCONTINUED | OUTPATIENT
Start: 2023-07-13 | End: 2023-07-13 | Stop reason: HOSPADM

## 2023-07-13 RX ORDER — FLUMAZENIL 0.1 MG/ML
0.2 INJECTION, SOLUTION INTRAVENOUS
Status: DISCONTINUED | OUTPATIENT
Start: 2023-07-13 | End: 2023-07-13 | Stop reason: HOSPADM

## 2023-07-13 RX ORDER — ONDANSETRON 4 MG/1
4 TABLET, ORALLY DISINTEGRATING ORAL EVERY 6 HOURS PRN
Status: DISCONTINUED | OUTPATIENT
Start: 2023-07-13 | End: 2023-07-13 | Stop reason: HOSPADM

## 2023-07-13 RX ORDER — PROPOFOL 10 MG/ML
INJECTION, EMULSION INTRAVENOUS CONTINUOUS PRN
Status: DISCONTINUED | OUTPATIENT
Start: 2023-07-13 | End: 2023-07-13

## 2023-07-13 RX ADMIN — PROPOFOL 150 MCG/KG/MIN: 10 INJECTION, EMULSION INTRAVENOUS at 12:56

## 2023-07-13 RX ADMIN — PROPOFOL 20 MG: 10 INJECTION, EMULSION INTRAVENOUS at 12:56

## 2023-07-13 RX ADMIN — LIDOCAINE HYDROCHLORIDE 50 MG: 20 INJECTION, SOLUTION INFILTRATION; PERINEURAL at 12:56

## 2023-07-13 RX ADMIN — Medication 10 MG: at 13:04

## 2023-07-13 RX ADMIN — ONDANSETRON 4 MG: 2 INJECTION INTRAMUSCULAR; INTRAVENOUS at 12:56

## 2023-07-13 RX ADMIN — GLYCOPYRROLATE 0.2 MG: 0.2 INJECTION, SOLUTION INTRAMUSCULAR; INTRAVENOUS at 12:56

## 2023-07-13 RX ADMIN — SODIUM CHLORIDE, POTASSIUM CHLORIDE, SODIUM LACTATE AND CALCIUM CHLORIDE: 600; 310; 30; 20 INJECTION, SOLUTION INTRAVENOUS at 12:29

## 2023-07-13 ASSESSMENT — ACTIVITIES OF DAILY LIVING (ADL)
ADLS_ACUITY_SCORE: 37
ADLS_ACUITY_SCORE: 33

## 2023-07-13 ASSESSMENT — ENCOUNTER SYMPTOMS
SEIZURES: 1
DYSRHYTHMIAS: 0

## 2023-07-13 NOTE — ANESTHESIA POSTPROCEDURE EVALUATION
Patient: Damien Vallecillo    Procedure: Procedure(s):  Endoscopic ultrasound upper gastrointestinal tract (GI) with fine needle aspiration       Anesthesia Type:  MAC    Note:  Disposition: Outpatient   Postop Pain Control: Uneventful            Sign Out: Well controlled pain   PONV: No   Neuro/Psych: Uneventful            Sign Out: Acceptable/Baseline neuro status   Airway/Respiratory: Uneventful            Sign Out: Acceptable/Baseline resp. status   CV/Hemodynamics: Uneventful            Sign Out: Acceptable CV status; No obvious hypovolemia; No obvious fluid overload   Other NRE:    DID A NON-ROUTINE EVENT OCCUR?            Last vitals:  Vitals Value Taken Time   /84 07/13/23 1445   Temp 97.4  F (36.3  C) 07/13/23 1445   Pulse 67 07/13/23 1445   Resp 16 07/13/23 1445   SpO2 100 % 07/13/23 1449   Vitals shown include unvalidated device data.    Electronically Signed By: Ranulfo Sanchez MD  July 13, 2023  3:32 PM

## 2023-07-13 NOTE — ANESTHESIA PREPROCEDURE EVALUATION
"Anesthesia Pre-Procedure Evaluation    Patient: Damien Vallecillo   MRN: [id number] : 1935        Procedure : Procedure(s):  Endoscopic ultrasound upper gastrointestinal tract (GI) with fine needle aspiration          Past Medical History:   Diagnosis Date     Acute suppurative arthritis (H)      Acute, but ill-defined, cerebrovascular disease      Anemia due to blood loss, acute 2023     Ankle fracture 2018     Arthritis      Benign prostatic hyperplasia with urinary frequency 3/29/2022     Cheilosis 2018     Chronic headaches      Closed right ankle fracture 2018     Cognitive attention deficit 2021     Diabetes (H)     \"Pre-diabetes\"     Dislocation of right patella 2020     Elevated serum creatinine 2019    GFR Estimate Date Value Ref Range Status 2019 84 >60 mL/min/[1.73_m2] Final   Comment:   Non  GFR Calc Starting 2018, serum creatinine based estimated GFR (eGFR) will be  calculated using the Chronic Kidney Disease Epidemiology Collaboration  (CKD-EPI) equation.         Encounter for immunization 2022     External hemorrhoids 2022     Fall 3/8/2023     Fe deficiency anemia 7/10/2023     Fecal incontinence 2020     Glucose intolerance (impaired glucose tolerance) 2013     Gout, unspecified      Grieving 2021     Hammer toe of left foot 10/21/2016     Hematuria      History of arthroplasty of right knee 2017     History of blood transfusion      Hyperlipidemia LDL goal <160 2019     Hypersomnia 3/7/2023     Left foot pain 2019     Left knee pain, unspecified chronicity 2017     Lentigo maligna (H)      Malignant neoplasm of rectosigmoid junction (H)      Morbid obesity due to excess calories (H) 10/21/2016     RENAE (obstructive sleep apnea) 3/8/2023     Other convulsions     last seizure 7-8 years ago...not certain if these were true seizres or not..     Pedal edema 2018     Peripheral " polyneuropathy 9/30/2019     Physical deconditioning 3/8/2023     Pre-diabetes 10/21/2016     RBC microcytosis 2/14/2017     Rhinophyma 9/28/2018     Right knee pain, unspecified chronicity 5/8/2017     Rosacea 9/28/2018     Syncope      Tubulovillous adenoma of colon      Venous stasis dermatitis of both lower extremities 10/21/2016      Past Surgical History:   Procedure Laterality Date     ARTHROPLASTY KNEE Right 6/19/2017    Procedure: ARTHROPLASTY KNEE;  Right total knee arthroplasty, Hammer toe repair toe 2,3,4,5 left foot with osteotomy;  Surgeon: Alec Raymond MD;  Location:  OR     COLONOSCOPY N/A 6/23/2015    Procedure: COMBINED COLONOSCOPY, SINGLE OR MULTIPLE BIOPSY/POLYPECTOMY BY BIOPSY;  Surgeon: Kimberly Alfaro MD;  Location:  GI     COLONOSCOPY N/A 7/30/2015    Procedure: COLONOSCOPY;  Surgeon: Enrique Salazar MD;  Location:  OR     COLONOSCOPY N/A 7/31/2018    Procedure: COLONOSCOPY;  Colonoscopy;  Surgeon: Tiffany Cheema MD;  Location:  GI     COLONOSCOPY N/A 5/26/2023    Procedure: Colonoscopy;  Surgeon: Juan Grimaldo MD;  Location:  GI     COLONOSCOPY N/A 6/15/2023    Procedure: colonoscopy;   no cecum as pt has had partial colectomy;  Surgeon: Sherif Escalona MD;  Location:  GI     ESOPHAGOSCOPY, GASTROSCOPY, DUODENOSCOPY (EGD), COMBINED N/A 5/26/2023    Procedure: Esophagoscopy, gastroscopy, duodenoscopy (EGD), combined;  Surgeon: Juan Grimaldo MD;  Location:  GI     HERNIORRHAPHY EPIGASTRIC  4/27/2012    Procedure:HERNIORRHAPHY EPIGASTRIC; Epigastric Hernia Repair with mesh ; Surgeon:BOLIVAR OLIVEIRA; Location: OR     LAPAROSCOPIC ASSISTED COLECTOMY Right 7/30/2015    Procedure: LAPAROSCOPIC ASSISTED COLECTOMY;  Surgeon: Enrique Salazar MD;  Location:  OR     ORTHOPEDIC SURGERY      lt knee arthroplasty     REALIGN PATELLA Right 10/9/2017    Procedure: REALIGN PATELLA;  Revision right total knee arthroplasty;  Surgeon: Mandi  Alec Andrade MD;  Location: [facility] OR     REPAIR HAMMER TOE Left 6/19/2017    Procedure: REPAIR HAMMER TOE;  Hammer toe repair toe 2,3,4,5 left foot with osteotomy   ;  Surgeon: Beverly Kim DPM, Podiatry/Foot and Ankle Surgery;  Location: [facility] OR     SIGMOIDOSCOPY FLEXIBLE  5/29/2013    Procedure: SIGMOIDOSCOPY FLEXIBLE;  SIGMOIDOSCOPY FLEXIBLE (FV) Needs blood sugar ck'd;  Surgeon: Enrique Salazar MD;  Location:  GI     skin cancer excision       ZZC NONSPECIFIC PROCEDURE      right heel surgery; spur removed.      Allergies   Allergen Reactions     Levetiracetam [Keppra] Rash     Oxcarbazepine [Oxcarbazepine] Rash      Social History     Tobacco Use     Smoking status: Never     Smokeless tobacco: Never   Substance Use Topics     Alcohol use: Yes     Alcohol/week: 0.0 standard drinks of alcohol     Comment: Occas      Wt Readings from Last 1 Encounters:   07/10/23 121.6 kg (268 lb)        Anesthesia Evaluation   Pt has had prior anesthetic. Type: General and MAC.    No history of anesthetic complications       ROS/MED HX  ENT/Pulmonary:     (+) sleep apnea,     Neurologic:     (+) dementia (Mild), peripheral neuropathy, seizures,     Cardiovascular:     (+) Dyslipidemia -----fainting (syncope).  (-) hypertension and arrhythmias   METS/Exercise Tolerance:     Hematologic:     (+) anemia,     Musculoskeletal:       GI/Hepatic:    (-) GERD   Renal/Genitourinary:     (+) renal disease, type: CRI, BPH,     Endo:     (+) type II DM, Obesity,     Psychiatric/Substance Use:       Infectious Disease:       Malignancy:       Other:            Physical Exam    Airway        Mallampati: II   TM distance: > 3 FB   Neck ROM: full   Mouth opening: > 3 cm    Respiratory Devices and Support         Dental           Cardiovascular   cardiovascular exam normal          Pulmonary   pulmonary exam normal            Other findings: Lab Test        07/10/23     06/27/23     06/16/23     06/14/23     06/14/23     05/25/23     05/25/23      04/04/19     12/28/18     02/13/17     08/10/15                       1327          1539          1355          2201          0640          1624          6097          0979          1657          1115          0831          WBC          5.9          5.4           --           --          4.8            < >        8.6            < >        9.0            < >         --           HGB          10.9*        8.8*         7.9*           < >        7.8*           < >        9.4*           < >        12.4*          < >         --           MCV          88           90            --           --          96             < >        92             < >        88             < >         --           PLT          250          244           --           --          193            < >        177            < >        217            < >         --           INR           --           --           --           --           --           --          1.24*         --          1.19*         --          1.15*          < > = values in this interval not displayed.                  Lab Test        07/13/23     07/10/23     06/16/23     06/14/23     06/14/23     06/13/23     06/13/23                       1121          1327          0225          0840          0640          1135          7679          NA            --          141           --           --          141           --          140           POTASSIUM     --          4.3           --           --          4.0           --          4.2           CHLORIDE      --          104           --           --          107           --          108*          CO2           --          26            --           --          24            --          25            BUN           --          12.6          --           --          11.7          --          10.8          CR            --          0.93          --           --          0.87          --          0.79          ANIONGAP      --           11            --           --          10            --          7             ANURAG           --          9.3           --           --          8.7*          --          8.6*          GLC          123*         116*         127*           < >        128*           < >        141*           < > = values in this interval not displayed.                   OUTSIDE LABS:  CBC:   Lab Results   Component Value Date    WBC 5.9 07/10/2023    WBC 5.4 06/27/2023    HGB 10.9 (L) 07/10/2023    HGB 8.8 (L) 06/27/2023    HCT 35.9 (L) 07/10/2023    HCT 28.8 (L) 06/27/2023     07/10/2023     06/27/2023     BMP:   Lab Results   Component Value Date     07/10/2023     06/14/2023    POTASSIUM 4.3 07/10/2023    POTASSIUM 4.0 06/14/2023    CHLORIDE 104 07/10/2023    CHLORIDE 107 06/14/2023    CO2 26 07/10/2023    CO2 24 06/14/2023    BUN 12.6 07/10/2023    BUN 11.7 06/14/2023    CR 0.93 07/10/2023    CR 0.87 06/14/2023     (H) 07/13/2023     (H) 07/10/2023     COAGS:   Lab Results   Component Value Date    INR 1.24 (H) 05/25/2023     POC:   Lab Results   Component Value Date     (H) 12/28/2018     HEPATIC:   Lab Results   Component Value Date    ALBUMIN 3.9 06/12/2023    PROTTOTAL 5.9 (L) 06/12/2023    ALT 16 06/12/2023    AST 19 06/12/2023    GGT 29 10/24/2012    ALKPHOS 48 06/12/2023    BILITOTAL 0.5 06/12/2023     OTHER:   Lab Results   Component Value Date    A1C 6.7 (H) 05/25/2023    ANURAG 9.3 07/10/2023    PHOS 3.1 06/13/2023    MAG 2.1 06/13/2023    LIPASE 16 06/12/2023    TSH 3.16 03/29/2022    T4 1.12 11/15/2019    SED 12 10/11/2006       Anesthesia Plan    ASA Status:  3      Anesthesia Type: MAC.              Consents    Anesthesia Plan(s) and associated risks, benefits, and realistic alternatives discussed. Questions answered and patient/representative(s) expressed understanding.    - Discussed:     - Discussed with:  Patient      - Extended Intubation/Ventilatory Support  Discussed: No.      - Patient is DNR/DNI Status: No    Use of blood products discussed: No .     Postoperative Care       PONV prophylaxis: Background Propofol Infusion     Comments:    Other Comments: Patient has tolerated multiple colonoscopies and at least oer EGD under conscious sedation without difficulty. Will proceed with MAC with GA/ETT backup. Noemí Sanchez MD

## 2023-07-13 NOTE — ANESTHESIA CARE TRANSFER NOTE
Patient: Damien Vallecillo    Procedure: Procedure(s):  Endoscopic ultrasound upper gastrointestinal tract (GI) with fine needle aspiration       Diagnosis: Submucosal lesion of stomach [K31.89]  Diagnosis Additional Information: No value filed.    Anesthesia Type:   MAC     Note:    Oropharynx: oropharynx clear of all foreign objects  Level of Consciousness: drowsy  Oxygen Supplementation: face mask  Level of Supplemental Oxygen (L/min / FiO2): 2  Independent Airway: airway patency satisfactory and stable  Dentition: dentition unchanged  Vital Signs Stable: post-procedure vital signs reviewed and stable  Report to RN Given: handoff report given  Patient transferred to: Phase II    Handoff Report: Identifed the Patient, Identified the Reponsible Provider, Reviewed the pertinent medical history, Discussed the surgical course, Reviewed Intra-OP anesthesia mangement and issues during anesthesia, Set expectations for post-procedure period and Allowed opportunity for questions and acknowledgement of understanding      Vitals:  Vitals Value Taken Time   BP     Temp     Pulse     Resp     SpO2 99 % 07/13/23 1340   Vitals shown include unvalidated device data.    Electronically Signed By: MAKAYLA Calles CRNA  July 13, 2023  1:40 PM

## 2023-07-18 LAB
PATH REPORT.COMMENTS IMP SPEC: ABNORMAL
PATH REPORT.COMMENTS IMP SPEC: YES
PATH REPORT.FINAL DX SPEC: ABNORMAL
PATH REPORT.GROSS SPEC: ABNORMAL
PATH REPORT.MICROSCOPIC SPEC OTHER STN: ABNORMAL
PATH REPORT.RELEVANT HX SPEC: ABNORMAL

## 2023-07-18 PROCEDURE — 88172 CYTP DX EVAL FNA 1ST EA SITE: CPT | Mod: 26 | Performed by: PATHOLOGY

## 2023-07-18 PROCEDURE — 88305 TISSUE EXAM BY PATHOLOGIST: CPT | Mod: 26 | Performed by: PATHOLOGY

## 2023-07-18 PROCEDURE — 88341 IMHCHEM/IMCYTCHM EA ADD ANTB: CPT | Mod: 26 | Performed by: PATHOLOGY

## 2023-07-18 PROCEDURE — 88173 CYTOPATH EVAL FNA REPORT: CPT | Mod: 26 | Performed by: PATHOLOGY

## 2023-07-18 PROCEDURE — 88342 IMHCHEM/IMCYTCHM 1ST ANTB: CPT | Mod: 26 | Performed by: PATHOLOGY

## 2023-07-21 ENCOUNTER — TRANSFERRED RECORDS (OUTPATIENT)
Dept: SURGERY | Facility: CLINIC | Age: 88
End: 2023-07-21
Payer: MEDICARE

## 2023-08-03 ENCOUNTER — OFFICE VISIT (OUTPATIENT)
Dept: SURGERY | Facility: CLINIC | Age: 88
End: 2023-08-03
Payer: MEDICARE

## 2023-08-03 VITALS
DIASTOLIC BLOOD PRESSURE: 74 MMHG | WEIGHT: 268 LBS | OXYGEN SATURATION: 97 % | HEIGHT: 70 IN | SYSTOLIC BLOOD PRESSURE: 108 MMHG | RESPIRATION RATE: 15 BRPM | BODY MASS INDEX: 38.37 KG/M2 | HEART RATE: 58 BPM

## 2023-08-03 DIAGNOSIS — C49.A0 MALIGNANT GASTROINTESTINAL STROMAL TUMOR, UNSPECIFIED SITE (H): Primary | ICD-10-CM

## 2023-08-03 PROCEDURE — 99203 OFFICE O/P NEW LOW 30 MIN: CPT | Performed by: SURGERY

## 2023-08-03 NOTE — PATIENT INSTRUCTIONS
CT ABDOMEN AND PELVIS WITH CONTRAST     Date: 8-22-23  Time: 8:20 am     Location: [facility]  [address] [address]  [address]        Please check in at 7:50 am

## 2023-08-03 NOTE — PROGRESS NOTES
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  We discussed robotic assisted laparoscopic resection. Will contact patient once CT scan performed to discuss surgery in more detail. We did discuss risks/benefits/alternatives with the patient and his wife during this office visit.               Damien Vallecillo is a 87 year old male seen in consultation for a Gist found on endoscopy, at the request of Obi Strange MD.       Chief Complaint:   GIST tumor           History of Present Illness:   Damien Vallecillo is a 87 year old male who presents with after findings of a GIST tumor after endoscopy. He is here today to discuss operative intervention.             Past Medical History:     Past Medical History:   Diagnosis Date    Acute suppurative arthritis (H)     Acute, but ill-defined, cerebrovascular disease     Anemia due to blood loss, acute 5/25/2023    Ankle fracture 12/28/2018    Arthritis     Benign prostatic hyperplasia with urinary frequency 3/29/2022    Cheilosis 9/28/2018    Chronic headaches     Closed right ankle fracture 12/29/2018    Cognitive attention deficit 9/16/2021    Diabetes (H)     \"Pre-diabetes\"    Dislocation of right patella 9/16/2020    Elevated serum creatinine 11/13/2019    GFR Estimate Date Value Ref Range Status 09/30/2019 84 >60 mL/min/[1.73_m2] Final   Comment:   Non  GFR Calc Starting 12/18/2018, serum creatinine based estimated GFR (eGFR) will be  calculated using the Chronic Kidney Disease Epidemiology Collaboration  (CKD-EPI) equation.        Encounter for immunization 9/21/2022    External hemorrhoids 9/21/2022    Fall 3/8/2023    Fe deficiency anemia 7/10/2023    Fecal incontinence 9/16/2020    Glucose intolerance (impaired glucose tolerance) 5/31/2013    " Gout, unspecified     Grieving 9/16/2021    Hammer toe of left foot 10/21/2016    Hematuria     History of arthroplasty of right knee 08/17/2017    History of blood transfusion     Hyperlipidemia LDL goal <160 11/13/2019    Hypersomnia 3/7/2023    Left foot pain 9/30/2019    Left knee pain, unspecified chronicity 5/8/2017    Lentigo maligna (H)     Malignant neoplasm of rectosigmoid junction (H)     Morbid obesity due to excess calories (H) 10/21/2016    RENAE (obstructive sleep apnea) 3/8/2023    Other convulsions     last seizure 7-8 years ago...not certain if these were true seizres or not..    Pedal edema 1/18/2018    Peripheral polyneuropathy 9/30/2019    Physical deconditioning 3/8/2023    Pre-diabetes 10/21/2016    RBC microcytosis 2/14/2017    Rhinophyma 9/28/2018    Right knee pain, unspecified chronicity 5/8/2017    Rosacea 9/28/2018    Syncope     Tubulovillous adenoma of colon     Venous stasis dermatitis of both lower extremities 10/21/2016             Past Surgical History:     Past Surgical History:   Procedure Laterality Date    ARTHROPLASTY KNEE Right 6/19/2017    Procedure: ARTHROPLASTY KNEE;  Right total knee arthroplasty, Hammer toe repair toe 2,3,4,5 left foot with osteotomy;  Surgeon: Alec Raymond MD;  Location:  OR    COLONOSCOPY N/A 6/23/2015    Procedure: COMBINED COLONOSCOPY, SINGLE OR MULTIPLE BIOPSY/POLYPECTOMY BY BIOPSY;  Surgeon: Kimberly Alfaro MD;  Location:  GI    COLONOSCOPY N/A 7/30/2015    Procedure: COLONOSCOPY;  Surgeon: Enrique Salazar MD;  Location:  OR    COLONOSCOPY N/A 7/31/2018    Procedure: COLONOSCOPY;  Colonoscopy;  Surgeon: Tiffany Cheema MD;  Location:  GI    COLONOSCOPY N/A 5/26/2023    Procedure: Colonoscopy;  Surgeon: Juan Grimaldo MD;  Location:  GI    COLONOSCOPY N/A 6/15/2023    Procedure: colonoscopy;   no cecum as pt has had partial colectomy;  Surgeon: Sherif Escalona MD;  Location:  GI    ENDOSCOPIC ULTRASOUND  UPPER GASTROINTESTINAL TRACT (GI) N/A 7/13/2023    Procedure: Endoscopic ultrasound upper gastrointestinal tract with fine needle aspiration;  Surgeon: Kae Mccall MD;  Location:  OR    ESOPHAGOSCOPY, GASTROSCOPY, DUODENOSCOPY (EGD), COMBINED N/A 5/26/2023    Procedure: Esophagoscopy, gastroscopy, duodenoscopy (EGD), combined;  Surgeon: Juan Grimaldo MD;  Location:  GI    HERNIORRHAPHY EPIGASTRIC  4/27/2012    Procedure:HERNIORRHAPHY EPIGASTRIC; Epigastric Hernia Repair with mesh ; Surgeon:BOLIVAR OLIVEIRA; Location: OR    LAPAROSCOPIC ASSISTED COLECTOMY Right 7/30/2015    Procedure: LAPAROSCOPIC ASSISTED COLECTOMY;  Surgeon: Enrique Salazar MD;  Location:  OR    ORTHOPEDIC SURGERY      lt knee arthroplasty    REALIGN PATELLA Right 10/9/2017    Procedure: REALIGN PATELLA;  Revision right total knee arthroplasty;  Surgeon: Alec Raymond MD;  Location:  OR    REPAIR HAMMER TOE Left 6/19/2017    Procedure: REPAIR HAMMER TOE;  Hammer toe repair toe 2,3,4,5 left foot with osteotomy   ;  Surgeon: Beverly Kim DPM, Podiatry/Foot and Ankle Surgery;  Location:  OR    SIGMOIDOSCOPY FLEXIBLE  5/29/2013    Procedure: SIGMOIDOSCOPY FLEXIBLE;  SIGMOIDOSCOPY FLEXIBLE (FV) Needs blood sugar ck'd;  Surgeon: Enrique Salazar MD;  Location:  GI    skin cancer excision      ZZC NONSPECIFIC PROCEDURE      right heel surgery; spur removed.             Social History:     Social History     Tobacco Use    Smoking status: Never     Passive exposure: Never    Smokeless tobacco: Never   Substance Use Topics    Alcohol use: Not Currently           Family History:     Family History   Problem Relation Age of Onset    Cancer - colorectal Mother     Cerebrovascular Disease Father             Medications:     Current Outpatient Medications   Medication    acetaminophen (TYLENOL) 500 MG tablet    atorvastatin (LIPITOR) 20 MG tablet    CINNAMON PO    diclofenac (VOLTAREN) 1 % topical gel    ferrous  "sulfate (FEROSUL) 325 (65 Fe) MG tablet    fish oil-omega-3 fatty acids 1000 MG capsule    Flaxseed, Linseed, (FLAXSEED OIL) 1000 MG CAPS    furosemide (LASIX) 20 MG tablet    glucosamine-chondroitin 500-400 MG CAPS per capsule    metroNIDAZOLE (METROGEL) 0.75 % external gel    Multiple Vitamins-Minerals (MULTIVITAMIN ADULT PO)    order for DME    order for DME    pramox-pe-glycerin-petrolatum (PREPARATION H) 1-0.25-14.4-15 % CREA cream    tamsulosin (FLOMAX) 0.4 MG capsule     No current facility-administered medications for this visit.            Review of Systems:   The Review of Systems is negative other than noted in the HPI or below            Physical Exam:   /74   Pulse 58   Resp 15   Ht 1.778 m (5' 10\")   Wt 121.6 kg (268 lb)   SpO2 97%   BMI 38.45 kg/m    General - Well developed, well nourished male in no apparent distress  HEENT:  Head normocephalic and atraumatic, pupils equal and round, conjunctivae clear, no scleral icterus, mucous membranes moist, external ears and nose normal  Neck: Supple without thyromegaly or masses  Lymphatic: No cervical, or supraclavicular lymphadenopathy  Lungs: Clear to auscultation bilaterally  Heart: regular rate and rhythm, no murmurs  Abdomen:   soft, flat, non-distended with no tenderness noted diffusely. No rebound or guarding. no masses palpated.  Extremities: Warm without edema  Neurologic: alert, speech is clear, moves all extremities with good strength  Psychiatric: Mood and affect appropriate  Skin: Without lesions, rashes, or jaundice         Data:   CT abd/pelvis ordered      Chino Ny MD    Please route or send letter to:  Primary Care Provider (PCP)       "

## 2023-08-09 ENCOUNTER — TELEPHONE (OUTPATIENT)
Dept: SURGERY | Facility: CLINIC | Age: 88
End: 2023-08-09
Payer: MEDICARE

## 2023-08-09 NOTE — TELEPHONE ENCOUNTER
Attempted to call Alejandrina back several times and there was noo answer.  A message was left for patient regarding GIST tumors and according to the patient's pathology the tumor is low risk for aggressive behavior.     Ion Solis PA-C

## 2023-08-09 NOTE — TELEPHONE ENCOUNTER
Name of caller: spouse, Alejandrina     Reason for Call: She has questions regarding the after visit summary they received from Dr Ny's visit on 8/3 and the Gist tumor being malignant.     Surgeon:  Dr. Ny    Recent Surgery:  No    If yes, when & what type:  N/A      Best phone number to reach pt at is: [phone] please call Alejandrina tara Damien Loja to leave a message with medical info? Yes.    Pharmacy preferred (if calling for a refill): N/A

## 2023-08-22 ENCOUNTER — HOSPITAL ENCOUNTER (OUTPATIENT)
Dept: CT IMAGING | Facility: CLINIC | Age: 88
Discharge: HOME | End: 2023-08-22
Attending: SURGERY | Admitting: SURGERY
Payer: MEDICARE

## 2023-08-22 DIAGNOSIS — C49.A0 MALIGNANT GASTROINTESTINAL STROMAL TUMOR, UNSPECIFIED SITE (H): ICD-10-CM

## 2023-08-22 LAB
CREAT BLD-MCNC: 0.9 MG/DL (ref 0.7–1.3)
GFR SERPL CREATININE-BSD FRML MDRD: >60 ML/MIN/1.73M2

## 2023-08-22 PROCEDURE — 250N000009 HC RX 250: Performed by: SURGERY

## 2023-08-22 PROCEDURE — G1010 CDSM STANSON: HCPCS

## 2023-08-22 PROCEDURE — 74177 CT ABD & PELVIS W/CONTRAST: CPT | Mod: MG

## 2023-08-22 PROCEDURE — 82565 ASSAY OF CREATININE: CPT

## 2023-08-22 PROCEDURE — 250N000011 HC RX IP 250 OP 636: Performed by: SURGERY

## 2023-08-22 RX ORDER — IOPAMIDOL 755 MG/ML
500 INJECTION, SOLUTION INTRAVASCULAR ONCE
Status: COMPLETED | OUTPATIENT
Start: 2023-08-22 | End: 2023-08-22

## 2023-08-22 RX ADMIN — IOPAMIDOL 100 ML: 755 INJECTION, SOLUTION INTRAVENOUS at 08:35

## 2023-08-22 RX ADMIN — SODIUM CHLORIDE 65 ML: 9 INJECTION, SOLUTION INTRAVENOUS at 08:35

## 2023-08-27 ENCOUNTER — APPOINTMENT (OUTPATIENT)
Dept: CT IMAGING | Facility: CLINIC | Age: 88
End: 2023-08-27
Attending: EMERGENCY MEDICINE
Payer: MEDICARE

## 2023-08-27 ENCOUNTER — HOSPITAL ENCOUNTER (EMERGENCY)
Facility: CLINIC | Age: 88
Discharge: HOME | End: 2023-08-27
Attending: EMERGENCY MEDICINE | Admitting: EMERGENCY MEDICINE
Payer: MEDICARE

## 2023-08-27 VITALS
TEMPERATURE: 97.7 F | DIASTOLIC BLOOD PRESSURE: 78 MMHG | SYSTOLIC BLOOD PRESSURE: 161 MMHG | RESPIRATION RATE: 18 BRPM | OXYGEN SATURATION: 96 % | HEART RATE: 57 BPM

## 2023-08-27 DIAGNOSIS — S09.90XA CLOSED HEAD INJURY, INITIAL ENCOUNTER: ICD-10-CM

## 2023-08-27 PROCEDURE — G1010 CDSM STANSON: HCPCS

## 2023-08-27 PROCEDURE — 99284 EMERGENCY DEPT VISIT MOD MDM: CPT | Mod: 25

## 2023-08-27 ASSESSMENT — ACTIVITIES OF DAILY LIVING (ADL): ADLS_ACUITY_SCORE: 35

## 2023-08-27 NOTE — DISCHARGE INSTRUCTIONS
We have performed head injury and imaging and is negative.  Please avoid falling if possible continue to use your cane for support.  We are sorry you tripped on the sidewalk and glad they are fixing it.  Thanks for your patience today.

## 2023-08-27 NOTE — ED NOTES
Neuro Cognitive (Adult)Cognitive/Neuro/Behavioral WDL: all (pt omes in after trip and fall on uneven pavement outside of [place], pt was able to get up and drive home with wife. wife was conerned when pt was confused about route they were taking home. he also C/O a headahe for wife.)Level of Consciousness: alertArousal Level: opens eyes spontaneouslyOrientation: oriented x 4Speech: spontaneous; clearMood/Behavior: cooperative; calmAdditional Documentation:  (no LOC. no thinner. headahe has resolved for EMS and pt is A&Ox4 for EMS. VSS pt arrives in [place].)  John Coma ScaleBest Eye Response: 4-->(E4) spontaneousBest Motor Response: 6-->(M6) obeys commandsBest Verbal Response: 5-->(V5) orientedGlasgow Coma Scale Score: 15  Hand /Ankle StrengthHand , Left: strongHand , Right: strongDorsiflexion, Left: strongDorsiflexion, Right: strongPlantarflexion, Left: strongPlantarflexion, Right: strong

## 2023-08-29 ENCOUNTER — PATIENT OUTREACH (OUTPATIENT)
Dept: FAMILY MEDICINE | Facility: CLINIC | Age: 88
End: 2023-08-29
Payer: MEDICARE

## 2023-08-29 NOTE — TELEPHONE ENCOUNTER
"DORIS RN - ED Follow-up     PAL called and spoke with pt and wife, Alejandrina ([facility])    Patient status since discharge: \"He's doing much better...back to normal.\"    -Denies any symptoms- no headaches, dizziness, confusion, vision changes, nausea/vomiting, pain     Follow up  Follow up instructions/recommendations:   \"Schedule an appointment with Obi Strange MD as  soon as possible for a visit  Why: As needed  Specialty: Family Medicine  Contact: [address] [place] [address] [phone]\"    Medications  Changes to chronic meds? None    Medication reconciliation: patient was not discharged from an inpatient facility or TCU.    Appointment   Primary Care Provider appointment scheduled:  No, pt and wife decline scheduling at this time. State they do not feel appt is necessary.       Other appointments scheduled:  N/A    Questions about medications: None     Medication therapy management (MTM) referral placed:  No     Other information  New diagnoses of heart failure, COPD, diabetes, or MI? No     Call Summary  Advised to contact -[phone] w/ any questions or concerns    Reminded of upcoming appt with Dr. Strange 9/26/23    Patient and wife were given an opportunity to ask questions, verbalized understanding of plan, and is agreeable.    Katt ALMANZAR RN  Patient Advocate Liaison - PAL RN  [facility]  [phone]  "

## 2023-08-30 ENCOUNTER — TELEPHONE (OUTPATIENT)
Dept: SURGERY | Facility: CLINIC | Age: 88
End: 2023-08-30
Payer: MEDICARE

## 2023-08-30 ENCOUNTER — PREP FOR PROCEDURE (OUTPATIENT)
Dept: SURGERY | Facility: CLINIC | Age: 88
End: 2023-08-30
Payer: MEDICARE

## 2023-08-30 DIAGNOSIS — C49.A0 GASTROINTESTINAL STROMAL TUMOR (GIST) (H): Primary | ICD-10-CM

## 2023-08-30 RX ORDER — ACETAMINOPHEN 325 MG/1
975 TABLET ORAL ONCE
Status: CANCELLED | OUTPATIENT
Start: 2023-08-30 | End: 2023-08-30

## 2023-08-30 NOTE — LETTER
Showering Before Surgery      Your surgeon has asked you to take 2 showers before surgery.    Why is this important?  It is normal for bacteria (germs) to be on your skin. The skin protects us from these germs. When you have surgery, we cut the skin. Sometimes germs get into the cuts and cause infection (illness caused by germs). By following the instructions below and using special soap, you will lower the number of germs on your skin. This decreases your chance of infection.  Special soap  Buy or get 8 ounces of antiseptic surgical soap called 4% CHG. Common name brands of this soap are Hibiclens and Exidine.  You can find it at your local pharmacy, clinic or retail store. If you have trouble, ask your pharmacist to help you find the right substitute.  A note about shaving:  Do not shave within 12 inches of your incision (surgical cut) area for at least 3 days before surgery. Shaving can make small cuts in the skin. This puts you at a higher risk of infection.  Items you will need for each shower:  1 newly washed towel  4 ounces of one of the above soaps  Clean pajamas or clothes to change into    Follow these instructions:  Follow these steps the evening before surgery and the morning of surgery.  Wash your hair and body with your regular shampoo and soap. Make sure you rinse the shampoo and soap from your hair and body.  Using clean hands, apply about 2 ounces of soap gently on your skin from your ear lobes to your toes. Use on your groin area last. Do not use this soap on your face or head. If you get any soap in your eyes, ears or mouth, rinse right away.  Repeat step 2. It is very important to let the soap stay on your skin for at least 1 minute.    Rinse well and dry off using a clean towel.    If you feel any tingling, itching or other irritation, rinse right away. It is normal to feel some coolness on the skin after using the antiseptic soap. Your skin may feel a bit dry after the shower, but do not use  any lotions, creams or moisturizers. Do not use hair spray or other products in your hair.  5.  Dress in freshly washed clothes or pajamas. Use fresh pillowcases and sheets on your bed.    Repeat these steps the morning of surgery.  If you have any questions about showering or an allergy to CHG soap, please call your surgery center.    For informational purposes only. Not to replace the advice of your health care provider. Copyright   2012 [facility]. All rights reserved. Clinically reviewed by Infection Prevention and Practice [employer] Education. Flit [id number] - REV 12

## 2023-08-30 NOTE — TELEPHONE ENCOUNTER
Type of surgery: ROBOTIC ASSISTED DIAGNOSTIC LAPAROSCOPY, RESECTION OF GASTRIC GIST TUMOR   Location of surgery: [facility]  Date and time of surgery: 9-20-23, 7:50 AM  Surgeon: DR BROWN  Pre-Op Appt Date: PATIENT TO SCHEDULE   Post-Op Appt Date: NA    Packet sent out: Yes  Pre-cert/Authorization completed:  Not Applicable  Date: 8-30-23        *outpatient overnight* ROBOTIC ASSISTED DIAGNOSTIC LAPAROSCOPY, RESECTION OF GASTRIC GIST TUMOR GENERAL PT INST TO HAVE H&P 2.5 HRS REQ PA ASSIST CJP ALW

## 2023-08-30 NOTE — LETTER
2023       Damien Vallecillo    RE: [id number]  : 1935    Damien Vallecillo has been scheduled for surgery on 23 at 7:50 am at [facility] with Dr Chino Ny.  The hospital is located at [address] in [place].    Please check in at the Surgery reception desk at 5:50 am. This is located in the back of the hospital on the East side, just past the Emergency Room entrance.     DO NOT EAT OR DRINK ANYTHING 8 HOURS BEFORE YOUR ARRIVAL TIME.  You may have sips of clear liquids up until 2 hours before your arrival time. If you have been advised to take your medication, please do this early in the morning with just sips of clear liquid.     Hospital regulations require an updated pre-operative examination to be completed within 30 days of the procedure. This can be done by your primary care provider. Please ask them to fax documentation to [phone]. We also recommend you bring a copy with you.     You should shower before your surgery with Hibiclens or Exidine soap.  This can be found at your local pharmacy or you can pick it up from our office for free.  Please call our office if you have any questions.     You will receive several calls from our staff 3-7 days prior to your scheduled procedure with further details and to answer any questions you may have.    It is sometimes necessary to adjust the surgery schedule due to emergencies and additions to the schedule.  If your surgery is affected by this, we greatly appreciate your flexibility and understanding in this matter    It is best if you call regarding post-operative questions between the hours of 8:00 am & 3:00 pm Monday-Friday, so you have access to the daytime care team that know you best.  Prescription refills are accepted during regular office hours only.    Please do not bring any Disability or FMLA papers to the hospital.  They need to be either faxed ([phone]), mailed or hand delivered to our office by you or  a family member for completion.  Please allow 14 business days to complete paperwork.        If you have questions or concerns, please contact our office at [phone].

## 2023-09-06 ENCOUNTER — OFFICE VISIT (OUTPATIENT)
Dept: FAMILY MEDICINE | Facility: CLINIC | Age: 88
End: 2023-09-06
Payer: MEDICARE

## 2023-09-06 VITALS
SYSTOLIC BLOOD PRESSURE: 138 MMHG | WEIGHT: 267 LBS | RESPIRATION RATE: 18 BRPM | BODY MASS INDEX: 38.22 KG/M2 | OXYGEN SATURATION: 98 % | HEART RATE: 50 BPM | DIASTOLIC BLOOD PRESSURE: 80 MMHG | TEMPERATURE: 97.5 F | HEIGHT: 70 IN

## 2023-09-06 DIAGNOSIS — R01.1 HEART MURMUR: ICD-10-CM

## 2023-09-06 DIAGNOSIS — M1A.00X0 IDIOPATHIC CHRONIC GOUT WITHOUT TOPHUS, UNSPECIFIED SITE: ICD-10-CM

## 2023-09-06 DIAGNOSIS — Z01.818 PRE-OP EXAM: ICD-10-CM

## 2023-09-06 DIAGNOSIS — E11.9 TYPE 2 DIABETES MELLITUS WITHOUT COMPLICATION, UNSPECIFIED WHETHER LONG TERM INSULIN USE (H): ICD-10-CM

## 2023-09-06 DIAGNOSIS — R79.89 ELEVATED SERUM CREATININE: ICD-10-CM

## 2023-09-06 DIAGNOSIS — C49.A2 GASTRIC STROMAL TUMOR (H): ICD-10-CM

## 2023-09-06 DIAGNOSIS — Z01.818 PREOP GENERAL PHYSICAL EXAM: Primary | ICD-10-CM

## 2023-09-06 LAB
ANION GAP SERPL CALCULATED.3IONS-SCNC: 12 MMOL/L (ref 7–15)
BASOPHILS # BLD AUTO: 0.1 10E3/UL (ref 0–0.2)
BASOPHILS NFR BLD AUTO: 1 %
BUN SERPL-MCNC: 14.9 MG/DL (ref 8–23)
CALCIUM SERPL-MCNC: 9.5 MG/DL (ref 8.8–10.2)
CHLORIDE SERPL-SCNC: 104 MMOL/L (ref 98–107)
CREAT SERPL-MCNC: 0.81 MG/DL (ref 0.67–1.17)
CREAT UR-MCNC: 285 MG/DL
DEPRECATED HCO3 PLAS-SCNC: 27 MMOL/L (ref 22–29)
EGFRCR SERPLBLD CKD-EPI 2021: 86 ML/MIN/1.73M2
EOSINOPHIL # BLD AUTO: 0.3 10E3/UL (ref 0–0.7)
EOSINOPHIL NFR BLD AUTO: 5 %
ERYTHROCYTE [DISTWIDTH] IN BLOOD BY AUTOMATED COUNT: 15.4 % (ref 10–15)
GLUCOSE SERPL-MCNC: 123 MG/DL (ref 70–99)
HBA1C MFR BLD: 6.8 % (ref 0–5.6)
HCT VFR BLD AUTO: 42.1 % (ref 40–53)
HGB BLD-MCNC: 14 G/DL (ref 13.3–17.7)
IMM GRANULOCYTES # BLD: 0 10E3/UL
IMM GRANULOCYTES NFR BLD: 0 %
LYMPHOCYTES # BLD AUTO: 1.3 10E3/UL (ref 0.8–5.3)
LYMPHOCYTES NFR BLD AUTO: 19 %
MCH RBC QN AUTO: 26.9 PG (ref 26.5–33)
MCHC RBC AUTO-ENTMCNC: 33.3 G/DL (ref 31.5–36.5)
MCV RBC AUTO: 81 FL (ref 78–100)
MICROALBUMIN UR-MCNC: 17 MG/L
MICROALBUMIN/CREAT UR: 5.96 MG/G CR (ref 0–17)
MONOCYTES # BLD AUTO: 0.9 10E3/UL (ref 0–1.3)
MONOCYTES NFR BLD AUTO: 12 %
NEUTROPHILS # BLD AUTO: 4.6 10E3/UL (ref 1.6–8.3)
NEUTROPHILS NFR BLD AUTO: 64 %
PLATELET # BLD AUTO: 218 10E3/UL (ref 150–450)
POTASSIUM SERPL-SCNC: 3.8 MMOL/L (ref 3.4–5.3)
RBC # BLD AUTO: 5.21 10E6/UL (ref 4.4–5.9)
SODIUM SERPL-SCNC: 143 MMOL/L (ref 136–145)
URATE SERPL-MCNC: 7.7 MG/DL (ref 3.4–7)
WBC # BLD AUTO: 7.2 10E3/UL (ref 4–11)

## 2023-09-06 PROCEDURE — 36415 COLL VENOUS BLD VENIPUNCTURE: CPT | Performed by: FAMILY MEDICINE

## 2023-09-06 PROCEDURE — 85025 COMPLETE CBC W/AUTO DIFF WBC: CPT | Performed by: FAMILY MEDICINE

## 2023-09-06 PROCEDURE — 82570 ASSAY OF URINE CREATININE: CPT | Performed by: FAMILY MEDICINE

## 2023-09-06 PROCEDURE — 84550 ASSAY OF BLOOD/URIC ACID: CPT | Performed by: FAMILY MEDICINE

## 2023-09-06 PROCEDURE — 80048 BASIC METABOLIC PNL TOTAL CA: CPT | Performed by: FAMILY MEDICINE

## 2023-09-06 PROCEDURE — 83036 HEMOGLOBIN GLYCOSYLATED A1C: CPT | Performed by: FAMILY MEDICINE

## 2023-09-06 PROCEDURE — 82043 UR ALBUMIN QUANTITATIVE: CPT | Performed by: FAMILY MEDICINE

## 2023-09-06 PROCEDURE — 93000 ELECTROCARDIOGRAM COMPLETE: CPT | Performed by: FAMILY MEDICINE

## 2023-09-06 PROCEDURE — 99214 OFFICE O/P EST MOD 30 MIN: CPT | Performed by: FAMILY MEDICINE

## 2023-09-06 NOTE — LETTER
September 7, 2023      Damienaamir Vallecillo  [address]  [facility] [id number]        Dear ,    We are writing to inform you of your test results.      Resulted Orders   Albumin Random Urine Quantitative with Creat Ratio   Result Value Ref Range    Creatinine Urine mg/dL 285.0 mg/dL      Comment:      The reference ranges have not been established in urine creatinine. The results should be integrated into the clinical context for interpretation.    Albumin Urine mg/L 17.0 mg/L      Comment:      The reference ranges have not been established in urine albumin. The results should be integrated into the clinical context for interpretation.    Albumin Urine mg/g Cr 5.96 0.00 - 17.00 mg/g Cr      Comment:      Microalbuminuria is defined as an albumin:creatinine ratio of 17 to 299 for males and 25 to 299 for females. A ratio of albumin:creatinine of 300 or higher is indicative of overt proteinuria.  Due to biologic variability, positive results should be confirmed by a second, first-morning random or 24-hour timed urine specimen. If there is discrepancy, a third specimen is recommended. When 2 out of 3 results are in the microalbuminuria range, this is evidence for incipient nephropathy and warrants increased efforts at glucose control, blood pressure control, and institution of therapy with an angiotensin-converting-enzyme (ACE) inhibitor (if the patient can tolerate it).     Uric acid   Result Value Ref Range    Uric Acid 7.7 (H) 3.4 - 7.0 mg/dL   Hemoglobin A1c   Result Value Ref Range    Hemoglobin A1C 6.8 (H) 0.0 - 5.6 %      Comment:      Normal <5.7%   Prediabetes 5.7-6.4%    Diabetes 6.5% or higher     Note: Adopted from ADA consensus guidelines.   Basic metabolic panel  (Ca, Cl, CO2, Creat, Gluc, K, Na, BUN)   Result Value Ref Range    Sodium 143 136 - 145 mmol/L    Potassium 3.8 3.4 - 5.3 mmol/L    Chloride 104 98 - 107 mmol/L    Carbon Dioxide (CO2) 27 22 - 29 mmol/L    Anion Gap 12 7 - 15 mmol/L     Urea Nitrogen 14.9 8.0 - 23.0 mg/dL    Creatinine 0.81 0.67 - 1.17 mg/dL    Calcium 9.5 8.8 - 10.2 mg/dL    Glucose 123 (H) 70 - 99 mg/dL    GFR Estimate 86 >60 mL/min/1.73m2   CBC with platelets and differential   Result Value Ref Range    WBC Count 7.2 4.0 - 11.0 10e3/uL    RBC Count 5.21 4.40 - 5.90 10e6/uL    Hemoglobin 14.0 13.3 - 17.7 g/dL    Hematocrit 42.1 40.0 - 53.0 %    MCV 81 78 - 100 fL    MCH 26.9 26.5 - 33.0 pg    MCHC 33.3 31.5 - 36.5 g/dL    RDW 15.4 (H) 10.0 - 15.0 %    Platelet Count 218 150 - 450 10e3/uL    % Neutrophils 64 %    % Lymphocytes 19 %    % Monocytes 12 %    % Eosinophils 5 %    % Basophils 1 %    % Immature Granulocytes 0 %    Absolute Neutrophils 4.6 1.6 - 8.3 10e3/uL    Absolute Lymphocytes 1.3 0.8 - 5.3 10e3/uL    Absolute Monocytes 0.9 0.0 - 1.3 10e3/uL    Absolute Eosinophils 0.3 0.0 - 0.7 10e3/uL    Absolute Basophils 0.1 0.0 - 0.2 10e3/uL    Absolute Immature Granulocytes 0.0 <=0.4 10e3/uL       If you have any questions or concerns, please call the clinic at the number listed above.       Sincerely,      Obi Strange MD

## 2023-09-06 NOTE — PATIENT INSTRUCTIONS
No fish oil, ibuprofen 10 days prior  No furosemide AM of surgery  Take other medications AM of surgery

## 2023-09-07 NOTE — RESULT ENCOUNTER NOTE
Med refill Ready to go for surgery.  Yes, it is diabetes.  You are doing okay that way so far  ALICE HAMEED

## 2023-09-08 ENCOUNTER — TELEPHONE (OUTPATIENT)
Dept: FAMILY MEDICINE | Facility: CLINIC | Age: 88
End: 2023-09-08
Payer: MEDICARE

## 2023-09-08 NOTE — TELEPHONE ENCOUNTER
General Call    Contacts         Type Contact Phone/Fax    09/08/2023 11:17 AM CDT Phone (Incoming) GEOFF ADAMS (Emergency Contact) [phone]          Reason for Call:  Pts spouse called and is wondering if Dr. Strange care team can fax pre op documentation to fax number [phone] to Dr. Ny. Pts spouse is also wanting a paper copy of pts results mailed to home address. Please call back to discuss.    What are your questions or concerns:  NA    Date of last appointment with provider: 9/6/23    Okay to leave a detailed message?: No at Cell number on file:    Telephone Information:   Mobile [phone]

## 2023-09-14 ENCOUNTER — HOSPITAL ENCOUNTER (OUTPATIENT)
Dept: CARDIOLOGY | Facility: CLINIC | Age: 88
Discharge: HOME | End: 2023-09-14
Attending: FAMILY MEDICINE | Admitting: FAMILY MEDICINE
Payer: MEDICARE

## 2023-09-14 DIAGNOSIS — R01.1 HEART MURMUR: ICD-10-CM

## 2023-09-14 LAB — LVEF ECHO: NORMAL

## 2023-09-14 PROCEDURE — 93306 TTE W/DOPPLER COMPLETE: CPT | Mod: 26 | Performed by: INTERNAL MEDICINE

## 2023-09-14 PROCEDURE — 93306 TTE W/DOPPLER COMPLETE: CPT

## 2023-09-17 NOTE — RESULT ENCOUNTER NOTE
A couple of valves are showing their age, but they still work well. That is the cause of the murmur  ALICE HAMEED

## 2023-09-18 NOTE — PHARMACY-ADMISSION MEDICATION HISTORY
Med rec completed by pre-admitting RNJENELLE.    Prior to Admission medications    Medication Sig Last Dose Taking? Auth Provider Long Term End Date   atorvastatin (LIPITOR) 20 MG tablet Take 20 mg by mouth every evening  Yes Unknown, Entered By History No    CINNAMON PO Take 1 tablet by mouth daily   Yes Reported, Patient     diclofenac (VOLTAREN) 1 % topical gel Apply 2 g topically 2 times daily  Yes Unknown, Entered By History     ferrous sulfate (FEROSUL) 325 (65 Fe) MG tablet Take 1 tablet (325 mg) by mouth daily (with breakfast)  Yes Mari Obrien PA-C     Flaxseed, Linseed, (FLAXSEED OIL) 1000 MG CAPS Take 1,000 mg by mouth daily  Yes Unknown, Entered By History     furosemide (LASIX) 20 MG tablet Take 1 tablet (20 mg) by mouth 2 times daily  Yes Obi Strange MD Yes    glucosamine-chondroitin 500-400 MG CAPS per capsule Take 1 capsule by mouth daily  Yes Unknown, Entered By History     metroNIDAZOLE (METROGEL) 0.75 % external gel APPLY THIN LAYER TO FACE TWICE A DAY FOR ROSACEA  Yes Reported, Patient     Multiple Vitamins-Minerals (MULTIVITAMIN ADULT PO) Take 1 tablet by mouth daily Reported on 5/12/2017  Yes Reported, Patient     tamsulosin (FLOMAX) 0.4 MG capsule Take 1 capsule (0.4 mg) by mouth daily  Yes Obi Strange MD     acetaminophen (TYLENOL) 500 MG tablet Take 500-1,000 mg by mouth every 6 hours as needed for mild pain  Patient not taking: Reported on 9/15/2023 Not Taking  Unknown, Entered By History     fish oil-omega-3 fatty acids 1000 MG capsule Take 1 g by mouth daily  Patient not taking: Reported on 9/15/2023 Not Taking  Unknown, Entered By History     pramox-pe-glycerin-petrolatum (PREPARATION H) 1-0.25-14.4-15 % CREA cream Place rectally 4 times daily as needed for hemorrhoids Use as directed   Unknown, Entered By History

## 2023-09-20 ENCOUNTER — HOSPITAL ENCOUNTER (INPATIENT)
Facility: CLINIC | Age: 88
LOS: 2 days | Discharge: HOME | DRG: 327 | End: 2023-09-22
Attending: SURGERY | Admitting: SURGERY
Payer: MEDICARE

## 2023-09-20 ENCOUNTER — ANESTHESIA (OUTPATIENT)
Dept: SURGERY | Facility: CLINIC | Age: 88
DRG: 327 | End: 2023-09-20
Payer: MEDICARE

## 2023-09-20 ENCOUNTER — APPOINTMENT (OUTPATIENT)
Dept: SURGERY | Facility: PHYSICIAN GROUP | Age: 88
End: 2023-09-20
Payer: MEDICARE

## 2023-09-20 ENCOUNTER — ANESTHESIA EVENT (OUTPATIENT)
Dept: SURGERY | Facility: CLINIC | Age: 88
DRG: 327 | End: 2023-09-20
Payer: MEDICARE

## 2023-09-20 PROBLEM — Z85.09 HISTORY OF GASTROINTESTINAL STROMAL TUMOR (GIST): Status: ACTIVE | Noted: 2023-09-20

## 2023-09-20 LAB
CREAT SERPL-MCNC: 0.81 MG/DL (ref 0.67–1.17)
EGFRCR SERPLBLD CKD-EPI 2021: 86 ML/MIN/1.73M2
GLUCOSE BLDC GLUCOMTR-MCNC: 160 MG/DL (ref 70–99)
GLUCOSE BLDC GLUCOMTR-MCNC: 184 MG/DL (ref 70–99)
PLATELET # BLD AUTO: 149 10E3/UL (ref 150–450)

## 2023-09-20 PROCEDURE — 250N000009 HC RX 250: Performed by: SURGERY

## 2023-09-20 PROCEDURE — 88331 PATH CONSLTJ SURG 1 BLK 1SPC: CPT | Mod: TC | Performed by: SURGERY

## 2023-09-20 PROCEDURE — 258N000003 HC RX IP 258 OP 636: Performed by: SURGERY

## 2023-09-20 PROCEDURE — 258N000001 HC RX 258: Performed by: SURGERY

## 2023-09-20 PROCEDURE — 36415 COLL VENOUS BLD VENIPUNCTURE: CPT | Performed by: SURGERY

## 2023-09-20 PROCEDURE — 250N000013 HC RX MED GY IP 250 OP 250 PS 637: Performed by: SURGERY

## 2023-09-20 PROCEDURE — 272N000001 HC OR GENERAL SUPPLY STERILE: Performed by: SURGERY

## 2023-09-20 PROCEDURE — 82565 ASSAY OF CREATININE: CPT | Performed by: SURGERY

## 2023-09-20 PROCEDURE — 250N000011 HC RX IP 250 OP 636

## 2023-09-20 PROCEDURE — 370N000017 HC ANESTHESIA TECHNICAL FEE, PER MIN: Performed by: SURGERY

## 2023-09-20 PROCEDURE — 258N000003 HC RX IP 258 OP 636

## 2023-09-20 PROCEDURE — 360N000080 HC SURGERY LEVEL 7, PER MIN: Performed by: SURGERY

## 2023-09-20 PROCEDURE — 0DBU4ZX EXCISION OF OMENTUM, PERCUTANEOUS ENDOSCOPIC APPROACH, DIAGNOSTIC: ICD-10-PCS | Performed by: SURGERY

## 2023-09-20 PROCEDURE — 0DN64ZZ RELEASE STOMACH, PERCUTANEOUS ENDOSCOPIC APPROACH: ICD-10-PCS | Performed by: SURGERY

## 2023-09-20 PROCEDURE — 0DB64ZZ EXCISION OF STOMACH, PERCUTANEOUS ENDOSCOPIC APPROACH: ICD-10-PCS | Performed by: SURGERY

## 2023-09-20 PROCEDURE — 85049 AUTOMATED PLATELET COUNT: CPT | Performed by: SURGERY

## 2023-09-20 PROCEDURE — 250N000011 HC RX IP 250 OP 636: Performed by: SURGERY

## 2023-09-20 PROCEDURE — 250N000025 HC SEVOFLURANE, PER MIN: Performed by: SURGERY

## 2023-09-20 PROCEDURE — 120N000001 HC R&B MED SURG/OB

## 2023-09-20 PROCEDURE — 8E0W4CZ ROBOTIC ASSISTED PROCEDURE OF TRUNK REGION, PERCUTANEOUS ENDOSCOPIC APPROACH: ICD-10-PCS | Performed by: SURGERY

## 2023-09-20 PROCEDURE — 258N000003 HC RX IP 258 OP 636: Performed by: ANESTHESIOLOGY

## 2023-09-20 PROCEDURE — 999N000141 HC STATISTIC PRE-PROCEDURE NURSING ASSESSMENT: Performed by: SURGERY

## 2023-09-20 PROCEDURE — 710N000009 HC RECOVERY PHASE 1, LEVEL 1, PER MIN: Performed by: SURGERY

## 2023-09-20 PROCEDURE — 250N000009 HC RX 250

## 2023-09-20 PROCEDURE — 43659 UNLISTED LAPS PX STOMACH: CPT | Mod: 22 | Performed by: SURGERY

## 2023-09-20 RX ORDER — ACETAMINOPHEN 325 MG/1
650 TABLET ORAL EVERY 4 HOURS PRN
Status: DISCONTINUED | OUTPATIENT
Start: 2023-09-23 | End: 2023-09-22 | Stop reason: HOSPADM

## 2023-09-20 RX ORDER — PROCHLORPERAZINE MALEATE 5 MG
5 TABLET ORAL EVERY 6 HOURS PRN
Status: DISCONTINUED | OUTPATIENT
Start: 2023-09-20 | End: 2023-09-22 | Stop reason: HOSPADM

## 2023-09-20 RX ORDER — HYDROMORPHONE HCL IN WATER/PF 6 MG/30 ML
0.4 PATIENT CONTROLLED ANALGESIA SYRINGE INTRAVENOUS
Status: DISCONTINUED | OUTPATIENT
Start: 2023-09-20 | End: 2023-09-22 | Stop reason: HOSPADM

## 2023-09-20 RX ORDER — GLYCOPYRROLATE 0.2 MG/ML
INJECTION, SOLUTION INTRAMUSCULAR; INTRAVENOUS PRN
Status: DISCONTINUED | OUTPATIENT
Start: 2023-09-20 | End: 2023-09-20

## 2023-09-20 RX ORDER — OXYCODONE HYDROCHLORIDE 5 MG/1
10 TABLET ORAL
Status: DISCONTINUED | OUTPATIENT
Start: 2023-09-20 | End: 2023-09-20 | Stop reason: HOSPADM

## 2023-09-20 RX ORDER — SODIUM CHLORIDE, SODIUM LACTATE, POTASSIUM CHLORIDE, CALCIUM CHLORIDE 600; 310; 30; 20 MG/100ML; MG/100ML; MG/100ML; MG/100ML
INJECTION, SOLUTION INTRAVENOUS CONTINUOUS
Status: DISCONTINUED | OUTPATIENT
Start: 2023-09-20 | End: 2023-09-20 | Stop reason: HOSPADM

## 2023-09-20 RX ORDER — NALOXONE HYDROCHLORIDE 0.4 MG/ML
0.4 INJECTION, SOLUTION INTRAMUSCULAR; INTRAVENOUS; SUBCUTANEOUS
Status: DISCONTINUED | OUTPATIENT
Start: 2023-09-20 | End: 2023-09-22 | Stop reason: HOSPADM

## 2023-09-20 RX ORDER — CEFAZOLIN SODIUM/WATER 3 G/30 ML
3 SYRINGE (ML) INTRAVENOUS SEE ADMIN INSTRUCTIONS
Status: DISCONTINUED | OUTPATIENT
Start: 2023-09-20 | End: 2023-09-20 | Stop reason: HOSPADM

## 2023-09-20 RX ORDER — PROPOFOL 10 MG/ML
INJECTION, EMULSION INTRAVENOUS PRN
Status: DISCONTINUED | OUTPATIENT
Start: 2023-09-20 | End: 2023-09-20

## 2023-09-20 RX ORDER — HYDROMORPHONE HCL IN WATER/PF 6 MG/30 ML
0.4 PATIENT CONTROLLED ANALGESIA SYRINGE INTRAVENOUS EVERY 5 MIN PRN
Status: DISCONTINUED | OUTPATIENT
Start: 2023-09-20 | End: 2023-09-20 | Stop reason: HOSPADM

## 2023-09-20 RX ORDER — ONDANSETRON 4 MG/1
4 TABLET, ORALLY DISINTEGRATING ORAL EVERY 30 MIN PRN
Status: DISCONTINUED | OUTPATIENT
Start: 2023-09-20 | End: 2023-09-20 | Stop reason: HOSPADM

## 2023-09-20 RX ORDER — FUROSEMIDE 20 MG
20 TABLET ORAL 2 TIMES DAILY
Status: DISCONTINUED | OUTPATIENT
Start: 2023-09-21 | End: 2023-09-22 | Stop reason: HOSPADM

## 2023-09-20 RX ORDER — ONDANSETRON 2 MG/ML
4 INJECTION INTRAMUSCULAR; INTRAVENOUS EVERY 6 HOURS PRN
Status: DISCONTINUED | OUTPATIENT
Start: 2023-09-20 | End: 2023-09-22 | Stop reason: HOSPADM

## 2023-09-20 RX ORDER — ONDANSETRON 4 MG/1
4 TABLET, ORALLY DISINTEGRATING ORAL EVERY 6 HOURS PRN
Status: DISCONTINUED | OUTPATIENT
Start: 2023-09-20 | End: 2023-09-22 | Stop reason: HOSPADM

## 2023-09-20 RX ORDER — NALOXONE HYDROCHLORIDE 0.4 MG/ML
0.2 INJECTION, SOLUTION INTRAMUSCULAR; INTRAVENOUS; SUBCUTANEOUS
Status: DISCONTINUED | OUTPATIENT
Start: 2023-09-20 | End: 2023-09-22 | Stop reason: HOSPADM

## 2023-09-20 RX ORDER — CEFAZOLIN SODIUM/WATER 3 G/30 ML
3 SYRINGE (ML) INTRAVENOUS
Status: COMPLETED | OUTPATIENT
Start: 2023-09-20 | End: 2023-09-20

## 2023-09-20 RX ORDER — NEOSTIGMINE METHYLSULFATE 1 MG/ML
VIAL (ML) INJECTION PRN
Status: DISCONTINUED | OUTPATIENT
Start: 2023-09-20 | End: 2023-09-20

## 2023-09-20 RX ORDER — POLYETHYLENE GLYCOL 3350 17 G/17G
17 POWDER, FOR SOLUTION ORAL DAILY
Status: DISCONTINUED | OUTPATIENT
Start: 2023-09-21 | End: 2023-09-22 | Stop reason: HOSPADM

## 2023-09-20 RX ORDER — OXYCODONE HYDROCHLORIDE 5 MG/1
10 TABLET ORAL EVERY 4 HOURS PRN
Status: DISCONTINUED | OUTPATIENT
Start: 2023-09-20 | End: 2023-09-22 | Stop reason: HOSPADM

## 2023-09-20 RX ORDER — BISACODYL 10 MG
10 SUPPOSITORY, RECTAL RECTAL DAILY PRN
Status: DISCONTINUED | OUTPATIENT
Start: 2023-09-20 | End: 2023-09-22 | Stop reason: HOSPADM

## 2023-09-20 RX ORDER — HYDROMORPHONE HCL IN WATER/PF 6 MG/30 ML
0.2 PATIENT CONTROLLED ANALGESIA SYRINGE INTRAVENOUS
Status: DISCONTINUED | OUTPATIENT
Start: 2023-09-20 | End: 2023-09-22 | Stop reason: HOSPADM

## 2023-09-20 RX ORDER — ONDANSETRON 2 MG/ML
4 INJECTION INTRAMUSCULAR; INTRAVENOUS EVERY 30 MIN PRN
Status: DISCONTINUED | OUTPATIENT
Start: 2023-09-20 | End: 2023-09-20 | Stop reason: HOSPADM

## 2023-09-20 RX ORDER — ACETAMINOPHEN 325 MG/1
975 TABLET ORAL EVERY 8 HOURS
Status: DISCONTINUED | OUTPATIENT
Start: 2023-09-20 | End: 2023-09-22 | Stop reason: HOSPADM

## 2023-09-20 RX ORDER — OXYCODONE HYDROCHLORIDE 5 MG/1
5 TABLET ORAL
Status: DISCONTINUED | OUTPATIENT
Start: 2023-09-20 | End: 2023-09-20 | Stop reason: HOSPADM

## 2023-09-20 RX ORDER — FENTANYL CITRATE 50 UG/ML
50 INJECTION, SOLUTION INTRAMUSCULAR; INTRAVENOUS EVERY 5 MIN PRN
Status: DISCONTINUED | OUTPATIENT
Start: 2023-09-20 | End: 2023-09-20 | Stop reason: HOSPADM

## 2023-09-20 RX ORDER — FENTANYL CITRATE 50 UG/ML
25 INJECTION, SOLUTION INTRAMUSCULAR; INTRAVENOUS EVERY 5 MIN PRN
Status: DISCONTINUED | OUTPATIENT
Start: 2023-09-20 | End: 2023-09-20 | Stop reason: HOSPADM

## 2023-09-20 RX ORDER — ONDANSETRON 2 MG/ML
INJECTION INTRAMUSCULAR; INTRAVENOUS PRN
Status: DISCONTINUED | OUTPATIENT
Start: 2023-09-20 | End: 2023-09-20

## 2023-09-20 RX ORDER — OXYCODONE HYDROCHLORIDE 5 MG/1
5 TABLET ORAL EVERY 4 HOURS PRN
Status: DISCONTINUED | OUTPATIENT
Start: 2023-09-20 | End: 2023-09-22 | Stop reason: HOSPADM

## 2023-09-20 RX ORDER — SODIUM CHLORIDE, SODIUM LACTATE, POTASSIUM CHLORIDE, CALCIUM CHLORIDE 600; 310; 30; 20 MG/100ML; MG/100ML; MG/100ML; MG/100ML
INJECTION, SOLUTION INTRAVENOUS CONTINUOUS
Status: DISCONTINUED | OUTPATIENT
Start: 2023-09-20 | End: 2023-09-22 | Stop reason: HOSPADM

## 2023-09-20 RX ORDER — LIDOCAINE 40 MG/G
CREAM TOPICAL
Status: DISCONTINUED | OUTPATIENT
Start: 2023-09-20 | End: 2023-09-20 | Stop reason: HOSPADM

## 2023-09-20 RX ORDER — FENTANYL CITRATE 50 UG/ML
INJECTION, SOLUTION INTRAMUSCULAR; INTRAVENOUS PRN
Status: DISCONTINUED | OUTPATIENT
Start: 2023-09-20 | End: 2023-09-20

## 2023-09-20 RX ORDER — ENOXAPARIN SODIUM 100 MG/ML
40 INJECTION SUBCUTANEOUS EVERY 24 HOURS
Status: DISCONTINUED | OUTPATIENT
Start: 2023-09-21 | End: 2023-09-22 | Stop reason: HOSPADM

## 2023-09-20 RX ORDER — BUPIVACAINE HYDROCHLORIDE AND EPINEPHRINE 5; 5 MG/ML; UG/ML
INJECTION, SOLUTION PERINEURAL PRN
Status: DISCONTINUED | OUTPATIENT
Start: 2023-09-20 | End: 2023-09-20 | Stop reason: HOSPADM

## 2023-09-20 RX ORDER — ACETAMINOPHEN 325 MG/1
975 TABLET ORAL ONCE
Status: COMPLETED | OUTPATIENT
Start: 2023-09-20 | End: 2023-09-20

## 2023-09-20 RX ORDER — HYDROMORPHONE HCL IN WATER/PF 6 MG/30 ML
0.2 PATIENT CONTROLLED ANALGESIA SYRINGE INTRAVENOUS EVERY 5 MIN PRN
Status: DISCONTINUED | OUTPATIENT
Start: 2023-09-20 | End: 2023-09-20 | Stop reason: HOSPADM

## 2023-09-20 RX ORDER — KETOROLAC TROMETHAMINE 30 MG/ML
INJECTION, SOLUTION INTRAMUSCULAR; INTRAVENOUS PRN
Status: DISCONTINUED | OUTPATIENT
Start: 2023-09-20 | End: 2023-09-20

## 2023-09-20 RX ORDER — EPHEDRINE SULFATE 50 MG/ML
INJECTION, SOLUTION INTRAMUSCULAR; INTRAVENOUS; SUBCUTANEOUS PRN
Status: DISCONTINUED | OUTPATIENT
Start: 2023-09-20 | End: 2023-09-20

## 2023-09-20 RX ORDER — LIDOCAINE 40 MG/G
CREAM TOPICAL
Status: DISCONTINUED | OUTPATIENT
Start: 2023-09-20 | End: 2023-09-22 | Stop reason: HOSPADM

## 2023-09-20 RX ORDER — LIDOCAINE HYDROCHLORIDE 20 MG/ML
INJECTION, SOLUTION INFILTRATION; PERINEURAL PRN
Status: DISCONTINUED | OUTPATIENT
Start: 2023-09-20 | End: 2023-09-20

## 2023-09-20 RX ADMIN — GLYCOPYRROLATE 0.2 MG: 0.2 INJECTION, SOLUTION INTRAMUSCULAR; INTRAVENOUS at 08:30

## 2023-09-20 RX ADMIN — HYDROMORPHONE HYDROCHLORIDE 0.5 MG: 1 INJECTION, SOLUTION INTRAMUSCULAR; INTRAVENOUS; SUBCUTANEOUS at 08:51

## 2023-09-20 RX ADMIN — KETOROLAC TROMETHAMINE 15 MG: 30 INJECTION, SOLUTION INTRAMUSCULAR at 08:30

## 2023-09-20 RX ADMIN — ACETAMINOPHEN 975 MG: 325 TABLET, FILM COATED ORAL at 15:59

## 2023-09-20 RX ADMIN — ROCURONIUM BROMIDE 10 MG: 50 INJECTION, SOLUTION INTRAVENOUS at 10:10

## 2023-09-20 RX ADMIN — Medication 5 MG: at 10:03

## 2023-09-20 RX ADMIN — DEXMEDETOMIDINE HYDROCHLORIDE 0.02 MCG/KG/HR: 100 INJECTION, SOLUTION INTRAVENOUS at 08:18

## 2023-09-20 RX ADMIN — HYDROMORPHONE HYDROCHLORIDE 0.5 MG: 1 INJECTION, SOLUTION INTRAMUSCULAR; INTRAVENOUS; SUBCUTANEOUS at 08:38

## 2023-09-20 RX ADMIN — SODIUM CHLORIDE, POTASSIUM CHLORIDE, SODIUM LACTATE AND CALCIUM CHLORIDE: 600; 310; 30; 20 INJECTION, SOLUTION INTRAVENOUS at 23:52

## 2023-09-20 RX ADMIN — Medication 10 MG: at 10:56

## 2023-09-20 RX ADMIN — ACETAMINOPHEN 975 MG: 325 TABLET, FILM COATED ORAL at 23:52

## 2023-09-20 RX ADMIN — SODIUM CHLORIDE, POTASSIUM CHLORIDE, SODIUM LACTATE AND CALCIUM CHLORIDE: 600; 310; 30; 20 INJECTION, SOLUTION INTRAVENOUS at 07:03

## 2023-09-20 RX ADMIN — SODIUM CHLORIDE, POTASSIUM CHLORIDE, SODIUM LACTATE AND CALCIUM CHLORIDE: 600; 310; 30; 20 INJECTION, SOLUTION INTRAVENOUS at 14:39

## 2023-09-20 RX ADMIN — ROCURONIUM BROMIDE 50 MG: 50 INJECTION, SOLUTION INTRAVENOUS at 08:18

## 2023-09-20 RX ADMIN — ONDANSETRON 4 MG: 2 INJECTION INTRAMUSCULAR; INTRAVENOUS at 08:46

## 2023-09-20 RX ADMIN — PHENYLEPHRINE HYDROCHLORIDE 100 MCG: 10 INJECTION INTRAVENOUS at 09:49

## 2023-09-20 RX ADMIN — PROPOFOL 120 MG: 10 INJECTION, EMULSION INTRAVENOUS at 08:17

## 2023-09-20 RX ADMIN — Medication 3 G: at 08:11

## 2023-09-20 RX ADMIN — FENTANYL CITRATE 100 MCG: 50 INJECTION, SOLUTION INTRAMUSCULAR; INTRAVENOUS at 08:17

## 2023-09-20 RX ADMIN — PROPOFOL 25 MCG/KG/MIN: 10 INJECTION, EMULSION INTRAVENOUS at 09:00

## 2023-09-20 RX ADMIN — NEOSTIGMINE METHYLSULFATE 5 MG: 1 INJECTION, SOLUTION INTRAVENOUS at 10:43

## 2023-09-20 RX ADMIN — GLYCOPYRROLATE 0.7 MG: 0.2 INJECTION, SOLUTION INTRAMUSCULAR; INTRAVENOUS at 10:43

## 2023-09-20 RX ADMIN — ACETAMINOPHEN 975 MG: 325 TABLET, FILM COATED ORAL at 07:04

## 2023-09-20 RX ADMIN — LIDOCAINE HYDROCHLORIDE 50 MG: 20 INJECTION, SOLUTION INFILTRATION; PERINEURAL at 08:17

## 2023-09-20 RX ADMIN — SODIUM CHLORIDE, POTASSIUM CHLORIDE, SODIUM LACTATE AND CALCIUM CHLORIDE: 600; 310; 30; 20 INJECTION, SOLUTION INTRAVENOUS at 15:59

## 2023-09-20 RX ADMIN — ROCURONIUM BROMIDE 10 MG: 50 INJECTION, SOLUTION INTRAVENOUS at 09:17

## 2023-09-20 RX ADMIN — Medication 5 MG: at 09:51

## 2023-09-20 ASSESSMENT — ACTIVITIES OF DAILY LIVING (ADL)
ADLS_ACUITY_SCORE: 25
ADLS_ACUITY_SCORE: 23
ADLS_ACUITY_SCORE: 25
ADLS_ACUITY_SCORE: 23
ADLS_ACUITY_SCORE: 25
ADLS_ACUITY_SCORE: 23

## 2023-09-20 NOTE — PLAN OF CARE
End of Shift Summary  For vital signs and complete assessments, please see documentation flowsheets.     Pertinent assessments: Patient AO4, VSS, 95% on RA. Assist x1 with GB/walker. Lap sites x5 with dermabond. +2 edema in LE. No complaints of pain or nausea. Tolerating clear liquid diet. Unable to urinate this shift, bladder scan 283ml. Will recheck in 1-2hr. Educated on Incentive spirometry. Resting comfortably.     Major Shift Events: Admit to unit  Treatment Plan: Frequent vitals, pain mgmt, monitor I/Os,    Bedside Nurse: Marion Keita RN

## 2023-09-20 NOTE — ANESTHESIA PREPROCEDURE EVALUATION
"Anesthesia Pre-Procedure Evaluation    Patient: Damien Vallecillo   MRN: [id number] : 1935        Procedure : Procedure(s):  Robot assisted diagnostic laparoscopy, Resection of gastric gastrointestinal stromal  tumor          Past Medical History:   Diagnosis Date    Acute suppurative arthritis (H)     Acute, but ill-defined, cerebrovascular disease     Anemia due to blood loss, acute 2023    Ankle fracture 2018    Arthritis     Benign prostatic hyperplasia with urinary frequency 2022    Cheilosis 2018    Chronic headaches     Closed right ankle fracture 2018    Cognitive attention deficit 2021    Diabetes (H)     \"Pre-diabetes\"    Dislocation of right patella 2020    Elevated serum creatinine 2019    GFR Estimate Date Value Ref Range Status 2019 84 >60 mL/min/[1.73_m2] Final   Comment:   Non  GFR Calc Starting 2018, serum creatinine based estimated GFR (eGFR) will be  calculated using the Chronic Kidney Disease Epidemiology Collaboration  (CKD-EPI) equation.        Encounter for immunization 2022    External hemorrhoids 2022    Fall 2023    Fe deficiency anemia 07/10/2023    Fecal incontinence 2020    Gastric stromal tumor (H) 2023    Glucose intolerance (impaired glucose tolerance) 2013    Gout, unspecified     Grieving 2021    Hammer toe of left foot 10/21/2016    Heart murmur 2023    Hematuria     History of arthroplasty of right knee 2017    History of blood transfusion     Hyperlipidemia LDL goal <160 2019    Hypersomnia 2023    Left foot pain 2019    Left knee pain, unspecified chronicity 2017    Lentigo maligna (H)     Malignant neoplasm of rectosigmoid junction (H)     Morbid obesity due to excess calories (H) 10/21/2016    RENAE (obstructive sleep apnea) 2023    Pedal edema 2018    Peripheral polyneuropathy 2019    Physical " deconditioning 03/08/2023    Pre-diabetes 10/21/2016    RBC microcytosis 02/14/2017    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    Right knee pain, unspecified chronicity 05/08/2017    Rosacea 09/28/2018    Syncope     Tubulovillous adenoma of colon     Venous stasis dermatitis of both lower extremities 10/21/2016      Past Surgical History:   Procedure Laterality Date    ARTHROPLASTY KNEE Right 6/19/2017    Procedure: ARTHROPLASTY KNEE;  Right total knee arthroplasty, Hammer toe repair toe 2,3,4,5 left foot with osteotomy;  Surgeon: Alec Raymond MD;  Location:  OR    COLONOSCOPY N/A 6/23/2015    Procedure: COMBINED COLONOSCOPY, SINGLE OR MULTIPLE BIOPSY/POLYPECTOMY BY BIOPSY;  Surgeon: Kimberly Alfaro MD;  Location:  GI    COLONOSCOPY N/A 7/30/2015    Procedure: COLONOSCOPY;  Surgeon: Enrique Salazar MD;  Location:  OR    COLONOSCOPY N/A 7/31/2018    Procedure: COLONOSCOPY;  Colonoscopy;  Surgeon: Tiffany Cheema MD;  Location:  GI    COLONOSCOPY N/A 5/26/2023    Procedure: Colonoscopy;  Surgeon: Juan Grimaldo MD;  Location:  GI    COLONOSCOPY N/A 6/15/2023    Procedure: colonoscopy;   no cecum as pt has had partial colectomy;  Surgeon: Sherif Escalona MD;  Location:  GI    ENDOSCOPIC ULTRASOUND UPPER GASTROINTESTINAL TRACT (GI) N/A 7/13/2023    Procedure: Endoscopic ultrasound upper gastrointestinal tract with fine needle aspiration;  Surgeon: Kae cMcall MD;  Location:  OR    ESOPHAGOSCOPY, GASTROSCOPY, DUODENOSCOPY (EGD), COMBINED N/A 5/26/2023    Procedure: Esophagoscopy, gastroscopy, duodenoscopy (EGD), combined;  Surgeon: Juan Grimaldo MD;  Location:  GI    HERNIORRHAPHY EPIGASTRIC  4/27/2012    Procedure:HERNIORRHAPHY EPIGASTRIC; Epigastric Hernia Repair with mesh ; Surgeon:BOLIVAR OLIVEIRA; Location: OR    LAPAROSCOPIC ASSISTED COLECTOMY Right 7/30/2015    Procedure: LAPAROSCOPIC ASSISTED COLECTOMY;  Surgeon: Enrique Salazar MD;  Location:  OR     ORTHOPEDIC SURGERY      lt knee arthroplasty    REALIGN PATELLA Right 10/9/2017    Procedure: REALIGN PATELLA;  Revision right total knee arthroplasty;  Surgeon: Alec Raymond MD;  Location: [facility] OR    REPAIR HAMMER TOE Left 6/19/2017    Procedure: REPAIR HAMMER TOE;  Hammer toe repair toe 2,3,4,5 left foot with osteotomy   ;  Surgeon: Beverly Kim DPM, Podiatry/Foot and Ankle Surgery;  Location: [facility] OR    SIGMOIDOSCOPY FLEXIBLE  5/29/2013    Procedure: SIGMOIDOSCOPY FLEXIBLE;  SIGMOIDOSCOPY FLEXIBLE (FV) Needs blood sugar ck'd;  Surgeon: Enrique Salazar MD;  Location:  GI    skin cancer excision      ZZC NONSPECIFIC PROCEDURE      right heel surgery; spur removed.      Allergies   Allergen Reactions    Levetiracetam [Keppra] Rash    Oxcarbazepine [Oxcarbazepine] Rash      Social History     Tobacco Use    Smoking status: Never     Passive exposure: Never    Smokeless tobacco: Never   Substance Use Topics    Alcohol use: Yes     Comment: occ.      Wt Readings from Last 1 Encounters:   09/20/23 120.5 kg (265 lb 9.6 oz)        Anesthesia Evaluation            ROS/MED HX  ENT/Pulmonary:     (+) sleep apnea,                                      Neurologic:  - neg neurologic ROS     Cardiovascular:     (+)  hypertension- -   -  - -             fainting (syncope).                         METS/Exercise Tolerance: >4 METS    Hematologic:  - neg hematologic  ROS     Musculoskeletal:  - neg musculoskeletal ROS     GI/Hepatic: Comment: GI stromal tumor      Renal/Genitourinary:  - neg Renal ROS     Endo:     (+)  type II DM,             Obesity,       Psychiatric/Substance Use:  - neg psychiatric ROS     Infectious Disease:  - neg infectious disease ROS     Malignancy:  - neg malignancy ROS     Other:  - neg other ROS          Physical Exam    Airway        Mallampati: II   TM distance: > 3 FB   Neck ROM: full   Mouth opening: > 3 cm    Respiratory Devices and Support         Dental       (+)  Edentulous      Cardiovascular   cardiovascular exam normal       Rhythm and rate: regular and normal     Pulmonary   pulmonary exam normal        breath sounds clear to auscultation           OUTSIDE LABS:  CBC:   Lab Results   Component Value Date    WBC 7.2 09/06/2023    WBC 5.9 07/10/2023    HGB 14.0 09/06/2023    HGB 10.9 (L) 07/10/2023    HCT 42.1 09/06/2023    HCT 35.9 (L) 07/10/2023     09/06/2023     07/10/2023     BMP:   Lab Results   Component Value Date     09/06/2023     07/10/2023    POTASSIUM 3.8 09/06/2023    POTASSIUM 4.3 07/10/2023    CHLORIDE 104 09/06/2023    CHLORIDE 104 07/10/2023    CO2 27 09/06/2023    CO2 26 07/10/2023    BUN 14.9 09/06/2023    BUN 12.6 07/10/2023    CR 0.81 09/06/2023    CR 0.9 08/22/2023     (H) 09/20/2023     (H) 09/06/2023     COAGS:   Lab Results   Component Value Date    INR 1.24 (H) 05/25/2023     POC:   Lab Results   Component Value Date     (H) 12/28/2018     HEPATIC:   Lab Results   Component Value Date    ALBUMIN 3.9 06/12/2023    PROTTOTAL 5.9 (L) 06/12/2023    ALT 16 06/12/2023    AST 19 06/12/2023    GGT 29 10/24/2012    ALKPHOS 48 06/12/2023    BILITOTAL 0.5 06/12/2023     OTHER:   Lab Results   Component Value Date    A1C 6.8 (H) 09/06/2023    ANURAG 9.5 09/06/2023    PHOS 3.1 06/13/2023    MAG 2.1 06/13/2023    LIPASE 16 06/12/2023    TSH 3.16 03/29/2022    T4 1.12 11/15/2019    SED 12 10/11/2006       Anesthesia Plan    ASA Status:  3    NPO Status:  NPO Appropriate    Anesthesia Type: General.     - Airway: ETT   Induction: Intravenous.   Maintenance: Inhalation.        Consents    Anesthesia Plan(s) and associated risks, benefits, and realistic alternatives discussed. Questions answered and patient/representative(s) expressed understanding.     - Discussed: Risks, Benefits and Alternatives for the PROCEDURE were discussed     - Discussed with:  Patient       Use of blood products discussed: Yes.     - Discussed  with: Patient.     - Consented: consented to blood products            Reason for refusal: other.     Postoperative Care    Pain management: IV analgesics, Oral pain medications.   PONV prophylaxis: Ondansetron (or other 5HT-3), Dexamethasone or Solumedrol     Comments:    Other Comments: MARIE KHAN MD

## 2023-09-20 NOTE — ANESTHESIA PROCEDURE NOTES
Airway       Patient location during procedure: OR       Procedure Start/Stop Times: 9/20/2023 8:22 AM  Staff -        Anesthesiologist:  Raiza Sprague MD       CRNA: Raymond Cedillo APRN CRNA       Performed By: CRNA  Consent for Airway        Urgency: elective  Indications and Patient Condition       Indications for airway management: ina-procedural       Induction type:intravenous       Mask difficulty assessment: 2 - vent by mask + OA or adjuvant +/- NMBA    Final Airway Details       Final airway type: endotracheal airway       Successful airway: ETT - single  Endotracheal Airway Details        ETT size (mm): 8.0       Cuffed: yes       Successful intubation technique: video laryngoscopy       VL Blade Size: Glidescope 3       Grade View of Cords: 1       Adjucts: stylet       Position: Right       Measured from: lips       Secured at (cm): 22       Bite block used: Oral Airway    Post intubation assessment        Placement verified by: capnometry, equal breath sounds and chest rise        Number of attempts at approach: 1       Number of other approaches attempted: 0       Secured with: plastic tape       Ease of procedure: easy       Dentition: Intact and Unchanged    Medication(s) Administered   Medication Administration Time: 9/20/2023 8:22 AM

## 2023-09-20 NOTE — BRIEF OP NOTE
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], intraabdominal adhesions  Post-operative diagnosis Same as pre-operative diagnosis    Procedure: Procedure(s):  Robot assisted lysis of adhesions, Resection of gastric gastrointestinal stromal  tumor, omental mass biopsy  Surgeon: Surgeon(s) and Role:     * Chino Ny MD - Primary     * Cathryn Cervantes MD - Assisting     * Saba Alatorre PA-C - Assisting  Anesthesia: General   Estimated Blood Loss: Less than 50 ml    Drains: None  Specimens:   ID Type Source Tests Collected by Time Destination   1 : Omental biopsy Tissue Omentum SURGICAL PATHOLOGY EXAM Chino Ny MD 9/20/2023  9:32 AM    2 : stromal tumor Tissue Stomach SURGICAL PATHOLOGY EXAM Chino Ny MD 9/20/2023 10:42 AM    3 : Omental mass Tissue Omentum SURGICAL PATHOLOGY EXAM Chino Ny MD 9/20/2023 10:46 AM      Findings:   Necrotic omental mass, frozen section revealed fat necrosis, significant anterior adhesions, exophytic tumor on posterior aspect of lesser curve .  Complications: None.  Implants: * No implants in log *

## 2023-09-20 NOTE — PROGRESS NOTES
"NS ADMISSION NOTE    Patient admitted to room 541 at approximately 1415 via cart from PACU. Patient was accompanied by spouse and son.     Verbal SBAR report received from PACU RN prior to patient arrival.     Patient trasferred to bed via air kodi. Patient alert and oriented X 4. The patient is not having any pain at this time  . Admission vital signs: Blood pressure 115/61, pulse 79, temperature 97.4  F (36.3  C), temperature source Oral, resp. rate 19, height 1.778 m (5' 10\"), weight 120.5 kg (265 lb 9.6 oz), SpO2 98 %. Patient and spouse were oriented to plan of care, call light, bed controls, tv, telephone, bathroom, and visiting hours.     Risk Assessment    The following safety risks were identified during admission: fall and skin.     Skin Initial Assessment    This writer admitted this patient and completed a full skin assessment and Stefan score in the Adult PCS flowsheet. Appropriate interventions initiated as needed.     Secondary skin check completed by Marion MAYA .    Stefan Risk Assessment  Sensory Perception: 3-->slightly limited  Moisture: 3-->occasionally moist  Activity: 3-->walks occasionally  Mobility: 3-->slightly limited  Nutrition: 3-->adequate  Friction and Shear: 2-->potential problem  Stefan Score: 17    Education    Patient admission and education completed and documented:       Eugenia Fong RN      "

## 2023-09-20 NOTE — PROGRESS NOTES
Bladder Screen    Bladder Assessment    Patient unable to void this shift post surgery. Bladder scan at 1525 was 313 ml.  Patient assisted to the bathroom at 1725, had difficult voiding and  ml. Denies bladder discomfort. Patient stated that he takes Flomax daily that he has not taken for two days. Treatment team to follow up in the AM. Will continue to follow bladder management protocol.

## 2023-09-20 NOTE — ANESTHESIA CARE TRANSFER NOTE
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    Anesthesia Type:   General     Note:    Oropharynx: oral airway in place  Level of Consciousness: drowsy  Oxygen Supplementation: face mask  Level of Supplemental Oxygen (L/min / FiO2): 6  Independent Airway: airway patency satisfactory and stable  Dentition: dentition unchanged  Vital Signs Stable: post-procedure vital signs reviewed and stable  Report to RN Given: handoff report given  Patient transferred to: PACU    Handoff Report: Identifed the Patient, Identified the Reponsible Provider, Reviewed the pertinent medical history, Discussed the surgical course, Reviewed Intra-OP anesthesia mangement and issues during anesthesia, Set expectations for post-procedure period and Allowed opportunity for questions and acknowledgement of understanding      Vitals:  Vitals Value Taken Time   /74 09/20/23 1109   Temp     Pulse 67 09/20/23 1113   Resp 30 09/20/23 1113   SpO2 100 % 09/20/23 1113   Vitals shown include unvalidated device data.    Electronically Signed By: MAKAYLA Watson CRNA  September 20, 2023  11:14 AM

## 2023-09-20 NOTE — ANESTHESIA POSTPROCEDURE EVALUATION
Patient: Damien Vallecillo    Procedure: Procedure(s):  Robot assisted lysis of adhesions, Resection of gastric gastrointestinal stromal  tumor, omental mass biopsy       Anesthesia Type:  General    Note:  Disposition: Inpatient   Postop Pain Control: Uneventful            Sign Out: Well controlled pain   PONV: No   Neuro/Psych: Uneventful            Sign Out: Acceptable/Baseline neuro status   Airway/Respiratory: Uneventful            Sign Out: Acceptable/Baseline resp. status   CV/Hemodynamics: Uneventful            Sign Out: Acceptable CV status; No obvious hypovolemia; No obvious fluid overload   Other NRE: NONE   DID A NON-ROUTINE EVENT OCCUR? No           Last vitals:  Vitals Value Taken Time   /68 09/20/23 1315   Temp 97.8  F (36.6  C) 09/20/23 1245   Pulse 63 09/20/23 1321   Resp 17 09/20/23 1321   SpO2 95 % 09/20/23 1321   Vitals shown include unvalidated device data.    Electronically Signed By: Raiza Sprague MD  September 20, 2023  1:22 PM

## 2023-09-21 LAB — GLUCOSE BLDC GLUCOMTR-MCNC: 124 MG/DL (ref 70–99)

## 2023-09-21 PROCEDURE — 250N000011 HC RX IP 250 OP 636: Mod: JZ | Performed by: SURGERY

## 2023-09-21 PROCEDURE — 258N000003 HC RX IP 258 OP 636: Performed by: SURGERY

## 2023-09-21 PROCEDURE — 120N000001 HC R&B MED SURG/OB

## 2023-09-21 PROCEDURE — 250N000013 HC RX MED GY IP 250 OP 250 PS 637: Performed by: SURGERY

## 2023-09-21 RX ORDER — TAMSULOSIN HYDROCHLORIDE 0.4 MG/1
0.4 CAPSULE ORAL DAILY
Status: DISCONTINUED | OUTPATIENT
Start: 2023-09-21 | End: 2023-09-22 | Stop reason: HOSPADM

## 2023-09-21 RX ADMIN — ACETAMINOPHEN 975 MG: 325 TABLET, FILM COATED ORAL at 08:45

## 2023-09-21 RX ADMIN — ENOXAPARIN SODIUM 40 MG: 40 INJECTION SUBCUTANEOUS at 08:52

## 2023-09-21 RX ADMIN — ACETAMINOPHEN 975 MG: 325 TABLET, FILM COATED ORAL at 15:58

## 2023-09-21 RX ADMIN — SODIUM CHLORIDE, POTASSIUM CHLORIDE, SODIUM LACTATE AND CALCIUM CHLORIDE: 600; 310; 30; 20 INJECTION, SOLUTION INTRAVENOUS at 08:59

## 2023-09-21 RX ADMIN — TAMSULOSIN HYDROCHLORIDE 0.4 MG: 0.4 CAPSULE ORAL at 09:11

## 2023-09-21 RX ADMIN — POLYETHYLENE GLYCOL 3350 17 G: 17 POWDER, FOR SOLUTION ORAL at 08:45

## 2023-09-21 RX ADMIN — FUROSEMIDE 20 MG: 20 TABLET ORAL at 20:04

## 2023-09-21 RX ADMIN — FUROSEMIDE 20 MG: 20 TABLET ORAL at 08:46

## 2023-09-21 ASSESSMENT — ACTIVITIES OF DAILY LIVING (ADL)
ADLS_ACUITY_SCORE: 26
ADLS_ACUITY_SCORE: 28
ADLS_ACUITY_SCORE: 26
ADLS_ACUITY_SCORE: 28
ADLS_ACUITY_SCORE: 28
ADLS_ACUITY_SCORE: 26
ADLS_ACUITY_SCORE: 26
ADLS_ACUITY_SCORE: 28
ADLS_ACUITY_SCORE: 28
ADLS_ACUITY_SCORE: 26
ADLS_ACUITY_SCORE: 28
ADLS_ACUITY_SCORE: 28

## 2023-09-21 NOTE — PLAN OF CARE
End of Shift Summary  For vital signs and complete assessments, please see documentation flowsheets.     Pertinent assessments:  Patient Ax4, VSS, 95% on RA. Denies pain , SOB and nausea. Denies passing flatus, faint BS. Assist x1 with GB/walker. Lap sites x5 with dermabond. +2 edema in LE.Tolerating clear liquid diet. Unable to urinate. Educated on Incentive spirometry.     Major Shift Events:  Unable to urinate, scan 475, straight cathed for 600ml.    Treatment Plan: Frequent vitals, pain mgmt, monitor I/Os,    Bedside Nurse: Remedios Kelsey RN

## 2023-09-21 NOTE — PLAN OF CARE
To Do:  End of Shift Summary  For vital signs and complete assessments, please see documentation flowsheets.     Pertinent assessments:  Assumed cares [id number]. Patient AO4, VSS, 95% on RA. Denies pain , SOB and nausea. Assist x1 with GB/walker. Lap sites x5 with dermabond. +2 edema in LE.Tolerating clear liquid diet. Unable to urinate. Educated on Incentive spirometry. Resting comfortably.     Major Shift Events: Bladder scanned 375 ML and straight cathed 575 ML @ 2028.    Treatment Plan: Frequent vitals, pain mgmt, monitor I/Os,    Bedside Nurse: Ivet Jimenez RN

## 2023-09-21 NOTE — PROGRESS NOTES
"[facility]   General Surgery Progress Note           Assessment and Plan:   Assessment:   1 Day Post-Op  s/p Procedure(s):  Robot assisted lysis of adhesions, Resection of gastric gastrointestinal stromal  tumor, omental mass biopsy  Urinary retention - on flomax      Plan:   -place lindquist if persistent urinary retention  -ADAT  -ppx: lovenox  -bowel regimen: miralax  -discharge tomorrow if passing flatus and tolerating diet advancement         Interval History:   Comfortable in chair, wife present. Minimal pain and not taking narcotics. Tolerating CLD, interested in advancing diet. No passing flatus yet. Unable to urinate and has required straight cath x 3 so far. Discussed with RN, will place lindquist if continues to retain urine.         Physical Exam:   Blood pressure 135/71, pulse 78, temperature 98.3  F (36.8  C), temperature source Oral, resp. rate 24, height 1.778 m (5' 10\"), weight 120.5 kg (265 lb 9.6 oz), SpO2 94 %.    I/O last 3 completed shifts:  In: 800 [I.V.:800]  Out: 1175 [Urine:1175]    Abdomen:   soft, rounded, non-tender and normal bowel sounds   Inc(s) - clean, dry, intact              Data:     Recent Labs   Lab 09/20/23  1441   *       Saba Alatorre PA-C  Text page: [phone] 8 am to 4 pm  After 4 pm, call [phone]       "

## 2023-09-21 NOTE — PLAN OF CARE
To Do:  End of Shift Summary  For vital signs and complete assessments, please see documentation flowsheets.     Pertinent assessments:  6520-7571 Shift: Patient alert, oriented x 4 and in no acute distress. Breathing comfortably on RA. Denies pain, HA, CP, abd pain and nausea. Denies passing flatus, faint BS. IVF infusing. Assist x1 with GB/walker. Lap sites x5 with dermabond, c/d/I.  +2 edema in LE.Tolerating full liquid diet. Advanced diet to regular. Unable to void, PVR >300  and straight cath x 3 this shift. Fernandez placed as per orders. Restarted on Flomax.  Encouraged use of IS..     Major Shift Events:  Unable to urinate, Fernandez placed at 1630    Treatment Plan: Monitor I/O     Bedside Nurse: Eugenia Fong RN

## 2023-09-22 ENCOUNTER — TELEPHONE (OUTPATIENT)
Dept: UROLOGY | Facility: CLINIC | Age: 88
End: 2023-09-22

## 2023-09-22 VITALS
HEIGHT: 70 IN | HEART RATE: 52 BPM | OXYGEN SATURATION: 99 % | WEIGHT: 265.6 LBS | TEMPERATURE: 97.4 F | DIASTOLIC BLOOD PRESSURE: 73 MMHG | BODY MASS INDEX: 38.02 KG/M2 | RESPIRATION RATE: 18 BRPM | SYSTOLIC BLOOD PRESSURE: 160 MMHG

## 2023-09-22 LAB
GLUCOSE BLDC GLUCOMTR-MCNC: 135 MG/DL (ref 70–99)
PATH REPORT.COMMENTS IMP SPEC: NORMAL
PATH REPORT.FINAL DX SPEC: NORMAL
PATH REPORT.GROSS SPEC: NORMAL
PATH REPORT.INTRAOP OBS SPEC DOC: NORMAL
PATH REPORT.MICROSCOPIC SPEC OTHER STN: NORMAL
PATH REPORT.RELEVANT HX SPEC: NORMAL
PATHOLOGY SYNOPTIC REPORT: NORMAL
PHOTO IMAGE: NORMAL

## 2023-09-22 PROCEDURE — 250N000013 HC RX MED GY IP 250 OP 250 PS 637: Performed by: SURGERY

## 2023-09-22 PROCEDURE — 88341 IMHCHEM/IMCYTCHM EA ADD ANTB: CPT | Mod: 26 | Performed by: PATHOLOGY

## 2023-09-22 PROCEDURE — 88331 PATH CONSLTJ SURG 1 BLK 1SPC: CPT | Mod: 26 | Performed by: PATHOLOGY

## 2023-09-22 PROCEDURE — 88309 TISSUE EXAM BY PATHOLOGIST: CPT | Mod: 26 | Performed by: PATHOLOGY

## 2023-09-22 PROCEDURE — 88305 TISSUE EXAM BY PATHOLOGIST: CPT | Mod: 26 | Performed by: PATHOLOGY

## 2023-09-22 PROCEDURE — 88342 IMHCHEM/IMCYTCHM 1ST ANTB: CPT | Mod: 26 | Performed by: PATHOLOGY

## 2023-09-22 PROCEDURE — 250N000011 HC RX IP 250 OP 636: Mod: JZ | Performed by: SURGERY

## 2023-09-22 PROCEDURE — 258N000003 HC RX IP 258 OP 636: Performed by: SURGERY

## 2023-09-22 RX ADMIN — FUROSEMIDE 20 MG: 20 TABLET ORAL at 07:51

## 2023-09-22 RX ADMIN — POLYETHYLENE GLYCOL 3350 17 G: 17 POWDER, FOR SOLUTION ORAL at 07:53

## 2023-09-22 RX ADMIN — TAMSULOSIN HYDROCHLORIDE 0.4 MG: 0.4 CAPSULE ORAL at 07:52

## 2023-09-22 RX ADMIN — ENOXAPARIN SODIUM 40 MG: 40 INJECTION SUBCUTANEOUS at 10:02

## 2023-09-22 RX ADMIN — SODIUM CHLORIDE, POTASSIUM CHLORIDE, SODIUM LACTATE AND CALCIUM CHLORIDE: 600; 310; 30; 20 INJECTION, SOLUTION INTRAVENOUS at 07:40

## 2023-09-22 RX ADMIN — ACETAMINOPHEN 975 MG: 325 TABLET, FILM COATED ORAL at 07:50

## 2023-09-22 RX ADMIN — ACETAMINOPHEN 975 MG: 325 TABLET, FILM COATED ORAL at 00:04

## 2023-09-22 ASSESSMENT — ACTIVITIES OF DAILY LIVING (ADL)
ADLS_ACUITY_SCORE: 30

## 2023-09-22 NOTE — PLAN OF CARE
"Care from [id number]    Inpatient Progress Note:  For complete assessment see flow sheet documentation.    BP (!) 143/69 (BP Location: Left arm)   Pulse 58   Temp 97.9  F (36.6  C) (Oral)   Resp 20   Ht 1.778 m (5' 10\")   Wt 120.5 kg (265 lb 9.6 oz)   SpO2 97%   BMI 38.11 kg/m         Orientation: A&Ox4  Pain status: Denied  Activity: Ax1 with walker and gait belt  Resp: WNL  Cardiac: WNL  GI: 5 lap sites with dermabond, sites CDI, Passing flatus, LBM 9/19  : Rebecca for retention  LDA: PIV  Infusions: LR @ 100ml/hr  Diet: Regular  Discharge Plan: Possible discharge home tomorrow     Will continue to monitor and provide cares.     Radha Fortune RN  "

## 2023-09-22 NOTE — DISCHARGE INSTRUCTIONS
HOME CARE FOLLOWING ABDOMINAL SURGERY  LORRAINE Lauren, MAIK Cuellar, FATMATA Ny, MAXIMILIANO Cervantes    Special instructions for Damien Vallecillo:  --Please make an appointment to follow up with Urology for lindquist catheter removal. You should also take your Flomax (tamsulosin) daily as prescribed.  --Make an appointment to see your surgeon Dr. Ny for routine follow up in 2-3 weeks after you discharge from the hospital.     INCISIONAL CARE:  Replace the bandage over your incision (or incisions) until all drainage stops, or if more comfortable to have in place.  If present, leave the steri-strips (white paper tapes) in place for 14 days after surgery.  If you have staples in your incision at the time of discharge, they will be removed at your follow-up appointment.  If Dermabond (a type of skin glue) is present, leave in place until it wears/flakes off.     BATHING:  Avoid baths for 1 week after surgery.  Showers are okay.  You may wash your hair at any time.  Gently pat your incision dry after bathing.    ACTIVITY:  Light Activity -- you may immediately be up and about as tolerated.  Driving -- you may drive when comfortable and off narcotic pain medications.  Light Work -- resume when comfortable off pain medications.  (If you can drive, you probably can work.)  Strenuous Work/Activity -- limit lifting to 20 pounds for 4 weeks.  Then, progressively increase with time.  Active Sports (running, biking, etc.) -- cautiously resume after 6 weeks.    DISCOMFORT:  Use pain medications as prescribed by your surgeon.  Take the pain medication with some food, when possible, to minimize side effects.  Expect gradual improvement.    DIET:  Return to diet you were on before surgery, unless you are given specific diet instructions.  Drink plenty of fluids.  While taking pain medications, increase dietary fiber or add a fiber supplementation like Metamucil or Citrucel to help prevent constipation - a possible side  effect of pain medications.    NAUSEA:  If nauseated from the anesthetic/pain meds; rest in bed, get up cautiously with assistance, and drink clear liquids (juice, tea, broth).    RETURN APPOINTMENT:  Schedule a follow-up visit 2-3 weeks after discharge from the hospital.  Office Phone:  [phone]     CONTACT US IF THE FOLLOWING DEVELOPS:   1. A fever that is above 101     2. If there is a large amount of drainage, bleeding, or swelling.   3. Severe pain that is not relieved by your prescription.   4. Drainage that is thick, cloudy, yellow, green or white.   5. Any other questions not answered by  Frequently Asked Questions  sheet.      FREQUENTLY ASKED QUESTIONS:    Q:  How should my incision look?    A:  Normally your incision will appear slightly swollen with light redness directly along the incision itself as it heals.  It may feel like a bump or ridge as the healing/scarring happens, and over time (3-4 months) this bump or ridge feeling should slowly go away.  In general, clear or pink watery drainage can be normal at first as your incision heals, but should decrease over time.    Q:  How do I know if my incision is infected?  A:  Look at your incision for signs of infection, like redness around the incision spreading to surrounding skin, or drainage of cloudy or foul-smelling drainage.  If you feel warm, check your temperature to see if you are running a fever.    **If any of these things occur, please notify the nurse at our office.  We may need you to come into the office for an incision check.      Q:  How do I take care of my incision?  A:  If you have a dressing in place - Starting the day after surgery, replace the dressing 1-2 times a day until there is no further drainage from the incision.  At that time, a dressing is no longer needed.  Try to minimize tape on the skin if irritation is occurring at the tape sites.  If you have significant irritation from tape on the skin, please call the office to  discuss other method of dressing your incision.    Small pieces of tape called  steri-strips  may be present directly overlying your incision; these may be removed 10 days after surgery unless otherwise specified by your surgeon.  If these tapes start to loosen at the ends, you may trim them back until they fall off or are removed.    A:  If you had  Dermabond  tissue glue used as a dressing (this causes your incision to look shiny with a clear covering over it) - This type of dressing wears off with time and does not require more dressings over the top unless it is draining around the glue as it wears off.  Do not apply ointments or lotions over the incisions until the glue has completely worn off.    Q:  There is a piece of tape or a sticky  lead  still on my skin.  Can I remove this?  A:  Sometimes the sticky  leads  used for monitoring during surgery or for evaluation in the emergency department are not all removed while you are in the hospital.  These sometimes have a tab or metal dot on them.  You can easily remove these on your own, like taking off a band-aid.  If there is a gel substance under the  lead , simply wipe/clean it off with a washcloth or paper towel.      Q:  What can I do to minimize constipation (very hard stools, or lack of stools)?  A:  Stay well hydrated.  Increase your dietary fiber intake or take a fiber supplement -with plenty of water.  Walk around frequently.  You may consider an over-the-counter stool-softener.  Your Pharmacist can assist you with choosing one that is stocked at your pharmacy.  Constipation is also one of the most common side effects of pain medication.  If you are using pain medication, be pro-active and try to PREVENT problems with constipation by taking the steps above BEFORE constipation becomes a problem.    Q:  What do I do if I need more pain medications?  A:  Call the office to receive refills.  Be aware that certain pain meds cannot be called into a pharmacy  and actually require a paper prescription.  A change may be made in your pain med as you progress thru your recovery period or if you have side effects to certain meds.    --Pain meds are NOT refilled after 5pm on weekdays, and NOT AT ALL on the weekends, so please look ahead to prevent problems.      Q:  Why am I having a hard time sleeping now that I am at home?  A:  Many medications you receive while you are in the hospital can impact your sleep for a number of days after your surgery/hospitalization.  Decreased level of activity and naps during the day may also make sleeping at night difficult.  Try to minimize day-time naps, and get up frequently during the day to walk around your home during your recovery time.  Sleep aides may be of some help, but are not recommended for long-term use.      Q:  I am having some back discomfort.  What should I do?  A:  This may be related to certain positioning that was required for your surgery, extended periods of time in bed, or other changes in your overall activity level.  You may try ice, heat, acetaminophen, or ibuprofen to treat this temporarily.  Note that many pain medications have acetaminophen in them and would state this on the prescription bottle.  Be sure not to exceed the maximum of 4000mg per day of acetaminophen.     **If the pain you are having does not resolve, is severe, or is a flare of back pain you have had on other occasions prior to surgery, please contact your primary physician for further recommendations or for an appointment to be examined at their office.    Q:  Why am I having headaches?  A:  Headaches can be caused by many things:  caffeine withdrawal, use of pain meds, dehydration, high blood pressure, lack of sleep, over-activity/exhaustion, flare-up of usual migraine headaches.  If you feel this is related to muscle tension (a band-like feeling around the head, or a pressure at the low-back of the head) you may try ice or heat to this area.   You may need to drink more fluids (try electrolyte drink like Gatorade), rest, or take your usual migraine medications.   **If your headaches do not resolve, worsen, are accompanied by other symptoms, or if your blood pressure is high, please call your primary physician for recommendation and/or examination.    Q:  I am unable to urinate.  What do I do?  A:  A small percentage of people can have difficulty urinating initially after surgery.  This includes being able to urinate only a very small amount at a time and feeling discomfort or pressure in the very low abdomen.  This is called  urinary retention , and is actually an urgent situation.  Proceed to your nearest Emergency department for evaluation (not an Urgent Care Center).  Sometimes the bladder does not work correctly after certain medications you receive during surgery, or related to certain procedures.  You may need to have a catheter placed until your bladder recovers.  When planning to go to an Emergency department, it may help to call the ER to let them know you are coming in for this problem after a surgery.  This may help you get in quicker to be evaluated.  **If you have symptoms of a urinary tract infection, please contact your primary physician for the proper evaluation and treatment.          If you have other questions, please call the office Monday thru Friday between 8am and 4:30pm to discuss with the nurse or physician assistant.  #[phone]    There is a surgeon ON CALL on weekday evenings and over the weekend in case of urgent need only, and may be contacted at the same number.    If you are having an emergency, call 911 or proceed to your nearest emergency department.

## 2023-09-22 NOTE — PROGRESS NOTES
"[facility]   General Surgery Progress Note         Assessment and Plan:   Assessment:   2 Days Post-Op  s/p Procedure(s):  Robot assisted lysis of adhesions, Resection of gastric gastrointestinal stromal  tumor, omental mass biopsy  Urinary retention, lindquist placed, on flomax      Plan:   -Diet as tolerated  -Continue lindquist and Flomax (already on Flomax at home), follow up with urology office in about 1 week for removal  -ppx: lovenox  -Bowel regimen: miralax  -Disposition: Plan to discharge today, no pain meds needed. Follow up with Dr. Ny in the office in 2-3 weeks. Discharge instructions in chart.         Interval History:   Comfortable in chair, wife present. Minimal pain and not taking narcotics. Had a large BM this morning. Tolerating a regular diet. interested in advancing diet. Lindquist in place after being straight catheterized three times. He feels ready to go home.         Physical Exam:   Blood pressure (!) 160/73, pulse 52, temperature 97.4  F (36.3  C), temperature source Oral, resp. rate 18, height 1.778 m (5' 10\"), weight 120.5 kg (265 lb 9.6 oz), SpO2 99 %.    I/O last 3 completed shifts:  In: -   Out: 2050 [Urine:2050]    Abdomen:   soft, rounded, non-tender and normal bowel sounds   Inc(s) - clean, dry, intact with dermabond glue             Data:     Recent Labs   Lab 09/20/23  1441   *       Ion Solis PA-C  Text page [phone] 8am-4pm  After 4pm call [phone]         "

## 2023-09-22 NOTE — TELEPHONE ENCOUNTER
[facility] Call Center    Phone Message    May a detailed message be left on voicemail: yes     Reason for Call: Other: Patient's spouse Alejandrina calling in regards to patient being released from having surgery today and needing a catheter removed. Med recs in chart. States they were told to contact Urology to get this removed. Patient never been seen in Urology. Please contact patient's spouse in regards to this message. Thank you     Action Taken: Other: Urology    Travel Screening: Not Applicable

## 2023-09-22 NOTE — PLAN OF CARE
Pertinent assessments: A&Ox4. VSS on room air, afebrile. Remains comfortable on scheduled tylenol, no further pain management needed. Abd lap sites WDL. Fernandez in place with gaby output. Tolerating regular diet, denies nausea. Passing gas but no BM yet. Up Ax1 with GB & walker.     Major Shift Events: None   Treatment Plan: Monitor I/O   Bedside Nurse: Pretty Sanford RN

## 2023-09-25 ENCOUNTER — PATIENT OUTREACH (OUTPATIENT)
Dept: CARE COORDINATION | Facility: CLINIC | Age: 88
End: 2023-09-25
Payer: MEDICARE

## 2023-09-25 DIAGNOSIS — Z85.09 HISTORY OF GASTROINTESTINAL STROMAL TUMOR (GIST): Primary | ICD-10-CM

## 2023-09-25 LAB
INTERPRETATION: NORMAL
SIGNIFICANT RESULTS: NORMAL
SPECIMEN DESCRIPTION: NORMAL
TEST DETAILS, MDL: NORMAL

## 2023-09-25 PROCEDURE — 81445 SO NEO GSAP 5-50DNA/DNA&RNA: CPT | Performed by: SURGERY

## 2023-09-25 NOTE — PROGRESS NOTES
Clinic Care Coordination Contact  [facility]: Post-Discharge Note  SITUATION                                                      Admission:    Admission Date: 09/20/23   Reason for Admission: Gastrointestinal stromal tumor (GIST) (H) (C49.A0), intraabdominal adhesions  Discharge:   Discharge Date: 09/22/23  Discharge Diagnosis: Gastrointestinal stromal tumor (GIST) (H) (C49.A0), intraabdominal adhesions    BACKGROUND                                                      Per hospital discharge summary and inpatient provider notes:    1 Day Post-Op  s/p Procedure(s):  Robot assisted lysis of adhesions, Resection of gastric gastrointestinal stromal  tumor, omental mass biopsy  Urinary retention - on flomax      ASSESSMENT           Discharge Assessment  How are you doing now that you are home?: Patient states he is feeling pretty good, weak but feels good. Denies any pain.  How are your symptoms? (Red Flag symptoms escalate to triage hotline per guidelines): Improved  Do you feel your condition is stable enough to be safe at home until your provider visit?: Yes  Does the patient have their discharge instructions? : Yes  Does the patient have questions regarding their discharge instructions? : No  Were you started on any new medications or were there changes to any of your previous medications? : Yes  Does the patient have all of their medications?: Yes  Do you have questions regarding any of your medications? : No  Do you have all of your needed medical supplies or equipment (DME)?  (i.e. oxygen tank, CPAP, cane, etc.): Yes  Discharge follow-up appointment scheduled within 14 calendar days? : Yes  Discharge Follow Up Appointment Date: 09/26/23  Discharge Follow Up Appointment Scheduled with?: Primary Care Provider         Post-op (Clinicians Only)  Did the patient have surgery or a procedure: Yes  Incision: closed  Drainage: No  Bleeding: none  Fever: No  Chills: No  Redness: No  Warmth: No  Swelling:  No  Incision site pain: No  Eating & Drinking: eating and drinking without complaints/concerns  PO Intake: other;soft foods (Soft diet x1 week per wife)  Additional Symptoms:  (Denies)  Bowel Function: loose stools  Date of last BM: 09/25/23  Urinary Status: indwelling urinary catheter    RN spoke to patient and his wife, Alejandrina, consent on file dated 10/2018.    PLAN                                                      Outpatient Plan:  Special instructions for Damien Vallecillo:  --Please make an appointment to follow up with Urology for lindquist catheter removal. You  should also take your Flomax (tamsulosin) daily as prescribed.  --Make an appointment to see your surgeon Dr. Ny for routine follow up in 2-3 weeks  after you discharge from the hospital.    Future Appointments   Date Time Provider Department Center   9/26/2023 10:00 AM Obi Strange MD [facility]   10/3/2023  2:00 PM Evaristo Cruz APRN CNP [facility]   10/5/2023  1:00 PM Chino Ny MD [facility]         For any urgent concerns, please contact our 24 hour nurse triage line: [phone] ([phone])         Marlene Self RN

## 2023-09-25 NOTE — TELEPHONE ENCOUNTER
[facility] Call Center    Phone Message    May a detailed message be left on voicemail: yes     Reason for Call: Other: pt wife calling again to set appt for cath removal, please advise     Action Taken: Other: urology    Travel Screening: Not Applicable

## 2023-09-25 NOTE — OP NOTE
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   Anesthesia: general   Estimated blood loss:  Specimen: 50 cc  Omental mass and gastric tumor               INDICATION FOR OPERATION: This is a 87 year old male who presented to clinic after having EGD and EUS with concerning findings of a just over 2cm likley GIST tumor at the lesser curvature. After lengthy discussion regarding surgery and other options patient and family wished to proceed with resection.   DESCRIPTION OF PROCEDURE:  The patient was taken to the operating room and placed on the table in supine position.  General endotracheal anesthesia was induced and the abdomen was prepped and draped in standard sterile fashion.  A time out was performed.  An 11 blade scalpel was used to make a transverse incision.  A michaela was used to dissect down to the fasica and this was grasped with a kocher and elevated. Next the Veress needle was placed into the abdomen. After passing a meniscus test the abdomen was then insufflated to 12 mmHg.  The Veress needle was then removed and the abdomen was entered with an 8mm trocar.  The abdomen was inspected and there were no injuries obviously noted from Veress or trocar placement.  Three additional 8mm ports were then placed into the abdomen.  Multiple intra-abdominal adhesions were then taken down in order to be able to mobilize the stomach and get adequate visualization.  This took a considerable amount of time and added significantly to the operation.    Once we had adequate visualization we used the vessel sealer to make an opening just inferior to the greater curvature to enter the lesser sac. We were able to then retract the greater curvature caudally and expose the exophytic mass at the posterior aspect of the lesser curvature.  We then made a longitudinal incision dissecting down to the tumor.  The tumor was then shelled out. Taking care not to enter through the gastric mucosa.  Once the tumor was completely shelled out the outer gastric layers were closed in two layers with running 3-0 vlok suture.   While dissecting the omentum and taking down adhesions a large mass was encountered.  This had necrotic appearance to it and a small portion was sent for frozen. (Benign). After resection of the gist we used the vesssel sealer again to ligate the omentum around this necrotic mass.  Once complete both specimens were placed in a retrieval bag and removed through a port site.  All incision sites were anesthetized with local anesthetic.  All of the incisions were closed with interrupted 4-0 Vicryl subcuticular sutures and sterile glue was placed as a dressing.  The patient tolerated the procedure well.  Sponge and instrument counts were correct.      FINDINGS: Gist at lesser curvature, posterior aspect, large omental mass frozen section showing necrotic fat    Chino Ny MD

## 2023-09-26 ENCOUNTER — TELEPHONE (OUTPATIENT)
Dept: UROLOGY | Facility: CLINIC | Age: 88
End: 2023-09-26

## 2023-09-26 ENCOUNTER — OFFICE VISIT (OUTPATIENT)
Dept: FAMILY MEDICINE | Facility: CLINIC | Age: 88
End: 2023-09-26
Payer: MEDICARE

## 2023-09-26 ENCOUNTER — PATIENT OUTREACH (OUTPATIENT)
Dept: ONCOLOGY | Facility: CLINIC | Age: 88
End: 2023-09-26

## 2023-09-26 VITALS
DIASTOLIC BLOOD PRESSURE: 70 MMHG | SYSTOLIC BLOOD PRESSURE: 120 MMHG | HEART RATE: 53 BPM | RESPIRATION RATE: 16 BRPM | TEMPERATURE: 97.7 F | WEIGHT: 271 LBS | HEIGHT: 70 IN | BODY MASS INDEX: 38.8 KG/M2 | OXYGEN SATURATION: 97 %

## 2023-09-26 DIAGNOSIS — K11.7 XEROSTOMIA: ICD-10-CM

## 2023-09-26 DIAGNOSIS — Z00.00 ENCOUNTER FOR MEDICARE ANNUAL WELLNESS EXAM: Primary | ICD-10-CM

## 2023-09-26 DIAGNOSIS — C49.A0 MALIGNANT GASTROINTESTINAL STROMAL TUMOR, UNSPECIFIED SITE (H): Primary | ICD-10-CM

## 2023-09-26 DIAGNOSIS — N40.1 BENIGN PROSTATIC HYPERPLASIA WITH URINARY FREQUENCY: ICD-10-CM

## 2023-09-26 DIAGNOSIS — R33.9 URINARY RETENTION: ICD-10-CM

## 2023-09-26 DIAGNOSIS — C49.A2 GASTRIC STROMAL TUMOR (H): ICD-10-CM

## 2023-09-26 DIAGNOSIS — R01.1 HEART MURMUR: ICD-10-CM

## 2023-09-26 DIAGNOSIS — L71.9 ROSACEA: ICD-10-CM

## 2023-09-26 DIAGNOSIS — E11.9 TYPE 2 DIABETES MELLITUS WITHOUT COMPLICATION, UNSPECIFIED WHETHER LONG TERM INSULIN USE (H): ICD-10-CM

## 2023-09-26 DIAGNOSIS — Z28.21 COVID-19 VACCINATION REFUSED: ICD-10-CM

## 2023-09-26 DIAGNOSIS — R35.0 BENIGN PROSTATIC HYPERPLASIA WITH URINARY FREQUENCY: ICD-10-CM

## 2023-09-26 PROBLEM — Z23 ENCOUNTER FOR IMMUNIZATION: Status: RESOLVED | Noted: 2022-09-21 | Resolved: 2023-09-26

## 2023-09-26 PROBLEM — Z01.818 PREOP GENERAL PHYSICAL EXAM: Status: RESOLVED | Noted: 2023-09-06 | Resolved: 2023-09-26

## 2023-09-26 PROCEDURE — G0439 PPPS, SUBSEQ VISIT: HCPCS | Performed by: FAMILY MEDICINE

## 2023-09-26 PROCEDURE — G0008 ADMIN INFLUENZA VIRUS VAC: HCPCS | Performed by: FAMILY MEDICINE

## 2023-09-26 PROCEDURE — 90662 IIV NO PRSV INCREASED AG IM: CPT | Performed by: FAMILY MEDICINE

## 2023-09-26 RX ORDER — TAMSULOSIN HYDROCHLORIDE 0.4 MG/1
0.4 CAPSULE ORAL DAILY
Qty: 90 CAPSULE | Refills: 3 | Status: SHIPPED | OUTPATIENT
Start: 2023-09-26

## 2023-09-26 RX ORDER — DOXYCYCLINE HYCLATE 20 MG
20 TABLET ORAL DAILY
Status: ON HOLD
Start: 2023-09-26 | End: 2023-10-04

## 2023-09-26 ASSESSMENT — ACTIVITIES OF DAILY LIVING (ADL): CURRENT_FUNCTION: NO ASSISTANCE NEEDED

## 2023-09-26 NOTE — PROGRESS NOTES
New Patient Hematology / Oncology Nurse Navigator Note     Referral Date: 9/26/23    Referring provider: Dr Nguyen    Referring Clinic/Organization: [facility]     Referred to: Medical Oncology    Requested provider (if applicable): First available - did not specify     Evaluation for :      Clinical History (per Nurse review of records provided):    9/20 path report -- BOOKMARKED      Clinical Assessment / Barriers to Care (Per Nurse):    HANG     Records Location: [facility]     Records Needed:     See Pre-Visit encounter from CSS team for additional details on records collection. Additional questions on records needed for initial consult can be sent to P ONC ADULT NEW PATIENT RECORDS [[id number]] with a copy to  CANCER CARE NURSE NAVIGATION [[id number]]. Please include diagnosis and urgency in subject line.    Additional testing needed prior to consult:     N/A    Referral updates and Plan:     Triage instructions updated and sent to NPS for completion      Charleen Fernández, ANDREAN, RN, PHN, OCN  Hematology/Oncology Nurse Navigator   [facility]   [phone]   [email or web address]

## 2023-09-26 NOTE — PROGRESS NOTES
"SUBJECTIVE:   Damien is a 87 year old who presents for Preventive Visit.      9/26/2023     9:54 AM   Additional Questions   Roomed by Radha LAYTON       Are you in the first 12 months of your Medicare coverage?  No    Healthy Habits:     In general, how would you rate your overall health?  Good    Frequency of exercise:  6-7 days/week    Duration of exercise:  Less than 15 minutes    Do you usually eat at least 4 servings of fruit and vegetables a day, include whole grains    & fiber and avoid regularly eating high fat or \"junk\" foods?  Yes    Taking medications regularly:  Yes    Barriers to taking medications:  None    Medication side effects:  None    Ability to successfully perform activities of daily living:  No assistance needed    Home Safety:  No safety concerns identified    Hearing impairment symptoms: Has earring aids.    In the past 6 months, have you been bothered by leaking of urine? Yes    In general, how would you rate your overall mental or emotional health?  Good    Additional concerns today:  Yes          Have you ever done Advance Care Planning? (For example, a Health Directive, POLST, or a discussion with a medical provider or your loved ones about your wishes): Yes, advance care planning is on file.       Fall risk  Fallen 2 or more times in the past year?: Yes  Any fall with injury in the past year?: Yes  click delete button to remove this line now  Cognitive Screening   1) Repeat 3 items (Leader, Season, Table)    2) Clock draw: ABNORMAL    3) 3 item recall: Recalls 3 objects  Results: 3 items recalled: COGNITIVE IMPAIRMENT LESS LIKELY    Mini-CogTM Copyright CALIN Haney. Licensed by the author for use in [facility]; reprinted with permission (edgar@.[facility]). All rights reserved.      Do you have sleep apnea, excessive snoring or daytime drowsiness? : no    Reviewed and updated as needed this visit by clinical staff   Tobacco  Allergies  Meds              Reviewed and updated as needed " this visit by Provider                 Social History     Tobacco Use    Smoking status: Never     Passive exposure: Never    Smokeless tobacco: Never   Substance Use Topics    Alcohol use: Yes     Comment: occ.              No data to display              Do you have a current opioid prescription? No  Do you use any other controlled substances or medications that are not prescribed by a provider? None              Current providers sharing in care for this patient include:   Patient Care Team:  Obi Strange MD as PCP - General (Family Practice)  Obi Strange MD as Assigned PCP  Alec Raymond MD as MD (Orthopedics)  Beverly Kim DPM, Podiatry/Foot and Ankle Surgery (Podiatry)  Katt Warner, RN as Personal Advocate & Liaison (PAL)  Cholo Koenig MD as MD (Neurology)  Cholo Koeing MD as Assigned Neuroscience Provider  Chino Ny MD as Assigned Surgical Provider    The following health maintenance items are reviewed in Epic and correct as of today:  Health Maintenance   Topic Date Due    MEDICARE ANNUAL WELLNESS VISIT  09/21/2023    BMP  03/06/2024    CMP  06/12/2024    A1C  09/06/2024    MICROALBUMIN  09/06/2024    CBC W/DIFFERENTIAL  09/06/2024    ANNUAL REVIEW OF HM ORDERS  09/06/2024    URIC ACID  09/06/2024    FALL RISK ASSESSMENT  09/26/2024    ADVANCE CARE PLANNING  09/26/2028    DTAP/TDAP/TD IMMUNIZATION (5 - Td or Tdap) 09/16/2030    PHQ-2 (once per calendar year)  Completed    INFLUENZA VACCINE  Completed    Pneumococcal Vaccine: 65+ Years  Completed    COVID-19 Vaccine  Completed    ZOSTER IMMUNIZATION  Addressed    IPV IMMUNIZATION  Aged Out    HPV IMMUNIZATION  Aged Out    MENINGITIS IMMUNIZATION  Aged Out    LIPID  Discontinued    DIABETIC FOOT EXAM  Discontinued    EYE EXAM  Discontinued    COLORECTAL CANCER SCREENING  Discontinued               Review of Systems  No fevers no cough no cardiac symptoms no GI symptoms pain well  "controlled    OBJECTIVE:   /70 (BP Location: Right arm, Patient Position: Sitting, Cuff Size: Adult Large)   Pulse 53   Temp 97.7  F (36.5  C) (Oral)   Resp 16   Ht 1.765 m (5' 9.5\")   Wt 122.9 kg (271 lb)   SpO2 97%   BMI 39.45 kg/m   Estimated body mass index is 39.45 kg/m  as calculated from the following:    Height as of this encounter: 1.765 m (5' 9.5\").    Weight as of this encounter: 122.9 kg (271 lb).  Physical Exam  Constitutional:       Appearance: Normal appearance. He is obese.   HENT:      Head: Atraumatic.      Right Ear: Tympanic membrane normal.      Left Ear: Tympanic membrane normal.      Nose: Nose normal.      Mouth/Throat:      Mouth: Mucous membranes are moist.   Eyes:      Pupils: Pupils are equal, round, and reactive to light.   Cardiovascular:      Rate and Rhythm: Normal rate and regular rhythm.      Heart sounds: Normal heart sounds.   Pulmonary:      Effort: Pulmonary effort is normal.      Breath sounds: Normal breath sounds.   Abdominal:      General: Bowel sounds are normal.      Palpations: Abdomen is soft.   Musculoskeletal:      Cervical back: Neck supple.      Right lower leg: Edema present.      Left lower leg: Edema present.      Comments: trace   Skin:     General: Skin is warm and dry.   Neurological:      General: No focal deficit present.      Mental Status: He is alert.   Psychiatric:         Mood and Affect: Mood normal.               ASSESSMENT / PLAN:     Problem List Items Addressed This Visit       Benign prostatic hyperplasia with urinary frequency     He needs refills         Relevant Medications    tamsulosin (FLOMAX) 0.4 MG capsule    COVID-19 vaccination refused    Encounter for Medicare annual wellness exam - Primary    Gastric stromal tumor (H)     He is weak postsurgery.  He has been referred to oncology         Heart murmur     Mild regurgitation all valves, aortic sclerosis mild         Rosacea     His doxycycline (dermatology) was discontinued at " discharge.  This can be resumed         Relevant Medications    doxycycline hyclate (PERIOSTAT) 20 MG tablet    Type 2 diabetes mellitus without complication, unspecified whether long term insulin use (H)     Hemoglobin A1C   Date Value Ref Range Status   09/06/2023 6.8 (H) 0.0 - 5.6 % Final     Comment:     Normal <5.7%   Prediabetes 5.7-6.4%    Diabetes 6.5% or higher     Note: Adopted from ADA consensus guidelines.   09/16/2020 5.9 (H) 0 - 5.6 % Final     Comment:     Normal <5.7% Prediabetes 5.7-6.4%  Diabetes 6.5% or higher - adopted from ADA   consensus guidelines.     Adequately controlled with diet         Relevant Orders    AMB Adult Diabetes Educator Referral    Urinary retention     He has a Fernandez in place.  This is to be removed tomorrow at urology who will then see him in the next few weeks         Xerostomia     He has been using hard candies to treat this.  This may affect his diabetes.  Recommend sugar-free candies            Patient has been advised of split billing requirements and indicates understanding: Yes  MED REC REQUIRED  Post Medication Reconciliation Status: discharge medications reconciled and changed, per note/orders    COUNSELING:  Reviewed preventive health counseling, as reflected in patient instructions        He reports that he has never smoked. He has never been exposed to tobacco smoke. He has never used smokeless tobacco.      Appropriate preventive services were discussed with this patient, including applicable screening as appropriate for cardiovascular disease, diabetes, osteopenia/osteoporosis, and glaucoma.  As appropriate for age/gender, discussed screening for colorectal cancer, prostate cancer, breast cancer, and cervical cancer. Checklist reviewing preventive services available has been given to the patient.    Reviewed patients plan of care and provided an AVS. The Basic Care Plan (routine screening as documented in Health Maintenance) for Damien meets the Care Plan  requirement. This Care Plan has been established and reviewed with the Patient and spouse.          Obi Strange MD  [facility]    Identified Health Risks:  I have reviewed Opioid Use Disorder and Substance Use Disorder risk factors and made any needed referrals.

## 2023-09-26 NOTE — PATIENT INSTRUCTIONS
Patient Education   Personalized Prevention Plan  You are due for the preventive services outlined below.  Your care team is available to assist you in scheduling these services.  If you have already completed any of these items, please share that information with your care team to update in your medical record.  Health Maintenance Due   Topic Date Due     Annual Wellness Visit  09/21/2023     Bladder Training: Care Instructions  Your Care Instructions     Bladder training is used to treat urge incontinence and stress incontinence. Urge incontinence means that the need to urinate comes on so fast that you can't get to a toilet in time. Stress incontinence means that you leak urine because of pressure on your bladder. For example, it may happen when you laugh, cough, or lift something heavy.  Bladder training can increase how long you can wait before you have to urinate. It can also help your bladder hold more urine. And it can give you better control over the urge to urinate.  It is important to remember that bladder training takes a few weeks to a few months to make a difference. You may not see results right away, but don't give up.  Follow-up care is a key part of your treatment and safety. Be sure to make and go to all appointments, and call your doctor if you are having problems. It's also a good idea to know your test results and keep a list of the medicines you take.  How can you care for yourself at home?  Work with your doctor to come up with a bladder training program that is right for you. You may use one or more of the following methods.  Delayed urination  In the beginning, try to keep from urinating for 5 minutes after you first feel the need to go.  While you wait, take deep, slow breaths to relax. Kegel exercises can also help you delay the need to go to the bathroom.  After some practice, when you can easily wait 5 minutes to urinate, try to wait 10 minutes before you urinate.  Slowly increase the  "waiting period until you are able to control when you have to urinate.  Scheduled urination  Empty your bladder when you first wake up in the morning.  Schedule times throughout the day when you will urinate.  Start by going to the bathroom every hour, even if you don't need to go.  Slowly increase the time between trips to the bathroom.  When you have found a schedule that works well for you, keep doing it.  If you wake up during the night and have to urinate, do it. Apply your schedule to waking hours only.  Kegel exercises  These tighten and strengthen pelvic muscles, which can help you control the flow of urine. (If doing these exercises causes pain, stop doing them and talk with your doctor.) To do Kegel exercises:  Squeeze your muscles as if you were trying not to pass gas. Or squeeze your muscles as if you were stopping the flow of urine. Your belly, legs, and buttocks shouldn't move.  Hold the squeeze for 3 seconds, then relax for 5 to 10 seconds.  Start with 3 seconds, then add 1 second each week until you are able to squeeze for 10 seconds.  Repeat the exercise 10 times a session. Do 3 to 8 sessions a day.  When should you call for help?  Watch closely for changes in your health, and be sure to contact your doctor if:    Your incontinence is getting worse.     You do not get better as expected.   Where can you learn more?  Go to https://www.JoinMe@.net/patiented  Enter V684 in the search box to learn more about \"Bladder Training: Care Instructions.\"  Current as of: March 1, 2023               Content Version: 13.7    3117-7769 [employer].   Care instructions adapted under license by your healthcare professional. If you have questions about a medical condition or this instruction, always ask your healthcare professional. [employer] disclaims any warranty or liability for your use of this information.      Preventing Falls: Care Instructions    Talk to your doctor about the " "medicines you take. Ask if any of them increase the risk of falls and whether they can be changed or stopped.   Try to exercise regularly. It can help improve your strength and balance. This can help lower your risk of falling.     Practice fall safety and prevention.    Wear low-heeled shoes that fit well and give your feet good support. Talk to your doctor if you have foot problems that make this hard.  Carry a cellphone or wear a medical alert device that you can use to call for help.  Use stepladders instead of chairs to reach high objects. Don't climb if you're at risk for falls. Ask for help, if needed.  Wear the correct eyeglasses, if you need them.    Make your home safer.    Remove rugs, cords, clutter, and furniture from walkways.  Keep your house well lit. Use night-lights in hallways and bathrooms.  Install and use sturdy handrails on stairways.  Wear nonskid footwear, even inside. Don't walk barefoot or in socks without shoes.    Be safe outside.    Use handrails, curb cuts, and ramps whenever possible.  Keep your hands free by using a shoulder bag or backpack.  Try to walk in well-lit areas. Watch out for uneven ground, changes in pavement, and debris.  Be careful in the winter. Walk on the grass or gravel when sidewalks are slippery. Use de-icer on steps and walkways. Add non-slip devices to shoes.    Put grab bars and nonskid mats in your shower or tub and near the toilet. Try to use a shower chair or bath bench when bathing.   Get into a tub or shower by putting in your weaker leg first. Get out with your strong side first. Have a phone or medical alert device in the bathroom with you.   Where can you learn more?  Go to https://www.[employer].net/patiented  Enter G117 in the search box to learn more about \"Preventing Falls: Care Instructions.\"  Current as of: November 9, 2022               Content Version: 13.7    7808-5925 [employer].   Care instructions adapted under license by your " healthcare professional. If you have questions about a medical condition or this instruction, always ask your healthcare professional. [employer]wise, [facility] disclaims any warranty or liability for your use of this information.      How to Get Up Safely After a Fall: Care Instructions  Overview     If you have injuries, health problems, or other reasons that may make it easy for you to fall at home, it is a good idea to learn how to get up safely after a fall. Learning how to get up correctly can help you avoid making an injury worse.  Also, knowing what to do if you cannot get up can help you stay safe until help arrives.  Follow-up care is a key part of your treatment and safety. Be sure to make and go to all appointments, and call your doctor if you are having problems. It's also a good idea to know your test results and keep a list of the medicines you take.  How can you care for yourself after a fall?  If you think you can get up  First lie still for a few minutes and think about how you feel. If your body feels okay and you think you can get up safely, follow the rest of the steps below:  Look for a chair or other piece of furniture that is close to you.  Roll onto your side and rest. Roll by turning your head in the direction you want to roll, move your shoulder and arm, then hip and leg in the same direction.  Lie still for a moment to let your blood pressure adjust.  Slowly push your upper body up, lift your head, and take a moment to rest.  Slowly get up on your hands and knees, and crawl to the chair or other stable piece of furniture.  Put your hands on the chair.  Move one foot forward, and place it flat on the floor. Your other leg should be bent with the knee on the floor.  Rise slowly, turn your body, and sit in the chair. Stay seated for a bit and think about how you feel. Call for help. Even if you feel okay, let someone know what happened to you. You might not know that you have a serious  "injury.  If you cannot get up  If you think you are injured after a fall or you cannot get up, try not to panic.  Call out for help.  If you have a phone within reach or you have an emergency call device, use it to call for help.  If you do not have a phone within reach, try to slide yourself toward it. If you cannot get to the phone, try to slide toward a door or window or a place where you think you can be heard.  [place] or use an object to make noise so someone might hear you.  If you can reach something that you can use for a pillow, place it under your head. Try to stay warm by covering yourself with a blanket or clothing while you wait for help.  When should you call for help?   Call 911 anytime you think you may need emergency care. For example, call if:    You passed out (lost consciousness).     You cannot get up after a fall.     You have severe pain.   Call your doctor now or seek immediate medical care if:    You have new or worse pain.     You are dizzy or lightheaded.     You hit your head.   Watch closely for changes in your health, and be sure to contact your doctor if:    You do not get better as expected.   Where can you learn more?  Go to https://www.[employer].net/patiented  Enter G513 in the search box to learn more about \"How to Get Up Safely After a Fall: Care Instructions.\"  Current as of: November 14, 2022               Content Version: 13.7    0924-1780 [employer].   Care instructions adapted under license by your healthcare professional. If you have questions about a medical condition or this instruction, always ask your healthcare professional. [employer] disclaims any warranty or liability for your use of this information.         "

## 2023-09-26 NOTE — PROGRESS NOTES
Prior to immunization administration, verified patients identity using patient s name and date of birth. Please see Immunization Activity for additional information.     Screening Questionnaire for Adult Immunization    Are you sick today?   No   Do you have allergies to medications, food, a vaccine component or latex?   Yes   Have you ever had a serious reaction after receiving a vaccination?   No   Do you have a long-term health problem with heart, lung, kidney, or metabolic disease (e.g., diabetes), asthma, a blood disorder, no spleen, complement component deficiency, a cochlear implant, or a spinal fluid leak?  Are you on long-term aspirin therapy?   No   Do you have cancer, leukemia, HIV/AIDS, or any other immune system problem?   No   Do you have a parent, brother, or sister with an immune system problem?   No   In the past 3 months, have you taken medications that affect  your immune system, such as prednisone, other steroids, or anticancer drugs; drugs for the treatment of rheumatoid arthritis, Crohn s disease, or psoriasis; or have you had radiation treatments?   No   Have you had a seizure, or a brain or other nervous system problem?   No   During the past year, have you received a transfusion of blood or blood    products, or been given immune (gamma) globulin or antiviral drug?   No   For women: Are you pregnant or is there a chance you could become       pregnant during the next month?   No   Have you received any vaccinations in the past 4 weeks?   No     Immunization questionnaire answers were all negative, but one. Provider notified.       Patient instructed to remain in clinic for 15 minutes afterwards, and to report any adverse reactions.     Screening performed by Radha Arndt CMA on 9/26/2023 at 10:39 AM.

## 2023-09-26 NOTE — ASSESSMENT & PLAN NOTE
Hemoglobin A1C   Date Value Ref Range Status   09/06/2023 6.8 (H) 0.0 - 5.6 % Final     Comment:     Normal <5.7%   Prediabetes 5.7-6.4%    Diabetes 6.5% or higher     Note: Adopted from ADA consensus guidelines.   09/16/2020 5.9 (H) 0 - 5.6 % Final     Comment:     Normal <5.7% Prediabetes 5.7-6.4%  Diabetes 6.5% or higher - adopted from ADA   consensus guidelines.       Adequately controlled with diet

## 2023-09-26 NOTE — ASSESSMENT & PLAN NOTE
He has been using hard candies to treat this.  This may affect his diabetes.  Recommend sugar-free candies

## 2023-09-26 NOTE — TELEPHONE ENCOUNTER
[facility] Call Center    Phone Message    May a detailed message be left on voicemail: yes     Reason for Call: Other: Patient wife is calling very worried. She had the pt set up with [facility] for tomorrow to pull his cath, they called and canceled the apt. Pt wife is hoping to get pt in today or tomorrow for this. Writer looked at the locations pt Is ok traveling to ( [facility] and [facility]) and cannot find anything due to him technically being a new pt. Pt is seeing this provider on the 3rd. Pt is wondering if there is anything that can be done to remove this cath as he is extremely uncomfortable. Pt wife is giving permission for nurse to just make any apt at the [facility] location for this, and just give them a call to let them know when to come in        Action Taken: Message routed to:  Other: uro    Travel Screening: Not Applicable

## 2023-09-26 NOTE — ASSESSMENT & PLAN NOTE
He has a Fernandez in place.  This is to be removed tomorrow at urology who will then see him in the next few weeks

## 2023-09-26 NOTE — PROGRESS NOTES
Information on urinary incontinence and treatment options given to patient.  Lexus rutledge is at risk for falling and has been provided with information to reduce the risk of falling at home.  Obi Strange MD

## 2023-09-27 NOTE — DISCHARGE SUMMARY
[facility]    Discharge Summary  Surgery    Date of Admission:  9/20/2023  Date of Discharge:  9/22/2023  2:41 PM  Discharging Provider: Ion Solis PA-C  Discharge Summary Note completed by: April Bran PA-C on 9/27/2023  Date of Service: The patient was personally seen by Discharging Providers on the day of discharge.    Discharge Diagnoses   Principal Problem:    History of gastrointestinal stromal tumor (GIST)      Procedure/Surgery Information   Procedure(s):  Robot assisted lysis of adhesions, Resection of gastric gastrointestinal stromal  tumor, omental mass biopsy   Surgeon(s) and Role:     * Chino Ny MD - Primary     * Cathryn Cervantes MD - Assisting     * Saba Alatorre PA-C - Assisting     Specimens: ID Type Source Tests Collected by Time Destination   1 : Omental biopsy Tissue Omentum SURGICAL PATHOLOGY EXAM Chino Ny MD 9/20/2023  9:32 AM    2 : stromal tumor Tissue Stomach SURGICAL PATHOLOGY EXAM Chino Ny MD 9/20/2023 10:42 AM    3 : Omental mass Tissue Omentum SURGICAL PATHOLOGY EXAM Chino Ny MD 9/20/2023 10:46 AM         History of Present Illness   Damien Vallecillo is a 87 year old male who presented to clinic after having EGD and EUS with concerning findings of a just over 2cm likley GIST tumor at the lesser curvature. After lengthy discussion regarding surgery and other options patient and family wished to proceed with resection.      Hospital Course   Damien Vallecillo was admitted on 9/20/2023.  The following problems were addressed during his hospitalization:  Patient Active Problem List   Diagnosis    Gout of multiple sites    ACP (advance care planning)    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)    Morbid obesity due to excess calories (H)    Hammer toe of left foot    Venous stasis dermatitis of both lower extremities    RBC microcytosis    Right knee pain    Left knee pain, unspecified chronicity     S/P total knee arthroplasty    History of arthroplasty of right knee    Pedal edema    Rosacea    Cheilosis    Rhinophyma    Peripheral polyneuropathy    Hyperlipidemia LDL goal <160    Elevated serum creatinine    Fecal incontinence    Dislocation of right patella    Type 2 diabetes mellitus without complication, unspecified whether long term insulin use (H)    Hematuria, unspecified type    Special screening for malignant neoplasm of prostate    Falls frequently    Cognitive attention deficit    Benign prostatic hyperplasia with urinary frequency    External hemorrhoids    Hypersomnia    RENAE (obstructive sleep apnea)    Fall    Physical deconditioning    Anemia due to blood loss, acute    Midline low back pain without sciatica, unspecified chronicity    High priority for 2019-nCoV vaccine    Hematochezia    History of GI diverticular bleed    Fe deficiency anemia    Gastric stromal tumor (H)    Heart murmur    History of gastrointestinal stromal tumor (GIST)    Xerostomia    Encounter for Medicare annual wellness exam    Urinary retention    COVID-19 vaccination refused     Oksana-operative antibiotic therapy included: Ancef.  Post-operative pain control: was via IV until able to tolerate PO intake and transitioned to PO pain meds.    Remarkable hospital course events: Postoperative urinary retention, lindquist placement, flomax started.  Patient discharged home with lindquist in place and ongoing flomax therapy, and plans to follow up with Urology in about a week.     Damien met all criteria for release on 9/22/2023  2:41 PM.  He was afebrile, tolerating diet, pain controlled on PO meds, ambulating well, and had return of bowel function.    Final pathology results:   A.  Omentum, biopsy:  -- Fat necrosis with hyalinization and calcification.  Negative for malignancy.  B.  Stomach, lesser curvature, exophytic mass, resection:  -- Gastrointestinal stromal tumor, spindle cell type.  -- See cancer staging synoptic for additional  details.  C.  Omentum, resection:  -- Encapsulated fat necrosis with calcifications.  Negative for malignancy.    Medications discontinued or adjusted during this hospitalization: see discharge med list below.    Antibiotics prescribed at discharge: None prescribed     Imaging study follow up needs:   -None performed    Discharge Instructions and Follow-Up:  Discharge diet: Advance to a regular diet as tolerated   Discharge activity: Lifting restricted to 20 pounds  No heavy lifting, pushing, pulling for 4 week(s)   Discharge follow-up: Follow up with Urologist in about 7 days  Follow up with Dr. Ny in 2-3 weeks   Wound/Incision care: Ice to area for comfort  May get incision wet in shower but do not soak or scrub  Allow skin glue to fall off in about 2-3 weeks.       April Bran PA-C      Discharge Disposition   Discharged to home   Condition at discharge: Stable    Pending Results   Unresulted Labs Ordered in the Past 30 Days of this Admission       No orders found from 8/21/2023 to 9/21/2023.            Primary Care Physician   Obi Strange    Consultations This Hospital Stay   None    Discharge Orders   No discharge procedures on file.    Discharge Medications   Discharge Medication List as of 9/22/2023  2:10 PM        CONTINUE these medications which have NOT CHANGED    Details   acetaminophen (TYLENOL) 500 MG tablet Take 500-1,000 mg by mouth every 6 hours as needed for mild pain, Historical      atorvastatin (LIPITOR) 20 MG tablet Take 20 mg by mouth every evening, Historical      CINNAMON PO Take 1 tablet by mouth daily , Historical      diclofenac (VOLTAREN) 1 % topical gel Apply 2 g topically 2 times daily, Historical      ferrous sulfate (FEROSUL) 325 (65 Fe) MG tablet Take 1 tablet (325 mg) by mouth daily (with breakfast), OTC      Flaxseed, Linseed, (FLAXSEED OIL) 1000 MG CAPS Take 1,000 mg by mouth daily, Historical      furosemide (LASIX) 20 MG tablet Take 1 tablet (20 mg) by mouth 2  times daily, Disp-180 tablet, R-1, E-Prescribe      glucosamine-chondroitin 500-400 MG CAPS per capsule Take 1 capsule by mouth daily, Historical      metroNIDAZOLE (METROGEL) 0.75 % external gel APPLY THIN LAYER TO FACE TWICE A DAY FOR ROSACEAHistorical      Multiple Vitamins-Minerals (MULTIVITAMIN ADULT PO) Take 1 tablet by mouth daily Reported on 5/12/2017, Historical      !! order for DME 1: Gradient Compression Wraps; 2: Cast Boots; 3: BLE knee or thigh high 20-30 mm Hg compression stocking; 4: BLE knee or thigh high custom compression stocking; 5: Alternative BLE knee high or thigh compression garments (velcro/buckling)Disp-1 each, R-0 , Local Print      !! order for DME Equipment being ordered: Walker Wheels () and Walker ()  Treatment Diagnosis: Impaired functional mobilityDisp-1 each, R-0, Local Print      pramox-pe-glycerin-petrolatum (PREPARATION H) 1-0.25-14.4-15 % CREA cream Place rectally 4 times daily as needed for hemorrhoids Use as directedHistorical      fish oil-omega-3 fatty acids 1000 MG capsule Take 1 g by mouth daily, Historical      tamsulosin (FLOMAX) 0.4 MG capsule Take 1 capsule (0.4 mg) by mouth daily, Disp-90 capsule, R-3, E-Prescribe       !! - Potential duplicate medications found. Please discuss with provider.        Allergies   Allergies   Allergen Reactions    Levetiracetam [Keppra] Rash    Oxcarbazepine [Oxcarbazepine] Rash     Data   Most Recent 3 CBC's:  Recent Labs   Lab Test 09/20/23  1441 09/06/23  1332 07/10/23  1327 06/27/23  1539   WBC  --  7.2 5.9 5.4   HGB  --  14.0 10.9* 8.8*   MCV  --  81 88 90   * 218 250 244      Most Recent 3 BMP's:  Recent Labs   Lab Test 09/22/23  0558 09/21/23  0441 09/20/23  1441 09/20/23  1113 09/20/23  0635 09/06/23  1332 08/22/23  0828 07/13/23  1121 07/10/23  1327 06/14/23 0840 06/14/23 0640   NA  --   --   --   --   --  143  --   --  141  --  141   POTASSIUM  --   --   --   --   --  3.8  --   --  4.3  --  4.0   CHLORIDE   --   --   --   --   --  104  --   --  104  --  107   CO2  --   --   --   --   --  27  --   --  26  --  24   BUN  --   --   --   --   --  14.9  --   --  12.6  --  11.7   CR  --   --  0.81  --   --  0.81 0.9  --  0.93  --  0.87   ANIONGAP  --   --   --   --   --  12  --   --  11  --  10   ANURAG  --   --   --   --   --  9.5  --   --  9.3  --  8.7*   * 124*  --  184*   < > 123*  --    < > 116*   < > 128*    < > = values in this interval not displayed.     Most Recent INR's and Anticoagulation Dosing History:  Anticoagulation Dose History  More data exists         Latest Ref Rng & Units 9/24/2013 6/30/2015 8/4/2015 8/5/2015 8/10/2015 12/28/2018 5/25/2023   Recent Dosing and Labs   INR 0.85 - 1.15 1.13  1.11  1.16  1.09  1.15  1.19  1.24

## 2023-10-01 ENCOUNTER — APPOINTMENT (OUTPATIENT)
Dept: GENERAL RADIOLOGY | Facility: CLINIC | Age: 88
DRG: 698 | End: 2023-10-01
Attending: EMERGENCY MEDICINE
Payer: MEDICARE

## 2023-10-01 ENCOUNTER — HOSPITAL ENCOUNTER (INPATIENT)
Facility: CLINIC | Age: 88
LOS: 3 days | Discharge: HOME HEALTH | DRG: 698 | End: 2023-10-04
Attending: EMERGENCY MEDICINE | Admitting: HOSPITALIST
Payer: MEDICARE

## 2023-10-01 ENCOUNTER — APPOINTMENT (OUTPATIENT)
Dept: CT IMAGING | Facility: CLINIC | Age: 88
DRG: 698 | End: 2023-10-01
Attending: EMERGENCY MEDICINE
Payer: MEDICARE

## 2023-10-01 DIAGNOSIS — T83.511A URINARY TRACT INFECTION ASSOCIATED WITH INDWELLING URETHRAL CATHETER, INITIAL ENCOUNTER (H): ICD-10-CM

## 2023-10-01 DIAGNOSIS — I10 HYPERTENSION, UNSPECIFIED TYPE: ICD-10-CM

## 2023-10-01 DIAGNOSIS — N39.0 URINARY TRACT INFECTION ASSOCIATED WITH INDWELLING URETHRAL CATHETER, INITIAL ENCOUNTER (H): ICD-10-CM

## 2023-10-01 DIAGNOSIS — A41.9 SEPSIS DUE TO URINARY TRACT INFECTION (H): ICD-10-CM

## 2023-10-01 DIAGNOSIS — N39.0 SEPSIS DUE TO URINARY TRACT INFECTION (H): ICD-10-CM

## 2023-10-01 DIAGNOSIS — L29.9 ITCHING: Primary | ICD-10-CM
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
## 2023-10-01 DIAGNOSIS — R53.81 PHYSICAL DECONDITIONING: ICD-10-CM

## 2023-10-01 DIAGNOSIS — Z85.09 HX OF MALIGNANT GASTROINTESTINAL STROMAL TUMOR (GIST): ICD-10-CM

## 2023-10-01 LAB
ALBUMIN SERPL BCG-MCNC: 4.2 G/DL (ref 3.5–5.2)
ALBUMIN UR-MCNC: 20 MG/DL
ALBUMIN UR-MCNC: 50 MG/DL
ALP SERPL-CCNC: 73 U/L (ref 40–129)
ALT SERPL W P-5'-P-CCNC: 20 U/L (ref 0–70)
ANION GAP SERPL CALCULATED.3IONS-SCNC: 12 MMOL/L (ref 7–15)
APPEARANCE UR: ABNORMAL
APPEARANCE UR: ABNORMAL
AST SERPL W P-5'-P-CCNC: 20 U/L (ref 0–45)
BACTERIA #/AREA URNS HPF: ABNORMAL /HPF
BASOPHILS # BLD AUTO: 0 10E3/UL (ref 0–0.2)
BASOPHILS NFR BLD AUTO: 0 %
BILIRUB SERPL-MCNC: 1.3 MG/DL
BILIRUB UR QL STRIP: NEGATIVE
BILIRUB UR QL STRIP: NEGATIVE
BUN SERPL-MCNC: 11.1 MG/DL (ref 8–23)
CALCIUM SERPL-MCNC: 9.1 MG/DL (ref 8.8–10.2)
CHLORIDE SERPL-SCNC: 103 MMOL/L (ref 98–107)
COLOR UR AUTO: ABNORMAL
COLOR UR AUTO: YELLOW
CREAT SERPL-MCNC: 0.77 MG/DL (ref 0.67–1.17)
CREAT SERPL-MCNC: 0.79 MG/DL (ref 0.67–1.17)
DEPRECATED HCO3 PLAS-SCNC: 24 MMOL/L (ref 22–29)
EGFRCR SERPLBLD CKD-EPI 2021: 87 ML/MIN/1.73M2
EGFRCR SERPLBLD CKD-EPI 2021: 87 ML/MIN/1.73M2
EOSINOPHIL # BLD AUTO: 0 10E3/UL (ref 0–0.7)
EOSINOPHIL NFR BLD AUTO: 0 %
ERYTHROCYTE [DISTWIDTH] IN BLOOD BY AUTOMATED COUNT: 17 % (ref 10–15)
FLUAV RNA SPEC QL NAA+PROBE: NEGATIVE
FLUBV RNA RESP QL NAA+PROBE: NEGATIVE
GLUCOSE SERPL-MCNC: 164 MG/DL (ref 70–99)
GLUCOSE UR STRIP-MCNC: NEGATIVE MG/DL
GLUCOSE UR STRIP-MCNC: NEGATIVE MG/DL
HCO3 BLDV-SCNC: 24 MMOL/L (ref 21–28)
HCT VFR BLD AUTO: 42.3 % (ref 40–53)
HGB BLD-MCNC: 14.1 G/DL (ref 13.3–17.7)
HGB UR QL STRIP: ABNORMAL
HGB UR QL STRIP: ABNORMAL
HOLD SPECIMEN: NORMAL
IMM GRANULOCYTES # BLD: 0.1 10E3/UL
IMM GRANULOCYTES NFR BLD: 1 %
KETONES UR STRIP-MCNC: NEGATIVE MG/DL
KETONES UR STRIP-MCNC: NEGATIVE MG/DL
LACTATE BLD-SCNC: 1.1 MMOL/L
LEUKOCYTE ESTERASE UR QL STRIP: ABNORMAL
LEUKOCYTE ESTERASE UR QL STRIP: ABNORMAL
LYMPHOCYTES # BLD AUTO: 0.3 10E3/UL (ref 0.8–5.3)
LYMPHOCYTES NFR BLD AUTO: 2 %
MCH RBC QN AUTO: 27.1 PG (ref 26.5–33)
MCHC RBC AUTO-ENTMCNC: 33.3 G/DL (ref 31.5–36.5)
MCV RBC AUTO: 81 FL (ref 78–100)
MONOCYTES # BLD AUTO: 0.8 10E3/UL (ref 0–1.3)
MONOCYTES NFR BLD AUTO: 5 %
MUCOUS THREADS #/AREA URNS LPF: PRESENT /LPF
MUCOUS THREADS #/AREA URNS LPF: PRESENT /LPF
NEUTROPHILS # BLD AUTO: 14.3 10E3/UL (ref 1.6–8.3)
NEUTROPHILS NFR BLD AUTO: 92 %
NITRATE UR QL: NEGATIVE
NITRATE UR QL: POSITIVE
NRBC # BLD AUTO: 0 10E3/UL
NRBC BLD AUTO-RTO: 0 /100
PCO2 BLDV: 35 MM HG (ref 40–50)
PH BLDV: 7.44 [PH] (ref 7.32–7.43)
PH UR STRIP: 5.5 [PH] (ref 5–7)
PH UR STRIP: 6.5 [PH] (ref 5–7)
PLATELET # BLD AUTO: 231 10E3/UL (ref 150–450)
PO2 BLDV: 34 MM HG (ref 25–47)
POTASSIUM SERPL-SCNC: 3.9 MMOL/L (ref 3.4–5.3)
PROT SERPL-MCNC: 6.7 G/DL (ref 6.4–8.3)
RBC # BLD AUTO: 5.2 10E6/UL (ref 4.4–5.9)
RBC URINE: 153 /HPF
RBC URINE: 26 /HPF
RSV RNA SPEC NAA+PROBE: NEGATIVE
SAO2 % BLDV: 69 % (ref 94–100)
SARS-COV-2 RNA RESP QL NAA+PROBE: NEGATIVE
SODIUM SERPL-SCNC: 139 MMOL/L (ref 135–145)
SP GR UR STRIP: 1.01 (ref 1–1.03)
SP GR UR STRIP: 1.03 (ref 1–1.03)
SQUAMOUS EPITHELIAL: 1 /HPF
UROBILINOGEN UR STRIP-MCNC: NORMAL MG/DL
UROBILINOGEN UR STRIP-MCNC: NORMAL MG/DL
WBC # BLD AUTO: 15.5 10E3/UL (ref 4–11)
WBC CLUMPS #/AREA URNS HPF: PRESENT /HPF
WBC CLUMPS #/AREA URNS HPF: PRESENT /HPF
WBC URINE: >182 /HPF
WBC URINE: >182 /HPF

## 2023-10-01 PROCEDURE — 99223 1ST HOSP IP/OBS HIGH 75: CPT | Mod: A1 | Performed by: HOSPITALIST

## 2023-10-01 PROCEDURE — 250N000011 HC RX IP 250 OP 636: Mod: JZ | Performed by: EMERGENCY MEDICINE

## 2023-10-01 PROCEDURE — 36415 COLL VENOUS BLD VENIPUNCTURE: CPT | Performed by: HOSPITALIST

## 2023-10-01 PROCEDURE — 258N000003 HC RX IP 258 OP 636: Performed by: EMERGENCY MEDICINE

## 2023-10-01 PROCEDURE — 80053 COMPREHEN METABOLIC PANEL: CPT | Performed by: EMERGENCY MEDICINE

## 2023-10-01 PROCEDURE — 258N000003 HC RX IP 258 OP 636: Performed by: HOSPITALIST

## 2023-10-01 PROCEDURE — 120N000001 HC R&B MED SURG/OB

## 2023-10-01 PROCEDURE — 83605 ASSAY OF LACTIC ACID: CPT

## 2023-10-01 PROCEDURE — 36415 COLL VENOUS BLD VENIPUNCTURE: CPT | Performed by: EMERGENCY MEDICINE

## 2023-10-01 PROCEDURE — 250N000011 HC RX IP 250 OP 636: Performed by: EMERGENCY MEDICINE

## 2023-10-01 PROCEDURE — 74177 CT ABD & PELVIS W/CONTRAST: CPT | Mod: MA

## 2023-10-01 PROCEDURE — 96374 THER/PROPH/DIAG INJ IV PUSH: CPT

## 2023-10-01 PROCEDURE — 87086 URINE CULTURE/COLONY COUNT: CPT | Performed by: EMERGENCY MEDICINE

## 2023-10-01 PROCEDURE — 82565 ASSAY OF CREATININE: CPT | Performed by: HOSPITALIST

## 2023-10-01 PROCEDURE — 87637 SARSCOV2&INF A&B&RSV AMP PRB: CPT | Performed by: EMERGENCY MEDICINE

## 2023-10-01 PROCEDURE — 93005 ELECTROCARDIOGRAM TRACING: CPT

## 2023-10-01 PROCEDURE — 96361 HYDRATE IV INFUSION ADD-ON: CPT | Mod: 59

## 2023-10-01 PROCEDURE — 81001 URINALYSIS AUTO W/SCOPE: CPT | Performed by: EMERGENCY MEDICINE

## 2023-10-01 PROCEDURE — 250N000013 HC RX MED GY IP 250 OP 250 PS 637: Performed by: HOSPITALIST

## 2023-10-01 PROCEDURE — 85025 COMPLETE CBC W/AUTO DIFF WBC: CPT | Performed by: EMERGENCY MEDICINE

## 2023-10-01 PROCEDURE — 99285 EMERGENCY DEPT VISIT HI MDM: CPT | Mod: 25

## 2023-10-01 PROCEDURE — 250N000011 HC RX IP 250 OP 636: Mod: JZ | Performed by: HOSPITALIST

## 2023-10-01 PROCEDURE — 71046 X-RAY EXAM CHEST 2 VIEWS: CPT

## 2023-10-01 PROCEDURE — 87040 BLOOD CULTURE FOR BACTERIA: CPT | Performed by: EMERGENCY MEDICINE

## 2023-10-01 PROCEDURE — 96360 HYDRATION IV INFUSION INIT: CPT | Mod: 59

## 2023-10-01 PROCEDURE — 96375 TX/PRO/DX INJ NEW DRUG ADDON: CPT

## 2023-10-01 PROCEDURE — 250N000009 HC RX 250: Performed by: EMERGENCY MEDICINE

## 2023-10-01 PROCEDURE — 82803 BLOOD GASES ANY COMBINATION: CPT

## 2023-10-01 RX ORDER — CHLORAL HYDRATE 500 MG
1 CAPSULE ORAL DAILY
COMMUNITY

## 2023-10-01 RX ORDER — ATORVASTATIN CALCIUM 20 MG/1
20 TABLET, FILM COATED ORAL EVERY EVENING
Status: DISCONTINUED | OUTPATIENT
Start: 2023-10-01 | End: 2023-10-04 | Stop reason: HOSPADM

## 2023-10-01 RX ORDER — ATORVASTATIN CALCIUM 10 MG/1
20 TABLET, FILM COATED ORAL EVERY EVENING
Status: CANCELLED | OUTPATIENT
Start: 2023-10-01

## 2023-10-01 RX ORDER — ONDANSETRON 4 MG/1
4 TABLET, ORALLY DISINTEGRATING ORAL EVERY 6 HOURS PRN
Status: DISCONTINUED | OUTPATIENT
Start: 2023-10-01 | End: 2023-10-04 | Stop reason: HOSPADM

## 2023-10-01 RX ORDER — CEFTRIAXONE 2 G/1
2 INJECTION, POWDER, FOR SOLUTION INTRAMUSCULAR; INTRAVENOUS ONCE
Status: DISCONTINUED | OUTPATIENT
Start: 2023-10-01 | End: 2023-10-01

## 2023-10-01 RX ORDER — CEFTRIAXONE 1 G/1
1 INJECTION, POWDER, FOR SOLUTION INTRAMUSCULAR; INTRAVENOUS ONCE
Status: COMPLETED | OUTPATIENT
Start: 2023-10-01 | End: 2023-10-01

## 2023-10-01 RX ORDER — CEFTRIAXONE 1 G/1
1 INJECTION, POWDER, FOR SOLUTION INTRAMUSCULAR; INTRAVENOUS EVERY 24 HOURS
Status: DISCONTINUED | OUTPATIENT
Start: 2023-10-02 | End: 2023-10-04 | Stop reason: HOSPADM

## 2023-10-01 RX ORDER — ACETAMINOPHEN 500 MG
500-1000 TABLET ORAL EVERY 6 HOURS PRN
Status: CANCELLED | OUTPATIENT
Start: 2023-10-01

## 2023-10-01 RX ORDER — ACETAMINOPHEN 500 MG
500-1000 TABLET ORAL EVERY 6 HOURS PRN
Status: DISCONTINUED | OUTPATIENT
Start: 2023-10-01 | End: 2023-10-04 | Stop reason: HOSPADM

## 2023-10-01 RX ORDER — ENOXAPARIN SODIUM 100 MG/ML
40 INJECTION SUBCUTANEOUS EVERY 24 HOURS
Status: DISCONTINUED | OUTPATIENT
Start: 2023-10-01 | End: 2023-10-04 | Stop reason: HOSPADM

## 2023-10-01 RX ORDER — FERROUS SULFATE 325(65) MG
325 TABLET ORAL
Status: DISCONTINUED | OUTPATIENT
Start: 2023-10-02 | End: 2023-10-04 | Stop reason: HOSPADM

## 2023-10-01 RX ORDER — ONDANSETRON 2 MG/ML
4 INJECTION INTRAMUSCULAR; INTRAVENOUS EVERY 6 HOURS PRN
Status: DISCONTINUED | OUTPATIENT
Start: 2023-10-01 | End: 2023-10-04 | Stop reason: HOSPADM

## 2023-10-01 RX ORDER — IOPAMIDOL 755 MG/ML
500 INJECTION, SOLUTION INTRAVASCULAR ONCE
Status: COMPLETED | OUTPATIENT
Start: 2023-10-01 | End: 2023-10-01

## 2023-10-01 RX ORDER — SODIUM CHLORIDE 9 MG/ML
INJECTION, SOLUTION INTRAVENOUS CONTINUOUS
Status: ACTIVE | OUTPATIENT
Start: 2023-10-01 | End: 2023-10-02

## 2023-10-01 RX ORDER — FUROSEMIDE 20 MG
20 TABLET ORAL 2 TIMES DAILY
Status: DISCONTINUED | OUTPATIENT
Start: 2023-10-01 | End: 2023-10-04 | Stop reason: HOSPADM

## 2023-10-01 RX ORDER — TAMSULOSIN HYDROCHLORIDE 0.4 MG/1
0.4 CAPSULE ORAL EVERY EVENING
Status: DISCONTINUED | OUTPATIENT
Start: 2023-10-01 | End: 2023-10-04 | Stop reason: HOSPADM

## 2023-10-01 RX ADMIN — CEFTRIAXONE 1 G: 1 INJECTION, POWDER, FOR SOLUTION INTRAMUSCULAR; INTRAVENOUS at 16:04

## 2023-10-01 RX ADMIN — SODIUM CHLORIDE 500 ML: 9 INJECTION, SOLUTION INTRAVENOUS at 15:06

## 2023-10-01 RX ADMIN — SODIUM CHLORIDE 1000 ML: 9 INJECTION, SOLUTION INTRAVENOUS at 13:01

## 2023-10-01 RX ADMIN — ATORVASTATIN CALCIUM 20 MG: 20 TABLET, FILM COATED ORAL at 19:57

## 2023-10-01 RX ADMIN — SODIUM CHLORIDE 64 ML: 9 INJECTION, SOLUTION INTRAVENOUS at 14:31

## 2023-10-01 RX ADMIN — ENOXAPARIN SODIUM 40 MG: 40 INJECTION SUBCUTANEOUS at 21:48

## 2023-10-01 RX ADMIN — TAMSULOSIN HYDROCHLORIDE 0.4 MG: 0.4 CAPSULE ORAL at 19:57

## 2023-10-01 RX ADMIN — IOPAMIDOL 94 ML: 755 INJECTION, SOLUTION INTRAVENOUS at 14:31

## 2023-10-01 RX ADMIN — SODIUM CHLORIDE: 9 INJECTION, SOLUTION INTRAVENOUS at 21:48

## 2023-10-01 RX ADMIN — FUROSEMIDE 20 MG: 20 TABLET ORAL at 21:48

## 2023-10-01 ASSESSMENT — ACTIVITIES OF DAILY LIVING (ADL)
ADLS_ACUITY_SCORE: 22
ADLS_ACUITY_SCORE: 35
CHANGE_IN_FUNCTIONAL_STATUS_SINCE_ONSET_OF_CURRENT_ILLNESS/INJURY: NO
ADLS_ACUITY_SCORE: 35
ADLS_ACUITY_SCORE: 35
ADLS_ACUITY_SCORE: 24
FALL_HISTORY_WITHIN_LAST_SIX_MONTHS: NO
ADLS_ACUITY_SCORE: 22

## 2023-10-01 NOTE — PHARMACY-ADMISSION MEDICATION HISTORY
Pharmacist Admission Medication History    Admission medication history is complete. The information provided in this note is only as accurate as the sources available at the time of the update.    Medication reconciliation/reorder completed by provider prior to medication history? No    Information Source(s): Patient, Family member, Clinic records, and CareEverywhere/SureScripts via in-person    Pertinent Information: Patient has not taken his doxycycline since before his procedure on 9/20 because he was told to stop this. At his 9/26 office visit w/ Dr. Strange he was told that he could resume it but the patient did not know this so he hasn't taken it recently. He normally takes 3 fish oil pills daily but is starting back at 1 pill post surgery.     Changes made to PTA medication list:  Added: fish oil  Deleted: None  Changed: Flomax daily --> qPM, cinnamon daily to qPM, Lipitor tab size. Falxseed oil daily --> qpm        Allergies reviewed with patient and updates made in EHR: no    Medication History Completed By: GIAN LARES RPH 10/1/2023 5:06 PM    Prior to Admission medications    Medication Sig Last Dose Taking? Auth Provider Long Term End Date   acetaminophen (TYLENOL) 500 MG tablet Take 500-1,000 mg by mouth every 6 hours as needed for mild pain Unknown at PRN Yes Unknown, Entered By History     atorvastatin (LIPITOR) 40 MG tablet Take 20 mg by mouth every evening 9/30/2023 at PM Yes Unknown, Entered By History No    CINNAMON PO Take 1 tablet by mouth every evening 9/30/2023 at PM Yes Reported, Patient     diclofenac (VOLTAREN) 1 % topical gel Apply 2 g topically 2 times daily Past Week at unknown time Yes Unknown, Entered By History     doxycycline hyclate (PERIOSTAT) 20 MG tablet Take 1 tablet (20 mg) by mouth daily Past Month at unknown time Yes Obi Strange MD No    ferrous sulfate (FEROSUL) 325 (65 Fe) MG tablet Take 1 tablet (325 mg) by mouth daily (with breakfast) 10/1/2023 at AM Yes Camille  Mari Santana PA-C     fish oil-omega-3 fatty acids 1000 MG capsule Take 1 g by mouth daily 10/1/2023 at AM Yes Unknown, Entered By History No    Flaxseed, Linseed, (FLAXSEED OIL) 1000 MG CAPS Take 1,000 mg by mouth every evening 9/30/2023 at PM Yes Unknown, Entered By History     furosemide (LASIX) 20 MG tablet Take 1 tablet (20 mg) by mouth 2 times daily 10/1/2023 at AM Yes Obi Strange MD Yes    glucosamine-chondroitin 500-400 MG CAPS per capsule Take 1 capsule by mouth daily 10/1/2023 at AM Yes Unknown, Entered By History     metroNIDAZOLE (METROGEL) 0.75 % external gel APPLY THIN LAYER TO FACE TWICE A DAY FOR ROSACEA 10/1/2023 at AM Yes Reported, Patient     Multiple Vitamins-Minerals (MULTIVITAMIN ADULT PO) Take 1 tablet by mouth daily Reported on 5/12/2017 10/1/2023 at AM Yes Reported, Patient     pramox-pe-glycerin-petrolatum (PREPARATION H) 1-0.25-14.4-15 % CREA cream Place rectally 4 times daily as needed for hemorrhoids Use as directed More than a month at PRN Yes Unknown, Entered By History     tamsulosin (FLOMAX) 0.4 MG capsule Take 1 capsule (0.4 mg) by mouth daily  Patient taking differently: Take 0.4 mg by mouth every evening 9/30/2023 at PM Yes Obi Strange MD     order for DME 1: Gradient Compression Wraps; 2: Cast Boots; 3: BLE knee or thigh high 20-30 mm Hg compression stocking; 4: BLE knee or thigh high custom compression stocking; 5: Alternative BLE knee high or thigh compression garments (velcro/buckling)   Arnav Cunningham MD     order for DME Equipment being ordered: Walker Wheels () and Walker ()  Treatment Diagnosis: Impaired functional mobility   Alec Raymond MD

## 2023-10-01 NOTE — ED NOTES
[facility]  ED Nurse Handoff Report    ED Chief complaint: Generalized Weakness  . ED Diagnosis:   Final diagnoses:   Sepsis due to urinary tract infection (H)   Hx of malignant gastrointestinal stromal tumor (GIST) - s/p resection       Allergies:   Allergies   Allergen Reactions    Levetiracetam [Keppra] Rash    Oxcarbazepine [Oxcarbazepine] Rash       Code Status: Full Code    Activity level - Baseline/Home:  walker.  Activity Level - Current:   in bed.   Lift room needed: No.   Bariatric: No   Needed: No   Isolation: No.   Infection: Not Applicable.     Respiratory status: Nasal cannula    Vital Signs (within 30 minutes):   Vitals:    10/01/23 1402 10/01/23 1417 10/01/23 1446 10/01/23 1500   BP: 131/67 105/67 125/66 127/66   Pulse: (!) 125 (!) 128 85    Resp:       Temp:       TempSrc:       SpO2: 97% 98% 99% 98%       Cardiac Rhythm:  ,      Pain level:    Patient confused: Yes.   Patient Falls Risk: bed/chair alarm on, nonskid shoes/slippers when out of bed, arm band in place, and patient and family education.   Elimination Status: Has voided     Patient Report - Initial Complaint: increased weakness and confusion.   Focused Assessment: Damien Vallecillo is a 87 year old male who arrives via EMS for evaluation of generalized weakness. His wife states that he appears more fatigued and confused than at baseline. Damien states that he felt weak this morning and was also having chills. He notes that he had abdominal surgery recently (09-) and had a urinary catheter placed following the surgery. He adds that he has a cough at baseline. Damien denies sore throat, runny nose, or abdominal pain      Abnormal Results:   Labs Ordered and Resulted from Time of ED Arrival to Time of ED Departure   ROUTINE UA WITH MICROSCOPIC - Abnormal       Result Value    Color Urine Yellow      Appearance Urine Slightly Cloudy (*)     Glucose Urine Negative      Bilirubin Urine Negative      Ketones  Urine Negative      Specific Gravity Urine 1.013      Blood Urine Large (*)     pH Urine 5.5      Protein Albumin Urine 50 (*)     Urobilinogen Urine Normal      Nitrite Urine Positive (*)     Leukocyte Esterase Urine Large (*)     Bacteria Urine Few (*)     WBC Clumps Urine Present (*)     Mucus Urine Present (*)     RBC Urine 153 (*)     WBC Urine >182 (*)    ISTAT GASES LACTATE VENOUS POCT - Abnormal    Lactic Acid POCT 1.1      Bicarbonate Venous POCT 24      O2 Sat, Venous POCT 69 (*)     pCO2 Venous POCT 35 (*)     pH Venous POCT 7.44 (*)     pO2 Venous POCT 34     COMPREHENSIVE METABOLIC PANEL - Abnormal    Sodium 139      Potassium 3.9      Carbon Dioxide (CO2) 24      Anion Gap 12      Urea Nitrogen 11.1      Creatinine 0.79      GFR Estimate 87      Calcium 9.1      Chloride 103      Glucose 164 (*)     Alkaline Phosphatase 73      AST 20      ALT 20      Protein Total 6.7      Albumin 4.2      Bilirubin Total 1.3 (*)    CBC WITH PLATELETS AND DIFFERENTIAL - Abnormal    WBC Count 15.5 (*)     RBC Count 5.20      Hemoglobin 14.1      Hematocrit 42.3      MCV 81      MCH 27.1      MCHC 33.3      RDW 17.0 (*)     Platelet Count 231      % Neutrophils 92      % Lymphocytes 2      % Monocytes 5      % Eosinophils 0      % Basophils 0      % Immature Granulocytes 1      NRBCs per 100 WBC 0      Absolute Neutrophils 14.3 (*)     Absolute Lymphocytes 0.3 (*)     Absolute Monocytes 0.8      Absolute Eosinophils 0.0      Absolute Basophils 0.0      Absolute Immature Granulocytes 0.1      Absolute NRBCs 0.0     ROUTINE UA WITH MICROSCOPIC - Abnormal    Color Urine Light Yellow      Appearance Urine Slightly Cloudy (*)     Glucose Urine Negative      Bilirubin Urine Negative      Ketones Urine Negative      Specific Gravity Urine 1.026      Blood Urine Large (*)     pH Urine 6.5      Protein Albumin Urine 20 (*)     Urobilinogen Urine Normal      Nitrite Urine Negative      Leukocyte Esterase Urine Large (*)     WBC  Clumps Urine Present (*)     Mucus Urine Present (*)     RBC Urine 26 (*)     WBC Urine >182 (*)     Squamous Epithelials Urine 1     INFLUENZA A/B, RSV, & SARS-COV2 PCR - Normal    Influenza A PCR Negative      Influenza B PCR Negative      RSV PCR Negative      SARS CoV2 PCR Negative     BLOOD CULTURE   BLOOD CULTURE   URINE CULTURE        Chest XR,  PA & LAT   Final Result   IMPRESSION: Negative chest.         Abd/pelvis CT,  IV  contrast only TRAUMA / AAA   Final Result   IMPRESSION:    1. Mild urinary bladder wall thickening with some adjacent inflammatory change, which can be seen in the setting of cystitis. Please correlate clinically.   2. Otherwise no acute findings in the abdomen or pelvis.   3. Interval postoperative changes of gastric tumor resection. No free air, free fluid or abscess.          Treatments provided: see MAR  Family Comments: wife at bedside  OBS brochure/video discussed/provided to patient:  N/A  ED Medications:   Medications   cefTRIAXone (ROCEPHIN) 1 g vial to attach to  mL bag for ADULTS or NS 50 mL bag for PEDS (1 g Intravenous $New Bag 10/1/23 1604)   sodium chloride 0.9% BOLUS 1,000 mL (0 mLs Intravenous Stopped 10/1/23 1424)   sodium chloride for CT scan flush use (64 mLs Intravenous $Given 10/1/23 1431)   iopamidol (ISOVUE-370) solution 500 mL (94 mLs Intravenous $Given 10/1/23 1431)   sodium chloride 0.9% BOLUS 500 mL (500 mLs Intravenous $New Bag 10/1/23 1506)       Drips infusing:  Yes  For the majority of the shift this patient was Green.   Interventions performed were antibiotics initiated for urinary tract infection; see MAR.    Sepsis treatment initiated: No    Cares/treatment/interventions/medications to be completed following ED care: see admit orders.     ED Nurse Name: Tiffany Salgado RN  4:26 PM    RECEIVING UNIT ED HANDOFF REVIEW    Above ED Nurse Handoff Report was reviewed: Yes  Reviewed by: Michelle Silvestre RN on October 1, 2023 at 5:08 PM

## 2023-10-01 NOTE — ED TRIAGE NOTES
Pt arrives by EMS with complaint of generalized weakness, fatigue, and confusion per wifes statement to EMS. Pt had surgery on the 20th in abd, and he complains of pain 5/10 inlower abd. EMS applied O2 for sats 93% RA and sats improved to 95%. EMS reports . Pt had catheter placed after surgery. Urine malodorous. Pt confused.      Triage Assessment       Row Name 10/01/23 1204       Triage Assessment (Adult)    Airway WDL WDL       Respiratory WDL    Respiratory WDL X  SOB at rest       Skin Circulation/Temperature WDL    Skin Circulation/Temperature WDL X  bruised, pale       Cardiac WDL    Cardiac WDL X;rhythm    Pulse Rate & Regularity tachycardic       Peripheral/Neurovascular WDL    Peripheral Neurovascular WDL WDL       Cognitive/Neuro/Behavioral WDL    Cognitive/Neuro/Behavioral WDL X    Level of Consciousness confused

## 2023-10-01 NOTE — ED PROVIDER NOTES
History     Chief Complaint:  Generalized Weakness     The history is provided by the patient and the spouse.      Damien Vallecillo is a 87 year old male who arrives via EMS for evaluation of generalized weakness. His wife states that he appears more fatigued and confused than at baseline. Damien states that he felt weak this morning and was also having chills. He notes that he had abdominal surgery recently (09-) and had a urinary catheter placed following the surgery. He adds that he has a cough at baseline. Damien denies sore throat, runny nose, or abdominal pain.     Independent Historian:   Spouse/Partner - They report as noted above.    Review of External Notes:   Discharge summary from 9/22/2023 after he was admitted for resection of GIST tumor he had urinary retention afterwards and indwelling Fernandez was placed.    Medications:    Atorvastatin   Doxycycline hyclate   Ferrous sulfate  Furosemide    Glucosamine-chondroitin   Tamsulosin   Docosahexaenoic acid    Past Medical History:    Acute suppurative arthritis  Acute, but ill-defined, cerebrovascular disease  Anemia due to blood loss, acute  Ankle fracture  Arthritis  Benign prostatic hyperplasia with urinary frequency   Cheilosis   Chronic headaches   Closed right ankle fracture  Cognitive attention deficit  Covid-19 vaccination refused  Diabetes mellitus, type 2   Dislocation of right patella   Elevated serum creatinine  External hemorrhoids  Fall   Iron deficiency anemia  Fecal incontinence  Gastric stromal tumor  Glucose intolerance (impaired glucose tolerance)  Gout, unspecified  Hammer toe of left foot  Heart murmur   Hematuria  History of blood transfusion  Hyperlipidemia   Hypersomnia  Left foot pain   Bilateral knee pain, unspecified chronicity   Lentigo maligna  Malignant neoplasm of rectosigmoid junction  Morbid obesity due to excess calories  RENAE  Pedal edema   Peripheral polyneuropathy   Physical deconditioning   RBC microcytosis  Rhinophyma    Rosacea   Syncope   Tubulovillous adenoma of colon   Urinary retention   Venous stasis dermatitis of both lower extremities  Xerostomia   Thyroid nodule   Hypertension     Past Surgical History:    Arthroplasty knee   Colonoscopy x 5  DaVinci pelvic procedure   Endoscopic ultrasound upper gastrointestinal tract   EGD, combined   Herniorrhaphy epigastric   Laparoscopic assisted colectomy   Realign patella   Repair hammer toe  Sigmoidoscopy flexible  Right heel surgery, spur removed      Physical Exam   Patient Vitals for the past 24 hrs:   BP Temp Temp src Pulse Resp SpO2   10/01/23 1307 (!) 145/78 -- -- 93 -- 96 %   10/01/23 1306 -- -- -- -- -- 97 %   10/01/23 1249 -- -- -- -- -- 95 %   10/01/23 1237 -- -- -- -- -- 95 %   10/01/23 1202 (!) 171/92 100.1  F (37.8  C) Oral 101 20 97 %        Physical Exam  Constitutional: Alert, attentive, GCS 15  HENT:    Nose: Nose normal.    Mouth/Throat: Oropharynx is clear, mucous membranes are moist  Eyes: EOM are normal, anicteric, conjugate gaze  CV: regular rate and rhythm; no murmurs  Chest: Effort normal and breath sounds clear without wheezing or rales, symmetric bilaterally   GI: Surgical incisions, well-healing without evidence of erythema or drainage, anterior abdomen is mildly ecchymotic  MSK: No LE edema, no tenderness to palpation of BLE.  Neurological: Alert, attentive, moving all extremities equally.   Skin: Skin is warm and dry.     Emergency Department Course     ECG results from 10/01/23   EKG 12 lead     Value    Systolic Blood Pressure     Diastolic Blood Pressure     Ventricular Rate 95    Atrial Rate 96    ME Interval 186    QRS Duration 80        QTc 427    P Axis 2    R AXIS 0    T Axis 22    Interpretation ECG      Sinus rhythm  Inferior infarct , age undetermined  Abnormal ECG  When compared with ECG of 24-SEP-2013 09:37,  Vent. rate has increased BY  49 BPM  Inferior infarct is now Present           Imaging:  Chest XR,  PA & LAT   Final Result    IMPRESSION: Negative chest.         Abd/pelvis CT,  IV  contrast only TRAUMA / AAA   Final Result   IMPRESSION:    1. Mild urinary bladder wall thickening with some adjacent inflammatory change, which can be seen in the setting of cystitis. Please correlate clinically.   2. Otherwise no acute findings in the abdomen or pelvis.   3. Interval postoperative changes of gastric tumor resection. No free air, free fluid or abscess.         Report per radiology    Laboratory:  Labs Ordered and Resulted from Time of ED Arrival to Time of ED Departure   ROUTINE UA WITH MICROSCOPIC - Abnormal       Result Value    Color Urine Yellow      Appearance Urine Slightly Cloudy (*)     Glucose Urine Negative      Bilirubin Urine Negative      Ketones Urine Negative      Specific Gravity Urine 1.013      Blood Urine Large (*)     pH Urine 5.5      Protein Albumin Urine 50 (*)     Urobilinogen Urine Normal      Nitrite Urine Positive (*)     Leukocyte Esterase Urine Large (*)     Bacteria Urine Few (*)     WBC Clumps Urine Present (*)     Mucus Urine Present (*)     RBC Urine 153 (*)     WBC Urine >182 (*)    ISTAT GASES LACTATE VENOUS POCT - Abnormal    Lactic Acid POCT 1.1      Bicarbonate Venous POCT 24      O2 Sat, Venous POCT 69 (*)     pCO2 Venous POCT 35 (*)     pH Venous POCT 7.44 (*)     pO2 Venous POCT 34     COMPREHENSIVE METABOLIC PANEL - Abnormal    Sodium 139      Potassium 3.9      Carbon Dioxide (CO2) 24      Anion Gap 12      Urea Nitrogen 11.1      Creatinine 0.79      GFR Estimate 87      Calcium 9.1      Chloride 103      Glucose 164 (*)     Alkaline Phosphatase 73      AST 20      ALT 20      Protein Total 6.7      Albumin 4.2      Bilirubin Total 1.3 (*)    CBC WITH PLATELETS AND DIFFERENTIAL - Abnormal    WBC Count 15.5 (*)     RBC Count 5.20      Hemoglobin 14.1      Hematocrit 42.3      MCV 81      MCH 27.1      MCHC 33.3      RDW 17.0 (*)     Platelet Count 231      % Neutrophils 92      % Lymphocytes 2       % Monocytes 5      % Eosinophils 0      % Basophils 0      % Immature Granulocytes 1      NRBCs per 100 WBC 0      Absolute Neutrophils 14.3 (*)     Absolute Lymphocytes 0.3 (*)     Absolute Monocytes 0.8      Absolute Eosinophils 0.0      Absolute Basophils 0.0      Absolute Immature Granulocytes 0.1      Absolute NRBCs 0.0     ROUTINE UA WITH MICROSCOPIC - Abnormal    Color Urine Light Yellow      Appearance Urine Slightly Cloudy (*)     Glucose Urine Negative      Bilirubin Urine Negative      Ketones Urine Negative      Specific Gravity Urine 1.026      Blood Urine Large (*)     pH Urine 6.5      Protein Albumin Urine 20 (*)     Urobilinogen Urine Normal      Nitrite Urine Negative      Leukocyte Esterase Urine Large (*)     WBC Clumps Urine Present (*)     Mucus Urine Present (*)     RBC Urine 26 (*)     WBC Urine >182 (*)     Squamous Epithelials Urine 1     INFLUENZA A/B, RSV, & SARS-COV2 PCR - Normal    Influenza A PCR Negative      Influenza B PCR Negative      RSV PCR Negative      SARS CoV2 PCR Negative     BLOOD CULTURE   BLOOD CULTURE   URINE CULTURE            Emergency Department Course & Assessments:  Interventions:  Medications   sodium chloride 0.9% BOLUS 500 mL (500 mLs Intravenous $New Bag 10/1/23 1506)   cefTRIAXone (ROCEPHIN) 1 g vial to attach to  mL bag for ADULTS or NS 50 mL bag for PEDS (has no administration in time range)   sodium chloride 0.9% BOLUS 1,000 mL (0 mLs Intravenous Stopped 10/1/23 1424)   sodium chloride for CT scan flush use (64 mLs Intravenous $Given 10/1/23 1431)   iopamidol (ISOVUE-370) solution 500 mL (94 mLs Intravenous $Given 10/1/23 1431)        Assessments:  1332 I obtained the patient's history and examined as noted above.      Independent Interpretation (X-rays, CTs, rhythm strip):   I personally reviewed his chest x-ray, see notes of pneumonia or pneumothorax    Consultations/Discussion of Management or Tests:  Dr. Stout, hospitalist service       Social  Determinants of Health affecting care:   None    Disposition:  The patient was admitted to the hospital under the care of Dr. Stout, will plan for medicine admission.     Impression & Plan      Medical Decision Makin-year-old male past medical history notable for GIST tumor resection in 2023 presenting for general malaise, fever.  He had postoperative course complicated by urinary retention   And arrives with indwelling Fernandez with cloudy dark urine.  We were able to successfully remove his Fernandez he was able to void spontaneously (first UA is from contaminated Fernandez catheter bag), UA is consistent with UTI, he is noted to have low-grade temp here, mildly tachycardic concerning for sepsis with a white count of 15.  Lactate is within normal limits.  CT imaging was obtained which shows no definite postsurgical infection.  Chest x-ray is clear.  We will plan for medicine admission for sepsis secondary to UTI.    Diagnosis:    ICD-10-CM    1. Sepsis due to urinary tract infection (H)  A41.9     N39.0       2. Hx of malignant gastrointestinal stromal tumor (GIST)  Z85.09     s/p resection         Ranulfo Lopez MD  [employer]  4:02 PM 10/01/23     Scribe Disclosure:  I, Katia Griffiths, am serving as a scribe at 1:31 PM on 10/1/2023 to document services personally performed by Ranulfo Lopez MD based on my observations and the provider's statements to me.     10/1/2023   Ranulfo Lopez MD Dunbar, John Forrest, MD  10/01/23 1605

## 2023-10-01 NOTE — H&P
[facility]    History and Physical  Hospitalist     Date of Admission:  10/1/2023  Date of Service (when I saw the patient): 10/01/23  Provider: Gordy Stout MD      Chief Complaint   Fever, chills and confusion.    History is obtained from the patient, electronic health record, and emergency department physician    History of Present Illness   Damien Vallecillo is a 87 year old male who has a very complicated past medical history as can be seen in the list below.  He underwent laparoscopic surgery recently to remove a mass in his stomach.  Please see discharge summary from the surgeons for details.  He was sent home with a Fernandez catheter in place.  Today the patient started to have fever, chills and confusion according with his wife, she called EMS and he was transported to the emergency department.  He arrived febrile and hypertensive to the emergency department.  His preliminary work-up has revealed leukocytosis with neutrophilia and very abnormal UA.  Fernandez catheter was removed and the patient has been able to void without help.  He has been started on ceftriaxone IV empirically for UTI, culture sent and in process.  At the moment of my visit he looks better.  Wife is in the room with him, she has been helping with information for the HPI.  Patient is also cooperative, feeling better.  I have discussed this case with Dr. Lopez who is requesting admission for further treatment and follow-up.    Past Medical History    I have reviewed this patient's medical history and updated it with pertinent information if needed.   Past Medical History:   Diagnosis Date    Acute suppurative arthritis (H)     Acute, but ill-defined, cerebrovascular disease     Anemia due to blood loss, acute 05/25/2023    Ankle fracture 12/28/2018    Arthritis     Benign prostatic hyperplasia with urinary frequency 03/29/2022    Cheilosis 09/28/2018    Chronic headaches     Closed right ankle fracture 12/29/2018     "Cognitive attention deficit 09/16/2021    COVID-19 vaccination refused 09/26/2023    Diabetes (H)     \"Pre-diabetes\"    Dislocation of right patella 09/16/2020    Elevated serum creatinine 11/13/2019    GFR Estimate Date Value Ref Range Status 09/30/2019 84 >60 mL/min/[1.73_m2] Final   Comment:   Non  GFR Calc Starting 12/18/2018, serum creatinine based estimated GFR (eGFR) will be  calculated using the Chronic Kidney Disease Epidemiology Collaboration  (CKD-EPI) equation.        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    Glucose intolerance (impaired glucose tolerance) 05/31/2013    Gout, unspecified     Grieving 09/16/2021    Hammer toe of left foot 10/21/2016    Heart murmur 09/06/2023    Hematuria     History of arthroplasty of right knee 08/17/2017    History of blood transfusion     Hyperlipidemia LDL goal <160 11/13/2019    Hypersomnia 03/07/2023    Left foot pain 09/30/2019    Left knee pain, unspecified chronicity 05/08/2017    Lentigo maligna (H)     Malignant neoplasm of rectosigmoid junction (H)     Morbid obesity due to excess calories (H) 10/21/2016    RENAE (obstructive sleep apnea) 03/08/2023    Pedal edema 01/18/2018    Peripheral polyneuropathy 09/30/2019    Physical deconditioning 03/08/2023    Pre-diabetes 10/21/2016    RBC microcytosis 02/14/2017    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  Status post recent abdominal intervention, sent home with Fernandez catheter in place.  Laboratory work-up " "shows leukocytosis with neutrophilia, very abnormal UA.  CT of the abdomen shows no complications related to the surgery, urinary bladder wall thickening/inflammation correlate with symptoms and laboratory findings.    CAUTI.  Urinary tract infection secondary to catheter placement after recent abdominal laparoscopic intervention.  Clinical picture, current radiological work-up and laboratory findings are clear evidence of urinary infection.  Acute infectious encephalopathy secondary to the above.  Wife noted patient was extremely confused at home and when she placed a call to 911.  Sepsis secondary to the above.  -Admit to inpatient.  - Vitals.  Routine.  - Regular diet.  - Normal saline at 100 mill per hour.  -Ceftriaxone 1 g IV daily until urine culture available.  - Aspirin for pain and fever..  - Lovenox 40 mg subcu daily.  Other chronic comorbidities.  - Hyperlipidemia on atorvastatin.  - Hypertension.   -History of iron deficiency anemia, currently on supplement.  - BPH on Flomax.  - Type 2 diabetes mellitus, last A1c 6.8.  Not on any medication.  Apparently diet controlled.      Clinically Significant Risk Factors Present on Admission                      # DMII: A1C = 6.8 % (Ref range: 0.0 - 5.6 %) within past 6 months    # Obesity: Estimated body mass index is 39.45 kg/m  as calculated from the following:    Height as of 9/26/23: 1.765 m (5' 9.5\").    Weight as of 9/26/23: 122.9 kg (271 lb).                  Code Status   Full Code    Primary Care Physician   Obi Strange      Past Surgical History   I have reviewed this patient's surgical history and updated it with pertinent information if needed.  Past Surgical History:   Procedure Laterality Date    ARTHROPLASTY KNEE Right 6/19/2017    Procedure: ARTHROPLASTY KNEE;  Right total knee arthroplasty, Hammer toe repair toe 2,3,4,5 left foot with osteotomy;  Surgeon: Alec Raymond MD;  Location: [facility] OR    COLONOSCOPY N/A 6/23/2015    Procedure: " COMBINED COLONOSCOPY, SINGLE OR MULTIPLE BIOPSY/POLYPECTOMY BY BIOPSY;  Surgeon: Kimberly Alfaro MD;  Location:  GI    COLONOSCOPY N/A 7/30/2015    Procedure: COLONOSCOPY;  Surgeon: Enrique Salazar MD;  Location:  OR    COLONOSCOPY N/A 7/31/2018    Procedure: COLONOSCOPY;  Colonoscopy;  Surgeon: Tiffany Cheema MD;  Location:  GI    COLONOSCOPY N/A 5/26/2023    Procedure: Colonoscopy;  Surgeon: Juan Grimaldo MD;  Location:  GI    COLONOSCOPY N/A 6/15/2023    Procedure: colonoscopy;   no cecum as pt has had partial colectomy;  Surgeon: Sherif Escalona MD;  Location:  GI    DAVINCI PELVIC PROCEDURE N/A 9/20/2023    Procedure: Robot assisted lysis of adhesions, Resection of gastric gastrointestinal stromal  tumor, omental mass biopsy;  Surgeon: Chino Ny MD;  Location:  OR    ENDOSCOPIC ULTRASOUND UPPER GASTROINTESTINAL TRACT (GI) N/A 7/13/2023    Procedure: Endoscopic ultrasound upper gastrointestinal tract with fine needle aspiration;  Surgeon: Kae Mccall MD;  Location:  OR    ESOPHAGOSCOPY, GASTROSCOPY, DUODENOSCOPY (EGD), COMBINED N/A 5/26/2023    Procedure: Esophagoscopy, gastroscopy, duodenoscopy (EGD), combined;  Surgeon: Juan Grimaldo MD;  Location:  GI    HERNIORRHAPHY EPIGASTRIC  4/27/2012    Procedure:HERNIORRHAPHY EPIGASTRIC; Epigastric Hernia Repair with mesh ; Surgeon:BOLIVAR OLIVEIRA; Location: OR    LAPAROSCOPIC ASSISTED COLECTOMY Right 7/30/2015    Procedure: LAPAROSCOPIC ASSISTED COLECTOMY;  Surgeon: Enrique Salazar MD;  Location:  OR    ORTHOPEDIC SURGERY      lt knee arthroplasty    REALIGN PATELLA Right 10/9/2017    Procedure: REALIGN PATELLA;  Revision right total knee arthroplasty;  Surgeon: Alec Raymond MD;  Location:  OR    REPAIR HAMMER TOE Left 6/19/2017    Procedure: REPAIR HAMMER TOE;  Hammer toe repair toe 2,3,4,5 left foot with osteotomy   ;  Surgeon: Beverly Kim DPM, Podiatry/Foot and Ankle  Surgery;  Location: [facility] OR    SIGMOIDOSCOPY FLEXIBLE  2013    Procedure: SIGMOIDOSCOPY FLEXIBLE;  SIGMOIDOSCOPY FLEXIBLE (FV) Needs blood sugar ck'd;  Surgeon: Enrique Salazar MD;  Location:  GI    skin cancer excision      [facility] NONSPECIFIC PROCEDURE      right heel surgery; spur removed.       Prior to Admission Medications   Prior to Admission Medications   Prescriptions Last Dose Informant Patient Reported? Taking?   CINNAMON PO   Yes No   Sig: Take 1 tablet by mouth daily    Flaxseed, Linseed, (FLAXSEED OIL) 1000 MG CAPS   Yes No   Sig: Take 1,000 mg by mouth daily   Multiple Vitamins-Minerals (MULTIVITAMIN ADULT PO)   Yes No   Sig: Take 1 tablet by mouth daily Reported on 2017   acetaminophen (TYLENOL) 500 MG tablet   Yes No   Sig: Take 500-1,000 mg by mouth every 6 hours as needed for mild pain   atorvastatin (LIPITOR) 20 MG tablet   Yes No   Sig: Take 20 mg by mouth every evening   diclofenac (VOLTAREN) 1 % topical gel   Yes No   Sig: Apply 2 g topically 2 times daily   doxycycline hyclate (PERIOSTAT) 20 MG tablet   No No   Sig: Take 1 tablet (20 mg) by mouth daily   ferrous sulfate (FEROSUL) 325 (65 Fe) MG tablet   No No   Sig: Take 1 tablet (325 mg) by mouth daily (with breakfast)   furosemide (LASIX) 20 MG tablet   No No   Sig: Take 1 tablet (20 mg) by mouth 2 times daily   glucosamine-chondroitin 500-400 MG CAPS per capsule   Yes No   Sig: Take 1 capsule by mouth daily   metroNIDAZOLE (METROGEL) 0.75 % external gel   Yes No   Sig: APPLY THIN LAYER TO FACE TWICE A DAY FOR ROSACEA   order for DME   No No   Sig: Equipment being ordered: Walker Wheels () and Walker ()  Treatment Diagnosis: Impaired functional mobility   order for DME   No No   Si: Gradient Compression Wraps; 2: Cast Boots; 3: BLE knee or thigh high 20-30 mm Hg compression stocking; 4: BLE knee or thigh high custom compression stocking; 5: Alternative BLE knee high or thigh compression garments (velcro/buckling)    pramox-pe-glycerin-petrolatum (PREPARATION H) 1-0.25-14.4-15 % CREA cream   Yes No   Sig: Place rectally 4 times daily as needed for hemorrhoids Use as directed   tamsulosin (FLOMAX) 0.4 MG capsule   No No   Sig: Take 1 capsule (0.4 mg) by mouth daily      Facility-Administered Medications: None     Allergies   Allergies   Allergen Reactions    Levetiracetam [Keppra] Rash    Oxcarbazepine [Oxcarbazepine] Rash       Social History   I have personally reviewed the social history with the patient showing.  Social History     Tobacco Use    Smoking status: Never     Passive exposure: Never    Smokeless tobacco: Never   Substance Use Topics    Alcohol use: Yes     Comment: occ.       Family History   I have reviewed this patient's family history and it is not contributory to the admission .       Review of Systems   Except as noted in the HPI, a 12-system Review of Systems was found to be negative.      Physical Exam   Vital Signs with Ranges  Temp:  [100.1  F (37.8  C)] 100.1  F (37.8  C)  Pulse:  [] 85  Resp:  [20] 20  BP: (105-171)/(66-92) 127/66  SpO2:  [95 %-99 %] 98 %  0 lbs 0 oz    GEN:  Alert, cooperative, appears comfortable, NAD.  HEENT:  Normocephalic/atraumatic, no scleral icterus, no nasal discharge, mouth moist.  CV:  Regular rate and rhythm, no murmur or JVD.  S1 + S2 noted, no S3 or S4.  LUNGS:  Clear to auscultation bilaterally without rales/rhonchi/wheezing/retractions.  Symmetric chest rise on inhalation noted.  ABD:  Active bowel sounds, soft, non-tender/non-distended.  No rebound/guarding/rigidity.  EXT: BLE edema 2+, easy pitting, no cyanosis.  No joint synovitis noted.  SKIN:  Dry to touch, no exanthems noted in the visualized areas.       Data   I personally reviewed the EKG tracing showing sinus rhythm and possible old inferior infarction .  Results for orders placed or performed during the hospital encounter of 10/01/23 (from the past 24 hour(s))   EKG 12 lead   Result Value Ref Range     Systolic Blood Pressure  mmHg    Diastolic Blood Pressure  mmHg    Ventricular Rate 95 BPM    Atrial Rate 96 BPM    RI Interval 186 ms    QRS Duration 80 ms     ms    QTc 427 ms    P Axis 2 degrees    R AXIS 0 degrees    T Axis 22 degrees    Interpretation ECG       Sinus rhythm  Inferior infarct , age undetermined  Abnormal ECG  When compared with ECG of 24-SEP-2013 09:37,  Vent. rate has increased BY  49 BPM  Inferior infarct is now Present     Extra Tube ([place] Draw)    Narrative    The following orders were created for panel order Extra Tube ([place] Draw).  Procedure                               Abnormality         Status                     ---------                               -----------         ------                     Extra Blue Top Tube[[id number]]                              Final result               Extra Green Top (Lithium...[[id number]]                      Final result               Extra Purple Top Tube[[id number]]                            Final result                 Please view results for these tests on the individual orders.   Extra Blue Top Tube   Result Value Ref Range    Hold Specimen JIC    Extra Green Top (Lithium Heparin) Tube   Result Value Ref Range    Hold Specimen JIC    Extra Purple Top Tube   Result Value Ref Range    Hold Specimen JIC    CBC + differential    Narrative    The following orders were created for panel order CBC + differential.  Procedure                               Abnormality         Status                     ---------                               -----------         ------                     CBC with platelets and d...[[id number]]  Abnormal            Final result                 Please view results for these tests on the individual orders.   Comprehensive metabolic panel   Result Value Ref Range    Sodium 139 135 - 145 mmol/L    Potassium 3.9 3.4 - 5.3 mmol/L    Carbon Dioxide (CO2) 24 22 - 29 mmol/L    Anion Gap 12 7 - 15 mmol/L    Urea Nitrogen  11.1 8.0 - 23.0 mg/dL    Creatinine 0.79 0.67 - 1.17 mg/dL    GFR Estimate 87 >60 mL/min/1.73m2    Calcium 9.1 8.8 - 10.2 mg/dL    Chloride 103 98 - 107 mmol/L    Glucose 164 (H) 70 - 99 mg/dL    Alkaline Phosphatase 73 40 - 129 U/L    AST 20 0 - 45 U/L    ALT 20 0 - 70 U/L    Protein Total 6.7 6.4 - 8.3 g/dL    Albumin 4.2 3.5 - 5.2 g/dL    Bilirubin Total 1.3 (H) <=1.2 mg/dL   CBC with platelets and differential   Result Value Ref Range    WBC Count 15.5 (H) 4.0 - 11.0 10e3/uL    RBC Count 5.20 4.40 - 5.90 10e6/uL    Hemoglobin 14.1 13.3 - 17.7 g/dL    Hematocrit 42.3 40.0 - 53.0 %    MCV 81 78 - 100 fL    MCH 27.1 26.5 - 33.0 pg    MCHC 33.3 31.5 - 36.5 g/dL    RDW 17.0 (H) 10.0 - 15.0 %    Platelet Count 231 150 - 450 10e3/uL    % Neutrophils 92 %    % Lymphocytes 2 %    % Monocytes 5 %    % Eosinophils 0 %    % Basophils 0 %    % Immature Granulocytes 1 %    NRBCs per 100 WBC 0 <1 /100    Absolute Neutrophils 14.3 (H) 1.6 - 8.3 10e3/uL    Absolute Lymphocytes 0.3 (L) 0.8 - 5.3 10e3/uL    Absolute Monocytes 0.8 0.0 - 1.3 10e3/uL    Absolute Eosinophils 0.0 0.0 - 0.7 10e3/uL    Absolute Basophils 0.0 0.0 - 0.2 10e3/uL    Absolute Immature Granulocytes 0.1 <=0.4 10e3/uL    Absolute NRBCs 0.0 10e3/uL   iStat Gases (lactate) venous, POCT   Result Value Ref Range    Lactic Acid POCT 1.1 <=2.0 mmol/L    Bicarbonate Venous POCT 24 21 - 28 mmol/L    O2 Sat, Venous POCT 69 (L) 94 - 100 %    pCO2 Venous POCT 35 (L) 40 - 50 mm Hg    pH Venous POCT 7.44 (H) 7.32 - 7.43    pO2 Venous POCT 34 25 - 47 mm Hg   UA with Microscopic   Result Value Ref Range    Color Urine Yellow Colorless, Straw, Light Yellow, Yellow    Appearance Urine Slightly Cloudy (A) Clear    Glucose Urine Negative Negative mg/dL    Bilirubin Urine Negative Negative    Ketones Urine Negative Negative mg/dL    Specific Gravity Urine 1.013 1.003 - 1.035    Blood Urine Large (A) Negative    pH Urine 5.5 5.0 - 7.0    Protein Albumin Urine 50 (A) Negative mg/dL     Urobilinogen Urine Normal Normal, 2.0 mg/dL    Nitrite Urine Positive (A) Negative    Leukocyte Esterase Urine Large (A) Negative    Bacteria Urine Few (A) None Seen /HPF    WBC Clumps Urine Present (A) None Seen /HPF    Mucus Urine Present (A) None Seen /LPF    RBC Urine 153 (H) <=2 /HPF    WBC Urine >182 (H) <=5 /HPF   Symptomatic Influenza A/B, RSV, & SARS-CoV2 PCR (COVID-19) Nasopharyngeal    Specimen: Nasopharyngeal; Swab   Result Value Ref Range    Influenza A PCR Negative Negative    Influenza B PCR Negative Negative    RSV PCR Negative Negative    SARS CoV2 PCR Negative Negative    Narrative    Testing was performed using the Xpert Xpress CoV2/Flu/RSV Assay on the Cepheid GeneXpert Instrument. This test should be ordered for the detection of SARS-CoV-2, influenza, and RSV viruses in individuals who meet clinical and/or epidemiological criteria. Test performance is unknown in asymptomatic patients. This test is for in vitro diagnostic use under the FDA EUA for laboratories certified under CLIA to perform high or moderate complexity testing. This test has not been FDA cleared or approved. A negative result does not rule out the presence of PCR inhibitors in the specimen or target RNA in concentration below the limit of detection for the assay. If only one viral target is positive but coinfection with multiple targets is suspected, the sample should be re-tested with another FDA cleared, approved, or authorized test, if coinfection would change clinical management. This test was validated by the [facility]. These laboratories are certified under the Clinical Laboratory Improvement Amendments of 1988 (CLIA-88) as qualified to perform high complexity laboratory testing.   Abd/pelvis CT,  IV  contrast only TRAUMA / AAA    Narrative    EXAM: CT ABDOMEN PELVIS W CONTRAST  LOCATION: [facility]  DATE: 10/1/2023    INDICATION: Recent surgery, gastric tumor resection and  omental resection. Midline lower abdominal pain. Fever.  COMPARISON: CT abdomen pelvis 08/22/2023.  TECHNIQUE: CT scan of the abdomen and pelvis was performed following injection of IV contrast. Multiplanar reformats were obtained. Dose reduction techniques were used.  CONTRAST: 94mL Isovue 370    FINDINGS:   LOWER CHEST: Mild linear scarring or atelectasis in the lung bases.    HEPATOBILIARY: Cholelithiasis. Diffuse hepatic steatosis. No focal liver lesion.    PANCREAS: Normal.    SPLEEN: Splenule.    ADRENAL GLANDS: Normal.    KIDNEYS/BLADDER: Normal kidneys. No urinary stone or hydronephrosis. Mild diffuse thickening of the urinary bladder with some mild adjacent inflammatory stranding. No bladder stone.    BOWEL: Moderate sigmoid colonic diverticulosis. No evidence of diverticulitis or colitis. Right hemicolectomy with anterior abdominal anastomosis. Since 08/22/2023, interval postoperative changes along the lesser curve of the stomach with resection of a   prior mass. No evidence of locally recurrent or residual neoplasm. No free air, free fluid or abscess.    LYMPH NODES: Normal.    VASCULATURE: Mild atherosclerotic calcifications. Normal caliber abdominal aorta.    PELVIC ORGANS: Mild prostate gland enlargement.    MUSCULOSKELETAL: Mild scattered hypertrophic degenerative changes in the spine. No suspicious osseous lesion.      Impression    IMPRESSION:   1. Mild urinary bladder wall thickening with some adjacent inflammatory change, which can be seen in the setting of cystitis. Please correlate clinically.  2. Otherwise no acute findings in the abdomen or pelvis.  3. Interval postoperative changes of gastric tumor resection. No free air, free fluid or abscess.   UA with Microscopic   Result Value Ref Range    Color Urine Light Yellow Colorless, Straw, Light Yellow, Yellow    Appearance Urine Slightly Cloudy (A) Clear    Glucose Urine Negative Negative mg/dL    Bilirubin Urine Negative Negative    Ketones  Urine Negative Negative mg/dL    Specific Gravity Urine 1.026 1.003 - 1.035    Blood Urine Large (A) Negative    pH Urine 6.5 5.0 - 7.0    Protein Albumin Urine 20 (A) Negative mg/dL    Urobilinogen Urine Normal Normal, 2.0 mg/dL    Nitrite Urine Negative Negative    Leukocyte Esterase Urine Large (A) Negative    WBC Clumps Urine Present (A) None Seen /HPF    Mucus Urine Present (A) None Seen /LPF    RBC Urine 26 (H) <=2 /HPF    WBC Urine >182 (H) <=5 /HPF    Squamous Epithelials Urine 1 <=1 /HPF   Chest XR,  PA & LAT    Narrative    EXAM: XR CHEST 2 VIEWS  LOCATION: [facility]  DATE: 10/01/2023    INDICATION: Fever, postop.  COMPARISON: None.      Impression    IMPRESSION: Negative chest.         Securely message with the Vocera Web Console (learn more here)  Text page via [facility] Paging/Directory        Disclaimer: This note consists of symbols derived from keyboarding, dictation and/or voice recognition software. As a result, there may be errors in the script that have gone undetected. Please consider this when interpreting information found in this chart.

## 2023-10-01 NOTE — ED NOTES
Bed: [facility]  Expected date: 10/1/23  Expected time: 11:49 AM  Means of arrival: Ambulance  Comments:  [id number]

## 2023-10-02 ENCOUNTER — APPOINTMENT (OUTPATIENT)
Dept: PHYSICAL THERAPY | Facility: CLINIC | Age: 88
DRG: 698 | End: 2023-10-02
Attending: HOSPITALIST
Payer: MEDICARE

## 2023-10-02 LAB
ANION GAP SERPL CALCULATED.3IONS-SCNC: 9 MMOL/L (ref 7–15)
ATRIAL RATE - MUSE: 96 BPM
BASOPHILS # BLD AUTO: 0 10E3/UL (ref 0–0.2)
BASOPHILS NFR BLD AUTO: 0 %
BUN SERPL-MCNC: 11.5 MG/DL (ref 8–23)
CALCIUM SERPL-MCNC: 8.5 MG/DL (ref 8.8–10.2)
CHLORIDE SERPL-SCNC: 105 MMOL/L (ref 98–107)
CREAT SERPL-MCNC: 0.77 MG/DL (ref 0.67–1.17)
DEPRECATED HCO3 PLAS-SCNC: 23 MMOL/L (ref 22–29)
DIASTOLIC BLOOD PRESSURE - MUSE: NORMAL MMHG
EGFRCR SERPLBLD CKD-EPI 2021: 87 ML/MIN/1.73M2
EOSINOPHIL # BLD AUTO: 0.1 10E3/UL (ref 0–0.7)
EOSINOPHIL NFR BLD AUTO: 1 %
ERYTHROCYTE [DISTWIDTH] IN BLOOD BY AUTOMATED COUNT: 17.3 % (ref 10–15)
GLUCOSE SERPL-MCNC: 134 MG/DL (ref 70–99)
HCT VFR BLD AUTO: 38.1 % (ref 40–53)
HGB BLD-MCNC: 12.4 G/DL (ref 13.3–17.7)
IMM GRANULOCYTES # BLD: 0.1 10E3/UL
IMM GRANULOCYTES NFR BLD: 1 %
INTERPRETATION ECG - MUSE: NORMAL
LYMPHOCYTES # BLD AUTO: 0.8 10E3/UL (ref 0.8–5.3)
LYMPHOCYTES NFR BLD AUTO: 6 %
MCH RBC QN AUTO: 27 PG (ref 26.5–33)
MCHC RBC AUTO-ENTMCNC: 32.5 G/DL (ref 31.5–36.5)
MCV RBC AUTO: 83 FL (ref 78–100)
MONOCYTES # BLD AUTO: 1.1 10E3/UL (ref 0–1.3)
MONOCYTES NFR BLD AUTO: 8 %
NEUTROPHILS # BLD AUTO: 11.2 10E3/UL (ref 1.6–8.3)
NEUTROPHILS NFR BLD AUTO: 84 %
NRBC # BLD AUTO: 0 10E3/UL
NRBC BLD AUTO-RTO: 0 /100
P AXIS - MUSE: 2 DEGREES
PLATELET # BLD AUTO: 185 10E3/UL (ref 150–450)
POTASSIUM SERPL-SCNC: 3.4 MMOL/L (ref 3.4–5.3)
PR INTERVAL - MUSE: 186 MS
QRS DURATION - MUSE: 80 MS
QT - MUSE: 340 MS
QTC - MUSE: 427 MS
R AXIS - MUSE: 0 DEGREES
RBC # BLD AUTO: 4.59 10E6/UL (ref 4.4–5.9)
SODIUM SERPL-SCNC: 137 MMOL/L (ref 135–145)
SYSTOLIC BLOOD PRESSURE - MUSE: NORMAL MMHG
T AXIS - MUSE: 22 DEGREES
VENTRICULAR RATE- MUSE: 95 BPM
WBC # BLD AUTO: 13.3 10E3/UL (ref 4–11)

## 2023-10-02 PROCEDURE — 250N000011 HC RX IP 250 OP 636: Mod: JZ | Performed by: HOSPITALIST

## 2023-10-02 PROCEDURE — 80048 BASIC METABOLIC PNL TOTAL CA: CPT | Performed by: HOSPITALIST

## 2023-10-02 PROCEDURE — G0452 MOLECULAR PATHOLOGY INTERPR: HCPCS | Mod: 26 | Performed by: PATHOLOGY

## 2023-10-02 PROCEDURE — 97530 THERAPEUTIC ACTIVITIES: CPT | Mod: GP

## 2023-10-02 PROCEDURE — 85025 COMPLETE CBC W/AUTO DIFF WBC: CPT | Performed by: HOSPITALIST

## 2023-10-02 PROCEDURE — 99232 SBSQ HOSP IP/OBS MODERATE 35: CPT | Performed by: STUDENT IN AN ORGANIZED HEALTH CARE EDUCATION/TRAINING PROGRAM

## 2023-10-02 PROCEDURE — 97161 PT EVAL LOW COMPLEX 20 MIN: CPT | Mod: GP

## 2023-10-02 PROCEDURE — 120N000001 HC R&B MED SURG/OB

## 2023-10-02 PROCEDURE — 250N000013 HC RX MED GY IP 250 OP 250 PS 637: Performed by: HOSPITALIST

## 2023-10-02 PROCEDURE — 250N000013 HC RX MED GY IP 250 OP 250 PS 637: Performed by: STUDENT IN AN ORGANIZED HEALTH CARE EDUCATION/TRAINING PROGRAM

## 2023-10-02 PROCEDURE — 97116 GAIT TRAINING THERAPY: CPT | Mod: GP

## 2023-10-02 PROCEDURE — 36415 COLL VENOUS BLD VENIPUNCTURE: CPT | Performed by: HOSPITALIST

## 2023-10-02 RX ADMIN — FUROSEMIDE 20 MG: 20 TABLET ORAL at 20:01

## 2023-10-02 RX ADMIN — FERROUS SULFATE TAB 325 MG (65 MG ELEMENTAL FE) 325 MG: 325 (65 FE) TAB at 08:28

## 2023-10-02 RX ADMIN — TAMSULOSIN HYDROCHLORIDE 0.4 MG: 0.4 CAPSULE ORAL at 19:56

## 2023-10-02 RX ADMIN — ENOXAPARIN SODIUM 40 MG: 40 INJECTION SUBCUTANEOUS at 20:01

## 2023-10-02 RX ADMIN — MICONAZOLE NITRATE: 20 POWDER TOPICAL at 13:51

## 2023-10-02 RX ADMIN — ATORVASTATIN CALCIUM 20 MG: 20 TABLET, FILM COATED ORAL at 19:56

## 2023-10-02 RX ADMIN — CEFTRIAXONE 1 G: 1 INJECTION, POWDER, FOR SOLUTION INTRAMUSCULAR; INTRAVENOUS at 15:44

## 2023-10-02 RX ADMIN — FUROSEMIDE 20 MG: 20 TABLET ORAL at 08:28

## 2023-10-02 RX ADMIN — MICONAZOLE NITRATE: 20 POWDER TOPICAL at 20:00

## 2023-10-02 ASSESSMENT — ACTIVITIES OF DAILY LIVING (ADL)
ADLS_ACUITY_SCORE: 25
ADLS_ACUITY_SCORE: 24
ADLS_ACUITY_SCORE: 25
ADLS_ACUITY_SCORE: 24
ADLS_ACUITY_SCORE: 25
ADLS_ACUITY_SCORE: 24

## 2023-10-02 NOTE — PROGRESS NOTES
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    Sepsis, resolved  Catheter-associated Urinary Tract Infection  Presented with acute encephalopathy and found to have grossly abnormal urinalysis in setting of indwelling catheter. UA with large LE, large blood, >182 WBC, and 26 RBC. Ucx pending. Had recent admission 9/20/2023-9/22/2023 for removal of abdominal mass and was noted to have urinary retention in acute postoperative state. Was discharged home with lindquist catheter, which was removed in emergency department with noted return of spontaneous urinary function.   -Ceftriaxone 1g IV daily    Acute Metabolic Encephalopathy  Presented with significant confusion. Suspected 2/2 infectious encephalopthy. Improving with antibiotics.    Type 2 Diabetes Mellitus: Diet controlled. A1C 6.8 09/2023.  Hypertension: Does not appear to be on home medications. Does take lasix, which is being continued.  Hyperlipidemia: Continue PTA statin.  Iron Deficiency Anemia: Hgb 12.4-14.1 since admission. Monitoring. Did have hematuria on UA.  BPH: Continue PTA flomax.       Diet: Orders Placed This Encounter      Combination Diet Regular Diet Adult     DVT Prophylaxis: Enoxaparin (Lovenox) SQ  Lindquist: None  Code Status: Full Code    Expected discharge: Likely 1-3 days pending clinical improvement.    The patient's care was discussed with the Patient and friend .  I personally spent 45 minutes on chart review, documentation, coordination, and bedside management of patient.    Vlad West MD, S  Hospitalist  "[facility]  ______________________________________________________________________    Interval History   Nursing notes reviewed; no acute events overnight. Patient feeling improved, but not 100%. Continues to spontaneously urinate. Feels less confused.    A full 10+ point review of systems was performed and found to be negative with the exception of those items noted here.    Physical Exam   Temp: 98.5  F (36.9  C) Temp src: Oral BP: (!) 152/72 Pulse: 67   Resp: 18 SpO2: 97 % O2 Device: None (Room air) Oxygen Delivery: 2 LPM Height: 177.8 cm (5' 10\") Weight: 118.9 kg (262 lb 2 oz)  Estimated body mass index is 37.61 kg/m  as calculated from the following:    Height as of this encounter: 1.778 m (5' 10\").    Weight as of this encounter: 118.9 kg (262 lb 2 oz).    General: Very pleasant male resting comfortably in bedside chair.  Awake, alert, interactive. Friend at bedside.  HEENT: Normocephalic, atraumatic.  PERRL, EOMI.  Conjunctiva clear, sclerae anicteric.  Mucous membranes moist.  Cardiac: Regular rate and rhythm without murmur, gallop, or rub.  No peripheral edema.  Respiratory: Normal work of breathing.  Clear to auscultation bilaterally without wheezing, rales, or rhonchi.  GI: Normal, active bowel sounds.  Abdomen soft, nontender, nondistended.  : Deferred.  Musculoskeletal: Moving all extremities appropriately.  Skin: No rashes or abrasions on exposed skin.  Neurologic: Alert and oriented x4.  Cranial nerves II through XII grossly intact.  Psychologic: Appropriate mood and affect.    Data   All laboratory results and other diagnostic data from the past 24 hours is available in Epic and has been personally reviewed.    Recent Labs   Lab 10/02/23  0556 10/01/23  2056 10/01/23  1213   WBC 13.3*  --  15.5*   HGB 12.4*  --  14.1   MCV 83  --  81     --  231     --  139   POTASSIUM 3.4  --  3.9   CHLORIDE 105  --  103   CO2 23  --  24   BUN 11.5  --  11.1   CR 0.77 " 0.77 0.79   ANIONGAP 9  --  12   ANURAG 8.5*  --  9.1   *  --  164*   ALBUMIN  --   --  4.2   PROTTOTAL  --   --  6.7   BILITOTAL  --   --  1.3*   ALKPHOS  --   --  73   ALT  --   --  20   AST  --   --  20       Imaging results reviewed over the past 24 hrs:   No results found for this or any previous visit (from the past 24 hour(s)).  I personally reviewed: no images or EKG's today.

## 2023-10-02 NOTE — PLAN OF CARE
Goal Outcome Evaluation:  To Do:  End of Shift Summary  For vital signs and complete assessments, please see documentation flowsheets.     Pertinent assessments: Assumed 5882-1160. Pt A&O, Afebrile, VSS on RA. Denies pain. Weakness noted. No BM on this shift. Urinal on the bedside with good urine output. PIV infusing NS @ 100 ML/hr.    Major Shift Events: none    Treatment Plan: NS at 100cc/hr, IV abx. SW and PT consulted for discharge planning. Monitor urine output. Bladder scan prn     Bedside Nurse: Ivet Floyd RN

## 2023-10-02 NOTE — PLAN OF CARE
Pt is A&Ox4. Vitals stable on RA. Home meds have been ordered. No other orders in place at this time. Pt arrived to unit at 1730 requesting to order dinner. No diet order in place. Paged provider with no response. Pt again requesting for diet at 2000 still with no orders in place. Pt with history of type 2 dm here with UTI. Given water and lunch box.    Paged crosscover again at 2030, awaiting orders

## 2023-10-02 NOTE — PROGRESS NOTES
"   10/02/23 1257   Appointment Info   Signing Clinician's Name / Credentials (PT) Kikonelson Zavala DPT   Living Environment   People in Home spouse   Current Living Arrangements house  (town house.)   [place] no stairs to enter through front entrance. 17 stairs to basement with R railing (ascending), pt states there is a chair lift for stairs that wife uses, pt typically navigates the stairs with railing. Pt has walkin shower.   Self-Care   Usual Activity Tolerance moderate   Current Activity Tolerance moderate   Equipment Currently Used at Home raised toilet seat;shower chair;grab bar, toilet;grab bar, tub/shower  (walker or walking stick.)   Fall history within last six months yes   Number of times patient has fallen within last six months 1   Activity/Exercise/Self-Care Comment Pt is IND with functional mob, reports furniture walking on main floor.  Pt owns walking sticks, FWW, and 4WW. Pt reports using walker(s) when feeling weaker, otherwise walking stick. Pt has lift chair in living room where he typically sits.   General Information   Onset of Illness/Injury or Date of Surgery 10/01/23   Referring Physician Gordy Stout MD   Patient/Family Therapy Goals Statement (PT) return home.   Pertinent History of Current Problem (include personal factors and/or comorbidities that impact the POC) Pt is 88 yo male who, per chart, \"who has a very complicated past medical history as can be seen in the list below.  He underwent laparoscopic surgery recently to remove a mass in his stomach.  Please see discharge summary from the surgeons for details.  He was sent home with a Fernandez catheter in place.  Today the patient started to have fever, chills and confusion according with his wife, she called EMS and he was transported to the emergency department.  He arrived febrile and hypertensive to the emergency department.  His preliminary work-up has revealed " "leukocytosis with neutrophilia and very abnormal UA.  Fernandez catheter was removed and the patient has been able to void without help.  He has been started on ceftriaxone IV empirically for UTI, culture sent and in process.\"   Existing Precautions/Restrictions fall   Cognition   Affect/Mental Status (Cognition) WFL   Orientation Status (Cognition) oriented x 4   Pain Assessment   Patient Currently in Pain No   Integumentary/Edema   Integumentary/Edema Comments 2+ edema in B LEs, more pitting present in L LE compared to R. Foot, ankle and distal LE with 2+. Pt reports having compression socks at baseline.   Range of Motion (ROM)   ROM Comment reduced ROM in B knees AROM, L lacking ~20-30 degrees of extension. PROM full. Able to extend B LEs fully during gait.   Bed Mobility   Comment, (Bed Mobility) not assessed on this date.   Transfers   Transfers sit-stand transfer   Comment, (Transfers) FWW and Arely, from recliner, pt pushing up from recliner, pain reported in L knee.   Gait/Stairs (Locomotion)   Comment, (Gait/Stairs) Pt ambulated 10' with FWW and CGA, step through pattern, decreased speed and forward flexed posture (walker set low for pt's height). Overall steady.   Balance   Balance Comments requires walker for standing and ambulation, steady with gait.   Sensory Examination   Sensory Perception patient reports no sensory changes   Clinical Impression   Criteria for Skilled Therapeutic Intervention Yes, treatment indicated   PT Diagnosis (PT) impaired functional mobility   Influenced by the following impairments pain, decreased AROM, decreased strength and activity tolerance, edema   Functional limitations due to impairments difficulty with bed mobility, transfers, ambulation, and stairs.   Clinical Presentation (PT Evaluation Complexity) Stable/Uncomplicated   Clinical Presentation Rationale clinical judgement   Clinical Decision Making (Complexity) low complexity   Planned Therapy Interventions (PT) balance " training;bed mobility training;gait training;home exercise program;neuromuscular re-education;ROM (range of motion);stair training;strengthening;stretching;transfer training;progressive activity/exercise   Anticipated Equipment Needs at Discharge (PT) walker, rolling   Risk & Benefits of therapy have been explained evaluation/treatment results reviewed;care plan/treatment goals reviewed;risks/benefits reviewed;current/potential barriers reviewed;participants voiced agreement with care plan;participants included;patient   PT Total Evaluation Time   PT Eval, Low Complexity Minutes (47185) 10   Plan of Care Review   Plan of Care Reviewed With patient   Physical Therapy Goals   PT Frequency Daily   PT Predicted Duration/Target Date for Goal Attainment 10/09/23   PT Goals Bed Mobility;Transfers;Gait;Stairs   PT: Bed Mobility Independent;Supine to/from sit   PT: Transfers Supervision/stand-by assist;Sit to/from stand;Assistive device   PT: Gait Supervision/stand-by assist;Rolling walker;150 feet   PT: Stairs Greater than 10 stairs;Supervision/stand-by assist;Rail on right;Rail on both sides   PT Discharge Planning   PT Plan progress IND with transfers (pt reports having lift chair that he sits in). Progress gait and stairs performance. assess bed mob.   PT Discharge Recommendation (DC Rec) home with assist;home with home care physical therapy;Transitional Care Facility   PT Rationale for DC Rec Pt at baseline is IND with functional mobility, uses walking stick or walker for ambulation (occasionally not using any AD and furniture walks). Lives in town home with spouse. Pt is currently near baseline, some mild deficits in strength and activity tolerance observed. Pt with incresed difficulty getting up from chairs in hospital, but able to progress to close SBA (pt uses lift chair at home). If pt can progress to be SBA with transfers and gait, then perform 17 stairs, anticipate pt will be safe to return home. Recommend FWW  continued use. If pt unable to demonstrate this, recommend short-term TCF stay to progress IND and safety.   PT Brief overview of current status STS and gait with FWW and SB-CGA.   Total Session Time   Total Session Time (sum of timed and untimed services) 10

## 2023-10-02 NOTE — PLAN OF CARE
To Do:  End of Shift Summary  For vital signs and complete assessments, please see documentation flowsheets.     Pertinent assessments: Pt A&Ox4, Afebrile, VSS on RA. Denies pain. Pt up from cart with Ax2 pivot to bed. Weakness noted. IVF started. Given scheduled medications  Major Shift Events none  Treatment Plan: NS at 100cc/hr, IV abx. SW and PT consulted for discharge planning. Monitor urine output. Bladder scan prn   Bedside Nurse: Michelle Silvestre RN

## 2023-10-03 ENCOUNTER — APPOINTMENT (OUTPATIENT)
Dept: PHYSICAL THERAPY | Facility: CLINIC | Age: 88
DRG: 698 | End: 2023-10-03
Payer: MEDICARE

## 2023-10-03 LAB — BACTERIA UR CULT: NORMAL

## 2023-10-03 PROCEDURE — 97116 GAIT TRAINING THERAPY: CPT | Mod: GP

## 2023-10-03 PROCEDURE — 97530 THERAPEUTIC ACTIVITIES: CPT | Mod: GP

## 2023-10-03 PROCEDURE — 250N000013 HC RX MED GY IP 250 OP 250 PS 637: Performed by: HOSPITALIST

## 2023-10-03 PROCEDURE — 250N000011 HC RX IP 250 OP 636: Mod: JZ | Performed by: HOSPITALIST

## 2023-10-03 PROCEDURE — 120N000001 HC R&B MED SURG/OB

## 2023-10-03 PROCEDURE — 99232 SBSQ HOSP IP/OBS MODERATE 35: CPT | Performed by: STUDENT IN AN ORGANIZED HEALTH CARE EDUCATION/TRAINING PROGRAM

## 2023-10-03 RX ADMIN — FERROUS SULFATE TAB 325 MG (65 MG ELEMENTAL FE) 325 MG: 325 (65 FE) TAB at 08:20

## 2023-10-03 RX ADMIN — FUROSEMIDE 20 MG: 20 TABLET ORAL at 08:20

## 2023-10-03 RX ADMIN — ENOXAPARIN SODIUM 40 MG: 40 INJECTION SUBCUTANEOUS at 21:00

## 2023-10-03 RX ADMIN — MICONAZOLE NITRATE: 20 POWDER TOPICAL at 21:04

## 2023-10-03 RX ADMIN — FUROSEMIDE 20 MG: 20 TABLET ORAL at 21:00

## 2023-10-03 RX ADMIN — MICONAZOLE NITRATE: 20 POWDER TOPICAL at 08:24

## 2023-10-03 RX ADMIN — TAMSULOSIN HYDROCHLORIDE 0.4 MG: 0.4 CAPSULE ORAL at 21:00

## 2023-10-03 RX ADMIN — ATORVASTATIN CALCIUM 20 MG: 20 TABLET, FILM COATED ORAL at 21:00

## 2023-10-03 RX ADMIN — CEFTRIAXONE 1 G: 1 INJECTION, POWDER, FOR SOLUTION INTRAMUSCULAR; INTRAVENOUS at 16:14

## 2023-10-03 ASSESSMENT — ACTIVITIES OF DAILY LIVING (ADL)
ADLS_ACUITY_SCORE: 32
ADLS_ACUITY_SCORE: 28
ADLS_ACUITY_SCORE: 32
ADLS_ACUITY_SCORE: 28
ADLS_ACUITY_SCORE: 32
ADLS_ACUITY_SCORE: 28
ADLS_ACUITY_SCORE: 32
ADLS_ACUITY_SCORE: 25
ADLS_ACUITY_SCORE: 28
ADLS_ACUITY_SCORE: 28

## 2023-10-03 NOTE — PLAN OF CARE
Assumed care [id number]. A&Ox4. SBA with a gait belt and walker when OOB. On RA. On a regular diet. PIV in L arm is saline locked. Blanchable redness noted to bottom, applied barrier cream. No reports of pain/discomfort.

## 2023-10-03 NOTE — PLAN OF CARE
End of Shift Summary  For vital signs and complete assessments, please see documentation flowsheets.     Pertinent assessments: VSS. Denies pain, N/V. A&Ox4 forgetful. Good urine output, incontinent at times due to urgency. BLE edema, home compression socks in place.     Treatment Plan: Saline Locked, IV rocephin. SW and PT consulted for discharge planning. Monitor urine output. Bladder scan prn

## 2023-10-03 NOTE — CONSULTS
Care Management Initial Consult    General Information  Assessment completed with: Patient,    Type of CM/SW Visit: Initial Assessment    Primary Care Provider verified and updated as needed: Yes   Readmission within the last 30 days:        Reason for Consult: discharge planning  Advance Care Planning:            Communication Assessment  Patient's communication style: spoken language (English or Bilingual)    Hearing Difficulty or Deaf: yes   Wear Glasses or Blind: yes    Cognitive  Cognitive/Neuro/Behavioral: WDL  Level of Consciousness: confused, alert  Arousal Level: opens eyes spontaneously  Orientation: oriented x 4  Mood/Behavior: calm, cooperative  Best Language: 0 - No aphasia  Speech: clear    Living Environment:   People in home: spouse     Current living Arrangements: house      Able to return to prior arrangements: yes       Family/Social Support:  Care provided by: self  Provides care for: no one  Marital Status:   Wife          Description of Support System: Supportive, Involved         Current Resources:   Patient receiving home care services: No     Community Resources: None  Equipment currently used at home: raised toilet seat, shower chair, grab bar, toilet, grab bar, tub/shower (walker or walking stick.)  Supplies currently used at home:      Employment/Financial:  Employment Status:          Financial Concerns:             Does the patient's insurance plan have a 3 day qualifying hospital stay waiver?  No  Yes       Lifestyle & Psychosocial Needs:  Social Determinants of Health     Food Insecurity: Not on file   Depression: Not at risk (7/10/2023)    PHQ-2     PHQ-2 Score: 0   Housing Stability: Not on file   Tobacco Use: Low Risk  (9/26/2023)    Patient History     Smoking Tobacco Use: Never     Smokeless Tobacco Use: Never     Passive Exposure: Never   Financial Resource Strain: Not on file   Alcohol Use: Not on file   Transportation Needs: Not on file   Physical Activity: Not on file    Interpersonal Safety: Low Risk  (9/26/2023)    Interpersonal Safety     Do you feel physically and emotionally safe where you currently live?: Yes     Within the past 12 months, have you been hit, slapped, kicked or otherwise physically hurt by someone?: No     Within the past 12 months, have you been humiliated or emotionally abused in other ways by your partner or ex-partner?: No   Stress: Not on file   Social Connections: Not on file       Functional Status:  Prior to admission patient needed assistance:   Dependent ADLs:: Ambulation-walker          Mental Health Status:          Chemical Dependency Status:                Values/Beliefs:  Spiritual, Cultural Beliefs, [employer] Practices, Values that affect care:                 Additional Information:  SW met with patient to discuss discharge planning. He shared he resides in a townhouse with his spouse prior & was independent. Patient confirmed he has both a front wheeled walker & 4 wheel walker available at home. He shared he is agreeable to the recommendation for home PT & does not have a preference on an agency. Patient declined needing a TCU at discharge & voiced his preference is to return home with his wife. Per patient spouse will provide transportation to home. He denied any questions or concerns with discharge planning. SW will place home care referral to [facility]. JAYLON will continue to follow to assist with discharge planning prn.     ASHIA Chandra

## 2023-10-03 NOTE — PROGRESS NOTES
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 on 10/1/2023 with acute metabolic encephalopathy 2/2 urinary tract infection.    Sepsis, resolved  Catheter-associated Urinary Tract Infection  Benign Prostatic Hypertrophy  Presented with acute encephalopathy and found to have grossly abnormal urinalysis in setting of indwelling catheter. UA with large LE, large blood, >182 WBC, and 26 RBC. Ucx pending. Had recent admission 9/20/2023-9/22/2023 for removal of abdominal mass and was noted to have urinary retention in acute postoperative state. Was discharged home with lindquist catheter, which was removed in emergency department with noted return of spontaneous urinary function. Has been having some irritation of bladder vs prostatic involvement this admission, as patient has frequent urination of small amounts. PVR today 150ml. If continues to have significant nocturia and irritable bladder symptoms will consult urology.  -Ceftriaxone 1g IV daily  -Continue PTA flomax    Acute Metabolic Encephalopathy  Presented with significant confusion. Suspected 2/2 infectious encephalopthy. Improving with antibiotics.    Type 2 Diabetes Mellitus: Diet controlled. A1C 6.8 09/2023.  Hypertension: Does not appear to be on home medications. Does take lasix, which is being continued.  Hyperlipidemia: Continue PTA statin.  Iron Deficiency Anemia: Hgb 12.4-14.1 since admission. No evidence of current bleeding. Hematuria on UA without continued drop Hgb.       Diet: Orders Placed This Encounter      Combination Diet Regular Diet Adult  DVT Prophylaxis: Enoxaparin (Lovenox) SQ  Lindquist:  "None  Code Status: Full Code    Expected discharge: Likely 1-3 days pending clinical improvement; Needs to demonstrate SBA and stair agility per PT.    The patient's care was discussed with the Patient and friend .  I personally spent 45 minutes on chart review, documentation, coordination, and bedside management of patient.    Vlad West MD, S  Hospitalist Service  [facility]  ______________________________________________________________________    Interval History   Nursing notes reviewed; no acute events overnight. Patient feeling somewhat weak still. Also with significant frequency of urination overnight.    A full 10+ point review of systems was performed and found to be negative with the exception of those items noted here.    Physical Exam   Temp: 98.8  F (37.1  C) Temp src: Oral BP: (!) 164/75 Pulse: 62   Resp: 18 SpO2: 97 % O2 Device: None (Room air) Oxygen Delivery: 2 LPM Height: 177.8 cm (5' 10\") Weight: 118.9 kg (262 lb 2 oz)  Estimated body mass index is 37.61 kg/m  as calculated from the following:    Height as of this encounter: 1.778 m (5' 10\").    Weight as of this encounter: 118.9 kg (262 lb 2 oz).    General: Very pleasant male resting comfortably in bedside chair.  Awake, alert, interactive. Friend at bedside.  HEENT: Normocephalic, atraumatic.  PERRL, EOMI.  Conjunctiva clear, sclerae anicteric.  Mucous membranes moist.  Cardiac: Regular rate and rhythm without murmur, gallop, or rub.  No peripheral edema.  Respiratory: Normal work of breathing.  Clear to auscultation bilaterally without wheezing, rales, or rhonchi.  GI: Normal, active bowel sounds.  Abdomen soft, nontender, nondistended.  : Deferred.  Musculoskeletal: Moving all extremities appropriately.  Skin: No rashes or abrasions on exposed skin.  Neurologic: Alert and oriented x4.  Cranial nerves II through XII grossly intact.  Psychologic: Appropriate mood and affect.    Data   All laboratory results and other " diagnostic data from the past 24 hours is available in Epic and has been personally reviewed.    Recent Labs   Lab 10/02/23  0556 10/01/23  2056 10/01/23  1213   WBC 13.3*  --  15.5*   HGB 12.4*  --  14.1   MCV 83  --  81     --  231     --  139   POTASSIUM 3.4  --  3.9   CHLORIDE 105  --  103   CO2 23  --  24   BUN 11.5  --  11.1   CR 0.77 0.77 0.79   ANIONGAP 9  --  12   ANURAG 8.5*  --  9.1   *  --  164*   ALBUMIN  --   --  4.2   PROTTOTAL  --   --  6.7   BILITOTAL  --   --  1.3*   ALKPHOS  --   --  73   ALT  --   --  20   AST  --   --  20         Imaging results reviewed over the past 24 hrs:   No results found for this or any previous visit (from the past 24 hour(s)).  I personally reviewed: no images or EKG's today.

## 2023-10-03 NOTE — PLAN OF CARE
Goal Outcome Evaluation:      Plan of Care Reviewed With: patient, spouse    Overall Patient Progress: improvingOverall Patient Progress: improving         To Do:  End of Shift Summary  For vital signs and complete assessments, please see documentation flowsheets.     Pertinent assessments: VSS. Denies pain, N/V. A&Ox4 forgetful. Good urine output, incontinent at times due to urgency. BLE edema, home compression socks in place.     Major Shift Events: bladder scan x1 pvr 120    Treatment Plan: Saline Locked, IV rocephin. Monitor urine output. Bladder scan pvr    Bedside Nurse: Valarie Brooks RN

## 2023-10-04 ENCOUNTER — APPOINTMENT (OUTPATIENT)
Dept: PHYSICAL THERAPY | Facility: CLINIC | Age: 88
DRG: 698 | End: 2023-10-04
Payer: MEDICARE

## 2023-10-04 VITALS
SYSTOLIC BLOOD PRESSURE: 166 MMHG | HEART RATE: 61 BPM | BODY MASS INDEX: 37.53 KG/M2 | TEMPERATURE: 99.3 F | HEIGHT: 70 IN | OXYGEN SATURATION: 97 % | RESPIRATION RATE: 16 BRPM | WEIGHT: 262.13 LBS | DIASTOLIC BLOOD PRESSURE: 79 MMHG

## 2023-10-04 LAB
CREAT SERPL-MCNC: 0.72 MG/DL (ref 0.67–1.17)
EGFRCR SERPLBLD CKD-EPI 2021: 89 ML/MIN/1.73M2
PLATELET # BLD AUTO: 200 10E3/UL (ref 150–450)

## 2023-10-04 PROCEDURE — 97530 THERAPEUTIC ACTIVITIES: CPT | Mod: GP | Performed by: PHYSICAL THERAPIST

## 2023-10-04 PROCEDURE — 250N000013 HC RX MED GY IP 250 OP 250 PS 637: Performed by: STUDENT IN AN ORGANIZED HEALTH CARE EDUCATION/TRAINING PROGRAM

## 2023-10-04 PROCEDURE — 99239 HOSP IP/OBS DSCHRG MGMT >30: CPT | Performed by: STUDENT IN AN ORGANIZED HEALTH CARE EDUCATION/TRAINING PROGRAM

## 2023-10-04 PROCEDURE — 85049 AUTOMATED PLATELET COUNT: CPT | Performed by: HOSPITALIST

## 2023-10-04 PROCEDURE — 250N000013 HC RX MED GY IP 250 OP 250 PS 637: Performed by: HOSPITALIST

## 2023-10-04 PROCEDURE — 82565 ASSAY OF CREATININE: CPT | Performed by: HOSPITALIST

## 2023-10-04 PROCEDURE — 36415 COLL VENOUS BLD VENIPUNCTURE: CPT | Performed by: HOSPITALIST

## 2023-10-04 PROCEDURE — 97116 GAIT TRAINING THERAPY: CPT | Mod: GP | Performed by: PHYSICAL THERAPIST

## 2023-10-04 RX ORDER — PRAMOXINE HYDROCHLORIDE 10 MG/ML
LOTION TOPICAL 3 TIMES DAILY
Status: DISCONTINUED | OUTPATIENT
Start: 2023-10-04 | End: 2023-10-04

## 2023-10-04 RX ORDER — PRAMOXINE HYDROCHLORIDE 10 MG/ML
LOTION TOPICAL 3 TIMES DAILY
Qty: 237 ML | Refills: 0 | Status: SHIPPED | OUTPATIENT
Start: 2023-10-04 | End: 2024-05-31

## 2023-10-04 RX ORDER — CEFDINIR 300 MG/1
300 CAPSULE ORAL 2 TIMES DAILY
Qty: 8 CAPSULE | Refills: 0 | Status: SHIPPED | OUTPATIENT
Start: 2023-10-04 | End: 2023-10-08

## 2023-10-04 RX ORDER — PRAMOXINE HYDROCHLORIDE 10 MG/ML
LOTION TOPICAL 3 TIMES DAILY
Status: DISCONTINUED | OUTPATIENT
Start: 2023-10-04 | End: 2023-10-04 | Stop reason: HOSPADM

## 2023-10-04 RX ORDER — AMLODIPINE BESYLATE 5 MG/1
5 TABLET ORAL DAILY
Status: DISCONTINUED | OUTPATIENT
Start: 2023-10-04 | End: 2023-10-04 | Stop reason: HOSPADM

## 2023-10-04 RX ORDER — AMLODIPINE BESYLATE 5 MG/1
5 TABLET ORAL DAILY
Qty: 30 TABLET | Refills: 0 | Status: SHIPPED | OUTPATIENT
Start: 2023-10-05 | End: 2023-10-12

## 2023-10-04 RX ADMIN — MICONAZOLE NITRATE: 20 POWDER TOPICAL at 10:04

## 2023-10-04 RX ADMIN — FUROSEMIDE 20 MG: 20 TABLET ORAL at 10:04

## 2023-10-04 RX ADMIN — AMLODIPINE BESYLATE 5 MG: 5 TABLET ORAL at 10:04

## 2023-10-04 RX ADMIN — FERROUS SULFATE TAB 325 MG (65 MG ELEMENTAL FE) 325 MG: 325 (65 FE) TAB at 10:04

## 2023-10-04 ASSESSMENT — ACTIVITIES OF DAILY LIVING (ADL)
ADLS_ACUITY_SCORE: 28
ADLS_ACUITY_SCORE: 32
ADLS_ACUITY_SCORE: 28
ADLS_ACUITY_SCORE: 28
ADLS_ACUITY_SCORE: 32

## 2023-10-04 NOTE — PROGRESS NOTES
Care Management Discharge Note    Discharge Date: 10/04/2023       Discharge Disposition: Home, Home Care    Discharge Services:  home care    Discharge DME:  none    Discharge Transportation: family or friend will provide    Private pay costs discussed: Not applicable      Education Provided on the Discharge Plan:  yes  Persons Notified of Discharge Plans: patient  Patient/Family in Agreement with the Plan:  yes    Handoff Referral Completed: Yes    Additional Information:  Patient discharging home today with homecare for RN and PT. Updated patient at bedside that [facility] will be contacting him to set up start of care. He verbalizes understanding. His wife is transporting him home.   Discharge orders faxed to [facility].     Rosetta Smith RN  Care Coordinator  [facility]

## 2023-10-04 NOTE — DISCHARGE SUMMARY
"[facility]  Hospitalist Discharge Summary      Date of Admission:  10/1/2023  Date of Discharge:  10/4/2023  2:16 PM  Discharging Provider: Vlad West MD  Discharge Service: Hospitalist Service    Discharge Diagnoses   Sepsis, resolved  Catheter-associated Urinary Tract Infection  Benign Prostatic Hypertrophy  Acute Metabolic Encephalopathy   Type 2 Diabetes Mellitus   Hypertension   Hyperlipidemia   Iron Deficiency Anemia     Clinically Significant Risk Factors     # DMII: A1C = 6.8 % (Ref range: 0.0 - 5.6 %) within past 6 months  # Obesity: Estimated body mass index is 37.61 kg/m  as calculated from the following:    Height as of this encounter: 1.778 m (5' 10\").    Weight as of this encounter: 118.9 kg (262 lb 2 oz).       Follow-ups Needed After Discharge   Follow-up Appointments     Follow-up and recommended labs and tests       Follow up with primary care provider, Obi Strange, within 7 days for   hospital follow- up.          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     Found to have grossly abnormal urinalysis in setting of indwelling catheter. UA with large LE, large blood, >182 WBC, and 26 RBC. Ucx pending. Had recent admission 9/20/2023-9/22/2023 for removal of abdominal mass and was noted to have urinary " retention in acute postoperative state. Was discharged home with lindquist catheter, which was removed in emergency department with noted return of spontaneous urinary function. Has been having some irritation of bladder vs prostatic involvement this admission, as patient has frequent urination of small amounts. PVR today 150ml. If continues to have frequency of urination or bladder irritation may need urology consult outpatient. Encephalopathy resolved with treatment of infection. Remains a bit weak; agreed to home health nursing and PT.    Consultations This Hospital Stay   CARE MANAGEMENT / SOCIAL WORK IP CONSULT  PHYSICAL THERAPY ADULT IP CONSULT    Code Status   Full Code    Time Spent on this Encounter   I, Vlad West MD, personally saw the patient today and spent greater than 30 minutes discharging this patient.       Vlad West MD  [place] [address] MEDICAL SURGICAL  [address] [place] [address]  Phone: [phone]  Fax: [phone]  ______________________________________________________________________    Physical Exam   Vital Signs: Temp: 99.3  F (37.4  C) Temp src: Oral BP: (!) 166/79 Pulse: 61   Resp: 16 SpO2: 97 % O2 Device: None (Room air)    Weight: 262 lbs 2.03 oz    General: Very pleasant male resting comfortably in bedside chair. Awake, alert, interactive. Friend at bedside.  HEENT: Normocephalic, atraumatic. PERRL, EOMI. Conjunctiva clear, sclerae anicteric. Mucous membranes moist.  Cardiac: Regular rate and rhythm without murmur, gallop, or rub. No peripheral edema.  Respiratory: Normal work of breathing. Clear to auscultation bilaterally without wheezing, rales, or rhonchi.  GI: Normal, active bowel sounds. Abdomen soft, nontender, nondistended.  : Deferred.  Musculoskeletal: Moving all extremities appropriately.  Skin: No rashes or abrasions on exposed skin.  Neurologic: Alert and oriented x4. Cranial nerves II through XII grossly intact.  Psychologic: Appropriate mood  and affect.        Primary Care Physician   Obi Strange    Discharge Orders      Home Care Referral      Primary Care - Care Coordination Referral      Reason for your hospital stay    You were admitted to the hospital for confusion related to a urinary tract infection. Your lindquist was removed and you received antibiotics. You have improved and are safe to go home with plans for home care to assist with PT/OT.     Follow-up and recommended labs and tests     Follow up with primary care provider, Obi Strange, within 7 days for hospital follow- up.     Activity    Your activity upon discharge: activity as tolerated with assistance from others, as needed, and assistive devices, as needed.     Diet    Follow this diet upon discharge: Orders Placed This Encounter      Combination Diet Regular Diet Adult       Significant Results and Procedures   Most Recent 3 CBC's:  Recent Labs   Lab Test 10/04/23  0644 10/02/23  0556 10/01/23  1213 09/20/23  1441 09/06/23  1332   WBC  --  13.3* 15.5*  --  7.2   HGB  --  12.4* 14.1  --  14.0   MCV  --  83 81  --  81    185 231   < > 218    < > = values in this interval not displayed.     Most Recent 3 BMP's:  Recent Labs   Lab Test 10/04/23  0644 10/02/23  0556 10/01/23  2056 10/01/23  1213 09/22/23  0558 09/20/23  0635 09/06/23  1332   NA  --  137  --  139  --   --  143   POTASSIUM  --  3.4  --  3.9  --   --  3.8   CHLORIDE  --  105  --  103  --   --  104   CO2  --  23  --  24  --   --  27   BUN  --  11.5  --  11.1  --   --  14.9   CR 0.72 0.77 0.77 0.79  --    < > 0.81   ANIONGAP  --  9  --  12  --   --  12   ANURAG  --  8.5*  --  9.1  --   --  9.5   GLC  --  134*  --  164* 135*   < > 123*    < > = values in this interval not displayed.     Most Recent 2 LFT's:  Recent Labs   Lab Test 10/01/23  1213 06/12/23  1557   AST 20 19   ALT 20 16   ALKPHOS 73 48   BILITOTAL 1.3* 0.5   ,   Results for orders placed or performed during the hospital encounter of 10/01/23   Abd/pelvis CT,   IV  contrast only TRAUMA / AAA    Narrative    EXAM: CT ABDOMEN PELVIS W CONTRAST  LOCATION: [facility]  DATE: 10/1/2023    INDICATION: Recent surgery, gastric tumor resection and omental resection. Midline lower abdominal pain. Fever.  COMPARISON: CT abdomen pelvis 08/22/2023.  TECHNIQUE: CT scan of the abdomen and pelvis was performed following injection of IV contrast. Multiplanar reformats were obtained. Dose reduction techniques were used.  CONTRAST: 94mL Isovue 370    FINDINGS:   LOWER CHEST: Mild linear scarring or atelectasis in the lung bases.    HEPATOBILIARY: Cholelithiasis. Diffuse hepatic steatosis. No focal liver lesion.    PANCREAS: Normal.    SPLEEN: Splenule.    ADRENAL GLANDS: Normal.    KIDNEYS/BLADDER: Normal kidneys. No urinary stone or hydronephrosis. Mild diffuse thickening of the urinary bladder with some mild adjacent inflammatory stranding. No bladder stone.    BOWEL: Moderate sigmoid colonic diverticulosis. No evidence of diverticulitis or colitis. Right hemicolectomy with anterior abdominal anastomosis. Since 08/22/2023, interval postoperative changes along the lesser curve of the stomach with resection of a   prior mass. No evidence of locally recurrent or residual neoplasm. No free air, free fluid or abscess.    LYMPH NODES: Normal.    VASCULATURE: Mild atherosclerotic calcifications. Normal caliber abdominal aorta.    PELVIC ORGANS: Mild prostate gland enlargement.    MUSCULOSKELETAL: Mild scattered hypertrophic degenerative changes in the spine. No suspicious osseous lesion.      Impression    IMPRESSION:   1. Mild urinary bladder wall thickening with some adjacent inflammatory change, which can be seen in the setting of cystitis. Please correlate clinically.  2. Otherwise no acute findings in the abdomen or pelvis.  3. Interval postoperative changes of gastric tumor resection. No free air, free fluid or abscess.   Chest XR,  PA & LAT    Narrative    EXAM: XR  CHEST 2 VIEWS  LOCATION: [facility]  DATE: 10/01/2023    INDICATION: Fever, postop.  COMPARISON: None.      Impression    IMPRESSION: Negative chest.         Discharge Medications   Discharge Medication List as of 10/4/2023 12:25 PM        START taking these medications    Details   amLODIPine (NORVASC) 5 MG tablet Take 1 tablet (5 mg) by mouth daily, Disp-30 tablet, R-0, E-Prescribe      cefdinir (OMNICEF) 300 MG capsule Take 1 capsule (300 mg) by mouth 2 times daily for 4 days, Disp-8 capsule, R-0, E-Prescribe      pramoxine (PRAX) 1 % LOTN lotion Apply topically 3 times daily to areas that are itchyDisp-237 mL, [id number]           CONTINUE these medications which have NOT CHANGED    Details   acetaminophen (TYLENOL) 500 MG tablet Take 500-1,000 mg by mouth every 6 hours as needed for mild pain, Historical      atorvastatin (LIPITOR) 40 MG tablet Take 20 mg by mouth every evening, Historical      CINNAMON PO Take 1 tablet by mouth every evening, Historical      diclofenac (VOLTAREN) 1 % topical gel Apply 2 g topically 2 times daily, Historical      ferrous sulfate (FEROSUL) 325 (65 Fe) MG tablet Take 1 tablet (325 mg) by mouth daily (with breakfast), OTC      fish oil-omega-3 fatty acids 1000 MG capsule Take 1 g by mouth daily, Historical      Flaxseed, Linseed, (FLAXSEED OIL) 1000 MG CAPS Take 1,000 mg by mouth every evening, Historical      furosemide (LASIX) 20 MG tablet Take 1 tablet (20 mg) by mouth 2 times daily, Disp-180 tablet, R-1, E-Prescribe      glucosamine-chondroitin 500-400 MG CAPS per capsule Take 1 capsule by mouth daily, Historical      metroNIDAZOLE (METROGEL) 0.75 % external gel APPLY THIN LAYER TO FACE TWICE A DAY FOR ROSACEAHistorical      Multiple Vitamins-Minerals (MULTIVITAMIN ADULT PO) Take 1 tablet by mouth daily Reported on 5/12/2017, Historical      !! order for DME 1: Gradient Compression Wraps; 2: Cast Boots; 3: BLE knee or thigh high 20-30 mm Hg  compression stocking; 4: BLE knee or thigh high custom compression stocking; 5: Alternative BLE knee high or thigh compression garments (velcro/buckling)Disp-1 each, R-0 , Local Print      !! order for DME Equipment being ordered: Walker Wheels () and Walker ()  Treatment Diagnosis: Impaired functional mobilityDisp-1 each, R-0, Local Print      pramox-pe-glycerin-petrolatum (PREPARATION H) 1-0.25-14.4-15 % CREA cream Place rectally 4 times daily as needed for hemorrhoids Use as directedHistorical      tamsulosin (FLOMAX) 0.4 MG capsule Take 1 capsule (0.4 mg) by mouth daily, Disp-90 capsule, R-3, E-Prescribe       !! - Potential duplicate medications found. Please discuss with provider.        STOP taking these medications       doxycycline hyclate (PERIOSTAT) 20 MG tablet Comments:   Reason for Stopping:             Allergies   Allergies   Allergen Reactions    Levetiracetam [Keppra] Rash    Oxcarbazepine [Oxcarbazepine] Rash

## 2023-10-04 NOTE — PLAN OF CARE
To Do:  End of Shift Summary  For vital signs and complete assessments, please see documentation flowsheets.     Pertinent assessments: VSS, A&Ox4 forgetful, denies any pain. Infrequent chronic cough, productive. Good urine output, incontinent at times due to urgency. BLE edema 2+, home compression socks in place. Possible discharge later today home with PT visits. Slept well overnight.     Major Shift Events: N/A.     Treatment Plan: Saline locked PIV, IV rocephin. Monitor urine output. Bladder scan prn for retention.     Bedside Nurse: Kae Mccabe RN

## 2023-10-04 NOTE — PLAN OF CARE
Physical Therapy Discharge Summary    Reason for therapy discharge:    Discharged to home with home therapy. And S from wife    Progress towards therapy goal(s). See goals on Care Plan in [facility] electronic health record for goal details.  Goals partially met.  Barriers to achieving goals:   discharge from facility.  Pt continues to require CGA for ascending stairs for safety  Therapy recommendation(s):    Continued therapy is recommended.  Rationale/Recommendations:   .  HHPT to progress gait on stairs, increase functional strength and progress independence with all mobility.   Goal Outcome Evaluation:

## 2023-10-04 NOTE — PLAN OF CARE
Discharge Note    Patient discharged to home via private vehicle  accompanied by significant other .  IV: Discontinued  Prescriptions filled and given to patient/family.   Belongings reviewed and sent with patient.   Home medications returned to patient: NA  Equipment sent with: N/A.   patient verbalizes understanding of discharge instructions. AVS given to patient and family.  Additional education completed?  N/a  Marion Keita RN on 10/4/2023 at 2:02 PM

## 2023-10-05 ENCOUNTER — PATIENT OUTREACH (OUTPATIENT)
Dept: CARE COORDINATION | Facility: CLINIC | Age: 88
End: 2023-10-05

## 2023-10-05 ENCOUNTER — OFFICE VISIT (OUTPATIENT)
Dept: SURGERY | Facility: CLINIC | Age: 88
End: 2023-10-05
Payer: MEDICARE

## 2023-10-05 VITALS
DIASTOLIC BLOOD PRESSURE: 80 MMHG | HEIGHT: 70 IN | HEART RATE: 76 BPM | BODY MASS INDEX: 37.51 KG/M2 | WEIGHT: 262 LBS | OXYGEN SATURATION: 98 % | RESPIRATION RATE: 15 BRPM | SYSTOLIC BLOOD PRESSURE: 136 MMHG

## 2023-10-05 DIAGNOSIS — C49.A2 MALIGNANT GASTROINTESTINAL STROMAL TUMOR (GIST) OF STOMACH (H): Primary | ICD-10-CM

## 2023-10-05 PROCEDURE — 99024 POSTOP FOLLOW-UP VISIT: CPT | Performed by: SURGERY

## 2023-10-05 NOTE — LETTER
[facility] CARE COORDINATION  [id number]  [facility] [address]     October 6, 2023    Damien Vallecillo  [address] [address]      Dear Damien,    I am a  clinic care coordinator who works with Obi Strange MD with the [facility]. I recently tried to call and was unable to reach you. Below is a description of clinic care coordination and how I can further assist you.       The clinic care coordination team is made up of a registered nurse, , financial resource worker and community health worker who understand the health care system. The goal of clinic care coordination is to help you manage your health and improve access to the health care system. Our team works alongside your provider to assist you in determining your health and social needs. We can help you obtain health care and community resources, providing you with necessary information and education. We can work with you through any barriers and develop a care plan that helps coordinate and strengthen the communication between you and your care team.  Our services are voluntary and are offered without charge to you personally.    Please feel free to contact me with any questions or concerns regarding care coordination and what we can offer.      We are focused on providing you with the highest-quality healthcare experience possible.    Sincerely,     Virginia Smith RN Care Coordinator  [facility]  Email: [email or web address]  Phone: [phone]

## 2023-10-05 NOTE — PROGRESS NOTES
Clinic Care Coordination Contact  [facility]/Voicemail    Referral Source: IP Handoff  Clinical Data: Care Coordinator Outreach  Outreach attempted x 1.  Left message on patient's voicemail with call back information and requested return call.  Plan: Care Coordinator will try to reach patient again in 1-2 business days.    Virginia Smith, RN Care Coordinator  [facility]  Email: [email or web address]  Phone: [phone]

## 2023-10-06 ENCOUNTER — TELEPHONE (OUTPATIENT)
Dept: FAMILY MEDICINE | Facility: CLINIC | Age: 88
End: 2023-10-06
Payer: MEDICARE

## 2023-10-06 LAB
BACTERIA BLD CULT: NO GROWTH
BACTERIA BLD CULT: NO GROWTH

## 2023-10-06 NOTE — TELEPHONE ENCOUNTER
Reason for Call: Request for an order or referral:    Order or referral being requested: Verbal orders    Date needed: as soon as possible    Has the patient been seen by the PCP for this problem?  Unknown    Additional comments: ok to start of care on 10/09/23, and to confirm that Dr Strange will follow for home care    Phone number Patient can be reached at:  Other phone number:  [phone]    Best Time:  by end of day today please     Can we leave a detailed message on this number?  YES    Call taken on 10/6/2023 at 3:05 PM by Pamella Bai

## 2023-10-06 NOTE — TELEPHONE ENCOUNTER
Home Care is calling regarding an established patient with [facility].       Requesting orders from: Obi Strange  Provider is following patient: No       Orders Requested    Okay to start of care 10/9/23    Information was gathered and will be sent to provider for review.  RN will contact Home Care with information after provider review.  Information was gathered and will be sent to provider to confirm provider will be following patient.  RN will contact Home Care with information after provider review.  Confirmed ok to leave a detailed message with call back.  Contact information confirmed and updated as needed.    Katt ALMANZAR RN  Patient Advocate Liaison - PAL RN  [facility]  [phone]

## 2023-10-06 NOTE — TELEPHONE ENCOUNTER
Routing to FRANCESCO Mast Registered Nurse, PAL (Patient Advocate Liaison)   [facility]   [phone]

## 2023-10-06 NOTE — PROGRESS NOTES
Clinic Care Coordination Contact  [facility]/Voicemail    Referral Source: IP Handoff  Clinical Data: Care Coordinator Outreach  Outreach attempted x 2.  Left message on patient's voicemail with call back information and requested return call.  Plan: Care Coordinator will send care coordination introduction letter with care coordinator contact information and explanation of care coordination services via mail. Care Coordinator will do no further outreaches at this time.    Virginia Smith RN Care Coordinator  [facility]  Email: [email or web address]  Phone: [phone]

## 2023-10-09 ENCOUNTER — TELEPHONE (OUTPATIENT)
Dept: FAMILY MEDICINE | Facility: CLINIC | Age: 88
End: 2023-10-09
Payer: MEDICARE

## 2023-10-09 NOTE — TELEPHONE ENCOUNTER
Dr. Strange   Please review, are you agreeable to follow for home care? See RN/PT orders request below   Patient would like to complete a POLST when he comes in for hospital follow up       Home Care is calling regarding an established patient with [facility].       Requesting orders from: Obi Strange  Provider is following patient: No       Orders Requested    Skilled Nursing   Request for initial certification (first set of orders)   Frequency: weekly x 3 weeks, then every other week, plus 1 visit week of December 3rd, 3 PRN     Physical Therapy  Request for initial evaluation and treatment (one time) (first set of orders)     Information was gathered and will be sent to provider for review.  RN will contact Home Care with information after provider review.  Confirmed ok to leave a detailed message with call back.  Contact information confirmed and updated as needed.    [place] [phone]     Flakita Mast RN

## 2023-10-09 NOTE — TELEPHONE ENCOUNTER
Called and spoke with FRANCESCO Wheeler with [facility]. Relayed message from Dr. Strange with approval of delay of care request.     Summer verbalizes understanding and is agreeable with plan.     Tiffany LINDO RN   PAL (Patient Advocate Liaison)  [facility]

## 2023-10-10 NOTE — TELEPHONE ENCOUNTER
"PAL called and spoke with FRANCESCO Wyman with [facility]   -Gave the following verbal orders per Dr. Strange approval:    Orders Requested     Skilled Nursing   Request for initial certification (first set of orders)   Frequency: weekly x 3 weeks, then every other week, plus 1 visit week of December 3rd, 3 PRN      Physical Therapy  Request for initial evaluation and treatment (one time) (first set of orders)     Home care will send orders for provider to sign      PAL RN - Hospital Follow-up     PAL called and spoke with pt's wife, Alejandrina ([facility])    Patient status since discharge: \"He's doing pretty good. He's getting more and more ambitious...doing his exercises a lot. He's really improving more and more each day.\"    Follow up  Follow up instructions/recommendations:   Follow-up Appointments    Follow-up and recommended labs and tests      Follow up with primary care provider, Obi Strange, within 7 days for hospital follow- up.      Medications  Changes to chronic meds? 0-1    Medication reconciliation: discharge medications reconciled, continue medications without change.    Appointment   Primary Care Provider appointment scheduled: Yes. (confirm) 10/12/23    Other appointments scheduled:  Oncology  10/17/23    Questions about medications: None     Medication therapy management (MTM) referral placed:  No     Other information  New diagnoses of heart failure, COPD, diabetes, or MI? No       Call Summary  Provided PAL direct line [phone]    Transferred to diabetic ed scheduling line at end of call     Wife was given an opportunity to ask questions, verbalized understanding of plan, and is agreeable.     Katt ALMANZAR RN  Patient Advocate Liaison - PAL RN  [facility]  [phone]  "

## 2023-10-11 ENCOUNTER — PATIENT OUTREACH (OUTPATIENT)
Dept: FAMILY MEDICINE | Facility: CLINIC | Age: 88
End: 2023-10-11
Payer: MEDICARE

## 2023-10-11 NOTE — TELEPHONE ENCOUNTER
Home Care is calling regarding an established patient with [facility].        10/10/2023     3:26 PM 10/10/2023     3:10 PM   Home Care Information   Current following provider  Dr. Strange   Date provider agreed to follow  10/10/2023   Home Care agency [facility]      Requesting orders from: Obi Strange  Provider is following patient: Yes  Is this a 60-day recertification request?  No    Orders Requested    Physical Therapy  Request for initial certification (first set of orders)   Frequency:  1x/wk for 2 wks  Then 1x every other week for 6 wks     Information was gathered and will be sent to provider for review.  RN will contact Home Care with information after provider review.  Confirmed ok to leave a detailed message with call back.  Contact information confirmed and updated as needed.    Katt ALMANZAR RN  Patient Advocate Liaison - PAL RN  [facility]  [phone]

## 2023-10-12 ENCOUNTER — OFFICE VISIT (OUTPATIENT)
Dept: FAMILY MEDICINE | Facility: CLINIC | Age: 88
End: 2023-10-12
Payer: MEDICARE

## 2023-10-12 ENCOUNTER — PATIENT OUTREACH (OUTPATIENT)
Dept: FAMILY MEDICINE | Facility: CLINIC | Age: 88
End: 2023-10-12

## 2023-10-12 VITALS
DIASTOLIC BLOOD PRESSURE: 83 MMHG | SYSTOLIC BLOOD PRESSURE: 164 MMHG | BODY MASS INDEX: 36.59 KG/M2 | OXYGEN SATURATION: 97 % | TEMPERATURE: 97.4 F | RESPIRATION RATE: 12 BRPM | HEART RATE: 63 BPM | HEIGHT: 70 IN | WEIGHT: 255.6 LBS

## 2023-10-12 DIAGNOSIS — Z85.09 HX OF MALIGNANT GASTROINTESTINAL STROMAL TUMOR (GIST): ICD-10-CM

## 2023-10-12 DIAGNOSIS — A41.9 SEPSIS DUE TO URINARY TRACT INFECTION (H): Primary | ICD-10-CM

## 2023-10-12 DIAGNOSIS — G62.9 PERIPHERAL POLYNEUROPATHY: ICD-10-CM

## 2023-10-12 DIAGNOSIS — L20.84 INTRINSIC ECZEMA: ICD-10-CM

## 2023-10-12 DIAGNOSIS — Z28.39 IMMUNIZATION DEFICIENCY: ICD-10-CM

## 2023-10-12 DIAGNOSIS — L71.9 ROSACEA: ICD-10-CM

## 2023-10-12 DIAGNOSIS — R33.9 URINARY RETENTION: ICD-10-CM

## 2023-10-12 DIAGNOSIS — N39.0 SEPSIS DUE TO URINARY TRACT INFECTION (H): Primary | ICD-10-CM

## 2023-10-12 DIAGNOSIS — I10 ESSENTIAL HYPERTENSION: ICD-10-CM

## 2023-10-12 DIAGNOSIS — C49.A2 GASTRIC STROMAL TUMOR (H): ICD-10-CM

## 2023-10-12 PROBLEM — Z00.00 ENCOUNTER FOR MEDICARE ANNUAL WELLNESS EXAM: Status: RESOLVED | Noted: 2023-09-26 | Resolved: 2023-10-12

## 2023-10-12 PROCEDURE — 99495 TRANSJ CARE MGMT MOD F2F 14D: CPT | Performed by: FAMILY MEDICINE

## 2023-10-12 RX ORDER — DOXYCYCLINE HYCLATE 20 MG
20 TABLET ORAL DAILY
Start: 2023-10-12

## 2023-10-12 RX ORDER — AMLODIPINE BESYLATE 10 MG/1
10 TABLET ORAL DAILY
Qty: 90 TABLET | Refills: 1 | Status: SHIPPED | OUTPATIENT
Start: 2023-10-12 | End: 2023-11-15

## 2023-10-12 RX ORDER — RESPIRATORY SYNCYTIAL VIRUS VACCINE 120MCG/0.5
0.5 KIT INTRAMUSCULAR ONCE
Qty: 1 EACH | Refills: 0 | Status: CANCELLED | OUTPATIENT
Start: 2023-10-12 | End: 2023-10-12

## 2023-10-12 NOTE — TELEPHONE ENCOUNTER
PAL received incoming call from pt's wife, Alejandrina ([facility])  -Had OV with Dr. Strange today 10/12/23, wanting to inform that pt has been putting Diclofenac 1% topical gel on his back, [facility]     Routing to Dr. Strange as JOANNA ALMANZAR, RN  Patient Advocate Liaison - PAL RN  [facility]  [phone]

## 2023-10-12 NOTE — ASSESSMENT & PLAN NOTE
"\"I heard a doctor from [place] who said they do not tell you about the strokes and heart attacks\".  Recommended, declined  "

## 2023-10-12 NOTE — TELEPHONE ENCOUNTER
PAL attempted to contact SARAH Lynn with [facility]  -Left  giving the following verbal orders per Dr. Strange approval:    Orders Requested     Physical Therapy  Request for initial certification (first set of orders)   Frequency:  1x/wk for 2 wks  Then 1x every other week for 6 wks     Home care will send orders for provider to sign     Katt ALMANZAR RN  Patient Advocate Liaison - PAL RN  [facility]  [phone]

## 2023-10-12 NOTE — ASSESSMENT & PLAN NOTE
Hospitalized with pruritus.  He found to lotions ineffective, and is applying [facility] with benefit.  Exam shows mild eczema.  He uses a drying soap.  Recommend a neutral soap, topical hydrocortisone prn

## 2023-10-12 NOTE — PROGRESS NOTES
"  Assessment & Plan   Problem List Items Addressed This Visit       Essential hypertension    Relevant Medications    amLODIPine (NORVASC) 10 MG tablet    RESOLVED: Gastric stromal tumor (H)    Hx of malignant gastrointestinal stromal tumor (GIST)     He has an appointment to see oncology         Immunization deficiency     \"I heard a doctor from [place] who said they do not tell you about the strokes and heart attacks\".  Recommended, declined         Intrinsic eczema     Hospitalized with pruritus.  He found to lotions ineffective, and is [facility] with [facility].  Exam shows mild eczema.  He uses a drying soap.  Recommend a neutral soap, topical hydrocortisone prn         Peripheral polyneuropathy     He is using a walker         Rosacea     Quiescent.  Resume his doxycycline         Relevant Medications    doxycycline hyclate (PERIOSTAT) 20 MG tablet    Sepsis due to urinary tract infection (H) - Primary     Cultures negative.  He has a few more days of antibiotics which he will complete         Urinary retention     He is now subjectively urinating freely                    MED REC REQUIRED  Post Medication Reconciliation Status: discharge medications reconciled and changed, per note/orders      Obi Strange MD  [facility]    Richi Quezada is a 87 year old, presenting for the following health issues:  Hospital F/U        10/12/2023    10:35 AM   Additional Questions   Roomed by Nicholas FLORES         10/12/2023    10:43 AM   Patient Reported Additional Medications   Patient reports taking the following new medications CEFDINIR 300MG       HPI         10/5/2023     1:48 PM   Post Discharge Outreach   Admission Date 10/1/2023   Reason for Admission Sepsis, resolved  Catheter-associated Urinary Tract Infection  Benign Prostatic Hypertrophy  Acute Metabolic Encephalopathy   Type 2 Diabetes Mellitus   Hypertension   Hyperlipidemia   Iron Deficiency Anemia   Discharge " "Date 10/4/2023   Discharge Diagnosis Sepsis, resolved  Catheter-associated Urinary Tract Infection  Benign Prostatic Hypertrophy  Acute Metabolic Encephalopathy   Type 2 Diabetes Mellitus   Hypertension   Hyperlipidemia   Iron Deficiency Anemia   Were you started on any new medications or were there changes to any of your previous medications?  Yes   Discharge follow-up appointment scheduled within 14 calendar days?  No   Discharge Follow Up Appointment Date 10/5/2023   Discharge Follow Up Appointment Scheduled with? Specialty Care Provider     Hospital Follow-up Visit:    Hospital/Nursing Home/IP Rehab Facility: [facility]  Date of Admission: 10/01/2023  Date of Discharge: 10/04/2023  Reason(s) for Admission: Sepsis, resolved, Catheter-associated Urinary Tract Infection, Benign, Prostatic Hypertrophy, Acute Metabolic Encephalopathy , Type 2 Diabetes Mellitus, Hypertension , Hyperlipidemia ,Iron Deficiency Anemia     Was your hospitalization related to COVID-19? No   Problems taking medications regularly:  None  Medication changes since discharge: Pt was added on   AMLODIPINE BESYLATE 5MG, CEFDINIR 300MG     Problems adhering to non-medication therapy:  None    Summary of hospitalization:  [facility] discharge summary reviewed  Diagnostic Tests/Treatments reviewed.  Follow up needed: none  Other Healthcare Providers Involved in Patient s Care:         Specialist appointment - oncology  Update since discharge: improved.         Plan of care communicated with patient                 Review of Systems   No dysuria no falls.  He has a pruritic back which is responsive [facility].       Objective    BP (!) 164/83 (BP Location: Right arm, Patient Position: Sitting, Cuff Size: Adult Large)   Pulse 63   Temp 97.4  F (36.3  C) (Oral)   Resp 12   Ht 1.778 m (5' 10\")   Wt 115.9 kg (255 lb 9.6 oz)   SpO2 97%   BMI 36.67 kg/m    Body mass index is 36.67 " kg/m .  Physical Exam     As described  Rosacea is quiescent  Stance is broad-based    Obi Strange MD

## 2023-10-14 NOTE — TELEPHONE ENCOUNTER
RECORDS STATUS - ALL OTHER DIAGNOSIS      RECORDS RECEIVED FROM: Epic   DATE RECEIVED:    NOTES STATUS DETAILS   OFFICE NOTE from referring provider Epic 10/05/23: Dr. Chino Ny   DISCHARGE SUMMARY from hospital [facility] 10/01/23: [facility]   OPERATIVE REPORT Epic 09/20/23: Robot assisted lysis of adhesions, Resection of gastric gastrointestinal stromal  tumor, omental mass biopsy     07/13/23: Endoscopic ultrasound upper gastrointestinal tract with fine needle aspiration    MEDICATION LIST [facility]    LABS     PATHOLOGY REPORTS Reports in EPIC 09/20/23: [id number]  07/13/23: [id number]   ANYTHING RELATED TO DIAGNOSIS Epic Most recent 10/04/23   IMAGING (NEED IMAGES & REPORT)     CT SCANS PACS 10/01/23-09/17/13: CT Abd Pel   XRAYS PACS 10/01/23, 02/17/17: XR Chest  08/05/13, 08/03/15: XR Abd

## 2023-10-17 ENCOUNTER — ONCOLOGY VISIT (OUTPATIENT)
Dept: ONCOLOGY | Facility: CLINIC | Age: 88
End: 2023-10-17
Attending: SURGERY
Payer: MEDICARE

## 2023-10-17 ENCOUNTER — PATIENT OUTREACH (OUTPATIENT)
Dept: ONCOLOGY | Facility: CLINIC | Age: 88
End: 2023-10-17

## 2023-10-17 ENCOUNTER — PRE VISIT (OUTPATIENT)
Dept: ONCOLOGY | Facility: CLINIC | Age: 88
End: 2023-10-17
Payer: MEDICARE

## 2023-10-17 VITALS
RESPIRATION RATE: 16 BRPM | TEMPERATURE: 97.4 F | SYSTOLIC BLOOD PRESSURE: 141 MMHG | DIASTOLIC BLOOD PRESSURE: 80 MMHG | HEIGHT: 70 IN | HEART RATE: 72 BPM | BODY MASS INDEX: 36.95 KG/M2 | OXYGEN SATURATION: 98 % | WEIGHT: 258.1 LBS

## 2023-10-17 DIAGNOSIS — C49.A0 MALIGNANT GASTROINTESTINAL STROMAL TUMOR, UNSPECIFIED SITE (H): ICD-10-CM

## 2023-10-17 DIAGNOSIS — C16.2 MALIGNANT NEOPLASM OF BODY OF STOMACH (H): ICD-10-CM

## 2023-10-17 PROCEDURE — G0463 HOSPITAL OUTPT CLINIC VISIT: HCPCS | Performed by: INTERNAL MEDICINE

## 2023-10-17 PROCEDURE — 99215 OFFICE O/P EST HI 40 MIN: CPT | Performed by: INTERNAL MEDICINE

## 2023-10-17 ASSESSMENT — PAIN SCALES - GENERAL: PAINLEVEL: NO PAIN (0)

## 2023-10-17 NOTE — NURSING NOTE
"Oncology Rooming Note    October 17, 2023 9:51 AM   Damien Vallecillo is a 87 year old male who presents for:    Chief Complaint   Patient presents with    Oncology Clinic Visit     Initial Vitals: BP (!) 141/80   Pulse 72   Temp 97.4  F (36.3  C) (Oral)   Resp 16   Ht 1.778 m (5' 10\")   Wt 117.1 kg (258 lb 1.6 oz)   SpO2 98%   BMI 37.03 kg/m   Estimated body mass index is 37.03 kg/m  as calculated from the following:    Height as of this encounter: 1.778 m (5' 10\").    Weight as of this encounter: 117.1 kg (258 lb 1.6 oz). Body surface area is 2.4 meters squared.  No Pain (0) Comment: Data Unavailable   No LMP for male patient.  Allergies reviewed: Yes  Medications reviewed: Yes    Medications: Medication refills not needed today.  Pharmacy name entered into [employer]:    [place], [place] - [address] [facility] - [place], [place] - [address]    Clinical concerns: Balta Titus            "

## 2023-10-17 NOTE — LETTER
10/17/2023         RE: Damien Vallecillo  [id number]  [facility] [id number]        Dear Colleague,    Thank you for referring your patient, Damien Vallecillo, to the [facility]. Please see a copy of my visit note below.    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 PMH is significant for anemia, arthritis, BPH, cheilosis, chronic headache, coginitive impairment, DM2, CKD, external hemorrhoids, SWATHI, gout, hammer toe, heart murmur, right knee arhtroplasty, HLD, rosacea, and RENAE.     Patient has history of colon resection with reanastomosis due to high grade polyps 10-12 years ago.    He has had recurrent abdominal pain with GIB.    EGD 5/25/23: Normal esophagus, single submucosal papule (nodule) found in the stomach.    Colonoscopy 5/25/23: Patent end-to-side ileo-colonic anastomosis, diverticulosis with bleed.    Colonoscopy 6/12/23: end-to-end ileo-colonic anastomosis. Green stool coming from ileum. Blood in the recto-sigmoid colon in the descending colon and in the transverse colon. Diverticulosis.     EUS 7/13/23: 2.7 cm gastric submucosal nodule. Benign appearing small pancreatic cyst. Cholelithiasis without choledocholithiasis. Path returned as gastrointestinal stromal tumor, low mitotic activity <1/50 HPF.    CT A/P 8/22/23: exophytic 2 cm solid mass arising from the lesser curvature of the stomach, compatible with known GIST. No definite evidence of metastatic disease. Subcentimeter hypoattenuating lesions of the liver too small to characterize. Cholelithiasis, hepatomegaly with hepatic steatosis, colonic diverticulosis, and prostatomegaly.    On 9/20/23, he underwent robotic assisted diagnostic lap with resection of gastric GIST and  omental biopsy with Dr. Ny. 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 Omental biopsy and resection were negative for malignancy. (pT2Nx, 2,8 cm greatest dimension, 0 mitoses/5mm2, Grade 1 low risk, no necrosis, all margins negative, no lymph nodes submited, Positive KIT/DOG1).    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    Mother: Colon cancer.  Father: CVA.  Brother: Unknown malignancy.      REVIEW OF SYSTEMS:   A 14 point ROS was reviewed with pertinent positives and negatives in the HPI.        HOME MEDICATIONS:  Current Outpatient Medications   Medication Sig Dispense Refill     acetaminophen (TYLENOL) 500 MG tablet Take 500-1,000 mg by mouth every 6 hours as needed for mild pain       amLODIPine (NORVASC) 10 MG tablet Take 1 tablet (10 mg) by mouth daily 90 tablet 1     atorvastatin (LIPITOR) 40 MG tablet Take 20 mg by mouth every evening       CINNAMON PO Take 1 tablet by mouth every evening       diclofenac (VOLTAREN) 1 % topical gel Apply 2 g topically 2 times daily       doxycycline hyclate (PERIOSTAT) 20 MG tablet Take 1 tablet (20 mg) by mouth daily       ferrous sulfate (FEROSUL) 325 (65 Fe) MG tablet Take 1 tablet (325 mg) by mouth daily (with breakfast)       fish oil-omega-3 fatty acids 1000 MG capsule Take 1 g by mouth daily       Flaxseed, Linseed, (FLAXSEED OIL) 1000 MG CAPS Take 1,000 mg by mouth every evening       furosemide (LASIX) 20 MG tablet Take 1 tablet (20 mg) by mouth 2 times daily 180 tablet 1     glucosamine-chondroitin 500-400 MG CAPS per capsule Take 1 capsule by mouth daily       metroNIDAZOLE (METROGEL) 0.75 % external gel APPLY THIN LAYER TO FACE TWICE A DAY FOR ROSACEA       Multiple Vitamins-Minerals (MULTIVITAMIN ADULT PO) Take 1 tablet by mouth daily Reported on 5/12/2017       order for DME 1: Gradient Compression Wraps; 2: Cast Boots; 3: BLE knee or thigh high 20-30 mm Hg  "compression stocking; 4: BLE knee or thigh high custom compression stocking; 5: Alternative BLE knee high or thigh compression garments (velcro/buckling) 1 each 0     order for DME Equipment being ordered: Walker Wheels () and Walker ()  Treatment Diagnosis: Impaired functional mobility 1 each 0     pramox-pe-glycerin-petrolatum (PREPARATION H) 1-0.25-14.4-15 % CREA cream Place rectally 4 times daily as needed for hemorrhoids Use as directed       pramoxine (PRAX) 1 % LOTN lotion Apply topically 3 times daily to areas that are itchy 237 mL 0     tamsulosin (FLOMAX) 0.4 MG capsule Take 1 capsule (0.4 mg) by mouth daily (Patient taking differently: Take 0.4 mg by mouth every evening) 90 capsule 3         ALLERGIES:  Allergies   Allergen Reactions     Levetiracetam [Keppra] Rash     Oxcarbazepine [Oxcarbazepine] Rash         PAST MEDICAL HISTORY:  Past Medical History:   Diagnosis Date     Acute suppurative arthritis (H)      Acute, but ill-defined, cerebrovascular disease      Anemia due to blood loss, acute 05/25/2023     Ankle fracture 12/28/2018     Arthritis      Benign prostatic hyperplasia with urinary frequency 03/29/2022     Cheilosis 09/28/2018     Chronic headaches      Closed right ankle fracture 12/29/2018     Cognitive attention deficit 09/16/2021     COVID-19 vaccination refused 09/26/2023     Diabetes (H)     \"Pre-diabetes\"     Dislocation of right patella 09/16/2020     Elevated serum creatinine 11/13/2019    GFR Estimate Date Value Ref Range Status 09/30/2019 84 >60 mL/min/[1.73_m2] Final   Comment:   Non  GFR Calc Starting 12/18/2018, serum creatinine based estimated GFR (eGFR) will be  calculated using the Chronic Kidney Disease Epidemiology Collaboration  (CKD-EPI) equation.         Encounter for immunization 09/21/2022     Essential hypertension 10/12/2023     External hemorrhoids 09/21/2022     Fall 03/08/2023     Fe deficiency anemia 07/10/2023     Fecal incontinence " 09/16/2020     Gastric stromal tumor (H) 09/06/2023     Glucose intolerance (impaired glucose tolerance) 05/31/2013     Gout, unspecified      Grieving 09/16/2021     Hammer toe of left foot 10/21/2016     Heart murmur 09/06/2023     Hematuria      History of arthroplasty of right knee 08/17/2017     History of blood transfusion      Hyperlipidemia LDL goal <160 11/13/2019     Hypersomnia 03/07/2023     Immunization deficiency 10/12/2023     Intrinsic eczema 10/12/2023     Left foot pain 09/30/2019     Left knee pain, unspecified chronicity 05/08/2017     Lentigo maligna (H)      Malignant neoplasm of rectosigmoid junction (H)      Morbid obesity due to excess calories (H) 10/21/2016     RENAE (obstructive sleep apnea) 03/08/2023     Pedal edema 01/18/2018     Peripheral polyneuropathy 09/30/2019     Physical deconditioning 03/08/2023     Pre-diabetes 10/21/2016     RBC microcytosis 02/14/2017     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     Right knee pain, unspecified chronicity 05/08/2017     Rosacea 09/28/2018     Syncope      Tubulovillous adenoma of colon      Urinary retention 09/26/2023     Venous stasis dermatitis of both lower extremities 10/21/2016     Xerostomia 09/26/2023         PAST SURGICAL HISTORY:  Past Surgical History:   Procedure Laterality Date     ARTHROPLASTY KNEE Right 6/19/2017    Procedure: ARTHROPLASTY KNEE;  Right total knee arthroplasty, Hammer toe repair toe 2,3,4,5 left foot with osteotomy;  Surgeon: Alec Raymond MD;  Location:  OR     COLONOSCOPY N/A 6/23/2015    Procedure: COMBINED COLONOSCOPY, SINGLE OR MULTIPLE BIOPSY/POLYPECTOMY BY BIOPSY;  Surgeon: Kimberly Alfaro MD;  Location:  GI     COLONOSCOPY N/A 7/30/2015    Procedure: COLONOSCOPY;  Surgeon: Enrique Salazar MD;  Location:  OR     COLONOSCOPY N/A 7/31/2018    Procedure: COLONOSCOPY;  Colonoscopy;  Surgeon: Tiffany Cheema MD;  Location:  GI     COLONOSCOPY N/A 5/26/2023    Procedure: Colonoscopy;  Surgeon:  Juan Grimaldo MD;  Location:  GI     COLONOSCOPY N/A 6/15/2023    Procedure: colonoscopy;   no cecum as pt has had partial colectomy;  Surgeon: Sherif Escalona MD;  Location:  GI     DAVINCI PELVIC PROCEDURE N/A 9/20/2023    Procedure: Robot assisted lysis of adhesions, Resection of gastric gastrointestinal stromal  tumor, omental mass biopsy;  Surgeon: Chino Ny MD;  Location:  OR     ENDOSCOPIC ULTRASOUND UPPER GASTROINTESTINAL TRACT (GI) N/A 7/13/2023    Procedure: Endoscopic ultrasound upper gastrointestinal tract with fine needle aspiration;  Surgeon: Kae Mccall MD;  Location:  OR     ESOPHAGOSCOPY, GASTROSCOPY, DUODENOSCOPY (EGD), COMBINED N/A 5/26/2023    Procedure: Esophagoscopy, gastroscopy, duodenoscopy (EGD), combined;  Surgeon: Juan Grimaldo MD;  Location:  GI     HERNIORRHAPHY EPIGASTRIC  4/27/2012    Procedure:HERNIORRHAPHY EPIGASTRIC; Epigastric Hernia Repair with mesh ; Surgeon:BOLIVAR OLIVEIRA; Location: OR     LAPAROSCOPIC ASSISTED COLECTOMY Right 7/30/2015    Procedure: LAPAROSCOPIC ASSISTED COLECTOMY;  Surgeon: Enrique Salazar MD;  Location:  OR     ORTHOPEDIC SURGERY      lt knee arthroplasty     REALIGN PATELLA Right 10/9/2017    Procedure: REALIGN PATELLA;  Revision right total knee arthroplasty;  Surgeon: Alec Raymond MD;  Location:  OR     REPAIR HAMMER TOE Left 6/19/2017    Procedure: REPAIR HAMMER TOE;  Hammer toe repair toe 2,3,4,5 left foot with osteotomy   ;  Surgeon: Beverly Kim DPM, Podiatry/Foot and Ankle Surgery;  Location:  OR     SIGMOIDOSCOPY FLEXIBLE  5/29/2013    Procedure: SIGMOIDOSCOPY FLEXIBLE;  SIGMOIDOSCOPY FLEXIBLE (FV) Needs blood sugar ck'd;  Surgeon: Enrique Salazar MD;  Location:  GI     skin cancer excision       ZZC NONSPECIFIC PROCEDURE      right heel surgery; spur removed.         SOCIAL HISTORY:  Social History     Socioeconomic History     Marital status:      Spouse name: Not  "on file     Number of children: Not on file     Years of education: Not on file     Highest education level: Not on file   Occupational History     Not on file   Tobacco Use     Smoking status: Never     Passive exposure: Never     Smokeless tobacco: Never   Vaping Use     Vaping Use: Never used   Substance and Sexual Activity     Alcohol use: Yes     Comment: occ.     Drug use: No     Sexual activity: Yes     Partners: Female   Other Topics Concern     Parent/sibling w/ CABG, MI or angioplasty before 65F 55M? No   Social History Narrative     Not on file     Social Determinants of Health     Financial Resource Strain: Not on file   Food Insecurity: Not on file   Transportation Needs: Not on file   Physical Activity: Not on file   Stress: Not on file   Social Connections: Not on file   Interpersonal Safety: Low Risk  (2023)    Interpersonal Safety      Do you feel physically and emotionally safe where you currently live?: Yes      Within the past 12 months, have you been hit, slapped, kicked or otherwise physically hurt by someone?: No      Within the past 12 months, have you been humiliated or emotionally abused in other ways by your partner or ex-partner?: No   Housing Stability: Not on file         FAMILY HISTORY:  Family History   Problem Relation Age of Onset     Cancer - colorectal Mother      Cerebrovascular Disease Father          PHYSICAL EXAM:  Vital signs:  BP (!) 141/80   Pulse 72   Temp 97.4  F (36.3  C) (Oral)   Resp 16   Ht 1.778 m (5' 10\")   Wt 117.1 kg (258 lb 1.6 oz)   SpO2 98%   BMI 37.03 kg/m     ECO.5-2  GENERAL/CONSTITUTIONAL: No acute distress.  EYES: Pupils are equal, round, and react to light and accommodation. Extraocular movements intact.  No scleral icterus.  GASTROINTESTINAL: Obese, no distention. No pain.  MUSCULOSKELETAL: Warm and well-perfused, no cyanosis, clubbing, or edema.  NEUROLOGIC: Cranial nerves II-XII are intact. Alert, oriented, answers questions " appropriately.  INTEGUMENTARY: No rashes or jaundice.      LABS:   Latest Reference Range & Units 10/02/23 05:56   Sodium 135 - 145 mmol/L 137   Potassium 3.4 - 5.3 mmol/L 3.4   Chloride 98 - 107 mmol/L 105   Carbon Dioxide (CO2) 22 - 29 mmol/L 23   Urea Nitrogen 8.0 - 23.0 mg/dL 11.5   Creatinine 0.67 - 1.17 mg/dL 0.77   GFR Estimate >60 mL/min/1.73m2 87   Calcium 8.8 - 10.2 mg/dL 8.5 (L)   Anion Gap 7 - 15 mmol/L 9   Glucose 70 - 99 mg/dL 134 (H)   WBC 4.0 - 11.0 10e3/uL 13.3 (H)   Hemoglobin 13.3 - 17.7 g/dL 12.4 (L)   Hematocrit 40.0 - 53.0 % 38.1 (L)   Platelet Count 150 - 450 10e3/uL 185   RBC Count 4.40 - 5.90 10e6/uL 4.59   MCV 78 - 100 fL 83   MCH 26.5 - 33.0 pg 27.0   MCHC 31.5 - 36.5 g/dL 32.5   RDW 10.0 - 15.0 % 17.3 (H)   % Neutrophils % 84   % Lymphocytes % 6   % Monocytes % 8   % Eosinophils % 1   % Basophils % 0   Absolute Basophils 0.0 - 0.2 10e3/uL 0.0   Absolute Eosinophils 0.0 - 0.7 10e3/uL 0.1   Absolute Immature Granulocytes <=0.4 10e3/uL 0.1   Absolute Lymphocytes 0.8 - 5.3 10e3/uL 0.8   Absolute Monocytes 0.0 - 1.3 10e3/uL 1.1   % Immature Granulocytes % 1   Absolute Neutrophils 1.6 - 8.3 10e3/uL 11.2 (H)   Absolute NRBCs 10e3/uL 0.0   NRBCs per 100 WBC <1 /100 0   (L): Data is abnormally low  (H): Data is abnormally high        PATHOLOGY:  EUS 7/13/23:  Impression:         - 2.7 cm gastric submucosal nodule. Appearance is                             suspicios for GIST. FNA done and pending                             - Benign appearing small pancreatic cyst. Unlikely                             to be clnically significant in this pt due to small                             size and pt's age                             - Cholelithiasis without choledocholithiasis     Final Diagnosis   Specimen A                 Interpretation:                   Gastrointestinal Stromal Tumor. (Please see comment and microscopic description)                    Adequacy:                 Satisfactory for  evaluation         Electronically signed by Janeth Rivas MD on 7/18/2023 at 10:33 AM   Comment  [facility]   Based on the size of the lesion (2.7 cm by EUS report), location (stomach) and low mitotic activity ( <1/50 HPF )this lesion would be stratified as Low Risk  for aggressive behavior.  However sampling is limited and ideally risk stratification should be done on the resection specimen.       Case Report   Surgical Pathology Report                         Case: [id number]                                   Authorizing Provider:  Chino Ny MD       Collected:           09/20/2023 09:32 AM           Ordering Location:     [facility]   Received:            09/20/2023 09:34 AM                                  Main OR                                                                       Pathologist:           Mason Lyn MD                                                             Intraop:               Eileen Radford                                                               Specimens:   A) - Omentum, Omental biopsy                                                                        B) - Stomach, stromal tumor                                                                          C) - Omentum, Omental mass                                                                Final Diagnosis   A.  Omentum, biopsy:  -- Fat necrosis with hyalinization and calcification.  Negative for malignancy.     B.  Stomach, lesser curvature, exophytic mass, resection:  -- Gastrointestinal stromal tumor, spindle cell type.  -- See cancer staging synoptic for additional details.     C.  Omentum, resection:  -- Encapsulated fat necrosis with calcifications.  Negative for malignancy.         Electronically signed by Mason Lyn MD on 9/22/2023 at 11:25 AM   Comment    And NGS mutational panel is being performed per reflex testing guidelines, and will be reported separately by the performing  laboratory.   Synoptic Checklist   GASTROINTESTINAL STROMAL TUMOR (GIST): Resection   8th Edition - Protocol posted: 12/14/2022GASTROINTESTINAL STROMAL TUMOR (GIST): RESECTION - All Specimens  CLINICAL   Preresection Treatment  No known preresection therapy   SPECIMEN   Procedure  Local excision   TUMOR   Tumor Focality  Unifocal   Tumor Site  Stomach: posterior aspect of lower curvature     Histologic Type  Gastrointestinal stromal tumor, spindle cell type   Tumor Size  Greatest Dimension (Centimeters): 2.8 cm   Additional Dimension (Centimeters)  2.2 cm     1.8 cm   Mitotic Rate  0 mitoses per 5 mm2   Histologic Grade  G1, low grade   Necrosis  Not identified   Treatment Effect  No known presurgical therapy   Risk Assessment  Very low risk   MARGINS   Margin Status  All margins negative for GIST   Closest Margin(s) to GIST  Deep   Distance from GIST to Closest Margin  1 mm   REGIONAL LYMPH NODES   Regional Lymph Node Status  Not applicable (no regional lymph nodes submitted or found)   pTNM CLASSIFICATION (AJCC 8th Edition)   Reporting of pT, pN, and (when applicable) pM categories is based on information available to the pathologist at the time the report is issued. As per the AJCC (Chapter 1, 8th Ed.) it is the managing physician's responsibility to establish the final pathologic stage based upon all pertinent information, including but potentially not limited to this pathology report.   pT Category  pT2   pN Category  pN not assigned (no nodes submitted or found)   SPECIAL STUDIES   Immunohistochemical Studies     KIT ()  Positive   Immunohistochemical Studies     DOG1 (ANO1)  Positive   Molecular Genetic Studies  Pending        Specimen Description    Tissue: Fixed slides-Collected 9/20/23, Stomach, [id number] B4  [id number] B4   Significant Results    Detected Alterations of Known or Potential Pathogenicity: KIT [id number]          IMAGING:  CT A/P 8/22/23:  IMPRESSION:   1.  Exophytic 2 cm solid mass  arising from the lesser curvature of the  stomach, compatible with known gastrointestinal stromal tumor.  2.  No definite evidence of metastatic disease within the abdomen or  pelvis. Subcentimeter hypoattenuating lesions of the liver are too  small to characterize. Attention on future imaging.  3.  Additional ancillary findings including cholelithiasis,  hepatomegaly with hepatic steatosis, colonic diverticulosis, and  prostatomegaly.    CT Head 8/27/23:  IMPRESSION:  1.  No CT evidence for acute intracranial process.  2.  Brain atrophy and presumed chronic microvascular ischemic changes as above.    CT C-spine 8/27/23:  IMPRESSION:  1.  No fracture or posttraumatic subluxation.  2.  No high-grade spinal canal stenosis.    CT A/P 10/1/23:  IMPRESSION:   1. Mild urinary bladder wall thickening with some adjacent inflammatory change, which can be seen in the setting of cystitis. Please correlate clinically.  2. Otherwise no acute findings in the abdomen or pelvis.  3. Interval postoperative changes of gastric tumor resection. No free air, free fluid or abscess.        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 PMH is significant for anemia, arthritis, BPH, cheilosis, chronic headache, coginitive impairment, DM2, CKD, external hemorrhoids, SWATHI, gout, hammer toe, heart murmur, right knee arhtroplasty, HLD, rosacea, and RENAE.     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   -On 9/20/23, he underwent robotic assisted diagnostic lap with resection of gastric GIST and omental biopsy with Dr. Ny. 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 Omental biopsy and resection were negative for malignancy. (pT2Nx, 2,8 cm greatest dimension, 0 mitoses/5mm2, Grade 1 low risk, no necrosis, all margins negative, no lymph nodes submited, Positive KIT/DOG1).  -I reviewed  imaging and pathologic reports with patient and wife. He did not have lymph nodes assessed. I will send for PET to assess for further evidence of metastatic disease.   -Discussed with patient he is in need of surgery follow up, and will need repeat EGD in the future (as well as colonoscopy given history of GIB/diverticular bleed).   -We had discussed in low risk disease, treatment can include upfront surgery to be followed by observation vs imatinib in the low risk setting, or neoadjuvant imatinib therapy followed by surgery and imatinib treatment. Will assess final PET. Given the low risk nature of his findings, if no evidence of metastatic disease, would elect for observation.     2) History of GIB due to diverticulosis  -See above.     3) History of DM2    4) History of HTN, HLD    5) -Schedule PET in the next 2-4 weeks with repeat CBC, CMP in 4 weeks prior to follow up in clinic.  -TAYLOR from Dr. Ny's office.          Cathryn Pearson DO  Hematology/Oncology  [facility]      Again, thank you for allowing me to participate in the care of your patient.        Sincerely,        Cathryn Pearson DO

## 2023-10-17 NOTE — PROGRESS NOTES
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 PMH is significant for anemia, arthritis, BPH, cheilosis, chronic headache, coginitive impairment, DM2, CKD, external hemorrhoids, SWATHI, gout, hammer toe, heart murmur, right knee arhtroplasty, HLD, rosacea, and RENAE.     Patient has history of colon resection with reanastomosis due to high grade polyps 10-12 years ago.    He has had recurrent abdominal pain with GIB.    EGD 5/25/23: Normal esophagus, single submucosal papule (nodule) found in the stomach.    Colonoscopy 5/25/23: Patent end-to-side ileo-colonic anastomosis, diverticulosis with bleed.    Colonoscopy 6/12/23: end-to-end ileo-colonic anastomosis. Green stool coming from ileum. Blood in the recto-sigmoid colon in the descending colon and in the transverse colon. Diverticulosis.     EUS 7/13/23: 2.7 cm gastric submucosal nodule. Benign appearing small pancreatic cyst. Cholelithiasis without choledocholithiasis. Path returned as gastrointestinal stromal tumor, low mitotic activity <1/50 HPF.    CT A/P 8/22/23: exophytic 2 cm solid mass arising from the lesser curvature of the stomach, compatible with known GIST. No definite evidence of metastatic disease. Subcentimeter hypoattenuating lesions of the liver too small to characterize. Cholelithiasis, hepatomegaly with hepatic steatosis, colonic diverticulosis, and prostatomegaly.    On 9/20/23, he underwent robotic assisted diagnostic lap with resection of gastric GIST and omental biopsy with Dr. Ny. 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 Omental biopsy and resection were negative for malignancy. (pT2Nx, 2,8 cm greatest dimension, 0 mitoses/5mm2, Grade 1 low risk, no necrosis, all  margins negative, no lymph nodes submited, Positive KIT/DOG1).    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    Mother: Colon cancer.  Father: CVA.  Brother: Unknown malignancy.      REVIEW OF SYSTEMS:   A 14 point ROS was reviewed with pertinent positives and negatives in the HPI.        HOME MEDICATIONS:  Current Outpatient Medications   Medication Sig Dispense Refill    acetaminophen (TYLENOL) 500 MG tablet Take 500-1,000 mg by mouth every 6 hours as needed for mild pain      amLODIPine (NORVASC) 10 MG tablet Take 1 tablet (10 mg) by mouth daily 90 tablet 1    atorvastatin (LIPITOR) 40 MG tablet Take 20 mg by mouth every evening      CINNAMON PO Take 1 tablet by mouth every evening      diclofenac (VOLTAREN) 1 % topical gel Apply 2 g topically 2 times daily      doxycycline hyclate (PERIOSTAT) 20 MG tablet Take 1 tablet (20 mg) by mouth daily      ferrous sulfate (FEROSUL) 325 (65 Fe) MG tablet Take 1 tablet (325 mg) by mouth daily (with breakfast)      fish oil-omega-3 fatty acids 1000 MG capsule Take 1 g by mouth daily      Flaxseed, Linseed, (FLAXSEED OIL) 1000 MG CAPS Take 1,000 mg by mouth every evening      furosemide (LASIX) 20 MG tablet Take 1 tablet (20 mg) by mouth 2 times daily 180 tablet 1    glucosamine-chondroitin 500-400 MG CAPS per capsule Take 1 capsule by mouth daily      metroNIDAZOLE (METROGEL) 0.75 % external gel APPLY THIN LAYER TO FACE TWICE A DAY FOR ROSACEA      Multiple Vitamins-Minerals (MULTIVITAMIN ADULT PO) Take 1 tablet by mouth daily Reported on 5/12/2017      order for DME 1: Gradient Compression Wraps; 2: Cast Boots; 3: BLE knee or thigh high 20-30 mm Hg compression stocking; 4: BLE knee or thigh high custom compression stocking; 5: Alternative BLE knee high or thigh compression garments (velcro/buckling) 1 each 0    order for DME Equipment being ordered: Walker Wheels () and Walker ()  Treatment Diagnosis: Impaired  "functional mobility 1 each 0    pramox-pe-glycerin-petrolatum (PREPARATION H) 1-0.25-14.4-15 % CREA cream Place rectally 4 times daily as needed for hemorrhoids Use as directed      pramoxine (PRAX) 1 % LOTN lotion Apply topically 3 times daily to areas that are itchy 237 mL 0    tamsulosin (FLOMAX) 0.4 MG capsule Take 1 capsule (0.4 mg) by mouth daily (Patient taking differently: Take 0.4 mg by mouth every evening) 90 capsule 3         ALLERGIES:  Allergies   Allergen Reactions    Levetiracetam [Keppra] Rash    Oxcarbazepine [Oxcarbazepine] Rash         PAST MEDICAL HISTORY:  Past Medical History:   Diagnosis Date    Acute suppurative arthritis (H)     Acute, but ill-defined, cerebrovascular disease     Anemia due to blood loss, acute 05/25/2023    Ankle fracture 12/28/2018    Arthritis     Benign prostatic hyperplasia with urinary frequency 03/29/2022    Cheilosis 09/28/2018    Chronic headaches     Closed right ankle fracture 12/29/2018    Cognitive attention deficit 09/16/2021    COVID-19 vaccination refused 09/26/2023    Diabetes (H)     \"Pre-diabetes\"    Dislocation of right patella 09/16/2020    Elevated serum creatinine 11/13/2019    GFR Estimate Date Value Ref Range Status 09/30/2019 84 >60 mL/min/[1.73_m2] Final   Comment:   Non  GFR Calc Starting 12/18/2018, serum creatinine based estimated GFR (eGFR) will be  calculated using the Chronic Kidney Disease Epidemiology Collaboration  (CKD-EPI) equation.        Encounter for immunization 09/21/2022    Essential hypertension 10/12/2023    External hemorrhoids 09/21/2022    Fall 03/08/2023    Fe deficiency anemia 07/10/2023    Fecal incontinence 09/16/2020    Gastric stromal tumor (H) 09/06/2023    Glucose intolerance (impaired glucose tolerance) 05/31/2013    Gout, unspecified     Grieving 09/16/2021    Hammer toe of left foot 10/21/2016    Heart murmur 09/06/2023    Hematuria     History of arthroplasty of right knee 08/17/2017    History of " blood transfusion     Hyperlipidemia LDL goal <160 11/13/2019    Hypersomnia 03/07/2023    Immunization deficiency 10/12/2023    Intrinsic eczema 10/12/2023    Left foot pain 09/30/2019    Left knee pain, unspecified chronicity 05/08/2017    Lentigo maligna (H)     Malignant neoplasm of rectosigmoid junction (H)     Morbid obesity due to excess calories (H) 10/21/2016    RENAE (obstructive sleep apnea) 03/08/2023    Pedal edema 01/18/2018    Peripheral polyneuropathy 09/30/2019    Physical deconditioning 03/08/2023    Pre-diabetes 10/21/2016    RBC microcytosis 02/14/2017    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    Right knee pain, unspecified chronicity 05/08/2017    Rosacea 09/28/2018    Syncope     Tubulovillous adenoma of colon     Urinary retention 09/26/2023    Venous stasis dermatitis of both lower extremities 10/21/2016    Xerostomia 09/26/2023         PAST SURGICAL HISTORY:  Past Surgical History:   Procedure Laterality Date    ARTHROPLASTY KNEE Right 6/19/2017    Procedure: ARTHROPLASTY KNEE;  Right total knee arthroplasty, Hammer toe repair toe 2,3,4,5 left foot with osteotomy;  Surgeon: Alec Raymond MD;  Location:  OR    COLONOSCOPY N/A 6/23/2015    Procedure: COMBINED COLONOSCOPY, SINGLE OR MULTIPLE BIOPSY/POLYPECTOMY BY BIOPSY;  Surgeon: Kimberly Alfaro MD;  Location:  GI    COLONOSCOPY N/A 7/30/2015    Procedure: COLONOSCOPY;  Surgeon: Enrique Salazar MD;  Location:  OR    COLONOSCOPY N/A 7/31/2018    Procedure: COLONOSCOPY;  Colonoscopy;  Surgeon: Tiffany Cheema MD;  Location:  GI    COLONOSCOPY N/A 5/26/2023    Procedure: Colonoscopy;  Surgeon: Juan Grimaldo MD;  Location:  GI    COLONOSCOPY N/A 6/15/2023    Procedure: colonoscopy;   no cecum as pt has had partial colectomy;  Surgeon: Sherif Escalona MD;  Location:  GI    DAVINCI PELVIC PROCEDURE N/A 9/20/2023    Procedure: Robot assisted lysis of adhesions, Resection of gastric gastrointestinal stromal  tumor, omental  mass biopsy;  Surgeon: Chino Ny MD;  Location:  OR    ENDOSCOPIC ULTRASOUND UPPER GASTROINTESTINAL TRACT (GI) N/A 7/13/2023    Procedure: Endoscopic ultrasound upper gastrointestinal tract with fine needle aspiration;  Surgeon: Kae Mccall MD;  Location:  OR    ESOPHAGOSCOPY, GASTROSCOPY, DUODENOSCOPY (EGD), COMBINED N/A 5/26/2023    Procedure: Esophagoscopy, gastroscopy, duodenoscopy (EGD), combined;  Surgeon: Juan Grimaldo MD;  Location:  GI    HERNIORRHAPHY EPIGASTRIC  4/27/2012    Procedure:HERNIORRHAPHY EPIGASTRIC; Epigastric Hernia Repair with mesh ; Surgeon:BOLIVAR OLIVEIRA; Location: OR    LAPAROSCOPIC ASSISTED COLECTOMY Right 7/30/2015    Procedure: LAPAROSCOPIC ASSISTED COLECTOMY;  Surgeon: Enrique Salazar MD;  Location:  OR    ORTHOPEDIC SURGERY      lt knee arthroplasty    REALIGN PATELLA Right 10/9/2017    Procedure: REALIGN PATELLA;  Revision right total knee arthroplasty;  Surgeon: Alec Raymond MD;  Location:  OR    REPAIR HAMMER TOE Left 6/19/2017    Procedure: REPAIR HAMMER TOE;  Hammer toe repair toe 2,3,4,5 left foot with osteotomy   ;  Surgeon: Beverly Kim DPM, Podiatry/Foot and Ankle Surgery;  Location:  OR    SIGMOIDOSCOPY FLEXIBLE  5/29/2013    Procedure: SIGMOIDOSCOPY FLEXIBLE;  SIGMOIDOSCOPY FLEXIBLE (FV) Needs blood sugar ck'd;  Surgeon: Enrique Salazar MD;  Location:  GI    skin cancer excision      ZZC NONSPECIFIC PROCEDURE      right heel surgery; spur removed.         SOCIAL HISTORY:  Social History     Socioeconomic History    Marital status:      Spouse name: Not on file    Number of children: Not on file    Years of education: Not on file    Highest education level: Not on file   Occupational History    Not on file   Tobacco Use    Smoking status: Never     Passive exposure: Never    Smokeless tobacco: Never   Vaping Use    Vaping Use: Never used   Substance and Sexual Activity    Alcohol use: Yes     Comment: occ.  "   Drug use: No    Sexual activity: Yes     Partners: Female   Other Topics Concern    Parent/sibling w/ CABG, MI or angioplasty before 65F 55M? No   Social History Narrative    Not on file     Social Determinants of Health     Financial Resource Strain: Not on file   Food Insecurity: Not on file   Transportation Needs: Not on file   Physical Activity: Not on file   Stress: Not on file   Social Connections: Not on file   Interpersonal Safety: Low Risk  (2023)    Interpersonal Safety     Do you feel physically and emotionally safe where you currently live?: Yes     Within the past 12 months, have you been hit, slapped, kicked or otherwise physically hurt by someone?: No     Within the past 12 months, have you been humiliated or emotionally abused in other ways by your partner or ex-partner?: No   Housing Stability: Not on file         FAMILY HISTORY:  Family History   Problem Relation Age of Onset    Cancer - colorectal Mother     Cerebrovascular Disease Father          PHYSICAL EXAM:  Vital signs:  BP (!) 141/80   Pulse 72   Temp 97.4  F (36.3  C) (Oral)   Resp 16   Ht 1.778 m (5' 10\")   Wt 117.1 kg (258 lb 1.6 oz)   SpO2 98%   BMI 37.03 kg/m     ECO.5-2  GENERAL/CONSTITUTIONAL: No acute distress.  EYES: Pupils are equal, round, and react to light and accommodation. Extraocular movements intact.  No scleral icterus.  GASTROINTESTINAL: Obese, no distention. No pain.  MUSCULOSKELETAL: Warm and well-perfused, no cyanosis, clubbing, or edema.  NEUROLOGIC: Cranial nerves II-XII are intact. Alert, oriented, answers questions appropriately.  INTEGUMENTARY: No rashes or jaundice.      LABS:   Latest Reference Range & Units 10/02/23 05:56   Sodium 135 - 145 mmol/L 137   Potassium 3.4 - 5.3 mmol/L 3.4   Chloride 98 - 107 mmol/L 105   Carbon Dioxide (CO2) 22 - 29 mmol/L 23   Urea Nitrogen 8.0 - 23.0 mg/dL 11.5   Creatinine 0.67 - 1.17 mg/dL 0.77   GFR Estimate >60 mL/min/1.73m2 87   Calcium 8.8 - 10.2 mg/dL 8.5 " (L)   Anion Gap 7 - 15 mmol/L 9   Glucose 70 - 99 mg/dL 134 (H)   WBC 4.0 - 11.0 10e3/uL 13.3 (H)   Hemoglobin 13.3 - 17.7 g/dL 12.4 (L)   Hematocrit 40.0 - 53.0 % 38.1 (L)   Platelet Count 150 - 450 10e3/uL 185   RBC Count 4.40 - 5.90 10e6/uL 4.59   MCV 78 - 100 fL 83   MCH 26.5 - 33.0 pg 27.0   MCHC 31.5 - 36.5 g/dL 32.5   RDW 10.0 - 15.0 % 17.3 (H)   % Neutrophils % 84   % Lymphocytes % 6   % Monocytes % 8   % Eosinophils % 1   % Basophils % 0   Absolute Basophils 0.0 - 0.2 10e3/uL 0.0   Absolute Eosinophils 0.0 - 0.7 10e3/uL 0.1   Absolute Immature Granulocytes <=0.4 10e3/uL 0.1   Absolute Lymphocytes 0.8 - 5.3 10e3/uL 0.8   Absolute Monocytes 0.0 - 1.3 10e3/uL 1.1   % Immature Granulocytes % 1   Absolute Neutrophils 1.6 - 8.3 10e3/uL 11.2 (H)   Absolute NRBCs 10e3/uL 0.0   NRBCs per 100 WBC <1 /100 0   (L): Data is abnormally low  (H): Data is abnormally high        PATHOLOGY:  EUS 7/13/23:  Impression:         - 2.7 cm gastric submucosal nodule. Appearance is                             suspicios for GIST. FNA done and pending                             - Benign appearing small pancreatic cyst. Unlikely                             to be clnically significant in this pt due to small                             size and pt's age                             - Cholelithiasis without choledocholithiasis     Final Diagnosis   Specimen A                 Interpretation:                   Gastrointestinal Stromal Tumor. (Please see comment and microscopic description)                    Adequacy:                 Satisfactory for evaluation         Electronically signed by Janeth Rivas MD on 7/18/2023 at 10:33 AM   Comment  [facility]   Based on the size of the lesion (2.7 cm by EUS report), location (stomach) and low mitotic activity ( <1/50 HPF )this lesion would be stratified as Low Risk  for aggressive behavior.  However sampling is limited and ideally risk stratification should be done on the resection  specimen.       Case Report   Surgical Pathology Report                         Case: [id number]                                   Authorizing Provider:  Chino Ny MD       Collected:           09/20/2023 09:32 AM           Ordering Location:     [facility]   Received:            09/20/2023 09:34 AM                                  Main OR                                                                       Pathologist:           Mason Lyn MD                                                             Intraop:               Eileen Radford                                                               Specimens:   A) - Omentum, Omental biopsy                                                                        B) - Stomach, stromal tumor                                                                          C) - Omentum, Omental mass                                                                Final Diagnosis   A.  Omentum, biopsy:  -- Fat necrosis with hyalinization and calcification.  Negative for malignancy.     B.  Stomach, lesser curvature, exophytic mass, resection:  -- Gastrointestinal stromal tumor, spindle cell type.  -- See cancer staging synoptic for additional details.     C.  Omentum, resection:  -- Encapsulated fat necrosis with calcifications.  Negative for malignancy.         Electronically signed by Mason Lyn MD on 9/22/2023 at 11:25 AM   Comment    And NGS mutational panel is being performed per reflex testing guidelines, and will be reported separately by the performing laboratory.   Synoptic Checklist   GASTROINTESTINAL STROMAL TUMOR (GIST): Resection   8th Edition - Protocol posted: 12/14/2022GASTROINTESTINAL STROMAL TUMOR (GIST): RESECTION - All Specimens  CLINICAL   Preresection Treatment  No known preresection therapy   SPECIMEN   Procedure  Local excision   TUMOR   Tumor Focality  Unifocal   Tumor Site  Stomach: posterior aspect of lower curvature      Histologic Type  Gastrointestinal stromal tumor, spindle cell type   Tumor Size  Greatest Dimension (Centimeters): 2.8 cm   Additional Dimension (Centimeters)  2.2 cm     1.8 cm   Mitotic Rate  0 mitoses per 5 mm2   Histologic Grade  G1, low grade   Necrosis  Not identified   Treatment Effect  No known presurgical therapy   Risk Assessment  Very low risk   MARGINS   Margin Status  All margins negative for GIST   Closest Margin(s) to GIST  Deep   Distance from GIST to Closest Margin  1 mm   REGIONAL LYMPH NODES   Regional Lymph Node Status  Not applicable (no regional lymph nodes submitted or found)   pTNM CLASSIFICATION (AJCC 8th Edition)   Reporting of pT, pN, and (when applicable) pM categories is based on information available to the pathologist at the time the report is issued. As per the AJCC (Chapter 1, 8th Ed.) it is the managing physician's responsibility to establish the final pathologic stage based upon all pertinent information, including but potentially not limited to this pathology report.   pT Category  pT2   pN Category  pN not assigned (no nodes submitted or found)   SPECIAL STUDIES   Immunohistochemical Studies     KIT ()  Positive   Immunohistochemical Studies     DOG1 (ANO1)  Positive   Molecular Genetic Studies  Pending        Specimen Description    Tissue: Fixed slides-Collected 9/20/23, Stomach, [id number] B4  [id number] B4   Significant Results    Detected Alterations of Known or Potential Pathogenicity: KIT [id number]          IMAGING:  CT A/P 8/22/23:  IMPRESSION:   1.  Exophytic 2 cm solid mass arising from the lesser curvature of the  stomach, compatible with known gastrointestinal stromal tumor.  2.  No definite evidence of metastatic disease within the abdomen or  pelvis. Subcentimeter hypoattenuating lesions of the liver are too  small to characterize. Attention on future imaging.  3.  Additional ancillary findings including cholelithiasis,  hepatomegaly with hepatic  steatosis, colonic diverticulosis, and  prostatomegaly.    CT Head 8/27/23:  IMPRESSION:  1.  No CT evidence for acute intracranial process.  2.  Brain atrophy and presumed chronic microvascular ischemic changes as above.    CT C-spine 8/27/23:  IMPRESSION:  1.  No fracture or posttraumatic subluxation.  2.  No high-grade spinal canal stenosis.    CT A/P 10/1/23:  IMPRESSION:   1. Mild urinary bladder wall thickening with some adjacent inflammatory change, which can be seen in the setting of cystitis. Please correlate clinically.  2. Otherwise no acute findings in the abdomen or pelvis.  3. Interval postoperative changes of gastric tumor resection. No free air, free fluid or abscess.        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 PMH is significant for anemia, arthritis, BPH, cheilosis, chronic headache, coginitive impairment, DM2, CKD, external hemorrhoids, SWATHI, gout, hammer toe, heart murmur, right knee arhtroplasty, HLD, rosacea, and RENAE.     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   -On 9/20/23, he underwent robotic assisted diagnostic lap with resection of gastric GIST and omental biopsy with Dr. Ny. 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 Omental biopsy and resection were negative for malignancy. (pT2Nx, 2,8 cm greatest dimension, 0 mitoses/5mm2, Grade 1 low risk, no necrosis, all margins negative, no lymph nodes submited, Positive KIT/DOG1).  -I reviewed imaging and pathologic reports with patient and wife. He did not have lymph nodes assessed. I will send for PET to assess for further evidence of metastatic disease.   -Discussed with patient he is in need of surgery follow up, and will need repeat EGD in the future (as well as colonoscopy given history of GIB/diverticular bleed).   -We had discussed in low risk disease, treatment can  include upfront surgery to be followed by observation vs imatinib in the low risk setting, or neoadjuvant imatinib therapy followed by surgery and imatinib treatment. Will assess final PET. Given the low risk nature of his findings, if no evidence of metastatic disease, would elect for observation.     2) History of GIB due to diverticulosis  -See above.     3) History of DM2    4) History of HTN, HLD    5) -Schedule PET in the next 2-4 weeks with repeat CBC, CMP in 4 weeks prior to follow up in clinic.  -TAYLOR from Dr. Ny's office.          Cathryn Pearson DO  Hematology/Oncology  [facility]

## 2023-10-17 NOTE — PROGRESS NOTES
Consent to obtain records from Dr. Ny at [facility] received during this office visit. Writer contacted the [facility] team via telephone and they will be faxing over records.    Ashely Blanchard RN on 10/17/2023 at 11:10 AM

## 2023-10-18 NOTE — PROGRESS NOTES
"[facility]: Cancer Care Initial Note                                    Discussion with Patient:                                                    Writer contacted Damien to complete an initial assessment and to check to see if Damien had any follow up questions after having a consult with Dr. Pearson.   Assessment:                                                    Initial  Current living arrangement:: I live in a private home with spouse (Ran)  Informal Support system:: [place] based;Spouse;Family  Equipment Currently Used at Home: walker, rolling  Bed or wheelchair confined:: No  Mobility Status: Independent w/Device  Transportation means:: Regular car  Medication adherence problem (GOAL):: No  Knowledgeable about how to use meds:: Yes  Medication side effects suspected:: No  Advanced Care Plans/Directives on file:: In process  Patient Reported Pain?: No  Pain Score: No Pain (0)    Writer asked about his current goals. \"At my age goals are long gone!\"     Intervention/Education provided during outreach:                                                     Damien is scheduled for a PET scan on 11/21/23 and a follow up with Dr. Pearson on 11/24/23.    Ashely Blanchard RN on 10/18/2023 at 3:51 PM      "

## 2023-10-18 NOTE — PROGRESS NOTES
"Surgery Post op visit  10/5/2023    86 yo male s/p stomach gist excision  No major issues or complaints. Tolerating diet and having bowel function.  /80   Pulse 76   Resp 15   Ht 1.778 m (5' 10\")   Wt 118.8 kg (262 lb)   SpO2 98%   BMI 37.59 kg/m    Gen: NAD, alert  Abd: soft, incisions healed    Pathology:  Final Diagnosis   A.  Omentum, biopsy:  -- Fat necrosis with hyalinization and calcification.  Negative for malignancy.     B.  Stomach, lesser curvature, exophytic mass, resection:  -- Gastrointestinal stromal tumor, spindle cell type.  -- See cancer staging synoptic for additional details.     C.  Omentum, resection:  -- Encapsulated fat necrosis with calcifications.  Negative for malignancy.   Tumor 2.2x1.8cm, O mitoses, G1 low grade, Margins negative    86 yo male s/p gist excision  -low grade and complete resection  -patient would like visit with oncologist to discuss surveillance and if any other treatment modalities are needed, discussed that this is unlikely due to size and complete resection.   "

## 2023-11-11 DIAGNOSIS — R60.0 PEDAL EDEMA: ICD-10-CM

## 2023-11-13 RX ORDER — FUROSEMIDE 20 MG
20 TABLET ORAL 2 TIMES DAILY
Qty: 180 TABLET | Refills: 1 | Status: SHIPPED | OUTPATIENT
Start: 2023-11-13 | End: 2024-04-02

## 2023-11-14 ENCOUNTER — ALLIED HEALTH/NURSE VISIT (OUTPATIENT)
Dept: EDUCATION SERVICES | Facility: CLINIC | Age: 88
End: 2023-11-14
Payer: MEDICARE

## 2023-11-14 DIAGNOSIS — E11.9 TYPE 2 DIABETES MELLITUS WITHOUT COMPLICATION, UNSPECIFIED WHETHER LONG TERM INSULIN USE (H): Primary | ICD-10-CM

## 2023-11-14 PROCEDURE — G0108 DIAB MANAGE TRN  PER INDIV: HCPCS

## 2023-11-14 NOTE — PATIENT INSTRUCTIONS
Care Plan:  Meal Plan Recommendation: eat 3 meals a day, have small snacks between meals, if needed, use portion control, use plate planning method, add vegetables every day, and refer to the snack ideas.   - Avoid sugar sweetened cereal  - Limit Orange juice to 4 oz a day  - Avoid cookies at breakfast   - Limit your cookies to - 2 a day  - Fruit - limit to 1 serving at a time ( tennis ball size fruit or small banana)  - Limit black licorice to 2 pieces  - Limit ice cream to 1/2 cup    Exercise / activity plan: continue with your activity. Remember, activity helps control blood sugar.    Check blood sugars: consider in the future if your A1c doesn't improve.     Follow up:  Follow-up diabetes education appointment scheduled on 1/9/24 at 9:15am.      Bring blood glucose meter and logbook with you to all doctor and follow-up appointments.     [facility] for the [facility]:  For Your Diabetes or Nutrition Education Appointments Call:  [phone]   For Diabetes or Nutrition Related Questions:   [phone]  Send [employer] Message   If you need a medication refill please contact your pharmacy. Please allow 3 business days for your refills to be completed.

## 2023-11-14 NOTE — LETTER
11/14/2023         RE: Damien Vallecillo  [address]  [facility] [id number]        Dear Colleague,    Thank you for referring your patient, Damien Vallecillo, to the [facility]. Please see a copy of my visit note below.    Diabetes Self-Management Education & Support    Presents for: Individual review    Type of Service: In Person Visit      ASSESSMENT:  Patient is motivated to make lifestyle changes to improve his diabetes. He would like to focus most on his diet, and avoid medications. He is not interested in testing his blood sugar at this time. He tried testing in the past and it didn't work and he does not want to learn it at this point in his life.      Discussed pathophysiology of diabetes, the difference between Type 1 and 2 DM.  Reviewed carbohydrate sources, portion control and the benefit of spreading intake throughout the day. Encouraged 3 meals a day, watch portions, refer to the plate planner and provided snack ideas. Reviewed benefits of exercise to help with better blood glucose utilization.    Patient's most recent   Lab Results   Component Value Date    A1C 6.8 09/06/2023    A1C 5.9 09/16/2020     is meeting goal of <7.0      PLAN  Meal Plan Recommendation: eat 3 meals a day, have small snacks between meals, if needed, use portion control, use plate planning method, add vegetables every day, and refer to the snack ideas.   - Avoid sugar sweetened cereal  - Limit Orange juice to 4 oz a day  - Avoid cookies at breakfast   - Limit your cookies to - 2 a day  - Fruit - limit to 1 serving at a time ( tennis ball size fruit or small banana)  - Limit black licorice to 2 pieces  - Limit ice cream to 1/2 cup    Exercise / activity plan: continue with your activity. Remember, activity helps control blood sugar.    Check blood sugars: consider in the future if your A1c doesn't improve.     Follow up:  Follow-up diabetes education appointment scheduled on 1/9/24 at 9:15am.  "    See Care Plan for co-developed, patient-state behavior change goals.  AVS provided for patient today.    Education Materials Provided:  [employer] Understanding Diabetes Booklet, My Plate Planner, and snack ideas      SUBJECTIVE/OBJECTIVE:  Presents for: Individual review  Accompanied by: Self  Diabetes education in the past 24mo: No  Focus of Visit: Patient Unsure  Diabetes type: Type 2  Disease course: Worsening  How confident are you filling out medical forms by yourself:: Somewhat  Transportation concerns: No  Difficulty affording diabetes medication?: No  Difficulty affording diabetes testing supplies?: No  Other concerns:: None  Cultural Influences/Ethnic Background:  Not  or     Diabetes Symptoms & Complications:  Fatigue: No  Neuropathy: No  Polydipsia: No  Polyphagia: No  Polyuria: No  Visual change: No  Slow healing wounds: No  Symptom course: Stable  Weight trend: Stable  Complications assessed today?: Yes  Autonomic neuropathy: No  CVA: No  Heart disease: No  Nephropathy: No  Peripheral neuropathy: No  Peripheral Vascular Disease: No  Retinopathy: No    Patient Problem List and Family Medical History reviewed for relevant medical history, current medical status, and diabetes risk factors.    Vitals:  There were no vitals taken for this visit.  Estimated body mass index is 37.03 kg/m  as calculated from the following:    Height as of 10/17/23: 1.778 m (5' 10\").    Weight as of 10/17/23: 117.1 kg (258 lb 1.6 oz).   Last 3 BP:   BP Readings from Last 3 Encounters:   10/17/23 (!) 141/80   10/12/23 (!) 164/83   10/05/23 136/80       History   Smoking Status     Never   Smokeless Tobacco     Never       Labs:  Lab Results   Component Value Date    A1C 6.8 09/06/2023    A1C 5.9 09/16/2020     Lab Results   Component Value Date     10/02/2023     09/22/2023     09/21/2022     09/16/2020     Lab Results   Component Value Date    LDL 31 09/16/2021    LDL 29 " "11/15/2019     HDL Cholesterol   Date Value Ref Range Status   11/15/2019 75 >39 mg/dL Final     Direct Measure HDL   Date Value Ref Range Status   09/16/2021 75 >=40 mg/dL Final   ]  GFR Estimate   Date Value Ref Range Status   10/04/2023 89 >60 mL/min/1.73m2 Final   09/16/2020 82 >60 mL/min/[1.73_m2] Final     Comment:     Non  GFR Calc  Starting 12/18/2018, serum creatinine based estimated GFR (eGFR) will be   calculated using the Chronic Kidney Disease Epidemiology Collaboration   (CKD-EPI) equation.       GFR, ESTIMATED POCT   Date Value Ref Range Status   08/22/2023 >60 >60 mL/min/1.73m2 Final     GFR Estimate If Black   Date Value Ref Range Status   09/16/2020 >90 >60 mL/min/[1.73_m2] Final     Comment:      GFR Calc  Starting 12/18/2018, serum creatinine based estimated GFR (eGFR) will be   calculated using the Chronic Kidney Disease Epidemiology Collaboration   (CKD-EPI) equation.       Lab Results   Component Value Date    CR 0.72 10/04/2023    CR 0.80 09/16/2020     No results found for: \"MICROALBUMIN\"    Healthy Eating:  Healthy Eating Assessed Today: Yes  Cultural/Judaism diet restrictions?: No  Meal planning/habits: None  Breakfast: cereal with milk, OJ, ham, cookies  Lunch: sandwich Or apple/banana  Dinner: mexican meal- ground beef/chips, sauce, sour cream OR hamburger with bun, baked bean/ potatoes  Or chicken wiht cream of celery soup, potatoes, green beans carrots.  OR salad, steak  Snacks: peanuts, chips, cookies, carrot cake  Beverages: Water, Juice, Alcohol, Other, Coffee (beer, mike water, occassional regular pepsi)  Has patient met with a dietitian in the past?: No    Being Active:  Being Active Assessed Today: Yes  Exercise:: Yes (stretching type activity, le lifts, walk in house, squats, chair exercises)  Days per week of moderate to strenuous exercise (like a brisk walk): 7  How intense was your typical exercise? : Light (like stretching or slow " walking)  Barrier to exercise: Physical limitation    Monitoring:  Monitoring Assessed Today: Yes  Did patient bring glucose meter to appointment? : No (has a meter, tried testing in the past but didn't go well. Not interested in testing blood sugar at home.)    Taking Medications:  Taking Medication Assessed Today: Yes  Current Treatments: None    Problem Solving:  Problem Solving Assessed Today: Yes  Is the patient at risk for hypoglycemia?: No      Reducing Risks:  Reducing Risks Assessed Today: Yes  Diabetes Risks: Age over 45 years, Sedentary Lifestyle  CAD Risks: Sedentary lifestyle, Diabetes Mellitus, Male sex, Obesity, Hypertension  Has dilated eye exam at least once a year?: No  Sees dentist every 6 months?: No  Feet checked by healthcare provider in the last year?: No    Healthy Coping:  Healthy Coping Assessed Today: Yes  Emotional response to diabetes: Ready to learn  Informal Support system:: Family  Stage of change: PREPARATION (Decided to change - considering how)  Patient Activation Measure Survey Score:      5/18/2011    11:00 AM   YEHUDA Score (Last Two)   YEHUDA Raw Score 43   Activation Score 68.5   YEHUDA Level 4         Care Plan and Education Provided:  Care Plan: Diabetes   Updates made by Geeta Carpenter RN since 11/14/2023 12:00 AM        Problem: HbA1C Not In Goal         Goal: Establish Regular Follow-Ups with PCP         Task: Discuss with PCP the recommended timing for patient's next follow up visit(s)    Responsible User: Geeta Carpenter RN        Task: Discuss schedule for PCP visits with patient    Responsible User: Geeta Carpenter RN        Goal: Get HbA1C Level in Goal         Task: Educate patient on diabetes education self-management topics    Responsible User: Geeta Carpenter RN        Task: Educate patient on benefits of regular glucose monitoring    Responsible User: Geeta Carpenter RN        Task: Refer patient to appropriate extended care team member, as needed (Medication  Therapy Management, Behavioral Health, Physical Therapy, etc.)    Responsible User: Geeta Carpenter RN        Task: Discuss diabetes treatment plan with patient    Responsible User: Geeta Carpenter RN        Problem: Diabetes Self-Management Education Needed to Optimize Self-Care Behaviors         Goal: Understand diabetes pathophysiology and disease progression         Task: Provide education on diabetes pathophysiology and disease progression specfic to patient's diabetes type Completed 11/14/2023   Responsible User: Geeta Carpenter RN        Goal: Healthy Eating - follow a healthy eating pattern for diabetes    Note:    My Goal: I will limit fruit to 1 serving at a time    What I need to meet my goal: using a measuring cup, or size of a tennis ball    I plan to meet my goal by this date: 1/9/24         Task: Provide education on portion control and consistency in amount, composition and timing of food intake Completed 11/14/2023   Responsible User: Geeta Carpenter RN        Task: Provide education on managing carbohydrate intake (carbohydrate counting, plate planning method, etc.) Completed 11/14/2023   Responsible User: Geeta Carpenter RN        Task: Provide education on weight management Completed 11/14/2023   Responsible User: Geeta Carpenter RN        Task: Provide education on heart healthy eating    Responsible User: Geeta Carpenter RN        Task: Provide education on eating out    Responsible User: Geeta Carpenter RN        Task: Develop individualized healthy eating plan with patient Completed 11/14/2023   Responsible User: Geeta Carpenter RN        Goal: Being Active - get regular physical activity, working up to at least 150 minutes per week         Task: Provide education on relationship of activity to glucose and precautions to take if at risk for low glucose Completed 11/14/2023   Responsible User: Geeta Carpenter RN        Task: Discuss barriers to physical activity with  patient    Responsible User: Geeta Carpenter RN        Task: Develop physical activity plan with patient    Responsible User: Geeta Carpenter RN        Task: Explore community resources including walking groups, assistance programs, and home videos    Responsible User: Geeta Carpenter RN        Goal: Monitoring - monitor glucose and ketones as directed         Task: Provide education on blood glucose monitoring (purpose, proper technique, frequency, glucose targets, interpreting results, when to use glucose control solution, sharps disposal)    Responsible User: Geeta Carpenter RN        Task: Provide education on continuous glucose monitoring (sensor placement, use of valentin or /reader, understanding glucose trends, alerts and alarms, differences between sensor glucose and blood glucose)    Responsible User: Geeta Carpenter RN        Task: Provide education on ketone monitoring (when to monitor, frequency, etc.)    Responsible User: Geeta Carpenter RN        Goal: Taking Medication - patient is consistently taking medications as directed         Task: Provide education on action of prescribed medication, including when to take and possible side effects    Responsible User: Geeta Carpenter RN        Task: Provide education on insulin and injectable diabetes medications, including administration, storage, site selection and rotation for injection sites    Responsible User: Geeta Carpenter RN        Task: Discuss barriers to medication adherence with patient and provide management technique ideas as appropriate    Responsible User: Geeta Carpenter RN        Task: Provide education on frequency and refill details of medications    Responsible User: Geeta Carpenter RN        Goal: Problem Solving - know how to prevent and manage short-term diabetes complications         Task: Provide education on high blood glucose - causes, signs/symptoms, prevention and treatment    Responsible User: Jayden  Geeta UNDERWOOD RN        Task: Provide education on low blood glucose - causes, signs/symptoms, prevention, treatment, carrying a carbohydrate source at all times, and medical identification    Responsible User: Geeta Carpenter RN        Task: Provide education on safe travel with diabetes    Responsible User: Geeta Carpenter RN        Task: Provide education on how to care for diabetes on sick days    Responsible User: Geeta Carpenter RN        Task: Provide education on when to call a health care provider    Responsible User: Geeta Carpenter RN        Goal: Reducing Risks - know how to prevent and treat long-term diabetes complications         Task: Provide education on major complications of diabetes, prevention, early diagnostic measures and treatment of complications    Responsible User: Geeta Carpenter RN        Task: Provide education on recommended care for dental, eye and foot health    Responsible User: Geeta Carpenter RN        Task: Provide education on Hemoglobin A1c - goals and relationship to blood glucose levels Completed 11/14/2023   Responsible User: Geeta Carpenter RN        Task: Provide education on recommendations for heart health - lipid levels and goals, blood pressure and goals, and aspirin therapy, if indicated    Responsible User: Geeta Carpenter RN        Task: Provide education on tobacco cessation    Responsible User: Geeta Carpenter RN        Goal: Healthy Coping - use available resources to cope with the challenges of managing diabetes         Task: Discuss recognizing feelings about having diabetes    Responsible User: Geeta Carpenter RN        Task: Provide education on the benefits of making appropriate lifestyle changes    Responsible User: Geeta Carpenter RN        Task: Provide education on benefits of utilizing support systems    Responsible User: Geeta Carpenter RN        Task: Discuss methods for coping with stress    Responsible User: Geeta Carpenter RN         Task: Provide education on when to seek professional counseling    Responsible User: Geeta Carpenter RN Vickie Baeyen BSN, RN, PHN, [facility]    Time Spent: 60 minutes  Encounter Type: Individual    Any diabetes medication dose changes were made via the CDE Protocol per the patient's referring provider. A copy of this encounter was shared with the provider.

## 2023-11-14 NOTE — PROGRESS NOTES
Diabetes Self-Management Education & Support    Presents for: Individual review    Type of Service: In Person Visit      ASSESSMENT:  Patient is motivated to make lifestyle changes to improve his diabetes. He would like to focus most on his diet, and avoid medications. He is not interested in testing his blood sugar at this time. He tried testing in the past and it didn't work and he does not want to learn it at this point in his life.      Discussed pathophysiology of diabetes, the difference between Type 1 and 2 DM.  Reviewed carbohydrate sources, portion control and the benefit of spreading intake throughout the day. Encouraged 3 meals a day, watch portions, refer to the plate planner and provided snack ideas. Reviewed benefits of exercise to help with better blood glucose utilization.    Patient's most recent   Lab Results   Component Value Date    A1C 6.8 09/06/2023    A1C 5.9 09/16/2020     is meeting goal of <7.0      PLAN  Meal Plan Recommendation: eat 3 meals a day, have small snacks between meals, if needed, use portion control, use plate planning method, add vegetables every day, and refer to the snack ideas.   - Avoid sugar sweetened cereal  - Limit Orange juice to 4 oz a day  - Avoid cookies at breakfast   - Limit your cookies to - 2 a day  - Fruit - limit to 1 serving at a time ( tennis ball size fruit or small banana)  - Limit black licorice to 2 pieces  - Limit ice cream to 1/2 cup    Exercise / activity plan: continue with your activity. Remember, activity helps control blood sugar.    Check blood sugars: consider in the future if your A1c doesn't improve.     Follow up:  Follow-up diabetes education appointment scheduled on 1/9/24 at 9:15am.     See Care Plan for co-developed, patient-state behavior change goals.  AVS provided for patient today.    Education Materials Provided:  [facility] Understanding Diabetes Booklet, My Plate Planner, and snack ideas      SUBJECTIVE/OBJECTIVE:  Presents  "for: Individual review  Accompanied by: Self  Diabetes education in the past 24mo: No  Focus of Visit: Patient Unsure  Diabetes type: Type 2  Disease course: Worsening  How confident are you filling out medical forms by yourself:: Somewhat  Transportation concerns: No  Difficulty affording diabetes medication?: No  Difficulty affording diabetes testing supplies?: No  Other concerns:: None  Cultural Influences/Ethnic Background:  Not  or     Diabetes Symptoms & Complications:  Fatigue: No  Neuropathy: No  Polydipsia: No  Polyphagia: No  Polyuria: No  Visual change: No  Slow healing wounds: No  Symptom course: Stable  Weight trend: Stable  Complications assessed today?: Yes  Autonomic neuropathy: No  CVA: No  Heart disease: No  Nephropathy: No  Peripheral neuropathy: No  Peripheral Vascular Disease: No  Retinopathy: No    Patient Problem List and Family Medical History reviewed for relevant medical history, current medical status, and diabetes risk factors.    Vitals:  There were no vitals taken for this visit.  Estimated body mass index is 37.03 kg/m  as calculated from the following:    Height as of 10/17/23: 1.778 m (5' 10\").    Weight as of 10/17/23: 117.1 kg (258 lb 1.6 oz).   Last 3 BP:   BP Readings from Last 3 Encounters:   10/17/23 (!) 141/80   10/12/23 (!) 164/83   10/05/23 136/80       History   Smoking Status    Never   Smokeless Tobacco    Never       Labs:  Lab Results   Component Value Date    A1C 6.8 09/06/2023    A1C 5.9 09/16/2020     Lab Results   Component Value Date     10/02/2023     09/22/2023     09/21/2022     09/16/2020     Lab Results   Component Value Date    LDL 31 09/16/2021    LDL 29 11/15/2019     HDL Cholesterol   Date Value Ref Range Status   11/15/2019 75 >39 mg/dL Final     Direct Measure HDL   Date Value Ref Range Status   09/16/2021 75 >=40 mg/dL Final   ]  GFR Estimate   Date Value Ref Range Status   10/04/2023 89 >60 mL/min/1.73m2 Final " "  09/16/2020 82 >60 mL/min/[1.73_m2] Final     Comment:     Non  GFR Calc  Starting 12/18/2018, serum creatinine based estimated GFR (eGFR) will be   calculated using the Chronic Kidney Disease Epidemiology Collaboration   (CKD-EPI) equation.       GFR, ESTIMATED POCT   Date Value Ref Range Status   08/22/2023 >60 >60 mL/min/1.73m2 Final     GFR Estimate If Black   Date Value Ref Range Status   09/16/2020 >90 >60 mL/min/[1.73_m2] Final     Comment:      GFR Calc  Starting 12/18/2018, serum creatinine based estimated GFR (eGFR) will be   calculated using the Chronic Kidney Disease Epidemiology Collaboration   (CKD-EPI) equation.       Lab Results   Component Value Date    CR 0.72 10/04/2023    CR 0.80 09/16/2020     No results found for: \"MICROALBUMIN\"    Healthy Eating:  Healthy Eating Assessed Today: Yes  Cultural/Muslim diet restrictions?: No  Meal planning/habits: None  Breakfast: cereal with milk, OJ, ham, cookies  Lunch: sandwich Or apple/banana  Dinner: mexican meal- ground beef/chips, sauce, sour cream OR hamburger with bun, baked bean/ potatoes  Or chicken wiht cream of celery soup, potatoes, green beans carrots.  OR salad, steak  Snacks: peanuts, chips, cookies, carrot cake  Beverages: Water, Juice, Alcohol, Other, Coffee (beer, mike water, occassional regular pepsi)  Has patient met with a dietitian in the past?: No    Being Active:  Being Active Assessed Today: Yes  Exercise:: Yes (stretching type activity, le lifts, walk in house, squats, chair exercises)  Days per week of moderate to strenuous exercise (like a brisk walk): 7  How intense was your typical exercise? : Light (like stretching or slow walking)  Barrier to exercise: Physical limitation    Monitoring:  Monitoring Assessed Today: Yes  Did patient bring glucose meter to appointment? : No (has a meter, tried testing in the past but didn't go well. Not interested in testing blood sugar at home.)    Taking " Medications:  Taking Medication Assessed Today: Yes  Current Treatments: None    Problem Solving:  Problem Solving Assessed Today: Yes  Is the patient at risk for hypoglycemia?: No      Reducing Risks:  Reducing Risks Assessed Today: Yes  Diabetes Risks: Age over 45 years, Sedentary Lifestyle  CAD Risks: Sedentary lifestyle, Diabetes Mellitus, Male sex, Obesity, Hypertension  Has dilated eye exam at least once a year?: No  Sees dentist every 6 months?: No  Feet checked by healthcare provider in the last year?: No    Healthy Coping:  Healthy Coping Assessed Today: Yes  Emotional response to diabetes: Ready to learn  Informal Support system:: Family  Stage of change: PREPARATION (Decided to change - considering how)  Patient Activation Measure Survey Score:      5/18/2011    11:00 AM   YEHUDA Score (Last Two)   YEHUDA Raw Score 43   Activation Score 68.5   YEHUDA Level 4         Care Plan and Education Provided:  Care Plan: Diabetes   Updates made by Geeta Carpenter RN since 11/14/2023 12:00 AM        Problem: HbA1C Not In Goal         Goal: Establish Regular Follow-Ups with PCP         Task: Discuss with PCP the recommended timing for patient's next follow up visit(s)    Responsible User: Geeta Carpenter RN        Task: Discuss schedule for PCP visits with patient    Responsible User: Geeta Carpenter RN        Goal: Get HbA1C Level in Goal         Task: Educate patient on diabetes education self-management topics    Responsible User: Geeta Carpenter RN        Task: Educate patient on benefits of regular glucose monitoring    Responsible User: Geeta Carpenter RN        Task: Refer patient to appropriate extended care team member, as needed (Medication Therapy Management, Behavioral Health, Physical Therapy, etc.)    Responsible User: Geeta Carpenter RN        Task: Discuss diabetes treatment plan with patient    Responsible User: Geeta Carpenter RN        Problem: Diabetes Self-Management Education Needed to  Optimize Self-Care Behaviors         Goal: Understand diabetes pathophysiology and disease progression         Task: Provide education on diabetes pathophysiology and disease progression specfic to patient's diabetes type Completed 11/14/2023   Responsible User: Geeta Carpenter RN        Goal: Healthy Eating - follow a healthy eating pattern for diabetes    Note:    My Goal: I will limit fruit to 1 serving at a time    What I need to meet my goal: using a measuring cup, or size of a tennis ball    I plan to meet my goal by this date: 1/9/24         Task: Provide education on portion control and consistency in amount, composition and timing of food intake Completed 11/14/2023   Responsible User: Geeta Carpenter RN        Task: Provide education on managing carbohydrate intake (carbohydrate counting, plate planning method, etc.) Completed 11/14/2023   Responsible User: Geeta Carpenter RN        Task: Provide education on weight management Completed 11/14/2023   Responsible User: Geeta Carpenter RN        Task: Provide education on heart healthy eating    Responsible User: Geeta Carpenter RN        Task: Provide education on eating out    Responsible User: Geeta Carpenter RN        Task: Develop individualized healthy eating plan with patient Completed 11/14/2023   Responsible User: Geeta Carpenter RN        Goal: Being Active - get regular physical activity, working up to at least 150 minutes per week         Task: Provide education on relationship of activity to glucose and precautions to take if at risk for low glucose Completed 11/14/2023   Responsible User: Geeta Carpenter RN        Task: Discuss barriers to physical activity with patient    Responsible User: Geeta Carpenter RN        Task: Develop physical activity plan with patient    Responsible User: Geeta Carpenter RN        Task: Explore community resources including walking groups, assistance programs, and home videos    Responsible  User: Geeta Carpenter RN        Goal: Monitoring - monitor glucose and ketones as directed         Task: Provide education on blood glucose monitoring (purpose, proper technique, frequency, glucose targets, interpreting results, when to use glucose control solution, sharps disposal)    Responsible User: Geeta Carpenter RN        Task: Provide education on continuous glucose monitoring (sensor placement, use of valentin or /reader, understanding glucose trends, alerts and alarms, differences between sensor glucose and blood glucose)    Responsible User: Geeta Carpenter RN        Task: Provide education on ketone monitoring (when to monitor, frequency, etc.)    Responsible User: Geeta Carpenter RN        Goal: Taking Medication - patient is consistently taking medications as directed         Task: Provide education on action of prescribed medication, including when to take and possible side effects    Responsible User: Geeta Carpenter RN        Task: Provide education on insulin and injectable diabetes medications, including administration, storage, site selection and rotation for injection sites    Responsible User: Geeta Carpenter RN        Task: Discuss barriers to medication adherence with patient and provide management technique ideas as appropriate    Responsible User: Geeta Carpenter RN        Task: Provide education on frequency and refill details of medications    Responsible User: Geeta Carpenter RN        Goal: Problem Solving - know how to prevent and manage short-term diabetes complications         Task: Provide education on high blood glucose - causes, signs/symptoms, prevention and treatment    Responsible User: Geeta Carpenter RN        Task: Provide education on low blood glucose - causes, signs/symptoms, prevention, treatment, carrying a carbohydrate source at all times, and medical identification    Responsible User: Geeta Carpenter RN        Task: Provide education on safe  travel with diabetes    Responsible User: Geeta Carpenter RN        Task: Provide education on how to care for diabetes on sick days    Responsible User: Geeta Carpenter RN        Task: Provide education on when to call a health care provider    Responsible User: Geeta Carpenter RN        Goal: Reducing Risks - know how to prevent and treat long-term diabetes complications         Task: Provide education on major complications of diabetes, prevention, early diagnostic measures and treatment of complications    Responsible User: Geeta Carpenter RN        Task: Provide education on recommended care for dental, eye and foot health    Responsible User: Geeta Carpenter RN        Task: Provide education on Hemoglobin A1c - goals and relationship to blood glucose levels Completed 11/14/2023   Responsible User: Geeta Carpenter RN        Task: Provide education on recommendations for heart health - lipid levels and goals, blood pressure and goals, and aspirin therapy, if indicated    Responsible User: Geeta Carpenter RN        Task: Provide education on tobacco cessation    Responsible User: Geeta Carpenter RN        Goal: Healthy Coping - use available resources to cope with the challenges of managing diabetes         Task: Discuss recognizing feelings about having diabetes    Responsible User: Geeta Carpenter RN        Task: Provide education on the benefits of making appropriate lifestyle changes    Responsible User: Geeta Carpenter RN        Task: Provide education on benefits of utilizing support systems    Responsible User: Geeta Carpenter RN        Task: Discuss methods for coping with stress    Responsible User: Geeta Carpenter RN        Task: Provide education on when to seek professional counseling    Responsible User: Geeta Carpenter RN Vickie Baeyen BSN, RN, PHN, [facility]    Time Spent: 60 minutes  Encounter Type: Individual    Any diabetes medication dose changes were made via the  CDE Protocol per the patient's referring provider. A copy of this encounter was shared with the provider.

## 2023-11-15 ENCOUNTER — NURSE TRIAGE (OUTPATIENT)
Dept: FAMILY MEDICINE | Facility: CLINIC | Age: 88
End: 2023-11-15

## 2023-11-15 ENCOUNTER — OFFICE VISIT (OUTPATIENT)
Dept: FAMILY MEDICINE | Facility: CLINIC | Age: 88
End: 2023-11-15
Payer: MEDICARE

## 2023-11-15 VITALS
DIASTOLIC BLOOD PRESSURE: 76 MMHG | TEMPERATURE: 98.3 F | SYSTOLIC BLOOD PRESSURE: 145 MMHG | HEART RATE: 60 BPM | OXYGEN SATURATION: 98 % | WEIGHT: 265.2 LBS | RESPIRATION RATE: 18 BRPM | HEIGHT: 70 IN | BODY MASS INDEX: 37.97 KG/M2

## 2023-11-15 DIAGNOSIS — R60.0 BILATERAL LEG EDEMA: ICD-10-CM

## 2023-11-15 DIAGNOSIS — I10 ESSENTIAL HYPERTENSION: Primary | ICD-10-CM

## 2023-11-15 PROCEDURE — 99214 OFFICE O/P EST MOD 30 MIN: CPT

## 2023-11-15 RX ORDER — LOSARTAN POTASSIUM 25 MG/1
25 TABLET ORAL DAILY
Qty: 60 TABLET | Refills: 0 | Status: SHIPPED | OUTPATIENT
Start: 2023-11-15 | End: 2023-12-27

## 2023-11-15 RX ORDER — RESPIRATORY SYNCYTIAL VIRUS VACCINE 120MCG/0.5
0.5 KIT INTRAMUSCULAR ONCE
Qty: 1 EACH | Refills: 0 | Status: CANCELLED | OUTPATIENT
Start: 2023-11-15 | End: 2023-11-15

## 2023-11-15 NOTE — TELEPHONE ENCOUNTER
S-(situation):   -Pt calls reporting increased bilateral leg swelling since increasing dose of amlodipine     B-(background):   -Amlodipine increased to 10mg daily at 10/12/23 OV with Dr. Strange, pt reports he began 10mg dose last weekend   -Increased leg swelling onset: approx 5 days ago   -Confirms pt is taking Furosemide and wearing compression stockings daily, only elevates legs at night   -Usually wears knee brace on right knee daily for stabilization, but has been unable to wear due to swelling, tried ace bandage today   -Home care nurse visited pt on Mon 11/13, expressed concern    A-(assessment):   -Reports: increased bilateral leg swelling (foot to knee, right side worse), bilateral leg redness (pt states this is his baseline)  -Unable to assess pitting edema due to pt wearing compression stockings, does not want to take off   -Denies: new or worsening pain, SOB, fever, difficulty urinating or decrease in urinary frequency    R-(recommendations):   -Scheduled pt for OV with Bacilio Bradford NP today 11/15/23 at 3:40pm    -Advised pt continue to wear compression stockings and elevate legs throughout the day  -Instructed pt to call back if new or worsening symptoms [phone]    Patient was given an opportunity to ask questions, verbalized understanding of plan, and is agreeable.     Routing to Bacilio Bradford NP and Dr. Strange as FYI     Reason for Disposition   Thigh, calf, or ankle swelling in both legs, but one side is definitely more swollen (Exception: Longstanding difference between legs.)   MODERATE swelling of both ankles (e.g., swelling extends up to the knees) AND new-onset or worsening    Additional Information   Negative: Sounds like a life-threatening emergency to the triager   Negative: Chest pain   Negative: Followed an insect bite and has localized swelling (e.g., small area of puffy or swollen skin)   Negative: Followed a knee injury   Negative: Ankle or foot injury   Negative: Pregnant with leg  swelling or edema   Negative: Difficulty breathing at rest   Negative: Entire foot is cool or blue in comparison to other side   Negative: SEVERE swelling (e.g., swelling extends above knee, entire leg is swollen, weeping fluid)   Negative: Cast on leg or ankle and has increasing pain   Negative: Can't walk or can barely stand (new-onset)   Negative: Fever and red area (or area very tender to touch)   Negative: Patient sounds very sick or weak to the triager   Negative: Swelling of face, arm or hands  (Exception: Slight puffiness of fingers during hot weather.)   Negative: Pregnant 20 or more weeks and sudden weight gain (i.e., > 2 lbs, 1 kg in one week)   Negative: Thigh or calf pain and only 1 side and present > 1 hour   Negative: Thigh, calf, or ankle swelling in only one leg    Protocols used: Leg Swelling and Edema-KJ-KARL ALMANZAR RN  Patient Advocate Liaison - PAL RN  [facility]  [phone]

## 2023-11-15 NOTE — TELEPHONE ENCOUNTER
Provider Response to 2nd Level Triage Request    I have reviewed the RN documentation. My recommendation is:  Stop amlodipine  Virtual visit next week  Obi Strange MD

## 2023-11-15 NOTE — PROGRESS NOTES
Assessment & Plan     (I10) Essential hypertension  (primary encounter diagnosis)  (R60.0) Bilateral leg edema  Comment: borderline in office today. Patient stopped amlodipine. Patient has history of Losartan use but does not indicate intolerance. Will restart Losartan. BP cuff ordered as well. Instructions outlined in patient instructions. Will check in at 2 weeks, follow up appointment in 1 month w/ labs. Discussed medication risks and benefits of Losartan with patient in detail with patient verbal understanding. Patient fully understands and is agreeable with plan of care, at this point patient will follow up as needed unless acute concerns arise in the meantime.  Plan: losartan (COZAAR) 25 MG tablet, Home Blood         Pressure Monitor Order for DME - ONLY FOR DME,         PRIMARY CARE FOLLOW-UP SCHEDULING, Basic         metabolic panel  (Ca, Cl, CO2, Creat, Gluc, K,         Na, BUN)    30 minutes spent by me on the date of the encounter doing chart review, history and exam, documentation and further activities per the note       1 month follow up     MAKAYLA Loco [facility]    Richi Quezada is a 88 year old, presenting for the following health issues:  Swelling        11/15/2023     3:58 PM   Additional Questions   Roomed by Indy RIVERA     Concern - swelling  Onset: 2 days ago  Description: please review telephone encounter from today. Patient states he had a nurse go to his house and noticed both legs were swollen. He has no pain and no fevers. He states it was worse yesterday.    -*See nurse encounter from today*  -Patient was in hospital and was started on amlodipine  -Was increased to 10 mg by PCP  -Noticed more leg swelling as of late   -Denies any cardiac red flags    Review of Systems   Constitutional, cardiovascular, pulmonary, gi and gu systems are negative, except as otherwise noted.      Objective    BP (!) 145/76 (BP Location: Right arm, Patient  "Position: Sitting, Cuff Size: Adult Large)   Pulse 60   Temp 98.3  F (36.8  C) (Oral)   Resp 18   Ht 1.778 m (5' 10\")   Wt 120.3 kg (265 lb 3.2 oz)   SpO2 98%   BMI 38.05 kg/m    Body mass index is 38.05 kg/m .  Physical Exam  Vitals and nursing note reviewed.   Constitutional:       General: He is not in acute distress.     Appearance: Normal appearance. He is obese. He is not ill-appearing.   Cardiovascular:      Rate and Rhythm: Normal rate and regular rhythm.      Heart sounds: No murmur heard.     No friction rub. No gallop.   Pulmonary:      Effort: Pulmonary effort is normal. No respiratory distress.      Breath sounds: No wheezing, rhonchi or rales.   Musculoskeletal:      Right lower leg: Edema present.      Left lower leg: Edema present.   Skin:     General: Skin is warm and dry.   Neurological:      Mental Status: He is alert.   Psychiatric:         Mood and Affect: Mood normal.         Behavior: Behavior normal. Behavior is cooperative.         Thought Content: Thought content normal.         Judgment: Judgment normal.                "

## 2023-11-15 NOTE — PATIENT INSTRUCTIONS
Start losartan  -would like you to check BP at least 3 times/week  -take at least 1-2 hours after medication  -start losartan at 25 mg  -if noting BP is >140/90 routinely after 1-2 weeks of taking 25 mg losartan, lets increase your losartan to 50 mg if this is the case.  -follow up in 1 month for recheck and labs.

## 2023-11-21 ENCOUNTER — HOSPITAL ENCOUNTER (OUTPATIENT)
Dept: PET IMAGING | Facility: CLINIC | Age: 88
Discharge: HOME | End: 2023-11-21
Attending: INTERNAL MEDICINE | Admitting: INTERNAL MEDICINE
Payer: MEDICARE

## 2023-11-21 DIAGNOSIS — C49.A0 MALIGNANT GASTROINTESTINAL STROMAL TUMOR, UNSPECIFIED SITE (H): ICD-10-CM

## 2023-11-21 DIAGNOSIS — C16.2 MALIGNANT NEOPLASM OF BODY OF STOMACH (H): ICD-10-CM

## 2023-11-21 PROCEDURE — A9552 F18 FDG: HCPCS | Performed by: INTERNAL MEDICINE

## 2023-11-21 PROCEDURE — 78816 PET IMAGE W/CT FULL BODY: CPT | Mod: MG,PI

## 2023-11-21 PROCEDURE — 343N000001 HC RX 343: Performed by: INTERNAL MEDICINE

## 2023-11-21 RX ADMIN — FLUDEOXYGLUCOSE F-18 11.81 MILLICURIE: 500 INJECTION, SOLUTION INTRAVENOUS at 09:41

## 2023-11-24 ENCOUNTER — ONCOLOGY VISIT (OUTPATIENT)
Dept: ONCOLOGY | Facility: CLINIC | Age: 88
End: 2023-11-24
Attending: INTERNAL MEDICINE
Payer: MEDICARE

## 2023-11-24 VITALS
TEMPERATURE: 98.1 F | WEIGHT: 260.5 LBS | OXYGEN SATURATION: 100 % | HEIGHT: 70 IN | BODY MASS INDEX: 37.29 KG/M2 | DIASTOLIC BLOOD PRESSURE: 75 MMHG | SYSTOLIC BLOOD PRESSURE: 152 MMHG | RESPIRATION RATE: 16 BRPM | HEART RATE: 57 BPM

## 2023-11-24 DIAGNOSIS — C49.A0 MALIGNANT GASTROINTESTINAL STROMAL TUMOR, UNSPECIFIED SITE (H): Primary | ICD-10-CM

## 2023-11-24 DIAGNOSIS — C49.A0 MALIGNANT GASTROINTESTINAL STROMAL TUMOR, UNSPECIFIED SITE (H): ICD-10-CM

## 2023-11-24 LAB
ALBUMIN SERPL BCG-MCNC: 4.5 G/DL (ref 3.5–5.2)
ALP SERPL-CCNC: 64 U/L (ref 40–150)
ALT SERPL W P-5'-P-CCNC: 21 U/L (ref 0–70)
ANION GAP SERPL CALCULATED.3IONS-SCNC: 9 MMOL/L (ref 7–15)
AST SERPL W P-5'-P-CCNC: 20 U/L (ref 0–45)
BASOPHILS # BLD AUTO: 0 10E3/UL (ref 0–0.2)
BASOPHILS NFR BLD AUTO: 1 %
BILIRUB SERPL-MCNC: 1.2 MG/DL
BUN SERPL-MCNC: 14.4 MG/DL (ref 8–23)
CALCIUM SERPL-MCNC: 9.1 MG/DL (ref 8.8–10.2)
CHLORIDE SERPL-SCNC: 103 MMOL/L (ref 98–107)
CREAT SERPL-MCNC: 0.79 MG/DL (ref 0.67–1.17)
DEPRECATED HCO3 PLAS-SCNC: 26 MMOL/L (ref 22–29)
EGFRCR SERPLBLD CKD-EPI 2021: 86 ML/MIN/1.73M2
EOSINOPHIL # BLD AUTO: 0.3 10E3/UL (ref 0–0.7)
EOSINOPHIL NFR BLD AUTO: 4 %
ERYTHROCYTE [DISTWIDTH] IN BLOOD BY AUTOMATED COUNT: 15.1 % (ref 10–15)
GLUCOSE SERPL-MCNC: 108 MG/DL (ref 70–99)
HCT VFR BLD AUTO: 43 % (ref 40–53)
HGB BLD-MCNC: 14 G/DL (ref 13.3–17.7)
IMM GRANULOCYTES # BLD: 0 10E3/UL
IMM GRANULOCYTES NFR BLD: 1 %
LYMPHOCYTES # BLD AUTO: 1.4 10E3/UL (ref 0.8–5.3)
LYMPHOCYTES NFR BLD AUTO: 21 %
MCH RBC QN AUTO: 28.6 PG (ref 26.5–33)
MCHC RBC AUTO-ENTMCNC: 32.6 G/DL (ref 31.5–36.5)
MCV RBC AUTO: 88 FL (ref 78–100)
MONOCYTES # BLD AUTO: 0.8 10E3/UL (ref 0–1.3)
MONOCYTES NFR BLD AUTO: 12 %
NEUTROPHILS # BLD AUTO: 4.1 10E3/UL (ref 1.6–8.3)
NEUTROPHILS NFR BLD AUTO: 61 %
NRBC # BLD AUTO: 0 10E3/UL
NRBC BLD AUTO-RTO: 0 /100
PLATELET # BLD AUTO: 211 10E3/UL (ref 150–450)
POTASSIUM SERPL-SCNC: 4 MMOL/L (ref 3.4–5.3)
PROT SERPL-MCNC: 6.9 G/DL (ref 6.4–8.3)
RBC # BLD AUTO: 4.89 10E6/UL (ref 4.4–5.9)
SODIUM SERPL-SCNC: 138 MMOL/L (ref 135–145)
WBC # BLD AUTO: 6.6 10E3/UL (ref 4–11)

## 2023-11-24 PROCEDURE — 36415 COLL VENOUS BLD VENIPUNCTURE: CPT

## 2023-11-24 PROCEDURE — 80053 COMPREHEN METABOLIC PANEL: CPT | Performed by: INTERNAL MEDICINE

## 2023-11-24 PROCEDURE — G0463 HOSPITAL OUTPT CLINIC VISIT: HCPCS | Performed by: INTERNAL MEDICINE

## 2023-11-24 PROCEDURE — 99214 OFFICE O/P EST MOD 30 MIN: CPT | Performed by: INTERNAL MEDICINE

## 2023-11-24 PROCEDURE — 85025 COMPLETE CBC W/AUTO DIFF WBC: CPT | Performed by: INTERNAL MEDICINE

## 2023-11-24 RX ORDER — NEOMYCIN SULFATE, POLYMYXIN B SULFATE, AND DEXAMETHASONE 3.5; 10000; 1 MG/G; [USP'U]/G; MG/G
0.5 OINTMENT OPHTHALMIC PRN
COMMUNITY
End: 2024-07-10

## 2023-11-24 ASSESSMENT — PAIN SCALES - GENERAL: PAINLEVEL: NO PAIN (0)

## 2023-11-24 NOTE — PROGRESS NOTES
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 PMH is significant for anemia, arthritis, BPH, cheilosis, chronic headache, coginitive impairment, DM2, CKD, external hemorrhoids, SWATHI, gout, hammer toe, heart murmur, right knee arhtroplasty, HLD, rosacea, and RENAE.     Patient has history of colon resection with reanastomosis due to high grade polyps 10-12 years ago.    He has had recurrent abdominal pain with GIB.    EGD 5/25/23: Normal esophagus, single submucosal papule (nodule) found in the stomach.    Colonoscopy 5/25/23: Patent end-to-side ileo-colonic anastomosis, diverticulosis with bleed.    Colonoscopy 6/12/23: end-to-end ileo-colonic anastomosis. Green stool coming from ileum. Blood in the recto-sigmoid colon in the descending colon and in the transverse colon. Diverticulosis.     EUS 7/13/23: 2.7 cm gastric submucosal nodule. Benign appearing small pancreatic cyst. Cholelithiasis without choledocholithiasis. Path returned as gastrointestinal stromal tumor, low mitotic activity <1/50 HPF.    CT A/P 8/22/23: exophytic 2 cm solid mass arising from the lesser curvature of the stomach, compatible with known GIST. No definite evidence of metastatic disease. Subcentimeter hypoattenuating lesions of the liver too small to characterize. Cholelithiasis, hepatomegaly with hepatic steatosis, colonic diverticulosis, and prostatomegaly.    On 9/20/23, he underwent robotic assisted diagnostic lap with resection of gastric GIST and omental biopsy with Dr. Ny. 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 Omental biopsy and resection were negative for malignancy. (pT2Nx, 2,8 cm greatest dimension, 0 mitoses/5mm2, Grade 1 low risk, no necrosis,  all margins negative, no lymph nodes submited, Positive KIT/DOG1).    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    Mother: Colon cancer.  Father: CVA.  Brother: Unknown malignancy.      INTERVAL HISTORY:  No acute events. He enjoyed the holiday with his family.    REVIEW OF SYSTEMS:   A 14 point ROS was reviewed with pertinent positives and negatives in the HPI.        HOME MEDICATIONS:  Current Outpatient Medications   Medication Sig Dispense Refill    acetaminophen (TYLENOL) 500 MG tablet Take 500-1,000 mg by mouth every 6 hours as needed for mild pain      atorvastatin (LIPITOR) 40 MG tablet Take 20 mg by mouth every evening      CINNAMON PO Take 1 tablet by mouth every evening      diclofenac (VOLTAREN) 1 % topical gel Apply 2 g topically 2 times daily      doxycycline hyclate (PERIOSTAT) 20 MG tablet Take 1 tablet (20 mg) by mouth daily      ferrous sulfate (FEROSUL) 325 (65 Fe) MG tablet Take 1 tablet (325 mg) by mouth daily (with breakfast)      fish oil-omega-3 fatty acids 1000 MG capsule Take 1 g by mouth daily      Flaxseed, Linseed, (FLAXSEED OIL) 1000 MG CAPS Take 1,000 mg by mouth every evening      furosemide (LASIX) 20 MG tablet TAKE 1 TABLET(20 MG) BY MOUTH TWICE DAILY 180 tablet 1    glucosamine-chondroitin 500-400 MG CAPS per capsule Take 1 capsule by mouth daily      losartan (COZAAR) 25 MG tablet Take 1 tablet (25 mg) by mouth daily 60 tablet 0    metroNIDAZOLE (METROGEL) 0.75 % external gel APPLY THIN LAYER TO FACE TWICE A DAY FOR ROSACEA      Multiple Vitamins-Minerals (MULTIVITAMIN ADULT PO) Take 1 tablet by mouth daily Reported on 5/12/2017      order for DME 1: Gradient Compression Wraps; 2: Cast Boots; 3: BLE knee or thigh high 20-30 mm Hg compression stocking; 4: BLE knee or thigh high custom compression stocking; 5: Alternative BLE knee high or thigh compression garments (velcro/buckling) 1 each 0    order for DME Equipment being  "ordered: Walker Wheels () and Walker ()  Treatment Diagnosis: Impaired functional mobility 1 each 0    pramox-pe-glycerin-petrolatum (PREPARATION H) 1-0.25-14.4-15 % CREA cream Place rectally 4 times daily as needed for hemorrhoids Use as directed      pramoxine (PRAX) 1 % LOTN lotion Apply topically 3 times daily to areas that are itchy 237 mL 0    tamsulosin (FLOMAX) 0.4 MG capsule Take 1 capsule (0.4 mg) by mouth daily (Patient taking differently: Take 0.4 mg by mouth every evening) 90 capsule 3         ALLERGIES:  Allergies   Allergen Reactions    Levetiracetam [Keppra] Rash    Oxcarbazepine [Oxcarbazepine] Rash         PAST MEDICAL HISTORY:  Past Medical History:   Diagnosis Date    Acute suppurative arthritis (H)     Acute, but ill-defined, cerebrovascular disease     Anemia due to blood loss, acute 05/25/2023    Ankle fracture 12/28/2018    Arthritis     Benign prostatic hyperplasia with urinary frequency 03/29/2022    Cheilosis 09/28/2018    Chronic headaches     Closed right ankle fracture 12/29/2018    Cognitive attention deficit 09/16/2021    COVID-19 vaccination refused 09/26/2023    Diabetes (H)     \"Pre-diabetes\"    Dislocation of right patella 09/16/2020    Elevated serum creatinine 11/13/2019    GFR Estimate Date Value Ref Range Status 09/30/2019 84 >60 mL/min/[1.73_m2] Final   Comment:   Non  GFR Calc Starting 12/18/2018, serum creatinine based estimated GFR (eGFR) will be  calculated using the Chronic Kidney Disease Epidemiology Collaboration  (CKD-EPI) equation.        Encounter for immunization 09/21/2022    Essential hypertension 10/12/2023    External hemorrhoids 09/21/2022    Fall 03/08/2023    Fe deficiency anemia 07/10/2023    Fecal incontinence 09/16/2020    Gastric stromal tumor (H) 09/06/2023    Glucose intolerance (impaired glucose tolerance) 05/31/2013    Gout, unspecified     Grieving 09/16/2021    Hammer toe of left foot 10/21/2016    Heart murmur 09/06/2023 "    Hematuria     History of arthroplasty of right knee 08/17/2017    History of blood transfusion     Hyperlipidemia LDL goal <160 11/13/2019    Hypersomnia 03/07/2023    Immunization deficiency 10/12/2023    Intrinsic eczema 10/12/2023    Left foot pain 09/30/2019    Left knee pain, unspecified chronicity 05/08/2017    Lentigo maligna (H)     Malignant neoplasm of rectosigmoid junction (H)     Morbid obesity due to excess calories (H) 10/21/2016    RENAE (obstructive sleep apnea) 03/08/2023    Pedal edema 01/18/2018    Peripheral polyneuropathy 09/30/2019    Physical deconditioning 03/08/2023    Pre-diabetes 10/21/2016    RBC microcytosis 02/14/2017    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    Right knee pain, unspecified chronicity 05/08/2017    Rosacea 09/28/2018    Syncope     Tubulovillous adenoma of colon     Urinary retention 09/26/2023    Venous stasis dermatitis of both lower extremities 10/21/2016    Xerostomia 09/26/2023         PAST SURGICAL HISTORY:  Past Surgical History:   Procedure Laterality Date    ARTHROPLASTY KNEE Right 6/19/2017    Procedure: ARTHROPLASTY KNEE;  Right total knee arthroplasty, Hammer toe repair toe 2,3,4,5 left foot with osteotomy;  Surgeon: Alec Raymond MD;  Location:  OR    COLONOSCOPY N/A 6/23/2015    Procedure: COMBINED COLONOSCOPY, SINGLE OR MULTIPLE BIOPSY/POLYPECTOMY BY BIOPSY;  Surgeon: Kimberly Alfaro MD;  Location:  GI    COLONOSCOPY N/A 7/30/2015    Procedure: COLONOSCOPY;  Surgeon: Enrique Salazar MD;  Location:  OR    COLONOSCOPY N/A 7/31/2018    Procedure: COLONOSCOPY;  Colonoscopy;  Surgeon: Tiffany Cheema MD;  Location:  GI    COLONOSCOPY N/A 5/26/2023    Procedure: Colonoscopy;  Surgeon: Juan Grimaldo MD;  Location:  GI    COLONOSCOPY N/A 6/15/2023    Procedure: colonoscopy;   no cecum as pt has had partial colectomy;  Surgeon: Sherif Escalona MD;  Location:  GI    DAVINCI PELVIC PROCEDURE N/A 9/20/2023    Procedure: Robot assisted  lysis of adhesions, Resection of gastric gastrointestinal stromal  tumor, omental mass biopsy;  Surgeon: Chino Ny MD;  Location:  OR    ENDOSCOPIC ULTRASOUND UPPER GASTROINTESTINAL TRACT (GI) N/A 7/13/2023    Procedure: Endoscopic ultrasound upper gastrointestinal tract with fine needle aspiration;  Surgeon: Kae Mccall MD;  Location:  OR    ESOPHAGOSCOPY, GASTROSCOPY, DUODENOSCOPY (EGD), COMBINED N/A 5/26/2023    Procedure: Esophagoscopy, gastroscopy, duodenoscopy (EGD), combined;  Surgeon: Juan Grimaldo MD;  Location:  GI    HERNIORRHAPHY EPIGASTRIC  4/27/2012    Procedure:HERNIORRHAPHY EPIGASTRIC; Epigastric Hernia Repair with mesh ; Surgeon:BOLIVAR OLIVEIRA; Location: OR    LAPAROSCOPIC ASSISTED COLECTOMY Right 7/30/2015    Procedure: LAPAROSCOPIC ASSISTED COLECTOMY;  Surgeon: Enrique Salazar MD;  Location:  OR    ORTHOPEDIC SURGERY      lt knee arthroplasty    REALIGN PATELLA Right 10/9/2017    Procedure: REALIGN PATELLA;  Revision right total knee arthroplasty;  Surgeon: Alec Raymond MD;  Location:  OR    REPAIR HAMMER TOE Left 6/19/2017    Procedure: REPAIR HAMMER TOE;  Hammer toe repair toe 2,3,4,5 left foot with osteotomy   ;  Surgeon: Beverly Kmi DPM, Podiatry/Foot and Ankle Surgery;  Location:  OR    SIGMOIDOSCOPY FLEXIBLE  5/29/2013    Procedure: SIGMOIDOSCOPY FLEXIBLE;  SIGMOIDOSCOPY FLEXIBLE (FV) Needs blood sugar ck'd;  Surgeon: Enrique Salazar MD;  Location:  GI    skin cancer excision      ZZC NONSPECIFIC PROCEDURE      right heel surgery; spur removed.         SOCIAL HISTORY:  Social History     Socioeconomic History    Marital status:      Spouse name: Not on file    Number of children: Not on file    Years of education: Not on file    Highest education level: Not on file   Occupational History    Not on file   Tobacco Use    Smoking status: Never     Passive exposure: Never    Smokeless tobacco: Never   Vaping Use    Vaping Use:  "Never used   Substance and Sexual Activity    Alcohol use: Yes     Comment: occ.    Drug use: No    Sexual activity: Yes     Partners: Female   Other Topics Concern    Parent/sibling w/ CABG, MI or angioplasty before 65F 55M? No   Social History Narrative    Not on file     Social Determinants of Health     Financial Resource Strain: Not on file   Food Insecurity: Not on file   Transportation Needs: Not on file   Physical Activity: Not on file   Stress: Not on file   Social Connections: Not on file   Interpersonal Safety: Low Risk  (2023)    Interpersonal Safety     Do you feel physically and emotionally safe where you currently live?: Yes     Within the past 12 months, have you been hit, slapped, kicked or otherwise physically hurt by someone?: No     Within the past 12 months, have you been humiliated or emotionally abused in other ways by your partner or ex-partner?: No   Housing Stability: Not on file         FAMILY HISTORY:  Family History   Problem Relation Age of Onset    Cancer - colorectal Mother     Cerebrovascular Disease Father          PHYSICAL EXAM:  Vital signs:  BP (!) 152/75 (BP Location: Left arm, Patient Position: Sitting, Cuff Size: Adult Large)   Pulse 57   Temp 98.1  F (36.7  C) (Oral)   Resp 16   Ht 1.778 m (5' 10\")   Wt 118.2 kg (260 lb 8 oz)   SpO2 100%   BMI 37.38 kg/m     ECO  GENERAL/CONSTITUTIONAL: No acute distress.  EYES: Pupils are equal, round, and react to light and accommodation. Extraocular movements intact.  No scleral icterus.  GASTROINTESTINAL: Obese, no distention. No pain.  MUSCULOSKELETAL: Warm and well-perfused, no cyanosis, clubbing, or edema.  NEUROLOGIC: Cranial nerves II-XII are intact. Alert, oriented, answers questions appropriately.  INTEGUMENTARY: No rashes or jaundice.      LABS:  Reviewed with patient today.   Latest Reference Range & Units 23 13:31   Sodium 135 - 145 mmol/L 138   Potassium 3.4 - 5.3 mmol/L 4.0   Chloride 98 - 107 mmol/L 103 "   Carbon Dioxide (CO2) 22 - 29 mmol/L 26   Urea Nitrogen 8.0 - 23.0 mg/dL 14.4   Creatinine 0.67 - 1.17 mg/dL 0.79   GFR Estimate >60 mL/min/1.73m2 86   Calcium 8.8 - 10.2 mg/dL 9.1   Anion Gap 7 - 15 mmol/L 9   Albumin 3.5 - 5.2 g/dL 4.5   Protein Total 6.4 - 8.3 g/dL 6.9   Alkaline Phosphatase 40 - 150 U/L 64   ALT 0 - 70 U/L 21   AST 0 - 45 U/L 20   Bilirubin Total <=1.2 mg/dL 1.2   Glucose 70 - 99 mg/dL 108 (H)   WBC 4.0 - 11.0 10e3/uL 6.6   Hemoglobin 13.3 - 17.7 g/dL 14.0   Hematocrit 40.0 - 53.0 % 43.0   Platelet Count 150 - 450 10e3/uL 211   RBC Count 4.40 - 5.90 10e6/uL 4.89   MCV 78 - 100 fL 88   MCH 26.5 - 33.0 pg 28.6   MCHC 31.5 - 36.5 g/dL 32.6   RDW 10.0 - 15.0 % 15.1 (H)   % Neutrophils % 61   % Lymphocytes % 21   % Monocytes % 12   % Eosinophils % 4   % Basophils % 1   Absolute Basophils 0.0 - 0.2 10e3/uL 0.0   Absolute Eosinophils 0.0 - 0.7 10e3/uL 0.3   Absolute Immature Granulocytes <=0.4 10e3/uL 0.0   Absolute Lymphocytes 0.8 - 5.3 10e3/uL 1.4   Absolute Monocytes 0.0 - 1.3 10e3/uL 0.8   % Immature Granulocytes % 1   Absolute Neutrophils 1.6 - 8.3 10e3/uL 4.1   Absolute NRBCs 10e3/uL 0.0   NRBCs per 100 WBC <1 /100 0         PATHOLOGY:  EUS 7/13/23:  Impression:         - 2.7 cm gastric submucosal nodule. Appearance is                             suspicios for GIST. FNA done and pending                             - Benign appearing small pancreatic cyst. Unlikely                             to be clnically significant in this pt due to small                             size and pt's age                             - Cholelithiasis without choledocholithiasis     Final Diagnosis   Specimen A                 Interpretation:                   Gastrointestinal Stromal Tumor. (Please see comment and microscopic description)                    Adequacy:                 Satisfactory for evaluation         Electronically signed by Janeth Rivas MD on 7/18/2023 at 10:33 AM   Comment  [facility]    Based on the size of the lesion (2.7 cm by EUS report), location (stomach) and low mitotic activity ( <1/50 HPF )this lesion would be stratified as Low Risk  for aggressive behavior.  However sampling is limited and ideally risk stratification should be done on the resection specimen.       Case Report   Surgical Pathology Report                         Case: [id number]                                   Authorizing Provider:  Chino Ny MD       Collected:           09/20/2023 09:32 AM           Ordering Location:     [facility]   Received:            09/20/2023 09:34 AM                                  Main OR                                                                       Pathologist:           Mason Lyn MD                                                             Intraop:               Eileen Radford                                                               Specimens:   A) - Omentum, Omental biopsy                                                                        B) - Stomach, stromal tumor                                                                          C) - Omentum, Omental mass                                                                Final Diagnosis   A.  Omentum, biopsy:  -- Fat necrosis with hyalinization and calcification.  Negative for malignancy.     B.  Stomach, lesser curvature, exophytic mass, resection:  -- Gastrointestinal stromal tumor, spindle cell type.  -- See cancer staging synoptic for additional details.     C.  Omentum, resection:  -- Encapsulated fat necrosis with calcifications.  Negative for malignancy.         Electronically signed by Mason Lyn MD on 9/22/2023 at 11:25 AM   Comment    And NGS mutational panel is being performed per reflex testing guidelines, and will be reported separately by the performing laboratory.   Synoptic Checklist   GASTROINTESTINAL STROMAL TUMOR (GIST): Resection   8th Edition - Protocol posted:  12/14/2022GASTROINTESTINAL STROMAL TUMOR (GIST): RESECTION - All Specimens  CLINICAL   Preresection Treatment  No known preresection therapy   SPECIMEN   Procedure  Local excision   TUMOR   Tumor Focality  Unifocal   Tumor Site  Stomach: posterior aspect of lower curvature     Histologic Type  Gastrointestinal stromal tumor, spindle cell type   Tumor Size  Greatest Dimension (Centimeters): 2.8 cm   Additional Dimension (Centimeters)  2.2 cm     1.8 cm   Mitotic Rate  0 mitoses per 5 mm2   Histologic Grade  G1, low grade   Necrosis  Not identified   Treatment Effect  No known presurgical therapy   Risk Assessment  Very low risk   MARGINS   Margin Status  All margins negative for GIST   Closest Margin(s) to GIST  Deep   Distance from GIST to Closest Margin  1 mm   REGIONAL LYMPH NODES   Regional Lymph Node Status  Not applicable (no regional lymph nodes submitted or found)   pTNM CLASSIFICATION (AJCC 8th Edition)   Reporting of pT, pN, and (when applicable) pM categories is based on information available to the pathologist at the time the report is issued. As per the AJCC (Chapter 1, 8th Ed.) it is the managing physician's responsibility to establish the final pathologic stage based upon all pertinent information, including but potentially not limited to this pathology report.   pT Category  pT2   pN Category  pN not assigned (no nodes submitted or found)   SPECIAL STUDIES   Immunohistochemical Studies     KIT ()  Positive   Immunohistochemical Studies     DOG1 (ANO1)  Positive   Molecular Genetic Studies  Pending        Specimen Description    Tissue: Fixed slides-Collected 9/20/23, Stomach, [id number]  [id number] B4   Significant Results    Detected Alterations of Known or Potential Pathogenicity: KIT [id number]          IMAGING: Reviewed with patient today.  CT A/P 8/22/23:  IMPRESSION:   1.  Exophytic 2 cm solid mass arising from the lesser curvature of the  stomach, compatible with known  gastrointestinal stromal tumor.  2.  No definite evidence of metastatic disease within the abdomen or  pelvis. Subcentimeter hypoattenuating lesions of the liver are too  small to characterize. Attention on future imaging.  3.  Additional ancillary findings including cholelithiasis,  hepatomegaly with hepatic steatosis, colonic diverticulosis, and  prostatomegaly.    CT Head 8/27/23:  IMPRESSION:  1.  No CT evidence for acute intracranial process.  2.  Brain atrophy and presumed chronic microvascular ischemic changes as above.    CT C-spine 8/27/23:  IMPRESSION:  1.  No fracture or posttraumatic subluxation.  2.  No high-grade spinal canal stenosis.    CT A/P 10/1/23:  IMPRESSION:   1. Mild urinary bladder wall thickening with some adjacent inflammatory change, which can be seen in the setting of cystitis. Please correlate clinically.  2. Otherwise no acute findings in the abdomen or pelvis.  3. Interval postoperative changes of gastric tumor resection. No free air, free fluid or abscess.    PET 11/21/23:  IMPRESSION:  No metabolic evidence of active neoplasm.      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 PMH is significant for anemia, arthritis, BPH, cheilosis, chronic headache, coginitive impairment, DM2, CKD, external hemorrhoids, SWATHI, gout, hammer toe, heart murmur, right knee arhtroplasty, HLD, rosacea, and RENAE.     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   -On 9/20/23, he underwent robotic assisted diagnostic lap with resection of gastric GIST and omental biopsy with Dr. Ny. 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 Omental biopsy and resection were negative for malignancy. (pT2Nx, 2,8 cm greatest dimension, 0 mitoses/5mm2, Grade 1 low risk, no necrosis, all margins negative, no lymph nodes submited, Positive KIT/DOG1).  -I reviewed  imaging and pathologic reports with patient and wife. He did not have lymph nodes assessed. I will send for PET to assess for further evidence of metastatic disease.   -Discussed with patient he is in need of surgery follow up, and will need repeat EGD in the future (as well as colonoscopy given history of GIB/diverticular bleed).   -PET returned without evidence of active neoplasm.   -We had discussed in low risk disease, treatment can include upfront surgery to be followed by observation vs imatinib in the low risk setting, or neoadjuvant imatinib therapy followed by surgery and imatinib treatment. Given the low risk nature of his finding, no evidence of metastatic disease, his advanced age, and wishes will continue to monitor in surveillance.     2) History of GIB due to diverticulosis  -See above.     3) History of DM2    4) History of HTN, HLD    5) Obtain CT CAP and CBC, CMP in 6 months prior to follow up. If all is stable then, check annual imaging and labs.        Cathryn Pearson, DO  Hematology/Oncology  [facility]

## 2023-11-24 NOTE — LETTER
11/24/2023         RE: Damien Vallecillo  [address]  [facility] [id number]        Dear Colleague,    Thank you for referring your patient, Damien Vallecillo, to the [facility]. Please see a copy of my visit note below.    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 PMH is significant for anemia, arthritis, BPH, cheilosis, chronic headache, coginitive impairment, DM2, CKD, external hemorrhoids, SWATHI, gout, hammer toe, heart murmur, right knee arhtroplasty, HLD, rosacea, and RENAE.     Patient has history of colon resection with reanastomosis due to high grade polyps 10-12 years ago.    He has had recurrent abdominal pain with GIB.    EGD 5/25/23: Normal esophagus, single submucosal papule (nodule) found in the stomach.    Colonoscopy 5/25/23: Patent end-to-side ileo-colonic anastomosis, diverticulosis with bleed.    Colonoscopy 6/12/23: end-to-end ileo-colonic anastomosis. Green stool coming from ileum. Blood in the recto-sigmoid colon in the descending colon and in the transverse colon. Diverticulosis.     EUS 7/13/23: 2.7 cm gastric submucosal nodule. Benign appearing small pancreatic cyst. Cholelithiasis without choledocholithiasis. Path returned as gastrointestinal stromal tumor, low mitotic activity <1/50 HPF.    CT A/P 8/22/23: exophytic 2 cm solid mass arising from the lesser curvature of the stomach, compatible with known GIST. No definite evidence of metastatic disease. Subcentimeter hypoattenuating lesions of the liver too small to characterize. Cholelithiasis, hepatomegaly with hepatic steatosis, colonic diverticulosis, and prostatomegaly.    On 9/20/23, he underwent robotic assisted diagnostic lap with resection of gastric  GIST and omental biopsy with Dr. Ny. 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 Omental biopsy and resection were negative for malignancy. (pT2Nx, 2,8 cm greatest dimension, 0 mitoses/5mm2, Grade 1 low risk, no necrosis, all margins negative, no lymph nodes submited, Positive KIT/DOG1).    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    Mother: Colon cancer.  Father: CVA.  Brother: Unknown malignancy.      INTERVAL HISTORY:  No acute events. He enjoyed the holiday with his family.    REVIEW OF SYSTEMS:   A 14 point ROS was reviewed with pertinent positives and negatives in the HPI.        HOME MEDICATIONS:  Current Outpatient Medications   Medication Sig Dispense Refill     acetaminophen (TYLENOL) 500 MG tablet Take 500-1,000 mg by mouth every 6 hours as needed for mild pain       atorvastatin (LIPITOR) 40 MG tablet Take 20 mg by mouth every evening       CINNAMON PO Take 1 tablet by mouth every evening       diclofenac (VOLTAREN) 1 % topical gel Apply 2 g topically 2 times daily       doxycycline hyclate (PERIOSTAT) 20 MG tablet Take 1 tablet (20 mg) by mouth daily       ferrous sulfate (FEROSUL) 325 (65 Fe) MG tablet Take 1 tablet (325 mg) by mouth daily (with breakfast)       fish oil-omega-3 fatty acids 1000 MG capsule Take 1 g by mouth daily       Flaxseed, Linseed, (FLAXSEED OIL) 1000 MG CAPS Take 1,000 mg by mouth every evening       furosemide (LASIX) 20 MG tablet TAKE 1 TABLET(20 MG) BY MOUTH TWICE DAILY 180 tablet 1     glucosamine-chondroitin 500-400 MG CAPS per capsule Take 1 capsule by mouth daily       losartan (COZAAR) 25 MG tablet Take 1 tablet (25 mg) by mouth daily 60 tablet 0     metroNIDAZOLE (METROGEL) 0.75 % external gel APPLY THIN LAYER TO FACE TWICE A DAY FOR ROSACEA       Multiple Vitamins-Minerals (MULTIVITAMIN ADULT PO) Take 1 tablet by mouth daily Reported on 5/12/2017       order for DME 1:  "Gradient Compression Wraps; 2: Cast Boots; 3: BLE knee or thigh high 20-30 mm Hg compression stocking; 4: BLE knee or thigh high custom compression stocking; 5: Alternative BLE knee high or thigh compression garments (velcro/buckling) 1 each 0     order for DME Equipment being ordered: Walker Wheels () and Walker ()  Treatment Diagnosis: Impaired functional mobility 1 each 0     pramox-pe-glycerin-petrolatum (PREPARATION H) 1-0.25-14.4-15 % CREA cream Place rectally 4 times daily as needed for hemorrhoids Use as directed       pramoxine (PRAX) 1 % LOTN lotion Apply topically 3 times daily to areas that are itchy 237 mL 0     tamsulosin (FLOMAX) 0.4 MG capsule Take 1 capsule (0.4 mg) by mouth daily (Patient taking differently: Take 0.4 mg by mouth every evening) 90 capsule 3         ALLERGIES:  Allergies   Allergen Reactions     Levetiracetam [Keppra] Rash     Oxcarbazepine [Oxcarbazepine] Rash         PAST MEDICAL HISTORY:  Past Medical History:   Diagnosis Date     Acute suppurative arthritis (H)      Acute, but ill-defined, cerebrovascular disease      Anemia due to blood loss, acute 05/25/2023     Ankle fracture 12/28/2018     Arthritis      Benign prostatic hyperplasia with urinary frequency 03/29/2022     Cheilosis 09/28/2018     Chronic headaches      Closed right ankle fracture 12/29/2018     Cognitive attention deficit 09/16/2021     COVID-19 vaccination refused 09/26/2023     Diabetes (H)     \"Pre-diabetes\"     Dislocation of right patella 09/16/2020     Elevated serum creatinine 11/13/2019    GFR Estimate Date Value Ref Range Status 09/30/2019 84 >60 mL/min/[1.73_m2] Final   Comment:   Non  GFR Calc Starting 12/18/2018, serum creatinine based estimated GFR (eGFR) will be  calculated using the Chronic Kidney Disease Epidemiology Collaboration  (CKD-EPI) equation.         Encounter for immunization 09/21/2022     Essential hypertension 10/12/2023     External hemorrhoids 09/21/2022 "     Fall 03/08/2023     Fe deficiency anemia 07/10/2023     Fecal incontinence 09/16/2020     Gastric stromal tumor (H) 09/06/2023     Glucose intolerance (impaired glucose tolerance) 05/31/2013     Gout, unspecified      Grieving 09/16/2021     Hammer toe of left foot 10/21/2016     Heart murmur 09/06/2023     Hematuria      History of arthroplasty of right knee 08/17/2017     History of blood transfusion      Hyperlipidemia LDL goal <160 11/13/2019     Hypersomnia 03/07/2023     Immunization deficiency 10/12/2023     Intrinsic eczema 10/12/2023     Left foot pain 09/30/2019     Left knee pain, unspecified chronicity 05/08/2017     Lentigo maligna (H)      Malignant neoplasm of rectosigmoid junction (H)      Morbid obesity due to excess calories (H) 10/21/2016     RENAE (obstructive sleep apnea) 03/08/2023     Pedal edema 01/18/2018     Peripheral polyneuropathy 09/30/2019     Physical deconditioning 03/08/2023     Pre-diabetes 10/21/2016     RBC microcytosis 02/14/2017     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     Right knee pain, unspecified chronicity 05/08/2017     Rosacea 09/28/2018     Syncope      Tubulovillous adenoma of colon      Urinary retention 09/26/2023     Venous stasis dermatitis of both lower extremities 10/21/2016     Xerostomia 09/26/2023         PAST SURGICAL HISTORY:  Past Surgical History:   Procedure Laterality Date     ARTHROPLASTY KNEE Right 6/19/2017    Procedure: ARTHROPLASTY KNEE;  Right total knee arthroplasty, Hammer toe repair toe 2,3,4,5 left foot with osteotomy;  Surgeon: Alec Raymond MD;  Location:  OR     COLONOSCOPY N/A 6/23/2015    Procedure: COMBINED COLONOSCOPY, SINGLE OR MULTIPLE BIOPSY/POLYPECTOMY BY BIOPSY;  Surgeon: Kimberly Alfaro MD;  Location: [facility]     COLONOSCOPY N/A 7/30/2015    Procedure: COLONOSCOPY;  Surgeon: Enrique Salazar MD;  Location:  OR     COLONOSCOPY N/A 7/31/2018    Procedure: COLONOSCOPY;  Colonoscopy;  Surgeon: Tiffany Cheema MD;   Location:  GI     COLONOSCOPY N/A 5/26/2023    Procedure: Colonoscopy;  Surgeon: Juan Grimaldo MD;  Location:  GI     COLONOSCOPY N/A 6/15/2023    Procedure: colonoscopy;   no cecum as pt has had partial colectomy;  Surgeon: Sherif Escalona MD;  Location:  GI     DAVINCI PELVIC PROCEDURE N/A 9/20/2023    Procedure: Robot assisted lysis of adhesions, Resection of gastric gastrointestinal stromal  tumor, omental mass biopsy;  Surgeon: Chino Ny MD;  Location:  OR     ENDOSCOPIC ULTRASOUND UPPER GASTROINTESTINAL TRACT (GI) N/A 7/13/2023    Procedure: Endoscopic ultrasound upper gastrointestinal tract with fine needle aspiration;  Surgeon: Kae Mccall MD;  Location:  OR     ESOPHAGOSCOPY, GASTROSCOPY, DUODENOSCOPY (EGD), COMBINED N/A 5/26/2023    Procedure: Esophagoscopy, gastroscopy, duodenoscopy (EGD), combined;  Surgeon: Juan Grimaldo MD;  Location:  GI     HERNIORRHAPHY EPIGASTRIC  4/27/2012    Procedure:HERNIORRHAPHY EPIGASTRIC; Epigastric Hernia Repair with mesh ; Surgeon:BOLIVAR OLIVEIRA; Location: OR     LAPAROSCOPIC ASSISTED COLECTOMY Right 7/30/2015    Procedure: LAPAROSCOPIC ASSISTED COLECTOMY;  Surgeon: Enrique Salazar MD;  Location:  OR     ORTHOPEDIC SURGERY      lt knee arthroplasty     REALIGN PATELLA Right 10/9/2017    Procedure: REALIGN PATELLA;  Revision right total knee arthroplasty;  Surgeon: Alec Raymond MD;  Location:  OR     REPAIR HAMMER TOE Left 6/19/2017    Procedure: REPAIR HAMMER TOE;  Hammer toe repair toe 2,3,4,5 left foot with osteotomy   ;  Surgeon: Beverly Kim DPM, Podiatry/Foot and Ankle Surgery;  Location:  OR     SIGMOIDOSCOPY FLEXIBLE  5/29/2013    Procedure: SIGMOIDOSCOPY FLEXIBLE;  SIGMOIDOSCOPY FLEXIBLE (FV) Needs blood sugar ck'd;  Surgeon: Enrique Salazar MD;  Location:  GI     skin cancer excision       ZZC NONSPECIFIC PROCEDURE      right heel surgery; spur removed.         SOCIAL HISTORY:  Social  "History     Socioeconomic History     Marital status:      Spouse name: Not on file     Number of children: Not on file     Years of education: Not on file     Highest education level: Not on file   Occupational History     Not on file   Tobacco Use     Smoking status: Never     Passive exposure: Never     Smokeless tobacco: Never   Vaping Use     Vaping Use: Never used   Substance and Sexual Activity     Alcohol use: Yes     Comment: occ.     Drug use: No     Sexual activity: Yes     Partners: Female   Other Topics Concern     Parent/sibling w/ CABG, MI or angioplasty before 65F 55M? No   Social History Narrative     Not on file     Social Determinants of Health     Financial Resource Strain: Not on file   Food Insecurity: Not on file   Transportation Needs: Not on file   Physical Activity: Not on file   Stress: Not on file   Social Connections: Not on file   Interpersonal Safety: Low Risk  (2023)    Interpersonal Safety      Do you feel physically and emotionally safe where you currently live?: Yes      Within the past 12 months, have you been hit, slapped, kicked or otherwise physically hurt by someone?: No      Within the past 12 months, have you been humiliated or emotionally abused in other ways by your partner or ex-partner?: No   Housing Stability: Not on file         FAMILY HISTORY:  Family History   Problem Relation Age of Onset     Cancer - colorectal Mother      Cerebrovascular Disease Father          PHYSICAL EXAM:  Vital signs:  BP (!) 152/75 (BP Location: Left arm, Patient Position: Sitting, Cuff Size: Adult Large)   Pulse 57   Temp 98.1  F (36.7  C) (Oral)   Resp 16   Ht 1.778 m (5' 10\")   Wt 118.2 kg (260 lb 8 oz)   SpO2 100%   BMI 37.38 kg/m     ECO  GENERAL/CONSTITUTIONAL: No acute distress.  EYES: Pupils are equal, round, and react to light and accommodation. Extraocular movements intact.  No scleral icterus.  GASTROINTESTINAL: Obese, no distention. No " pain.  MUSCULOSKELETAL: Warm and well-perfused, no cyanosis, clubbing, or edema.  NEUROLOGIC: Cranial nerves II-XII are intact. Alert, oriented, answers questions appropriately.  INTEGUMENTARY: No rashes or jaundice.      LABS:  Reviewed with patient today.   Latest Reference Range & Units 11/24/23 13:31   Sodium 135 - 145 mmol/L 138   Potassium 3.4 - 5.3 mmol/L 4.0   Chloride 98 - 107 mmol/L 103   Carbon Dioxide (CO2) 22 - 29 mmol/L 26   Urea Nitrogen 8.0 - 23.0 mg/dL 14.4   Creatinine 0.67 - 1.17 mg/dL 0.79   GFR Estimate >60 mL/min/1.73m2 86   Calcium 8.8 - 10.2 mg/dL 9.1   Anion Gap 7 - 15 mmol/L 9   Albumin 3.5 - 5.2 g/dL 4.5   Protein Total 6.4 - 8.3 g/dL 6.9   Alkaline Phosphatase 40 - 150 U/L 64   ALT 0 - 70 U/L 21   AST 0 - 45 U/L 20   Bilirubin Total <=1.2 mg/dL 1.2   Glucose 70 - 99 mg/dL 108 (H)   WBC 4.0 - 11.0 10e3/uL 6.6   Hemoglobin 13.3 - 17.7 g/dL 14.0   Hematocrit 40.0 - 53.0 % 43.0   Platelet Count 150 - 450 10e3/uL 211   RBC Count 4.40 - 5.90 10e6/uL 4.89   MCV 78 - 100 fL 88   MCH 26.5 - 33.0 pg 28.6   MCHC 31.5 - 36.5 g/dL 32.6   RDW 10.0 - 15.0 % 15.1 (H)   % Neutrophils % 61   % Lymphocytes % 21   % Monocytes % 12   % Eosinophils % 4   % Basophils % 1   Absolute Basophils 0.0 - 0.2 10e3/uL 0.0   Absolute Eosinophils 0.0 - 0.7 10e3/uL 0.3   Absolute Immature Granulocytes <=0.4 10e3/uL 0.0   Absolute Lymphocytes 0.8 - 5.3 10e3/uL 1.4   Absolute Monocytes 0.0 - 1.3 10e3/uL 0.8   % Immature Granulocytes % 1   Absolute Neutrophils 1.6 - 8.3 10e3/uL 4.1   Absolute NRBCs 10e3/uL 0.0   NRBCs per 100 WBC <1 /100 0         PATHOLOGY:  EUS 7/13/23:  Impression:         - 2.7 cm gastric submucosal nodule. Appearance is                             suspicios for GIST. FNA done and pending                             - Benign appearing small pancreatic cyst. Unlikely                             to be clnically significant in this pt due to small                             size and pt's age                              - Cholelithiasis without choledocholithiasis     Final Diagnosis   Specimen A                 Interpretation:                   Gastrointestinal Stromal Tumor. (Please see comment and microscopic description)                    Adequacy:                 Satisfactory for evaluation         Electronically signed by Janeth Rivas MD on 7/18/2023 at 10:33 AM   Comment  [facility]   Based on the size of the lesion (2.7 cm by EUS report), location (stomach) and low mitotic activity ( <1/50 HPF )this lesion would be stratified as Low Risk  for aggressive behavior.  However sampling is limited and ideally risk stratification should be done on the resection specimen.       Case Report   Surgical Pathology Report                         Case: [id number]                                   Authorizing Provider:  Chino Ny MD       Collected:           09/20/2023 09:32 AM           Ordering Location:     [facility]   Received:            09/20/2023 09:34 AM                                  Main OR                                                                       Pathologist:           Mason Lyn MD                                                             Intraop:               Eileen Radford                                                               Specimens:   A) - Omentum, Omental biopsy                                                                        B) - Stomach, stromal tumor                                                                          C) - Omentum, Omental mass                                                                Final Diagnosis   A.  Omentum, biopsy:  -- Fat necrosis with hyalinization and calcification.  Negative for malignancy.     B.  Stomach, lesser curvature, exophytic mass, resection:  -- Gastrointestinal stromal tumor, spindle cell type.  -- See cancer staging synoptic for additional details.     C.  Omentum, resection:  -- Encapsulated  fat necrosis with calcifications.  Negative for malignancy.         Electronically signed by Mason Lyn MD on 9/22/2023 at 11:25 AM   Comment    And NGS mutational panel is being performed per reflex testing guidelines, and will be reported separately by the performing laboratory.   Synoptic Checklist   GASTROINTESTINAL STROMAL TUMOR (GIST): Resection   8th Edition - Protocol posted: 12/14/2022GASTROINTESTINAL STROMAL TUMOR (GIST): RESECTION - All Specimens  CLINICAL   Preresection Treatment  No known preresection therapy   SPECIMEN   Procedure  Local excision   TUMOR   Tumor Focality  Unifocal   Tumor Site  Stomach: posterior aspect of lower curvature     Histologic Type  Gastrointestinal stromal tumor, spindle cell type   Tumor Size  Greatest Dimension (Centimeters): 2.8 cm   Additional Dimension (Centimeters)  2.2 cm     1.8 cm   Mitotic Rate  0 mitoses per 5 mm2   Histologic Grade  G1, low grade   Necrosis  Not identified   Treatment Effect  No known presurgical therapy   Risk Assessment  Very low risk   MARGINS   Margin Status  All margins negative for GIST   Closest Margin(s) to GIST  Deep   Distance from GIST to Closest Margin  1 mm   REGIONAL LYMPH NODES   Regional Lymph Node Status  Not applicable (no regional lymph nodes submitted or found)   pTNM CLASSIFICATION (AJCC 8th Edition)   Reporting of pT, pN, and (when applicable) pM categories is based on information available to the pathologist at the time the report is issued. As per the AJCC (Chapter 1, 8th Ed.) it is the managing physician's responsibility to establish the final pathologic stage based upon all pertinent information, including but potentially not limited to this pathology report.   pT Category  pT2   pN Category  pN not assigned (no nodes submitted or found)   SPECIAL STUDIES   Immunohistochemical Studies     KIT ()  Positive   Immunohistochemical Studies     DOG1 (ANO1)  Positive   Molecular Genetic Studies  Pending        Specimen  Description    Tissue: Fixed slides-Collected 9/20/23, Stomach, [id number]  [id number] B4   Significant Results    Detected Alterations of Known or Potential Pathogenicity: KIT [id number]          IMAGING: Reviewed with patient today.  CT A/P 8/22/23:  IMPRESSION:   1.  Exophytic 2 cm solid mass arising from the lesser curvature of the  stomach, compatible with known gastrointestinal stromal tumor.  2.  No definite evidence of metastatic disease within the abdomen or  pelvis. Subcentimeter hypoattenuating lesions of the liver are too  small to characterize. Attention on future imaging.  3.  Additional ancillary findings including cholelithiasis,  hepatomegaly with hepatic steatosis, colonic diverticulosis, and  prostatomegaly.    CT Head 8/27/23:  IMPRESSION:  1.  No CT evidence for acute intracranial process.  2.  Brain atrophy and presumed chronic microvascular ischemic changes as above.    CT C-spine 8/27/23:  IMPRESSION:  1.  No fracture or posttraumatic subluxation.  2.  No high-grade spinal canal stenosis.    CT A/P 10/1/23:  IMPRESSION:   1. Mild urinary bladder wall thickening with some adjacent inflammatory change, which can be seen in the setting of cystitis. Please correlate clinically.  2. Otherwise no acute findings in the abdomen or pelvis.  3. Interval postoperative changes of gastric tumor resection. No free air, free fluid or abscess.    PET 11/21/23:  IMPRESSION:  No metabolic evidence of active neoplasm.      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 PMH is significant for anemia, arthritis, BPH, cheilosis, chronic headache, coginitive impairment, DM2, CKD, external hemorrhoids, SWATHI, gout, hammer toe, heart murmur, right knee arhtroplasty, HLD, rosacea, and RENAE.     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   -On 9/20/23, he underwent robotic assisted diagnostic  lap with resection of gastric GIST and omental biopsy with Dr. Ny. 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 Omental biopsy and resection were negative for malignancy. (pT2Nx, 2,8 cm greatest dimension, 0 mitoses/5mm2, Grade 1 low risk, no necrosis, all margins negative, no lymph nodes submited, Positive KIT/DOG1).  -I reviewed imaging and pathologic reports with patient and wife. He did not have lymph nodes assessed. I will send for PET to assess for further evidence of metastatic disease.   -Discussed with patient he is in need of surgery follow up, and will need repeat EGD in the future (as well as colonoscopy given history of GIB/diverticular bleed).   -PET returned without evidence of active neoplasm.   -We had discussed in low risk disease, treatment can include upfront surgery to be followed by observation vs imatinib in the low risk setting, or neoadjuvant imatinib therapy followed by surgery and imatinib treatment. Given the low risk nature of his finding, no evidence of metastatic disease, his advanced age, and wishes will continue to monitor in surveillance.     2) History of GIB due to diverticulosis  -See above.     3) History of DM2    4) History of HTN, HLD    5) Obtain CT CAP and CBC, CMP in 6 months prior to follow up. If all is stable then, check annual imaging and labs.        Cathryn Pearson DO  Hematology/Oncology  [facility]      Again, thank you for allowing me to participate in the care of your patient.        Sincerely,        Cathryn Pearson DO

## 2023-11-24 NOTE — NURSING NOTE
Medical Assistant Note:    Damien Vallecillo presents today for Blood draw.      Patient seen by provider today: Yes: Will see .   present during visit today: Not Applicable.    Concerns: No Concerns.    Procedure:  Lab draw site: Right ac, Needle type: butterfly, Gauge: 21.    Post Assessment:  Labs drawn without difficulty: Yes.    Discharge Plan:  Departure Mode: Ambulatory.    Face to Face Time: 10 MIN    Cathryn Pappas CMA on 11/24/2023 at 1:46 PM

## 2023-11-24 NOTE — NURSING NOTE
"Oncology Rooming Note    November 24, 2023 1:47 PM   Damien Vallecillo is a 88 year old male who presents for:    Chief Complaint   Patient presents with    Oncology Clinic Visit     Malignant gastrointestinal stromal tumor, unspecified site     Initial Vitals: BP (!) 152/75 (BP Location: Left arm, Patient Position: Sitting, Cuff Size: Adult Large)   Pulse 57   Temp 98.1  F (36.7  C) (Oral)   Resp 16   Ht 1.778 m (5' 10\")   Wt 118.2 kg (260 lb 8 oz)   SpO2 100%   BMI 37.38 kg/m   Estimated body mass index is 37.38 kg/m  as calculated from the following:    Height as of this encounter: 1.778 m (5' 10\").    Weight as of this encounter: 118.2 kg (260 lb 8 oz). Body surface area is 2.42 meters squared.  No Pain (0) Comment: Data Unavailable   No LMP for male patient.  Allergies reviewed: Yes  Medications reviewed: Yes    Medications: Medication refills not needed today.  Pharmacy name entered into [facility]:    [facility] [place], [place] - [address] [facility] - [place], [place] - [id number] - [place], [place] - [address] AT [facility] [address] [place] [address]    Cathryn Pappas CMA              "

## 2023-11-30 ENCOUNTER — TELEPHONE (OUTPATIENT)
Dept: FAMILY MEDICINE | Facility: CLINIC | Age: 88
End: 2023-11-30
Payer: MEDICARE

## 2023-11-30 NOTE — TELEPHONE ENCOUNTER
Wellness check today, BP's roughly 130s-140s/70s. Tolerating losartan, no SE's noted. Leg swelling much better.     MAKAYLA Loco CNP

## 2023-12-08 ENCOUNTER — TELEPHONE (OUTPATIENT)
Dept: FAMILY MEDICINE | Facility: CLINIC | Age: 88
End: 2023-12-08
Payer: MEDICARE

## 2023-12-11 NOTE — TELEPHONE ENCOUNTER
Home Care is calling regarding an established patient with [facility].        10/10/2023     3:26 PM 10/10/2023     3:10 PM   Home Care Information   Current following provider  Dr. Strange   Date provider agreed to follow  10/10/2023   Home Care agency [facility]      Requesting orders from: Obi Strange  Provider is following patient: Yes  Is this a 60-day recertification request?  No    Orders Requested    PAL received VM from FRANCESCO Drake with [facility] on 12/8/23  -Informs pt requested to be discharged from home care services at visit on 12/6/23  -Officially discharged from [facility] 12/6/23 per request, states pt is doing well     Confirmed ok to leave a detailed message with call back.  Contact information confirmed and updated as needed.    Routing to Dr. Strange as JOANNA ALMANZAR RN  Patient Advocate Liaison - PAL RN  [facility]  [phone]

## 2023-12-27 ENCOUNTER — OFFICE VISIT (OUTPATIENT)
Dept: FAMILY MEDICINE | Facility: CLINIC | Age: 88
End: 2023-12-27
Payer: MEDICARE

## 2023-12-27 VITALS
OXYGEN SATURATION: 98 % | SYSTOLIC BLOOD PRESSURE: 135 MMHG | HEART RATE: 71 BPM | DIASTOLIC BLOOD PRESSURE: 84 MMHG | TEMPERATURE: 97.8 F | HEIGHT: 70 IN | RESPIRATION RATE: 18 BRPM | WEIGHT: 263 LBS | BODY MASS INDEX: 37.65 KG/M2

## 2023-12-27 DIAGNOSIS — R60.0 BILATERAL LEG EDEMA: ICD-10-CM

## 2023-12-27 DIAGNOSIS — I10 ESSENTIAL HYPERTENSION: Primary | ICD-10-CM

## 2023-12-27 LAB
ANION GAP SERPL CALCULATED.3IONS-SCNC: 10 MMOL/L (ref 7–15)
BUN SERPL-MCNC: 14.4 MG/DL (ref 8–23)
CALCIUM SERPL-MCNC: 9.3 MG/DL (ref 8.8–10.2)
CHLORIDE SERPL-SCNC: 103 MMOL/L (ref 98–107)
CREAT SERPL-MCNC: 0.78 MG/DL (ref 0.67–1.17)
DEPRECATED HCO3 PLAS-SCNC: 28 MMOL/L (ref 22–29)
EGFRCR SERPLBLD CKD-EPI 2021: 86 ML/MIN/1.73M2
GLUCOSE SERPL-MCNC: 140 MG/DL (ref 70–99)
POTASSIUM SERPL-SCNC: 3.5 MMOL/L (ref 3.4–5.3)
SODIUM SERPL-SCNC: 141 MMOL/L (ref 135–145)

## 2023-12-27 PROCEDURE — 99213 OFFICE O/P EST LOW 20 MIN: CPT

## 2023-12-27 PROCEDURE — 36415 COLL VENOUS BLD VENIPUNCTURE: CPT

## 2023-12-27 PROCEDURE — 80048 BASIC METABOLIC PNL TOTAL CA: CPT

## 2023-12-27 RX ORDER — LOSARTAN POTASSIUM 50 MG/1
50 TABLET ORAL DAILY
Qty: 90 TABLET | Refills: 0 | Status: SHIPPED | OUTPATIENT
Start: 2023-12-27 | End: 2024-03-13

## 2023-12-27 RX ORDER — RESPIRATORY SYNCYTIAL VIRUS VACCINE 120MCG/0.5
0.5 KIT INTRAMUSCULAR ONCE
Qty: 1 EACH | Refills: 0 | Status: CANCELLED | OUTPATIENT
Start: 2023-12-27 | End: 2023-12-27

## 2023-12-27 ASSESSMENT — PAIN SCALES - GENERAL: PAINLEVEL: NO PAIN (0)

## 2023-12-27 NOTE — PATIENT INSTRUCTIONS
Increase Losartan to 50 mg daily    Labs today to ensure kidney function stable    Follow up BP check in 1 month.

## 2023-12-27 NOTE — PROGRESS NOTES
"  Assessment & Plan     (I10) Essential hypertension  (primary encounter diagnosis)  (R60.0) Bilateral leg edema  Comment: BP better on recheck. However, patient BP routinely >140/90 at home. Will increase Losartan to 50 mg daily. Will have patient follow up for 1 month BP check. BMP today, will follow up on results and whether further direction is necessary. Continue w/ compression stockings and knee sleeves, seems to be helping leg edema. Patient fully understands and is agreeable with plan of care, at this point patient will follow up as needed unless acute concerns arise in the meantime.  Plan: losartan (COZAAR) 50 MG tablet, Basic metabolic        panel  (Ca, Cl, CO2, Creat, Gluc, K, Na, BUN)    28 minutes spent by me on the date of the encounter doing chart review, history and exam, documentation and further activities per the note       BMI:   Estimated body mass index is 37.74 kg/m  as calculated from the following:    Height as of this encounter: 1.778 m (5' 10\").    Weight as of this encounter: 119.3 kg (263 lb).     MAKAYLA [facility]    Richi Quezada is a 88 year old, presenting for the following health issues:  Leg Swelling        12/27/2023    12:52 PM   Additional Questions   Roomed by Destiny Smyth       History of Present Illness       Reason for visit:  Leg swelling    He eats 0-1 servings of fruits and vegetables daily.He consumes 1 sweetened beverage(s) daily.He exercises with enough effort to increase his heart rate 9 or less minutes per day.  He exercises with enough effort to increase his heart rate 3 or less days per week.   He is taking medications regularly.      Medication Followup of Losartan 25 MG    Taking Medication as prescribed: yes  Side Effects:  None  Medication Helping Symptoms:  yes    Hypertension Follow-up    Do you check your blood pressure regularly outside of the clinic? Yes   Are you following a low salt diet? Yes  Are your " "blood pressures ever more than 140 on the top number (systolic) OR more   than 90 on the bottom number (diastolic), for example 140/90? Yes    BP at home ranging from 130s/80s to 150s/90s, majority >140/90  Tolerating medication very well, no SE's noted  Denies any cardiac symptoms/concerns    Review of Systems   Constitutional, HEENT, cardiovascular, pulmonary, gi and gu systems are negative, except as otherwise noted.      Objective    /84 (BP Location: Right arm, Patient Position: Sitting, Cuff Size: Adult Regular)   Pulse 71   Temp 97.8  F (36.6  C) (Oral)   Resp 18   Ht 1.778 m (5' 10\")   Wt 119.3 kg (263 lb)   SpO2 98%   BMI 37.74 kg/m    Body mass index is 37.74 kg/m .  Physical Exam  Vitals and nursing note reviewed.   Constitutional:       General: He is not in acute distress.     Appearance: Normal appearance.   Cardiovascular:      Rate and Rhythm: Normal rate and regular rhythm.      Heart sounds: No murmur heard.     No friction rub. No gallop.   Pulmonary:      Effort: Pulmonary effort is normal. No respiratory distress.      Breath sounds: No wheezing, rhonchi or rales.   Musculoskeletal:      Right lower le+ Edema present.      Left lower le+ Edema present.   Skin:     General: Skin is warm and dry.   Neurological:      Mental Status: He is alert.   Psychiatric:         Mood and Affect: Mood normal.         Behavior: Behavior normal. Behavior is cooperative.         Thought Content: Thought content normal.         Judgment: Judgment normal.                  "

## 2023-12-27 NOTE — LETTER
December 28, 2023      Damien Vallecillo  [address] [facility] [address]        Dear ,    We are writing to inform you of your test results.    kidney function panel looks good! His blood sugar is a bit elevated but this was not a fasting level so not concerned about this.     Resulted Orders   Basic metabolic panel  (Ca, Cl, CO2, Creat, Gluc, K, Na, BUN)   Result Value Ref Range    Sodium 141 135 - 145 mmol/L      Comment:      Reference intervals for this test were updated on 09/26/2023 to more accurately reflect our healthy population. There may be differences in the flagging of prior results with similar values performed with this method. Interpretation of those prior results can be made in the context of the updated reference intervals.     Potassium 3.5 3.4 - 5.3 mmol/L    Chloride 103 98 - 107 mmol/L    Carbon Dioxide (CO2) 28 22 - 29 mmol/L    Anion Gap 10 7 - 15 mmol/L    Urea Nitrogen 14.4 8.0 - 23.0 mg/dL    Creatinine 0.78 0.67 - 1.17 mg/dL    GFR Estimate 86 >60 mL/min/1.73m2    Calcium 9.3 8.8 - 10.2 mg/dL    Glucose 140 (H) 70 - 99 mg/dL       If you have any questions or concerns, please call the clinic at the number listed above.       Sincerely,      MAKAYLA Loco CNP

## 2024-01-09 ENCOUNTER — ALLIED HEALTH/NURSE VISIT (OUTPATIENT)
Dept: EDUCATION SERVICES | Facility: CLINIC | Age: 89
End: 2024-01-09
Payer: MEDICARE

## 2024-01-09 VITALS — BODY MASS INDEX: 37.58 KG/M2 | WEIGHT: 261.9 LBS

## 2024-01-09 DIAGNOSIS — E11.9 TYPE 2 DIABETES MELLITUS WITHOUT COMPLICATION, UNSPECIFIED WHETHER LONG TERM INSULIN USE (H): Primary | ICD-10-CM

## 2024-01-09 PROCEDURE — G0108 DIAB MANAGE TRN  PER INDIV: HCPCS

## 2024-01-09 NOTE — PROGRESS NOTES
Diabetes Self-Management Education & Support    Presents for: Individual review    Type of Service: In Person Visit      ASSESSMENT:  Patient is working on lifestyle changes, eating less sweets and adding salads, although he admits the holidays were a challenge with having a lot of company. He plans to get back on track.    Reviewed carbohydrate sources, portion control and the benefit of spreading intake throughout the day. Reviewed benefits of exercise to help with better blood glucose utilization.  Discussed diabetes complications and prevention.       PLAN  Meal Plan Recommendation: continue to work on portions and limiting your sweets.  - Avoid sugar sweetened cereal  - Fruit - limit to 1 serving at a time ( tennis ball size fruit or small banana)    Exercise / activity plan: continue with your activity. Remember, activity helps control blood sugar.       Follow up:  Follow-up diabetes education yearly or sooner if needed.     See Care Plan for co-developed, patient-state behavior change goals.  AVS provided for patient today.    Education Materials Provided:  No new materials provided today      SUBJECTIVE/OBJECTIVE:  Presents for: Individual review  Accompanied by: Self  Diabetes education in the past 24mo: Yes  Focus of Visit: Assistance w/ making life changes  Diabetes type: Type 2  Disease course: Worsening  How confident are you filling out medical forms by yourself:: Somewhat  Transportation concerns: No  Difficulty affording diabetes medication?: No  Difficulty affording diabetes testing supplies?: No  Other concerns:: None  Cultural Influences/Ethnic Background:  Not  or     Diabetes Symptoms & Complications:  Fatigue: No  Neuropathy: No  Polydipsia: No  Polyphagia: No  Polyuria: No  Visual change: No  Slow healing wounds: No  Symptom course: Stable  Weight trend: Stable  Complications assessed today?: Yes  Autonomic neuropathy: No  CVA: No  Heart disease: No  Nephropathy: No  Peripheral  "neuropathy: No  Peripheral Vascular Disease: No  Retinopathy: No    Patient Problem List and Family Medical History reviewed for relevant medical history, current medical status, and diabetes risk factors.    Vitals:  Wt 118.8 kg (261 lb 14.4 oz)   BMI 37.58 kg/m    Estimated body mass index is 37.58 kg/m  as calculated from the following:    Height as of 12/27/23: 1.778 m (5' 10\").    Weight as of this encounter: 118.8 kg (261 lb 14.4 oz).   Last 3 BP:   BP Readings from Last 3 Encounters:   12/27/23 135/84   11/24/23 (!) 152/75   11/15/23 (!) 145/76       History   Smoking Status    Never   Smokeless Tobacco    Never       Labs:  Lab Results   Component Value Date    A1C 6.8 09/06/2023    A1C 5.9 09/16/2020     Lab Results   Component Value Date     12/27/2023     09/22/2023     09/21/2022     09/16/2020     Lab Results   Component Value Date    LDL 31 09/16/2021    LDL 29 11/15/2019     HDL Cholesterol   Date Value Ref Range Status   11/15/2019 75 >39 mg/dL Final     Direct Measure HDL   Date Value Ref Range Status   09/16/2021 75 >=40 mg/dL Final   ]  GFR Estimate   Date Value Ref Range Status   12/27/2023 86 >60 mL/min/1.73m2 Final   09/16/2020 82 >60 mL/min/[1.73_m2] Final     Comment:     Non  GFR Calc  Starting 12/18/2018, serum creatinine based estimated GFR (eGFR) will be   calculated using the Chronic Kidney Disease Epidemiology Collaboration   (CKD-EPI) equation.       GFR, ESTIMATED POCT   Date Value Ref Range Status   08/22/2023 >60 >60 mL/min/1.73m2 Final     GFR Estimate If Black   Date Value Ref Range Status   09/16/2020 >90 >60 mL/min/[1.73_m2] Final     Comment:      GFR Calc  Starting 12/18/2018, serum creatinine based estimated GFR (eGFR) will be   calculated using the Chronic Kidney Disease Epidemiology Collaboration   (CKD-EPI) equation.       Lab Results   Component Value Date    CR 0.78 12/27/2023    CR 0.80 09/16/2020     No " "results found for: \"MICROALBUMIN\"    Healthy Eating:  Healthy Eating Assessed Today: Yes  Cultural/Yazidi diet restrictions?: No  Meal planning/habits: Smaller portions, Other (eating less sweets and adding salads)  Breakfast: cereal with milk, OJ, ham, cookies  Lunch: sandwich Or apple/banana  Dinner: mexican meal- ground beef/chips, sauce, sour cream OR hamburger with bun, baked bean/ potatoes  Or chicken wiht cream of celery soup, potatoes, green beans carrots.  OR salad, steak  Snacks: peanuts, chips, cookies, carrot cake  Beverages: Water, Juice, Alcohol, Other, Coffee (beer, mike water, occassional regular pepsi)  Has patient met with a dietitian in the past?: No    Being Active:  Being Active Assessed Today: Yes  Exercise:: Yes (stretching type activity, le lifts, walk in house, squats, chair exercises)  Days per week of moderate to strenuous exercise (like a brisk walk): 7  How intense was your typical exercise? : Light (like stretching or slow walking)  Barrier to exercise: Physical limitation    Monitoring:  Monitoring Assessed Today: Yes  Did patient bring glucose meter to appointment? : No (has a meter, tried testing in the past but didn't go well. Not interested in testing blood sugar at home.)    Taking Medications:  Taking Medication Assessed Today: Yes  Current Treatments: None    Problem Solving:  Problem Solving Assessed Today: Yes  Is the patient at risk for hypoglycemia?: No       Reducing Risks:  Reducing Risks Assessed Today: Yes  Diabetes Risks: Age over 45 years, Sedentary Lifestyle  CAD Risks: Sedentary lifestyle, Diabetes Mellitus, Male sex, Obesity, Hypertension  Has dilated eye exam at least once a year?: No  Sees dentist every 6 months?: No  Feet checked by healthcare provider in the last year?: No    Healthy Coping:  Healthy Coping Assessed Today: Yes  Emotional response to diabetes: Ready to learn  Informal Support system:: Family  Stage of change: ACTION (Actively working towards " change)  Patient Activation Measure Survey Score:      5/18/2011    11:00 AM   YEHUDA Score (Last Two)   YEHUDA Raw Score 43   Activation Score 68.5   YEHUDA Level 4         Care Plan and Education Provided:  Care Plan: Diabetes   Updates made by Geeta Carpenter RN since 1/9/2024 12:00 AM        Problem: Diabetes Self-Management Education Needed to Optimize Self-Care Behaviors         Goal: Healthy Eating - follow a healthy eating pattern for diabetes    This Visit's Progress: 100%   Note:    My Goal: I will limit fruit to 1 serving at a time    What I need to meet my goal: using a measuring cup, or size of a tennis ball    I plan to meet my goal by this date: 1/9/24         Goal: Problem Solving - know how to prevent and manage short-term diabetes complications         Task: Provide education on high blood glucose - causes, signs/symptoms, prevention and treatment Completed 1/9/2024   Responsible User: Geeta Carpenter RN        Task: Provide education on low blood glucose - causes, signs/symptoms, prevention, treatment, carrying a carbohydrate source at all times, and medical identification Completed 1/9/2024   Responsible User: Geeta Carpenter RN        Task: Provide education on when to call a health care provider Completed 1/9/2024   Responsible User: Geeta Carpenter RN        Goal: Reducing Risks - know how to prevent and treat long-term diabetes complications         Task: Provide education on major complications of diabetes, prevention, early diagnostic measures and treatment of complications Completed 1/9/2024   Responsible User: Geeta Carpenter RN        Task: Provide education on recommended care for dental, eye and foot health Completed 1/9/2024   Responsible User: Geeta Carpenter RN        Goal: Healthy Coping - use available resources to cope with the challenges of managing diabetes         Task: Discuss recognizing feelings about having diabetes Completed 1/9/2024   Responsible User: Geeta Carpenter  KJ RN        Task: Provide education on the benefits of making appropriate lifestyle changes Completed 1/9/2024   Responsible User: Geeta Carpenter RN Vickie Baeyen BSN, RN, PHN, [facility]     Time Spent: 30 minutes  Encounter Type: Individual    Any diabetes medication dose changes were made via the CDE Protocol per the patient's referring provider. A copy of this encounter was shared with the provider.

## 2024-01-09 NOTE — LETTER
1/9/2024         RE: Damien Vallecillo  [address]  [facility] [address]        Dear Colleague,    Thank you for referring your patient, Damien Vallecillo, to the [facility]. Please see a copy of my visit note below.    Diabetes Self-Management Education & Support    Presents for: Individual review    Type of Service: In Person Visit      ASSESSMENT:  Patient is working on lifestyle changes, eating less sweets and adding salads, although he admits the holidays were a challenge with having a lot of company. He plans to get back on track.    Reviewed carbohydrate sources, portion control and the benefit of spreading intake throughout the day. Reviewed benefits of exercise to help with better blood glucose utilization.  Discussed diabetes complications and prevention.       PLAN  Meal Plan Recommendation: continue to work on portions and limiting your sweets.  - Avoid sugar sweetened cereal  - Fruit - limit to 1 serving at a time ( tennis ball size fruit or small banana)    Exercise / activity plan: continue with your activity. Remember, activity helps control blood sugar.       Follow up:  Follow-up diabetes education yearly or sooner if needed.     See Care Plan for co-developed, patient-state behavior change goals.  AVS provided for patient today.    Education Materials Provided:  No new materials provided today      SUBJECTIVE/OBJECTIVE:  Presents for: Individual review  Accompanied by: Self  Diabetes education in the past 24mo: Yes  Focus of Visit: Assistance w/ making life changes  Diabetes type: Type 2  Disease course: Worsening  How confident are you filling out medical forms by yourself:: Somewhat  Transportation concerns: No  Difficulty affording diabetes medication?: No  Difficulty affording diabetes testing supplies?: No  Other concerns:: None  Cultural Influences/Ethnic Background:  Not  or     Diabetes Symptoms & Complications:  Fatigue: No  Neuropathy:  "No  Polydipsia: No  Polyphagia: No  Polyuria: No  Visual change: No  Slow healing wounds: No  Symptom course: Stable  Weight trend: Stable  Complications assessed today?: Yes  Autonomic neuropathy: No  CVA: No  Heart disease: No  Nephropathy: No  Peripheral neuropathy: No  Peripheral Vascular Disease: No  Retinopathy: No    Patient Problem List and Family Medical History reviewed for relevant medical history, current medical status, and diabetes risk factors.    Vitals:  Wt 118.8 kg (261 lb 14.4 oz)   BMI 37.58 kg/m    Estimated body mass index is 37.58 kg/m  as calculated from the following:    Height as of 12/27/23: 1.778 m (5' 10\").    Weight as of this encounter: 118.8 kg (261 lb 14.4 oz).   Last 3 BP:   BP Readings from Last 3 Encounters:   12/27/23 135/84   11/24/23 (!) 152/75   11/15/23 (!) 145/76       History   Smoking Status     Never   Smokeless Tobacco     Never       Labs:  Lab Results   Component Value Date    A1C 6.8 09/06/2023    A1C 5.9 09/16/2020     Lab Results   Component Value Date     12/27/2023     09/22/2023     09/21/2022     09/16/2020     Lab Results   Component Value Date    LDL 31 09/16/2021    LDL 29 11/15/2019     HDL Cholesterol   Date Value Ref Range Status   11/15/2019 75 >39 mg/dL Final     Direct Measure HDL   Date Value Ref Range Status   09/16/2021 75 >=40 mg/dL Final   ]  GFR Estimate   Date Value Ref Range Status   12/27/2023 86 >60 mL/min/1.73m2 Final   09/16/2020 82 >60 mL/min/[1.73_m2] Final     Comment:     Non  GFR Calc  Starting 12/18/2018, serum creatinine based estimated GFR (eGFR) will be   calculated using the Chronic Kidney Disease Epidemiology Collaboration   (CKD-EPI) equation.       GFR, ESTIMATED POCT   Date Value Ref Range Status   08/22/2023 >60 >60 mL/min/1.73m2 Final     GFR Estimate If Black   Date Value Ref Range Status   09/16/2020 >90 >60 mL/min/[1.73_m2] Final     Comment:      GFR " "Calc  Starting 12/18/2018, serum creatinine based estimated GFR (eGFR) will be   calculated using the Chronic Kidney Disease Epidemiology Collaboration   (CKD-EPI) equation.       Lab Results   Component Value Date    CR 0.78 12/27/2023    CR 0.80 09/16/2020     No results found for: \"MICROALBUMIN\"    Healthy Eating:  Healthy Eating Assessed Today: Yes  Cultural/Congregation diet restrictions?: No  Meal planning/habits: Smaller portions, Other (eating less sweets and adding salads)  Breakfast: cereal with milk, OJ, ham, cookies  Lunch: sandwich Or apple/banana  Dinner: mexican meal- ground beef/chips, sauce, sour cream OR hamburger with bun, baked bean/ potatoes  Or chicken wiht cream of celery soup, potatoes, green beans carrots.  OR salad, steak  Snacks: peanuts, chips, cookies, carrot cake  Beverages: Water, Juice, Alcohol, Other, Coffee (beer, mike water, occassional regular pepsi)  Has patient met with a dietitian in the past?: No    Being Active:  Being Active Assessed Today: Yes  Exercise:: Yes (stretching type activity, le lifts, walk in house, squats, chair exercises)  Days per week of moderate to strenuous exercise (like a brisk walk): 7  How intense was your typical exercise? : Light (like stretching or slow walking)  Barrier to exercise: Physical limitation    Monitoring:  Monitoring Assessed Today: Yes  Did patient bring glucose meter to appointment? : No (has a meter, tried testing in the past but didn't go well. Not interested in testing blood sugar at home.)    Taking Medications:  Taking Medication Assessed Today: Yes  Current Treatments: None    Problem Solving:  Problem Solving Assessed Today: Yes  Is the patient at risk for hypoglycemia?: No       Reducing Risks:  Reducing Risks Assessed Today: Yes  Diabetes Risks: Age over 45 years, Sedentary Lifestyle  CAD Risks: Sedentary lifestyle, Diabetes Mellitus, Male sex, Obesity, Hypertension  Has dilated eye exam at least once a year?: No  Sees dentist " every 6 months?: No  Feet checked by healthcare provider in the last year?: No    Healthy Coping:  Healthy Coping Assessed Today: Yes  Emotional response to diabetes: Ready to learn  Informal Support system:: Family  Stage of change: ACTION (Actively working towards change)  Patient Activation Measure Survey Score:      5/18/2011    11:00 AM   YEHUDA Score (Last Two)   YEHUDA Raw Score 43   Activation Score 68.5   YEHUDA Level 4         Care Plan and Education Provided:  Care Plan: Diabetes   Updates made by Geeta Carpenter RN since 1/9/2024 12:00 AM        Problem: Diabetes Self-Management Education Needed to Optimize Self-Care Behaviors         Goal: Healthy Eating - follow a healthy eating pattern for diabetes    This Visit's Progress: 100%   Note:    My Goal: I will limit fruit to 1 serving at a time    What I need to meet my goal: using a measuring cup, or size of a tennis ball    I plan to meet my goal by this date: 1/9/24         Goal: Problem Solving - know how to prevent and manage short-term diabetes complications         Task: Provide education on high blood glucose - causes, signs/symptoms, prevention and treatment Completed 1/9/2024   Responsible User: Geeta Carpenter RN        Task: Provide education on low blood glucose - causes, signs/symptoms, prevention, treatment, carrying a carbohydrate source at all times, and medical identification Completed 1/9/2024   Responsible User: Geeta Carpenter RN        Task: Provide education on when to call a health care provider Completed 1/9/2024   Responsible User: Geeta Carpenter RN        Goal: Reducing Risks - know how to prevent and treat long-term diabetes complications         Task: Provide education on major complications of diabetes, prevention, early diagnostic measures and treatment of complications Completed 1/9/2024   Responsible User: Geeta Carpenter RN        Task: Provide education on recommended care for dental, eye and foot health Completed  1/9/2024   Responsible User: Geeta Carpenter RN        Goal: Healthy Coping - use available resources to cope with the challenges of managing diabetes         Task: Discuss recognizing feelings about having diabetes Completed 1/9/2024   Responsible User: Geeta Carpenter RN        Task: Provide education on the benefits of making appropriate lifestyle changes Completed 1/9/2024   Responsible User: Geeta Carpenter RN Vickie Baeyen BSN, RN, PHN, [facility]     Time Spent: 30 minutes  Encounter Type: Individual    Any diabetes medication dose changes were made via the CDE Protocol per the patient's referring provider. A copy of this encounter was shared with the provider.

## 2024-01-09 NOTE — PATIENT INSTRUCTIONS
Care Plan:  Meal Plan Recommendation: continue to work on portions and limiting your sweets.  - Avoid sugar sweetened cereal  - Fruit - limit to 1 serving at a time ( tennis ball size fruit or small banana)    Exercise / activity plan: continue with your activity. Remember, activity helps control blood sugar.    Schedule your eye exam    Follow up:  Follow-up diabetes education yearly or sooner if needed.     Follow up with  in March. Call [phone].     Bring blood glucose meter and logbook with you to all doctor and follow-up appointments.     [place] [facility] for the [facility]:  For Your Diabetes or Nutrition Education Appointments Call:  [phone]   For Diabetes or Nutrition Related Questions:   [phone]  Send zerved Message   If you need a medication refill please contact your pharmacy. Please allow 3 business days for your refills to be completed.

## 2024-01-29 ENCOUNTER — ALLIED HEALTH/NURSE VISIT (OUTPATIENT)
Dept: FAMILY MEDICINE | Facility: CLINIC | Age: 89
End: 2024-01-29
Payer: MEDICARE

## 2024-01-29 VITALS — SYSTOLIC BLOOD PRESSURE: 122 MMHG | DIASTOLIC BLOOD PRESSURE: 62 MMHG

## 2024-01-29 DIAGNOSIS — Z01.30 BP CHECK: Primary | ICD-10-CM

## 2024-01-29 PROCEDURE — 99207 PR NO CHARGE NURSE ONLY: CPT

## 2024-01-29 NOTE — PROGRESS NOTES
Damien Vallecillo is a 88 year old patient who comes in today for a Blood Pressure check.  Initial BP:  /62 (BP Location: Left arm, Patient Position: Sitting, Cuff Size: Adult Large)      Disposition: follow-up as previously indicated by provider     Alba Basurto

## 2024-03-13 DIAGNOSIS — I10 ESSENTIAL HYPERTENSION: ICD-10-CM

## 2024-03-13 RX ORDER — LOSARTAN POTASSIUM 50 MG/1
50 TABLET ORAL DAILY
Qty: 90 TABLET | Refills: 0 | Status: SHIPPED | OUTPATIENT
Start: 2024-03-13 | End: 2024-04-01

## 2024-03-19 ENCOUNTER — TRANSFERRED RECORDS (OUTPATIENT)
Dept: HEALTH INFORMATION MANAGEMENT | Facility: CLINIC | Age: 89
End: 2024-03-19
Payer: MEDICARE

## 2024-03-19 LAB — RETINOPATHY: NEGATIVE

## 2024-03-21 ENCOUNTER — TELEPHONE (OUTPATIENT)
Dept: FAMILY MEDICINE | Facility: CLINIC | Age: 89
End: 2024-03-21
Payer: MEDICARE

## 2024-04-01 ENCOUNTER — OFFICE VISIT (OUTPATIENT)
Dept: FAMILY MEDICINE | Facility: CLINIC | Age: 89
End: 2024-04-01
Payer: MEDICARE

## 2024-04-01 VITALS
SYSTOLIC BLOOD PRESSURE: 139 MMHG | TEMPERATURE: 97.6 F | OXYGEN SATURATION: 99 % | BODY MASS INDEX: 37.65 KG/M2 | WEIGHT: 263 LBS | HEART RATE: 57 BPM | HEIGHT: 70 IN | RESPIRATION RATE: 20 BRPM | DIASTOLIC BLOOD PRESSURE: 88 MMHG

## 2024-04-01 DIAGNOSIS — Z29.11 NEED FOR VACCINATION AGAINST RESPIRATORY SYNCYTIAL VIRUS: ICD-10-CM

## 2024-04-01 DIAGNOSIS — Z85.09 HX OF MALIGNANT GASTROINTESTINAL STROMAL TUMOR (GIST): Primary | ICD-10-CM

## 2024-04-01 DIAGNOSIS — G62.9 PERIPHERAL POLYNEUROPATHY: ICD-10-CM

## 2024-04-01 DIAGNOSIS — R60.0 PEDAL EDEMA: ICD-10-CM

## 2024-04-01 DIAGNOSIS — I10 ESSENTIAL HYPERTENSION: ICD-10-CM

## 2024-04-01 DIAGNOSIS — E66.01 MORBID OBESITY DUE TO EXCESS CALORIES (H): ICD-10-CM

## 2024-04-01 DIAGNOSIS — E11.9 CONTROLLED TYPE 2 DIABETES MELLITUS WITHOUT COMPLICATION, WITHOUT LONG-TERM CURRENT USE OF INSULIN (H): ICD-10-CM

## 2024-04-01 PROCEDURE — 99214 OFFICE O/P EST MOD 30 MIN: CPT | Performed by: FAMILY MEDICINE

## 2024-04-01 RX ORDER — RESPIRATORY SYNCYTIAL VIRUS VACCINE 120MCG/0.5
0.5 KIT INTRAMUSCULAR ONCE
Qty: 1 EACH | Refills: 0 | Status: CANCELLED | OUTPATIENT
Start: 2024-04-01 | End: 2024-04-01

## 2024-04-01 RX ORDER — LOSARTAN POTASSIUM 100 MG/1
100 TABLET ORAL DAILY
Qty: 90 TABLET | Refills: 2 | Status: SHIPPED | OUTPATIENT
Start: 2024-04-01

## 2024-04-01 NOTE — PROGRESS NOTES
"  Assessment & Plan   Problem List Items Addressed This Visit       Controlled type 2 diabetes mellitus without complication, without long-term current use of insulin (H)     Diet controlled. He has seen Diabetic Ed         Essential hypertension     Controlled. 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 Oncology surveillance. Oncology notes reviewed         Morbid obesity due to excess calories (H)     Weight stable         Need for vaccination against respiratory syncytial virus     Recommended at pharmacy         Pedal edema     Compression hose, loop diuretics         Relevant Medications    furosemide (LASIX) 20 MG tablet    Peripheral polyneuropathy     Predates Dm dx                    BMI  Estimated body mass index is 37.74 kg/m  as calculated from the following:    Height as of this encounter: 1.778 m (5' 10\").    Weight as of this encounter: 119.3 kg (263 lb).   Weight management plan: maintain          Subjective   Damien is a 88 year old, presenting for the following health issues:  Hypertension    HPI       Hypertension Follow-up    Do you check your blood pressure regularly outside of the clinic? Yes   Are you following a low salt diet? Yes  Are your blood pressures ever more than 140 on the top number (systolic) OR more   than 90 on the bottom number (diastolic), for example 140/90? Yes  How many servings of fruits and vegetables do you eat daily?  0-1  On average, how many sweetened beverages do you drink each day (Examples: soda, juice, sweet tea, etc.  Do NOT count diet or artificially sweetened beverages)?   0.5  How many days per week do you exercise enough to make your heart beat faster? 3 or less  How many minutes a day do you exercise enough to make your heart beat faster? 9 or less  How many days per week do you miss taking your medication? 0      No complaints. He is unsteady on " "his feet      Objective    /88 (BP Location: Left arm, Patient Position: Sitting, Cuff Size: Adult Large)   Pulse 57   Temp 97.6  F (36.4  C) (Oral)   Resp 20   Ht 1.778 m (5' 10\")   Wt 119.3 kg (263 lb)   SpO2 99%   BMI 37.74 kg/m    Body mass index is 37.74 kg/m .  Physical Exam   No distress  Compression hose          Signed Electronically by: Obi Strange MD    "

## 2024-04-02 PROBLEM — Z23 HIGH PRIORITY FOR 2019-NCOV VACCINE: Status: RESOLVED | Noted: 2023-06-02 | Resolved: 2024-04-02

## 2024-04-02 PROBLEM — E11.9 CONTROLLED TYPE 2 DIABETES MELLITUS WITHOUT COMPLICATION, WITHOUT LONG-TERM CURRENT USE OF INSULIN (H): Status: ACTIVE | Noted: 2021-09-16

## 2024-04-02 PROBLEM — Z12.5 SPECIAL SCREENING FOR MALIGNANT NEOPLASM OF PROSTATE: Status: RESOLVED | Noted: 2021-09-16 | Resolved: 2024-04-02

## 2024-04-02 PROBLEM — N39.0 SEPSIS DUE TO URINARY TRACT INFECTION (H): Status: RESOLVED | Noted: 2023-10-01 | Resolved: 2024-04-02

## 2024-04-02 PROBLEM — Z85.09 HISTORY OF GASTROINTESTINAL STROMAL TUMOR (GIST): Status: RESOLVED | Noted: 2023-09-20 | Resolved: 2024-04-02

## 2024-04-02 PROBLEM — I10 ESSENTIAL HYPERTENSION: Status: ACTIVE | Noted: 2023-10-12

## 2024-04-02 PROBLEM — Z29.11 NEED FOR VACCINATION AGAINST RESPIRATORY SYNCYTIAL VIRUS: Status: ACTIVE | Noted: 2024-04-02

## 2024-04-02 PROBLEM — A41.9 SEPSIS DUE TO URINARY TRACT INFECTION (H): Status: RESOLVED | Noted: 2023-10-01 | Resolved: 2024-04-02

## 2024-04-02 RX ORDER — FUROSEMIDE 20 MG
20 TABLET ORAL 2 TIMES DAILY
Qty: 180 TABLET | Refills: 1 | Status: SHIPPED | OUTPATIENT
Start: 2024-04-02

## 2024-05-28 ENCOUNTER — HOSPITAL ENCOUNTER (OUTPATIENT)
Dept: CT IMAGING | Facility: CLINIC | Age: 89
Discharge: HOME | End: 2024-05-28
Attending: INTERNAL MEDICINE | Admitting: INTERNAL MEDICINE
Payer: MEDICARE

## 2024-05-28 ENCOUNTER — LAB (OUTPATIENT)
Dept: INFUSION THERAPY | Facility: CLINIC | Age: 89
End: 2024-05-28
Attending: PHYSICIAN ASSISTANT
Payer: MEDICARE

## 2024-05-28 DIAGNOSIS — C49.A0 MALIGNANT GASTROINTESTINAL STROMAL TUMOR, UNSPECIFIED SITE (H): ICD-10-CM

## 2024-05-28 DIAGNOSIS — N40.0 ENLARGED PROSTATE: ICD-10-CM

## 2024-05-28 LAB
ALBUMIN SERPL BCG-MCNC: 4.2 G/DL (ref 3.5–5.2)
ALP SERPL-CCNC: 67 U/L (ref 40–150)
ALT SERPL W P-5'-P-CCNC: 23 U/L (ref 0–70)
ANION GAP SERPL CALCULATED.3IONS-SCNC: 13 MMOL/L (ref 7–15)
AST SERPL W P-5'-P-CCNC: 21 U/L (ref 0–45)
BASOPHILS # BLD AUTO: 0 10E3/UL (ref 0–0.2)
BASOPHILS NFR BLD AUTO: 1 %
BILIRUB SERPL-MCNC: 0.8 MG/DL
BUN SERPL-MCNC: 19.1 MG/DL (ref 8–23)
CALCIUM SERPL-MCNC: 9 MG/DL (ref 8.8–10.2)
CHLORIDE SERPL-SCNC: 106 MMOL/L (ref 98–107)
CREAT SERPL-MCNC: 0.82 MG/DL (ref 0.67–1.17)
DEPRECATED HCO3 PLAS-SCNC: 24 MMOL/L (ref 22–29)
EGFRCR SERPLBLD CKD-EPI 2021: 85 ML/MIN/1.73M2
EOSINOPHIL # BLD AUTO: 0.3 10E3/UL (ref 0–0.7)
EOSINOPHIL NFR BLD AUTO: 4 %
ERYTHROCYTE [DISTWIDTH] IN BLOOD BY AUTOMATED COUNT: 13.8 % (ref 10–15)
GLUCOSE SERPL-MCNC: 129 MG/DL (ref 70–99)
HCT VFR BLD AUTO: 42.9 % (ref 40–53)
HGB BLD-MCNC: 14.3 G/DL (ref 13.3–17.7)
IMM GRANULOCYTES # BLD: 0 10E3/UL
IMM GRANULOCYTES NFR BLD: 1 %
LYMPHOCYTES # BLD AUTO: 0.9 10E3/UL (ref 0.8–5.3)
LYMPHOCYTES NFR BLD AUTO: 14 %
MCH RBC QN AUTO: 29.8 PG (ref 26.5–33)
MCHC RBC AUTO-ENTMCNC: 33.3 G/DL (ref 31.5–36.5)
MCV RBC AUTO: 89 FL (ref 78–100)
MONOCYTES # BLD AUTO: 0.6 10E3/UL (ref 0–1.3)
MONOCYTES NFR BLD AUTO: 10 %
NEUTROPHILS # BLD AUTO: 4.6 10E3/UL (ref 1.6–8.3)
NEUTROPHILS NFR BLD AUTO: 70 %
NRBC # BLD AUTO: 0 10E3/UL
NRBC BLD AUTO-RTO: 0 /100
PLATELET # BLD AUTO: 188 10E3/UL (ref 150–450)
POTASSIUM SERPL-SCNC: 3.8 MMOL/L (ref 3.4–5.3)
PROT SERPL-MCNC: 6.8 G/DL (ref 6.4–8.3)
RBC # BLD AUTO: 4.8 10E6/UL (ref 4.4–5.9)
SODIUM SERPL-SCNC: 143 MMOL/L (ref 135–145)
WBC # BLD AUTO: 6.4 10E3/UL (ref 4–11)

## 2024-05-28 PROCEDURE — 82040 ASSAY OF SERUM ALBUMIN: CPT | Performed by: INTERNAL MEDICINE

## 2024-05-28 PROCEDURE — 250N000009 HC RX 250: Performed by: INTERNAL MEDICINE

## 2024-05-28 PROCEDURE — 85025 COMPLETE CBC W/AUTO DIFF WBC: CPT | Performed by: INTERNAL MEDICINE

## 2024-05-28 PROCEDURE — 250N000011 HC RX IP 250 OP 636: Performed by: INTERNAL MEDICINE

## 2024-05-28 PROCEDURE — 71260 CT THORAX DX C+: CPT | Mod: MG

## 2024-05-28 PROCEDURE — 36415 COLL VENOUS BLD VENIPUNCTURE: CPT

## 2024-05-28 PROCEDURE — 84153 ASSAY OF PSA TOTAL: CPT | Performed by: INTERNAL MEDICINE

## 2024-05-28 RX ORDER — IOPAMIDOL 755 MG/ML
500 INJECTION, SOLUTION INTRAVASCULAR ONCE
Status: COMPLETED | OUTPATIENT
Start: 2024-05-28 | End: 2024-05-28

## 2024-05-28 RX ADMIN — SODIUM CHLORIDE 65 ML: 9 INJECTION, SOLUTION INTRAVENOUS at 11:21

## 2024-05-28 RX ADMIN — IOPAMIDOL 99 ML: 755 INJECTION, SOLUTION INTRAVENOUS at 11:21

## 2024-05-28 NOTE — PROGRESS NOTES
Nursing Note:  Damien Vallecillo presents today for Labs + PIV insertion for CT scan.    Patient seen by provider today: No   present during visit today: Not Applicable.    Note: N/A.    Intravenous Access:  Labs drawn without difficulty.  Peripheral IV placed.    Discharge Plan:   Patient was sent to imaging department for CT appointment.  Imaging department to discontinue PIV after scan is complete.      Anuel Ni RN

## 2024-05-29 NOTE — MR AVS SNAPSHOT
After Visit Summary   5/10/2017    Damien Vallecillo    MRN: [id number]           Patient Information     Date Of Birth          1935        Visit Information        Provider Department      5/10/2017 1:20 PM Alec Raymond MD [facility] ORTHOPEDIC SURGERY        Today's Diagnoses     Bilateral foot pain    -  1       Follow-ups after your visit        Your next 10 appointments already scheduled     Jun 12, 2017   Procedure with Alec Raymond MD   [facility] (--)    [address]  [facility] [address]   [phone]            Jun 16, 2017  1:30 PM CDT   Return Visit with Beverly Kim DPM, Podiatry/Foot and Ankle Surgery   [facility] ([facility])    [address]  [address]  [facility] [address]   [phone]            Jun 23, 2017  9:40 AM CDT   Return Visit with Thomas Govea PA-C   [facility]THOPEDIC SURGERY ([facility])    [address]  [address]  [facility] [address]   [phone]              Who to contact     If you have questions or need follow up information about today's clinic visit or your schedule please contact [facility] ORTHOPEDIC SURGERY directly at [phone].  Normal or non-critical lab and imaging results will be communicated to you by [employer], letter or phone within 4 business days after the clinic has received the results. If you do not hear from us within 7 days, please contact the clinic through MyChart or phone. If you have a critical or abnormal lab result, we will notify you by phone as soon as possible.  Submit refill requests through [employer] or call your pharmacy and they will forward the refill request to us. Please allow 3 business days for your refill to be completed.          Additional Information About Your Visit        [employer] Information     [employer] lets you send messages to your doctor, view your test results,  "renew your prescriptions, schedule appointments and more. To sign up, go to [email or web address] . Click on \"Log in\" on the left side of the screen, which will take you to the Welcome page. Then click on \"Sign up Now\" on the right side of the page.     You will be asked to enter the access code listed below, as well as some personal information. Please follow the directions to create your username and password.     Your access code is: [email or web address]  Expires: 2017  9:15 AM     Your access code will  in 90 days. If you need help or a new code, please call your [facility] clinic or [phone].        Care EveryWhere ID     This is your Care EveryWhere ID. This could be used by other organizations to access your [facility] medical records  [id number]        Your Vitals Were     Height BMI (Body Mass Index)                5' 10\" (1.778 m) 38.45 kg/m2           Blood Pressure from Last 3 Encounters:   17 140/84   05/10/17 144/72   17 138/68    Weight from Last 3 Encounters:   05/15/17 267 lb 9.6 oz (121.4 kg)   17 268 lb (121.6 kg)   05/10/17 268 lb (121.6 kg)               Primary Care Provider Office Phone # Fax #    Obi Strange [phone]       [address] [address]        Thank you!     Thank you for choosing [facility] ORTHOPEDIC SURGERY  for your care. Our goal is always to provide you with excellent care. Hearing back from our patients is one way we can continue to improve our services. Please take a few minutes to complete the written survey that you may receive in the mail after your visit with us. Thank you!             Your Updated Medication List - Protect others around you: Learn how to safely use, store and throw away your medicines at www.disposemymeds.org.          This list is accurate as of: 5/10/17 11:59 PM.  Always use your most recent med list.                   Brand Name Dispense Instructions for " use    ACE NOT PRESCRIBED (INTENTIONAL)     0 each    ACE Inhibitor not prescribed due to Other:  Had cough on ACEI in past       ASPIRIN PO      Take 325 mg by mouth daily       atorvastatin 40 MG tablet    LIPITOR    90 tablet    Take 1 tablet (40 mg) by mouth At Bedtime       * blood glucose monitoring test strip    ACCU-CHEK LAURA    100 strip    1Use to test blood sugar 2 times daily or as directed.       * blood glucose monitoring test strip    ACCU-CHEK LAURA PLUS    100 each    Use to test blood sugar 1 time daily       CINNAMON PO          fish oil-omega-3 fatty acids 1000 MG capsule      Take 1 g by mouth every evening       flax seed oil 1000 MG capsule      Take 1 capsule by mouth every evening       fluticasone 50 MCG/ACT spray    FLONASE    1 Bottle    Spray 1-2 sprays into both nostrils daily       Glucosamine HCl 1000 MG Tabs      Take 1 tablet by mouth every evening       guaiFENesin 600 MG 12 hr tablet    MUCINEX    120 tablet    Take 2 tablets (1,200 mg) by mouth 2 times daily       MULTIVITAMIN ADULT PO      Reported on 5/12/2017       tamsulosin 0.4 MG capsule    FLOMAX    90 capsule    Take 1 capsule (0.4 mg) by mouth At Bedtime       triamcinolone 0.5 % cream    KENALOG    30 g    Apply sparingly to affected area three times daily.       * Notice:  This list has 2 medication(s) that are the same as other medications prescribed for you. Read the directions carefully, and ask your doctor or other care provider to review them with you.       Normal for race

## 2024-05-30 NOTE — PROGRESS NOTES
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      Patient has history of colon resection with reanastomosis due to high grade polyps 10-12 years ago.     He has had recurrent abdominal pain with GIB.     EGD 5/25/23: Normal esophagus, single submucosal papule (nodule) found in the stomach.     Colonoscopy 5/25/23: Patent end-to-side ileo-colonic anastomosis, diverticulosis with bleed.     Colonoscopy 6/12/23: end-to-end ileo-colonic anastomosis. Green stool coming from ileum. Blood in the recto-sigmoid colon in the descending colon and in the transverse colon. Diverticulosis.      EUS 7/13/23: 2.7 cm gastric submucosal nodule. Benign appearing small pancreatic cyst. Cholelithiasis without choledocholithiasis. Path returned as gastrointestinal stromal tumor, low mitotic activity <1/50 HPF.     CT A/P 8/22/23: exophytic 2 cm solid mass arising from the lesser curvature of the stomach, compatible with known GIST. No definite evidence of metastatic disease. Subcentimeter hypoattenuating lesions of the liver too small to characterize. Cholelithiasis, hepatomegaly with hepatic steatosis, colonic diverticulosis, and prostatomegaly.     On 9/20/23, he underwent robotic assisted diagnostic lap with resection of gastric GIST and omental biopsy with Dr. Ny. 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 Omental biopsy and resection were negative for malignancy. (pT2Nx, 2,8 cm greatest dimension, 0 mitoses/5mm2, Grade 1 low risk, no necrosis, all margins negative, no lymph nodes submited, Positive KIT/DOG1).    Follow-up PET on 11/21/2023 with ALVARADO.  Dr. Pearson discussed that he needed follow-up with his surgery team and repeat EGD in addition to colonoscopy given his history.  Imatinib was briefly discussed and low risk setting, but he wishes to continue to monitor and  surveillance.    He is here today for close follow-up and CT CAP.    Interval History: Damien is here unaccompanied today.  He continues to do well with no complaints.  No fever, chills, erin pain, urinary changes, stool changes.  Appetite is good.    Review of Systems: See interval hx. Denies fevers, chills, changes in vision, cough, CP, SOB, abdominal pain, N/V, diarrhea, changes in urination, bleeding, bruising.     PMHx and Social Hx reviewed per EPIC.      Medications:  Current Outpatient Medications   Medication Sig Dispense Refill    atorvastatin (LIPITOR) 40 MG tablet Take 20 mg by mouth every evening      CINNAMON PO Take 1 tablet by mouth every evening      diclofenac (VOLTAREN) 1 % topical gel Apply 2 g topically 2 times daily      doxycycline hyclate (PERIOSTAT) 20 MG tablet Take 1 tablet (20 mg) by mouth daily      ferrous sulfate (FEROSUL) 325 (65 Fe) MG tablet Take 1 tablet (325 mg) by mouth daily (with breakfast)      fish oil-omega-3 fatty acids 1000 MG capsule Take 1 g by mouth daily      Flaxseed, Linseed, (FLAXSEED OIL) 1000 MG CAPS Take 1,000 mg by mouth every evening      furosemide (LASIX) 20 MG tablet Take 1 tablet (20 mg) by mouth 2 times daily 180 tablet 1    glucosamine-chondroitin 500-400 MG CAPS per capsule Take 1 capsule by mouth daily      losartan (COZAAR) 100 MG tablet Take 1 tablet (100 mg) by mouth daily 90 tablet 2    metroNIDAZOLE (METROGEL) 0.75 % external gel APPLY THIN LAYER TO FACE TWICE A DAY FOR ROSACEA      Multiple Vitamins-Minerals (MULTIVITAMIN ADULT PO) Take 1 tablet by mouth daily Reported on 5/12/2017      order for DME 1: Gradient Compression Wraps; 2: Cast Boots; 3: BLE knee or thigh high 20-30 mm Hg compression stocking; 4: BLE knee or thigh high custom compression stocking; 5: Alternative BLE knee high or thigh compression garments (velcro/buckling) 1 each 0    order for DME Equipment being ordered: Walker Wheels () and Walker ()  Treatment Diagnosis:  Impaired functional mobility 1 each 0    tamsulosin (FLOMAX) 0.4 MG capsule Take 1 capsule (0.4 mg) by mouth daily (Patient taking differently: Take 0.4 mg by mouth every evening) 90 capsule 3    acetaminophen (TYLENOL) 500 MG tablet Take 500-1,000 mg by mouth every 6 hours as needed for mild pain (Patient not taking: Reported on 5/31/2024)      neomycin-polymyxin-dexAMETHasone (MAXITROL) 3.5-78914-6.1 ophthalmic ointment 0.5 inches as needed (Patient not taking: Reported on 5/31/2024)      pramox-pe-glycerin-petrolatum (PREPARATION H) 1-0.25-14.4-15 % CREA cream Place rectally 4 times daily as needed for hemorrhoids Use as directed (Patient not taking: Reported on 5/31/2024)         Allergies   Allergen Reactions    Levetiracetam [Keppra] Rash    Oxcarbazepine [Oxcarbazepine] Rash       EXAM:    BP (!) 147/82   Pulse 52   Temp 98.3  F (36.8  C) (Temporal)   Resp 14   Wt 120 kg (264 lb 9.6 oz)   SpO2 99%   BMI 37.97 kg/m      GENERAL:  Male, in no acute distress.  Alert and oriented x3. Well groomed.   HEENT:  Normocephalic, atraumatic. No conjunctival injection or eye swelling.   LUNGS:  Nonlabored breathing, no cough or audible wheezing, able to speak full sentences.  MSK: Full ROM UE.    SKIN: No rash on exposed skin.   NEURO: CN grossly intact, speech normal  PSYCH: Mentation appears normal, insight and judgement intact      Labs:    05/28/24 10:18   Sodium 143   Potassium 3.8   Chloride 106   Carbon Dioxide (CO2) 24   Urea Nitrogen 19.1   Creatinine 0.82   GFR Estimate 85   Calcium 9.0   Anion Gap 13   Albumin 4.2   Protein Total 6.8   Alkaline Phosphatase 67   ALT 23   AST 21   Bilirubin Total 0.8   Glucose 129 (H)   WBC 6.4   Hemoglobin 14.3   Hematocrit 42.9   Platelet Count 188   RBC Count 4.80   MCV 89   MCH 29.8   MCHC 33.3   RDW 13.8   % Neutrophils 70   % Lymphocytes 14   % Monocytes 10   % Eosinophils 4   % Basophils 1   Absolute Basophils 0.0   Absolute Eosinophils 0.3   Absolute Immature  Granulocytes 0.0   Absolute Lymphocytes 0.9   Absolute Monocytes 0.6   % Immature Granulocytes 1   Absolute Neutrophils 4.6   Absolute NRBCs 0.0   NRBCs per 100 WBC 0   (H): Data is abnormally high    Imaging:   CT CHEST/ABDOMEN/PELVIS WITH CONTRAST May 28, 2024 11:29 AM     CLINICAL HISTORY: Malignant gastrointestinal stromal tumor,  unspecified site (H).     TECHNIQUE: CT scan of the chest, abdomen, and pelvis was performed  following injection of IV contrast. Multiplanar reformats were  obtained. Dose reduction techniques were used.   CONTRAST: 99mL Isovue-370.     COMPARISON: PET/CT 11/21/2023.     FINDINGS:   LUNGS AND PLEURA: No effusions or acute airspace consolidation. Stable  solid right middle lobe nodule measuring 5 mm series 12 image 152. No  new airspace disease identified.     MEDIASTINUM/AXILLAE: No adenopathy or acute mediastinal abnormality.  Mild thoracic aortic calcifications.     CORONARY ARTERY CALCIFICATION: Mild.     HEPATOBILIARY: No new focal hepatic observation. Multiple layering  gallstones.     PANCREAS: Fatty atrophy.     SPLEEN: No new worrisome abnormality. Incidental stable splenule.     ADRENAL GLANDS: Stable mild nodular thickening of the adrenals  bilaterally. No new adrenal abnormality.     KIDNEYS/BLADDER: Normal.     BOWEL: Stable postoperative changes. Colonic diverticulosis without  diverticulitis. No acute bowel abnormality identified. No visible  focal bowel lesion.     PELVIC ORGANS: Prostate measures 5.7 cm. No acute pelvic abnormality.     ADDITIONAL FINDINGS: None.     MUSCULOSKELETAL: Diffuse degenerative changes of the spine. No focal  bony lesion identified.                                                                      IMPRESSION:  1. Stable exam without evidence for new disease.  2. Stable pulmonary nodule on the right.  3. Enlarged prostate.  4. Cholelithiasis.    Impression/Plan: Damien Vallecillo is a 88 year old male with GIST s/p resection, currently on  surveillance.    GIST: S/p resection of gastric GIST and omental biopsy, path returned spindle cell type of the lesser curvature of the stomach.  Omental biopsy negative for malignancy.  All margins negative, no lymph nodes submitted.  PET scan November 2023 with no malignancy.  Dr. Pearson had previously discussed observation versus imatinib and due to advanced age and patient's wishes, he continued on surveillance.  Labs reviewed today and CBC/CMP look good.  CT CAP on 5/28/2024 with no new disease, stable pulmonary nodule, enlarged prostate.  Dr. Pearson recommended follow-up with repeat labs and CT CAP in 1 year.    -MD, labs, CT CAP in 1 year    Enlarged prostate: PSA normal 2 years ago, I have added this on and pending.      Chart documentation with Dragon Voice recognition Software. Although reviewed after completion, some words and grammatical errors may remain.    25 minutes spent on the date of the encounter doing chart review, review of test results, interpretation of tests, patient visit, and documentation     Arlene Madsen PA-C  Hematology/Oncology  [facility]                 [id number] Detailed

## 2024-05-31 ENCOUNTER — ONCOLOGY VISIT (OUTPATIENT)
Dept: ONCOLOGY | Facility: CLINIC | Age: 89
End: 2024-05-31
Attending: INTERNAL MEDICINE
Payer: MEDICARE

## 2024-05-31 VITALS
WEIGHT: 264.6 LBS | SYSTOLIC BLOOD PRESSURE: 147 MMHG | DIASTOLIC BLOOD PRESSURE: 82 MMHG | RESPIRATION RATE: 14 BRPM | HEART RATE: 52 BPM | OXYGEN SATURATION: 99 % | TEMPERATURE: 98.3 F | BODY MASS INDEX: 37.97 KG/M2

## 2024-05-31 DIAGNOSIS — C49.A0 MALIGNANT GASTROINTESTINAL STROMAL TUMOR, UNSPECIFIED SITE (H): Primary | ICD-10-CM

## 2024-05-31 DIAGNOSIS — N40.0 ENLARGED PROSTATE: ICD-10-CM

## 2024-05-31 PROCEDURE — G0463 HOSPITAL OUTPT CLINIC VISIT: HCPCS | Performed by: PHYSICIAN ASSISTANT

## 2024-05-31 PROCEDURE — 99214 OFFICE O/P EST MOD 30 MIN: CPT | Performed by: PHYSICIAN ASSISTANT

## 2024-05-31 ASSESSMENT — PAIN SCALES - GENERAL: PAINLEVEL: NO PAIN (0)

## 2024-05-31 NOTE — NURSING NOTE
"Oncology Rooming Note    May 31, 2024 2:37 PM   Damien Vallecillo is a 88 year old male who presents for:    Chief Complaint   Patient presents with    Oncology Clinic Visit     Hx of malignant gastrointestinal stromal tumor (GIST)       Initial Vitals: BP (!) 147/82   Pulse 52   Temp 98.3  F (36.8  C) (Temporal)   Resp 14   Wt 120 kg (264 lb 9.6 oz)   SpO2 99%   BMI 37.97 kg/m   Estimated body mass index is 37.97 kg/m  as calculated from the following:    Height as of 4/1/24: 1.778 m (5' 10\").    Weight as of this encounter: 120 kg (264 lb 9.6 oz). Body surface area is 2.43 meters squared.  No Pain (0) Comment: Data Unavailable   No LMP for male patient.  Allergies reviewed: Yes  Medications reviewed: Yes    Medications: Medication refills not needed today.  Pharmacy name entered into [employer]:    [place], [place] - [address] [facility] - [place], [place] - [id number] - [place], [place] - [address] [facility] [address]    Frailty Screening:   Is the patient here for a new oncology consult visit in cancer care? 2. No      Clinical concerns: Follow up       Katlyn Tarango MA              "

## 2024-05-31 NOTE — LETTER
5/31/2024         RE: Damien Vallecillo  [address]  [facility] [address]        Dear Colleague,    Thank you for referring your patient, Damien Vallecillo, to the [facility]. Please see a copy of my visit note below.    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      Patient has history of colon resection with reanastomosis due to high grade polyps 10-12 years ago.     He has had recurrent abdominal pain with GIB.     EGD 5/25/23: Normal esophagus, single submucosal papule (nodule) found in the stomach.     Colonoscopy 5/25/23: Patent end-to-side ileo-colonic anastomosis, diverticulosis with bleed.     Colonoscopy 6/12/23: end-to-end ileo-colonic anastomosis. Green stool coming from ileum. Blood in the recto-sigmoid colon in the descending colon and in the transverse colon. Diverticulosis.      EUS 7/13/23: 2.7 cm gastric submucosal nodule. Benign appearing small pancreatic cyst. Cholelithiasis without choledocholithiasis. Path returned as gastrointestinal stromal tumor, low mitotic activity <1/50 HPF.     CT A/P 8/22/23: exophytic 2 cm solid mass arising from the lesser curvature of the stomach, compatible with known GIST. No definite evidence of metastatic disease. Subcentimeter hypoattenuating lesions of the liver too small to characterize. Cholelithiasis, hepatomegaly with hepatic steatosis, colonic diverticulosis, and prostatomegaly.     On 9/20/23, he underwent robotic assisted diagnostic lap with resection of gastric GIST and omental biopsy with Dr. Ny. 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 Omental biopsy and resection were negative for malignancy. (pT2Nx, 2,8 cm greatest dimension, 0 mitoses/5mm2, Grade 1 low risk, no necrosis, all margins negative, no lymph nodes submited, Positive KIT/DOG1).    Follow-up  PET on 11/21/2023 with [facility]O.  Dr. Pearson discussed that he needed follow-up with his surgery team and repeat EGD in addition to colonoscopy given his history.  Imatinib was briefly discussed and low risk setting, but he wishes to continue to monitor and surveillance.    He is here today for close follow-up and CT CAP.    Interval History: Damien is here unaccompanied today.  He continues to do well with no complaints.  No fever, chills, erin pain, urinary changes, stool changes.  Appetite is good.    Review of Systems: See interval hx. Denies fevers, chills, changes in vision, cough, CP, SOB, abdominal pain, N/V, diarrhea, changes in urination, bleeding, bruising.     PMHx and Social Hx reviewed per EPIC.      Medications:  Current Outpatient Medications   Medication Sig Dispense Refill     atorvastatin (LIPITOR) 40 MG tablet Take 20 mg by mouth every evening       CINNAMON PO Take 1 tablet by mouth every evening       diclofenac (VOLTAREN) 1 % topical gel Apply 2 g topically 2 times daily       doxycycline hyclate (PERIOSTAT) 20 MG tablet Take 1 tablet (20 mg) by mouth daily       ferrous sulfate (FEROSUL) 325 (65 Fe) MG tablet Take 1 tablet (325 mg) by mouth daily (with breakfast)       fish oil-omega-3 fatty acids 1000 MG capsule Take 1 g by mouth daily       Flaxseed, Linseed, (FLAXSEED OIL) 1000 MG CAPS Take 1,000 mg by mouth every evening       furosemide (LASIX) 20 MG tablet Take 1 tablet (20 mg) by mouth 2 times daily 180 tablet 1     glucosamine-chondroitin 500-400 MG CAPS per capsule Take 1 capsule by mouth daily       losartan (COZAAR) 100 MG tablet Take 1 tablet (100 mg) by mouth daily 90 tablet 2     metroNIDAZOLE (METROGEL) 0.75 % external gel APPLY THIN LAYER TO FACE TWICE A DAY FOR ROSACEA       Multiple Vitamins-Minerals (MULTIVITAMIN ADULT PO) Take 1 tablet by mouth daily Reported on 5/12/2017       order for DME 1: Gradient Compression Wraps; 2: Cast Boots; 3: BLE knee or thigh high 20-30 mm Hg  compression stocking; 4: BLE knee or thigh high custom compression stocking; 5: Alternative BLE knee high or thigh compression garments (velcro/buckling) 1 each 0     order for DME Equipment being ordered: Walker Wheels () and Walker ()  Treatment Diagnosis: Impaired functional mobility 1 each 0     tamsulosin (FLOMAX) 0.4 MG capsule Take 1 capsule (0.4 mg) by mouth daily (Patient taking differently: Take 0.4 mg by mouth every evening) 90 capsule 3     acetaminophen (TYLENOL) 500 MG tablet Take 500-1,000 mg by mouth every 6 hours as needed for mild pain (Patient not taking: Reported on 5/31/2024)       neomycin-polymyxin-dexAMETHasone (MAXITROL) 3.5-34144-7.1 ophthalmic ointment 0.5 inches as needed (Patient not taking: Reported on 5/31/2024)       pramox-pe-glycerin-petrolatum (PREPARATION H) 1-0.25-14.4-15 % CREA cream Place rectally 4 times daily as needed for hemorrhoids Use as directed (Patient not taking: Reported on 5/31/2024)         Allergies   Allergen Reactions     Levetiracetam [Keppra] Rash     Oxcarbazepine [Oxcarbazepine] Rash       EXAM:    BP (!) 147/82   Pulse 52   Temp 98.3  F (36.8  C) (Temporal)   Resp 14   Wt 120 kg (264 lb 9.6 oz)   SpO2 99%   BMI 37.97 kg/m      GENERAL:  Male, in no acute distress.  Alert and oriented x3. Well groomed.   HEENT:  Normocephalic, atraumatic. No conjunctival injection or eye swelling.   LUNGS:  Nonlabored breathing, no cough or audible wheezing, able to speak full sentences.  MSK: Full ROM UE.    SKIN: No rash on exposed skin.   NEURO: CN grossly intact, speech normal  PSYCH: Mentation appears normal, insight and judgement intact      Labs:    05/28/24 10:18   Sodium 143   Potassium 3.8   Chloride 106   Carbon Dioxide (CO2) 24   Urea Nitrogen 19.1   Creatinine 0.82   GFR Estimate 85   Calcium 9.0   Anion Gap 13   Albumin 4.2   Protein Total 6.8   Alkaline Phosphatase 67   ALT 23   AST 21   Bilirubin Total 0.8   Glucose 129 (H)   WBC 6.4    Hemoglobin 14.3   Hematocrit 42.9   Platelet Count 188   RBC Count 4.80   MCV 89   MCH 29.8   MCHC 33.3   RDW 13.8   % Neutrophils 70   % Lymphocytes 14   % Monocytes 10   % Eosinophils 4   % Basophils 1   Absolute Basophils 0.0   Absolute Eosinophils 0.3   Absolute Immature Granulocytes 0.0   Absolute Lymphocytes 0.9   Absolute Monocytes 0.6   % Immature Granulocytes 1   Absolute Neutrophils 4.6   Absolute NRBCs 0.0   NRBCs per 100 WBC 0   (H): Data is abnormally high    Imaging:   CT CHEST/ABDOMEN/PELVIS WITH CONTRAST May 28, 2024 11:29 AM     CLINICAL HISTORY: Malignant gastrointestinal stromal tumor,  unspecified site (H).     TECHNIQUE: CT scan of the chest, abdomen, and pelvis was performed  following injection of IV contrast. Multiplanar reformats were  obtained. Dose reduction techniques were used.   CONTRAST: 99mL Isovue-370.     COMPARISON: PET/CT 11/21/2023.     FINDINGS:   LUNGS AND PLEURA: No effusions or acute airspace consolidation. Stable  solid right middle lobe nodule measuring 5 mm series 12 image 152. No  new airspace disease identified.     MEDIASTINUM/AXILLAE: No adenopathy or acute mediastinal abnormality.  Mild thoracic aortic calcifications.     CORONARY ARTERY CALCIFICATION: Mild.     HEPATOBILIARY: No new focal hepatic observation. Multiple layering  gallstones.     PANCREAS: Fatty atrophy.     SPLEEN: No new worrisome abnormality. Incidental stable splenule.     ADRENAL GLANDS: Stable mild nodular thickening of the adrenals  bilaterally. No new adrenal abnormality.     KIDNEYS/BLADDER: Normal.     BOWEL: Stable postoperative changes. Colonic diverticulosis without  diverticulitis. No acute bowel abnormality identified. No visible  focal bowel lesion.     PELVIC ORGANS: Prostate measures 5.7 cm. No acute pelvic abnormality.     ADDITIONAL FINDINGS: None.     MUSCULOSKELETAL: Diffuse degenerative changes of the spine. No focal  bony lesion identified.                                                                       IMPRESSION:  1. Stable exam without evidence for new disease.  2. Stable pulmonary nodule on the right.  3. Enlarged prostate.  4. Cholelithiasis.    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    GIST: S/p resection of gastric GIST and omental biopsy, path returned spindle cell type of the lesser curvature of the stomach.  Omental biopsy negative for malignancy.  All margins negative, no lymph nodes submitted.  PET scan November 2023 with no malignancy.  Dr. Pearson had previously discussed observation versus imatinib and due to advanced age and patient's wishes, he continued on surveillance.  Labs reviewed today and CBC/CMP look good.  CT CAP on 5/28/2024 with no new disease, stable pulmonary nodule, enlarged prostate.  Dr. Pearson recommended follow-up with repeat labs and CT CAP in 1 year.    -MD, labs, CT CAP in 1 year    Enlarged prostate: PSA normal 2 years ago, I have added this on and pending.      Chart documentation with Dragon Voice recognition Software. Although reviewed after completion, some words and grammatical errors may remain.    25 minutes spent on the date of the encounter doing chart review, review of test results, interpretation of tests, patient visit, and documentation     Arlene Madsen PA-C  Hematology/Oncology  [facility]                  Again, thank you for allowing me to participate in the care of your patient.        Sincerely,        Arlene Madsen PA-C

## 2024-06-01 LAB — PSA SERPL DL<=0.01 NG/ML-MCNC: 2.26 NG/ML

## 2024-07-10 ENCOUNTER — OFFICE VISIT (OUTPATIENT)
Dept: FAMILY MEDICINE | Facility: CLINIC | Age: 89
End: 2024-07-10
Payer: MEDICARE

## 2024-07-10 VITALS
HEART RATE: 71 BPM | BODY MASS INDEX: 38.21 KG/M2 | HEIGHT: 69 IN | DIASTOLIC BLOOD PRESSURE: 69 MMHG | SYSTOLIC BLOOD PRESSURE: 131 MMHG | OXYGEN SATURATION: 98 % | WEIGHT: 258 LBS | TEMPERATURE: 98.6 F | RESPIRATION RATE: 20 BRPM

## 2024-07-10 DIAGNOSIS — N40.1 BENIGN PROSTATIC HYPERPLASIA WITH URINARY FREQUENCY: ICD-10-CM

## 2024-07-10 DIAGNOSIS — R01.1 HEART MURMUR: ICD-10-CM

## 2024-07-10 DIAGNOSIS — L29.9 EAR ITCHING: ICD-10-CM

## 2024-07-10 DIAGNOSIS — R29.6 FALLS FREQUENTLY: ICD-10-CM

## 2024-07-10 DIAGNOSIS — R15.9 INCONTINENCE OF FECES, UNSPECIFIED FECAL INCONTINENCE TYPE: ICD-10-CM

## 2024-07-10 DIAGNOSIS — I10 ESSENTIAL HYPERTENSION: ICD-10-CM

## 2024-07-10 DIAGNOSIS — D62 ANEMIA DUE TO BLOOD LOSS, ACUTE: ICD-10-CM

## 2024-07-10 DIAGNOSIS — Z85.09 HX OF MALIGNANT GASTROINTESTINAL STROMAL TUMOR (GIST): ICD-10-CM

## 2024-07-10 DIAGNOSIS — E78.5 HYPERLIPIDEMIA LDL GOAL <100: ICD-10-CM

## 2024-07-10 DIAGNOSIS — R35.0 BENIGN PROSTATIC HYPERPLASIA WITH URINARY FREQUENCY: ICD-10-CM

## 2024-07-10 DIAGNOSIS — S83.004S DISLOCATION OF RIGHT PATELLA, SEQUELA: ICD-10-CM

## 2024-07-10 DIAGNOSIS — E11.9 CONTROLLED TYPE 2 DIABETES MELLITUS WITHOUT COMPLICATION, WITHOUT LONG-TERM CURRENT USE OF INSULIN (H): Primary | ICD-10-CM

## 2024-07-10 DIAGNOSIS — Z29.11 NEED FOR VACCINATION AGAINST RESPIRATORY SYNCYTIAL VIRUS: ICD-10-CM

## 2024-07-10 DIAGNOSIS — R60.0 PEDAL EDEMA: ICD-10-CM

## 2024-07-10 PROBLEM — R41.840 COGNITIVE ATTENTION DEFICIT: Status: RESOLVED | Noted: 2021-09-16 | Resolved: 2024-07-10

## 2024-07-10 PROBLEM — R79.89 ELEVATED SERUM CREATININE: Status: RESOLVED | Noted: 2019-11-13 | Resolved: 2024-07-10

## 2024-07-10 PROBLEM — Z28.21 COVID-19 VACCINATION REFUSED: Status: RESOLVED | Noted: 2023-09-26 | Resolved: 2024-07-10

## 2024-07-10 PROBLEM — R31.9 HEMATURIA, UNSPECIFIED TYPE: Status: RESOLVED | Noted: 2021-09-16 | Resolved: 2024-07-10

## 2024-07-10 PROBLEM — Z96.659 S/P TOTAL KNEE ARTHROPLASTY: Status: RESOLVED | Noted: 2017-06-19 | Resolved: 2024-07-10

## 2024-07-10 PROBLEM — G47.10 HYPERSOMNIA: Status: RESOLVED | Noted: 2023-03-07 | Resolved: 2024-07-10

## 2024-07-10 PROBLEM — G62.9 PERIPHERAL POLYNEUROPATHY: Status: RESOLVED | Noted: 2019-09-30 | Resolved: 2024-07-10

## 2024-07-10 PROBLEM — R71.8 RBC MICROCYTOSIS: Status: RESOLVED | Noted: 2017-02-14 | Resolved: 2024-07-10

## 2024-07-10 PROBLEM — K11.7 XEROSTOMIA: Status: RESOLVED | Noted: 2023-09-26 | Resolved: 2024-07-10

## 2024-07-10 PROBLEM — M25.562 LEFT KNEE PAIN, UNSPECIFIED CHRONICITY: Status: RESOLVED | Noted: 2017-05-08 | Resolved: 2024-07-10

## 2024-07-10 PROBLEM — K92.1 HEMATOCHEZIA: Status: RESOLVED | Noted: 2023-06-12 | Resolved: 2024-07-10

## 2024-07-10 PROBLEM — K80.20 CALCULUS OF GALLBLADDER: Status: ACTIVE | Noted: 2024-07-10

## 2024-07-10 PROBLEM — Z87.19 HISTORY OF GI DIVERTICULAR BLEED: Status: RESOLVED | Noted: 2023-06-12 | Resolved: 2024-07-10

## 2024-07-10 PROBLEM — K13.0 CHEILOSIS: Status: RESOLVED | Noted: 2018-09-28 | Resolved: 2024-07-10

## 2024-07-10 PROBLEM — D50.9 FE DEFICIENCY ANEMIA: Status: RESOLVED | Noted: 2023-07-10 | Resolved: 2024-07-10
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
## 2024-07-10 PROBLEM — Z28.39 IMMUNIZATION DEFICIENCY: Status: RESOLVED | Noted: 2023-10-12 | Resolved: 2024-07-10

## 2024-07-10 PROBLEM — L71.9 ROSACEA: Status: RESOLVED | Noted: 2018-09-28 | Resolved: 2024-07-10

## 2024-07-10 PROBLEM — K64.4 EXTERNAL HEMORRHOIDS: Status: RESOLVED | Noted: 2022-09-21 | Resolved: 2024-07-10

## 2024-07-10 PROBLEM — R33.9 URINARY RETENTION: Status: RESOLVED | Noted: 2023-09-26 | Resolved: 2024-07-10

## 2024-07-10 LAB — HBA1C MFR BLD: 6.2 % (ref 0–5.6)

## 2024-07-10 PROCEDURE — 99214 OFFICE O/P EST MOD 30 MIN: CPT | Performed by: FAMILY MEDICINE

## 2024-07-10 PROCEDURE — G2211 COMPLEX E/M VISIT ADD ON: HCPCS | Performed by: FAMILY MEDICINE

## 2024-07-10 PROCEDURE — 36415 COLL VENOUS BLD VENIPUNCTURE: CPT | Performed by: FAMILY MEDICINE

## 2024-07-10 PROCEDURE — 83036 HEMOGLOBIN GLYCOSYLATED A1C: CPT | Performed by: FAMILY MEDICINE

## 2024-07-10 RX ORDER — RESPIRATORY SYNCYTIAL VIRUS VACCINE 120MCG/0.5
0.5 KIT INTRAMUSCULAR ONCE
Qty: 1 EACH | Refills: 0 | Status: CANCELLED | OUTPATIENT
Start: 2024-07-10 | End: 2024-07-10

## 2024-07-10 RX ORDER — NEOMYCIN SULFATE, POLYMYXIN B SULFATE AND HYDROCORTISONE 10; 3.5; 1 MG/ML; MG/ML; [USP'U]/ML
3 SUSPENSION/ DROPS AURICULAR (OTIC) 2 TIMES DAILY PRN
Qty: 10 ML | Refills: 3 | Status: SHIPPED | OUTPATIENT
Start: 2024-07-10

## 2024-07-10 RX ORDER — NEOMYCIN SULFATE, POLYMYXIN B SULFATE AND HYDROCORTISONE 10; 3.5; 1 MG/ML; MG/ML; [USP'U]/ML
3 SUSPENSION/ DROPS AURICULAR (OTIC) 2 TIMES DAILY PRN
Qty: 10 ML | Refills: 3 | Status: SHIPPED | OUTPATIENT
Start: 2024-07-10 | End: 2024-07-10

## 2024-07-10 ASSESSMENT — PAIN SCALES - GENERAL: PAINLEVEL: NO PAIN (0)

## 2024-07-10 NOTE — ASSESSMENT & PLAN NOTE
He walks with a cane regularly. this be related to his knee replacement and general deconditioning.

## 2024-07-10 NOTE — ASSESSMENT & PLAN NOTE
Diabetes is controlled, diet controlled. .  Continue current treatment regimen.  Diabetes will be reassessed in 6 months.

## 2024-07-10 NOTE — LETTER
July 10, 2024      Damien Vallecillo  [address] [facility] [address]        Dear ,    We are writing to inform you of your test results.    Your results are showing stable blood sugar A1c.     Resulted Orders   HEMOGLOBIN A1C   Result Value Ref Range    Hemoglobin A1C 6.2 (H) 0.0 - 5.6 %      Comment:      Normal <5.7%   Prediabetes 5.7-6.4%    Diabetes 6.5% or higher     Note: Adopted from ADA consensus guidelines.       If you have any questions or concerns, please call the clinic at the number listed above.       Sincerely,      Brittany Jarvis MD

## 2024-07-10 NOTE — PROGRESS NOTES
Assessment & Plan   Problem List Items Addressed This Visit       Pedal edema     Compression hose, loop diuretics (lasix 20 mg daily)         Hyperlipidemia LDL goal <100     Statin therapy tolerated.  Continue on Atorvastatin 40 mg daily.         Fecal incontinence     Pt was started on Flaxeed and since then this problem has resolved.          Dislocation of right patella     He had knee replacements on both knees and he is still using knee sleeves to keep the patella in place.         Controlled type 2 diabetes mellitus without complication, without long-term current use of insulin (H) - Primary     Diabetes is controlled, diet controlled. .  Continue current treatment regimen.  Diabetes will be reassessed in 6 months.         Relevant Orders    HEMOGLOBIN A1C (Completed)    Falls frequently     He walks with a cane regularly. this be related to his knee replacement and general deconditioning.          Benign prostatic hyperplasia with urinary frequency     Hard time to start urination, and post void dripping, pt is still using Tamsulosin 0.4 mg daily.         Anemia due to blood loss, acute     Hx of anemia in the past, recent blood test is normal, he is still on iron pills.         Heart murmur     Mild regurgitation all valves, aortic sclerosis mild         Hx of malignant gastrointestinal stromal tumor (GIST)     Resection 2023. Oncology surveillance. That was the cause of his underlying anemia.         Essential hypertension     Controlled, continue on Losartan 100 mg and Furosemide 20 mg daily.         RESOLVED: Need for vaccination against respiratory syncytial virus     Other Visit Diagnoses       Ear itching        Relevant Medications    neomycin-polymyxin-hydrocortisone (CORTISPORIN) 3.5-66966-9 otic suspension                        Subjective   Damien is a 88 year old, presenting for the following health issues:  RECHECK and Recheck Medication        7/10/2024     3:01 PM   Additional Questions  "  Roomed by antoine valiente   Accompanied by wife     History of Present Illness       Reason for visit:  Est care and med check    He eats 0-1 servings of fruits and vegetables daily.He consumes 0 sweetened beverage(s) daily.He exercises with enough effort to increase his heart rate 9 or less minutes per day.  He exercises with enough effort to increase his heart rate 3 or less days per week.   He is taking medications regularly.         Medication Followup of DM, HTN, and other medical problems.   Taking Medication as prescribed: yes  Side Effects:  None  Medication Helping Symptoms:  yes        Review of Systems  Constitutional, HEENT, cardiovascular, pulmonary, gi and gu systems are negative, except as otherwise noted.      Objective    /69 (BP Location: Left arm, Patient Position: Chair, Cuff Size: Adult Large)   Pulse 71   Temp 98.6  F (37  C) (Temporal)   Resp 20   Ht 1.753 m (5' 9\")   Wt 117 kg (258 lb)   SpO2 98%   BMI 38.10 kg/m    Body mass index is 38.1 kg/m .  Physical Exam   GENERAL: alert and no distress  NECK: no adenopathy, no asymmetry, masses, or scars  RESP: lungs clear to auscultation - no rales, rhonchi or wheezes  CV: regular rates and rhythm, normal S1 S2, no S3 or S4, and no murmur, click or rub  ABDOMEN: soft, obese, non tender.   MS: none pitting swelling in both lower legs bilaterally.            Signed Electronically by: Brittany Jarvis MD    "

## 2024-07-10 NOTE — ASSESSMENT & PLAN NOTE
He had knee replacements on both knees and he is still using knee sleeves to keep the patella in place.

## 2024-08-12 ENCOUNTER — APPOINTMENT (OUTPATIENT)
Dept: ULTRASOUND IMAGING | Facility: CLINIC | Age: 89
End: 2024-08-12
Attending: EMERGENCY MEDICINE
Payer: MEDICARE

## 2024-08-12 ENCOUNTER — HOSPITAL ENCOUNTER (EMERGENCY)
Facility: CLINIC | Age: 89
Discharge: HOME | End: 2024-08-12
Attending: EMERGENCY MEDICINE | Admitting: EMERGENCY MEDICINE
Payer: MEDICARE

## 2024-08-12 ENCOUNTER — APPOINTMENT (OUTPATIENT)
Dept: GENERAL RADIOLOGY | Facility: CLINIC | Age: 89
End: 2024-08-12
Attending: EMERGENCY MEDICINE
Payer: MEDICARE

## 2024-08-12 ENCOUNTER — NURSE TRIAGE (OUTPATIENT)
Dept: FAMILY MEDICINE | Facility: CLINIC | Age: 89
End: 2024-08-12
Payer: MEDICARE

## 2024-08-12 VITALS
TEMPERATURE: 98.1 F | OXYGEN SATURATION: 100 % | WEIGHT: 257.5 LBS | HEIGHT: 70 IN | DIASTOLIC BLOOD PRESSURE: 86 MMHG | BODY MASS INDEX: 36.86 KG/M2 | HEART RATE: 55 BPM | RESPIRATION RATE: 20 BRPM | SYSTOLIC BLOOD PRESSURE: 168 MMHG

## 2024-08-12 DIAGNOSIS — M25.562 ACUTE PAIN OF LEFT KNEE: ICD-10-CM

## 2024-08-12 DIAGNOSIS — R93.6 ABNORMAL X-RAY OF KNEE: ICD-10-CM

## 2024-08-12 DIAGNOSIS — I82.412 ACUTE DEEP VEIN THROMBOSIS (DVT) OF FEMORAL VEIN OF LEFT LOWER EXTREMITY (H): ICD-10-CM

## 2024-08-12 DIAGNOSIS — M71.22 SYNOVIAL CYST OF LEFT POPLITEAL SPACE: ICD-10-CM

## 2024-08-12 LAB
ANION GAP SERPL CALCULATED.3IONS-SCNC: 12 MMOL/L (ref 7–15)
BUN SERPL-MCNC: 14.8 MG/DL (ref 8–23)
CALCIUM SERPL-MCNC: 9.4 MG/DL (ref 8.8–10.4)
CHLORIDE SERPL-SCNC: 101 MMOL/L (ref 98–107)
CREAT SERPL-MCNC: 0.84 MG/DL (ref 0.67–1.17)
EGFRCR SERPLBLD CKD-EPI 2021: 84 ML/MIN/1.73M2
ERYTHROCYTE [DISTWIDTH] IN BLOOD BY AUTOMATED COUNT: 13.2 % (ref 10–15)
GLUCOSE SERPL-MCNC: 106 MG/DL (ref 70–99)
HCO3 SERPL-SCNC: 23 MMOL/L (ref 22–29)
HCT VFR BLD AUTO: 42.5 % (ref 40–53)
HGB BLD-MCNC: 14.5 G/DL (ref 13.3–17.7)
MCH RBC QN AUTO: 29.7 PG (ref 26.5–33)
MCHC RBC AUTO-ENTMCNC: 34.1 G/DL (ref 31.5–36.5)
MCV RBC AUTO: 87 FL (ref 78–100)
PLATELET # BLD AUTO: 180 10E3/UL (ref 150–450)
POTASSIUM SERPL-SCNC: 4.1 MMOL/L (ref 3.4–5.3)
RBC # BLD AUTO: 4.88 10E6/UL (ref 4.4–5.9)
SODIUM SERPL-SCNC: 136 MMOL/L (ref 135–145)
WBC # BLD AUTO: 6.8 10E3/UL (ref 4–11)

## 2024-08-12 PROCEDURE — 93971 EXTREMITY STUDY: CPT | Mod: LT

## 2024-08-12 PROCEDURE — 73562 X-RAY EXAM OF KNEE 3: CPT | Mod: LT

## 2024-08-12 PROCEDURE — 99284 EMERGENCY DEPT VISIT MOD MDM: CPT | Mod: 25

## 2024-08-12 PROCEDURE — 250N000013 HC RX MED GY IP 250 OP 250 PS 637: Performed by: EMERGENCY MEDICINE

## 2024-08-12 PROCEDURE — 36415 COLL VENOUS BLD VENIPUNCTURE: CPT | Performed by: EMERGENCY MEDICINE

## 2024-08-12 PROCEDURE — 80048 BASIC METABOLIC PNL TOTAL CA: CPT | Performed by: EMERGENCY MEDICINE

## 2024-08-12 PROCEDURE — 85014 HEMATOCRIT: CPT | Performed by: EMERGENCY MEDICINE

## 2024-08-12 RX ORDER — ACETAMINOPHEN 500 MG
1000 TABLET ORAL ONCE
Status: COMPLETED | OUTPATIENT
Start: 2024-08-12 | End: 2024-08-12

## 2024-08-12 RX ORDER — APIXABAN 5 MG (74)
KIT ORAL
Qty: 74 EACH | Refills: 0 | Status: SHIPPED | OUTPATIENT
Start: 2024-08-12 | End: 2024-09-11

## 2024-08-12 RX ADMIN — APIXABAN 10 MG: 5 TABLET, FILM COATED ORAL at 20:13

## 2024-08-12 RX ADMIN — ACETAMINOPHEN 1000 MG: 500 TABLET, FILM COATED ORAL at 16:25

## 2024-08-12 ASSESSMENT — ACTIVITIES OF DAILY LIVING (ADL)
ADLS_ACUITY_SCORE: 38
ADLS_ACUITY_SCORE: 38
ADLS_ACUITY_SCORE: 36
ADLS_ACUITY_SCORE: 38
ADLS_ACUITY_SCORE: 38

## 2024-08-12 ASSESSMENT — COLUMBIA-SUICIDE SEVERITY RATING SCALE - C-SSRS
6. HAVE YOU EVER DONE ANYTHING, STARTED TO DO ANYTHING, OR PREPARED TO DO ANYTHING TO END YOUR LIFE?: NO
1. IN THE PAST MONTH, HAVE YOU WISHED YOU WERE DEAD OR WISHED YOU COULD GO TO SLEEP AND NOT WAKE UP?: NO
2. HAVE YOU ACTUALLY HAD ANY THOUGHTS OF KILLING YOURSELF IN THE PAST MONTH?: NO

## 2024-08-12 NOTE — TELEPHONE ENCOUNTER
Nurse Triage SBAR    Situation: Pain located in muscle above L knee and in calf. Worsened over the weekend.    Background: 20 years ago had L knee replacement. Thinks the pain could be related to this. Hx blood clot per pt, but I do not see this in chart. Not on blood thinner. Used to be on baby aspirin but discontinued by provider.    Assessment:   -Pain 10/10 when walking. Pain 0/10 at rest. Radiates to buttocks.  -Unable to bear weight due to pain. Using walker.  -Denies chest pain, back pain, breathing difficulty, swelling, rash, fever, numbness, weakness.    Recommendation: Call [facility]/Go to ED/[facility] Now (Or To Office with PCP Approval). Outgoing call to [facility]. They do not accept patient, Zamzam MAYO recommends ED based on pain number. Outgoing call to patient to let them know. Patient accepts plan for ED.    Reason for Disposition   Thigh or calf pain in only one leg and present > 1 hour    Additional Information   Negative: Looks like a broken bone or dislocated joint (e.g., crooked or deformed)   Negative: Sounds like a life-threatening emergency to the triager   Negative: Followed a hip injury   Negative: Followed a knee injury   Negative: Followed an ankle or foot injury   Negative: Back pain radiating (shooting) into leg(s)   Negative: Foot pain is main symptom   Negative: Ankle pain is main symptom   Negative: Knee pain is main symptom   Negative: Leg swelling is main symptom   Negative: Chest pain   Negative: Difficulty breathing   Negative: Entire foot is cool or blue in comparison to other side   Negative: Unable to walk   Negative: Fever and red area (or area very tender to touch)   Negative: Swollen joint and fever    Protocols used: Leg Pain-A-[place]

## 2024-08-12 NOTE — ED TRIAGE NOTES
Patient reports that he has been having issues his left knee for the last week. Patient states the pain radiates up into is thigh and down into his calf.  Patient denies injury but had a knee replacement many ago      Triage Assessment (Adult)       Row Name 08/12/24 1309          Triage Assessment    Airway WDL WDL        Respiratory WDL    Respiratory WDL WDL        Skin Circulation/Temperature WDL    Skin Circulation/Temperature WDL WDL        Cardiac WDL    Cardiac WDL WDL        Peripheral/Neurovascular WDL    Peripheral Neurovascular WDL WDL        Cognitive/Neuro/Behavioral WDL    Cognitive/Neuro/Behavioral WDL WDL

## 2024-08-12 NOTE — ED PROVIDER NOTES
Emergency Department Note      History of Present Illness     Chief Complaint   Leg Pain    HPI   Damien Vallecillo is a 88 year old male with history of type 2 diabetes mellitus, hypertension, and hyperlipidemia who presents with his wife for left knee pain. Patient has history of left knee replacement 20 years ago and he says that it has been problematic since. The pain started getting worse three weeks ago, even more so in the past 3-4 days. He says that it radiates down into his calf and up his thigh. The pain is most noticeable when he bears weight, including as soon as he stands on it. When he is sitting, he says that he occasionally has a dull, achy pain that is not as severe and at the time of my evaluation, denies any pain. Patient has a hard time getting out of bed in the morning and says that the pain happens immediately upon standing. No recent falls or injuries. He is not having pain in his lower back shooting down his leg. He is not currently on anticoagulation.    Independent Historian   None    Review of External Notes   Reviewed nurse triage note from today (8/12/24).     Past Medical History     Past Medical History:    Arthritis  Benign prostatic hyperplasia with urinary frequency   Cheilosis   Chronic headaches   Closed right ankle fracture  Cognitive attention deficit  Diabetes mellitus, type 2   Dislocation of right patella   Elevated serum creatinine  External hemorrhoids  Iron deficiency anemia  Gastric stromal tumor  Gout, unspecified  Hammer toe of left foot  Heart murmur   Hyperlipidemia   Hypertension  Hypersomnia   Bilateral knee pain, unspecified chronicity   Lentigo maligna  Malignant neoplasm of rectosigmoid junction  Morbid obesity due to excess calories  RENAE  Pedal edema   Peripheral polyneuropathy   Physical deconditioning   Rhinophyma   Rosacea   Tubulovillous adenoma of colon   Urinary retention   Venous stasis dermatitis of both lower extremities  Xerostomia   Thyroid nodule      "  Past Surgical History:    Arthroplasty of left knee   Colonoscopy  DaVinci pelvic procedure   Endoscopic ultrasound upper gastrointestinal tract   Herniorrhaphy epigastric   Laparoscopic assisted colectomy   Realign patella   Repair hammer toe  Sigmoidoscopy flexible  Right heel surgery, spur removed      Physical Exam     Patient Vitals for the past 24 hrs:   BP Temp Temp src Pulse Resp SpO2 Height Weight   08/12/24 2025 (!) 168/86 -- -- 55 20 100 % -- --   08/12/24 1305 (!) 151/81 98.1  F (36.7  C) Oral 72 18 95 % 1.778 m (5' 10\") 116.8 kg (257 lb 8 oz)     Physical Exam  General:              Well-nourished              Speaking in full sentences  Eyes:              Conjunctiva without injection or scleral icterus  ENT:              Moist mucous membranes              Nares patent              Pinnae normal  Neck:              Full ROM              No stiffness appreciated  Resp:              Lungs CTAB              No crackles, wheezing or audible rubs              Good air movement  CV:                    Normal rate, regular rhythm              S1 and S2 present              No murmur, gallop or rub  GI:              BS present              Abdomen soft without distention              Non-tender to light and deep palpation              No guarding or rebound tenderness  Skin:              Warm, dry, well perfused              No rashes or open wounds on exposed skin  MSK:              Moves all extremities              No focal deformities or swelling              Well-healed surgical scars over the anterior aspect of the knee              Mild swelling about left knee, though symmetric in size compared with right              No overlying warmth or erythema              Able to passively range the knee to flexion/extension without notable pain              No gross laxity to valgus/varus testing, anterior or posterior drawer testing              Compartment soft to calf and thigh              Extremity warm " and well-perfused, distal pulses easily found with bedside doppler  Neuro:              Alert              Answers questions appropriately              Moves all extremities equally              Gait stable  Psych:              Normal affect, normal mood    Diagnostics     Lab Results   Labs Ordered and Resulted from Time of ED Arrival to Time of ED Departure   BASIC METABOLIC PANEL - Abnormal       Result Value    Sodium 136      Potassium 4.1      Chloride 101      Carbon Dioxide (CO2) 23      Anion Gap 12      Urea Nitrogen 14.8      Creatinine 0.84      GFR Estimate 84      Calcium 9.4      Glucose 106 (*)    CBC WITH PLATELETS - Normal    WBC Count 6.8      RBC Count 4.88      Hemoglobin 14.5      Hematocrit 42.5      MCV 87      MCH 29.7      MCHC 34.1      RDW 13.2      Platelet Count 180         Imaging   US Lower Extremity Venous Duplex Left   Final Result   IMPRESSION:   1.  Focal DVT within the left popliteal vein.      multiple atttempts were made to call results and unable to reach provider.      XR Knee Left 3 Views   Final Result   IMPRESSION: There is a left total knee replacement. There is a joint   effusion. There is some mild periprosthetic lucency along the anterior   and posterior flange of the femoral component at the bone/implant   interface. Correlation of more prior studies is recommended to assess   for the stability of this finding. There is some mild lucency along   the medial tibial tray bone/implant interface as well. No acute   displaced fracture.      BLANCA HENDRIX MD            SYSTEM ID:  [id number]          Independent Interpretation   X-ray of the left knee shows no acute fracture.    ED Course      Medications Administered   Medications   acetaminophen (TYLENOL) tablet 1,000 mg (1,000 mg Oral $Given 8/12/24 162)     Procedures   None      Discussion of Management   Corie Akers as per below    ED Course   ED Course as of 08/13/24 0849   Mon Aug 12, 2024   5397 I  evaluated the patient, obtained history, and performed a physical exam as detailed above.    1636 I consulted with Corie Huff PA-C from orthopedics.    1800 I rechecked on the patient and updated on plan of care.      Additional Documentation  None    Medical Decision Making / Diagnosis     CMS Diagnoses: None    MIPS   None    MDM   Damien Vallecillo is a 88 year old male presenting to the emergency department for evaluation of left knee pain.  VS on presentation reveal elevated BP though otherwise are unremarkable.  History, exam, and ED course as outlined above.  Symptoms presently may be multifactorial.  He acknowledges pain centered about the knee.  Venous ultrasound was obtained, demonstrating evidence of a popliteal DVT.  No associated chest pain, shortness of breath, tachycardia, no hypoxia that would raise concern for PE at this time.  Screening laboratory studies reveal unremarkable metabolic function and normal creatinine.  He is not currently on anticoagulation.  After a thorough conversation regarding risks/benefits of anticoagulation, including various options for therapy, patient electing to proceed with Eliquis.  We also discussed risks of anticoagulants including bleeding, including need for immediate reevaluation in the context of any fall or particularly a head injury.  Initial dose provided from the ED, and will plan discharge home with a prescription for a 30-day starter pack.  Patient aware of the need to follow-up closely with PCP to discuss ultimate duration of therapy, to determine if additional workup is indicated to determine the underlying etiology for his DVT.  It is certainly possible that relative immobilization may be a contributing factor, as he has been noted pain for the previous few weeks, limiting his ability to ambulate.  Note also made of a popliteal cyst, which may be contributing to some discomfort.  X-ray was obtained of the knee, revealing no evidence of acute fracture nor  dislocation.  I do question a component of possible hardware loosening, as there is some mild periprosthetic lucency along the anterior and posterior flange of the femoral component as well as some mild lucency along the medial tibial tray interface.  I reviewed these findings with orthopedic surgery, who recommended supportive cares at this time and close follow-up with orthopedics in clinic to discuss next steps which may include a bone scan though do not require emergent operative intervention.  He has no constitutional symptoms of fevers, chills, overlying warmth or erythema, and exhibits full passive range of motion to the knee without pain.  For that reason I feel this unlikely represents septic arthritis or acute inflammatory arthropathy and feel risks of arthrocentesis outweigh benefits.  On reassessment, results and clinical impression discussed with patient and spouse.  With reasonable clinical certainty I feel he can safely be discharged from the ER with close outpatient follow-up with PCP and orthopedics.  Patient and spouse verbalized understanding.  Questions have been answered prior to discharge.  Return to ER with worsening pain, fevers, redness, development of chest pain, shortness of breath, bleeding complications or any other concerns.    Disposition   The patient was discharged.     Diagnosis     ICD-10-CM    1. Acute pain of left knee  M25.562       2. Acute deep vein thrombosis (DVT) of femoral vein of left lower extremity (H)  I82.412       3. Synovial cyst of left popliteal space  M71.22       4. Abnormal x-ray of knee  R93.6            Discharge Medications   Discharge Medication List as of 8/12/2024  8:13 PM        START taking these medications    Details   Apixaban Starter Pack (ELIQUIS DVT/PE STARTER PACK) 5 MG TBPK Take 10 mg by mouth 2 times daily for 7 days, THEN 5 mg 2 times daily for 23 days., Disp-74 each, R-0, E-Prescribe           Scribe Disclosure:  ALEX, Kathy Sanchez, am serving  as a scribe at 4:03 PM on 8/12/2024 to document services personally performed by Obi Chin MD based on my observations and the provider's statements to me.        Obi Chin MD  08/13/24 0830

## 2024-08-13 ENCOUNTER — PATIENT OUTREACH (OUTPATIENT)
Dept: FAMILY MEDICINE | Facility: CLINIC | Age: 89
End: 2024-08-13
Payer: MEDICARE

## 2024-08-13 NOTE — TELEPHONE ENCOUNTER
Hospital Follow Up  ER 8/12/24    Diagnosis   Acute pain of left knee  Acute deep vein thrombosis (DVT) of femoral vein of left lower extremity (H)  Synovial cyst of left popliteal space  Abnormal x-ray of knee    Medications   Apixaban Starter Pack (ELIQUIS DVT/PE STARTER PACK)     Follow Up instructions   Appt with PCP - does not specify timeframe- use clinical judgement, should likely be within 7 days due to dx of DVT   Follow-up with [facility] for knee      Patient and wife, Alejandrina soria VM on FRANCESCO GEORGE line yesterday 8/12 evening @ 2046. Calling to follow-up after ED visit and schedule appt. Requesting call back today.     Katt HANNAH RN  Patient Advocate Liaison - PAL RN  [facility]

## 2024-08-13 NOTE — TELEPHONE ENCOUNTER
Transitions of Care Outreach  Chief Complaint   Patient presents with    Hospital F/U     Acute deep vein thrombosis (DVT) of femoral vein of left lower extremity, Synovial cyst of left popliteal space, Acute pain of left knee, Abnormal x-ray of knee         Most Recent Admission Date: 8/12/2024   Most Recent Admission Diagnosis:      Most Recent Discharge Date: 8/12/2024   Most Recent Discharge Diagnosis: Acute pain of left knee - M25.562  Acute deep vein thrombosis (DVT) of femoral vein of left lower extremity (H) - I82.412  Synovial cyst of left popliteal space - M71.22  Abnormal x-ray of knee - R93.6     Transitions of Care Assessment    Discharge Assessment  How are you doing now that you are home?: Spouse states he is really weak. Hard to get up from chair. He has been using walker at home.  How are your symptoms? (Red Flag symptoms escalate to triage hotline per guidelines): Unchanged  Do you know how to contact your clinic care team if you have future questions or changes to your health status? : Yes  Does the patient have their discharge instructions? : Yes  Does the patient have questions regarding their discharge instructions? : No  Were you started on any new medications or were there changes to any of your previous medications? : Yes (Eliquis)  Does the patient have all of their medications?: Yes  Do you have questions regarding any of your medications? : No  Do you have all of your needed medical supplies or equipment (DME)?  (i.e. oxygen tank, CPAP, cane, etc.): Yes    Follow up Plan          Future Appointments   Date Time Provider Department Center   8/15/2024 11:30 AM Bacilio Bradford APRN CNP [facility]   1/8/2025  2:00 PM Brittany Jarvis MD [facility]       Outpatient Plan as outlined on AVS reviewed with patient.    For any urgent concerns, please contact our 24 hour nurse triage line: [phone] ([phone])       Tiffany Harris RN

## 2024-08-13 NOTE — DISCHARGE INSTRUCTIONS
Your ultrasound today shows evidence of a blood clot behind your knee.  Please begin the blood thinner as discussed.  Follow-up with your primary care provider to determine how long you need to be on this medication as well as if further workup is needed to figure out why the clot occurred.    Return to the ER if you develop any bleeding complications such as head injuries, blood in the stool, or any other concerns.    I would also recommend follow-up with orthopedics to discuss your x-ray as the hardware in place may be loosening somewhat.  This could be contributing to knee pain.

## 2024-08-15 ENCOUNTER — OFFICE VISIT (OUTPATIENT)
Dept: FAMILY MEDICINE | Facility: CLINIC | Age: 89
End: 2024-08-15
Payer: MEDICARE

## 2024-08-15 ENCOUNTER — PATIENT OUTREACH (OUTPATIENT)
Dept: FAMILY MEDICINE | Facility: CLINIC | Age: 89
End: 2024-08-15

## 2024-08-15 VITALS
HEIGHT: 69 IN | TEMPERATURE: 97.6 F | DIASTOLIC BLOOD PRESSURE: 81 MMHG | WEIGHT: 255.3 LBS | RESPIRATION RATE: 17 BRPM | SYSTOLIC BLOOD PRESSURE: 130 MMHG | HEART RATE: 66 BPM | OXYGEN SATURATION: 95 % | BODY MASS INDEX: 37.81 KG/M2

## 2024-08-15 DIAGNOSIS — I82.4Y2 ACUTE DEEP VEIN THROMBOSIS (DVT) OF PROXIMAL VEIN OF LEFT LOWER EXTREMITY (H): Primary | ICD-10-CM

## 2024-08-15 DIAGNOSIS — I82.4Y2 ACUTE DEEP VEIN THROMBOSIS (DVT) OF PROXIMAL VEIN OF LEFT LOWER EXTREMITY (H): ICD-10-CM

## 2024-08-15 DIAGNOSIS — G89.29 CHRONIC PAIN OF LEFT KNEE: Primary | ICD-10-CM

## 2024-08-15 DIAGNOSIS — M25.562 CHRONIC PAIN OF LEFT KNEE: Primary | ICD-10-CM

## 2024-08-15 PROCEDURE — 99213 OFFICE O/P EST LOW 20 MIN: CPT

## 2024-08-15 NOTE — TELEPHONE ENCOUNTER
PAL received incoming call from pt's wife, Alejandrina ([facility])  -Had hospital follow-up with MAKAYLA Loco CNP today 8/15/24 (see OV notes)  -[facility] with [facility] for them to order Eliquis, currently waiting for a call back to schedule appt with them   -[facility]   -PAL advised to call back and update when [facility] with [facility] is, educated that pt should not be off anticoagulant, if [facility] is booked [facility] out- will have to come up with plan for further anticoagulation therapy  -Provided phone number for radiology [phone] to schedule repeat US in 2 months    PAL will follow-up with pt and wife next week     Patient was given an opportunity to ask questions, verbalized understanding of plan, and is agreeable.     Katt HANNAH RN  Patient Advocate Liaison - PAL RN  [facility]

## 2024-08-15 NOTE — PROGRESS NOTES
Assessment & Plan     (M25.562,  G89.29) Chronic pain of left knee  (primary encounter diagnosis)  Comment: history of left total knee replacement. Concerned there may be some hardware issues given translucency on xray. Will refer to ortho to evaluate. If surgery needed, will more than likely need to postpone given DVT found in left leg. Patient fully understands and is agreeable with plan of care, at this point patient will follow up as needed unless acute concerns arise in the meantime.  Plan: Orthopedic  Referral    (I82.4Y2) Acute deep vein thrombosis (DVT) of proximal vein of left lower extremity (H)  Comment: currently on Eliquis. Placed on starter pack. [facility] looks to [facility] Will [facility] order for maintenance dose for 2 more months following starter pack. US ordered for future evaluation. May need to be on longer course of eliquis. Will look into this and follow up on plan w/ patient. Patient fully understands and is agreeable with plan of care, at this point patient will follow up as needed unless acute concerns arise in the meantime.  Plan: US Lower Extremity Venous Duplex Left        MED REC REQUIRED  Post Medication Reconciliation Status: discharge medications reconciled, continue medications without change        Subjective   Damien is a 88 year old, presenting for the following health issues:  Hospital F/U (ED follow-up. Acute pain of left knee/Acute deep vein thrombosis (DVT) of femoral vein of left lower extremity (H)/Synovial cyst of left popliteal space/Abnormal x-ray of knee /Blood clot behind knee)        8/15/2024    11:04 AM   Additional Questions   Roomed by Patria RIVERA     ED/ Followup:    Facility:  [facility] ED  Date of visit: 8/12/24  Reason for visit: Leg pain  Current Status: pain is also lower abdomen and pelvic area, currently 6/10 pain        Review of Systems  Constitutional, HEENT, cardiovascular, pulmonary, GI, ,  "musculoskeletal, neuro, skin, endocrine and psych systems are negative, except as otherwise noted.      Objective    /81 (BP Location: Right arm, Patient Position: Sitting, Cuff Size: Adult Large)   Pulse 66   Temp 97.6  F (36.4  C) (Oral)   Resp 17   Ht 1.753 m (5' 9\")   Wt 115.8 kg (255 lb 4.8 oz)   SpO2 95%   BMI 37.70 kg/m    Body mass index is 37.7 kg/m .  Physical Exam  Vitals and nursing note reviewed.   Constitutional:       General: He is not in acute distress.     Appearance: Normal appearance. He is obese.   Cardiovascular:      Rate and Rhythm: Normal rate.   Pulmonary:      Effort: Pulmonary effort is normal. No respiratory distress.      Breath sounds: No stridor. No wheezing, rhonchi or rales.   Neurological:      Mental Status: He is alert.   Psychiatric:         Mood and Affect: Mood normal.         Behavior: Behavior normal.         Thought Content: Thought content normal.         Judgment: Judgment normal.            Signed Electronically by: MAKAYLA Loco CNP    "

## 2024-08-15 NOTE — PATIENT INSTRUCTIONS
Will need to have Eliquis for 3 months.  -[facility] if [facility] can have covered  -I will talk w/ our pharmacy team if not having much coverage.    DVT  -can do US in a couple months    Ortho referral placed to evaluate knee

## 2024-08-15 NOTE — TELEPHONE ENCOUNTER
Routing to MAKAYLA Loco CNP-  Please place order for maintenance dose of Eliquis. [facility] to [facility] once signed - route to . I instructed wife to schedule repeat US in 2 months - if this is not correct, let me know. Thanks!    DORIS received incoming call from pt's wife, Alejandrina ([facility])  -Wife states she [facility] again and confirmed pt does NOT need appointment with a provider [facility]     [facility] for [facility] be [facility] to their co-manage department at [phone] with attention to Nicole HANNAH RN  Patient Advocate Liaison - PAL RN  [facility]

## 2024-08-15 NOTE — Clinical Note
Hey guys,  Can you take a look at Damien's chart and whether you would recommend 3 month Anticoagulation vs long term. I don't believe he has had a DVT prior to this one and it looks like immobility may have been an underlying cause d/t left knee orthopedic pain? Any advice would be of help, thanks for your time!  MAKAYLA Loco CNP

## 2024-08-16 NOTE — TELEPHONE ENCOUNTER
Eliquis ordered for another 2 months and [facility] to [facility] below, start this prescription once starter pack is finished.    MAKAYLA Loco CNP

## 2024-08-16 NOTE — TELEPHONE ENCOUNTER
Order has already been placed, faxed, and signed. Please have patient notify clinic if order was not [facility].     MAKAYLA Loco CNP

## 2024-08-16 NOTE — TELEPHONE ENCOUNTER
Called and spoke with pt's spouse ([facility]),     Relayed message below from Bacilio Bradford CNP.     Alejandrina verbalizes understanding and is agreeable with plan. Pt denies any further questions or concerns at this time.     Tiffany LINDO RN   Clinic RN  [facility]

## 2024-08-16 NOTE — TELEPHONE ENCOUNTER
Bacilio Bradford APRN CNP    Please sign pended rx and then route back to RN's to let patient know it has been completed     Thank you   Flakita Mast, Registered Nurse  [facility]

## 2024-08-19 ENCOUNTER — TRANSFERRED RECORDS (OUTPATIENT)
Dept: HEALTH INFORMATION MANAGEMENT | Facility: CLINIC | Age: 89
End: 2024-08-19
Payer: MEDICARE

## 2024-08-21 ENCOUNTER — DOCUMENTATION ONLY (OUTPATIENT)
Dept: ANTICOAGULATION | Facility: CLINIC | Age: 89
End: 2024-08-21
Payer: MEDICARE

## 2024-08-21 NOTE — PROGRESS NOTES
Anticoagulant Therapeutic Duplication    Duplicate orders identified: same medication but different dose, form, frequency or route    Duplicate orders appropriate starter pack ordered in ER and ongoing therapy [facility] after completion of started pack    Active anticoagulant: apixaban (Eliquis)    Plan made per [facility] anticoagulation protocol.    Josephine Rosario, RN  8/21/2024

## 2024-08-23 ENCOUNTER — APPOINTMENT (OUTPATIENT)
Dept: CT IMAGING | Facility: CLINIC | Age: 89
End: 2024-08-23
Attending: EMERGENCY MEDICINE
Payer: MEDICARE

## 2024-08-23 ENCOUNTER — HOSPITAL ENCOUNTER (EMERGENCY)
Facility: CLINIC | Age: 89
Discharge: HOME | End: 2024-08-23
Attending: EMERGENCY MEDICINE | Admitting: EMERGENCY MEDICINE
Payer: MEDICARE

## 2024-08-23 VITALS
OXYGEN SATURATION: 100 % | RESPIRATION RATE: 18 BRPM | HEIGHT: 70 IN | WEIGHT: 247 LBS | BODY MASS INDEX: 35.36 KG/M2 | DIASTOLIC BLOOD PRESSURE: 80 MMHG | SYSTOLIC BLOOD PRESSURE: 160 MMHG | TEMPERATURE: 97.8 F | HEART RATE: 67 BPM

## 2024-08-23 DIAGNOSIS — S09.90XA TRAUMATIC INJURY OF HEAD, INITIAL ENCOUNTER: ICD-10-CM
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
## 2024-08-23 DIAGNOSIS — S80.01XA CONTUSION OF RIGHT KNEE, INITIAL ENCOUNTER: ICD-10-CM

## 2024-08-23 PROCEDURE — 99284 EMERGENCY DEPT VISIT MOD MDM: CPT | Mod: 25

## 2024-08-23 PROCEDURE — 70450 CT HEAD/BRAIN W/O DYE: CPT | Mod: ME

## 2024-08-23 PROCEDURE — 250N000013 HC RX MED GY IP 250 OP 250 PS 637: Performed by: EMERGENCY MEDICINE

## 2024-08-23 RX ORDER — ACETAMINOPHEN 500 MG
1000 TABLET ORAL ONCE
Status: COMPLETED | OUTPATIENT
Start: 2024-08-23 | End: 2024-08-23

## 2024-08-23 RX ADMIN — ACETAMINOPHEN 1000 MG: 500 TABLET, FILM COATED ORAL at 08:01

## 2024-08-23 ASSESSMENT — ACTIVITIES OF DAILY LIVING (ADL): ADLS_ACUITY_SCORE: 38

## 2024-08-23 ASSESSMENT — COLUMBIA-SUICIDE SEVERITY RATING SCALE - C-SSRS
2. HAVE YOU ACTUALLY HAD ANY THOUGHTS OF KILLING YOURSELF IN THE PAST MONTH?: NO
1. IN THE PAST MONTH, HAVE YOU WISHED YOU WERE DEAD OR WISHED YOU COULD GO TO SLEEP AND NOT WAKE UP?: NO
6. HAVE YOU EVER DONE ANYTHING, STARTED TO DO ANYTHING, OR PREPARED TO DO ANYTHING TO END YOUR LIFE?: NO

## 2024-08-23 NOTE — ED PROVIDER NOTES
Emergency Department Note      History of Present Illness     Chief Complaint   Fall      HPI   Damien Vallecillo is a 88 year old male with a history of type 2 diabetes mellitus, hypertension, hyperlipidemia, and cancer on Eliquis who presents to the ED with a fall. The patient reports he went to the bathroom this morning around 0500 and when he went to lay back down he put his left knee on the bed and his right knee gave out. He reports he fell and hit his buttocks and left posterior head on the closet door so he came to hospital to check for bleeding. The patient's wife reports he was alert and able to walk after fall and notes he is on Eliquis. The patient denies pain to right knee or other areas besides his left pelvis. He reports he came in to the ED 2 weeks ago for left-sided groin pain that is still ongoing. He notes he has had an x-ray of his right knee multiple times recently. Of note, he ambulates with a walker and requires hearing aids.     Independent Historian   Wife as detailed above.    Review of External Notes   Reviewed [facility] note from 8/19/24.    Past Medical History     Medical History and Problem List   Acute suppurative arthritis  Acute, but ill-defined, cerebrovascular disease  Anemia due to blood loss, acute  Ankle fracture  Arthritis  Benign prostatic hyperplasia with urinary frequency  Blood transfusion  Calculus of gallbladder  Cheilosis  Chronic headaches  Closed right ankle fracture  Cognitive attention deficit  Controlled type 2 diabetes mellitus without complication, without long-term current use of insulin  COVID-19 vaccination refused  Dislocation of right patella  Elevated serum creatinine  Encounter for immunization  Essential hypertension  External hemorrhoids  Frequent falls  Fe deficiency anemia  Fecal incontinence  Gastric stromal tumor  Glucose intolerance  Gout of multiple sites  Grieving  Hammer toe of left foot  Heart murmur  Hematuria  Hyperlipidemia  Hypersomnia  Immunization  "deficiency  Intrinsic eczema  Left foot pain  Left knee pain, unspecified chronicity  Lentigo maligna   Malignant neoplasm of rectosigmoid junction   Midline low back pain without sciatica  Morbid obesity due to excess calories   RENAE   Pedal edema  Peripheral polyneuropathy  Physical deconditioning  Pre-diabetes  RBC microcytosis  Rhinophyma  Right knee pain, unspecified chronicity  Rosacea  Syncope  Tubulovillous adenoma of colon  Urinary retention  Venous stasis dermatitis of both lower extremities  Xerostomia    Medications   Apixaban   Atorvastatin   Doxycycline hyclate  Furosemide   Glucosamine-chondroitin  Losartan   Tamsulosin     Surgical History   Arthroplasty knee (R)  Arthroplasty knee (L)  Colonoscopy x 5  DaVinci pelvic procedure  Endoscopic, gastroscopy, duodenoscopy (egd), combined  Herniorrhaphy epigastric  Laparoscopic assisted colectomy (R)  Realign patella (R)  Repair hammer toe (L)  Resection GIST stomach  Right heel surgery; spur removed  Sigmoidoscopy flexible    Physical Exam     Patient Vitals for the past 24 hrs:   BP Temp Temp src Pulse Resp SpO2 Height Weight   08/23/24 0800 (!) 143/78 -- -- 56 -- 100 % -- --   08/23/24 0706 -- -- -- -- -- -- 1.778 m (5' 10\") 112 kg (247 lb)   08/23/24 0701 (!) 147/76 97.8  F (36.6  C) Temporal 80 18 99 % -- --     Physical Exam  Nursing note and vitals reviewed.  HENT:   Mouth/Throat: Moist mucous membranes.   Eyes: EOMI, nonicteric sclera  Cardiovascular: Normal rate, regular rhythm, no murmurs, rubs, or gallops. Normal DP pulsesl.  Pulmonary/Chest: Effort normal and breath sounds normal. No respiratory distress. No wheezes. No rales.   Abdominal: Soft. Nontender, nondistended, no guarding or rigidity.   Musculoskeletal: Contusion noted right knee. Normal ROM. No midline C/T/L spine tenderness.   Neurological: Alert. Moves all extremities spontaneously.   Skin: Skin is warm and dry. No rash noted.         Diagnostics     Lab Results   Labs Ordered and " Resulted from Time of ED Arrival to Time of ED Departure - No data to display    Imaging   Head CT w/o contrast   Final Result   IMPRESSION:   No intracranial hemorrhage, mass, or definite CT evidence of recent   ischemia.      BELTRAN AVITIA MD            SYSTEM ID:  [id number]          Independent Interpretation   I independently reviewed the head CT. I see no evidence of intracranial hemorrhage.       ED Course      Medications Administered   Medications   acetaminophen (TYLENOL) tablet 1,000 mg (1,000 mg Oral $Given 8/23/24 0801)         Discussion of Management   None    ED Course   ED Course as of 08/23/24 0829   Fri Aug 23, 2024   0725 I obtained history and examined the patient as noted above.   0828 I rechecked the patient and explained findings.       Additional Documentation  None    Medical Decision Making / Diagnosis     CMS Diagnoses: None    MIPS       None    MDM   Damien Vallecillo is a 88 year old male who presents after mechanical fall after knee gave out. He has history of knee problems in the past and follows with [facility]. He declined knee x-ray today stating he just had one performed in the recent past and is confident he didn't further injure his knee. His greatest concern is his head given he is on blood thinners. CT head negative. No other traumatic injuries noted. Pt ambulatory. We discussed orthopedic follow-up as well as strategies to minimize falls. He is in stable condition at the time of discharge, red flags that should merit ED return were discussed as well as recommended follow-up instructions. All questions were answered and he and his wife are in agreement with the plan.      Disposition   The patient was discharged.     Diagnosis     ICD-10-CM    1. Fall, initial encounter  W19.XXXA       2. Traumatic injury of head, initial encounter  S09.90XA       3. Contusion of right knee, initial encounter  S80.01XA            Scribe Disclosure:  ALEX, John Parmar, am serving as a scribe at 7:32  AM on 8/23/2024 to document services personally performed by Tommy Crook MD based on my observations and the provider's statements to me.        Tommy Crook MD  08/26/24 0246

## 2024-08-23 NOTE — DISCHARGE INSTRUCTIONS
No signs of head bleed or other emergency today.     Always walk with a walker.     Ok to take tylenol 1,000mg every 6 hours as needed for pain. Keep compression on your knee and apply ice as needed.    If right knee still painful/giving out, follow-up with orthopedics in about 1 week to discuss options.     Return to emergency department for inability to walk, severe headache, vomiting, fever > 100.4, or for any other concerns.

## 2024-08-23 NOTE — ED TRIAGE NOTES
"Pt fell in BR this am \"my knee gave out.\" On blood thinners. Pain in R knee and buttocks. Scooted on his back to get to the stairs and get up. Pt states he \"sort of hit my head on the side of the closet.\" No LOC, no AMS, Slight headache.      Triage Assessment (Adult)       Row Name 08/23/24 0703          Triage Assessment    Airway WDL WDL        Respiratory WDL    Respiratory WDL WDL        Skin Circulation/Temperature WDL    Skin Circulation/Temperature WDL X  bruising R knee        Cardiac WDL    Cardiac WDL WDL        Peripheral/Neurovascular WDL    Peripheral Neurovascular WDL WDL        Cognitive/Neuro/Behavioral WDL    Cognitive/Neuro/Behavioral WDL WDL                     "

## 2024-08-27 ENCOUNTER — PATIENT OUTREACH (OUTPATIENT)
Dept: FAMILY MEDICINE | Facility: CLINIC | Age: 89
End: 2024-08-27
Payer: MEDICARE

## 2024-08-27 ENCOUNTER — PATIENT OUTREACH (OUTPATIENT)
Dept: CARE COORDINATION | Facility: CLINIC | Age: 89
End: 2024-08-27
Payer: MEDICARE

## 2024-08-27 NOTE — TELEPHONE ENCOUNTER
Hospital Follow Up  ER 8/23/24    Diagnosis   Fall, initial encounter  Traumatic injury of head    Contusion of right knee     Medications   None    Follow Up instructions   [facility]he[facility] [phone]  Always walk with a walker. Take tylenol 1,000mg every 6 hours as needed for pain. Keep compression on your knee and apply ice as needed.  Return to emergency department for inability to walk, severe headache, vomiting, fever > 100.4, or for any other concerns.    Katt HANNAH RN  Patient Advocate Liaison - PAL RN  [facility]

## 2024-08-27 NOTE — TELEPHONE ENCOUNTER
"  Transitions of Care Outreach  Chief Complaint   Patient presents with    Hospital F/U     Fall, traumatic injury of head, contusion of right knee       Most Recent Admission Date: 8/23/2024   Most Recent Admission Diagnosis:      Most Recent Discharge Date: 8/23/2024   Most Recent Discharge Diagnosis:   Fall, initial encounter - W19.XXXA  Traumatic injury of head, initial encounter - S09.90XA  Contusion of right knee, initial encounter - S80.01XA     Transitions of Care Assessment    Discharge Assessment  How are you doing now that you are home?: \"I'm doing better. My right knee is still a bit weak and gives out every once and a while.\"  How are your symptoms? (Red Flag symptoms escalate to triage hotline per guidelines): Improved  Do you know how to contact your clinic care team if you have future questions or changes to your health status? : Yes  Does the patient have their discharge instructions? : Yes  Does the patient have questions regarding their discharge instructions? : No  Were you started on any new medications or were there changes to any of your previous medications? : No  Does the patient have all of their medications?: Yes  Do you have questions regarding any of your medications? : No  Do you have all of your needed medical supplies or equipment (DME)?  (i.e. oxygen tank, CPAP, cane, etc.): Yes    Additional info:  -Right knee contusion still present but not spreading, pt is no longer taking Tylenol for pain relief  -Able to ambulate normally however is being more cautious with how right knee randomly gives out, using walker and cane   -Had slight headache the day after fall, but is now resolved and has not had any other symptoms since   -Discussed red flags for immediate return to clinic/ER, instructed pt to call back if new/worsening symptoms or if further questions/concerns [phone]    Follow up Plan     Discharge Follow-Up  Discharge follow up appointment scheduled in alignment with " recommended follow up timeframe or Transitions of Risk Category? (Low = within 30 days; Moderate= within 14 days; High= within 7 days): Yes  Discharge Follow Up Appointment Scheduled with?: Specialty Care Provider (Spouse calling [facility] now to schedule appointment) - RN provided phone number [phone]     Future Appointments   Date Time Provider Department Center   1/8/2025  2:00 PM Brittany Jarvis MD [facility]     Outpatient Plan as outlined on AVS reviewed with patient.    Patient was given an opportunity to ask questions, verbalized understanding of plan, and is agreeable.     For any urgent concerns, please contact our 24 hour nurse triage line: [phone] ([phone])       Katt HANNAH RN  Patient Advocate Liaison - PAL RN  [facility]

## 2024-08-27 NOTE — TELEPHONE ENCOUNTER
Pt's wife, Alejandrina left VM on RN PAL line today 8/27  -Informs that patient is now scheduled to see Dr. De León's physician assistant at [facility] on Thurs 8/29 at 8am, no call back from RN needed at this time     Katt HANNAH RN  Patient Advocate Liaison - PAL RN  [facility]

## 2024-08-29 ENCOUNTER — TRANSFERRED RECORDS (OUTPATIENT)
Dept: HEALTH INFORMATION MANAGEMENT | Facility: CLINIC | Age: 89
End: 2024-08-29
Payer: MEDICARE

## 2024-09-10 ENCOUNTER — PATIENT OUTREACH (OUTPATIENT)
Dept: CARE COORDINATION | Facility: CLINIC | Age: 89
End: 2024-09-10
Payer: MEDICARE

## 2024-10-10 ENCOUNTER — TELEPHONE (OUTPATIENT)
Dept: FAMILY MEDICINE | Facility: CLINIC | Age: 89
End: 2024-10-10
Payer: MEDICARE

## 2024-10-10 NOTE — TELEPHONE ENCOUNTER
Patient Quality Outreach    Patient is due for the following:   Physical Annual Wellness Visit    Next Steps:   Patient has upcoming appointment, these items will be addressed at that time.    Type of outreach:    Chart review performed, no outreach needed.      Questions for provider review:    None           Cathryn Nichols, CMA

## 2024-10-15 ENCOUNTER — HOSPITAL ENCOUNTER (OUTPATIENT)
Dept: ULTRASOUND IMAGING | Facility: CLINIC | Age: 89
Discharge: HOME | End: 2024-10-15
Payer: MEDICARE

## 2024-10-15 DIAGNOSIS — I82.4Y2 ACUTE DEEP VEIN THROMBOSIS (DVT) OF PROXIMAL VEIN OF LEFT LOWER EXTREMITY (H): ICD-10-CM

## 2024-10-15 PROCEDURE — 93971 EXTREMITY STUDY: CPT | Mod: LT

## 2024-10-17 ENCOUNTER — PATIENT OUTREACH (OUTPATIENT)
Dept: FAMILY MEDICINE | Facility: CLINIC | Age: 89
End: 2024-10-17
Payer: MEDICARE

## 2024-10-17 NOTE — TELEPHONE ENCOUNTER
"Routing to MAKAYLA Loco CNP-  Please review and advise on US results. Do you want patient to continue anticoagulant at this time?     Patient's wife, Alejandrina left VM on this [facility] direct line today 10/17. Requesting call back to discuss US results.     LLE venous US completed on 10/15/24:  \"IMPRESSION:  1. Negative for acute DVT in the left lower   extremity.  2. Minimal linear stranding in the popliteal vein could be residual tiny amount of chronic DVT, though fully compressible.  3. Left Baker's cyst.\"    Per chart review- 8/15/24 OV with MAKAYLA Loco CNP:  \"(I82.4Y2) Acute deep vein thrombosis (DVT) of proximal vein of left lower extremity (H)  Comment: currently on Eliquis. Placed on starter pack. [facility] Will [facility] order for maintenance dose for 2 more months following starter pack. US ordered for future evaluation. May need to be on longer course of eliquis. Will look into this and follow up on plan w/ patient.\"    Katt HANNAH RN  [facility]   "

## 2024-10-17 NOTE — TELEPHONE ENCOUNTER
Can we schedule a visit (this can be virtual/telephone or in person, whatever they prefer) to discuss results and further plans. I'm thinking he may require longer term therapy and will probably send to hematology to evaluate.     MAKAYLA Loco CNP

## 2024-10-17 NOTE — TELEPHONE ENCOUNTER
"Called pt and pt's wife (CTC on file) and relayed provider message below. Patient was given an opportunity to ask questions, verbalized understanding of plan, and is agreeable.    Pt states, \"I prefer to come in and see Bacilio\"    Scheduled in person OV with MAKAYLA Loco CNP for Monday, 10/21/24 to discuss ultrasound results.    Haily CONTE RN    "

## 2024-10-21 ENCOUNTER — OFFICE VISIT (OUTPATIENT)
Dept: FAMILY MEDICINE | Facility: CLINIC | Age: 89
End: 2024-10-21
Payer: MEDICARE

## 2024-10-21 VITALS
WEIGHT: 254 LBS | BODY MASS INDEX: 36.36 KG/M2 | HEIGHT: 70 IN | HEART RATE: 78 BPM | DIASTOLIC BLOOD PRESSURE: 78 MMHG | TEMPERATURE: 98.1 F | SYSTOLIC BLOOD PRESSURE: 124 MMHG | RESPIRATION RATE: 18 BRPM | OXYGEN SATURATION: 100 %

## 2024-10-21 DIAGNOSIS — Z23 ENCOUNTER FOR IMMUNIZATION: ICD-10-CM

## 2024-10-21 DIAGNOSIS — I82.4Y2 ACUTE DEEP VEIN THROMBOSIS (DVT) OF PROXIMAL VEIN OF LEFT LOWER EXTREMITY (H): Primary | ICD-10-CM

## 2024-10-21 PROCEDURE — G0008 ADMIN INFLUENZA VIRUS VAC: HCPCS

## 2024-10-21 PROCEDURE — 99213 OFFICE O/P EST LOW 20 MIN: CPT

## 2024-10-21 PROCEDURE — 99207 E-CONSULT TO HEMATOLOGY (ADULT OUTPT PROVIDER TO SPECIALIST WRITTEN QUESTION & RESPONSE): CPT

## 2024-10-21 PROCEDURE — 90662 IIV NO PRSV INCREASED AG IM: CPT

## 2024-10-21 ASSESSMENT — PAIN SCALES - GENERAL: PAINLEVEL: NO PAIN (0)

## 2024-10-21 NOTE — Clinical Note
Tiffany Grider, Would like your input on this. Received recommendations from Heme about anticoagulation:  - patient has an unprovoked thrombus. General recommendation is for uninterrupted full dose anticoagulation for total of 3 months.  - Given unprovoked nature- counseling would be related to high risk of recurrent thrombosis if anticoagulation stopped (upwards of 6.3% risk/year over next five years) - Could consider decreasing apixaban to 2.5 mg orally 2 times a day---- in clinical study had the same rate of venous thrombo-embolism recurrence (1.7%) that the 5 mg BID comparison arm. In addition, they had similar risk of major bleeding (0.2%) as placebo (0.5 %). - Could also consider stopping anticoagulation and using aspirin therapy, which is better than no treatment but not as effective as other modalities.   Any thoughts on which you would recommend? Thanks for your time!  MAKAYLA Loco CNP

## 2024-10-21 NOTE — PROGRESS NOTES
"  Assessment & Plan     (I82.4Y2) Acute deep vein thrombosis (DVT) of proximal vein of left lower extremity (H)  (primary encounter diagnosis)  Comment: will reach out to hematology on whether to continue Eliquis for additional 3 months or not. Eliquis plan pending recommendations. Will be in touch w/ patient on this. Patient fully understands and is agreeable with plan of care, at this point patient will follow up as needed unless acute concerns arise in the meantime.  Plan: Adult E-Consult to Hematology (Outpt Provider         to Specialist Written Question & Response)    (Z23) Encounter for immunization  Comment: updated.  Plan: INFLUENZA HIGH DOSE, TRIVALENT, PF (FLUZONE)      The longitudinal plan of care for the diagnosis(es)/condition(s) as documented were addressed during this visit. Due to the added complexity in care, I will continue to support Damien in the subsequent management and with ongoing continuity of care.    BMI  Estimated body mass index is 36.45 kg/m  as calculated from the following:    Height as of this encounter: 1.778 m (5' 10\").    Weight as of this encounter: 115.2 kg (254 lb).     Subjective   Damien is a 88 year old, presenting for the following health issues:  Results (Ultra sound follow up)      10/21/2024     9:39 AM   Additional Questions   Roomed by Destiny Smyth     History of Present Illness       Reason for visit:  Ultra sound test results      Review of Systems  Constitutional, HEENT, cardiovascular, pulmonary, GI, , musculoskeletal, neuro, skin, endocrine and psych systems are negative, except as otherwise noted.      Objective    /78 (BP Location: Left arm, Patient Position: Sitting, Cuff Size: Adult Regular)   Pulse 78   Temp 98.1  F (36.7  C) (Temporal)   Resp 18   Ht 1.778 m (5' 10\")   Wt 115.2 kg (254 lb)   SpO2 100%   BMI 36.45 kg/m    Body mass index is 36.45 kg/m .  Physical Exam  Vitals and nursing note reviewed.   Constitutional:       Appearance: " Normal appearance. He is well-developed. He is obese. He is not ill-appearing or toxic-appearing.   Cardiovascular:      Rate and Rhythm: Normal rate.   Pulmonary:      Effort: Pulmonary effort is normal.   Neurological:      Mental Status: He is alert.   Psychiatric:         Attention and Perception: Attention and perception normal.         Mood and Affect: Mood normal.         Speech: Speech normal.         Behavior: Behavior normal. Behavior is cooperative.         Thought Content: Thought content normal.         Judgment: Judgment normal.        VENOUS ULTRASOUND LEFT LOWER EXTREMITY  10/15/2024 10:11 AM      HISTORY: Acute deep vein thrombosis (DVT) of proximal vein of left  lower extremity (H).     COMPARISON: August 12, 2024     TECHNIQUE: Color Doppler and spectral waveform analysis performed  throughout the deep veins of the left lower extremity.     FINDINGS: The common femoral, proximal great saphenous, femoral veins  demonstrate normal blood flow, compression, and augmentation.  Hypoechoic collection in the popliteal fossa is 3.5 x 1.6 x 2.7 cm. No  internal blood flow. Posterior tibial and peroneal veins are  compressible. Minimal linear filling defect may be present in the  popliteal vein, though appears fully compressible.                                                                      IMPRESSION:  1. Negative for acute DVT in the left lower extremity.  2. Minimal linear stranding in the popliteal vein could be residual  tiny amount of chronic DVT, though fully compressible.  3. Left Paez's cyst.     ALICE HARDY MD      Signed Electronically by: MAKAYLA Loco CNP

## 2024-10-23 ENCOUNTER — E-CONSULT (OUTPATIENT)
Dept: ONCOLOGY | Facility: CLINIC | Age: 89
End: 2024-10-23
Payer: MEDICARE

## 2024-10-23 PROCEDURE — 99451 NTRPROF PH1/NTRNET/EHR 5/>: CPT | Performed by: INTERNAL MEDICINE

## 2024-10-23 NOTE — PROGRESS NOTES
10/23/2024     E-Consult has been accepted.    Interprofessional consultation requested by:  Bacilio Bradford APRN CNP      Clinical Question/Purpose: MY CLINICAL QUESTION IS: patient placed on Eliquis for DVT in August. 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 Risk factor(s): 1. Age,   Current treatment: apixaban    Repeat ultrasound done at 2 months (10/15/24) with potential residual clot.     Noted that patient is having falls and has been seen in ER for potential trauma since starting anticoagulation.     Recommendations:   - patient has an unprovoked thrombus. General recommendation is for uninterrupted full dose anticoagulation for total of 3 months.   - Given unprovoked nature- counseling would be related to high risk of recurrent thrombosis if anticoagulation stopped (upwards of 6.3% risk/year over next five years)  - Could consider decreasing apixaban to 2.5 mg orally 2 times a day---- in clinical study had the same rate of venous thrombo-embolism recurrence (1.7%) that the 5 mg BID comparison arm. In addition, they had similar risk of major bleeding (0.2%) as placebo (0.5 %).  - Could also consider stopping anticoagulation and using aspirin therapy, which is better than no treatment but not as effective as other modalities.       The recommendations provided in this E-Consult are based on a review of clinical data pertinent to the clinical question presented, without a review of the patient's complete medical record or, the benefit of a comprehensive in-person or virtual patient evaluation. This consultation should not replace the clinical judgement and evaluation of the provider ordering this E-Consult. Any new clinical issues, or changes in patient status since the filing of this E-Consult will need to be taken into account when  assessing these recommendations.     My total time spent reviewing clinical information and formulating assessment was 15 minutes.        Brenda Stuart MD/PhD

## 2024-10-30 ENCOUNTER — TELEPHONE (OUTPATIENT)
Dept: FAMILY MEDICINE | Facility: CLINIC | Age: 89
End: 2024-10-30
Payer: MEDICARE

## 2024-10-30 DIAGNOSIS — I82.4Y2 ACUTE DEEP VEIN THROMBOSIS (DVT) OF PROXIMAL VEIN OF LEFT LOWER EXTREMITY (H): Primary | ICD-10-CM

## 2024-10-30 NOTE — TELEPHONE ENCOUNTER
RN spoke to patient with wife present and informed of provider note below. Patient and wife verbalized understanding. Number to schedule ultrasound provided.     Advised to return call to clinic with questions.     DARRICK Schwarz, RN  [facility]

## 2024-10-30 NOTE — TELEPHONE ENCOUNTER
Can we notify patient to continue his eliquis for the full 3 months. I am going to place an order for a repeat US (11/15/24) to see if the clot fully resolved. If not may have to be on low dose eliquis or ASA.    MAKAYLA Loco CNP

## 2024-11-04 ENCOUNTER — PATIENT OUTREACH (OUTPATIENT)
Dept: FAMILY MEDICINE | Facility: CLINIC | Age: 89
End: 2024-11-04
Payer: MEDICARE

## 2024-11-04 NOTE — LETTER
November 4, 2024      Damien Vallecillo  [address]  [place] [address]        Dear aDmien,         I want to update you on some changes to your care team.  Currently, I work closely with your primary care provider as a PAL RN to manage your care needs.  In the future, we will be using a collaborative team-based approach, with a larger RN team at our clinic.  The highly skilled RN team will still work closely with your primary care provider to meet your needs in a timely manner.  This will ensure your messages, questions and care needs are answered promptly.  Starting now, please call [phone] when you need to contact us.  Any previous direct numbers to a PAL RN will route to an available nurse or care team member working with your provider.   We understand this is a change and want to reassure you that this team approach is designed to better support your healthcare needs and overall experience with our clinic.  We are committed to continuing to be involved in your care, and our team of nurses will work closely to ensure you receive exceptional care.    Thank you for entrusting your care to [facility].        Katt Wilson, RN   Clinic Nurse  [facility]

## 2024-11-04 NOTE — TELEPHONE ENCOUNTER
Sent PAL RN letter notifying pt of end of PAL RN services to address on file.      Tiffany LINDO RN   Clinic RN  [facility]

## 2024-11-15 ENCOUNTER — HOSPITAL ENCOUNTER (OUTPATIENT)
Dept: ULTRASOUND IMAGING | Facility: CLINIC | Age: 89
Discharge: HOME | End: 2024-11-15
Payer: MEDICARE

## 2024-11-15 DIAGNOSIS — I82.4Y2 ACUTE DEEP VEIN THROMBOSIS (DVT) OF PROXIMAL VEIN OF LEFT LOWER EXTREMITY (H): ICD-10-CM

## 2024-11-15 PROCEDURE — 93971 EXTREMITY STUDY: CPT | Mod: LT

## 2024-11-18 ENCOUNTER — TELEPHONE (OUTPATIENT)
Dept: FAMILY MEDICINE | Facility: CLINIC | Age: 89
End: 2024-11-18
Payer: MEDICARE

## 2024-11-18 NOTE — TELEPHONE ENCOUNTER
"Routing to MAKAYLA Loco CNP to review    Patient's wife left VM on RN direct line today 11/18/24 - wondering about results from LLE US completed on 11/15/24. Does not appear provider has reviewed/commented on imaging yet.    Per chart review - 11/15/24 US results:  \"FINDINGS: The left common femoral, femoral, popliteal, and tibial  veins demonstrate normal compressibility, spectral waveform, color flow and augmentation. Where imaged the great saphenous vein is patent. Previously described linear stranding in the popliteal vein could not be visualized on today's exam. Fluid collection left popliteal fossa measuring 4.4 x 3.7 x 1.0 cm, consistent with popliteal fossa cyst.     The contralateral right common femoral vein demonstrates normal  compressibility, spectral waveform, color flow and augmentation.                                                                      IMPRESSION:   1. No evidence of deep venous thrombosis in the left lower extremity.  2. Previously seen stranding in the left popliteal vein was not  definitely visualized on today's exam.  3. Left popliteal fossa cyst. \"      Katt HANNAH RN  [facility]   "

## 2024-11-20 NOTE — TELEPHONE ENCOUNTER
Discussed w/ patient that eliquis is to be finished after 3 months given nature of DVT and no active cancer at this time, FYI to Dr. Jarvis regarding this was completed. At this point follow up at upcoming visit w/ Dr. Jarvis in January regarding this if necessary.    MAKAYLA Loco CNP

## 2024-11-20 NOTE — TELEPHONE ENCOUNTER
Routing to MAKAYLA Loco CNP-  What is the plan for patient's Eliquis?     See result note from MAKAYLA Loco CNP:        See 10/30/24 telephone encounter:      Katt HANNAH RN  [facility]

## 2025-01-08 ENCOUNTER — OFFICE VISIT (OUTPATIENT)
Dept: FAMILY MEDICINE | Facility: CLINIC | Age: 89
End: 2025-01-08
Payer: MEDICARE

## 2025-01-08 VITALS
BODY MASS INDEX: 37.14 KG/M2 | DIASTOLIC BLOOD PRESSURE: 83 MMHG | TEMPERATURE: 97 F | HEIGHT: 70 IN | SYSTOLIC BLOOD PRESSURE: 143 MMHG | RESPIRATION RATE: 19 BRPM | HEART RATE: 62 BPM | OXYGEN SATURATION: 95 % | WEIGHT: 259.4 LBS

## 2025-01-08 DIAGNOSIS — E78.5 HYPERLIPIDEMIA LDL GOAL <100: ICD-10-CM

## 2025-01-08 DIAGNOSIS — E11.9 CONTROLLED TYPE 2 DIABETES MELLITUS WITHOUT COMPLICATION, WITHOUT LONG-TERM CURRENT USE OF INSULIN (H): ICD-10-CM

## 2025-01-08 DIAGNOSIS — Z96.651 HISTORY OF ARTHROPLASTY OF RIGHT KNEE: ICD-10-CM

## 2025-01-08 DIAGNOSIS — I87.2 VENOUS STASIS DERMATITIS OF BOTH LOWER EXTREMITIES: ICD-10-CM

## 2025-01-08 DIAGNOSIS — I10 ESSENTIAL HYPERTENSION: ICD-10-CM

## 2025-01-08 DIAGNOSIS — Z00.00 ENCOUNTER FOR MEDICARE ANNUAL WELLNESS EXAM: Primary | ICD-10-CM

## 2025-01-08 DIAGNOSIS — C49.A2 GASTROINTESTINAL STROMAL TUMOR OF STOMACH (H): ICD-10-CM

## 2025-01-08 DIAGNOSIS — R35.0 BENIGN PROSTATIC HYPERPLASIA WITH URINARY FREQUENCY: ICD-10-CM

## 2025-01-08 DIAGNOSIS — N40.1 BENIGN PROSTATIC HYPERPLASIA WITH URINARY FREQUENCY: ICD-10-CM

## 2025-01-08 DIAGNOSIS — Z85.09 HX OF MALIGNANT GASTROINTESTINAL STROMAL TUMOR (GIST): ICD-10-CM

## 2025-01-08 DIAGNOSIS — I82.432 ACUTE DEEP VEIN THROMBOSIS (DVT) OF POPLITEAL VEIN OF LEFT LOWER EXTREMITY (H): ICD-10-CM

## 2025-01-08 PROBLEM — G47.33 OSA (OBSTRUCTIVE SLEEP APNEA): Status: RESOLVED | Noted: 2023-03-08 | Resolved: 2025-01-08

## 2025-01-08 PROBLEM — M25.561 RIGHT KNEE PAIN: Status: RESOLVED | Noted: 2017-05-08 | Resolved: 2025-01-08

## 2025-01-08 PROBLEM — L71.1 RHINOPHYMA: Status: RESOLVED | Noted: 2018-09-28 | Resolved: 2025-01-08

## 2025-01-08 PROBLEM — L20.84 INTRINSIC ECZEMA: Status: RESOLVED | Noted: 2023-10-12 | Resolved: 2025-01-08

## 2025-01-08 PROBLEM — D62 ANEMIA DUE TO BLOOD LOSS, ACUTE: Status: RESOLVED | Noted: 2023-05-25 | Resolved: 2025-01-08

## 2025-01-08 PROBLEM — R15.9 FECAL INCONTINENCE: Status: RESOLVED | Noted: 2020-09-16 | Resolved: 2025-01-08

## 2025-01-08 PROBLEM — R53.81 PHYSICAL DECONDITIONING: Status: RESOLVED | Noted: 2023-03-08 | Resolved: 2025-01-08

## 2025-01-08 PROBLEM — R60.0 PEDAL EDEMA: Status: RESOLVED | Noted: 2018-01-18 | Resolved: 2025-01-08

## 2025-01-08 LAB
CREAT UR-MCNC: 92.4 MG/DL
EST. AVERAGE GLUCOSE BLD GHB EST-MCNC: 134 MG/DL
HBA1C MFR BLD: 6.3 % (ref 0–5.6)
MICROALBUMIN UR-MCNC: <12 MG/L
MICROALBUMIN/CREAT UR: NORMAL MG/G{CREAT}

## 2025-01-08 PROCEDURE — 99214 OFFICE O/P EST MOD 30 MIN: CPT | Mod: 25 | Performed by: FAMILY MEDICINE

## 2025-01-08 PROCEDURE — 82043 UR ALBUMIN QUANTITATIVE: CPT | Performed by: FAMILY MEDICINE

## 2025-01-08 PROCEDURE — G0439 PPPS, SUBSEQ VISIT: HCPCS | Performed by: FAMILY MEDICINE

## 2025-01-08 PROCEDURE — 83036 HEMOGLOBIN GLYCOSYLATED A1C: CPT | Performed by: FAMILY MEDICINE

## 2025-01-08 PROCEDURE — 36415 COLL VENOUS BLD VENIPUNCTURE: CPT | Performed by: FAMILY MEDICINE

## 2025-01-08 PROCEDURE — 82570 ASSAY OF URINE CREATININE: CPT | Performed by: FAMILY MEDICINE

## 2025-01-08 SDOH — HEALTH STABILITY: PHYSICAL HEALTH: ON AVERAGE, HOW MANY DAYS PER WEEK DO YOU ENGAGE IN MODERATE TO STRENUOUS EXERCISE (LIKE A BRISK WALK)?: 7 DAYS

## 2025-01-08 SDOH — HEALTH STABILITY: PHYSICAL HEALTH: ON AVERAGE, HOW MANY MINUTES DO YOU ENGAGE IN EXERCISE AT THIS LEVEL?: 20 MIN

## 2025-01-08 ASSESSMENT — SOCIAL DETERMINANTS OF HEALTH (SDOH): HOW OFTEN DO YOU GET TOGETHER WITH FRIENDS OR RELATIVES?: ONCE A WEEK

## 2025-01-08 NOTE — LETTER
January 9, 2025      Damien Vallecillo  [address]  [facility] [address]        Dear ,    We are writing to inform you of your test results.    Your results are all normal including urine test and your A1c blood sugar test is very good.       Resulted Orders   Albumin Random Urine Quantitative with Creat Ratio   Result Value Ref Range    Creatinine Urine mg/dL 92.4 mg/dL      Comment:      The reference ranges have not been established in urine creatinine. The results should be integrated into the clinical context for interpretation.    Albumin Urine mg/L <12.0 mg/L      Comment:      The reference ranges have not been established in urine albumin. The results should be integrated into the clinical context for interpretation.    Albumin Urine mg/g Cr        Comment:      Unable to calculate, urine albumin and/or urine creatinine is outside detectable limits.  Microalbuminuria is defined as an albumin:creatinine ratio of 17 to 299 for males and 25 to 299 for females. A ratio of albumin:creatinine of 300 or higher is indicative of overt proteinuria.  Due to biologic variability, positive results should be confirmed by a second, first-morning random or 24-hour timed urine specimen. If there is discrepancy, a third specimen is recommended. When 2 out of 3 results are in the microalbuminuria range, this is evidence for incipient nephropathy and warrants increased efforts at glucose control, blood pressure control, and institution of therapy with an angiotensin-converting-enzyme (ACE) inhibitor (if the patient can tolerate it).     HEMOGLOBIN A1C   Result Value Ref Range    Estimated Average Glucose 134 (H) <117 mg/dL    Hemoglobin A1C 6.3 (H) 0.0 - 5.6 %      Comment:      Normal <5.7%   Prediabetes 5.7-6.4%    Diabetes 6.5% or higher     Note: Adopted from ADA consensus guidelines.       If you have any questions or concerns, please call the clinic at the number listed above.       Sincerely,      Amer  MD Matheus    Electronically signed

## 2025-01-08 NOTE — PATIENT INSTRUCTIONS
Patient Education   Preventive Care Advice   This is general advice given by our system to help you stay healthy. However, your care team may have specific advice just for you. Please talk to your care team about your preventive care needs.  Nutrition  Eat 5 or more servings of fruits and vegetables each day.  Try wheat bread, brown rice and whole grain pasta (instead of white bread, rice, and pasta).  Get enough calcium and vitamin D. Check the label on foods and aim for 100% of the RDA (recommended daily allowance).  Lifestyle  Exercise at least 150 minutes each week  (30 minutes a day, 5 days a week).  Do muscle strengthening activities 2 days a week. These help control your weight and prevent disease.  No smoking.  Wear sunscreen to prevent skin cancer.  Have a dental exam and cleaning every 6 months.  Yearly exams  See your health care team every year to talk about:  Any changes in your health.  Any medicines your care team has prescribed.  Preventive care, family planning, and ways to prevent chronic diseases.  Shots (vaccines)   HPV shots (up to age 26), if you've never had them before.  Hepatitis B shots (up to age 59), if you've never had them before.  COVID-19 shot: Get this shot when it's due.  Flu shot: Get a flu shot every year.  Tetanus shot: Get a tetanus shot every 10 years.  Pneumococcal, hepatitis A, and RSV shots: Ask your care team if you need these based on your risk.  Shingles shot (for age 50 and up)  General health tests  Diabetes screening:  Starting at age 35, Get screened for diabetes at least every 3 years.  If you are younger than age 35, ask your care team if you should be screened for diabetes.  Cholesterol test: At age 39, start having a cholesterol test every 5 years, or more often if advised.  Bone density scan (DEXA): At age 50, ask your care team if you should have this scan for osteoporosis (brittle bones).  Hepatitis C: Get tested at least once in your life.  STIs (sexually  transmitted infections)  Before age 24: Ask your care team if you should be screened for STIs.  After age 24: Get screened for STIs if you're at risk. You are at risk for STIs (including HIV) if:  You are sexually active with more than one person.  You don't use condoms every time.  You or a partner was diagnosed with a sexually transmitted infection.  If you are at risk for HIV, ask about PrEP medicine to prevent HIV.  Get tested for HIV at least once in your life, whether you are at risk for HIV or not.  Cancer screening tests  Cervical cancer screening: If you have a cervix, begin getting regular cervical cancer screening tests starting at age 21.  Breast cancer scan (mammogram): If you've ever had breasts, begin having regular mammograms starting at age 40. This is a scan to check for breast cancer.  Colon cancer screening: It is important to start screening for colon cancer at age 45.  Have a colonoscopy test every 10 years (or more often if you're at risk) Or, ask your provider about stool tests like a FIT test every year or Cologuard test every 3 years.  To learn more about your testing options, visit:   .  For help making a decision, visit:   https://bit.ly/wk75315.  Prostate cancer screening test: If you have a prostate, ask your care team if a prostate cancer screening test (PSA) at age 55 is right for you.  Lung cancer screening: If you are a current or former smoker ages 50 to 80, ask your care team if ongoing lung cancer screenings are right for you.  For informational purposes only. Not to replace the advice of your health care provider. Copyright   2023 [facility] [employer]. All rights reserved. Clinically reviewed by the [facility]. [employer] [id number] - REV 01/24.  Hearing Loss: Care Instructions  Overview     Hearing loss is a sudden or slow decrease in how well you hear. It can range from slight to profound. Permanent hearing loss can occur with aging. It also can  happen when you are exposed long-term to loud noise. Examples include listening to loud music, riding motorcycles, or being around other loud machines.  Hearing loss can affect your work and home life. It can make you feel lonely or depressed. You may feel that you have lost your independence. But hearing aids and other devices can help you hear better and feel connected to others.  Follow-up care is a key part of your treatment and safety. Be sure to make and go to all appointments, and call your doctor if you are having problems. It's also a good idea to know your test results and keep a list of the medicines you take.  How can you care for yourself at home?  Avoid loud noises whenever possible. This helps keep your hearing from getting worse.  Always wear hearing protection around loud noises.  Wear a hearing aid as directed.  A professional can help you pick a hearing aid that will work best for you.  You can also get hearing aids over the counter for mild to moderate hearing loss.  Have hearing tests as your doctor suggests. They can show whether your hearing has changed. Your hearing aid may need to be adjusted.  Use other devices as needed. These may include:  Telephone amplifiers and hearing aids that can connect to a television, stereo, radio, or microphone.  Devices that use lights or vibrations. These alert you to the doorbell, a ringing telephone, or a baby monitor.  Television closed-captioning. This shows the words at the bottom of the screen. Most new TVs can do this.  TTY (text telephone). This lets you type messages back and forth on the telephone instead of talking or listening. These devices are also called TDD. When messages are typed on the keyboard, they are sent over the phone line to a receiving TTY. The message is shown on a monitor.  Use text messaging, social media, and email if it is hard for you to communicate by telephone.  Try to learn a listening technique called speechreading. It is  "not lipreading. You pay attention to people's gestures, expressions, posture, and tone of voice. These clues can help you understand what a person is saying. Face the person you are talking to, and have them face you. Make sure the lighting is good. You need to see the other person's face clearly.  Think about counseling if you need help to adjust to your hearing loss.  When should you call for help?  Watch closely for changes in your health, and be sure to contact your doctor if:    You think your hearing is getting worse.     You have new symptoms, such as dizziness or nausea.   Where can you learn more?  Go to https://www.[employer].net/patiented  Enter R798 in the search box to learn more about \"Hearing Loss: Care Instructions.\"  Current as of: September 27, 2023  Content Version: 14.3    2024 [employer].   Care instructions adapted under license by your healthcare professional. If you have questions about a medical condition or this instruction, always ask your healthcare professional. [employer] disclaims any warranty or liability for your use of this information.       "

## 2025-01-08 NOTE — ASSESSMENT & PLAN NOTE
Still struggling with pain in the Rt knee, and he is following up with [facility] and he maybe planning to do another knee replacement.

## 2025-01-08 NOTE — ASSESSMENT & PLAN NOTE
Diabetes is controlled, diet controlled. .  Continue current treatment regimen. Check A1c today. Encourage exercise but it is hard to walk.   Diabetes will be reassessed in 6 months.

## 2025-01-08 NOTE — ASSESSMENT & PLAN NOTE
Resection 2023. Oncology surveillance. That was the cause of his underlying anemia.  Pt appointment is in May 22/2025.

## 2025-01-08 NOTE — PROGRESS NOTES
Preventive Care Visit  [facility]  Brittany Jarvis MD, Family Medicine  Jan 8, 2025      Assessment & Plan   Problem List Items Addressed This Visit       Venous stasis dermatitis of both lower extremities     Uses compression stockings regularly.   Uses Furosemide 20 mg twice daily.         History of arthroplasty of right knee     Still struggling with pain in the Rt knee, and he is following up with [facility] and he maybe planning to do another knee replacement.          Hyperlipidemia LDL goal <100     Statin therapy tolerated.  Continue on Atorvastatin 40 mg daily.         Controlled type 2 diabetes mellitus without complication, without long-term current use of insulin (H)     Diabetes is controlled, diet controlled. .  Continue current treatment regimen. Check A1c today. Encourage exercise but it is hard to walk.   Diabetes will be reassessed in 6 months.         Relevant Orders    Albumin Random Urine Quantitative with Creat Ratio    HEMOGLOBIN A1C    Benign prostatic hyperplasia with urinary frequency     Hard time to get started, then hard time to finish as it keeps dripping after he finishes, takes tamsulosin 0.4 mg daily.         Hx of malignant gastrointestinal stromal tumor (GIST)     Resection 2023. Oncology surveillance. That was the cause of his underlying anemia.  Pt appointment is in May 22/2025.         Essential hypertension     Controlled, continue on Losartan 100 mg and Furosemide 20 mg daily.         Acute deep vein thrombosis (DVT) of popliteal vein of left lower extremity (H)     Treated with Apixaban for 3 months, then it was stopped, (per oncology) discussed the risk for recurrent clots in the future          RESOLVED: Gastrointestinal stromal tumor of stomach (H)     Other Visit Diagnoses       Encounter for Medicare annual wellness exam    -  Primary                    BMI  Estimated body mass index is 37.22 kg/m  as calculated from the following:    Height as of this  "encounter: 1.778 m (5' 10\").    Weight as of this encounter: 117.7 kg (259 lb 6.4 oz).   Weight management plan: Discussed healthy diet and exercise guidelines    Counseling  Appropriate preventive services were addressed with this patient via screening, questionnaire, or discussion as appropriate for fall prevention, nutrition, physical activity, Tobacco-use cessation, social engagement, weight loss and cognition.  Checklist reviewing preventive services available has been given to the patient.  Reviewed patient's diet, addressing concerns and/or questions.   The patient was instructed to see the dentist every 6 months.   Addressed any concerns about safety while driving.  The patient was provided with written information regarding signs of hearing loss.           Richi Quezada is a 89 year old, presenting for the following:  Wellness Visit (6 month follow up /Microalbumin /)            HPI      Diabetes Follow-up    How often are you checking your blood sugar? Not at all  What concerns do you have today about your diabetes? None   Do you have any of these symptoms? (Select all that apply)  No numbness or tingling in feet.  No redness, sores or blisters on feet.  No complaints of excessive thirst.  No reports of blurry vision.  No significant changes to weight.      BP Readings from Last 2 Encounters:   01/08/25 (!) 143/83   10/21/24 124/78     Hemoglobin A1C (%)   Date Value   07/10/2024 6.2 (H)   09/06/2023 6.8 (H)   09/16/2020 5.9 (H)   11/15/2019 6.1 (H)     LDL Cholesterol Calculated (mg/dL)   Date Value   09/16/2021 31   11/15/2019 29   04/28/2015 52             Hypertension Follow-up    Do you check your blood pressure regularly outside of the clinic? Yes   Are you following a low salt diet? No  Are your blood pressures ever more than 140 on the top number (systolic) OR more   than 90 on the bottom number (diastolic), for example 140/90? Yes occasionally.    Health Care Directive  Patient has a Health " "Care Directive on file         No data to display                   No data to display                   No data to display                  12/27/2023   Social Factors   Worry food won't last until get money to buy more Patient declined   Food not last or not have enough money for food? No   Do you have housing? (Housing is defined as stable permanent housing and does not include staying ouside in a car, in a tent, in an abandoned building, in an overnight shelter, or couch-surfing.) Patient declined   Are you worried about losing your housing? Patient declined   Lack of transportation? Patient declined   Unable to get utilities (heat,electricity)? Patient declined          No data to display                   No data to display                   No data to display                     No data to display                            No data to display              Social History     Tobacco Use    Smoking status: Never     Passive exposure: Never    Smokeless tobacco: Never   Vaping Use    Vaping status: Never Used   Substance Use Topics    Alcohol use: Yes     Comment: occ.    Drug use: No             Reviewed and updated as needed this visit by Provider                    Past Medical History:   Diagnosis Date    Acute suppurative arthritis (H)     Acute, but ill-defined, cerebrovascular disease     Anemia due to blood loss, acute 05/25/2023    Ankle fracture 12/28/2018    Arthritis     Benign prostatic hyperplasia with urinary frequency 03/29/2022    Cheilosis 09/28/2018    Chronic headaches     Closed right ankle fracture 12/29/2018    Cognitive attention deficit 09/16/2021    Controlled type 2 diabetes mellitus without complication, without long-term current use of insulin (H) 09/16/2021    diet    COVID-19 vaccination refused 09/26/2023    Diabetes (H)     \"Pre-diabetes\"    Dislocation of right patella 09/16/2020    Elevated serum creatinine 11/13/2019    GFR Estimate Date Value Ref Range Status " 09/30/2019 84 >60 mL/min/[1.73_m2] Final   Comment:   Non  GFR Calc Starting 12/18/2018, serum creatinine based estimated GFR (eGFR) will be  calculated using the Chronic Kidney Disease Epidemiology Collaboration  (CKD-EPI) equation.        Encounter for immunization 09/21/2022    Essential hypertension 10/12/2023    External hemorrhoids 09/21/2022    Fall 03/08/2023    Fe deficiency anemia 07/10/2023    Fecal incontinence 09/16/2020    Gastric stromal tumor (H) 09/06/2023    Glucose intolerance (impaired glucose tolerance) 05/31/2013    Gout of multiple sites     Problem list name updated by automated process. Provider to review      Gout, unspecified     Grieving 09/16/2021    Hammer toe of left foot 10/21/2016    Heart murmur 09/06/2023    Hematuria     Hematuria, unspecified type 09/16/2021    History of arthroplasty of right knee 08/17/2017    History of blood transfusion     Hyperlipidemia LDL goal <160 11/13/2019    Hypersomnia 03/07/2023    Immunization deficiency 10/12/2023    Intrinsic eczema 10/12/2023    Left foot pain 09/30/2019    Left knee pain, unspecified chronicity 05/08/2017    Lentigo maligna (H)     Malignant neoplasm of rectosigmoid junction (H)     Morbid obesity due to excess calories (H) 10/21/2016    RENAE (obstructive sleep apnea) 03/08/2023    Pedal edema 01/18/2018    Peripheral polyneuropathy 09/30/2019    Physical deconditioning 03/08/2023    Pre-diabetes 10/21/2016    RBC microcytosis 02/14/2017    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    Right knee pain, unspecified chronicity 05/08/2017    Rosacea 09/28/2018    S/P total knee arthroplasty 06/19/2017    Syncope     Tubulovillous adenoma of colon     Urinary retention 09/26/2023    Venous stasis dermatitis of both lower extremities 10/21/2016    Xerostomia 09/26/2023     Past Surgical History:   Procedure Laterality Date    ARTHROPLASTY KNEE Right 06/19/2017    Procedure: ARTHROPLASTY KNEE;  Right total knee arthroplasty, Hammer toe  repair toe 2,3,4,5 left foot with osteotomy;  Surgeon: Alec Raymond MD;  Location:  OR    COLONOSCOPY N/A 06/23/2015    Procedure: COMBINED COLONOSCOPY, SINGLE OR MULTIPLE BIOPSY/POLYPECTOMY BY BIOPSY;  Surgeon: Kimberly Alfaro MD;  Location:  GI    COLONOSCOPY N/A 07/30/2015    Procedure: COLONOSCOPY;  Surgeon: Enrique Salazar MD;  Location:  OR    COLONOSCOPY N/A 07/31/2018    Procedure: COLONOSCOPY;  Colonoscopy;  Surgeon: Tiffany Cheema MD;  Location:  GI    COLONOSCOPY N/A 05/26/2023    Procedure: Colonoscopy;  Surgeon: Juan Grimaldo MD;  Location:  GI    COLONOSCOPY N/A 06/15/2023    Procedure: colonoscopy;   no cecum as pt has had partial colectomy;  Surgeon: Sherif Escalona MD;  Location:  GI    DAVINCI PELVIC PROCEDURE N/A 09/20/2023    Procedure: Robot assisted lysis of adhesions, Resection of gastric gastrointestinal stromal  tumor, omental mass biopsy;  Surgeon: Chino Ny MD;  Location:  OR    ENDOSCOPIC ULTRASOUND UPPER GASTROINTESTINAL TRACT (GI) N/A 07/13/2023    Procedure: Endoscopic ultrasound upper gastrointestinal tract with fine needle aspiration;  Surgeon: Kae Mccall MD;  Location:  OR    ESOPHAGOSCOPY, GASTROSCOPY, DUODENOSCOPY (EGD), COMBINED N/A 05/26/2023    Procedure: Esophagoscopy, gastroscopy, duodenoscopy (EGD), combined;  Surgeon: Juan Grimaldo MD;  Location:  GI    HERNIORRHAPHY EPIGASTRIC  04/27/2012    Procedure:HERNIORRHAPHY EPIGASTRIC; Epigastric Hernia Repair with mesh ; Surgeon:BOLIVAR OLIVEIRA; Location: OR    LAPAROSCOPIC ASSISTED COLECTOMY Right 07/30/2015    Procedure: LAPAROSCOPIC ASSISTED COLECTOMY;  Surgeon: Enrique Salazar MD;  Location:  OR    ORTHOPEDIC SURGERY      lt knee arthroplasty    REALIGN PATELLA Right 10/09/2017    Procedure: REALIGN PATELLA;  Revision right total knee arthroplasty;  Surgeon: Alec Raymond MD;  Location:  OR    REPAIR HAMMER TOE Left 06/19/2017     Procedure: REPAIR HAMMER TOE;  Hammer toe repair toe 2,3,4,5 left foot with osteotomy   ;  Surgeon: Beverly Kim DPM, Podiatry/Foot and Ankle Surgery;  Location: [facility] OR    resection GIST stomach      SIGMOIDOSCOPY FLEXIBLE  05/29/2013    Procedure: SIGMOIDOSCOPY FLEXIBLE;  SIGMOIDOSCOPY FLEXIBLE (FV) Needs blood sugar ck'd;  Surgeon: Enrique Salazar MD;  Location: [facility] GI    skin cancer excision      ZZC NONSPECIFIC PROCEDURE      right heel surgery; spur removed.     Current providers sharing in care for this patient include:  Patient Care Team:  Brittany Jarvis MD as PCP - General (Family Medicine)  Obi Strange MD as Assigned PCP  Alec Raymond MD as MD (Orthopedics)  Beverly Kim DPM, Podiatry/Foot and Ankle Surgery (Podiatry)  Cholo Koenig MD as MD (Neurology)  Chino Ny MD as Assigned Surgical Provider  Cathryn Pearson DO as Physician (Hematology & Oncology)  Ashely Blanchard, RN as Specialty Care Coordinator (Hematology & Oncology)  Geeta Carpenter RN as Diabetes Educator (Diabetes Education)  Arlene Otto PA-C as Assigned Cancer Care Provider    The following health maintenance items are reviewed in Epic and correct as of today:  Health Maintenance   Topic Date Due    RSV VACCINE (1 - 1-dose 75+ series) Never done    COVID-19 Vaccine (8 - 2024-25 season) 09/01/2024    MICROALBUMIN  09/06/2024    MEDICARE ANNUAL WELLNESS VISIT  09/26/2024    PHQ-2 (once per calendar year)  01/01/2025    A1C  01/10/2025    ANNUAL REVIEW OF HM ORDERS  04/02/2025    BMP  02/12/2025    CBC  08/12/2025    FALL RISK ASSESSMENT  10/21/2025    ADVANCE CARE PLANNING  07/10/2029    DTAP/TDAP/TD IMMUNIZATION (5 - Td or Tdap) 09/16/2030    INFLUENZA VACCINE  Completed    Pneumococcal Vaccine: 50+ Years  Completed    ZOSTER IMMUNIZATION  Addressed    HPV IMMUNIZATION  Aged Out    MENINGITIS IMMUNIZATION  Aged Out    RSV MONOCLONAL ANTIBODY  Aged Out    LIPID  Discontinued     "TSH W/FREE T4 REFLEX  Discontinued    DIABETIC FOOT EXAM  Discontinued    EYE EXAM  Discontinued    COLORECTAL CANCER SCREENING  Discontinued         Review of Systems  Constitutional, HEENT, cardiovascular, pulmonary, GI, , musculoskeletal, neuro, skin, endocrine and psych systems are negative, except as otherwise noted.     Objective    Exam  There were no vitals taken for this visit.   Estimated body mass index is 36.45 kg/m  as calculated from the following:    Height as of 10/21/24: 1.778 m (5' 10\").    Weight as of 10/21/24: 115.2 kg (254 lb).    Physical Exam  GENERAL: alert and no distress  NECK: no adenopathy, no asymmetry, masses, or scars  RESP: lungs clear to auscultation - no rales, rhonchi or wheezes  CV: regular rates and rhythm, normal S1 S2, no S3 or S4, and no murmur, click or rub  MS: mild swelling in both lower legs.         2/10/2023   Mini Cog   Clock Draw Score 2 Normal   3 Item Recall 3 objects recalled   Mini Cog Total Score 5              Signed Electronically by: Brittany Jarvis MD    "

## 2025-01-08 NOTE — ASSESSMENT & PLAN NOTE
Treated with Apixaban for 3 months, then it was stopped, (per oncology) discussed the risk for recurrent clots in the future

## 2025-01-08 NOTE — ASSESSMENT & PLAN NOTE
Hard time to get started, then hard time to finish as it keeps dripping after he finishes, takes tamsulosin 0.4 mg daily.

## 2025-01-16 ENCOUNTER — TRANSFERRED RECORDS (OUTPATIENT)
Dept: HEALTH INFORMATION MANAGEMENT | Facility: CLINIC | Age: 89
End: 2025-01-16
Payer: MEDICARE

## 2025-05-15 ENCOUNTER — LAB (OUTPATIENT)
Dept: INFUSION THERAPY | Facility: CLINIC | Age: 89
End: 2025-05-15
Attending: INTERNAL MEDICINE
Payer: MEDICARE

## 2025-05-15 ENCOUNTER — HOSPITAL ENCOUNTER (OUTPATIENT)
Dept: CT IMAGING | Facility: CLINIC | Age: 89
Discharge: HOME | End: 2025-05-15
Attending: PHYSICIAN ASSISTANT
Payer: MEDICARE

## 2025-05-15 DIAGNOSIS — C49.A0 MALIGNANT GASTROINTESTINAL STROMAL TUMOR, UNSPECIFIED SITE (H): ICD-10-CM

## 2025-05-15 LAB
ALBUMIN SERPL BCG-MCNC: 4.2 G/DL (ref 3.5–5.2)
ALP SERPL-CCNC: 67 U/L (ref 40–150)
ALT SERPL W P-5'-P-CCNC: 25 U/L (ref 0–70)
ANION GAP SERPL CALCULATED.3IONS-SCNC: 12 MMOL/L (ref 7–15)
AST SERPL W P-5'-P-CCNC: 21 U/L (ref 0–45)
BASOPHILS # BLD AUTO: 0 10E3/UL (ref 0–0.2)
BASOPHILS NFR BLD AUTO: 1 %
BILIRUB SERPL-MCNC: 1 MG/DL
BUN SERPL-MCNC: 25.6 MG/DL (ref 8–23)
CALCIUM SERPL-MCNC: 9.4 MG/DL (ref 8.8–10.4)
CHLORIDE SERPL-SCNC: 104 MMOL/L (ref 98–107)
CREAT SERPL-MCNC: 0.95 MG/DL (ref 0.67–1.17)
EGFRCR SERPLBLD CKD-EPI 2021: 77 ML/MIN/1.73M2
EOSINOPHIL # BLD AUTO: 0.2 10E3/UL (ref 0–0.7)
EOSINOPHIL NFR BLD AUTO: 3 %
ERYTHROCYTE [DISTWIDTH] IN BLOOD BY AUTOMATED COUNT: 13.4 % (ref 10–15)
GLUCOSE SERPL-MCNC: 193 MG/DL (ref 70–99)
HCO3 SERPL-SCNC: 26 MMOL/L (ref 22–29)
HCT VFR BLD AUTO: 42.3 % (ref 40–53)
HGB BLD-MCNC: 14.4 G/DL (ref 13.3–17.7)
IMM GRANULOCYTES # BLD: 0 10E3/UL
IMM GRANULOCYTES NFR BLD: 1 %
LYMPHOCYTES # BLD AUTO: 1 10E3/UL (ref 0.8–5.3)
LYMPHOCYTES NFR BLD AUTO: 14 %
MCH RBC QN AUTO: 29.4 PG (ref 26.5–33)
MCHC RBC AUTO-ENTMCNC: 34 G/DL (ref 31.5–36.5)
MCV RBC AUTO: 86 FL (ref 78–100)
MONOCYTES # BLD AUTO: 0.7 10E3/UL (ref 0–1.3)
MONOCYTES NFR BLD AUTO: 10 %
NEUTROPHILS # BLD AUTO: 4.8 10E3/UL (ref 1.6–8.3)
NEUTROPHILS NFR BLD AUTO: 72 %
NRBC # BLD AUTO: 0 10E3/UL
NRBC BLD AUTO-RTO: 0 /100
PLATELET # BLD AUTO: 193 10E3/UL (ref 150–450)
POTASSIUM SERPL-SCNC: 3.6 MMOL/L (ref 3.4–5.3)
PROT SERPL-MCNC: 6.7 G/DL (ref 6.4–8.3)
RBC # BLD AUTO: 4.9 10E6/UL (ref 4.4–5.9)
SODIUM SERPL-SCNC: 142 MMOL/L (ref 135–145)
WBC # BLD AUTO: 6.7 10E3/UL (ref 4–11)

## 2025-05-15 PROCEDURE — 71260 CT THORAX DX C+: CPT

## 2025-05-15 PROCEDURE — 82040 ASSAY OF SERUM ALBUMIN: CPT | Performed by: PHYSICIAN ASSISTANT

## 2025-05-15 PROCEDURE — 250N000011 HC RX IP 250 OP 636: Performed by: PHYSICIAN ASSISTANT

## 2025-05-15 PROCEDURE — 85025 COMPLETE CBC W/AUTO DIFF WBC: CPT | Performed by: PHYSICIAN ASSISTANT

## 2025-05-15 PROCEDURE — 250N000009 HC RX 250: Performed by: PHYSICIAN ASSISTANT

## 2025-05-15 RX ORDER — IOPAMIDOL 755 MG/ML
500 INJECTION, SOLUTION INTRAVASCULAR ONCE
Status: COMPLETED | OUTPATIENT
Start: 2025-05-15 | End: 2025-05-15

## 2025-05-15 RX ADMIN — SODIUM CHLORIDE 65 ML: 9 INJECTION, SOLUTION INTRAVENOUS at 09:45

## 2025-05-15 RX ADMIN — IOPAMIDOL 100 ML: 755 INJECTION, SOLUTION INTRAVENOUS at 09:45

## 2025-05-15 NOTE — PROGRESS NOTES
Nursing Note:  Damien Vallecillo presents today for labs+PIV for CT scan.    Patient seen by provider today: No   present during visit today: Not Applicable.    Note: N/A.    Intravenous Access:  Lab draw site LAC, Needle type angiocath, Gauge 20.  Labs drawn without difficulty.  Peripheral IV placed.    Discharge Plan:   Patient was sent to radiology for CT scan appointment.    Patria Ogden RN

## 2025-05-16 ENCOUNTER — RESULTS FOLLOW-UP (OUTPATIENT)
Dept: ONCOLOGY | Facility: CLINIC | Age: 89
End: 2025-05-16

## 2025-05-22 ENCOUNTER — ONCOLOGY VISIT (OUTPATIENT)
Dept: ONCOLOGY | Facility: CLINIC | Age: 89
End: 2025-05-22
Attending: PHYSICIAN ASSISTANT
Payer: MEDICARE

## 2025-05-22 VITALS
HEART RATE: 66 BPM | SYSTOLIC BLOOD PRESSURE: 171 MMHG | HEIGHT: 70 IN | RESPIRATION RATE: 18 BRPM | TEMPERATURE: 98 F | BODY MASS INDEX: 38.81 KG/M2 | DIASTOLIC BLOOD PRESSURE: 80 MMHG | OXYGEN SATURATION: 99 % | WEIGHT: 271.1 LBS

## 2025-05-22 DIAGNOSIS — C49.A0 MALIGNANT GASTROINTESTINAL STROMAL TUMOR, UNSPECIFIED SITE (H): Primary | ICD-10-CM

## 2025-05-22 PROCEDURE — G0463 HOSPITAL OUTPT CLINIC VISIT: HCPCS | Performed by: INTERNAL MEDICINE

## 2025-05-22 RX ORDER — ERGOCALCIFEROL (VITAMIN D2) 10 MCG
TABLET ORAL
COMMUNITY

## 2025-05-22 ASSESSMENT — PAIN SCALES - GENERAL: PAINLEVEL_OUTOF10: NO PAIN (0)

## 2025-05-22 NOTE — NURSING NOTE
"Oncology Rooming Note    May 22, 2025 12:39 PM   Damien Vallecillo is a 89 year old male who presents for:    Chief Complaint   Patient presents with    Oncology Clinic Visit     Hx of malignant gastrointestinal stromal tumor (GIST)       Initial Vitals: BP (!) 171/80   Pulse 66   Temp 98  F (36.7  C) (Temporal)   Resp 18   Ht 1.778 m (5' 10\")   Wt 123 kg (271 lb 1.6 oz)   SpO2 99%   BMI 38.90 kg/m   Estimated body mass index is 38.9 kg/m  as calculated from the following:    Height as of this encounter: 1.778 m (5' 10\").    Weight as of this encounter: 123 kg (271 lb 1.6 oz). Body surface area is 2.46 meters squared.  No Pain (0) Comment: Data Unavailable   No LMP for male patient.  Allergies reviewed: Yes  Medications reviewed: Yes    Medications: Medication refills not needed today.  Pharmacy name entered into Ramen:    [place], [place] - [address] [facility] - [place], [place] - [id number] - [place], [place] - [address] AT [facility] [id number]    Frailty Screening:   Is the patient here for a new oncology consult visit in cancer care? 2. No    PHQ9:  Did this patient require a PHQ9?: No      Clinical concerns: 1 year follow up       Katlyn Tarango MA              "

## 2025-05-22 NOTE — LETTER
5/22/2025      Damien Vallecillo  [address]  [facility] [id number]      Dear Colleague,    Thank you for referring your patient, Damien Vallecillo, to the [facility]. Please see a copy of my visit note below.    Oncology/Hematology Visit Note    May 22, 2025    Reason for visit: Follow up GIST    Oncology HPI: Damien Vallecillo is a 89 year old male with GIST s/p resection.      Patient has history of colon resection with reanastomosis due to high grade polyps 10-12 years ago.     He has had recurrent abdominal pain with GIB.     EGD 5/25/23: Normal esophagus, single submucosal papule (nodule) found in the stomach.     Colonoscopy 5/25/23: Patent end-to-side ileo-colonic anastomosis, diverticulosis with bleed.     Colonoscopy 6/12/23: end-to-end ileo-colonic anastomosis. Green stool coming from ileum. Blood in the recto-sigmoid colon in the descending colon and in the transverse colon. Diverticulosis.      EUS 7/13/23: 2.7 cm gastric submucosal nodule. Benign appearing small pancreatic cyst. Cholelithiasis without choledocholithiasis. Path returned as gastrointestinal stromal tumor, low mitotic activity <1/50 HPF.     CT A/P 8/22/23: exophytic 2 cm solid mass arising from the lesser curvature of the stomach, compatible with known GIST. No definite evidence of metastatic disease. Subcentimeter hypoattenuating lesions of the liver too small to characterize. Cholelithiasis, hepatomegaly with hepatic steatosis, colonic diverticulosis, and prostatomegaly.     On 9/20/23, he underwent robotic assisted diagnostic lap with resection of gastric GIST and omental biopsy with Dr. Ny. 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 Omental biopsy and resection were negative for malignancy. (pT2Nx, 2,8 cm greatest dimension, 0 mitoses/5mm2, Grade 1 low risk, no necrosis, all margins negative, no lymph nodes submited, Positive KIT/DOG1).    Follow-up PET on  11/21/2023 with ALVARADO.  Dr. Pearson discussed that he needed follow-up with his surgery team and repeat EGD in addition to colonoscopy given his history.  Imatinib was briefly discussed and low risk setting, but he wishes to continue to monitor and surveillance.    CT C/A/P 5/15/2025: Since 5/28/2024, no evidence for progressive metastatic disease within the chest, abdomen or pelvis.     Interval History: Damien is here unaccompanied today.  He continues to do well with no complaints.  No fever, chills, erin pain, urinary changes, stool changes.  Appetite is good. Bad knees. Loves to recount personal stories from the 1960s.     Review of Systems: See interval hx. Denies fevers, chills, changes in vision, cough, CP, SOB, abdominal pain, N/V, diarrhea, changes in urination, bleeding, bruising.     PMHx and Social Hx reviewed per EPIC.      Medications:  Current Outpatient Medications   Medication Sig Dispense Refill     atorvastatin (LIPITOR) 40 MG tablet Take 20 mg by mouth every evening       diclofenac (VOLTAREN) 1 % topical gel Apply 2 g topically 2 times daily       doxycycline hyclate (PERIOSTAT) 20 MG tablet Take 1 tablet (20 mg) by mouth daily       fish oil-omega-3 fatty acids 1000 MG capsule Take 1 g by mouth daily       furosemide (LASIX) 20 MG tablet Take 1 tablet (20 mg) by mouth 2 times daily 180 tablet 1     glucosamine-chondroitin 500-400 MG CAPS per capsule Take 1 capsule by mouth daily       losartan (COZAAR) 100 MG tablet Take 1 tablet (100 mg) by mouth daily 90 tablet 2     metroNIDAZOLE (METROGEL) 0.75 % external gel APPLY THIN LAYER TO FACE TWICE A DAY FOR ROSACEA       Multiple Vitamins-Minerals (MULTIVITAMIN ADULT PO) Take 1 tablet by mouth daily Reported on 5/12/2017       neomycin-polymyxin-hydrocortisone (CORTISPORIN) 3.5-45046-9 otic suspension Place 3 drops into both ears 2 times daily as needed (itchy) 10 mL 3     tamsulosin (FLOMAX) 0.4 MG capsule Take 1 capsule (0.4 mg) by mouth daily  "(Patient taking differently: Take 0.4 mg by mouth every evening.) 90 capsule 3       Allergies   Allergen Reactions     Levetiracetam [Keppra] Rash     Oxcarbazepine [Oxcarbazepine] Rash       EXAM:    Ht 1.778 m (5' 10\")   BMI 37.22 kg/m      GENERAL:  Male, in no acute distress.  Alert and oriented x3. Well groomed.   HEENT:  Normocephalic, atraumatic. No conjunctival injection or eye swelling.   LUNGS:  Nonlabored breathing, no cough or audible wheezing, able to speak full sentences.  MSK: Full ROM UE.    SKIN: No rash on exposed skin.   NEURO: CN grossly intact, speech normal  PSYCH: Mentation appears normal, insight and judgement intact      Labs:    05/28/24 10:18   Sodium 143   Potassium 3.8   Chloride 106   Carbon Dioxide (CO2) 24   Urea Nitrogen 19.1   Creatinine 0.82   GFR Estimate 85   Calcium 9.0   Anion Gap 13   Albumin 4.2   Protein Total 6.8   Alkaline Phosphatase 67   ALT 23   AST 21   Bilirubin Total 0.8   Glucose 129 (H)   WBC 6.4   Hemoglobin 14.3   Hematocrit 42.9   Platelet Count 188   RBC Count 4.80   MCV 89   MCH 29.8   MCHC 33.3   RDW 13.8   % Neutrophils 70   % Lymphocytes 14   % Monocytes 10   % Eosinophils 4   % Basophils 1   Absolute Basophils 0.0   Absolute Eosinophils 0.3   Absolute Immature Granulocytes 0.0   Absolute Lymphocytes 0.9   Absolute Monocytes 0.6   % Immature Granulocytes 1   Absolute Neutrophils 4.6   Absolute NRBCs 0.0   NRBCs per 100 WBC 0   (H): Data is abnormally high    Imaging:   CT CHEST/ABDOMEN/PELVIS WITH CONTRAST May 28, 2024 11:29 AM     CLINICAL HISTORY: Malignant gastrointestinal stromal tumor,  unspecified site (H).     TECHNIQUE: CT scan of the chest, abdomen, and pelvis was performed  following injection of IV contrast. Multiplanar reformats were  obtained. Dose reduction techniques were used.   CONTRAST: 99mL Isovue-370.     COMPARISON: PET/CT 11/21/2023.     FINDINGS:   LUNGS AND PLEURA: No effusions or acute airspace consolidation. Stable  solid right " middle lobe nodule measuring 5 mm series 12 image 152. No  new airspace disease identified.     MEDIASTINUM/AXILLAE: No adenopathy or acute mediastinal abnormality.  Mild thoracic aortic calcifications.     CORONARY ARTERY CALCIFICATION: Mild.     HEPATOBILIARY: No new focal hepatic observation. Multiple layering  gallstones.     PANCREAS: Fatty atrophy.     SPLEEN: No new worrisome abnormality. Incidental stable splenule.     ADRENAL GLANDS: Stable mild nodular thickening of the adrenals  bilaterally. No new adrenal abnormality.     KIDNEYS/BLADDER: Normal.     BOWEL: Stable postoperative changes. Colonic diverticulosis without  diverticulitis. No acute bowel abnormality identified. No visible  focal bowel lesion.     PELVIC ORGANS: Prostate measures 5.7 cm. No acute pelvic abnormality.     ADDITIONAL FINDINGS: None.     MUSCULOSKELETAL: Diffuse degenerative changes of the spine. No focal  bony lesion identified.                                                                      IMPRESSION:  1. Stable exam without evidence for new disease.  2. Stable pulmonary nodule on the right.  3. Enlarged prostate.  4. Cholelithiasis.    Impression/Plan: Damien Vallecillo is a 89 year old male with GIST s/p resection, currently on surveillance.    GIST: S/p resection of gastric GIST and omental biopsy, path returned spindle cell type of the lesser curvature of the stomach.  Omental biopsy negative for malignancy.  All margins negative, no lymph nodes submitted.  PET scan November 2023 with no malignancy.  Dr. Pearson had previously discussed observation versus imatinib and due to advanced age and patient's wishes, he continued on surveillance.  Labs reviewed today and CBC/CMP look good.  CT CAP on 5/28/2024 with no new disease, stable pulmonary nodule, enlarged prostate.  Continue with CT CAP in 1 year for up to 5 years.   -MD, labs, CT CAP in 1 year  - Check B12 and ferritin next week as he is post a partial  gastrectomy.    Enlarged prostate: PSA normal 1 year ago.      30 minutes spent on the date of the encounter doing chart review, review of test results, interpretation of tests, patient visit, and documentation     Roz hansen MD  Hematology/Oncology  [facility]                  Again, thank you for allowing me to participate in the care of your patient.        Sincerely,        Roz Hansen MD    Electronically signed

## 2025-05-22 NOTE — PROGRESS NOTES
Oncology/Hematology Visit Note    May 22, 2025    Reason for visit: Follow up GIST    Oncology HPI: Damien Vallecillo is a 89 year old male with GIST s/p resection.      Patient has history of colon resection with reanastomosis due to high grade polyps 10-12 years ago.     He has had recurrent abdominal pain with GIB.     EGD 5/25/23: Normal esophagus, single submucosal papule (nodule) found in the stomach.     Colonoscopy 5/25/23: Patent end-to-side ileo-colonic anastomosis, diverticulosis with bleed.     Colonoscopy 6/12/23: end-to-end ileo-colonic anastomosis. Green stool coming from ileum. Blood in the recto-sigmoid colon in the descending colon and in the transverse colon. Diverticulosis.      EUS 7/13/23: 2.7 cm gastric submucosal nodule. Benign appearing small pancreatic cyst. Cholelithiasis without choledocholithiasis. Path returned as gastrointestinal stromal tumor, low mitotic activity <1/50 HPF.     CT A/P 8/22/23: exophytic 2 cm solid mass arising from the lesser curvature of the stomach, compatible with known GIST. No definite evidence of metastatic disease. Subcentimeter hypoattenuating lesions of the liver too small to characterize. Cholelithiasis, hepatomegaly with hepatic steatosis, colonic diverticulosis, and prostatomegaly.     On 9/20/23, he underwent robotic assisted diagnostic lap with resection of gastric GIST and omental biopsy with Dr. Ny. 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 Omental biopsy and resection were negative for malignancy. (pT2Nx, 2,8 cm greatest dimension, 0 mitoses/5mm2, Grade 1 low risk, no necrosis, all margins negative, no lymph nodes submited, Positive KIT/DOG1).    Follow-up PET on 11/21/2023 with ALVARADO.  Dr. Pearson discussed that he needed follow-up with his surgery team and repeat EGD in addition to colonoscopy given his history.  Imatinib was briefly discussed and low risk setting, but he wishes to continue to monitor and  "surveillance.    CT C/A/P 5/15/2025: Since 5/28/2024, no evidence for progressive metastatic disease within the chest, abdomen or pelvis.     Interval History: Damien is here unaccompanied today.  He continues to do well with no complaints.  No fever, chills, erin pain, urinary changes, stool changes.  Appetite is good. Bad knees. Loves to recount personal stories from the 1960s.     Review of Systems: See interval hx. Denies fevers, chills, changes in vision, cough, CP, SOB, abdominal pain, N/V, diarrhea, changes in urination, bleeding, bruising.     PMHx and Social Hx reviewed per EPIC.      Medications:  Current Outpatient Medications   Medication Sig Dispense Refill    atorvastatin (LIPITOR) 40 MG tablet Take 20 mg by mouth every evening      diclofenac (VOLTAREN) 1 % topical gel Apply 2 g topically 2 times daily      doxycycline hyclate (PERIOSTAT) 20 MG tablet Take 1 tablet (20 mg) by mouth daily      fish oil-omega-3 fatty acids 1000 MG capsule Take 1 g by mouth daily      furosemide (LASIX) 20 MG tablet Take 1 tablet (20 mg) by mouth 2 times daily 180 tablet 1    glucosamine-chondroitin 500-400 MG CAPS per capsule Take 1 capsule by mouth daily      losartan (COZAAR) 100 MG tablet Take 1 tablet (100 mg) by mouth daily 90 tablet 2    metroNIDAZOLE (METROGEL) 0.75 % external gel APPLY THIN LAYER TO FACE TWICE A DAY FOR ROSACEA      Multiple Vitamins-Minerals (MULTIVITAMIN ADULT PO) Take 1 tablet by mouth daily Reported on 5/12/2017      neomycin-polymyxin-hydrocortisone (CORTISPORIN) 3.5-35371-0 otic suspension Place 3 drops into both ears 2 times daily as needed (itchy) 10 mL 3    tamsulosin (FLOMAX) 0.4 MG capsule Take 1 capsule (0.4 mg) by mouth daily (Patient taking differently: Take 0.4 mg by mouth every evening.) 90 capsule 3       Allergies   Allergen Reactions    Levetiracetam [Keppra] Rash    Oxcarbazepine [Oxcarbazepine] Rash       EXAM:    Ht 1.778 m (5' 10\")   BMI 37.22 kg/m      GENERAL:  Male, in " no acute distress.  Alert and oriented x3. Well groomed.   HEENT:  Normocephalic, atraumatic. No conjunctival injection or eye swelling.   LUNGS:  Nonlabored breathing, no cough or audible wheezing, able to speak full sentences.  MSK: Full ROM UE.    SKIN: No rash on exposed skin.   NEURO: CN grossly intact, speech normal  PSYCH: Mentation appears normal, insight and judgement intact      Labs:    05/28/24 10:18   Sodium 143   Potassium 3.8   Chloride 106   Carbon Dioxide (CO2) 24   Urea Nitrogen 19.1   Creatinine 0.82   GFR Estimate 85   Calcium 9.0   Anion Gap 13   Albumin 4.2   Protein Total 6.8   Alkaline Phosphatase 67   ALT 23   AST 21   Bilirubin Total 0.8   Glucose 129 (H)   WBC 6.4   Hemoglobin 14.3   Hematocrit 42.9   Platelet Count 188   RBC Count 4.80   MCV 89   MCH 29.8   MCHC 33.3   RDW 13.8   % Neutrophils 70   % Lymphocytes 14   % Monocytes 10   % Eosinophils 4   % Basophils 1   Absolute Basophils 0.0   Absolute Eosinophils 0.3   Absolute Immature Granulocytes 0.0   Absolute Lymphocytes 0.9   Absolute Monocytes 0.6   % Immature Granulocytes 1   Absolute Neutrophils 4.6   Absolute NRBCs 0.0   NRBCs per 100 WBC 0   (H): Data is abnormally high    Imaging:   CT CHEST/ABDOMEN/PELVIS WITH CONTRAST May 28, 2024 11:29 AM     CLINICAL HISTORY: Malignant gastrointestinal stromal tumor,  unspecified site (H).     TECHNIQUE: CT scan of the chest, abdomen, and pelvis was performed  following injection of IV contrast. Multiplanar reformats were  obtained. Dose reduction techniques were used.   CONTRAST: 99mL Isovue-370.     COMPARISON: PET/CT 11/21/2023.     FINDINGS:   LUNGS AND PLEURA: No effusions or acute airspace consolidation. Stable  solid right middle lobe nodule measuring 5 mm series 12 image 152. No  new airspace disease identified.     MEDIASTINUM/AXILLAE: No adenopathy or acute mediastinal abnormality.  Mild thoracic aortic calcifications.     CORONARY ARTERY CALCIFICATION: Mild.     HEPATOBILIARY: No  new focal hepatic observation. Multiple layering  gallstones.     PANCREAS: Fatty atrophy.     SPLEEN: No new worrisome abnormality. Incidental stable splenule.     ADRENAL GLANDS: Stable mild nodular thickening of the adrenals  bilaterally. No new adrenal abnormality.     KIDNEYS/BLADDER: Normal.     BOWEL: Stable postoperative changes. Colonic diverticulosis without  diverticulitis. No acute bowel abnormality identified. No visible  focal bowel lesion.     PELVIC ORGANS: Prostate measures 5.7 cm. No acute pelvic abnormality.     ADDITIONAL FINDINGS: None.     MUSCULOSKELETAL: Diffuse degenerative changes of the spine. No focal  bony lesion identified.                                                                      IMPRESSION:  1. Stable exam without evidence for new disease.  2. Stable pulmonary nodule on the right.  3. Enlarged prostate.  4. Cholelithiasis.    Impression/Plan: Damien Vallecillo is a 89 year old male with GIST s/p resection, currently on surveillance.    GIST: S/p resection of gastric GIST and omental biopsy, path returned spindle cell type of the lesser curvature of the stomach.  Omental biopsy negative for malignancy.  All margins negative, no lymph nodes submitted.  PET scan November 2023 with no malignancy.  Dr. Pearson had previously discussed observation versus imatinib and due to advanced age and patient's wishes, he continued on surveillance.  Labs reviewed today and CBC/CMP look good.  CT CAP on 5/28/2024 with no new disease, stable pulmonary nodule, enlarged prostate.  Continue with CT CAP in 1 year for up to 5 years.   -MD, labs, CT CAP in 1 year  - Check B12 and ferritin next week as he is post a partial gastrectomy.    Enlarged prostate: PSA normal 1 year ago.      30 minutes spent on the date of the encounter doing chart review, review of test results, interpretation of tests, patient visit, and documentation     Roz hansen MD  Hematology/Oncology  [facility]

## 2025-06-16 ENCOUNTER — APPOINTMENT (OUTPATIENT)
Age: 89
Setting detail: DERMATOLOGY
End: 2025-06-16

## 2025-06-16 DIAGNOSIS — L82.1 OTHER SEBORRHEIC KERATOSIS: ICD-10-CM

## 2025-06-16 DIAGNOSIS — Z85.828 PERSONAL HISTORY OF OTHER MALIGNANT NEOPLASM OF SKIN: ICD-10-CM

## 2025-06-16 DIAGNOSIS — I87.2 VENOUS INSUFFICIENCY (CHRONIC) (PERIPHERAL): ICD-10-CM | Status: STABLE

## 2025-06-16 DIAGNOSIS — Z85.820 PERSONAL HISTORY OF MALIGNANT MELANOMA OF SKIN: ICD-10-CM

## 2025-06-16 DIAGNOSIS — L71.1 RHINOPHYMA: ICD-10-CM | Status: STABLE

## 2025-06-16 DIAGNOSIS — D18.0 HEMANGIOMA: ICD-10-CM

## 2025-06-16 DIAGNOSIS — D22 MELANOCYTIC NEVI: ICD-10-CM

## 2025-06-16 DIAGNOSIS — L29.89 OTHER PRURITUS: ICD-10-CM

## 2025-06-16 DIAGNOSIS — Z71.89 OTHER SPECIFIED COUNSELING: ICD-10-CM

## 2025-06-16 DIAGNOSIS — L57.0 ACTINIC KERATOSIS: ICD-10-CM

## 2025-06-16 PROBLEM — D22.5 MELANOCYTIC NEVI OF TRUNK: Status: ACTIVE | Noted: 2025-06-16

## 2025-06-16 PROBLEM — D18.01 HEMANGIOMA OF SKIN AND SUBCUTANEOUS TISSUE: Status: ACTIVE | Noted: 2025-06-16

## 2025-06-16 PROCEDURE — ? SUNSCREEN RECOMMENDATIONS

## 2025-06-16 PROCEDURE — ? PRESCRIPTION MEDICATION MANAGEMENT

## 2025-06-16 PROCEDURE — ? OTC TREATMENT REGIMEN

## 2025-06-16 PROCEDURE — ? ADDITIONAL NOTES

## 2025-06-16 PROCEDURE — ? COUNSELING

## 2025-06-16 PROCEDURE — ? OBSERVATION

## 2025-06-16 ASSESSMENT — LOCATION DETAILED DESCRIPTION DERM
LOCATION DETAILED: LEFT INFERIOR MEDIAL FOREHEAD
LOCATION DETAILED: LEFT NASAL SIDEWALL
LOCATION DETAILED: LEFT DISTAL PRETIBIAL REGION
LOCATION DETAILED: INFERIOR THORACIC SPINE
LOCATION DETAILED: RIGHT POSTERIOR NECK
LOCATION DETAILED: SUPERIOR THORACIC SPINE
LOCATION DETAILED: MEDIAL FRONTAL SCALP
LOCATION DETAILED: LEFT SUPERIOR HELIX
LOCATION DETAILED: RIGHT SUPERIOR MEDIAL MIDBACK
LOCATION DETAILED: INFERIOR MID FOREHEAD
LOCATION DETAILED: RIGHT NASAL SIDEWALL
LOCATION DETAILED: EPIGASTRIC SKIN
LOCATION DETAILED: MIDDLE STERNUM
LOCATION DETAILED: RIGHT DISTAL PRETIBIAL REGION

## 2025-06-16 ASSESSMENT — LOCATION SIMPLE DESCRIPTION DERM
LOCATION SIMPLE: RIGHT LOWER BACK
LOCATION SIMPLE: ABDOMEN
LOCATION SIMPLE: LEFT FOREHEAD
LOCATION SIMPLE: LEFT PRETIBIAL REGION
LOCATION SIMPLE: RIGHT PRETIBIAL REGION
LOCATION SIMPLE: FRONTAL SCALP
LOCATION SIMPLE: INFERIOR FOREHEAD
LOCATION SIMPLE: POSTERIOR NECK
LOCATION SIMPLE: LEFT EAR
LOCATION SIMPLE: UPPER BACK
LOCATION SIMPLE: RIGHT NOSE
LOCATION SIMPLE: CHEST
LOCATION SIMPLE: LEFT NOSE

## 2025-06-16 ASSESSMENT — LOCATION ZONE DERM
LOCATION ZONE: TRUNK
LOCATION ZONE: LEG
LOCATION ZONE: EAR
LOCATION ZONE: NECK
LOCATION ZONE: FACE
LOCATION ZONE: SCALP
LOCATION ZONE: NOSE

## 2025-06-16 NOTE — PROCEDURE: PRESCRIPTION MEDICATION MANAGEMENT
Render In Strict Bullet Format?: No
Detail Level: Zone
Continue Regimen: Continue applying metro gel twice a day to the nose

## 2025-06-16 NOTE — HPI: PRURITUS
How Severe Is Your Itching?: mild
Additional History: Patient reports the diprolene did not make it better and some OTC itching cream helped.

## 2025-06-16 NOTE — PROCEDURE: ADDITIONAL NOTES
Render Risk Assessment In Note?: no
Detail Level: Simple
Additional Notes: Continue wearing compression socks and leg elevation

## 2025-06-16 NOTE — PROCEDURE: OBSERVATION
Body Location Override (Optional - Billing Will Still Be Based On Selected Body Map Location If Applicable): right posterior neck
Detail Level: Detailed
Size Of Lesion In Cm (Optional): 0
Body Location Override (Optional - Billing Will Still Be Based On Selected Body Map Location If Applicable): left helical rim 12:00 and left posterior leg

## 2025-06-16 NOTE — HPI: RASH (ROSACEA)
Is This A New Presentation, Or A Follow-Up?: Follow Up Rosacea
Additional History: Rhinophyma. Patient stopped taking doxycycline due to starting blood thinners, reports he is back on the doxycycline.

## 2025-06-16 NOTE — PROCEDURE: COUNSELING
Detail Level: Generalized
Detail Level: Simple
Prescription Strength Graduated Compression Stockings Recommendations: The patient was counseled that prescription strength graduated compression stockings should be worn for all waking hours. They will follow up with a venous specialist to monitor graduated compression stocking usage and their symptoms.
Detail Level: Detailed
Detail Level: Zone

## 2025-06-16 NOTE — PROCEDURE: OTC TREATMENT REGIMEN
Detail Level: Zone
Patient Specific Otc Recommendations (Will Not Stick From Patient To Patient): Continue using the OTC anti itch cream on the scalp when flare ups occur

## (undated) DEVICE — SUCTION MANIFOLD NEPTUNE 2 SYS 4 PORT 0702-020-000

## (undated) DEVICE — GLOVE PROTEXIS BLUE W/NEU-THERA 8.0  2D73EB80

## (undated) DEVICE — GOWN XLG DISP 9545

## (undated) DEVICE — APPLICATORS COTTON TIP 6"X2 STERILE LF C15053-006

## (undated) DEVICE — DRSG ABDOMINAL 07 1/2X8" 7197D

## (undated) DEVICE — KIT ENDO TURNOVER/PROCEDURE W/CLEAN A SCOPE LINERS 103888

## (undated) DEVICE — ESU GROUND PAD UNIVERSAL W/O CORD

## (undated) DEVICE — ENDO NDL BALL TIP ULTRASOUND 25GA ECHO-25

## (undated) DEVICE — SU VICRYL 3-0 PS-2 18" UND J497H

## (undated) DEVICE — KIT ENDO FIRST STEP DISINFECTANT 200ML W/POUCH EP-4

## (undated) DEVICE — SOL WATER IRRIG 1000ML BOTTLE 2F7114

## (undated) DEVICE — LINEN HALF SHEET 5512

## (undated) DEVICE — DAVINCI XI SEAL UNIVERSAL 5-8MM 470361

## (undated) DEVICE — SOL NACL 0.9% INJ 1000ML BAG 2B1324X

## (undated) DEVICE — BLADE KNIFE SURG 15 371115

## (undated) DEVICE — GLOVE PROTEXIS BLUE W/NEU-THERA 6.5  2D73EB65

## (undated) DEVICE — PACK TOTAL KNEE BOXED LATEX FREE PO15TKFCT

## (undated) DEVICE — SOL WATER IRRIG 500ML BOTTLE 2F7113

## (undated) DEVICE — PACK LOWER EXTREMITY RIDGES

## (undated) DEVICE — ENDO POUCH UNIVERSAL RETRIEVAL SYSTEM INZII 5MM CD003

## (undated) DEVICE — DAVINCI XI SUCTION IRRIGATOR ENDOWRIST 480299

## (undated) DEVICE — SUCTION CANISTER MEDIVAC LINER 3000ML W/LID 65651-530

## (undated) DEVICE — LINEN FULL SHEET 5511

## (undated) DEVICE — ESU PENCIL W/HOLSTER E2350H

## (undated) DEVICE — SU ETHIBOND 1 CT-1 30" X425H

## (undated) DEVICE — TUBING SUCTION MEDI-VAC SOFT 3/16"X20' N520A

## (undated) DEVICE — GLOVE BIOGEL PI MICRO INDICATOR UNDERGLOVE SZ 7.5 48975

## (undated) DEVICE — TOURNIQUET CUFF 30" REPRO BLUE 60-7070-105

## (undated) DEVICE — IMM KNEE 20"

## (undated) DEVICE — DRSG KERLIX 4 1/2"X4YDS ROLL 6730

## (undated) DEVICE — SOL WATER IRRIG 1000ML BOTTLE 07139-09

## (undated) DEVICE — SU VICRYL 2-0 SH 27" J317H

## (undated) DEVICE — BLADE SAW SAGITTAL STRK 21X85X1.2MM 4/5/6/2000 4221-120-085

## (undated) DEVICE — SU VICRYL 4-0 PS-2 18" UND J496H

## (undated) DEVICE — SU VICRYL 0 CP-1 27" J467H

## (undated) DEVICE — NDL INSUFFLATION 13GA 120MM C2201

## (undated) DEVICE — DRSG ADAPTIC 3X8" 6113

## (undated) DEVICE — ENDO SNARE HEXAGONAL ISNARE 2.5X4.0CM W/25GA NDL

## (undated) DEVICE — DEVICE SUTURE PASSER 14GA WECK EFX EFXSP2

## (undated) DEVICE — SU VICRYL 2-0 CP-1 27" UND J266H

## (undated) DEVICE — TOURNIQUET CUFF 18" REPRO RED 60-7070-103

## (undated) DEVICE — GOWN LG DISP 9515

## (undated) DEVICE — PREP PAD ALCOHOL 6818

## (undated) DEVICE — NDL 27GA 1.25" 305136

## (undated) DEVICE — BNDG ELASTIC 4"X5YDS UNSTERILE 6611-40

## (undated) DEVICE — SU PROLENE 4-0 PS-2 18" 8682G

## (undated) DEVICE — GLOVE PROTEXIS W/NEU-THERA 6.5  2D73TE65

## (undated) DEVICE — DRSG GAUZE 4X4" 8044

## (undated) DEVICE — ESU GROUND PAD ADULT W/CORD E7507

## (undated) DEVICE — BAG CLEAR TRASH 1.3M 39X33" P4040C

## (undated) DEVICE — DAVINCI XI OBTURATOR BLADELESS 8MM 470359

## (undated) DEVICE — PREP CHLORAPREP 26ML TINTED ORANGE  260815

## (undated) DEVICE — GLOVE PROTEXIS POWDER FREE 7.5 ORTHOPEDIC 2D73ET75

## (undated) DEVICE — DAVINCI XI DRAPE ARM 470015

## (undated) DEVICE — DRAPE C-ARM MINI 5423

## (undated) DEVICE — GLOVE PROTEXIS POWDER FREE 8.0 ORTHOPEDIC 2D73ET80

## (undated) DEVICE — SU VICRYL 0 UR-6 27" J603H

## (undated) DEVICE — SU STRATAFIX PDS PLUS 2-0 SPIRAL SH 30CM SXPP1B416

## (undated) DEVICE — SOL NACL 0.9% IRRIG 1000ML BOTTLE 2F7124

## (undated) DEVICE — KIT PROCEDURE W/CLEAN-A-SCOPE LINERS V2 200800

## (undated) DEVICE — LINEN ORTHO PACK 5446

## (undated) DEVICE — SU STRATAFIX PDS PLUS 3-0 SPIRAL SH 15CM SXPP1B420

## (undated) DEVICE — Device

## (undated) DEVICE — LINEN TOWEL PACK X10 5473

## (undated) DEVICE — DRAPE STERI U 1015

## (undated) DEVICE — DRSG GAUZE 4X4" TRAY

## (undated) DEVICE — CAST PADDING 6" STERILE 9046S

## (undated) DEVICE — DAVINCI SEAL CANNULA AND STPL 12MM 470380

## (undated) DEVICE — ENDO PROBE COVER ULTRASOUND BALLOON LATEX  MAJ-249

## (undated) DEVICE — DAVINCI XI REDUCER 8-12MM 470381

## (undated) DEVICE — SU DERMABOND ADVANCED .7ML DNX12

## (undated) DEVICE — SYR 10ML FINGER CONTROL W/O NDL 309695

## (undated) DEVICE — LINEN ORTHO ACL PACK 5447

## (undated) DEVICE — DAVINCI HOT SHEARS TIP COVER  400180

## (undated) DEVICE — DAVINCI XI HANDPIECE ESU VESSEL SEALER 8MM EXT 480422

## (undated) DEVICE — LINEN TOWEL PACK X5 5464

## (undated) DEVICE — NDL 18GA 1.5" 305196

## (undated) DEVICE — SOL NACL 0.9% IRRIG 3000ML BAG 2B7477

## (undated) DEVICE — COVER FOOTSWITCH URO

## (undated) DEVICE — MANIFOLD NEPTUNE 4 PORT 700-20

## (undated) DEVICE — BLADE SAW SAGITTAL 25.5X9.5X.4MM FINE LINVATEC 5023-138

## (undated) DEVICE — LUBRICANT INST ELECTROLUBE EL101

## (undated) DEVICE — SYR 10ML LL W/O NDL 302995

## (undated) DEVICE — SPECIMEN CONTAINER 4OZ

## (undated) DEVICE — CATH TRAY FOLEY COUDE SURESTEP 16FR W/DRN BAG LATEX A304416A

## (undated) DEVICE — CAST PADDING 4" UNSTERILE 9044

## (undated) RX ORDER — GLYCOPYRROLATE 0.2 MG/ML
INJECTION INTRAMUSCULAR; INTRAVENOUS
Status: DISPENSED
Start: 2023-09-20

## (undated) RX ORDER — NEOSTIGMINE METHYLSULFATE 5 MG/5 ML
SYRINGE (ML) INTRAVENOUS
Status: DISPENSED
Start: 2017-06-19

## (undated) RX ORDER — BUPIVACAINE HYDROCHLORIDE 5 MG/ML
INJECTION, SOLUTION EPIDURAL; INTRACAUDAL
Status: DISPENSED
Start: 2017-06-19

## (undated) RX ORDER — DEXAMETHASONE SODIUM PHOSPHATE 4 MG/ML
INJECTION, SOLUTION INTRA-ARTICULAR; INTRALESIONAL; INTRAMUSCULAR; INTRAVENOUS; SOFT TISSUE
Status: DISPENSED
Start: 2023-09-20

## (undated) RX ORDER — LIDOCAINE HYDROCHLORIDE 10 MG/ML
INJECTION, SOLUTION EPIDURAL; INFILTRATION; INTRACAUDAL; PERINEURAL
Status: DISPENSED
Start: 2017-10-09

## (undated) RX ORDER — FENTANYL CITRATE 50 UG/ML
INJECTION, SOLUTION INTRAMUSCULAR; INTRAVENOUS
Status: DISPENSED
Start: 2018-07-31

## (undated) RX ORDER — ACETAMINOPHEN 325 MG/1
TABLET ORAL
Status: DISPENSED
Start: 2023-09-20

## (undated) RX ORDER — PROPOFOL 10 MG/ML
INJECTION, EMULSION INTRAVENOUS
Status: DISPENSED
Start: 2023-07-13

## (undated) RX ORDER — CEFAZOLIN SODIUM 1 G/3ML
INJECTION, POWDER, FOR SOLUTION INTRAMUSCULAR; INTRAVENOUS
Status: DISPENSED
Start: 2017-06-19

## (undated) RX ORDER — ONDANSETRON 2 MG/ML
INJECTION INTRAMUSCULAR; INTRAVENOUS
Status: DISPENSED
Start: 2023-07-13

## (undated) RX ORDER — ONDANSETRON 2 MG/ML
INJECTION INTRAMUSCULAR; INTRAVENOUS
Status: DISPENSED
Start: 2017-10-09

## (undated) RX ORDER — KETOROLAC TROMETHAMINE 30 MG/ML
INJECTION, SOLUTION INTRAMUSCULAR; INTRAVENOUS
Status: DISPENSED
Start: 2023-09-20

## (undated) RX ORDER — GLYCOPYRROLATE 0.2 MG/ML
INJECTION INTRAMUSCULAR; INTRAVENOUS
Status: DISPENSED
Start: 2017-10-09

## (undated) RX ORDER — LIDOCAINE HYDROCHLORIDE 10 MG/ML
INJECTION, SOLUTION EPIDURAL; INFILTRATION; INTRACAUDAL; PERINEURAL
Status: DISPENSED
Start: 2023-09-20

## (undated) RX ORDER — BUPIVACAINE HYDROCHLORIDE 5 MG/ML
INJECTION, SOLUTION EPIDURAL; INTRACAUDAL
Status: DISPENSED
Start: 2017-10-09

## (undated) RX ORDER — FENTANYL CITRATE-0.9 % NACL/PF 10 MCG/ML
PLASTIC BAG, INJECTION (ML) INTRAVENOUS
Status: DISPENSED
Start: 2023-09-20

## (undated) RX ORDER — FENTANYL CITRATE 50 UG/ML
INJECTION, SOLUTION INTRAMUSCULAR; INTRAVENOUS
Status: DISPENSED
Start: 2017-06-19

## (undated) RX ORDER — DEXAMETHASONE SODIUM PHOSPHATE 4 MG/ML
INJECTION, SOLUTION INTRA-ARTICULAR; INTRALESIONAL; INTRAMUSCULAR; INTRAVENOUS; SOFT TISSUE
Status: DISPENSED
Start: 2017-10-09

## (undated) RX ORDER — ONDANSETRON 2 MG/ML
INJECTION INTRAMUSCULAR; INTRAVENOUS
Status: DISPENSED
Start: 2023-09-20

## (undated) RX ORDER — HYDROMORPHONE HCL/0.9% NACL/PF 0.2MG/0.2
SYRINGE (ML) INTRAVENOUS
Status: DISPENSED
Start: 2017-10-09

## (undated) RX ORDER — EPHEDRINE SULFATE 50 MG/ML
INJECTION, SOLUTION INTRAMUSCULAR; INTRAVENOUS; SUBCUTANEOUS
Status: DISPENSED
Start: 2017-10-09

## (undated) RX ORDER — BUPIVACAINE HYDROCHLORIDE AND EPINEPHRINE 5; 5 MG/ML; UG/ML
INJECTION, SOLUTION EPIDURAL; INTRACAUDAL; PERINEURAL
Status: DISPENSED
Start: 2023-09-20

## (undated) RX ORDER — PROPOFOL 10 MG/ML
INJECTION, EMULSION INTRAVENOUS
Status: DISPENSED
Start: 2023-09-20

## (undated) RX ORDER — FENTANYL CITRATE 50 UG/ML
INJECTION, SOLUTION INTRAMUSCULAR; INTRAVENOUS
Status: DISPENSED
Start: 2017-10-09

## (undated) RX ORDER — GLYCOPYRROLATE 0.2 MG/ML
INJECTION INTRAMUSCULAR; INTRAVENOUS
Status: DISPENSED
Start: 2023-07-13

## (undated) RX ORDER — DEXMEDETOMIDINE HYDROCHLORIDE 4 UG/ML
INJECTION, SOLUTION INTRAVENOUS
Status: DISPENSED
Start: 2023-09-20

## (undated) RX ORDER — FENTANYL CITRATE 50 UG/ML
INJECTION, SOLUTION INTRAMUSCULAR; INTRAVENOUS
Status: DISPENSED
Start: 2023-05-26

## (undated) RX ORDER — FENTANYL CITRATE 50 UG/ML
INJECTION, SOLUTION INTRAMUSCULAR; INTRAVENOUS
Status: DISPENSED
Start: 2023-09-20

## (undated) RX ORDER — FENTANYL CITRATE 50 UG/ML
INJECTION, SOLUTION INTRAMUSCULAR; INTRAVENOUS
Status: DISPENSED
Start: 2023-06-15

## (undated) RX ORDER — EPHEDRINE SULFATE 50 MG/ML
INJECTION, SOLUTION INTRAMUSCULAR; INTRAVENOUS; SUBCUTANEOUS
Status: DISPENSED
Start: 2023-09-20

## (undated) RX ORDER — LIDOCAINE HYDROCHLORIDE 10 MG/ML
INJECTION, SOLUTION EPIDURAL; INFILTRATION; INTRACAUDAL; PERINEURAL
Status: DISPENSED
Start: 2023-07-13

## (undated) RX ORDER — PROPOFOL 10 MG/ML
INJECTION, EMULSION INTRAVENOUS
Status: DISPENSED
Start: 2017-10-09

## (undated) RX ORDER — LABETALOL HYDROCHLORIDE 5 MG/ML
INJECTION, SOLUTION INTRAVENOUS
Status: DISPENSED
Start: 2017-06-19

## (undated) RX ORDER — GINSENG 100 MG
CAPSULE ORAL
Status: DISPENSED
Start: 2017-06-19

## (undated) RX ORDER — CEFAZOLIN SODIUM 2 G/100ML
INJECTION, SOLUTION INTRAVENOUS
Status: DISPENSED
Start: 2017-10-09

## (undated) RX ORDER — CEFAZOLIN SODIUM/WATER 3 G/30 ML
SYRINGE (ML) INTRAVENOUS
Status: DISPENSED
Start: 2023-09-20